# Patient Record
Sex: MALE | Race: WHITE | Employment: OTHER | URBAN - METROPOLITAN AREA
[De-identification: names, ages, dates, MRNs, and addresses within clinical notes are randomized per-mention and may not be internally consistent; named-entity substitution may affect disease eponyms.]

---

## 2018-01-15 ENCOUNTER — TELEPHONE (OUTPATIENT)
Dept: INTERNAL MEDICINE CLINIC | Facility: CLINIC | Age: 78
End: 2018-01-15

## 2018-01-15 NOTE — TELEPHONE ENCOUNTER
Gianfranco Alvarado is calling to have Dr. Herlinda Lozada request records for [employer].  # [phone] STAT   Ph. # [phone]  [facility] ELISE Valentine's ph. # [phone]    Routed to clinical

## 2018-01-15 NOTE — TELEPHONE ENCOUNTER
Deion advissonali - patient was in [facility] in October 2017  For pneumonia and also an aneurysm was detected that has grown - he wants the records from there send to DR. CODY - is it ok to just call [facility]? To DR. CODY

## 2018-01-16 NOTE — TELEPHONE ENCOUNTER
Called [facility] and spoke with personnel in medical records. She requested that we fax over a letter head with patients name/ and requested information to [phone]. I faxed over letter head and received confirmation that fax went through. To DR. ESCOBAR mancuso

## 2018-01-16 NOTE — TELEPHONE ENCOUNTER
Noted.  I do not know this patient. He has never been seen by me. Usually to obtain records from another hospital we need to have the patient's permission.   Please contact [facility] to see if they can forward records concerning the patient's hospi

## 2018-01-17 NOTE — TELEPHONE ENCOUNTER
Wife - Rey Gipson called  Please re fax  request to [facility] for pt's medical records -  requesting most recent records

## 2018-01-18 ENCOUNTER — OFFICE VISIT (OUTPATIENT)
Dept: INTERNAL MEDICINE CLINIC | Facility: CLINIC | Age: 78
End: 2018-01-18

## 2018-01-18 VITALS
BODY MASS INDEX: 29.92 KG/M2 | WEIGHT: 202 LBS | HEART RATE: 96 BPM | OXYGEN SATURATION: 91 % | SYSTOLIC BLOOD PRESSURE: 100 MMHG | TEMPERATURE: 98 F | DIASTOLIC BLOOD PRESSURE: 66 MMHG | HEIGHT: 69 IN

## 2018-01-18 DIAGNOSIS — I71.4 ABDOMINAL AORTIC ANEURYSM (AAA) WITHOUT RUPTURE (HCC): ICD-10-CM

## 2018-01-18 DIAGNOSIS — E03.9 HYPOTHYROIDISM, UNSPECIFIED TYPE: ICD-10-CM

## 2018-01-18 DIAGNOSIS — N40.1 BENIGN PROSTATIC HYPERPLASIA WITH LOWER URINARY TRACT SYMPTOMS, SYMPTOM DETAILS UNSPECIFIED: ICD-10-CM

## 2018-01-18 DIAGNOSIS — I10 ESSENTIAL HYPERTENSION: ICD-10-CM

## 2018-01-18 DIAGNOSIS — G81.94 LEFT HEMIPARESIS (HCC): ICD-10-CM

## 2018-01-18 DIAGNOSIS — Z00.00 ANNUAL PHYSICAL EXAM: Primary | ICD-10-CM

## 2018-01-18 DIAGNOSIS — J18.9 PNEUMONIA OF BOTH LOWER LOBES DUE TO INFECTIOUS ORGANISM: ICD-10-CM

## 2018-01-18 DIAGNOSIS — I69.359 CVA, OLD, HEMIPARESIS (HCC): ICD-10-CM

## 2018-01-18 DIAGNOSIS — E78.00 HYPERCHOLESTEROLEMIA: ICD-10-CM

## 2018-01-18 PROBLEM — I71.40 ABDOMINAL AORTIC ANEURYSM (AAA) WITHOUT RUPTURE: Status: ACTIVE | Noted: 2018-01-18

## 2018-01-18 PROBLEM — I71.40 ABDOMINAL AORTIC ANEURYSM (AAA) WITHOUT RUPTURE (HCC): Status: ACTIVE | Noted: 2018-01-18

## 2018-01-18 PROCEDURE — 99204 OFFICE O/P NEW MOD 45 MIN: CPT | Performed by: INTERNAL MEDICINE

## 2018-01-18 PROCEDURE — G0463 HOSPITAL OUTPT CLINIC VISIT: HCPCS | Performed by: INTERNAL MEDICINE

## 2018-01-18 RX ORDER — ATORVASTATIN CALCIUM 40 MG/1
40 TABLET, FILM COATED ORAL NIGHTLY
COMMUNITY
End: 2019-07-17

## 2018-01-18 RX ORDER — CLOPIDOGREL BISULFATE 75 MG/1
75 TABLET ORAL DAILY
COMMUNITY
Start: 2018-01-16 | End: 2018-11-14

## 2018-01-18 RX ORDER — IRBESARTAN 75 MG/1
1 TABLET ORAL DAILY
Status: ON HOLD | COMMUNITY
Start: 2018-01-16 | End: 2018-04-13

## 2018-01-18 RX ORDER — LEVOTHYROXINE SODIUM 0.1 MG/1
100 TABLET ORAL DAILY
COMMUNITY
Start: 2017-12-18 | End: 2019-01-04

## 2018-01-18 RX ORDER — TAMSULOSIN HYDROCHLORIDE 0.4 MG/1
1 CAPSULE ORAL DAILY
COMMUNITY
Start: 2018-01-04 | End: 2018-04-18

## 2018-01-18 RX ORDER — AMLODIPINE BESYLATE 5 MG/1
5 TABLET ORAL DAILY
COMMUNITY
Start: 2018-01-04 | End: 2018-08-08

## 2018-01-18 RX ORDER — ASPIRIN 81 MG/1
81 TABLET ORAL DAILY
COMMUNITY
End: 2018-04-10

## 2018-01-18 RX ORDER — ACETAMINOPHEN 500 MG
2 TABLET ORAL 3 TIMES DAILY
Status: ON HOLD | COMMUNITY
End: 2020-01-04 | Stop reason: CLARIF

## 2018-01-18 RX ORDER — AMOXICILLIN AND CLAVULANATE POTASSIUM 875; 125 MG/1; MG/1
1 TABLET, FILM COATED ORAL 2 TIMES DAILY
Qty: 20 TABLET | Refills: 1 | Status: SHIPPED | OUTPATIENT
Start: 2018-01-18 | End: 2018-01-28

## 2018-01-18 NOTE — PATIENT INSTRUCTIONS
1.  Patient is to continue his current diet, medications and activity. 2.  I will place the patient on Augmentin 875 mg orally twice daily for 10 days. 3.  I will plan to see the patient back in about 2 weeks with a chest x-ray at that time.   4.  I will

## 2018-01-19 NOTE — PROGRESS NOTES
Laurel Olvera is a 69year old male who presents for a complete physical exam.   HPI:   Mr. Misha Garvey is a 78-year-old white male who was seen by me on January 18, 2018 for his initial annual Medicare evaluation.   Mr. Kristel August was previously a patient o abdominal pain. Past history. Medical past history. Hypothyroidism, hypertension, hypercholesterolemia, BPH, prior CVA with stent placement. There is no history of diabetes mellitus. Surgical past history.   Patient had a cholecystectomy in the past Lung       Social History:  Smoking status: Former Smoker                                                              Packs/day: 2.00      Years: 0.00         Quit date: 1/18/2008  Smokeless tobacco: Never Used                      Alcohol use:  Yes obvious left hemiparesis. Deep tendon reflexes were 1+ on the right and 2+ on the left. The Achilles reflexes are absent bilaterally. .    ASSESSMENT AND PLAN:   1. Annual physical exam  Patient appeared stable at this time.   Patient does have a chronic c hypertension  Doing well.  CPM.    7. Hypercholesterolemia  Stable. CPM.  We will follow-up on this in the future with blood tests. 8. Hypothyroidism, unspecified type  Stable. CPM.  We will follow-up on this in the future with blood tests.     [address] [place] [address] previous visit. No flowsheet data found. Fecal Occult Blood Annually No results found for: FOB, OCCULTSTOOL No flowsheet data found.     Glaucoma Screening      Ophthalmology Visit Annually Pt sees his Ophthalmologist..    Prostate Cancer Screening

## 2018-01-28 ENCOUNTER — HOSPITAL ENCOUNTER (OUTPATIENT)
Dept: GENERAL RADIOLOGY | Age: 78
Discharge: HOME | End: 2018-01-28
Attending: INTERNAL MEDICINE
Payer: MEDICARE

## 2018-01-28 DIAGNOSIS — J18.9 PNEUMONIA OF BOTH LOWER LOBES DUE TO INFECTIOUS ORGANISM: ICD-10-CM

## 2018-01-28 PROCEDURE — 71046 X-RAY EXAM CHEST 2 VIEWS: CPT | Performed by: INTERNAL MEDICINE

## 2018-02-01 ENCOUNTER — OFFICE VISIT (OUTPATIENT)
Dept: INTERNAL MEDICINE CLINIC | Facility: CLINIC | Age: 78
End: 2018-02-01

## 2018-02-01 VITALS
HEART RATE: 80 BPM | SYSTOLIC BLOOD PRESSURE: 124 MMHG | DIASTOLIC BLOOD PRESSURE: 70 MMHG | OXYGEN SATURATION: 94 % | HEIGHT: 69 IN | BODY MASS INDEX: 34.69 KG/M2 | WEIGHT: 234.19 LBS | TEMPERATURE: 98 F

## 2018-02-01 DIAGNOSIS — I69.359 CVA, OLD, HEMIPARESIS (HCC): ICD-10-CM

## 2018-02-01 DIAGNOSIS — J18.9 PNEUMONIA OF BOTH LOWER LOBES DUE TO INFECTIOUS ORGANISM: Primary | ICD-10-CM

## 2018-02-01 DIAGNOSIS — G81.94 LEFT HEMIPARESIS (HCC): ICD-10-CM

## 2018-02-01 DIAGNOSIS — E03.9 HYPOTHYROIDISM, UNSPECIFIED TYPE: ICD-10-CM

## 2018-02-01 DIAGNOSIS — R53.83 FATIGUE, UNSPECIFIED TYPE: ICD-10-CM

## 2018-02-01 DIAGNOSIS — D64.9 ANEMIA, UNSPECIFIED TYPE: ICD-10-CM

## 2018-02-01 DIAGNOSIS — E78.00 HYPERCHOLESTEROLEMIA: ICD-10-CM

## 2018-02-01 DIAGNOSIS — I10 ESSENTIAL HYPERTENSION: ICD-10-CM

## 2018-02-01 DIAGNOSIS — I71.4 ABDOMINAL AORTIC ANEURYSM (AAA) WITHOUT RUPTURE (HCC): ICD-10-CM

## 2018-02-01 DIAGNOSIS — N40.1 BENIGN PROSTATIC HYPERPLASIA WITH LOWER URINARY TRACT SYMPTOMS, SYMPTOM DETAILS UNSPECIFIED: ICD-10-CM

## 2018-02-01 PROCEDURE — G0463 HOSPITAL OUTPT CLINIC VISIT: HCPCS | Performed by: INTERNAL MEDICINE

## 2018-02-01 PROCEDURE — 99214 OFFICE O/P EST MOD 30 MIN: CPT | Performed by: INTERNAL MEDICINE

## 2018-02-01 NOTE — PROGRESS NOTES
Cordelia Smith is a 69year old male. Patient presents with: Follow - Up: 2 week f/u for pneumonia. Completed course of abx. Reports cough is better (non-productive). Denies SOB or breathing difficulty. Had f/u CXR done.   Cough  CVA  Hypertension  Hyperlip vomiting, diarrhea, or constipation  :No Urinary complaints  EXT:No complaints of pain or swelling in patient's legs    EXAM:   /70 (BP Location: Right arm, Patient Position: Sitting)   Pulse 84   Temp 97.7 °F (36.5 °C) (Oral)   Ht 5' 9\" (1.753 m) activity. I will plan to see the patient back in 6 weeks with blood tests which will include a CBC, CMP and a lipid panel.   Patient has had prior blood test performed at [facility] last August.  I will not need to repeat his TSH or PSA at this time

## 2018-02-01 NOTE — PATIENT INSTRUCTIONS
1.  Patient is to continue his current diet, medications and activity. 2.  I will plan to see the patient back in about 6 weeks with blood tests which will include a CBC, CMP, and lipid panel. 3.  I will see the patient back sooner as necessary.   4.  I raymond

## 2018-02-08 ENCOUNTER — TELEPHONE (OUTPATIENT)
Dept: INTERNAL MEDICINE CLINIC | Facility: CLINIC | Age: 78
End: 2018-02-08

## 2018-02-08 NOTE — TELEPHONE ENCOUNTER
Per DR. CODY's LOV:  4. Abdominal aortic aneurysm (AAA) without rupture [facility])  Patient has a 5.4 centimeter abdominal aortic aneurysm which has been noted at [facility].  Patient and his wife prefer to come to [facility] in the future.

## 2018-02-08 NOTE — TELEPHONE ENCOUNTER
Alexandra from Dr. [facility] office is calling to get records. Pt. Was referred to Dr. Jeremiah Park for an abdominal aortic aneurysm. They need CT of the abdomen & pelvis & u/s of the aorta reports. Pt's wife thinks Dr. Fausto Palacio may have the images as well.    Ph. # [phone]

## 2018-02-12 NOTE — TELEPHONE ENCOUNTER
Spoke with Cole Tirado regarding results from [address] [place] [address]?  She will make a note for Lin Adam

## 2018-02-12 NOTE — TELEPHONE ENCOUNTER
Emy from Dr. Astorga Rising office called stating she was told that the pt. Brought in results of a CT abdomen and pelvis and a stress test that he had done at [facility]. She needs those results.   She has access to everything in Epic, but needs these

## 2018-02-13 NOTE — TELEPHONE ENCOUNTER
Spoke with patient's wife, Nanette Redmond, and informed her of lag time between receiving records and being able to view them in EMR. She verbalized understanding, but states she brought in records from [place] about a month ago.  Will search in Dr. Wali Vincent office and u

## 2018-02-13 NOTE — TELEPHONE ENCOUNTER
Located records on Dr. Tae Grayson desk. Patient's wife notified. Reviewed what patient would like sent to Dr. Leo Srivastava. Cardiac-related testing faxed to Dr. Leo Srivastava per patient request for continuity of care. Alexandra at Dr. Renay Witt office notified also by phone.

## 2018-02-13 NOTE — TELEPHONE ENCOUNTER
Radha Pepe is calling to check status of records that were supposed to be faxed to Dr. Jimi Boston office. CT of the abdomen & pelvis & u/s of aorta. Pt.  Wants any and all records that came from [facility] sent over to Dr. Jimi Boston office she also wants t

## 2018-03-22 NOTE — PLAN OF CARE
Vascular & Interventional Radiology   Pre-procedure Instructions      Cordelia Sarah  [id number]  6/24/1940  69year old      · You are scheduled to have a AAA Repair on 4/12/18  · Do NOT eat or drink anything after Midnight  · Do NOT take the following me

## 2018-03-30 ENCOUNTER — TELEPHONE (OUTPATIENT)
Dept: INTERNAL MEDICINE CLINIC | Facility: CLINIC | Age: 78
End: 2018-03-30

## 2018-03-30 RX ORDER — AMOXICILLIN AND CLAVULANATE POTASSIUM 875; 125 MG/1; MG/1
1 TABLET, FILM COATED ORAL 2 TIMES DAILY
Qty: 20 TABLET | Refills: 1 | Status: SHIPPED | OUTPATIENT
Start: 2018-03-30 | End: 2018-04-09

## 2018-03-30 NOTE — TELEPHONE ENCOUNTER
Noted.  I have called patient's spouse and left a message. Patient was previously treated with Augmentin earlier this year. I have sent a prescription for Augmentin to the patient's pharmacy.   Is for Augmentin 875 mg orally twice a day for 10 days with 1

## 2018-03-30 NOTE — TELEPHONE ENCOUNTER
Pt wife called, pt has had cold for about a week, over the counter not working, cough still persisting.  Pt had pneumonia in November  Pt is scheduled for surgery on 4/12/18 with Dr Lukas Pressley, trying to clear prior  Pt is scheduled for appt with Dr Jerome Scales on

## 2018-03-30 NOTE — TELEPHONE ENCOUNTER
Please advise - called spouse per hipaa , patient has persistent dry cough for about 1 week, denies fever. Had pneumonia in November 2017. Scheduled for surgery 4/12 with DR. Rayo Hong scheduled with  4/2  Should he start on any medications ?- to DR. CODY

## 2018-04-04 ENCOUNTER — LAB ENCOUNTER (OUTPATIENT)
Dept: LAB | Facility: HOSPITAL | Age: 78
End: 2018-04-04
Attending: THORACIC SURGERY (CARDIOTHORACIC VASCULAR SURGERY)
Payer: MEDICARE

## 2018-04-04 DIAGNOSIS — I10 ESSENTIAL HYPERTENSION: ICD-10-CM

## 2018-04-04 DIAGNOSIS — I71.4 ABDOMINAL AORTIC ANEURYSM (AAA) WITHOUT RUPTURE (HCC): ICD-10-CM

## 2018-04-04 DIAGNOSIS — R53.83 FATIGUE, UNSPECIFIED TYPE: ICD-10-CM

## 2018-04-04 DIAGNOSIS — Z01.818 PREOP TESTING: ICD-10-CM

## 2018-04-04 DIAGNOSIS — D64.9 ANEMIA, UNSPECIFIED TYPE: ICD-10-CM

## 2018-04-04 DIAGNOSIS — E78.00 HYPERCHOLESTEROLEMIA: ICD-10-CM

## 2018-04-04 PROCEDURE — 80061 LIPID PANEL: CPT

## 2018-04-04 PROCEDURE — 85610 PROTHROMBIN TIME: CPT

## 2018-04-04 PROCEDURE — 86901 BLOOD TYPING SEROLOGIC RH(D): CPT

## 2018-04-04 PROCEDURE — 36415 COLL VENOUS BLD VENIPUNCTURE: CPT

## 2018-04-04 PROCEDURE — 87086 URINE CULTURE/COLONY COUNT: CPT

## 2018-04-04 PROCEDURE — 87641 MR-STAPH DNA AMP PROBE: CPT

## 2018-04-04 PROCEDURE — 85730 THROMBOPLASTIN TIME PARTIAL: CPT

## 2018-04-04 PROCEDURE — 86900 BLOOD TYPING SEROLOGIC ABO: CPT

## 2018-04-04 PROCEDURE — 81001 URINALYSIS AUTO W/SCOPE: CPT

## 2018-04-04 PROCEDURE — 86850 RBC ANTIBODY SCREEN: CPT

## 2018-04-04 PROCEDURE — 85025 COMPLETE CBC W/AUTO DIFF WBC: CPT

## 2018-04-04 PROCEDURE — 80053 COMPREHEN METABOLIC PANEL: CPT

## 2018-04-06 ENCOUNTER — TELEPHONE (OUTPATIENT)
Dept: INTERNAL MEDICINE CLINIC | Facility: CLINIC | Age: 78
End: 2018-04-06

## 2018-04-06 NOTE — TELEPHONE ENCOUNTER
Telephone call to FILIBERTO. Message left for Diana who called me. Patient had a preoperative urinalysis that showed 33 white cells present. A reflux urine culture was done which reveals her to be no growth.   I have left a message for preadmission testing for

## 2018-04-10 ENCOUNTER — ANESTHESIA EVENT (OUTPATIENT)
Dept: SURGERY | Facility: HOSPITAL | Age: 78
DRG: 269 | End: 2018-04-10
Payer: MEDICARE

## 2018-04-10 RX ORDER — SODIUM CHLORIDE 9 MG/ML
3 INJECTION, SOLUTION INTRAVENOUS CONTINUOUS
Status: DISCONTINUED | OUTPATIENT
Start: 2018-04-12 | End: 2018-04-12

## 2018-04-12 ENCOUNTER — HOSPITAL ENCOUNTER (INPATIENT)
Dept: INTERVENTIONAL RADIOLOGY/VASCULAR | Facility: HOSPITAL | Age: 78
LOS: 1 days | Discharge: HOME | DRG: 269 | End: 2018-04-13
Attending: RADIOLOGY | Admitting: RADIOLOGY
Payer: MEDICARE

## 2018-04-12 ENCOUNTER — SURGERY (OUTPATIENT)
Age: 78
End: 2018-04-12

## 2018-04-12 ENCOUNTER — ANESTHESIA (OUTPATIENT)
Dept: SURGERY | Facility: HOSPITAL | Age: 78
DRG: 269 | End: 2018-04-12
Payer: MEDICARE

## 2018-04-12 DIAGNOSIS — I71.4 AAA (ABDOMINAL AORTIC ANEURYSM) (HCC): ICD-10-CM

## 2018-04-12 PROBLEM — I71.40 AAA (ABDOMINAL AORTIC ANEURYSM): Status: ACTIVE | Noted: 2018-04-12

## 2018-04-12 PROBLEM — I71.40 AAA (ABDOMINAL AORTIC ANEURYSM) (HCC): Status: ACTIVE | Noted: 2018-04-12

## 2018-04-12 PROCEDURE — B4171ZZ FLUOROSCOPY OF LEFT RENAL ARTERY USING LOW OSMOLAR CONTRAST: ICD-10-PCS | Performed by: RADIOLOGY

## 2018-04-12 PROCEDURE — 04V03D6 RESTRICTION OF ABDOMINAL AORTA, BIFURCATION, WITH INTRALUMINAL DEVICE, PERCUTANEOUS APPROACH: ICD-10-PCS | Performed by: THORACIC SURGERY (CARDIOTHORACIC VASCULAR SURGERY)

## 2018-04-12 PROCEDURE — B4101ZZ FLUOROSCOPY OF ABDOMINAL AORTA USING LOW OSMOLAR CONTRAST: ICD-10-PCS | Performed by: RADIOLOGY

## 2018-04-12 PROCEDURE — 04Q00ZZ REPAIR ABDOMINAL AORTA, OPEN APPROACH: ICD-10-PCS | Performed by: THORACIC SURGERY (CARDIOTHORACIC VASCULAR SURGERY)

## 2018-04-12 PROCEDURE — B41G1ZZ FLUOROSCOPY OF LEFT LOWER EXTREMITY ARTERIES USING LOW OSMOLAR CONTRAST: ICD-10-PCS | Performed by: RADIOLOGY

## 2018-04-12 PROCEDURE — 99232 SBSQ HOSP IP/OBS MODERATE 35: CPT | Performed by: HOSPITALIST

## 2018-04-12 PROCEDURE — B41F1ZZ FLUOROSCOPY OF RIGHT LOWER EXTREMITY ARTERIES USING LOW OSMOLAR CONTRAST: ICD-10-PCS | Performed by: RADIOLOGY

## 2018-04-12 PROCEDURE — 04QC0ZZ REPAIR RIGHT COMMON ILIAC ARTERY, OPEN APPROACH: ICD-10-PCS | Performed by: THORACIC SURGERY (CARDIOTHORACIC VASCULAR SURGERY)

## 2018-04-12 RX ORDER — BUPIVACAINE HYDROCHLORIDE 2.5 MG/ML
INJECTION, SOLUTION EPIDURAL; INFILTRATION; INTRACAUDAL
Status: COMPLETED
Start: 2018-04-12 | End: 2018-04-12

## 2018-04-12 RX ORDER — HYDROCODONE BITARTRATE AND ACETAMINOPHEN 5; 325 MG/1; MG/1
1 TABLET ORAL EVERY 4 HOURS PRN
Status: DISCONTINUED | OUTPATIENT
Start: 2018-04-12 | End: 2018-04-13

## 2018-04-12 RX ORDER — PROTAMINE SULFATE 10 MG/ML
INJECTION, SOLUTION INTRAVENOUS AS NEEDED
Status: DISCONTINUED | OUTPATIENT
Start: 2018-04-12 | End: 2018-04-12 | Stop reason: SURG

## 2018-04-12 RX ORDER — MORPHINE SULFATE 4 MG/ML
2 INJECTION, SOLUTION INTRAMUSCULAR; INTRAVENOUS EVERY 4 HOURS PRN
Status: DISCONTINUED | OUTPATIENT
Start: 2018-04-12 | End: 2018-04-13

## 2018-04-12 RX ORDER — ASPIRIN 81 MG/1
81 TABLET ORAL DAILY
Status: DISCONTINUED | OUTPATIENT
Start: 2018-04-13 | End: 2018-04-13

## 2018-04-12 RX ORDER — ACETAMINOPHEN 500 MG
1000 TABLET ORAL ONCE
Status: COMPLETED | OUTPATIENT
Start: 2018-04-12 | End: 2018-04-12

## 2018-04-12 RX ORDER — CEFAZOLIN SODIUM/WATER 2 G/20 ML
SYRINGE (ML) INTRAVENOUS AS NEEDED
Status: DISCONTINUED | OUTPATIENT
Start: 2018-04-12 | End: 2018-04-12 | Stop reason: SURG

## 2018-04-12 RX ORDER — HYDROCODONE BITARTRATE AND ACETAMINOPHEN 5; 325 MG/1; MG/1
2 TABLET ORAL AS NEEDED
Status: DISCONTINUED | OUTPATIENT
Start: 2018-04-12 | End: 2018-04-13

## 2018-04-12 RX ORDER — DOCUSATE SODIUM 100 MG/1
100 CAPSULE, LIQUID FILLED ORAL 2 TIMES DAILY
Status: DISCONTINUED | OUTPATIENT
Start: 2018-04-12 | End: 2018-04-13

## 2018-04-12 RX ORDER — HYDROCODONE BITARTRATE AND ACETAMINOPHEN 5; 325 MG/1; MG/1
1 TABLET ORAL AS NEEDED
Status: DISCONTINUED | OUTPATIENT
Start: 2018-04-12 | End: 2018-04-13

## 2018-04-12 RX ORDER — ROCURONIUM BROMIDE 10 MG/ML
INJECTION, SOLUTION INTRAVENOUS AS NEEDED
Status: DISCONTINUED | OUTPATIENT
Start: 2018-04-12 | End: 2018-04-12 | Stop reason: SURG

## 2018-04-12 RX ORDER — ONDANSETRON 2 MG/ML
4 INJECTION INTRAMUSCULAR; INTRAVENOUS EVERY 6 HOURS PRN
Status: DISCONTINUED | OUTPATIENT
Start: 2018-04-12 | End: 2018-04-13

## 2018-04-12 RX ORDER — ALFUZOSIN HYDROCHLORIDE 10 MG/1
10 TABLET, EXTENDED RELEASE ORAL
Status: DISCONTINUED | OUTPATIENT
Start: 2018-04-13 | End: 2018-04-13

## 2018-04-12 RX ORDER — MORPHINE SULFATE 10 MG/ML
6 INJECTION, SOLUTION INTRAMUSCULAR; INTRAVENOUS EVERY 10 MIN PRN
Status: DISCONTINUED | OUTPATIENT
Start: 2018-04-12 | End: 2018-04-13

## 2018-04-12 RX ORDER — MORPHINE SULFATE 4 MG/ML
6 INJECTION, SOLUTION INTRAMUSCULAR; INTRAVENOUS EVERY 4 HOURS PRN
Status: DISCONTINUED | OUTPATIENT
Start: 2018-04-12 | End: 2018-04-13

## 2018-04-12 RX ORDER — SODIUM CHLORIDE 9 MG/ML
INJECTION, SOLUTION INTRAVENOUS
Status: COMPLETED
Start: 2018-04-12 | End: 2018-04-12

## 2018-04-12 RX ORDER — HYDROMORPHONE HYDROCHLORIDE 1 MG/ML
0.6 INJECTION, SOLUTION INTRAMUSCULAR; INTRAVENOUS; SUBCUTANEOUS EVERY 5 MIN PRN
Status: DISCONTINUED | OUTPATIENT
Start: 2018-04-12 | End: 2018-04-13

## 2018-04-12 RX ORDER — LOSARTAN POTASSIUM 25 MG/1
25 TABLET ORAL DAILY
Status: DISCONTINUED | OUTPATIENT
Start: 2018-04-12 | End: 2018-04-13

## 2018-04-12 RX ORDER — LEVOTHYROXINE SODIUM 0.1 MG/1
100 TABLET ORAL
Status: DISCONTINUED | OUTPATIENT
Start: 2018-04-12 | End: 2018-04-13

## 2018-04-12 RX ORDER — SODIUM CHLORIDE, SODIUM LACTATE, POTASSIUM CHLORIDE, CALCIUM CHLORIDE 600; 310; 30; 20 MG/100ML; MG/100ML; MG/100ML; MG/100ML
INJECTION, SOLUTION INTRAVENOUS CONTINUOUS
Status: DISCONTINUED | OUTPATIENT
Start: 2018-04-12 | End: 2018-04-12

## 2018-04-12 RX ORDER — MORPHINE SULFATE 4 MG/ML
4 INJECTION, SOLUTION INTRAMUSCULAR; INTRAVENOUS EVERY 10 MIN PRN
Status: DISCONTINUED | OUTPATIENT
Start: 2018-04-12 | End: 2018-04-13

## 2018-04-12 RX ORDER — PROTAMINE SULFATE 10 MG/ML
INJECTION, SOLUTION INTRAVENOUS
Status: COMPLETED
Start: 2018-04-12 | End: 2018-04-12

## 2018-04-12 RX ORDER — HYDROMORPHONE HYDROCHLORIDE 1 MG/ML
0.2 INJECTION, SOLUTION INTRAMUSCULAR; INTRAVENOUS; SUBCUTANEOUS EVERY 5 MIN PRN
Status: DISCONTINUED | OUTPATIENT
Start: 2018-04-12 | End: 2018-04-13

## 2018-04-12 RX ORDER — FAMOTIDINE 20 MG/1
20 TABLET ORAL ONCE
Status: COMPLETED | OUTPATIENT
Start: 2018-04-12 | End: 2018-04-12

## 2018-04-12 RX ORDER — LIDOCAINE HYDROCHLORIDE 10 MG/ML
INJECTION, SOLUTION EPIDURAL; INFILTRATION; INTRACAUDAL; PERINEURAL AS NEEDED
Status: DISCONTINUED | OUTPATIENT
Start: 2018-04-12 | End: 2018-04-12 | Stop reason: SURG

## 2018-04-12 RX ORDER — HALOPERIDOL 5 MG/ML
0.25 INJECTION INTRAMUSCULAR ONCE AS NEEDED
Status: ACTIVE | OUTPATIENT
Start: 2018-04-12 | End: 2018-04-12

## 2018-04-12 RX ORDER — SODIUM CHLORIDE 0.9 % (FLUSH) 0.9 %
10 SYRINGE (ML) INJECTION AS NEEDED
Status: DISCONTINUED | OUTPATIENT
Start: 2018-04-12 | End: 2018-04-13

## 2018-04-12 RX ORDER — AMLODIPINE BESYLATE 5 MG/1
5 TABLET ORAL DAILY
Status: DISCONTINUED | OUTPATIENT
Start: 2018-04-12 | End: 2018-04-13

## 2018-04-12 RX ORDER — DEXTROSE AND SODIUM CHLORIDE 5; .45 G/100ML; G/100ML
INJECTION, SOLUTION INTRAVENOUS CONTINUOUS
Status: DISCONTINUED | OUTPATIENT
Start: 2018-04-12 | End: 2018-04-13

## 2018-04-12 RX ORDER — MORPHINE SULFATE 4 MG/ML
4 INJECTION, SOLUTION INTRAMUSCULAR; INTRAVENOUS EVERY 4 HOURS PRN
Status: DISCONTINUED | OUTPATIENT
Start: 2018-04-12 | End: 2018-04-13

## 2018-04-12 RX ORDER — ONDANSETRON 2 MG/ML
4 INJECTION INTRAMUSCULAR; INTRAVENOUS ONCE AS NEEDED
Status: ACTIVE | OUTPATIENT
Start: 2018-04-12 | End: 2018-04-12

## 2018-04-12 RX ORDER — CEFAZOLIN SODIUM/WATER 2 G/20 ML
SYRINGE (ML) INTRAVENOUS
Status: COMPLETED
Start: 2018-04-12 | End: 2018-04-12

## 2018-04-12 RX ORDER — CLOPIDOGREL BISULFATE 75 MG/1
75 TABLET ORAL DAILY
Status: DISCONTINUED | OUTPATIENT
Start: 2018-04-13 | End: 2018-04-13

## 2018-04-12 RX ORDER — HYDROMORPHONE HYDROCHLORIDE 1 MG/ML
0.4 INJECTION, SOLUTION INTRAMUSCULAR; INTRAVENOUS; SUBCUTANEOUS EVERY 5 MIN PRN
Status: DISCONTINUED | OUTPATIENT
Start: 2018-04-12 | End: 2018-04-13

## 2018-04-12 RX ORDER — HYDROCODONE BITARTRATE AND ACETAMINOPHEN 5; 325 MG/1; MG/1
2 TABLET ORAL EVERY 4 HOURS PRN
Status: DISCONTINUED | OUTPATIENT
Start: 2018-04-12 | End: 2018-04-13

## 2018-04-12 RX ORDER — LEVOTHYROXINE SODIUM 0.05 MG/1
100 TABLET ORAL DAILY
Status: DISCONTINUED | OUTPATIENT
Start: 2018-04-13 | End: 2018-04-12

## 2018-04-12 RX ORDER — ONDANSETRON 2 MG/ML
INJECTION INTRAMUSCULAR; INTRAVENOUS AS NEEDED
Status: DISCONTINUED | OUTPATIENT
Start: 2018-04-12 | End: 2018-04-12 | Stop reason: SURG

## 2018-04-12 RX ORDER — HEPARIN SODIUM 1000 [USP'U]/ML
INJECTION, SOLUTION INTRAVENOUS; SUBCUTANEOUS
Status: COMPLETED
Start: 2018-04-12 | End: 2018-04-12

## 2018-04-12 RX ORDER — METOCLOPRAMIDE 10 MG/1
10 TABLET ORAL ONCE
Status: COMPLETED | OUTPATIENT
Start: 2018-04-12 | End: 2018-04-12

## 2018-04-12 RX ORDER — 0.9 % SODIUM CHLORIDE 0.9 %
VIAL (ML) INJECTION
Status: COMPLETED
Start: 2018-04-12 | End: 2018-04-12

## 2018-04-12 RX ORDER — IPRATROPIUM BROMIDE AND ALBUTEROL SULFATE 2.5; .5 MG/3ML; MG/3ML
3 SOLUTION RESPIRATORY (INHALATION) ONCE
Status: COMPLETED | OUTPATIENT
Start: 2018-04-12 | End: 2018-04-12

## 2018-04-12 RX ORDER — HEPARIN SODIUM 1000 [USP'U]/ML
INJECTION, SOLUTION INTRAVENOUS; SUBCUTANEOUS AS NEEDED
Status: DISCONTINUED | OUTPATIENT
Start: 2018-04-12 | End: 2018-04-12 | Stop reason: SURG

## 2018-04-12 RX ORDER — ATORVASTATIN CALCIUM 40 MG/1
40 TABLET, FILM COATED ORAL NIGHTLY
Status: DISCONTINUED | OUTPATIENT
Start: 2018-04-12 | End: 2018-04-13

## 2018-04-12 RX ORDER — NALOXONE HYDROCHLORIDE 0.4 MG/ML
80 INJECTION, SOLUTION INTRAMUSCULAR; INTRAVENOUS; SUBCUTANEOUS AS NEEDED
Status: ACTIVE | OUTPATIENT
Start: 2018-04-12 | End: 2018-04-12

## 2018-04-12 RX ORDER — BACITRACIN 50000 [USP'U]/1
INJECTION, POWDER, LYOPHILIZED, FOR SOLUTION INTRAMUSCULAR
Status: COMPLETED
Start: 2018-04-12 | End: 2018-04-12

## 2018-04-12 RX ORDER — HEPARIN SODIUM 5000 [USP'U]/ML
5000 INJECTION, SOLUTION INTRAVENOUS; SUBCUTANEOUS EVERY 12 HOURS SCHEDULED
Status: DISCONTINUED | OUTPATIENT
Start: 2018-04-13 | End: 2018-04-13

## 2018-04-12 RX ORDER — DEXAMETHASONE SODIUM PHOSPHATE 4 MG/ML
VIAL (ML) INJECTION AS NEEDED
Status: DISCONTINUED | OUTPATIENT
Start: 2018-04-12 | End: 2018-04-12 | Stop reason: SURG

## 2018-04-12 RX ORDER — MORPHINE SULFATE 4 MG/ML
2 INJECTION, SOLUTION INTRAMUSCULAR; INTRAVENOUS EVERY 10 MIN PRN
Status: DISCONTINUED | OUTPATIENT
Start: 2018-04-12 | End: 2018-04-13

## 2018-04-12 RX ORDER — ACETAMINOPHEN 325 MG/1
650 TABLET ORAL EVERY 4 HOURS PRN
Status: DISCONTINUED | OUTPATIENT
Start: 2018-04-12 | End: 2018-04-13

## 2018-04-12 RX ADMIN — METOCLOPRAMIDE 10 MG: 10 TABLET ORAL at 06:34:00

## 2018-04-12 RX ADMIN — DOCUSATE SODIUM 100 MG: 100 CAPSULE, LIQUID FILLED ORAL at 11:28:00

## 2018-04-12 RX ADMIN — ROCURONIUM BROMIDE 50 MG: 10 INJECTION, SOLUTION INTRAVENOUS at 07:40:00

## 2018-04-12 RX ADMIN — PROTAMINE SULFATE 50 MG: 10 INJECTION, SOLUTION INTRAVENOUS at 09:22:00

## 2018-04-12 RX ADMIN — SODIUM CHLORIDE 3 ML/KG/HR: 9 INJECTION, SOLUTION INTRAVENOUS at 06:22:00

## 2018-04-12 RX ADMIN — SODIUM CHLORIDE, SODIUM LACTATE, POTASSIUM CHLORIDE, CALCIUM CHLORIDE: 600; 310; 30; 20 INJECTION, SOLUTION INTRAVENOUS at 07:31:00

## 2018-04-12 RX ADMIN — DEXTROSE AND SODIUM CHLORIDE: 5; .45 INJECTION, SOLUTION INTRAVENOUS at 11:28:00

## 2018-04-12 RX ADMIN — HEPARIN SODIUM 10000 UNITS: 1000 INJECTION, SOLUTION INTRAVENOUS; SUBCUTANEOUS at 08:27:00

## 2018-04-12 RX ADMIN — LIDOCAINE HYDROCHLORIDE 50 MG: 10 INJECTION, SOLUTION EPIDURAL; INFILTRATION; INTRACAUDAL; PERINEURAL at 07:40:00

## 2018-04-12 RX ADMIN — AMLODIPINE BESYLATE 5 MG: 5 TABLET ORAL at 11:28:00

## 2018-04-12 RX ADMIN — DEXTROSE AND SODIUM CHLORIDE: 5; .45 INJECTION, SOLUTION INTRAVENOUS at 21:15:00

## 2018-04-12 RX ADMIN — ATORVASTATIN CALCIUM 40 MG: 40 TABLET, FILM COATED ORAL at 21:15:00

## 2018-04-12 RX ADMIN — 0.9 % SODIUM CHLORIDE 10 ML: 0.9 % VIAL (ML) INJECTION at 11:29:00

## 2018-04-12 RX ADMIN — ACETAMINOPHEN 1000 MG: 500 MG TABLET ORAL at 06:34:00

## 2018-04-12 RX ADMIN — DOCUSATE SODIUM 100 MG: 100 CAPSULE, LIQUID FILLED ORAL at 21:15:00

## 2018-04-12 RX ADMIN — IPRATROPIUM BROMIDE AND ALBUTEROL SULFATE 3 ML: 2.5; .5 SOLUTION RESPIRATORY (INHALATION) at 10:30:00

## 2018-04-12 RX ADMIN — ONDANSETRON 4 MG: 2 INJECTION INTRAMUSCULAR; INTRAVENOUS at 09:15:00

## 2018-04-12 RX ADMIN — SODIUM CHLORIDE 3 ML/KG/HR: 9 INJECTION, SOLUTION INTRAVENOUS at 07:27:00

## 2018-04-12 RX ADMIN — CEFAZOLIN SODIUM/WATER 2 G: 2 G/20 ML SYRINGE (ML) INTRAVENOUS at 07:46:00

## 2018-04-12 RX ADMIN — FAMOTIDINE 20 MG: 20 TABLET ORAL at 06:34:00

## 2018-04-12 RX ADMIN — DEXAMETHASONE SODIUM PHOSPHATE 4 MG: 4 MG/ML VIAL (ML) INJECTION at 07:40:00

## 2018-04-12 NOTE — PROGRESS NOTES
[facility]    Progress Note    Mandeep Pappas Patient Status:  Inpatient    1940 MRN [id number]   Location [facility] 2W/SW Attending No Montiel MD   Hosp Day # 0 PCP Stephan Light MD     Subjective:     [facility]stit[facility] HOME MEDS. Benign prostatic hyperplasia with lower urinary tract symptoms  CONT HOME MEDS, MONITOR FOR URINE RETENTION.          Results:     Lab Results  Component Value Date   WBC 12.2 (H) 04/12/2018   HGB 12.3 (L) 04/12/2018   HCT 37.3 (L) 04/12/20

## 2018-04-12 NOTE — PLAN OF CARE
Impaired Activities of Daily Living    • Achieve highest/safest level of independence in self care Not Progressing    Patient on bedrest until 0800 on 4/13/18    MUSCULOSKELETAL - ADULT    • Return mobility to safest level of function Not Progressing    Pa

## 2018-04-12 NOTE — OPERATIVE REPORT
[facility]  Operative Note     Fortino Garner Location: OR   [facility] [id number] MRN [id number]   Admission Date 4/12/2018 Operation Date 4/12/2018   Attending Physician Shamar Page MD Operating Physician Jerry Buckner MD Olive Dry access the gate and then expanded the endurance to limb down to the left distal common iliac. I then placed the endurance to limb on the Epley side and ended just above the takeoff of the right internal iliac.   All the areas of the graft were balloon

## 2018-04-12 NOTE — ANESTHESIA POSTPROCEDURE EVALUATION
Patient: Danie Stewart    Procedure Summary     Date:  04/12/18 Room / Location:  [facility] OR  / [facility] OR; [facility]    Anesthesia Start:  4631 Anesthesia Stop:  3871    Procedures:       ABDOMINAL AORTIC ANEURYSM [address]

## 2018-04-12 NOTE — ANESTHESIA PREPROCEDURE EVALUATION
Anesthesia PreOp Note    HPI:     Mandeep Pappas is a 69year old male who presents for preoperative consultation requested by: Herimnio Boothe MD    Date of Surgery: 4/12/2018    Procedure(s):  ABDOMINAL AORTIC ANEURYSM ENDOSCOPIC  Indication: abdomin atorvastatin 40 MG Oral Tab Take 40 mg by mouth nightly. Disp:  Rfl:  4/11/2018 at Unknown time   Coenzyme Q10 (CO Q 10 OR) Take 1 tablet by mouth daily. Disp:  Rfl:  4/11/2018 at Unknown time   tamsulosin HCl 0.4 MG Oral Cap Take 1 capsule by mouth daily. weight is 104.3 kg (230 lb). His oral temperature is 97.7 °F (36.5 °C). His blood pressure is 126/78 and his pulse is 67. His respiration is 22 and oxygen saturation is 93%.     04/10/18  1007 04/12/18  0627   BP:  126/78   BP Location:  Left arm   Pulse:

## 2018-04-12 NOTE — ANESTHESIA PROCEDURE NOTES
Arterial Line  Performed by: Homer Wood by: Guido Gonsalves     Procedure Start:  4/12/2018 8:41 AM  Procedure End:  4/12/2018 8:41 AM  Site Identification: surface landmarks    Patient Location:  OR  Indication: continuous blood pressure

## 2018-04-13 ENCOUNTER — APPOINTMENT (OUTPATIENT)
Dept: GENERAL RADIOLOGY | Facility: HOSPITAL | Age: 78
DRG: 269 | End: 2018-04-13
Attending: CLINICAL NURSE SPECIALIST
Payer: MEDICARE

## 2018-04-13 ENCOUNTER — MED REC SCAN ONLY (OUTPATIENT)
Dept: INTERNAL MEDICINE CLINIC | Facility: CLINIC | Age: 78
End: 2018-04-13

## 2018-04-13 VITALS
BODY MASS INDEX: 34 KG/M2 | SYSTOLIC BLOOD PRESSURE: 98 MMHG | OXYGEN SATURATION: 91 % | TEMPERATURE: 98 F | WEIGHT: 234.19 LBS | HEART RATE: 93 BPM | DIASTOLIC BLOOD PRESSURE: 51 MMHG | RESPIRATION RATE: 16 BRPM

## 2018-04-13 PROCEDURE — 99239 HOSP IP/OBS DSCHRG MGMT >30: CPT | Performed by: HOSPITALIST

## 2018-04-13 PROCEDURE — 71045 X-RAY EXAM CHEST 1 VIEW: CPT | Performed by: CLINICAL NURSE SPECIALIST

## 2018-04-13 RX ORDER — IRBESARTAN 75 MG/1
75 TABLET ORAL DAILY
Refills: 0 | Status: SHIPPED | COMMUNITY
Start: 2018-04-13 | End: 2019-09-20

## 2018-04-13 RX ORDER — LOSARTAN POTASSIUM 25 MG/1
25 TABLET ORAL DAILY
Qty: 30 TABLET | Refills: 0 | Status: SHIPPED | OUTPATIENT
Start: 2018-04-14 | End: 2018-04-13

## 2018-04-13 RX ORDER — HYDROCODONE BITARTRATE AND ACETAMINOPHEN 5; 325 MG/1; MG/1
2 TABLET ORAL AS NEEDED
Qty: 30 TABLET | Refills: 0 | Status: SHIPPED | OUTPATIENT
Start: 2018-04-13 | End: 2018-05-18 | Stop reason: ALTCHOICE

## 2018-04-13 RX ORDER — ASPIRIN 81 MG/1
81 TABLET ORAL DAILY
Refills: 0 | Status: SHIPPED | COMMUNITY
Start: 2018-04-14

## 2018-04-13 RX ADMIN — CLOPIDOGREL BISULFATE 75 MG: 75 TABLET ORAL at 08:13:00

## 2018-04-13 RX ADMIN — HEPARIN SODIUM 5000 UNITS: 5000 INJECTION, SOLUTION INTRAVENOUS; SUBCUTANEOUS at 08:12:00

## 2018-04-13 RX ADMIN — AMLODIPINE BESYLATE 5 MG: 5 TABLET ORAL at 08:13:00

## 2018-04-13 RX ADMIN — LOSARTAN POTASSIUM 25 MG: 25 TABLET ORAL at 08:13:00

## 2018-04-13 RX ADMIN — DOCUSATE SODIUM 100 MG: 100 CAPSULE, LIQUID FILLED ORAL at 08:13:00

## 2018-04-13 RX ADMIN — ALFUZOSIN HYDROCHLORIDE 10 MG: 10 TABLET, EXTENDED RELEASE ORAL at 08:13:00

## 2018-04-13 RX ADMIN — LEVOTHYROXINE SODIUM 100 MCG: 0.1 TABLET ORAL at 08:13:00

## 2018-04-13 RX ADMIN — ASPIRIN 81 MG: 81 TABLET ORAL at 08:13:00

## 2018-04-13 NOTE — CM/SW NOTE
Care Management Discharge Planning/ possible home O2 need Evaluation:  Assessment:  S/P AAA stent repair-doing well    Anticipated Discharge Plan: pt from home w/wife, ambulates with cane prn, has home O2 from the past November 2017, that he never used per

## 2018-04-13 NOTE — DISCHARGE SUMMARY
[facility] HOSPITALIST  DISCHARGE SUMMARY     María Stark Patient Status:  Inpatient    1940 MRN [id number]   Location [facility] 2W/SW Attending No att. providers found   [facility] Day # 1 PCP Paulette Damian MD     DATE OF ADMISSION:  normal deficit. Coordination  and gait normal.   MS: No joint effusions. No peripheral edema. Skin: Skin is warm and dry. No rashes, erythema, diaphoresis. Psych: Normal mood and affect.  Behavior and judgment normal.     DISCHARGE MEDICATIONS     Disch medications  · HYDROcodone-acetaminophen 5-325 MG Tabs         CONSULTANTS      FOLLOW UP:  Don Griffiths MD  [address] [facility] [place] [facility] [address] [id number] [phone]      4/25/18 @ 1130am    The above plan and follow-up instructions were reviewe

## 2018-04-13 NOTE — PLAN OF CARE
Patient/Family Goals    • Patient/Family Short Term Goal Progressing    Surgery completed, planning on discharge today if possible.     RESPIRATORY - ADULT    • Achieves optimal ventilation and oxygenation Progressing    Incentive spirometry initiated by RT

## 2018-04-13 NOTE — PROGRESS NOTES
[facility]    Progress Note    Doverkiara Simons Patient Status:  Inpatient    1940 MRN [id number]   [facility]W/[facility] Attending Zach Cuenca MD   Hosp Day # 1 PCP Yoli Jett MD     Subjective:  Pt.  Sitting i around site. Neurological:  AAOx3, MAEW    Assessment/Plan:  S/P AAA STENT REPAIR POD # 1  Encourage pulm toilet: IS- able to perform approx 750cc with IS. Pt. Currently on 2 liters oxygen w/O2 sats= low 90s.  He does not use oxygen at home at this time h

## 2018-04-15 ENCOUNTER — TELEPHONE (OUTPATIENT)
Dept: INTERNAL MEDICINE CLINIC | Facility: CLINIC | Age: 78
End: 2018-04-15

## 2018-04-15 NOTE — TELEPHONE ENCOUNTER
Telephone call to pt and discussed with pt's wife. Pt had an endovascular repair of his AAA. Prior to leaving the hospital pt had a Chest X-ray which showed pt to have bibasilar atelectasis(?pneumonia). Pt feels OK.   He is using his incentive spirometry

## 2018-04-16 ENCOUNTER — PATIENT OUTREACH (OUTPATIENT)
Dept: CASE MANAGEMENT | Age: 78
End: 2018-04-16

## 2018-04-16 RX ORDER — MULTIVIT-MIN/IRON/FOLIC ACID/K 18-600-40
1 CAPSULE ORAL DAILY
COMMUNITY

## 2018-04-16 NOTE — PROGRESS NOTES
Initial Post Discharge Follow Up   Discharge Date: 4/13/18  Contact Date: 4/16/2018    Consent Verification:  Assessment Completed With: Other: Daughter Gaston Dhillon received per patient?  verbal  HIPAA Verified?   Yes    Discharge Dx:    AAA (abdomin Chew Tab Chew 2 tablets by mouth 2 (two) times daily. Disp:  Rfl:    atorvastatin 40 MG Oral Tab Take 40 mg by mouth nightly. Disp:  Rfl:    Coenzyme Q10 (CO Q 10 OR) Take 1 tablet by mouth daily.  Disp:  Rfl:      • When you were leaving the hospital were reviewed and discussed. Any changes or updates to medications and or orders sent to PCP.

## 2018-04-16 NOTE — PROGRESS NOTES
Lamberto [phone] for post hospital follow up, [facility] contact information provided. TCM book by date 4/27/2018.

## 2018-04-18 ENCOUNTER — LAB ENCOUNTER (OUTPATIENT)
Dept: LAB | Age: 78
End: 2018-04-18
Attending: INTERNAL MEDICINE
Payer: MEDICARE

## 2018-04-18 ENCOUNTER — HOSPITAL ENCOUNTER (OUTPATIENT)
Dept: GENERAL RADIOLOGY | Age: 78
Discharge: HOME | End: 2018-04-18
Attending: INTERNAL MEDICINE | Admitting: INTERNAL MEDICINE
Payer: MEDICARE

## 2018-04-18 ENCOUNTER — OFFICE VISIT (OUTPATIENT)
Dept: INTERNAL MEDICINE CLINIC | Facility: CLINIC | Age: 78
End: 2018-04-18

## 2018-04-18 VITALS
TEMPERATURE: 98 F | HEART RATE: 84 BPM | WEIGHT: 215 LBS | HEIGHT: 70 IN | DIASTOLIC BLOOD PRESSURE: 60 MMHG | BODY MASS INDEX: 30.78 KG/M2 | SYSTOLIC BLOOD PRESSURE: 120 MMHG | OXYGEN SATURATION: 95 %

## 2018-04-18 DIAGNOSIS — N40.1 BENIGN PROSTATIC HYPERPLASIA WITH LOWER URINARY TRACT SYMPTOMS, SYMPTOM DETAILS UNSPECIFIED: ICD-10-CM

## 2018-04-18 DIAGNOSIS — I69.359 CVA, OLD, HEMIPARESIS (HCC): ICD-10-CM

## 2018-04-18 DIAGNOSIS — G81.94 LEFT HEMIPARESIS (HCC): ICD-10-CM

## 2018-04-18 DIAGNOSIS — Z98.890 STATUS POST PERCUTANEOUS ABDOMINAL AORTIC ANEURYSM (AAA) REPAIR: ICD-10-CM

## 2018-04-18 DIAGNOSIS — J18.9 PNEUMONIA OF BOTH LOWER LOBES DUE TO INFECTIOUS ORGANISM: ICD-10-CM

## 2018-04-18 DIAGNOSIS — Z86.79 STATUS POST PERCUTANEOUS ABDOMINAL AORTIC ANEURYSM (AAA) REPAIR: ICD-10-CM

## 2018-04-18 DIAGNOSIS — E03.9 HYPOTHYROIDISM, UNSPECIFIED TYPE: ICD-10-CM

## 2018-04-18 DIAGNOSIS — I71.4 ABDOMINAL AORTIC ANEURYSM (AAA) WITHOUT RUPTURE (HCC): Primary | ICD-10-CM

## 2018-04-18 DIAGNOSIS — E78.00 HYPERCHOLESTEROLEMIA: ICD-10-CM

## 2018-04-18 DIAGNOSIS — I10 ESSENTIAL HYPERTENSION: ICD-10-CM

## 2018-04-18 PROBLEM — I71.40 AAA (ABDOMINAL AORTIC ANEURYSM) (HCC): Status: RESOLVED | Noted: 2018-04-12 | Resolved: 2018-04-18

## 2018-04-18 PROBLEM — Z95.828 STATUS POST REPAIR OF ABDOMINAL AORTIC ANEURYSM (AAA) USING BIFURCATION GRAFT: Status: ACTIVE | Noted: 2018-04-18

## 2018-04-18 PROBLEM — I71.40 AAA (ABDOMINAL AORTIC ANEURYSM): Status: RESOLVED | Noted: 2018-04-12 | Resolved: 2018-04-18

## 2018-04-18 PROCEDURE — 99495 TRANSJ CARE MGMT MOD F2F 14D: CPT | Performed by: INTERNAL MEDICINE

## 2018-04-18 PROCEDURE — 71046 X-RAY EXAM CHEST 2 VIEWS: CPT | Performed by: INTERNAL MEDICINE

## 2018-04-18 PROCEDURE — 85025 COMPLETE CBC W/AUTO DIFF WBC: CPT

## 2018-04-18 PROCEDURE — 80048 BASIC METABOLIC PNL TOTAL CA: CPT

## 2018-04-18 PROCEDURE — 36415 COLL VENOUS BLD VENIPUNCTURE: CPT

## 2018-04-18 NOTE — PROGRESS NOTES
Rere Gutierrez is a 69year old male. Patient presents with:  Hospital F/U: Patient was admitted to [address] [place] [address] for AAA repair by Dr. Josette Batres on 4/12/2018. Patient states doing well other than discomfort at the incision site.    TCM (Transition Of Care Management) needed for Pain. Disp: 30 tablet Rfl: 0   aspirin 81 MG Oral Tab EC Take 1 tablet (81 mg total) by mouth daily. Disp:  Rfl: 0   Irbesartan 75 MG Oral Tab Take 1 tablet (75 mg total) by mouth daily.  Disp:  Rfl: 0   Clopidogrel Bisulfate 75 MG Oral Tab Take Eyes are normal  NECK: supple,no lymphadenopathy or masses, no bruits  CHEST: Well-developed male. LUNGS: clear to auscultation with rales present over his right lower lung area.   CARDIO: RRR, normal S1S2, without murmur   GI:Protuberant, BS are present, Date: 4/12/2018  Discharge Date: 4/13/2018  Hospital Discharge Diagnosis:    Abdominal Aortic Aneurysm Repair    Interactive contact within 2 business days post discharge first initiated on Date: 4/16/2018    During the visit, the following was completed: previous pneumonia in the past few months. Pt has had no fever or chills. He is using his incentive spirometry 4 times a day. He feels that he is doing well at this time. Allergies:  He has No Known Allergies.     Current Meds:    Current Outpatient P sinus pain or ST  LUNGS: denies shortness of breath with exertion  CARDIOVASCULAR: denies chest pain on exertion or palpitations  GI: denies abdominal pain, denies heartburn, denies diarrhea  MUSCULOSKELETAL: denies pain, normal range of motion of extremit [facility])    Essential hypertension    Hypercholesterolemia    Hypothyroidism, unspecified type    Benign prostatic hyperplasia with lower urinary tract symptoms, symptom details unspecified    Status post percutaneous abdominal aortic aneurysm (AAA) repair

## 2018-04-18 NOTE — PATIENT INSTRUCTIONS
1.  Patient is to continue his current diet, medication and activity. 2.  I will obtain a chest x-ray on the patient today. 3.  I will also obtain a CBC and BMP today. 4.  I will plan to see the patient back in 1 month.

## 2018-04-18 NOTE — PROGRESS NOTES
Master Sexton is a 69year old male. Patient presents with:  Hospital F/U: Patient was admitted to [facility] for AAA repair by Dr. Shukri Mirza on 4/12/2018. Patient states doing well other than discomfort at the incision site.    TCM (Transition Of Care Management) 1/18/2008  Smokeless tobacco: Never Used                      Alcohol use: Yes           0.6 oz/week     Standard drinks or equivalent: 1 per week       REVIEW OF SYSTEMS:   GENERAL HEALTH: feels well otherwise  RESPIRATORY:No cough or SOB  CARDIOVASCULAR:

## 2018-04-19 ENCOUNTER — TELEPHONE (OUTPATIENT)
Dept: INTERNAL MEDICINE CLINIC | Facility: CLINIC | Age: 78
End: 2018-04-19

## 2018-04-19 DIAGNOSIS — J98.11 ATELECTASIS: Primary | ICD-10-CM

## 2018-04-19 NOTE — TELEPHONE ENCOUNTER
Pt had chest x-rays yesterday. Wen Khan would like to know the results.      To [employer]

## 2018-04-19 NOTE — TELEPHONE ENCOUNTER
Telephone call to patient's wife. I reviewed the patient's chest x-ray and chest x-ray report. The patient's x-ray appears improved to me. This chest x-ray report feels that the bilateral lower lobe opacifications have improved but not resolved.   The pa

## 2018-05-11 ENCOUNTER — HOSPITAL ENCOUNTER (OUTPATIENT)
Dept: GENERAL RADIOLOGY | Age: 78
Discharge: HOME | End: 2018-05-11
Attending: INTERNAL MEDICINE
Payer: MEDICARE

## 2018-05-11 DIAGNOSIS — J98.11 ATELECTASIS: ICD-10-CM

## 2018-05-11 PROCEDURE — 71046 X-RAY EXAM CHEST 2 VIEWS: CPT | Performed by: INTERNAL MEDICINE

## 2018-05-12 ENCOUNTER — TELEPHONE (OUTPATIENT)
Dept: INTERNAL MEDICINE CLINIC | Facility: CLINIC | Age: 78
End: 2018-05-12

## 2018-05-12 NOTE — TELEPHONE ENCOUNTER
Please call patient/wife and let them know of the patient's recent chest x-ray is clear. The chest x-ray appears normal.  There is no evidence of any residual pneumonia. I will plan see the patient is scheduled later this week.   I will forward this to nu

## 2018-05-16 NOTE — TELEPHONE ENCOUNTER
Spoke with Casey wife Cata Francis to relay normal results; She will pass on good news to Mickey Lockwood and they will keep the 5/18 OV already on the schedule.

## 2018-05-18 ENCOUNTER — OFFICE VISIT (OUTPATIENT)
Dept: INTERNAL MEDICINE CLINIC | Facility: CLINIC | Age: 78
End: 2018-05-18

## 2018-05-18 VITALS
SYSTOLIC BLOOD PRESSURE: 110 MMHG | TEMPERATURE: 98 F | HEIGHT: 70 IN | BODY MASS INDEX: 32.78 KG/M2 | DIASTOLIC BLOOD PRESSURE: 70 MMHG | OXYGEN SATURATION: 97 % | HEART RATE: 80 BPM | WEIGHT: 229 LBS

## 2018-05-18 DIAGNOSIS — I69.359 CVA, OLD, HEMIPARESIS (HCC): ICD-10-CM

## 2018-05-18 DIAGNOSIS — R07.9 CHEST PAIN, UNSPECIFIED TYPE: ICD-10-CM

## 2018-05-18 DIAGNOSIS — E03.9 HYPOTHYROIDISM, UNSPECIFIED TYPE: ICD-10-CM

## 2018-05-18 DIAGNOSIS — Z86.79 STATUS POST REPAIR OF ABDOMINAL AORTIC ANEURYSM (AAA) USING BIFURCATION GRAFT: ICD-10-CM

## 2018-05-18 DIAGNOSIS — Z95.828 STATUS POST REPAIR OF ABDOMINAL AORTIC ANEURYSM (AAA) USING BIFURCATION GRAFT: ICD-10-CM

## 2018-05-18 DIAGNOSIS — R53.83 FATIGUE, UNSPECIFIED TYPE: ICD-10-CM

## 2018-05-18 DIAGNOSIS — G81.94 LEFT HEMIPARESIS (HCC): ICD-10-CM

## 2018-05-18 DIAGNOSIS — D64.9 ANEMIA, UNSPECIFIED TYPE: ICD-10-CM

## 2018-05-18 DIAGNOSIS — I10 ESSENTIAL HYPERTENSION: Primary | ICD-10-CM

## 2018-05-18 DIAGNOSIS — E78.00 HYPERCHOLESTEROLEMIA: ICD-10-CM

## 2018-05-18 DIAGNOSIS — N40.1 BENIGN PROSTATIC HYPERPLASIA WITH LOWER URINARY TRACT SYMPTOMS, SYMPTOM DETAILS UNSPECIFIED: ICD-10-CM

## 2018-05-18 PROCEDURE — G0463 HOSPITAL OUTPT CLINIC VISIT: HCPCS | Performed by: INTERNAL MEDICINE

## 2018-05-18 PROCEDURE — 99214 OFFICE O/P EST MOD 30 MIN: CPT | Performed by: INTERNAL MEDICINE

## 2018-05-18 NOTE — PROGRESS NOTES
Merritt Prado is a 69year old male. Patient presents with:  Checkup: Follow-up; Sharp Back pain especially when standing up;  Hypertension  Hyperlipidemia  CVA    HPI:   Patient presents with:  Checkup: Follow-up;  Sharp Back pain especially when standing SOB  CARDIOVASCULAR: No chest pain  GI: No abdominal pain, nausea, vomiting, diarrhea, or constipation  :No Urinary complaints  EXT:No complaints of pain or swelling in patient's legs    EXAM:   /70 (BP Location: Left arm, Patient Position: Sitting old, hemiparesis ([facility] [address]  Stable. CPM.  Patient continues to have a left hemiparesis related to his prior CVA. As noted above I feel the patient may benefit from another course of physical therapy and Occupational Therapy.   Patient and wife will contact me

## 2018-05-18 NOTE — PATIENT INSTRUCTIONS
1.  Patient is to continue his current diet, medication and activity. 2.  I will plan to see the patient back in 3 months with blood tests which will include a CBC, BMP, lipid panel, AST and ALT. 3.  I will see the patient back sooner as necessary.

## 2018-07-25 ENCOUNTER — HOSPITAL ENCOUNTER (OUTPATIENT)
Dept: ULTRASOUND IMAGING | Facility: HOSPITAL | Age: 78
Discharge: HOME | End: 2018-07-25
Attending: PHYSICIAN ASSISTANT
Payer: MEDICARE

## 2018-07-25 DIAGNOSIS — I72.4 FEMORAL ARTERY PSEUDO-ANEURYSM, RIGHT (HCC): ICD-10-CM

## 2018-07-25 PROCEDURE — 93926 LOWER EXTREMITY STUDY: CPT | Performed by: PHYSICIAN ASSISTANT

## 2018-08-02 ENCOUNTER — OFFICE VISIT (OUTPATIENT)
Dept: INTERNAL MEDICINE CLINIC | Facility: CLINIC | Age: 78
End: 2018-08-02
Payer: MEDICARE

## 2018-08-02 VITALS
WEIGHT: 229 LBS | DIASTOLIC BLOOD PRESSURE: 80 MMHG | TEMPERATURE: 98 F | HEART RATE: 96 BPM | SYSTOLIC BLOOD PRESSURE: 134 MMHG | BODY MASS INDEX: 32.78 KG/M2 | HEIGHT: 70 IN | OXYGEN SATURATION: 93 %

## 2018-08-02 DIAGNOSIS — R19.7 DIARRHEA, UNSPECIFIED TYPE: Primary | ICD-10-CM

## 2018-08-02 DIAGNOSIS — I69.359 CVA, OLD, HEMIPARESIS (HCC): ICD-10-CM

## 2018-08-02 DIAGNOSIS — I10 ESSENTIAL HYPERTENSION: ICD-10-CM

## 2018-08-02 DIAGNOSIS — Z86.79 STATUS POST REPAIR OF ABDOMINAL AORTIC ANEURYSM (AAA) USING BIFURCATION GRAFT: ICD-10-CM

## 2018-08-02 DIAGNOSIS — Z95.828 STATUS POST REPAIR OF ABDOMINAL AORTIC ANEURYSM (AAA) USING BIFURCATION GRAFT: ICD-10-CM

## 2018-08-02 DIAGNOSIS — K58.0 IRRITABLE BOWEL SYNDROME WITH DIARRHEA: ICD-10-CM

## 2018-08-02 PROCEDURE — G0463 HOSPITAL OUTPT CLINIC VISIT: HCPCS | Performed by: INTERNAL MEDICINE

## 2018-08-02 PROCEDURE — 99214 OFFICE O/P EST MOD 30 MIN: CPT | Performed by: INTERNAL MEDICINE

## 2018-08-02 NOTE — PROGRESS NOTES
Harrison Marc is a 69year old male. Patient presents with:  Diarrhea: Diarrhea with urgency. Pt having sudden uncontrollable BMs for past few months. HPI:   Patient presents with:  Diarrhea: Diarrhea with urgency.  Pt having sudden uncontrollable BMs fo pressure    • High cholesterol    • Hx of pneumothorax    • Muscle weakness     LUE hemiplegia   • Osteoarthritis    • Pneumonia due to organism 11/2017   • Stroke [facility])       Social History:  Smoking status: Former Smoker day usually in the morning and evening. Patient or wife is to call me back in about 2 weeks to notify me how the patient is doing. I will see the patient back as scheduled for his next appointment. I will see the patient back sooner as necessary.     The

## 2018-08-02 NOTE — PATIENT INSTRUCTIONS
1.  Patient is to continue his current diet, medications and activity. 2.  Patient is a take Metamucil 1 tablespoon in 8 ounces of water daily.   If he does this for a few days or week he has increased his Metamucil to 1 tablespoon 8 ounces of water twice

## 2018-08-08 ENCOUNTER — TELEPHONE (OUTPATIENT)
Dept: INTERNAL MEDICINE CLINIC | Facility: CLINIC | Age: 78
End: 2018-08-08

## 2018-08-08 RX ORDER — AMLODIPINE BESYLATE 5 MG/1
5 TABLET ORAL DAILY
Qty: 90 TABLET | Refills: 3 | Status: SHIPPED | OUTPATIENT
Start: 2018-08-08 | End: 2018-11-14

## 2018-08-08 NOTE — TELEPHONE ENCOUNTER
Noted.  I have refilled this medication as requested. Please notify the patient this is been done. I will route this to nursing.   Thank you!!

## 2018-08-08 NOTE — TELEPHONE ENCOUNTER
To  to please authorize refill as this medication has never been refilled by [facility] before (new patient January 2018)

## 2018-08-08 NOTE — TELEPHONE ENCOUNTER
Pt. Is requesting a refill on: Amlodipine 5 mg 90 day supply Ph. # [phone]   Human ph.  # [phone]   Routed to Rx

## 2018-09-05 ENCOUNTER — LAB ENCOUNTER (OUTPATIENT)
Dept: LAB | Age: 78
End: 2018-09-05
Attending: INTERNAL MEDICINE
Payer: MEDICARE

## 2018-09-05 DIAGNOSIS — D64.9 ANEMIA, UNSPECIFIED TYPE: ICD-10-CM

## 2018-09-05 DIAGNOSIS — E78.00 HYPERCHOLESTEROLEMIA: ICD-10-CM

## 2018-09-05 DIAGNOSIS — I10 ESSENTIAL HYPERTENSION: ICD-10-CM

## 2018-09-05 DIAGNOSIS — R53.83 FATIGUE, UNSPECIFIED TYPE: ICD-10-CM

## 2018-09-05 LAB
ALT SERPL-CCNC: 14 U/L (ref 17–63)
ANION GAP SERPL CALC-SCNC: 9 MMOL/L (ref 0–18)
AST SERPL-CCNC: 17 U/L (ref 15–41)
BASOPHILS # BLD: 0.1 K/UL (ref 0–0.2)
BASOPHILS NFR BLD: 1 %
BUN SERPL-MCNC: 18 MG/DL (ref 8–20)
BUN/CREAT SERPL: 12.2 (ref 10–20)
CALCIUM SERPL-MCNC: 9 MG/DL (ref 8.5–10.5)
CHLORIDE SERPL-SCNC: 109 MMOL/L (ref 95–110)
CHOLEST SERPL-MCNC: 156 MG/DL (ref 110–200)
CO2 SERPL-SCNC: 22 MMOL/L (ref 22–32)
CREAT SERPL-MCNC: 1.47 MG/DL (ref 0.5–1.5)
EOSINOPHIL # BLD: 0.2 K/UL (ref 0–0.7)
EOSINOPHIL NFR BLD: 2 %
ERYTHROCYTE [DISTWIDTH] IN BLOOD BY AUTOMATED COUNT: 14.3 % (ref 11–15)
GLUCOSE SERPL-MCNC: 113 MG/DL (ref 70–99)
HCT VFR BLD AUTO: 41.7 % (ref 41–52)
HDLC SERPL-MCNC: 34 MG/DL
HGB BLD-MCNC: 13.8 G/DL (ref 13.5–17.5)
LDLC SERPL CALC-MCNC: 92 MG/DL (ref 0–99)
LYMPHOCYTES # BLD: 2.3 K/UL (ref 1–4)
LYMPHOCYTES NFR BLD: 29 %
MCH RBC QN AUTO: 31.1 PG (ref 27–32)
MCHC RBC AUTO-ENTMCNC: 33.1 G/DL (ref 32–37)
MCV RBC AUTO: 94.1 FL (ref 80–100)
MONOCYTES # BLD: 0.8 K/UL (ref 0–1)
MONOCYTES NFR BLD: 10 %
NEUTROPHILS # BLD AUTO: 4.7 K/UL (ref 1.8–7.7)
NEUTROPHILS NFR BLD: 59 %
NONHDLC SERPL-MCNC: 122 MG/DL
OSMOLALITY UR CALC.SUM OF ELEC: 293 MOSM/KG (ref 275–295)
PLATELET # BLD AUTO: 217 K/UL (ref 140–400)
PMV BLD AUTO: 10 FL (ref 7.4–10.3)
POTASSIUM SERPL-SCNC: 3.8 MMOL/L (ref 3.3–5.1)
RBC # BLD AUTO: 4.44 M/UL (ref 4.5–5.9)
SODIUM SERPL-SCNC: 140 MMOL/L (ref 136–144)
TRIGL SERPL-MCNC: 150 MG/DL (ref 1–149)
WBC # BLD AUTO: 8 K/UL (ref 4–11)

## 2018-09-05 PROCEDURE — 85025 COMPLETE CBC W/AUTO DIFF WBC: CPT

## 2018-09-05 PROCEDURE — 84450 TRANSFERASE (AST) (SGOT): CPT

## 2018-09-05 PROCEDURE — 80061 LIPID PANEL: CPT

## 2018-09-05 PROCEDURE — 36415 COLL VENOUS BLD VENIPUNCTURE: CPT

## 2018-09-05 PROCEDURE — 84460 ALANINE AMINO (ALT) (SGPT): CPT

## 2018-09-05 PROCEDURE — 80048 BASIC METABOLIC PNL TOTAL CA: CPT

## 2018-09-10 ENCOUNTER — TELEPHONE (OUTPATIENT)
Dept: INTERNAL MEDICINE CLINIC | Facility: CLINIC | Age: 78
End: 2018-09-10

## 2018-09-10 NOTE — TELEPHONE ENCOUNTER
Spoke with patient's wife. They would like to know lab results from 9/5/18. They are aware that Dr CODY is out of the office until Wednesday. To Dr CODY to please advise on lab results.

## 2018-09-14 NOTE — TELEPHONE ENCOUNTER
Telephone call to patient and discussed with patient's wife. Patient's recent blood tests included a CBC, BMP and lipid panel. Patient's test turned out well. I did discuss the results with her at that time.   His renal function is slightly diminished bu

## 2018-11-14 ENCOUNTER — HOSPITAL ENCOUNTER (OUTPATIENT)
Dept: GENERAL RADIOLOGY | Age: 78
Discharge: HOME | End: 2018-11-14
Attending: INTERNAL MEDICINE | Admitting: INTERNAL MEDICINE
Payer: MEDICARE

## 2018-11-14 ENCOUNTER — HOSPITAL ENCOUNTER (OUTPATIENT)
Dept: GENERAL RADIOLOGY | Age: 78
Discharge: HOME | End: 2018-11-14
Attending: INTERNAL MEDICINE
Payer: MEDICARE

## 2018-11-14 ENCOUNTER — OFFICE VISIT (OUTPATIENT)
Dept: INTERNAL MEDICINE CLINIC | Facility: CLINIC | Age: 78
End: 2018-11-14
Payer: MEDICARE

## 2018-11-14 VITALS
HEART RATE: 80 BPM | DIASTOLIC BLOOD PRESSURE: 70 MMHG | SYSTOLIC BLOOD PRESSURE: 110 MMHG | TEMPERATURE: 98 F | OXYGEN SATURATION: 95 % | HEIGHT: 69 IN | WEIGHT: 240 LBS | BODY MASS INDEX: 35.55 KG/M2

## 2018-11-14 DIAGNOSIS — E03.9 HYPOTHYROIDISM, UNSPECIFIED TYPE: ICD-10-CM

## 2018-11-14 DIAGNOSIS — G89.29 CHRONIC PAIN OF BOTH KNEES: ICD-10-CM

## 2018-11-14 DIAGNOSIS — E78.00 HYPERCHOLESTEROLEMIA: ICD-10-CM

## 2018-11-14 DIAGNOSIS — Z95.828 STATUS POST REPAIR OF ABDOMINAL AORTIC ANEURYSM (AAA) USING BIFURCATION GRAFT: ICD-10-CM

## 2018-11-14 DIAGNOSIS — M25.562 CHRONIC PAIN OF BOTH KNEES: ICD-10-CM

## 2018-11-14 DIAGNOSIS — M15.9 PRIMARY OSTEOARTHRITIS INVOLVING MULTIPLE JOINTS: ICD-10-CM

## 2018-11-14 DIAGNOSIS — R53.83 FATIGUE, UNSPECIFIED TYPE: ICD-10-CM

## 2018-11-14 DIAGNOSIS — N18.30 STAGE 3 CHRONIC KIDNEY DISEASE (HCC): ICD-10-CM

## 2018-11-14 DIAGNOSIS — M25.561 CHRONIC PAIN OF BOTH KNEES: ICD-10-CM

## 2018-11-14 DIAGNOSIS — I10 ESSENTIAL HYPERTENSION: Primary | ICD-10-CM

## 2018-11-14 DIAGNOSIS — Z00.00 ANNUAL PHYSICAL EXAM: ICD-10-CM

## 2018-11-14 DIAGNOSIS — G81.94 LEFT HEMIPARESIS (HCC): ICD-10-CM

## 2018-11-14 DIAGNOSIS — Z12.5 PROSTATE CANCER SCREENING: ICD-10-CM

## 2018-11-14 DIAGNOSIS — Z86.79 STATUS POST REPAIR OF ABDOMINAL AORTIC ANEURYSM (AAA) USING BIFURCATION GRAFT: ICD-10-CM

## 2018-11-14 DIAGNOSIS — I69.359 CVA, OLD, HEMIPARESIS (HCC): ICD-10-CM

## 2018-11-14 DIAGNOSIS — N40.1 BENIGN PROSTATIC HYPERPLASIA WITH LOWER URINARY TRACT SYMPTOMS, SYMPTOM DETAILS UNSPECIFIED: ICD-10-CM

## 2018-11-14 PROBLEM — I71.40 ABDOMINAL AORTIC ANEURYSM (AAA) WITHOUT RUPTURE: Status: RESOLVED | Noted: 2018-01-18 | Resolved: 2018-11-14

## 2018-11-14 PROBLEM — I71.4 ABDOMINAL AORTIC ANEURYSM (AAA) WITHOUT RUPTURE (HCC): Status: RESOLVED | Noted: 2018-01-18 | Resolved: 2018-11-14

## 2018-11-14 PROBLEM — J18.9 PNEUMONIA OF BOTH LOWER LOBES DUE TO INFECTIOUS ORGANISM: Status: RESOLVED | Noted: 2018-01-18 | Resolved: 2018-11-14

## 2018-11-14 PROBLEM — I71.40 ABDOMINAL AORTIC ANEURYSM (AAA) WITHOUT RUPTURE (HCC): Status: RESOLVED | Noted: 2018-01-18 | Resolved: 2018-11-14

## 2018-11-14 PROBLEM — M15.0 PRIMARY OSTEOARTHRITIS INVOLVING MULTIPLE JOINTS: Status: ACTIVE | Noted: 2018-11-14

## 2018-11-14 PROCEDURE — 90670 PCV13 VACCINE IM: CPT | Performed by: INTERNAL MEDICINE

## 2018-11-14 PROCEDURE — G0463 HOSPITAL OUTPT CLINIC VISIT: HCPCS | Performed by: INTERNAL MEDICINE

## 2018-11-14 PROCEDURE — G0009 ADMIN PNEUMOCOCCAL VACCINE: HCPCS | Performed by: INTERNAL MEDICINE

## 2018-11-14 PROCEDURE — 73560 X-RAY EXAM OF KNEE 1 OR 2: CPT | Performed by: INTERNAL MEDICINE

## 2018-11-14 PROCEDURE — 99214 OFFICE O/P EST MOD 30 MIN: CPT | Performed by: INTERNAL MEDICINE

## 2018-11-14 RX ORDER — AMLODIPINE BESYLATE 5 MG/1
5 TABLET ORAL DAILY
Qty: 90 TABLET | Refills: 3 | Status: SHIPPED | OUTPATIENT
Start: 2018-11-14 | End: 2019-05-22

## 2018-11-14 RX ORDER — CLOPIDOGREL BISULFATE 75 MG/1
75 TABLET ORAL DAILY
Qty: 90 TABLET | Refills: 3 | Status: SHIPPED | OUTPATIENT
Start: 2018-11-14 | End: 2019-02-01

## 2018-11-14 NOTE — PROGRESS NOTES
Rere Gutierrez is a 69year old male. Patient presents with:  Checkup: Swelling in both ankles for several weeks. Patient has soreness in his low back on both sides. Taking tylenol which does not help very much. He had a flu shot this year.  Refills pended t Osteoarthritis    • Pneumonia due to organism 11/2017   • Stroke [facility])       Social History:  Social History    Tobacco Use      Smoking status: Former Smoker        Packs/day: 2.00        Years: 50.00        Pack years: 100        Quit date: 1/18/2008 was 17 and ALT was 14. CPM.  As above. 3. CVA, old, hemiparesis ([address]  Stable. CPM.    4. Left hemiparesis (HCC)  Stable.   CPM.    5. Status post repair of abdominal aortic aneurysm (AAA) using bifurcation graft  Doing well.  CPM.    6. Benign prostat

## 2018-11-14 NOTE — PATIENT INSTRUCTIONS
1.  Patient is to continue his current diet, medication and activity. 2.  Patient was given his flu vaccine today. 3.  Patient was given his Prevnar vaccine today. 4.  I will obtain x-rays of both of patient's knees.   5.  Patient may take Tylenol Extra

## 2018-11-20 ENCOUNTER — TELEPHONE (OUTPATIENT)
Dept: INTERNAL MEDICINE CLINIC | Facility: CLINIC | Age: 78
End: 2018-11-20

## 2018-11-21 NOTE — TELEPHONE ENCOUNTER
Telephone call to patient's wife and situation discussed. Patient's x-ray of his right knee is turned out well. The x-ray report shows no significant arthritis.   The report of the patient's left knee x-ray shows the patient hasarthritis of the medial com

## 2019-01-03 ENCOUNTER — TELEPHONE (OUTPATIENT)
Dept: INTERNAL MEDICINE CLINIC | Facility: CLINIC | Age: 79
End: 2019-01-03

## 2019-01-03 NOTE — TELEPHONE ENCOUNTER
Pt wife called, requesting NEW RX for:  Levothyroxine  Pt uses [facility] delivery for medication now  Pt is low on medication  Tasked to [employer]

## 2019-01-04 RX ORDER — LEVOTHYROXINE SODIUM 0.1 MG/1
100 TABLET ORAL DAILY
Qty: 90 TABLET | Refills: 0 | Status: SHIPPED | OUTPATIENT
Start: 2019-01-04 | End: 2019-03-22

## 2019-01-04 NOTE — TELEPHONE ENCOUNTER
I spoke with patient's wife, Dean Zhang. Patient is overdue to check his TSH.  I explained that I would sent in a refill for the patient because he is out of medication but he will need to complete fasting blood work orders placed back in November before the nex

## 2019-01-07 ENCOUNTER — HOSPITAL ENCOUNTER (OUTPATIENT)
Dept: GENERAL RADIOLOGY | Age: 79
Discharge: HOME | End: 2019-01-07
Attending: INTERNAL MEDICINE | Admitting: INTERNAL MEDICINE
Payer: MEDICARE

## 2019-01-07 ENCOUNTER — OFFICE VISIT (OUTPATIENT)
Dept: INTERNAL MEDICINE CLINIC | Facility: CLINIC | Age: 79
End: 2019-01-07
Payer: MEDICARE

## 2019-01-07 VITALS
BODY MASS INDEX: 34.8 KG/M2 | HEART RATE: 100 BPM | OXYGEN SATURATION: 95 % | DIASTOLIC BLOOD PRESSURE: 70 MMHG | HEIGHT: 69 IN | TEMPERATURE: 98 F | SYSTOLIC BLOOD PRESSURE: 110 MMHG | WEIGHT: 235 LBS

## 2019-01-07 DIAGNOSIS — H61.23 BILATERAL IMPACTED CERUMEN: ICD-10-CM

## 2019-01-07 DIAGNOSIS — E78.00 HYPERCHOLESTEROLEMIA: ICD-10-CM

## 2019-01-07 DIAGNOSIS — R06.00 DYSPNEA, UNSPECIFIED TYPE: ICD-10-CM

## 2019-01-07 DIAGNOSIS — I69.359 CVA, OLD, HEMIPARESIS (HCC): ICD-10-CM

## 2019-01-07 DIAGNOSIS — J06.9 URI, ACUTE: Primary | ICD-10-CM

## 2019-01-07 DIAGNOSIS — E03.9 HYPOTHYROIDISM, UNSPECIFIED TYPE: ICD-10-CM

## 2019-01-07 DIAGNOSIS — I10 ESSENTIAL HYPERTENSION: ICD-10-CM

## 2019-01-07 PROCEDURE — G0463 HOSPITAL OUTPT CLINIC VISIT: HCPCS | Performed by: INTERNAL MEDICINE

## 2019-01-07 PROCEDURE — 71046 X-RAY EXAM CHEST 2 VIEWS: CPT | Performed by: INTERNAL MEDICINE

## 2019-01-07 PROCEDURE — 99214 OFFICE O/P EST MOD 30 MIN: CPT | Performed by: INTERNAL MEDICINE

## 2019-01-07 RX ORDER — CEFUROXIME AXETIL 500 MG/1
500 TABLET ORAL 2 TIMES DAILY
Qty: 20 TABLET | Refills: 0 | Status: SHIPPED | OUTPATIENT
Start: 2019-01-07 | End: 2019-02-14 | Stop reason: ALTCHOICE

## 2019-01-07 NOTE — PROGRESS NOTES
Jenny Devlin is a 69year old male.     HPI:   Patient presents with:  Cough: has been for 5 days now, denies fever      68 y/o M with 5 d h/o +sinus and nasal congestion; +cough; no F/C; no SOB; +post-nasal drip        HISTORY:  Past Medical History:   D Disp:  Rfl: 0   Irbesartan 75 MG Oral Tab Take 1 tablet (75 mg total) by mouth daily. Disp:  Rfl: 0   acetaminophen 500 MG Oral Tab Chew 2 tablets by mouth 2 (two) times daily. Disp:  Rfl:    atorvastatin 40 MG Oral Tab Take 40 mg by mouth nightly.  Disp: • Cefuroxime Axetil 500 MG Oral Tab 20 tablet 0     Sig: Take 1 tablet (500 mg total) by mouth 2 (two) times daily.        Imaging & Referrals:  None     1/7/2019  Patience Mcclure MD

## 2019-01-24 ENCOUNTER — LAB ENCOUNTER (OUTPATIENT)
Dept: LAB | Age: 79
End: 2019-01-24
Attending: INTERNAL MEDICINE
Payer: MEDICARE

## 2019-01-24 DIAGNOSIS — R53.83 FATIGUE, UNSPECIFIED TYPE: ICD-10-CM

## 2019-01-24 DIAGNOSIS — Z00.00 ANNUAL PHYSICAL EXAM: ICD-10-CM

## 2019-01-24 DIAGNOSIS — Z12.5 PROSTATE CANCER SCREENING: ICD-10-CM

## 2019-01-24 DIAGNOSIS — E78.00 HYPERCHOLESTEROLEMIA: ICD-10-CM

## 2019-01-24 LAB
ALBUMIN SERPL BCP-MCNC: 3.9 G/DL (ref 3.5–4.8)
ALBUMIN/GLOB SERPL: 1.1 {RATIO} (ref 1–2)
ALP SERPL-CCNC: 82 U/L (ref 32–100)
ALT SERPL-CCNC: 19 U/L (ref 17–63)
ANION GAP SERPL CALC-SCNC: 15 MMOL/L (ref 0–18)
AST SERPL-CCNC: 24 U/L (ref 15–41)
BASOPHILS # BLD: 0 K/UL (ref 0–0.2)
BASOPHILS NFR BLD: 0 %
BILIRUB SERPL-MCNC: 0.6 MG/DL (ref 0.3–1.2)
BUN SERPL-MCNC: 25 MG/DL (ref 8–20)
BUN/CREAT SERPL: 17.4 (ref 10–20)
CALCIUM SERPL-MCNC: 9.1 MG/DL (ref 8.5–10.5)
CHLORIDE SERPL-SCNC: 105 MMOL/L (ref 95–110)
CHOLEST SERPL-MCNC: 168 MG/DL (ref 110–200)
CO2 SERPL-SCNC: 19 MMOL/L (ref 22–32)
COMPLEXED PSA SERPL-MCNC: 1.27 NG/ML (ref 0.01–4)
CREAT SERPL-MCNC: 1.44 MG/DL (ref 0.5–1.5)
EOSINOPHIL # BLD: 0 K/UL (ref 0–0.7)
EOSINOPHIL NFR BLD: 0 %
ERYTHROCYTE [DISTWIDTH] IN BLOOD BY AUTOMATED COUNT: 14.2 % (ref 11–15)
GLOBULIN PLAS-MCNC: 3.5 G/DL (ref 2.5–3.7)
GLUCOSE SERPL-MCNC: 164 MG/DL (ref 70–99)
HCT VFR BLD AUTO: 44.9 % (ref 41–52)
HDLC SERPL-MCNC: 44 MG/DL
HGB BLD-MCNC: 14.6 G/DL (ref 13.5–17.5)
LDLC SERPL CALC-MCNC: 104 MG/DL (ref 0–99)
LYMPHOCYTES # BLD: 1.2 K/UL (ref 1–4)
LYMPHOCYTES NFR BLD: 7 %
MCH RBC QN AUTO: 31 PG (ref 27–32)
MCHC RBC AUTO-ENTMCNC: 32.5 G/DL (ref 32–37)
MCV RBC AUTO: 95.5 FL (ref 80–100)
MONOCYTES # BLD: 0.6 K/UL (ref 0–1)
MONOCYTES NFR BLD: 3 %
NEUTROPHILS # BLD AUTO: 16.3 K/UL (ref 1.8–7.7)
NEUTROPHILS NFR BLD: 90 %
NONHDLC SERPL-MCNC: 124 MG/DL
OSMOLALITY UR CALC.SUM OF ELEC: 296 MOSM/KG (ref 275–295)
PATIENT FASTING: YES
PLATELET # BLD AUTO: 222 K/UL (ref 140–400)
PMV BLD AUTO: 10.6 FL (ref 7.4–10.3)
POTASSIUM SERPL-SCNC: 4.3 MMOL/L (ref 3.3–5.1)
PROT SERPL-MCNC: 7.4 G/DL (ref 5.9–8.4)
PSA SERPL-MCNC: 1.1 NG/ML (ref 0–4)
RBC # BLD AUTO: 4.7 M/UL (ref 4.5–5.9)
SODIUM SERPL-SCNC: 139 MMOL/L (ref 136–144)
TRIGL SERPL-MCNC: 102 MG/DL (ref 1–149)
TSH SERPL-ACNC: 0.68 UIU/ML (ref 0.45–5.33)
WBC # BLD AUTO: 18.2 K/UL (ref 4–11)

## 2019-01-24 PROCEDURE — 80053 COMPREHEN METABOLIC PANEL: CPT

## 2019-01-24 PROCEDURE — 85025 COMPLETE CBC W/AUTO DIFF WBC: CPT

## 2019-01-24 PROCEDURE — 84443 ASSAY THYROID STIM HORMONE: CPT

## 2019-01-24 PROCEDURE — 80061 LIPID PANEL: CPT

## 2019-01-24 PROCEDURE — 36415 COLL VENOUS BLD VENIPUNCTURE: CPT

## 2019-01-28 ENCOUNTER — TELEPHONE (OUTPATIENT)
Dept: INTERNAL MEDICINE CLINIC | Facility: CLINIC | Age: 79
End: 2019-01-28

## 2019-01-28 DIAGNOSIS — R73.01 ABNORMAL FASTING GLUCOSE: ICD-10-CM

## 2019-01-28 DIAGNOSIS — D72.829 LEUKOCYTOSIS, UNSPECIFIED TYPE: Primary | ICD-10-CM

## 2019-01-28 DIAGNOSIS — R53.83 FATIGUE, UNSPECIFIED TYPE: ICD-10-CM

## 2019-01-28 RX ORDER — AMOXICILLIN AND CLAVULANATE POTASSIUM 875; 125 MG/1; MG/1
1 TABLET, FILM COATED ORAL 2 TIMES DAILY
Qty: 20 TABLET | Refills: 0 | Status: SHIPPED | OUTPATIENT
Start: 2019-01-28 | End: 2019-02-07

## 2019-01-28 NOTE — TELEPHONE ENCOUNTER
Telephone call to patient and situation discussed with patient's wife. Patient had recent blood test.  He has CBC is showing his white count to be 18,000. His recent FBS was elevated. Patient still has his cough.   I have given the patient another 10-day

## 2019-02-01 ENCOUNTER — TELEPHONE (OUTPATIENT)
Dept: INTERNAL MEDICINE CLINIC | Facility: CLINIC | Age: 79
End: 2019-02-01

## 2019-02-01 RX ORDER — CLOPIDOGREL BISULFATE 75 MG/1
75 TABLET ORAL DAILY
Qty: 90 TABLET | Refills: 3 | Status: SHIPPED | OUTPATIENT
Start: 2019-02-01 | End: 2019-12-05

## 2019-02-08 ENCOUNTER — LAB ENCOUNTER (OUTPATIENT)
Dept: LAB | Age: 79
End: 2019-02-08
Attending: INTERNAL MEDICINE
Payer: MEDICARE

## 2019-02-08 DIAGNOSIS — R73.01 ABNORMAL FASTING GLUCOSE: ICD-10-CM

## 2019-02-08 DIAGNOSIS — D72.829 LEUKOCYTOSIS, UNSPECIFIED TYPE: ICD-10-CM

## 2019-02-08 DIAGNOSIS — Z00.00 ANNUAL PHYSICAL EXAM: ICD-10-CM

## 2019-02-08 DIAGNOSIS — R53.83 FATIGUE, UNSPECIFIED TYPE: ICD-10-CM

## 2019-02-08 LAB
ANION GAP SERPL CALC-SCNC: 9 MMOL/L (ref 0–18)
BASOPHILS # BLD AUTO: 0.03 X10(3) UL (ref 0–0.2)
BASOPHILS NFR BLD AUTO: 0.2 %
BILIRUB UR QL: NEGATIVE
BUN SERPL-MCNC: 28 MG/DL (ref 8–20)
BUN/CREAT SERPL: 20.7 (ref 10–20)
CALCIUM SERPL-MCNC: 9.3 MG/DL (ref 8.5–10.5)
CHLORIDE SERPL-SCNC: 108 MMOL/L (ref 95–110)
CLARITY UR: CLEAR
CO2 SERPL-SCNC: 23 MMOL/L (ref 22–32)
COLOR UR: YELLOW
CREAT SERPL-MCNC: 1.35 MG/DL (ref 0.5–1.5)
DEPRECATED RDW RBC AUTO: 50.7 FL (ref 35.1–46.3)
EOSINOPHIL # BLD AUTO: 0.01 X10(3) UL (ref 0–0.7)
EOSINOPHIL NFR BLD AUTO: 0.1 %
ERYTHROCYTE [DISTWIDTH] IN BLOOD BY AUTOMATED COUNT: 14 % (ref 11–15)
EST. AVERAGE GLUCOSE BLD GHB EST-MCNC: 137 MG/DL (ref 68–126)
GLUCOSE SERPL-MCNC: 112 MG/DL (ref 70–99)
GLUCOSE UR-MCNC: NEGATIVE MG/DL
HBA1C MFR BLD HPLC: 6.4 % (ref ?–5.7)
HCT VFR BLD AUTO: 44 % (ref 39–53)
HGB BLD-MCNC: 13.6 G/DL (ref 13–17.5)
HGB UR QL STRIP.AUTO: NEGATIVE
IMM GRANULOCYTES # BLD AUTO: 0.1 X10(3) UL (ref 0–1)
IMM GRANULOCYTES NFR BLD: 0.6 %
KETONES UR-MCNC: NEGATIVE MG/DL
LYMPHOCYTES # BLD AUTO: 2.26 X10(3) UL (ref 1–4)
LYMPHOCYTES NFR BLD AUTO: 12.8 %
MCH RBC QN AUTO: 30.6 PG (ref 26–34)
MCHC RBC AUTO-ENTMCNC: 30.9 G/DL (ref 31–37)
MCV RBC AUTO: 99.1 FL (ref 80–100)
MONOCYTES # BLD AUTO: 1.32 X10(3) UL (ref 0.1–1)
MONOCYTES NFR BLD AUTO: 7.5 %
NEUTROPHILS # BLD AUTO: 13.95 X10 (3) UL (ref 1.5–7.7)
NEUTROPHILS # BLD AUTO: 13.95 X10(3) UL (ref 1.5–7.7)
NEUTROPHILS NFR BLD AUTO: 78.8 %
NITRITE UR QL STRIP.AUTO: NEGATIVE
OSMOLALITY UR CALC.SUM OF ELEC: 296 MOSM/KG (ref 275–295)
PH UR: 5 [PH] (ref 5–8)
PLATELET # BLD AUTO: 215 10(3)UL (ref 150–450)
POTASSIUM SERPL-SCNC: 4.1 MMOL/L (ref 3.3–5.1)
PROT UR-MCNC: NEGATIVE MG/DL
RBC # BLD AUTO: 4.44 X10(6)UL (ref 3.8–5.8)
RBC #/AREA URNS AUTO: 1 /HPF
SODIUM SERPL-SCNC: 140 MMOL/L (ref 136–144)
SP GR UR STRIP: 1.02 (ref 1–1.03)
UROBILINOGEN UR STRIP-ACNC: <2
VIT C UR-MCNC: NEGATIVE MG/DL
WBC # BLD AUTO: 17.7 X10(3) UL (ref 4–11)
WBC #/AREA URNS AUTO: 8 /HPF

## 2019-02-08 PROCEDURE — 83036 HEMOGLOBIN GLYCOSYLATED A1C: CPT

## 2019-02-08 PROCEDURE — 85025 COMPLETE CBC W/AUTO DIFF WBC: CPT

## 2019-02-08 PROCEDURE — 81001 URINALYSIS AUTO W/SCOPE: CPT

## 2019-02-08 PROCEDURE — 36415 COLL VENOUS BLD VENIPUNCTURE: CPT

## 2019-02-08 PROCEDURE — 80048 BASIC METABOLIC PNL TOTAL CA: CPT

## 2019-02-14 ENCOUNTER — OFFICE VISIT (OUTPATIENT)
Dept: INTERNAL MEDICINE CLINIC | Facility: CLINIC | Age: 79
End: 2019-02-14
Payer: MEDICARE

## 2019-02-14 VITALS
BODY MASS INDEX: 33.93 KG/M2 | TEMPERATURE: 97 F | HEIGHT: 70 IN | RESPIRATION RATE: 12 BRPM | DIASTOLIC BLOOD PRESSURE: 80 MMHG | HEART RATE: 88 BPM | OXYGEN SATURATION: 94 % | WEIGHT: 237 LBS | SYSTOLIC BLOOD PRESSURE: 120 MMHG

## 2019-02-14 DIAGNOSIS — N40.1 BENIGN PROSTATIC HYPERPLASIA WITH LOWER URINARY TRACT SYMPTOMS, SYMPTOM DETAILS UNSPECIFIED: ICD-10-CM

## 2019-02-14 DIAGNOSIS — I69.359 CVA, OLD, HEMIPARESIS (HCC): ICD-10-CM

## 2019-02-14 DIAGNOSIS — E78.00 HYPERCHOLESTEROLEMIA: ICD-10-CM

## 2019-02-14 DIAGNOSIS — J01.90 ACUTE NON-RECURRENT SINUSITIS, UNSPECIFIED LOCATION: ICD-10-CM

## 2019-02-14 DIAGNOSIS — R53.83 FATIGUE, UNSPECIFIED TYPE: ICD-10-CM

## 2019-02-14 DIAGNOSIS — J40 BRONCHITIS: Primary | ICD-10-CM

## 2019-02-14 DIAGNOSIS — G81.94 LEFT HEMIPARESIS (HCC): ICD-10-CM

## 2019-02-14 DIAGNOSIS — I10 ESSENTIAL HYPERTENSION: ICD-10-CM

## 2019-02-14 DIAGNOSIS — G89.29 CHRONIC BILATERAL LOW BACK PAIN WITHOUT SCIATICA: ICD-10-CM

## 2019-02-14 DIAGNOSIS — M54.50 CHRONIC BILATERAL LOW BACK PAIN WITHOUT SCIATICA: ICD-10-CM

## 2019-02-14 PROCEDURE — 99214 OFFICE O/P EST MOD 30 MIN: CPT | Performed by: INTERNAL MEDICINE

## 2019-02-14 PROCEDURE — G0463 HOSPITAL OUTPT CLINIC VISIT: HCPCS | Performed by: INTERNAL MEDICINE

## 2019-02-14 RX ORDER — AMOXICILLIN AND CLAVULANATE POTASSIUM 875; 125 MG/1; MG/1
1 TABLET, FILM COATED ORAL 2 TIMES DAILY
Qty: 20 TABLET | Refills: 1 | Status: SHIPPED | OUTPATIENT
Start: 2019-02-14 | End: 2019-02-24

## 2019-02-14 NOTE — PATIENT INSTRUCTIONS
1.  Patient is to continue his current diet, medication and activity. 2.  I will start the patient on Augmentin 875 mg orally twice daily for 10 days with 1 refill to be available to use as necessary.   3.  Patient's wife is to call me in 1-2 weeks limit h

## 2019-02-14 NOTE — PROGRESS NOTES
Cordelia Smith is a 69year old male. Patient presents with: Follow - Up: 1 month follow-up for URI. States symptoms improved slightly. Pt c/o wet, non-productive cough. Pain: C/O pain down each side of body since aneurism and operation x1 year ago.   Hyp OR) Take 1 tablet by mouth daily.  Disp:  Rfl:       Past Medical History:   Diagnosis Date   • Abdominal aortic aneurysm (AAA) ([facility] [address]    • Disorder of thyroid     hypothyroidism   • High blood pressure    • High cholesterol    • Hx of pneumothorax    • Musc hypertension  Cva, old, hemiparesis (hcc)  Left hemiparesis (hcc)  Hypercholesterolemia  Benign prostatic hyperplasia with lower urinary tract symptoms, symptom details unspecified  Chronic bilateral low back pain without sciatica  Fatigue, unspecified typ

## 2019-03-01 ENCOUNTER — HOSPITAL ENCOUNTER (OUTPATIENT)
Dept: GENERAL RADIOLOGY | Age: 79
Discharge: HOME | End: 2019-03-01
Attending: INTERNAL MEDICINE
Payer: MEDICARE

## 2019-03-01 ENCOUNTER — LAB ENCOUNTER (OUTPATIENT)
Dept: LAB | Age: 79
End: 2019-03-01
Attending: INTERNAL MEDICINE
Payer: MEDICARE

## 2019-03-01 DIAGNOSIS — J40 BRONCHITIS: ICD-10-CM

## 2019-03-01 DIAGNOSIS — G89.29 CHRONIC BILATERAL LOW BACK PAIN WITHOUT SCIATICA: ICD-10-CM

## 2019-03-01 DIAGNOSIS — M54.50 CHRONIC BILATERAL LOW BACK PAIN WITHOUT SCIATICA: ICD-10-CM

## 2019-03-01 DIAGNOSIS — R53.83 FATIGUE, UNSPECIFIED TYPE: ICD-10-CM

## 2019-03-01 DIAGNOSIS — J01.90 ACUTE NON-RECURRENT SINUSITIS, UNSPECIFIED LOCATION: ICD-10-CM

## 2019-03-01 LAB
ANION GAP SERPL CALC-SCNC: 6 MMOL/L (ref 0–18)
BASOPHILS # BLD AUTO: 0.06 X10(3) UL (ref 0–0.2)
BASOPHILS NFR BLD AUTO: 0.8 %
BUN BLD-MCNC: 20 MG/DL (ref 7–18)
BUN/CREAT SERPL: 14.6 (ref 10–20)
CALCIUM BLD-MCNC: 9.4 MG/DL (ref 8.5–10.1)
CHLORIDE SERPL-SCNC: 108 MMOL/L (ref 98–107)
CO2 SERPL-SCNC: 28 MMOL/L (ref 21–32)
CREAT BLD-MCNC: 1.37 MG/DL (ref 0.7–1.3)
DEPRECATED RDW RBC AUTO: 51.4 FL (ref 35.1–46.3)
EOSINOPHIL # BLD AUTO: 0.19 X10(3) UL (ref 0–0.7)
EOSINOPHIL NFR BLD AUTO: 2.5 %
ERYTHROCYTE [DISTWIDTH] IN BLOOD BY AUTOMATED COUNT: 14.1 % (ref 11–15)
GLUCOSE BLD-MCNC: 97 MG/DL (ref 70–99)
HCT VFR BLD AUTO: 44.5 % (ref 39–53)
HGB BLD-MCNC: 14 G/DL (ref 13–17.5)
IMM GRANULOCYTES # BLD AUTO: 0.02 X10(3) UL (ref 0–1)
IMM GRANULOCYTES NFR BLD: 0.3 %
LYMPHOCYTES # BLD AUTO: 2.38 X10(3) UL (ref 1–4)
LYMPHOCYTES NFR BLD AUTO: 31.2 %
MCH RBC QN AUTO: 31.3 PG (ref 26–34)
MCHC RBC AUTO-ENTMCNC: 31.5 G/DL (ref 31–37)
MCV RBC AUTO: 99.6 FL (ref 80–100)
MONOCYTES # BLD AUTO: 0.73 X10(3) UL (ref 0.1–1)
MONOCYTES NFR BLD AUTO: 9.6 %
NEUTROPHILS # BLD AUTO: 4.26 X10 (3) UL (ref 1.5–7.7)
NEUTROPHILS # BLD AUTO: 4.26 X10(3) UL (ref 1.5–7.7)
NEUTROPHILS NFR BLD AUTO: 55.6 %
OSMOLALITY SERPL CALC.SUM OF ELEC: 297 MOSM/KG (ref 275–295)
PLATELET # BLD AUTO: 193 10(3)UL (ref 150–450)
POTASSIUM SERPL-SCNC: 4.5 MMOL/L (ref 3.5–5.1)
RBC # BLD AUTO: 4.47 X10(6)UL (ref 3.8–5.8)
SODIUM SERPL-SCNC: 142 MMOL/L (ref 136–145)
WBC # BLD AUTO: 7.6 X10(3) UL (ref 4–11)

## 2019-03-01 PROCEDURE — 72170 X-RAY EXAM OF PELVIS: CPT | Performed by: INTERNAL MEDICINE

## 2019-03-01 PROCEDURE — 85025 COMPLETE CBC W/AUTO DIFF WBC: CPT

## 2019-03-01 PROCEDURE — 72110 X-RAY EXAM L-2 SPINE 4/>VWS: CPT | Performed by: INTERNAL MEDICINE

## 2019-03-01 PROCEDURE — 36415 COLL VENOUS BLD VENIPUNCTURE: CPT

## 2019-03-01 PROCEDURE — 80048 BASIC METABOLIC PNL TOTAL CA: CPT

## 2019-03-04 ENCOUNTER — TELEPHONE (OUTPATIENT)
Dept: INTERNAL MEDICINE CLINIC | Facility: CLINIC | Age: 79
End: 2019-03-04

## 2019-03-07 NOTE — TELEPHONE ENCOUNTER
Discussed with pt's wife. Pt's CBC is back to normal with a WBC of 7.300. Pt's X-ray of his Lumbar Spine shows some arthritis and some disc space narrowing. Pt is to consider Physical Therapy for his low back pain.   Pt could require an MRI of his Lumbar

## 2019-03-11 ENCOUNTER — OFFICE VISIT (OUTPATIENT)
Dept: INTERNAL MEDICINE CLINIC | Facility: CLINIC | Age: 79
End: 2019-03-11
Payer: MEDICARE

## 2019-03-11 VITALS
WEIGHT: 236 LBS | TEMPERATURE: 98 F | OXYGEN SATURATION: 96 % | HEART RATE: 84 BPM | BODY MASS INDEX: 34 KG/M2 | DIASTOLIC BLOOD PRESSURE: 70 MMHG | SYSTOLIC BLOOD PRESSURE: 120 MMHG

## 2019-03-11 DIAGNOSIS — Z12.5 PROSTATE CANCER SCREENING: ICD-10-CM

## 2019-03-11 DIAGNOSIS — M15.9 PRIMARY OSTEOARTHRITIS INVOLVING MULTIPLE JOINTS: ICD-10-CM

## 2019-03-11 DIAGNOSIS — I10 ESSENTIAL HYPERTENSION: Primary | ICD-10-CM

## 2019-03-11 DIAGNOSIS — N40.1 BENIGN PROSTATIC HYPERPLASIA WITH LOWER URINARY TRACT SYMPTOMS, SYMPTOM DETAILS UNSPECIFIED: ICD-10-CM

## 2019-03-11 DIAGNOSIS — Z00.00 ANNUAL PHYSICAL EXAM: ICD-10-CM

## 2019-03-11 DIAGNOSIS — G89.29 CHRONIC BILATERAL LOW BACK PAIN WITHOUT SCIATICA: ICD-10-CM

## 2019-03-11 DIAGNOSIS — N18.30 STAGE 3 CHRONIC KIDNEY DISEASE (HCC): ICD-10-CM

## 2019-03-11 DIAGNOSIS — E78.00 HYPERCHOLESTEROLEMIA: ICD-10-CM

## 2019-03-11 DIAGNOSIS — I69.359 CVA, OLD, HEMIPARESIS (HCC): ICD-10-CM

## 2019-03-11 DIAGNOSIS — G81.94 LEFT HEMIPARESIS (HCC): ICD-10-CM

## 2019-03-11 DIAGNOSIS — R53.83 FATIGUE, UNSPECIFIED TYPE: ICD-10-CM

## 2019-03-11 DIAGNOSIS — M54.50 CHRONIC BILATERAL LOW BACK PAIN WITHOUT SCIATICA: ICD-10-CM

## 2019-03-11 PROCEDURE — G0463 HOSPITAL OUTPT CLINIC VISIT: HCPCS | Performed by: INTERNAL MEDICINE

## 2019-03-11 PROCEDURE — 99214 OFFICE O/P EST MOD 30 MIN: CPT | Performed by: INTERNAL MEDICINE

## 2019-03-11 NOTE — PATIENT INSTRUCTIONS
1.  Patient is to continue his current diet, medication and activity. 2.  I will give the patient order for physical therapy that he can consider using for his low back pain.   3.  I will plan see the patient back in about 3 months with blood tests, urinal

## 2019-03-11 NOTE — PROGRESS NOTES
Cassius Malagon is a 69year old male. Patient presents with: Follow - Up: 1 month  Runny Nose  Cough  Cramps: patient reports more leg muscle cramps/ \"compa horses\"  Hypertension  CVA  Arthritis    HPI:   Patient presents with:   Follow - Up: 1 month  R [facility])    • Disorder of thyroid     hypothyroidism   • High blood pressure    • High cholesterol    • Hx of pneumothorax    • Muscle weakness     LUE hemiplegia   • Osteoarthritis    • Pneumonia due to organism 11/2017   • Stroke [facility])       Social History: CPM.    4. Hypercholesterolemia  Stable. CPM.  I will see the patient back in 3 months with blood tests as noted above. 5. Primary osteoarthritis involving multiple joints  Stable.   CPM.    6. Chronic bilateral low back pain without sciatica  Patient c

## 2019-03-22 RX ORDER — LEVOTHYROXINE SODIUM 0.1 MG/1
TABLET ORAL
Qty: 90 TABLET | Refills: 3 | Status: SHIPPED | OUTPATIENT
Start: 2019-03-22 | End: 2020-04-28

## 2019-03-22 NOTE — TELEPHONE ENCOUNTER
Spoke to patient's wife, Radha Pepe (ok per HIPPA), who confirms that they switched to [address] and that the patient is still taking the medication and needs refill soon.      Refill request is for a maintenance medication and has met the criteria spe

## 2019-04-15 ENCOUNTER — HOSPITAL ENCOUNTER (EMERGENCY)
Facility: HOSPITAL | Age: 79
Discharge: HOME | End: 2019-04-15
Attending: EMERGENCY MEDICINE
Payer: MEDICARE

## 2019-04-15 ENCOUNTER — APPOINTMENT (OUTPATIENT)
Dept: ULTRASOUND IMAGING | Facility: HOSPITAL | Age: 79
End: 2019-04-15
Attending: EMERGENCY MEDICINE
Payer: MEDICARE

## 2019-04-15 ENCOUNTER — TELEPHONE (OUTPATIENT)
Dept: INTERNAL MEDICINE CLINIC | Facility: CLINIC | Age: 79
End: 2019-04-15

## 2019-04-15 VITALS
SYSTOLIC BLOOD PRESSURE: 142 MMHG | DIASTOLIC BLOOD PRESSURE: 86 MMHG | WEIGHT: 235 LBS | HEART RATE: 82 BPM | BODY MASS INDEX: 33.64 KG/M2 | OXYGEN SATURATION: 96 % | HEIGHT: 70 IN | TEMPERATURE: 98 F | RESPIRATION RATE: 18 BRPM

## 2019-04-15 DIAGNOSIS — R25.2 LEG CRAMP: Primary | ICD-10-CM

## 2019-04-15 PROCEDURE — 83735 ASSAY OF MAGNESIUM: CPT | Performed by: EMERGENCY MEDICINE

## 2019-04-15 PROCEDURE — 99284 EMERGENCY DEPT VISIT MOD MDM: CPT

## 2019-04-15 PROCEDURE — 36415 COLL VENOUS BLD VENIPUNCTURE: CPT

## 2019-04-15 PROCEDURE — 80048 BASIC METABOLIC PNL TOTAL CA: CPT | Performed by: EMERGENCY MEDICINE

## 2019-04-15 PROCEDURE — 93971 EXTREMITY STUDY: CPT | Performed by: EMERGENCY MEDICINE

## 2019-04-15 RX ORDER — DIAZEPAM 2 MG/1
2 TABLET ORAL 2 TIMES DAILY PRN
Qty: 10 TABLET | Refills: 0 | Status: SHIPPED | OUTPATIENT
Start: 2019-04-15 | End: 2019-04-22

## 2019-04-15 RX ORDER — TRAMADOL HYDROCHLORIDE 50 MG/1
50 TABLET ORAL ONCE
Status: COMPLETED | OUTPATIENT
Start: 2019-04-15 | End: 2019-04-15

## 2019-04-15 RX ORDER — DIAZEPAM 2 MG/1
2 TABLET ORAL ONCE
Status: COMPLETED | OUTPATIENT
Start: 2019-04-15 | End: 2019-04-15

## 2019-04-15 NOTE — ED INITIAL ASSESSMENT (HPI)
Patient states it feels like a compa horse in L leg. No turning, lifting, or twisting precipitated the pain. Denies any weakness. Hx of CVA with L upper extremity weakness.

## 2019-04-15 NOTE — ED PROVIDER NOTES
Patient Seen in: [facility] Emergency Department    History   Patient presents with:  Deep Vein Thrombosis (cardiovascular)    Stated Complaint: r/o dvt left leg    HPI    60-year-old male with history of CVA and residual left-sided weakness prese Ht 177.8 cm (5' 10\")   Wt 106.6 kg   SpO2 96%   BMI 33.72 kg/m²         Physical Exam   Constitutional: He is oriented to person, place, and time. He appears well-developed and well-nourished. No distress. HENT:   Head: Normocephalic and atraumatic.    E peroneal veins appear     normal.           THROMBI: None visible. COMPRESSIBILITY: Normal.    OTHER: Negative.                        =====    CONCLUSION: Normal examination.                      Dictated by (CST): Edda Sutherland MD on 4/15/201 limits. Patient symptoms are likely related to cramping, discussed stretching exercises. He was given a dose of Toradol without improvement. Given a prescription for Valium with strict precautions given its possible sedative side effect.   He is to see h

## 2019-04-15 NOTE — TELEPHONE ENCOUNTER
As FYI to DR. CODY - called spouse per hipaa who states patient is on constant pain left leg, it is also swollen, going on for 3 days , can barely walk- RN instructed spouse to bring patient to ER to r/o DVT - verbalized understanding F/U 4/16

## 2019-04-15 NOTE — ED NOTES
Family at the bedside. Patient given discharge instructions and prescriptions. Patient verbalized understanding. Patient left in wheelchair with family.

## 2019-04-15 NOTE — ED NOTES
Pt medicated for LLE pain 5/10 with ultram 50mg PO. Pt revitaled. Pt resting on cart watching TV.  Family at bedside

## 2019-04-15 NOTE — TELEPHONE ENCOUNTER
Wife Ishan Estrella, calling for patient. Left leg muscle spasms. In a lot a pain, can barely walk. Has had for three full days. Concerned that there may be blood clot.     Wife Ishan Estrella at [phone]

## 2019-04-15 NOTE — ED NOTES
Verbalized understanding of d/c instructions and appropriate follow-up information. Given copy of d/c papers.

## 2019-04-16 NOTE — TELEPHONE ENCOUNTER
Telephone call to pt's wife and message left. Pt was in ER yesterday for leg cramp. Venous doppler was negative for DVT. Pt may use warm compresses to his calf 3-4 times/day and PRN. Pt may also use Tylenol ES 2 tabs 2-3 times/day as necessary.   If pro

## 2019-04-24 ENCOUNTER — TELEPHONE (OUTPATIENT)
Dept: INTERNAL MEDICINE CLINIC | Facility: CLINIC | Age: 79
End: 2019-04-24

## 2019-04-24 DIAGNOSIS — R26.9 GAIT DIFFICULTY: ICD-10-CM

## 2019-04-24 DIAGNOSIS — M15.9 PRIMARY OSTEOARTHRITIS INVOLVING MULTIPLE JOINTS: ICD-10-CM

## 2019-04-24 DIAGNOSIS — G89.29 CHRONIC BILATERAL LOW BACK PAIN WITHOUT SCIATICA: ICD-10-CM

## 2019-04-24 DIAGNOSIS — M54.50 CHRONIC BILATERAL LOW BACK PAIN WITHOUT SCIATICA: ICD-10-CM

## 2019-04-24 DIAGNOSIS — R26.89 BALANCE PROBLEM: Primary | ICD-10-CM

## 2019-04-24 NOTE — TELEPHONE ENCOUNTER
I spoke with Ishan Estrella and she would like PT order to be written again. She called [facility] PT to start therapy for her  and the therapist said they would not do balance and gait training unless it was added to the order.  Explained that I would pend a new or

## 2019-04-24 NOTE — TELEPHONE ENCOUNTER
Wife Lou Alfonso calling. Order for physical therapy. Asking if Dr. Eddie Norton could add to order gait and balance training.     Lou Alfonso at [phone]

## 2019-04-25 NOTE — TELEPHONE ENCOUNTER
Patient's wife called and left message on voicemail. Left detailed message stating new PT orders with requested training placed in Epic. Patient/wife to call back office for any further questions.

## 2019-04-25 NOTE — TELEPHONE ENCOUNTER
NOted,  I have renewed pt's order for PT to assist with gait and balance. Please notify pt's wife that this has been done. I will route this to nursing.   Thank you!!

## 2019-05-02 ENCOUNTER — OFFICE VISIT (OUTPATIENT)
Dept: PHYSICAL THERAPY | Facility: HOSPITAL | Age: 79
End: 2019-05-02
Attending: INTERNAL MEDICINE
Payer: MEDICARE

## 2019-05-02 DIAGNOSIS — R26.9 GAIT DIFFICULTY: ICD-10-CM

## 2019-05-02 DIAGNOSIS — G89.29 CHRONIC BILATERAL LOW BACK PAIN WITHOUT SCIATICA: ICD-10-CM

## 2019-05-02 DIAGNOSIS — M15.9 PRIMARY OSTEOARTHRITIS INVOLVING MULTIPLE JOINTS: ICD-10-CM

## 2019-05-02 DIAGNOSIS — M54.50 CHRONIC BILATERAL LOW BACK PAIN WITHOUT SCIATICA: ICD-10-CM

## 2019-05-02 DIAGNOSIS — R26.89 BALANCE PROBLEM: ICD-10-CM

## 2019-05-02 PROCEDURE — 97162 PT EVAL MOD COMPLEX 30 MIN: CPT

## 2019-05-02 NOTE — PROGRESS NOTES
NEUROLOGICAL PHYSICAL THERAPY EVALUATION:   Referring Physician: Dr. Travon Pate  Diagnosis: Imbalance, CVA      Date of Onset: per HPI Date of Service: 5/2/2019     PATIENT SUMMARY   Jay Huffman is a 69year old y/o male who presents to therapy today wi due to organism 11/2017   • Stroke [facility])               ASSESSMENT:    Carol Lee presents today with reports of decreased level of functional independence due to decreased strength and balance for past 10 years 2/2 CVA.  Pt is considered an increased risk of fall ___3___9. Retrieving object from floor   ___2__10. Turning to look behind   ___2__11. Turning 360 degrees   ___0__12. Placing alternate foot on stool   ___1__13. Standing with one foot in front   ___1__14.  Standing on one foot     Total __32__/56 (less t have any questions, please contact me at Dept: [phone]    Sincerely,  Tavon Keith PT, NCS      Electronically signed by therapist: Tavon Keith PT, GARETH    [de-identified] certification required: Yes  I certify the need for these services furnished

## 2019-05-13 ENCOUNTER — APPOINTMENT (OUTPATIENT)
Dept: PHYSICAL THERAPY | Facility: HOSPITAL | Age: 79
End: 2019-05-13
Attending: INTERNAL MEDICINE
Payer: MEDICARE

## 2019-05-13 ENCOUNTER — APPOINTMENT (OUTPATIENT)
Dept: GENERAL RADIOLOGY | Facility: HOSPITAL | Age: 79
DRG: 178 | End: 2019-05-13
Attending: EMERGENCY MEDICINE
Payer: MEDICARE

## 2019-05-13 ENCOUNTER — HOSPITAL ENCOUNTER (INPATIENT)
Facility: HOSPITAL | Age: 79
LOS: 3 days | Discharge: HOME | DRG: 178 | End: 2019-05-16
Attending: EMERGENCY MEDICINE | Admitting: HOSPITALIST
Payer: MEDICARE

## 2019-05-13 ENCOUNTER — TELEPHONE (OUTPATIENT)
Dept: INTERNAL MEDICINE CLINIC | Facility: CLINIC | Age: 79
End: 2019-05-13

## 2019-05-13 ENCOUNTER — APPOINTMENT (OUTPATIENT)
Dept: ULTRASOUND IMAGING | Facility: HOSPITAL | Age: 79
DRG: 178 | End: 2019-05-13
Attending: EMERGENCY MEDICINE
Payer: MEDICARE

## 2019-05-13 ENCOUNTER — TELEPHONE (OUTPATIENT)
Dept: PHYSICAL THERAPY | Facility: HOSPITAL | Age: 79
End: 2019-05-13

## 2019-05-13 DIAGNOSIS — J69.0 ASPIRATION PNEUMONITIS (HCC): Primary | ICD-10-CM

## 2019-05-13 DIAGNOSIS — G89.29 CHRONIC BILATERAL LOW BACK PAIN WITHOUT SCIATICA: ICD-10-CM

## 2019-05-13 DIAGNOSIS — M54.50 CHRONIC BILATERAL LOW BACK PAIN WITHOUT SCIATICA: ICD-10-CM

## 2019-05-13 LAB
ANION GAP SERPL CALC-SCNC: 9 MMOL/L (ref 0–18)
BASOPHILS # BLD AUTO: 0.06 X10(3) UL (ref 0–0.2)
BASOPHILS NFR BLD AUTO: 0.5 %
BUN BLD-MCNC: 26 MG/DL (ref 7–18)
BUN/CREAT SERPL: 16.7 (ref 10–20)
CALCIUM BLD-MCNC: 10 MG/DL (ref 8.5–10.1)
CHLORIDE SERPL-SCNC: 111 MMOL/L (ref 98–112)
CO2 SERPL-SCNC: 26 MMOL/L (ref 21–32)
CREAT BLD-MCNC: 1.56 MG/DL (ref 0.7–1.3)
DEPRECATED RDW RBC AUTO: 50 FL (ref 35.1–46.3)
EOSINOPHIL # BLD AUTO: 0.16 X10(3) UL (ref 0–0.7)
EOSINOPHIL NFR BLD AUTO: 1.4 %
ERYTHROCYTE [DISTWIDTH] IN BLOOD BY AUTOMATED COUNT: 13.2 % (ref 11–15)
GLUCOSE BLD-MCNC: 106 MG/DL (ref 70–99)
HCT VFR BLD AUTO: 41.3 % (ref 39–53)
HGB BLD-MCNC: 12.7 G/DL (ref 13–17.5)
IMM GRANULOCYTES # BLD AUTO: 0.03 X10(3) UL (ref 0–1)
IMM GRANULOCYTES NFR BLD: 0.3 %
LACTATE SERPL-SCNC: 1.5 MMOL/L (ref 0.4–2)
LYMPHOCYTES # BLD AUTO: 1.89 X10(3) UL (ref 1–4)
LYMPHOCYTES NFR BLD AUTO: 17.1 %
MCH RBC QN AUTO: 31.3 PG (ref 26–34)
MCHC RBC AUTO-ENTMCNC: 30.8 G/DL (ref 31–37)
MCV RBC AUTO: 101.7 FL (ref 80–100)
MONOCYTES # BLD AUTO: 1.54 X10(3) UL (ref 0.1–1)
MONOCYTES NFR BLD AUTO: 13.9 %
NEUTROPHILS # BLD AUTO: 7.37 X10 (3) UL (ref 1.5–7.7)
NEUTROPHILS # BLD AUTO: 7.37 X10(3) UL (ref 1.5–7.7)
NEUTROPHILS NFR BLD AUTO: 66.8 %
OSMOLALITY SERPL CALC.SUM OF ELEC: 307 MOSM/KG (ref 275–295)
PLATELET # BLD AUTO: 201 10(3)UL (ref 150–450)
POTASSIUM SERPL-SCNC: 3.7 MMOL/L (ref 3.5–5.1)
RBC # BLD AUTO: 4.06 X10(6)UL (ref 3.8–5.8)
SODIUM SERPL-SCNC: 146 MMOL/L (ref 136–145)
TROPONIN I SERPL-MCNC: <0.045 NG/ML (ref ?–0.04)
WBC # BLD AUTO: 11.1 X10(3) UL (ref 4–11)

## 2019-05-13 PROCEDURE — 99223 1ST HOSP IP/OBS HIGH 75: CPT | Performed by: HOSPITALIST

## 2019-05-13 PROCEDURE — 93971 EXTREMITY STUDY: CPT | Performed by: EMERGENCY MEDICINE

## 2019-05-13 PROCEDURE — 71045 X-RAY EXAM CHEST 1 VIEW: CPT | Performed by: EMERGENCY MEDICINE

## 2019-05-13 RX ORDER — LEVOTHYROXINE SODIUM 0.1 MG/1
100 TABLET ORAL
Status: DISCONTINUED | OUTPATIENT
Start: 2019-05-14 | End: 2019-05-16

## 2019-05-13 RX ORDER — ONDANSETRON 2 MG/ML
4 INJECTION INTRAMUSCULAR; INTRAVENOUS EVERY 6 HOURS PRN
Status: DISCONTINUED | OUTPATIENT
Start: 2019-05-13 | End: 2019-05-16

## 2019-05-13 RX ORDER — SODIUM CHLORIDE 0.9 % (FLUSH) 0.9 %
3 SYRINGE (ML) INJECTION AS NEEDED
Status: DISCONTINUED | OUTPATIENT
Start: 2019-05-13 | End: 2019-05-16

## 2019-05-13 RX ORDER — ACETAMINOPHEN 325 MG/1
650 TABLET ORAL EVERY 6 HOURS PRN
Status: DISCONTINUED | OUTPATIENT
Start: 2019-05-13 | End: 2019-05-16

## 2019-05-13 RX ORDER — CLOPIDOGREL BISULFATE 75 MG/1
75 TABLET ORAL DAILY
Status: DISCONTINUED | OUTPATIENT
Start: 2019-05-14 | End: 2019-05-16

## 2019-05-13 RX ORDER — ASPIRIN 81 MG/1
81 TABLET ORAL DAILY
Status: DISCONTINUED | OUTPATIENT
Start: 2019-05-14 | End: 2019-05-16

## 2019-05-13 RX ORDER — ASCORBIC ACID 1000 MG
TABLET ORAL DAILY
Status: DISCONTINUED | OUTPATIENT
Start: 2019-05-13 | End: 2019-05-13 | Stop reason: RX

## 2019-05-13 RX ORDER — HEPARIN SODIUM 5000 [USP'U]/ML
5000 INJECTION, SOLUTION INTRAVENOUS; SUBCUTANEOUS EVERY 12 HOURS SCHEDULED
Status: DISCONTINUED | OUTPATIENT
Start: 2019-05-13 | End: 2019-05-16

## 2019-05-13 RX ORDER — ATORVASTATIN CALCIUM 40 MG/1
40 TABLET, FILM COATED ORAL NIGHTLY
Status: DISCONTINUED | OUTPATIENT
Start: 2019-05-13 | End: 2019-05-16

## 2019-05-13 RX ORDER — AMLODIPINE BESYLATE 5 MG/1
5 TABLET ORAL DAILY
Status: DISCONTINUED | OUTPATIENT
Start: 2019-05-14 | End: 2019-05-16

## 2019-05-13 RX ORDER — LOSARTAN POTASSIUM 25 MG/1
25 TABLET ORAL DAILY
Status: DISCONTINUED | OUTPATIENT
Start: 2019-05-14 | End: 2019-05-16

## 2019-05-13 NOTE — ED PROVIDER NOTES
Patient Seen in: [facility] AND [facility] Emergency Department    History   Patient presents with:  Leg Swelling    Stated Complaint: R leg swelling     HPI    Patient presents the emergency department complaining of weakness and difficulty ambulating over the 05/13/19 1423 93 %   O2 Device 05/13/19 1900 None (Room air)       Current:/63 (BP Location: Right arm)   Pulse 75   Temp 98.3 °F (36.8 °C) (Oral)   Resp 20   Ht 177.8 cm (5' 10\")   Wt 106.4 kg   SpO2 92%   BMI 33.66 kg/m²         Physical Exam   Co 1.54 (*)     All other components within normal limits   CBC W/ DIFFERENTIAL - Abnormal; Notable for the following components:    HGB 12.1 (*)     .3 (*)     MCHC 30.4 (*)     RDW-SD 49.8 (*)     Monocyte Absolute 1.07 (*)     All other components w 5/13/2019  CONCLUSION:  1. Wedge-shaped pulmonary opacity in left hilar region extending into the left base, likely representing atelectasis however correlate for pneumonia. 2.  Chronic interstitial changes likely representing senescent change.     Dictate

## 2019-05-13 NOTE — TELEPHONE ENCOUNTER
Spoke with Wife Aura Senior. She reports Geovani Garcia has had swelling in the right leg for the last few weeks but in the last few days it has gotten worse. \"Now it is quite a but more swollen\" If she presses it, it leaves an indentation.   No redness or discoloratio

## 2019-05-13 NOTE — H&P
[facility]    PATIENT'S NAME: Rk Muhammad   ATTENDING PHYSICIAN: Lluvia Shin MD   PATIENT ACCOUNT#:   [id number]    LOCATION:  [place] [address]  MEDICAL RECORD #:   [id number]       YOB: 1940  ADMISSION DATE:       05/13/20 historian, but according to the wife, the patient had a temperature of 102.4 over the weekend 2 days ago and he became progressively weaker.   Patient has chronic cough when he lays down to sleep and during the day, which has become more prominent per the w

## 2019-05-13 NOTE — TELEPHONE ENCOUNTER
Pt wife Efren Lesa is calling pt has been experiencing swelling in right leg over the last two weeks, this morning it is worse  There are no openings today, can pt be added?   Please call [phone]    Tasked to nursing

## 2019-05-13 NOTE — TELEPHONE ENCOUNTER
Telephone call to patient and discussed with patient's wife. According the patient's wife patient has developed much swelling involving his right lower leg which is worse now than it had been before.   She notes it is sometimes warm to touch and the patien

## 2019-05-13 NOTE — ED NOTES
Orders for admission, patient is aware of plan and ready to go upstairs.  Any questions, please call ED RN Juan Abraham RN  at extension [phone]

## 2019-05-13 NOTE — ED NOTES
Received pt a/ox3, clear speech, nad, no resp distress, ambulatory with steady gait  Here with c/o RLE swelling for a couple weeks and they gave out yesterday. Denies injury or fall,   Denies CP or SOB.    Upon assessment, swelling noted to R thigh and ankl

## 2019-05-13 NOTE — ED PROVIDER NOTES
Patient Seen in: [facility] AND [facility] Emergency Department    History   Patient presents with:  Leg Swelling    Stated Complaint: R leg swelling     HPI    Signed out by previous shift to follow-up diagnostic work-up.   Patient has had intermittent fevers up atraumatic. Ears: TM's clear b/l  Nose: Nose normal.   Mouth/Throat: MMM, post OP clear with no exudates  Eyes: Conjunctivae and EOM are normal. PERRLA  Neck: Normal range of motion. Neck supple. No tracheal deviation present.    CV: s1s2+, RRR, no m/r/g, and axes as noted on EKG Report.   Rate: 91 bpm  Rhythm: Sinus Rhythm  Reading: Normal sinus rhythm, incomplete right bundle branch block, anterior fascicular block, no acute ST changes, normal intervals              Us Venous Doppler Leg Right - Diag Img ( Prescribed:  Current Discharge Medication List        Present on Admission  Date Reviewed: 3/11/2019          ICD-10-CM Noted POA    Aspiration pneumonia ([facility] [address] J69.0 5/13/2019 Unknown

## 2019-05-14 ENCOUNTER — TELEPHONE (OUTPATIENT)
Dept: PHYSICAL THERAPY | Facility: HOSPITAL | Age: 79
End: 2019-05-14

## 2019-05-14 LAB
ANION GAP SERPL CALC-SCNC: 8 MMOL/L (ref 0–18)
BASOPHILS # BLD AUTO: 0.05 X10(3) UL (ref 0–0.2)
BASOPHILS NFR BLD AUTO: 0.6 %
BUN BLD-MCNC: 21 MG/DL (ref 7–18)
BUN/CREAT SERPL: 14.1 (ref 10–20)
CALCIUM BLD-MCNC: 9.1 MG/DL (ref 8.5–10.1)
CHLORIDE SERPL-SCNC: 113 MMOL/L (ref 98–112)
CO2 SERPL-SCNC: 26 MMOL/L (ref 21–32)
CREAT BLD-MCNC: 1.49 MG/DL (ref 0.7–1.3)
DEPRECATED RDW RBC AUTO: 49.8 FL (ref 35.1–46.3)
EOSINOPHIL # BLD AUTO: 0.2 X10(3) UL (ref 0–0.7)
EOSINOPHIL NFR BLD AUTO: 2.4 %
ERYTHROCYTE [DISTWIDTH] IN BLOOD BY AUTOMATED COUNT: 13.2 % (ref 11–15)
GLUCOSE BLD-MCNC: 97 MG/DL (ref 70–99)
HCT VFR BLD AUTO: 39.8 % (ref 39–53)
HGB BLD-MCNC: 12.1 G/DL (ref 13–17.5)
IMM GRANULOCYTES # BLD AUTO: 0.04 X10(3) UL (ref 0–1)
IMM GRANULOCYTES NFR BLD: 0.5 %
LYMPHOCYTES # BLD AUTO: 1.88 X10(3) UL (ref 1–4)
LYMPHOCYTES NFR BLD AUTO: 22.5 %
MCH RBC QN AUTO: 31.1 PG (ref 26–34)
MCHC RBC AUTO-ENTMCNC: 30.4 G/DL (ref 31–37)
MCV RBC AUTO: 102.3 FL (ref 80–100)
MONOCYTES # BLD AUTO: 1.07 X10(3) UL (ref 0.1–1)
MONOCYTES NFR BLD AUTO: 12.8 %
NEUTROPHILS # BLD AUTO: 5.13 X10 (3) UL (ref 1.5–7.7)
NEUTROPHILS # BLD AUTO: 5.13 X10(3) UL (ref 1.5–7.7)
NEUTROPHILS NFR BLD AUTO: 61.2 %
OSMOLALITY SERPL CALC.SUM OF ELEC: 307 MOSM/KG (ref 275–295)
PLATELET # BLD AUTO: 180 10(3)UL (ref 150–450)
POTASSIUM SERPL-SCNC: 3.6 MMOL/L (ref 3.5–5.1)
RBC # BLD AUTO: 3.89 X10(6)UL (ref 3.8–5.8)
SODIUM SERPL-SCNC: 147 MMOL/L (ref 136–145)
WBC # BLD AUTO: 8.4 X10(3) UL (ref 4–11)

## 2019-05-14 PROCEDURE — 99233 SBSQ HOSP IP/OBS HIGH 50: CPT | Performed by: HOSPITALIST

## 2019-05-14 RX ORDER — SODIUM CHLORIDE 9 MG/ML
INJECTION, SOLUTION INTRAVENOUS
Status: COMPLETED
Start: 2019-05-14 | End: 2019-05-14

## 2019-05-14 NOTE — PHYSICAL THERAPY NOTE
PHYSICAL THERAPY EVALUATION - INPATIENT     Room Number: 570/570-A  Evaluation Date: 5/14/2019  Type of Evaluation: Initial   Physician Order: PT Eval and Treat    Presenting Problem: p/w cough, fever, dx PNA  Reason for Therapy: Mobility Dysfunction and home with [facility] vs rehab stay. Anticipate pt will be able to progress mobility to safe level for d/c home with continued participation in acute PT. PT recommendation home with 24 hr supervision and OP neuro PT at d/c.     Patient will benefit from continued I and walks down for exercise. SUBJECTIVE  \"I had my stroke 10 years ago. \"    PHYSICAL THERAPY EXAMINATION     OBJECTIVE  Precautions: Limb alert - left  Fall Risk: Standard fall risk    WEIGHT BEARING RESTRICTION  Weight Bearing Restriction: None     P STATUS  Gait Assessment   Gait Assistance: Contact guard assist;Minimum assistance  Distance (ft): 30' x 2  Assistive Device: Other (Comment)(tabitha-walker on R)  Pattern: Shuffle(impaired walker use)  Stoop/Curb Assistance: Not tested     Bed Mobility: not

## 2019-05-14 NOTE — PROGRESS NOTES
Pharmacy Note: Dietary Supplement Discontinuation Per Policy    Co Q 10 CAPS has been discontinued on Dena Darryn per policy. This supplement may be restarted upon discharge using the medication reconciliation process.     Thank you,   Lea Espitia

## 2019-05-14 NOTE — OCCUPATIONAL THERAPY NOTE
OCCUPATIONAL THERAPY EVALUATION - INPATIENT     Room Number: 570/570-A  Evaluation Date: 5/14/2019  Type of Evaluation: Initial  Presenting Problem: R LE swelling, cough, fever, aspiration pneumonia.      Physician Order: IP Consult to Occupational Therapy training;Patient/Family education       OCCUPATIONAL THERAPY MEDICAL/SOCIAL HISTORY     Problem List  Principal Problem:    Aspiration pneumonitis ([facility] [address]  Active Problems:    Aspiration pneumonia [facility])      Past Medical History  Past Medical History:   Diag deficits    PERCEPTION  Overall Perception Status:   WFL - within functional limits      RANGE OF MOTION   Upper extremity ROM is within functional limits in R UE. L UE not tested due to prior CVA    STRENGTH ASSESSMENT  Upper extremity strength is within toileting with SBA  Comment:     Patient will tolerate standing for 3-4 minutes in prep for adls with SBA   Comment:    Patient will complete item retrieval with SBA  Comment:          Goals  on: 19  Frequency:   3 x/week    Ever Fairchild MA,

## 2019-05-14 NOTE — PROGRESS NOTES
[facility]    Progress Note    Cassius Malagon Patient Status:  Inpatient    1940 MRN [id number]   Location [facility] 5SW/SE Attending Katrin Cervantes MD   Hosp Day # 1 PCP Dahlia Quintana MD       Subjective:   Karla Pressley b/l    Current Scheduled Inpatient Meds:     • piperacillin-tazobactam  4.5 g Intravenous Q8H   • Heparin Sodium (Porcine)  5,000 Units Subcutaneous 2 times per day   • amLODIPine Besylate  5 mg Oral Daily   • aspirin  81 mg Oral Daily   • atorvastatin  40 (CST): Roxane Martinez MD on 5/13/2019 at 17:02          Xr Chest Ap Portable  (cpt=71045)    Result Date: 5/13/2019  CONCLUSION:  1.   Wedge-shaped pulmonary opacity in left hilar region extending into the left base, likely representing atelectasis however

## 2019-05-14 NOTE — PROGRESS NOTES
irbesartan (AVAPRO) tab  is Non-Formulary Medication &  Auto-Substituted to Losartan 25 mg tablet Per P&T PROTOCOL

## 2019-05-14 NOTE — PROGRESS NOTES
[facility] Pharmacy Note: Antimicrobial Weight Dose Adjustment for: piperacillin/tazobactam (Andrea Rank)    Mynor Segundo is a 69year old male who has been prescribed piperacillin/tazobactam (ZOSYN) 3.375 gm every 8 hours.   CrCl is estimated creatinine clearance is

## 2019-05-14 NOTE — SLP NOTE
ADULT SWALLOWING EVALUATION    ASSESSMENT    ASSESSMENT/OVERALL IMPRESSION:  Pt seen sitting upright in chair for all PO trials and evaluation. Pt denied known hx of dysphagia.  However, pt reportedly with recent hx of pneumonia prior to this admission and • Abdominal aortic aneurysm (AAA) (HCC)    • Disorder of thyroid     hypothyroidism   • High blood pressure    • High cholesterol    • Hx of pneumothorax    • Muscle weakness     LUE hemiplegia   • Osteoarthritis    • Pneumonia due to organism 11/2017 and swallow strategies independently over 2 session(s).     In Progress   Goal #3 The patient will utilize compensatory strategies as outlined by  BSSE (clinical evaluation) including Slow rate, Small bites, Small sips, Multiple swallows, Alternate liquids/

## 2019-05-14 NOTE — PLAN OF CARE
Problem: Patient Centered Care  Goal: Patient preferences are identified and integrated in the patient's plan of care  Description  Interventions:  - What would you like us to know as we care for you?  I had a stroke 10 years ago    - Provide timely, comp non-pharmacological measures as appropriate and evaluate response  - Consider cultural and social influences on pain and pain management  - Manage/alleviate anxiety  - Utilize distraction and/or relaxation techniques  - Monitor for opioid side effects  - N

## 2019-05-14 NOTE — PLAN OF CARE
Problem: Patient Centered Care  Goal: Patient preferences are identified and integrated in the patient's plan of care  Description  Interventions:  - What would you like us to know as we care for you?  I had a stroke 10 years ago    - Provide timely, comp ADULT  Goal: Verbalizes/displays adequate comfort level or patient's stated pain goal  Description  INTERVENTIONS:  - Encourage pt to monitor pain and request assistance  - Assess pain using appropriate pain scale  - Administer analgesics based on type and

## 2019-05-15 ENCOUNTER — APPOINTMENT (OUTPATIENT)
Dept: ULTRASOUND IMAGING | Facility: HOSPITAL | Age: 79
DRG: 178 | End: 2019-05-15
Attending: HOSPITALIST
Payer: MEDICARE

## 2019-05-15 ENCOUNTER — APPOINTMENT (OUTPATIENT)
Dept: PHYSICAL THERAPY | Facility: HOSPITAL | Age: 79
End: 2019-05-15
Attending: INTERNAL MEDICINE
Payer: MEDICARE

## 2019-05-15 ENCOUNTER — APPOINTMENT (OUTPATIENT)
Dept: MRI IMAGING | Facility: HOSPITAL | Age: 79
DRG: 178 | End: 2019-05-15
Attending: HOSPITALIST
Payer: MEDICARE

## 2019-05-15 LAB
ANION GAP SERPL CALC-SCNC: 6 MMOL/L (ref 0–18)
BASOPHILS # BLD AUTO: 0.06 X10(3) UL (ref 0–0.2)
BASOPHILS NFR BLD AUTO: 0.7 %
BUN BLD-MCNC: 20 MG/DL (ref 7–18)
BUN/CREAT SERPL: 13.8 (ref 10–20)
CALCIUM BLD-MCNC: 9.5 MG/DL (ref 8.5–10.1)
CHLORIDE SERPL-SCNC: 113 MMOL/L (ref 98–112)
CO2 SERPL-SCNC: 25 MMOL/L (ref 21–32)
CREAT BLD-MCNC: 1.45 MG/DL (ref 0.7–1.3)
DEPRECATED RDW RBC AUTO: 49.3 FL (ref 35.1–46.3)
EOSINOPHIL # BLD AUTO: 0.28 X10(3) UL (ref 0–0.7)
EOSINOPHIL NFR BLD AUTO: 3.4 %
ERYTHROCYTE [DISTWIDTH] IN BLOOD BY AUTOMATED COUNT: 13.1 % (ref 11–15)
GLUCOSE BLD-MCNC: 95 MG/DL (ref 70–99)
HCT VFR BLD AUTO: 38.6 % (ref 39–53)
HGB BLD-MCNC: 11.8 G/DL (ref 13–17.5)
IMM GRANULOCYTES # BLD AUTO: 0.03 X10(3) UL (ref 0–1)
IMM GRANULOCYTES NFR BLD: 0.4 %
LYMPHOCYTES # BLD AUTO: 1.82 X10(3) UL (ref 1–4)
LYMPHOCYTES NFR BLD AUTO: 22.3 %
MCH RBC QN AUTO: 31 PG (ref 26–34)
MCHC RBC AUTO-ENTMCNC: 30.6 G/DL (ref 31–37)
MCV RBC AUTO: 101.3 FL (ref 80–100)
MONOCYTES # BLD AUTO: 0.8 X10(3) UL (ref 0.1–1)
MONOCYTES NFR BLD AUTO: 9.8 %
NEUTROPHILS # BLD AUTO: 5.16 X10 (3) UL (ref 1.5–7.7)
NEUTROPHILS # BLD AUTO: 5.16 X10(3) UL (ref 1.5–7.7)
NEUTROPHILS NFR BLD AUTO: 63.4 %
OSMOLALITY SERPL CALC.SUM OF ELEC: 300 MOSM/KG (ref 275–295)
PLATELET # BLD AUTO: 202 10(3)UL (ref 150–450)
POTASSIUM SERPL-SCNC: 4 MMOL/L (ref 3.5–5.1)
RBC # BLD AUTO: 3.81 X10(6)UL (ref 3.8–5.8)
SODIUM SERPL-SCNC: 144 MMOL/L (ref 136–145)
WBC # BLD AUTO: 8.2 X10(3) UL (ref 4–11)

## 2019-05-15 PROCEDURE — 99233 SBSQ HOSP IP/OBS HIGH 50: CPT | Performed by: HOSPITALIST

## 2019-05-15 PROCEDURE — 72148 MRI LUMBAR SPINE W/O DYE: CPT | Performed by: HOSPITALIST

## 2019-05-15 PROCEDURE — 93923 UPR/LXTR ART STDY 3+ LVLS: CPT | Performed by: HOSPITALIST

## 2019-05-15 PROCEDURE — 93922 UPR/L XTREMITY ART 2 LEVELS: CPT | Performed by: HOSPITALIST

## 2019-05-15 NOTE — PROGRESS NOTES
[facility]    Progress Note    Corby Oyster Patient Status:  Inpatient    1940 MRN [id number]   Location [facility] 5SW/SE Attending Beata Teixeira MD   Hosp Day # 2 PCP Keyshawn Jensen MD       Subjective:   Charlee Lacy 100 mcg Oral Before breakfast       Current PRN Inpatient Meds:      Normal Saline Flush, acetaminophen, ondansetron HCl    Results:     Lab Results   Component Value Date    WBC 8.2 05/15/2019    HGB 11.8 (L) 05/15/2019    HCT 38.6 (L) 05/15/2019    PLT 2 right-sided CRUZ, minimally decreased left-sided CRUZ, with intact triphasic arterial Doppler waveforms bilaterally.     Dictated by (CST): Kirk Peña MD on 5/15/2019 at 11:21     Approved by (CST): Kirk Peña MD on 5/15/2019 at 11:24          [facility]

## 2019-05-15 NOTE — PLAN OF CARE
Problem: Patient Centered Care  Goal: Patient preferences are identified and integrated in the patient's plan of care  Description  Interventions:  - What would you like us to know as we care for you?  I had a stroke 10 years ago    - Provide timely, comp Incentive Spirometry  - Assess the need for suctioning and perform as needed  - Assess and instruct to report SOB or any respiratory difficulty  - Respiratory Therapy support as indicated  - Manage/alleviate anxiety  - Monitor for signs/symptoms of CO2 ret Instruct pt to call for assistance with activity based on assessment  - Modify environment to reduce risk of injury  - Provide assistive devices as appropriate  - Consider OT/PT consult to assist with strengthening/mobility  - Encourage toileting schedule

## 2019-05-16 ENCOUNTER — APPOINTMENT (OUTPATIENT)
Dept: GENERAL RADIOLOGY | Facility: HOSPITAL | Age: 79
DRG: 178 | End: 2019-05-16
Attending: HOSPITALIST
Payer: MEDICARE

## 2019-05-16 VITALS
WEIGHT: 234.63 LBS | BODY MASS INDEX: 33.59 KG/M2 | DIASTOLIC BLOOD PRESSURE: 63 MMHG | HEART RATE: 96 BPM | RESPIRATION RATE: 18 BRPM | SYSTOLIC BLOOD PRESSURE: 129 MMHG | HEIGHT: 70 IN | OXYGEN SATURATION: 92 % | TEMPERATURE: 97 F

## 2019-05-16 LAB
ANION GAP SERPL CALC-SCNC: 7 MMOL/L (ref 0–18)
BASOPHILS # BLD AUTO: 0.05 X10(3) UL (ref 0–0.2)
BASOPHILS NFR BLD AUTO: 0.6 %
BUN BLD-MCNC: 20 MG/DL (ref 7–18)
BUN/CREAT SERPL: 13.9 (ref 10–20)
CALCIUM BLD-MCNC: 9.7 MG/DL (ref 8.5–10.1)
CHLORIDE SERPL-SCNC: 109 MMOL/L (ref 98–112)
CO2 SERPL-SCNC: 26 MMOL/L (ref 21–32)
CREAT BLD-MCNC: 1.44 MG/DL (ref 0.7–1.3)
DEPRECATED RDW RBC AUTO: 46.9 FL (ref 35.1–46.3)
EOSINOPHIL # BLD AUTO: 0.27 X10(3) UL (ref 0–0.7)
EOSINOPHIL NFR BLD AUTO: 3.5 %
ERYTHROCYTE [DISTWIDTH] IN BLOOD BY AUTOMATED COUNT: 12.8 % (ref 11–15)
GLUCOSE BLD-MCNC: 88 MG/DL (ref 70–99)
HCT VFR BLD AUTO: 41.3 % (ref 39–53)
HGB BLD-MCNC: 13 G/DL (ref 13–17.5)
IMM GRANULOCYTES # BLD AUTO: 0.05 X10(3) UL (ref 0–1)
IMM GRANULOCYTES NFR BLD: 0.6 %
LYMPHOCYTES # BLD AUTO: 2 X10(3) UL (ref 1–4)
LYMPHOCYTES NFR BLD AUTO: 25.7 %
MCH RBC QN AUTO: 31.3 PG (ref 26–34)
MCHC RBC AUTO-ENTMCNC: 31.5 G/DL (ref 31–37)
MCV RBC AUTO: 99.3 FL (ref 80–100)
MONOCYTES # BLD AUTO: 0.8 X10(3) UL (ref 0.1–1)
MONOCYTES NFR BLD AUTO: 10.3 %
NEUTROPHILS # BLD AUTO: 4.6 X10 (3) UL (ref 1.5–7.7)
NEUTROPHILS # BLD AUTO: 4.6 X10(3) UL (ref 1.5–7.7)
NEUTROPHILS NFR BLD AUTO: 59.3 %
OSMOLALITY SERPL CALC.SUM OF ELEC: 296 MOSM/KG (ref 275–295)
PLATELET # BLD AUTO: 242 10(3)UL (ref 150–450)
POTASSIUM SERPL-SCNC: 3.8 MMOL/L (ref 3.5–5.1)
RBC # BLD AUTO: 4.16 X10(6)UL (ref 3.8–5.8)
SODIUM SERPL-SCNC: 142 MMOL/L (ref 136–145)
WBC # BLD AUTO: 7.8 X10(3) UL (ref 4–11)

## 2019-05-16 PROCEDURE — 71045 X-RAY EXAM CHEST 1 VIEW: CPT | Performed by: HOSPITALIST

## 2019-05-16 PROCEDURE — 99239 HOSP IP/OBS DSCHRG MGMT >30: CPT | Performed by: HOSPITALIST

## 2019-05-16 RX ORDER — AMOXICILLIN AND CLAVULANATE POTASSIUM 875; 125 MG/1; MG/1
1 TABLET, FILM COATED ORAL 2 TIMES DAILY
Qty: 12 TABLET | Refills: 0 | Status: SHIPPED | OUTPATIENT
Start: 2019-05-16 | End: 2019-05-22

## 2019-05-16 RX ORDER — POTASSIUM CHLORIDE 20 MEQ/1
40 TABLET, EXTENDED RELEASE ORAL ONCE
Status: COMPLETED | OUTPATIENT
Start: 2019-05-16 | End: 2019-05-16

## 2019-05-16 NOTE — PLAN OF CARE
Pt discharged from hospital in stable condition, all instructions given, prescriptions and follow up appointments, removed PIV dressing applied,

## 2019-05-16 NOTE — DISCHARGE SUMMARY
[facility]    Discharge Summary    Kaye Rush Patient Status:  Inpatient    1940 MRN [id number]   Location [facility] 5SW/SE Attending Disha Gonzalez MD   Hosp Day # 3 PCP Jemima Perea MD     Date of Admission: 307.  Sodium 146. CBC showed white blood cell count of 11.1, hemoglobin of 12.7. Chest x-ray showed wedge-shaped pulmonary opacity in the left hilar region extending into the left base likely representing atelectasis versus pneumonia.   Patient had a veno 0     Clopidogrel Bisulfate 75 MG Tabs  Commonly known as:  PLAVIX      Take 1 tablet (75 mg total) by mouth daily. Quantity:  90 tablet  Refills:  3     CO Q 10 OR      Take 1 tablet by mouth daily.    Refills:  0     Irbesartan 75 MG Tabs      Take 1 ta

## 2019-05-16 NOTE — SLP NOTE
SPEECH DAILY NOTE - INPATIENT    ASSESSMENT & PLAN   ASSESSMENT  Pt seen sitting upright in chair for meal tolerance and monitoring. Pt with no verbalized complaints of dysphagia with intake of current diet, general solids with thin liquids, no straws.  No bites, Small sips, Multiple swallows, Alternate liquids/solids, No straws, Upright 90 degrees with mild assistance 100 % of the time across 2 sessions. Pt seen with current diet and self feeding trials in upright chair position.  Pt self initiated small

## 2019-05-16 NOTE — CM/SW NOTE
Pt able to d/c today. SW met w/ pt who stated plan is for outpt PT at [facility]. Pt declined [facility]  services.      Kimberley Henry, [address]

## 2019-05-17 ENCOUNTER — PATIENT OUTREACH (OUTPATIENT)
Dept: CASE MANAGEMENT | Age: 79
End: 2019-05-17

## 2019-05-17 DIAGNOSIS — J69.0 ASPIRATION PNEUMONITIS (HCC): ICD-10-CM

## 2019-05-17 DIAGNOSIS — Z02.9 ENCOUNTERS FOR ADMINISTRATIVE PURPOSE: ICD-10-CM

## 2019-05-17 PROCEDURE — 1111F DSCHRG MED/CURRENT MED MERGE: CPT

## 2019-05-17 NOTE — PROGRESS NOTES
Initial Post Discharge Follow Up   Discharge Date: 5/16/19  Contact Date: 5/17/2019    Consent Verification:  Assessment Completed With: Patient  Spouse: Shellywesley Leon received per patient?  verbal  HIPAA Verified?   Yes    Discharge Dx:   Aspiration p by mouth daily. Disp:  Rfl:    aspirin 81 MG Oral Tab EC Take 1 tablet (81 mg total) by mouth daily. Disp:  Rfl: 0   Irbesartan 75 MG Oral Tab Take 1 tablet (75 mg total) by mouth daily.  Disp:  Rfl: 0   acetaminophen 500 MG Oral Tab Take 2 tablets by sagar with pt:  Yes      Have you made all of your follow up appointments? yes    Is there any reason as to why you cannot make your appointments?    No     NCM Reviewed upcoming Specialist Appt with patient     Not Applicable          Interventions by NCM: SUKHJINDER r

## 2019-05-17 NOTE — PROGRESS NOTES
Oli Sena [phone] for post hospital follow up, [facility] contact information provided.  TCM book by date 5-

## 2019-05-22 ENCOUNTER — APPOINTMENT (OUTPATIENT)
Dept: PHYSICAL THERAPY | Facility: HOSPITAL | Age: 79
End: 2019-05-22
Attending: INTERNAL MEDICINE
Payer: MEDICARE

## 2019-05-22 RX ORDER — AMLODIPINE BESYLATE 5 MG/1
5 TABLET ORAL DAILY
Qty: 90 TABLET | Refills: 3 | Status: ON HOLD | OUTPATIENT
Start: 2019-05-22 | End: 2019-07-09

## 2019-05-22 NOTE — TELEPHONE ENCOUNTER
I spoke with patient's wife, Ye Long per [employer], and she says that patient will need a refill on amlodipine because [facility]. They have switched to [facility] delivery pharmacy. Amlodipine refilled to [facility].      Refill re

## 2019-05-23 ENCOUNTER — OFFICE VISIT (OUTPATIENT)
Dept: INTERNAL MEDICINE CLINIC | Facility: CLINIC | Age: 79
End: 2019-05-23
Payer: MEDICARE

## 2019-05-23 VITALS
HEIGHT: 70 IN | SYSTOLIC BLOOD PRESSURE: 130 MMHG | TEMPERATURE: 98 F | HEART RATE: 76 BPM | OXYGEN SATURATION: 98 % | DIASTOLIC BLOOD PRESSURE: 70 MMHG | BODY MASS INDEX: 33.93 KG/M2 | WEIGHT: 237 LBS

## 2019-05-23 DIAGNOSIS — G81.94 LEFT HEMIPARESIS (HCC): ICD-10-CM

## 2019-05-23 DIAGNOSIS — N18.30 STAGE 3 CHRONIC KIDNEY DISEASE (HCC): ICD-10-CM

## 2019-05-23 DIAGNOSIS — M15.9 PRIMARY OSTEOARTHRITIS INVOLVING MULTIPLE JOINTS: ICD-10-CM

## 2019-05-23 DIAGNOSIS — N40.1 BENIGN PROSTATIC HYPERPLASIA WITH LOWER URINARY TRACT SYMPTOMS, SYMPTOM DETAILS UNSPECIFIED: ICD-10-CM

## 2019-05-23 DIAGNOSIS — E78.00 HYPERCHOLESTEROLEMIA: ICD-10-CM

## 2019-05-23 DIAGNOSIS — J69.0 ASPIRATION PNEUMONIA OF LEFT LOWER LOBE, UNSPECIFIED ASPIRATION PNEUMONIA TYPE (HCC): Primary | ICD-10-CM

## 2019-05-23 DIAGNOSIS — I10 ESSENTIAL HYPERTENSION: ICD-10-CM

## 2019-05-23 DIAGNOSIS — M54.50 CHRONIC BILATERAL LOW BACK PAIN WITHOUT SCIATICA: ICD-10-CM

## 2019-05-23 DIAGNOSIS — I69.359 CVA, OLD, HEMIPARESIS (HCC): ICD-10-CM

## 2019-05-23 DIAGNOSIS — G89.29 CHRONIC BILATERAL LOW BACK PAIN WITHOUT SCIATICA: ICD-10-CM

## 2019-05-23 DIAGNOSIS — R53.83 FATIGUE, UNSPECIFIED TYPE: ICD-10-CM

## 2019-05-23 PROCEDURE — 99496 TRANSJ CARE MGMT HIGH F2F 7D: CPT | Performed by: INTERNAL MEDICINE

## 2019-05-23 RX ORDER — AMOXICILLIN AND CLAVULANATE POTASSIUM 875; 125 MG/1; MG/1
1 TABLET, FILM COATED ORAL 2 TIMES DAILY
COMMUNITY
End: 2019-06-17 | Stop reason: ALTCHOICE

## 2019-05-23 RX ORDER — AMOXICILLIN AND CLAVULANATE POTASSIUM 875; 125 MG/1; MG/1
1 TABLET, FILM COATED ORAL 2 TIMES DAILY
Qty: 20 TABLET | Refills: 0 | Status: SHIPPED | OUTPATIENT
Start: 2019-05-23 | End: 2019-06-02

## 2019-05-23 NOTE — PATIENT INSTRUCTIONS
1.  Patient is to continue his current diet, medication and activity. 2.  I will give the patient another course of Augmentin to take for 10 more days. I will give him a prescription for 8 and 75 mg orally twice daily for 10 more days.   3.  Patient may u

## 2019-05-23 NOTE — PROGRESS NOTES
Jenny Devlin is a 69year old male. Patient presents with:  TCM (Transition Of Care Management): Discharhed from [facility] 5/16 DX: Aspiration pneumonitis. Continues on PO Augmentin. slight dry cough. No fever.    Pneumonia  Hypertension  CVA  Arthritis    HPI: Take 1 tablet (75 mg total) by mouth daily. Disp:  Rfl: 0   acetaminophen 500 MG Oral Tab Take 2 tablets by mouth 3 (three) times daily. Disp:  Rfl:    atorvastatin 40 MG Oral Tab Take 40 mg by mouth nightly.  Disp:  Rfl:    Coenzyme Q10 (CO Q 10 OR) Take 1. Aspiration pneumonia of left lower lobe, unspecified aspiration pneumonia type [facility])  Patient is much improved with his left lower lobe aspiration pneumonia.   Patient will be given a another course of Augmentin 875 mg orally twice a day for 10 more da on self-management, independent living, and activities of daily living. ? Referrals as listed below in orders Assisted in scheduling required follow-up with community providers and services.     Medication Reconciliation:  I am aware of an inpatient discha mouth daily. Irbesartan 75 MG Oral Tab Take 1 tablet (75 mg total) by mouth daily. acetaminophen 500 MG Oral Tab Take 2 tablets by mouth 3 (three) times daily. atorvastatin 40 MG Oral Tab Take 40 mg by mouth nightly.    Coenzyme Q10 (CO Q 10 OR) Jayme this encounter: 5' 10\" (1.778 m). Weight as of this encounter: 237 lb (107.5 kg).    /70 (BP Location: Right arm, Patient Position: Sitting, Cuff Size: large)   Pulse 76   Temp 97.7 °F (36.5 °C) (Oral)   Ht 5' 10\" (1.778 m)   Wt 237 lb (107.5 kg) Clavulanate (AUGMENTIN) 875-125 MG Oral Tab 20 tablet 0     Sig: Take 1 tablet by mouth 2 (two) times daily for 10 days. Imaging & Consults:  XR CHEST PA + LAT CHEST (CPT=71046)    Patient is doing better.   I have given the patient another course of

## 2019-05-24 ENCOUNTER — APPOINTMENT (OUTPATIENT)
Dept: PHYSICAL THERAPY | Facility: HOSPITAL | Age: 79
End: 2019-05-24
Attending: INTERNAL MEDICINE
Payer: MEDICARE

## 2019-05-29 ENCOUNTER — APPOINTMENT (OUTPATIENT)
Dept: PHYSICAL THERAPY | Facility: HOSPITAL | Age: 79
End: 2019-05-29
Attending: INTERNAL MEDICINE
Payer: MEDICARE

## 2019-05-31 ENCOUNTER — APPOINTMENT (OUTPATIENT)
Dept: PHYSICAL THERAPY | Facility: HOSPITAL | Age: 79
End: 2019-05-31
Attending: INTERNAL MEDICINE
Payer: MEDICARE

## 2019-06-01 ENCOUNTER — APPOINTMENT (OUTPATIENT)
Dept: PHYSICAL THERAPY | Facility: HOSPITAL | Age: 79
End: 2019-06-01
Attending: INTERNAL MEDICINE
Payer: MEDICARE

## 2019-06-05 ENCOUNTER — LAB ENCOUNTER (OUTPATIENT)
Dept: LAB | Age: 79
End: 2019-06-05
Attending: INTERNAL MEDICINE
Payer: MEDICARE

## 2019-06-05 ENCOUNTER — HOSPITAL ENCOUNTER (OUTPATIENT)
Dept: GENERAL RADIOLOGY | Age: 79
Discharge: HOME | End: 2019-06-05
Attending: INTERNAL MEDICINE
Payer: MEDICARE

## 2019-06-05 DIAGNOSIS — J69.0 ASPIRATION PNEUMONIA OF LEFT LOWER LOBE, UNSPECIFIED ASPIRATION PNEUMONIA TYPE (HCC): ICD-10-CM

## 2019-06-05 PROCEDURE — 36415 COLL VENOUS BLD VENIPUNCTURE: CPT

## 2019-06-05 PROCEDURE — 80048 BASIC METABOLIC PNL TOTAL CA: CPT

## 2019-06-05 PROCEDURE — 85025 COMPLETE CBC W/AUTO DIFF WBC: CPT

## 2019-06-05 PROCEDURE — 71046 X-RAY EXAM CHEST 2 VIEWS: CPT | Performed by: INTERNAL MEDICINE

## 2019-06-07 ENCOUNTER — OFFICE VISIT (OUTPATIENT)
Dept: INTERNAL MEDICINE CLINIC | Facility: CLINIC | Age: 79
End: 2019-06-07
Payer: MEDICARE

## 2019-06-07 VITALS
HEART RATE: 76 BPM | WEIGHT: 237 LBS | DIASTOLIC BLOOD PRESSURE: 70 MMHG | SYSTOLIC BLOOD PRESSURE: 120 MMHG | BODY MASS INDEX: 33.93 KG/M2 | OXYGEN SATURATION: 98 % | TEMPERATURE: 99 F | HEIGHT: 70 IN

## 2019-06-07 DIAGNOSIS — M54.50 CHRONIC BILATERAL LOW BACK PAIN WITHOUT SCIATICA: ICD-10-CM

## 2019-06-07 DIAGNOSIS — I10 ESSENTIAL HYPERTENSION: ICD-10-CM

## 2019-06-07 DIAGNOSIS — N40.1 BENIGN PROSTATIC HYPERPLASIA WITH LOWER URINARY TRACT SYMPTOMS, SYMPTOM DETAILS UNSPECIFIED: ICD-10-CM

## 2019-06-07 DIAGNOSIS — J69.0 ASPIRATION PNEUMONIA OF LEFT LOWER LOBE, UNSPECIFIED ASPIRATION PNEUMONIA TYPE (HCC): Primary | ICD-10-CM

## 2019-06-07 DIAGNOSIS — G89.29 CHRONIC BILATERAL LOW BACK PAIN WITHOUT SCIATICA: ICD-10-CM

## 2019-06-07 DIAGNOSIS — N18.30 STAGE 3 CHRONIC KIDNEY DISEASE (HCC): ICD-10-CM

## 2019-06-07 DIAGNOSIS — I69.359 CVA, OLD, HEMIPARESIS (HCC): ICD-10-CM

## 2019-06-07 DIAGNOSIS — R53.83 FATIGUE, UNSPECIFIED TYPE: ICD-10-CM

## 2019-06-07 DIAGNOSIS — E78.00 HYPERCHOLESTEROLEMIA: ICD-10-CM

## 2019-06-07 DIAGNOSIS — G81.94 LEFT HEMIPARESIS (HCC): ICD-10-CM

## 2019-06-07 DIAGNOSIS — M15.9 PRIMARY OSTEOARTHRITIS INVOLVING MULTIPLE JOINTS: ICD-10-CM

## 2019-06-07 PROCEDURE — G0463 HOSPITAL OUTPT CLINIC VISIT: HCPCS | Performed by: INTERNAL MEDICINE

## 2019-06-07 PROCEDURE — 99214 OFFICE O/P EST MOD 30 MIN: CPT | Performed by: INTERNAL MEDICINE

## 2019-06-07 NOTE — PROGRESS NOTES
Wale Rodriguez is a 69year old male. Patient presents with:  Checkup: Recent IP at [address] [place] [address] Avenue for Pnuemonia. Cough still present. Pneumonia  Hypertension  CVA  Arthritis    HPI:   Patient presents with:  Checkup: Recent IP at [address] [place] [address] for Pnuemonia.  Cough still prese Packs/day: 2.00        Years: 50.00        Pack years: 100        Quit date: 2008        Years since quittin.3      Smokeless tobacco: Never Used    Alcohol use: Yes      Comment: socially; about 1 glass of wine once a week     Drug use:  No Hypercholesterolemia  Stable. CPM.    6. Primary osteoarthritis involving multiple joints  Stable. CPM.  Patient does have much arthritis in his spine.   Patient will resume physical therapy to assist with muscle strengthening and ambulation and also pain

## 2019-06-07 NOTE — PATIENT INSTRUCTIONS
1.  Patient is to continue his current diet, medication and activity. 2.  Patient is to be sure that he is seated up when he is eating. He is also to be sure that he chews well swallows with multiple swallows.   3.  I will plan to see the patient back in

## 2019-06-17 ENCOUNTER — OFFICE VISIT (OUTPATIENT)
Dept: INTERNAL MEDICINE CLINIC | Facility: CLINIC | Age: 79
End: 2019-06-17
Payer: MEDICARE

## 2019-06-17 VITALS
DIASTOLIC BLOOD PRESSURE: 70 MMHG | SYSTOLIC BLOOD PRESSURE: 120 MMHG | BODY MASS INDEX: 33.5 KG/M2 | HEART RATE: 80 BPM | OXYGEN SATURATION: 93 % | WEIGHT: 234 LBS | TEMPERATURE: 98 F | HEIGHT: 70 IN | RESPIRATION RATE: 18 BRPM

## 2019-06-17 DIAGNOSIS — M15.9 PRIMARY OSTEOARTHRITIS INVOLVING MULTIPLE JOINTS: ICD-10-CM

## 2019-06-17 DIAGNOSIS — M25.552 PAIN OF LEFT HIP JOINT: Primary | ICD-10-CM

## 2019-06-17 DIAGNOSIS — I69.359 CVA, OLD, HEMIPARESIS (HCC): ICD-10-CM

## 2019-06-17 DIAGNOSIS — G81.94 LEFT HEMIPARESIS (HCC): ICD-10-CM

## 2019-06-17 PROCEDURE — 99214 OFFICE O/P EST MOD 30 MIN: CPT | Performed by: INTERNAL MEDICINE

## 2019-06-17 PROCEDURE — G0463 HOSPITAL OUTPT CLINIC VISIT: HCPCS | Performed by: INTERNAL MEDICINE

## 2019-06-17 RX ORDER — HYDROCODONE BITARTRATE AND ACETAMINOPHEN 5; 325 MG/1; MG/1
1 TABLET ORAL 2 TIMES DAILY PRN
Qty: 30 TABLET | Refills: 0 | Status: SHIPPED | OUTPATIENT
Start: 2019-06-17 | End: 2019-07-02

## 2019-06-17 NOTE — PROGRESS NOTES
Eze Bo is a 69year old male. Patient presents with:  Difficulty Walking: Patient c/o increased difficulty walking for past 1 week. Pain 10/10 to left hip and left leg.      HPI:   Patient presents with:  Difficulty Walking: Patient c/o increased dif History:  Social History    Tobacco Use      Smoking status: Former Smoker        Packs/day: 2.00        Years: 50.00        Pack years: 100        Quit date: 2008        Years since quittin.4      Smokeless tobacco: Never Used    Alcohol use: Deacon Solis out well. I will place the patient on [place] 5/325 that he can take 1 tablet twice a day as necessary. Patient may also use Tylenol extra strength 2 tablets twice a day in addition to this.   As the patient improves he can cut out the Norco and resume the

## 2019-06-17 NOTE — PATIENT INSTRUCTIONS
1.  Patient is to continue his current diet, medication and activity. 2.  I will give the patient a prescription for Norco/5/325 that the patient can take 1 tablet twice a day for severe pain.   Patient can continue to use Tylenol Extra Strength 2 tablets

## 2019-07-06 ENCOUNTER — APPOINTMENT (OUTPATIENT)
Dept: CT IMAGING | Facility: HOSPITAL | Age: 79
DRG: 871 | End: 2019-07-06
Attending: EMERGENCY MEDICINE
Payer: MEDICARE

## 2019-07-06 ENCOUNTER — APPOINTMENT (OUTPATIENT)
Dept: GENERAL RADIOLOGY | Facility: HOSPITAL | Age: 79
DRG: 871 | End: 2019-07-06
Attending: EMERGENCY MEDICINE
Payer: MEDICARE

## 2019-07-06 ENCOUNTER — HOSPITAL ENCOUNTER (INPATIENT)
Facility: HOSPITAL | Age: 79
LOS: 5 days | Discharge: HOME HEALTH | DRG: 871 | End: 2019-07-11
Attending: EMERGENCY MEDICINE | Admitting: HOSPITALIST
Payer: MEDICARE

## 2019-07-06 DIAGNOSIS — J18.9 PNEUMONIA DUE TO INFECTIOUS ORGANISM, UNSPECIFIED LATERALITY, UNSPECIFIED PART OF LUNG: Primary | ICD-10-CM

## 2019-07-06 DIAGNOSIS — A41.9 SEPSIS, DUE TO UNSPECIFIED ORGANISM: ICD-10-CM

## 2019-07-06 LAB
ANION GAP SERPL CALC-SCNC: 12 MMOL/L (ref 0–18)
BASOPHILS # BLD: 0.27 X10(3) UL (ref 0–0.2)
BASOPHILS NFR BLD: 1 %
BUN BLD-MCNC: 19 MG/DL (ref 7–18)
BUN/CREAT SERPL: 11.2 (ref 10–20)
CALCIUM BLD-MCNC: 9.4 MG/DL (ref 8.5–10.1)
CHLORIDE SERPL-SCNC: 110 MMOL/L (ref 98–112)
CO2 SERPL-SCNC: 20 MMOL/L (ref 21–32)
CREAT BLD-MCNC: 1.7 MG/DL (ref 0.7–1.3)
DEPRECATED RDW RBC AUTO: 49.3 FL (ref 35.1–46.3)
EOSINOPHIL # BLD: 0 X10(3) UL (ref 0–0.7)
EOSINOPHIL NFR BLD: 0 %
ERYTHROCYTE [DISTWIDTH] IN BLOOD BY AUTOMATED COUNT: 13.5 % (ref 11–15)
GLUCOSE BLD-MCNC: 150 MG/DL (ref 70–99)
HCT VFR BLD AUTO: 43.4 % (ref 39–53)
HGB BLD-MCNC: 13.8 G/DL (ref 13–17.5)
LACTATE SERPL-SCNC: 4.1 MMOL/L (ref 0.4–2)
LYMPHOCYTES NFR BLD: 10 %
LYMPHOCYTES NFR BLD: 2.7 X10(3) UL (ref 1–4)
MCH RBC QN AUTO: 31.3 PG (ref 26–34)
MCHC RBC AUTO-ENTMCNC: 31.8 G/DL (ref 31–37)
MCV RBC AUTO: 98.4 FL (ref 80–100)
MONOCYTES # BLD: 1.08 X10(3) UL (ref 0.1–1)
MONOCYTES NFR BLD: 4 %
MORPHOLOGY: NORMAL
NEUTROPHILS # BLD AUTO: 22.12 X10 (3) UL (ref 1.5–7.7)
NEUTROPHILS NFR BLD: 84 %
NEUTS BAND NFR BLD: 1 %
NEUTS HYPERSEG # BLD: 22.95 X10(3) UL (ref 1.5–7.7)
NT-PROBNP SERPL-MCNC: 400 PG/ML (ref ?–450)
OSMOLALITY SERPL CALC.SUM OF ELEC: 299 MOSM/KG (ref 275–295)
PLATELET # BLD AUTO: 216 10(3)UL (ref 150–450)
PLATELET MORPHOLOGY: NORMAL
POTASSIUM SERPL-SCNC: 3.8 MMOL/L (ref 3.5–5.1)
RBC # BLD AUTO: 4.41 X10(6)UL (ref 3.8–5.8)
SODIUM SERPL-SCNC: 142 MMOL/L (ref 136–145)
TOTAL CELLS COUNTED: 100
TROPONIN I SERPL-MCNC: <0.045 NG/ML (ref ?–0.04)
WBC # BLD AUTO: 27 X10(3) UL (ref 4–11)

## 2019-07-06 PROCEDURE — 70450 CT HEAD/BRAIN W/O DYE: CPT | Performed by: EMERGENCY MEDICINE

## 2019-07-06 PROCEDURE — 99223 1ST HOSP IP/OBS HIGH 75: CPT | Performed by: HOSPITALIST

## 2019-07-06 PROCEDURE — 71046 X-RAY EXAM CHEST 2 VIEWS: CPT | Performed by: EMERGENCY MEDICINE

## 2019-07-06 PROCEDURE — 72125 CT NECK SPINE W/O DYE: CPT | Performed by: EMERGENCY MEDICINE

## 2019-07-06 PROCEDURE — 73560 X-RAY EXAM OF KNEE 1 OR 2: CPT | Performed by: EMERGENCY MEDICINE

## 2019-07-06 RX ORDER — GUAIFENESIN 600 MG
600 TABLET, EXTENDED RELEASE 12 HR ORAL 2 TIMES DAILY
Status: DISCONTINUED | OUTPATIENT
Start: 2019-07-06 | End: 2019-07-11

## 2019-07-06 RX ORDER — SODIUM CHLORIDE 9 MG/ML
INJECTION, SOLUTION INTRAVENOUS CONTINUOUS
Status: DISCONTINUED | OUTPATIENT
Start: 2019-07-06 | End: 2019-07-07

## 2019-07-06 RX ORDER — LEVOTHYROXINE SODIUM 0.1 MG/1
100 TABLET ORAL
Status: DISCONTINUED | OUTPATIENT
Start: 2019-07-07 | End: 2019-07-11

## 2019-07-06 RX ORDER — CLOPIDOGREL BISULFATE 75 MG/1
75 TABLET ORAL DAILY
Status: DISCONTINUED | OUTPATIENT
Start: 2019-07-07 | End: 2019-07-11

## 2019-07-06 RX ORDER — ALBUTEROL SULFATE 2.5 MG/3ML
2.5 SOLUTION RESPIRATORY (INHALATION)
Status: DISCONTINUED | OUTPATIENT
Start: 2019-07-06 | End: 2019-07-11

## 2019-07-06 RX ORDER — ATORVASTATIN CALCIUM 40 MG/1
40 TABLET, FILM COATED ORAL NIGHTLY
Status: DISCONTINUED | OUTPATIENT
Start: 2019-07-06 | End: 2019-07-11

## 2019-07-06 RX ORDER — HEPARIN SODIUM 5000 [USP'U]/ML
5000 INJECTION, SOLUTION INTRAVENOUS; SUBCUTANEOUS EVERY 12 HOURS SCHEDULED
Status: DISCONTINUED | OUTPATIENT
Start: 2019-07-06 | End: 2019-07-11

## 2019-07-06 RX ORDER — ASPIRIN 81 MG/1
81 TABLET ORAL DAILY
Status: DISCONTINUED | OUTPATIENT
Start: 2019-07-07 | End: 2019-07-11

## 2019-07-06 RX ORDER — IPRATROPIUM BROMIDE AND ALBUTEROL SULFATE 2.5; .5 MG/3ML; MG/3ML
3 SOLUTION RESPIRATORY (INHALATION) ONCE
Status: COMPLETED | OUTPATIENT
Start: 2019-07-06 | End: 2019-07-06

## 2019-07-06 RX ORDER — ONDANSETRON 2 MG/ML
4 INJECTION INTRAMUSCULAR; INTRAVENOUS EVERY 6 HOURS PRN
Status: DISCONTINUED | OUTPATIENT
Start: 2019-07-06 | End: 2019-07-11

## 2019-07-06 RX ORDER — ACETAMINOPHEN 325 MG/1
650 TABLET ORAL EVERY 6 HOURS PRN
Status: DISCONTINUED | OUTPATIENT
Start: 2019-07-06 | End: 2019-07-11

## 2019-07-07 ENCOUNTER — APPOINTMENT (OUTPATIENT)
Dept: GENERAL RADIOLOGY | Facility: HOSPITAL | Age: 79
DRG: 871 | End: 2019-07-07
Attending: HOSPITALIST
Payer: MEDICARE

## 2019-07-07 LAB
ALBUMIN SERPL-MCNC: 2.7 G/DL (ref 3.4–5)
ALBUMIN/GLOB SERPL: 0.8 {RATIO} (ref 1–2)
ALP LIVER SERPL-CCNC: 76 U/L (ref 45–117)
ALT SERPL-CCNC: 15 U/L (ref 16–61)
ANION GAP SERPL CALC-SCNC: 7 MMOL/L (ref 0–18)
AST SERPL-CCNC: 13 U/L (ref 15–37)
BASOPHILS # BLD AUTO: 0.06 X10(3) UL (ref 0–0.2)
BASOPHILS NFR BLD AUTO: 0.4 %
BILIRUB SERPL-MCNC: 0.8 MG/DL (ref 0.1–2)
BUN BLD-MCNC: 17 MG/DL (ref 7–18)
BUN/CREAT SERPL: 12.3 (ref 10–20)
CALCIUM BLD-MCNC: 8.3 MG/DL (ref 8.5–10.1)
CHLORIDE SERPL-SCNC: 116 MMOL/L (ref 98–112)
CO2 SERPL-SCNC: 22 MMOL/L (ref 21–32)
CREAT BLD-MCNC: 1.38 MG/DL (ref 0.7–1.3)
DEPRECATED RDW RBC AUTO: 50.6 FL (ref 35.1–46.3)
EOSINOPHIL # BLD AUTO: 0.04 X10(3) UL (ref 0–0.7)
EOSINOPHIL NFR BLD AUTO: 0.2 %
ERYTHROCYTE [DISTWIDTH] IN BLOOD BY AUTOMATED COUNT: 13.7 % (ref 11–15)
GLOBULIN PLAS-MCNC: 3.5 G/DL (ref 2.8–4.4)
GLUCOSE BLD-MCNC: 125 MG/DL (ref 70–99)
HCT VFR BLD AUTO: 36.1 % (ref 39–53)
HGB BLD-MCNC: 11.3 G/DL (ref 13–17.5)
IMM GRANULOCYTES # BLD AUTO: 0.13 X10(3) UL (ref 0–1)
IMM GRANULOCYTES NFR BLD: 0.8 %
LACTATE SERPL-SCNC: 1.9 MMOL/L (ref 0.4–2)
LYMPHOCYTES # BLD AUTO: 1.43 X10(3) UL (ref 1–4)
LYMPHOCYTES NFR BLD AUTO: 8.6 %
M PROTEIN MFR SERPL ELPH: 6.2 G/DL (ref 6.4–8.2)
MCH RBC QN AUTO: 31.2 PG (ref 26–34)
MCHC RBC AUTO-ENTMCNC: 31.3 G/DL (ref 31–37)
MCV RBC AUTO: 99.7 FL (ref 80–100)
MONOCYTES # BLD AUTO: 1.62 X10(3) UL (ref 0.1–1)
MONOCYTES NFR BLD AUTO: 9.8 %
NEUTROPHILS # BLD AUTO: 13.29 X10 (3) UL (ref 1.5–7.7)
NEUTROPHILS # BLD AUTO: 13.29 X10(3) UL (ref 1.5–7.7)
NEUTROPHILS NFR BLD AUTO: 80.2 %
OSMOLALITY SERPL CALC.SUM OF ELEC: 303 MOSM/KG (ref 275–295)
PLATELET # BLD AUTO: 148 10(3)UL (ref 150–450)
POTASSIUM SERPL-SCNC: 3.4 MMOL/L (ref 3.5–5.1)
RBC # BLD AUTO: 3.62 X10(6)UL (ref 3.8–5.8)
SODIUM SERPL-SCNC: 145 MMOL/L (ref 136–145)
WBC # BLD AUTO: 16.6 X10(3) UL (ref 4–11)

## 2019-07-07 PROCEDURE — 99233 SBSQ HOSP IP/OBS HIGH 50: CPT | Performed by: HOSPITALIST

## 2019-07-07 PROCEDURE — 71046 X-RAY EXAM CHEST 2 VIEWS: CPT | Performed by: HOSPITALIST

## 2019-07-07 NOTE — ED INITIAL ASSESSMENT (HPI)
Pt reports feeling weak at home while getting into stair chair, injured left nee and hit head on wall. No LOC.

## 2019-07-07 NOTE — SLP NOTE
ADULT SWALLOWING EVALUATION    ASSESSMENT    ASSESSMENT/OVERALL IMPRESSION:  Order received, chart reviewed. BSSE warranted given diagnosis of aspiration pneumonia.   Patient known to this service from previous admission, seen for BSSE on 5/14/19 with crow position; Slow rate;Small bites and sips; No straw  Medication Administration Recommendations: One pill at a time  Treatment Plan/Recommendations: Videofluoroscopic swallow study  Discharge Recommendations/Plan: [address] Phase of Swallow: Impaired  Laryngeal Elevation Timing: Appears impaired  Laryngeal Elevation Strength: Appears intact  Laryngeal Elevation Coordination: Appears impaired  (Please note: Silent aspiration cannot be evaluated clinically.  Videofluoroscopic Sw

## 2019-07-07 NOTE — H&P
9 [facility] Patient Status:  Emergency    1940 MRN [id number]   Location [address] Attending Lui Musa MD   Hosp Day # 0 PCP Michael Felix MD     Date: TONSILLECTOMY      at age 22     Family History   Problem Relation Age of Onset   • Cancer Father         Lung      Social History:  Social History    Tobacco Use      Smoking status: Former Smoker        Packs/day: 2.00        Years: 50.00        Pack yea 07/06/2019    .0 07/06/2019    CREATSERUM 1.70 (H) 07/06/2019    BUN 19 (H) 07/06/2019     07/06/2019    K 3.8 07/06/2019     07/06/2019    CO2 20.0 (L) 07/06/2019     (H) 07/06/2019    CA 9.4 07/06/2019    ALB 3.9 01/24/2019    A Gary Dunlap MD on 7/06/2019 at 20:32     Approved by (CST):  Tammie Mccann MD on 7/06/2019 at 20:38          Ekg 12-lead    Result Date: 7/6/2019  ECG Report  Interpretation  --------------------------       Assessment/Plan:       Acute bacterial pneumonia,

## 2019-07-07 NOTE — SEPSIS REASSESSMENT
[facility]    Sepsis Reassessment Note    /56 (BP Location: Right arm)   Pulse 92   Temp 97.7 °F (36.5 °C) (Oral)   Resp 20   Ht 5' 10\" (1.778 m)   Wt 255 lb (115.7 kg)   SpO2 94%   BMI 36.59 kg/m²      10:53 PM    Cardiac:  Regula

## 2019-07-07 NOTE — ED NOTES
Pulse oximetry moved to right ear. Oxygen decreased to 3L nasal cannula. Pt with currently oxygen saturation of 96%.

## 2019-07-07 NOTE — PHYSICAL THERAPY NOTE
PHYSICAL THERAPY EVALUATION - INPATIENT     Room Number: 568/568-A  Evaluation Date: 7/7/2019  Type of Evaluation: Initial   Physician Order: PT Eval and Treat    Presenting Problem: pneumonia Karsten Alida / fall on admission / declining mobility in home -PM with decrease gait quality and intermittent catching of left foot / decrease clearance left LE noted  with therapy providing min to intermittent mod assist for pt safety and fall prevention .   Pt also during ambulation providing intermittent assist support pt at d/c , and further acute management . SW to work with pt and pt family on optimal d/c plan . Patient will benefit from continued IP PT services to address these deficits in preparation for discharge.     DISCHARGE RECOMMENDATIONS  PT Discha hypothyroidism   • High blood pressure    • High cholesterol    • Hx of pneumothorax    • Muscle weakness     LUE hemiplegia   • Osteoarthritis    • Pneumonia due to organism 11/2017   • Stroke [facility])        Past Surgical History  Past Surgical History:   P STRENGTH ASSESSMENT    Pt with hx of left hemiplegia   Pt with decrease fxn use of left UE noted   Pt able to  on left hand  Pt able to move left elbow  Pt with inability to move left shoulder   Pt with fxn use right UE     Pt with generalized LE weakn End of Session: Up in chair;Needs met;Call light within reach;RN aware of session/findings; All patient questions and concerns addressed; Alarm set    CURRENT GOALS    Goals to be met by: 2 weeks   Patient Goal Patient's self-stated goal is: home    Goal #1

## 2019-07-07 NOTE — OCCUPATIONAL THERAPY NOTE
OCCUPATIONAL THERAPY EVALUATION - INPATIENT     Room Number: 568/568-A  Evaluation Date: 7/7/2019  Type of Evaluation: Initial  Presenting Problem: Fatigue, fall, weakness, PNA    Physician Order: IP Consult to Occupational Therapy  Reason for Therapy: ADL from skilled inpatient OT to address the above deficits, maximizing patient's ability to return to prior level of function.     The patient's Approx Degree of Impairment: 53.32% has been calculated based on documentation in the [facility] '6 clicks' Inpatient Da cane)       Hand Dominance: Right  Drives: No       Stairs in Home: 2 CHRISTINA, 10 stairs (chair lift)  Use of Assistive Device(s): 3-prong cane    Prior Level of [place]: Pt lives with his wife in a 2-story home.  He has a chair lift he uses to go up stair clothing?: A Lot  -   Bathing (including washing, rinsing, drying)?: A Lot  -   Toileting, which includes using toilet, bedpan or urinal? : A Little  -   Putting on and taking off regular upper body clothing?: A Little  -   Taking care of personal grooming

## 2019-07-07 NOTE — PLAN OF CARE
Problem: Patient Centered Care  Goal: Patient preferences are identified and integrated in the patient's plan of care  Description  Interventions:  - What would you like us to know as we care for you? I live at home with my wife.   - Provide timely, compl minimize respiratory effort  - Oxygen supplementation based on oxygen saturation or ABGs  - Provide Smoking Cessation handout, if applicable  - Encourage broncho-pulmonary hygiene including cough, deep breathe, Incentive Spirometry  - Assess the need for s

## 2019-07-07 NOTE — ED NOTES
Orders for admission, patient is aware of plan and ready to go upstairs.  Any questions, please call ED RN flaquito  at extension [phone]

## 2019-07-07 NOTE — PROGRESS NOTES
[facility]  Progress Note     Chadd Dobson  : 1940    Status: Inpatient  Day #: 1    Attending: Pardeep Chun MD  PCP: Yohana Valera MD      Assessment and Plan     Acute bacterial pneumonia, possible aspiration  With sepsis  Ini 43.4 36.1*   .0 148.0*   RBC 4.41 3.62*   MCV 98.4 99.7   MCH 31.3 31.2   MCHC 31.8 31.3   RDW 13.5 13.7   NEPRELIM 22.12* 13.29*     Recent Labs   Lab 07/06/19  1909 07/06/19  2032 07/07/19  0612   BUN 19*  --  17   CREATSERUM 1.70*  --  1.38*   GF 4. Chronic small vessel ischemic changes. 5. Atherosclerosis. 6. Opacification of the right maxillary sinus. Dictated by (CST): Isai Sanchez MD on 7/06/2019 at 20:22     Approved by (CST):  Isai Sanchez MD on 7/06/2019 at 20:31          Ct Sp

## 2019-07-07 NOTE — ED PROVIDER NOTES
Patient Seen in: [facility] AND [facility] Emergency Department    History   Patient presents with:  Fatigue (constitutional, neurologic)    Stated Complaint: weakness, fall, left knee pain    HPI    78-year-old male with history of hypertension, hypercholestero Used    Alcohol use: Yes      Comment: socially; about 1 glass of wine once a week     Drug use: No      Review of Systems    Positive for stated complaint: weakness, fall, left knee pain  Other systems are as noted in HPI.   Constitutional and vital signs Creatinine 1.70 (*)     Calculated Osmolality 299 (*)     GFR, Non- 38 (*)     GFR, -American 44 (*)     All other components within normal limits   MANUAL DIFFERENTIAL - Abnormal; Notable for the following components:    Neutrophil exam, bedside monitoring of interventions, collecting and interpreting tests and discussion with consultants but not including time spent performing procedures.       Admission disposition: 7/6/2019  9:49 PM                 Disposition and Plan     Clinical

## 2019-07-07 NOTE — ED NOTES
Pt to ER via EMS with c/o dyspnea, cough, and weakness that started today. Pt states he fell while getting into stair chair- hitting head without LOC and hitting left knee. +abrasion noted to right knee. Pt takes asa at home- pt denies thinners.  Bilateral

## 2019-07-07 NOTE — PROGRESS NOTES
[facility] Dosing Service  Antibiotic Dosing    Jenny Devlin is a 67year old male for whom pharmacy is dosing Zosyn for treatment of  pneumonia. .  Other antibiotics (Not dosed by pharmacy): Allergies: has No Known Allergies.     Vitals: /56

## 2019-07-08 ENCOUNTER — APPOINTMENT (OUTPATIENT)
Dept: CV DIAGNOSTICS | Facility: HOSPITAL | Age: 79
DRG: 871 | End: 2019-07-08
Attending: HOSPITALIST
Payer: MEDICARE

## 2019-07-08 ENCOUNTER — APPOINTMENT (OUTPATIENT)
Dept: GENERAL RADIOLOGY | Facility: HOSPITAL | Age: 79
DRG: 871 | End: 2019-07-08
Attending: HOSPITALIST
Payer: MEDICARE

## 2019-07-08 LAB
ANION GAP SERPL CALC-SCNC: 8 MMOL/L (ref 0–18)
BASOPHILS # BLD AUTO: 0.07 X10(3) UL (ref 0–0.2)
BASOPHILS NFR BLD AUTO: 0.8 %
BUN BLD-MCNC: 15 MG/DL (ref 7–18)
BUN/CREAT SERPL: 10.9 (ref 10–20)
CALCIUM BLD-MCNC: 8.4 MG/DL (ref 8.5–10.1)
CHLORIDE SERPL-SCNC: 115 MMOL/L (ref 98–112)
CO2 SERPL-SCNC: 23 MMOL/L (ref 21–32)
CREAT BLD-MCNC: 1.38 MG/DL (ref 0.7–1.3)
DEPRECATED RDW RBC AUTO: 49.7 FL (ref 35.1–46.3)
EOSINOPHIL # BLD AUTO: 0.2 X10(3) UL (ref 0–0.7)
EOSINOPHIL NFR BLD AUTO: 2.2 %
ERYTHROCYTE [DISTWIDTH] IN BLOOD BY AUTOMATED COUNT: 13.6 % (ref 11–15)
GLUCOSE BLD-MCNC: 101 MG/DL (ref 70–99)
HCT VFR BLD AUTO: 35 % (ref 39–53)
HGB BLD-MCNC: 11 G/DL (ref 13–17.5)
IMM GRANULOCYTES # BLD AUTO: 0.04 X10(3) UL (ref 0–1)
IMM GRANULOCYTES NFR BLD: 0.4 %
LYMPHOCYTES # BLD AUTO: 1.54 X10(3) UL (ref 1–4)
LYMPHOCYTES NFR BLD AUTO: 16.8 %
MCH RBC QN AUTO: 30.8 PG (ref 26–34)
MCHC RBC AUTO-ENTMCNC: 31.4 G/DL (ref 31–37)
MCV RBC AUTO: 98 FL (ref 80–100)
MONOCYTES # BLD AUTO: 1 X10(3) UL (ref 0.1–1)
MONOCYTES NFR BLD AUTO: 10.9 %
NEUTROPHILS # BLD AUTO: 6.29 X10 (3) UL (ref 1.5–7.7)
NEUTROPHILS # BLD AUTO: 6.29 X10(3) UL (ref 1.5–7.7)
NEUTROPHILS NFR BLD AUTO: 68.9 %
OSMOLALITY SERPL CALC.SUM OF ELEC: 303 MOSM/KG (ref 275–295)
PLATELET # BLD AUTO: 146 10(3)UL (ref 150–450)
POTASSIUM SERPL-SCNC: 3.6 MMOL/L (ref 3.5–5.1)
RBC # BLD AUTO: 3.57 X10(6)UL (ref 3.8–5.8)
SODIUM SERPL-SCNC: 146 MMOL/L (ref 136–145)
WBC # BLD AUTO: 9.1 X10(3) UL (ref 4–11)

## 2019-07-08 PROCEDURE — 93306 TTE W/DOPPLER COMPLETE: CPT | Performed by: HOSPITALIST

## 2019-07-08 PROCEDURE — 99233 SBSQ HOSP IP/OBS HIGH 50: CPT | Performed by: HOSPITALIST

## 2019-07-08 PROCEDURE — 74230 X-RAY XM SWLNG FUNCJ C+: CPT | Performed by: HOSPITALIST

## 2019-07-08 NOTE — CM/SW NOTE
Patient is currently a [address] readmission. Pt active 5/16/19 Thru 8/13/19 Under .  Pt with 30   Days left

## 2019-07-08 NOTE — PHYSICAL THERAPY NOTE
PHYSICAL THERAPY TREATMENT NOTE - INPATIENT     Room Number: 568/568-A       Presenting Problem: pneumonia Jason Massey / fall on admission / declining mobility in home -PMH sign for prior CVA with left side weakness /hemiplegia     Problem List  Principal Pr MOBILITY  How much difficulty does the patient currently have. ..  -   Turning over in bed (including adjusting bedclothes, sheets and blankets)?: A Little   -   Sitting down on and standing up from a chair with arms (e.g., wheelchair, bedside commode, etc. Goal #4   Current Status NT   Goal #5 Patient education and family training with spouse with both able  to verbalize understanding of pt education for improved safety and proper sequencing with  fxn mobility .    Goal #5   Current Status In progress   [facility]

## 2019-07-08 NOTE — PROGRESS NOTES
[facility]  Progress Note     Fortino Garner  : 1940    Status: Inpatient  Day #: 2    Attending: Peyton Rehman MD  PCP: Hector Casillas MD      Assessment and Plan     Acute bacterial pneumonia, possible aspiration  With sepsis  Ini Output —   Net 661 ml         Recent Labs   Lab 07/06/19  1909 07/07/19  0612 07/08/19  0608   WBC 27.0* 16.6* 9.1   HGB 13.8 11.3* 11.0*   HCT 43.4 36.1* 35.0*   .0 148.0* 146.0*   RBC 4.41 3.62* 3.57*   MCV 98.4 99.7 98.0   MCH 31.3 31.2 30.8 20:22          Ct Brain Or Head (25297)    Result Date: 7/6/2019  CONCLUSION: 1. No acute intracranial changes. 2. 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   3 results, diagnosis, further w/u.       Baldemar Holland MD

## 2019-07-08 NOTE — SLP NOTE
ADULT VIDEOFLUOROSCOPIC SWALLOWING STUDY    Admission Date: 7/6/2019  Evaluation Date: 07/08/19  Radiologist: Clifton Brennan    RECOMMENDATIONS   Diet Recommendations - Solids: Regular  Diet Recommendations - Liquid:  Thin    Further Follow-up:  Follow Up Needed Liquids): Within Functional Limits  Triggered at: Pyriform sinuses  Premature Spillage to: Pyriform sinuses  Delay (seconds): (2-3)  Residue Severity, Location: Trace; Throughout pharynx  Cleared/Reduced with: Secondary swallow  Laryngeal Penetration: None thin liquids without overt signs or symptoms of aspiration with 100 % accuracy over 2 session(s).   In Progress   Goal #2 The patient/family/caregiver will demonstrate understanding and implementation of aspiration precautions and swallow strategies indepen

## 2019-07-09 ENCOUNTER — APPOINTMENT (OUTPATIENT)
Dept: GENERAL RADIOLOGY | Facility: HOSPITAL | Age: 79
DRG: 871 | End: 2019-07-09
Attending: HOSPITALIST
Payer: MEDICARE

## 2019-07-09 LAB
ANION GAP SERPL CALC-SCNC: 9 MMOL/L (ref 0–18)
BASOPHILS # BLD AUTO: 0.07 X10(3) UL (ref 0–0.2)
BASOPHILS NFR BLD AUTO: 0.9 %
BUN BLD-MCNC: 14 MG/DL (ref 7–18)
BUN/CREAT SERPL: 10.4 (ref 10–20)
CALCIUM BLD-MCNC: 8.9 MG/DL (ref 8.5–10.1)
CHLORIDE SERPL-SCNC: 111 MMOL/L (ref 98–112)
CO2 SERPL-SCNC: 23 MMOL/L (ref 21–32)
CREAT BLD-MCNC: 1.34 MG/DL (ref 0.7–1.3)
DEPRECATED RDW RBC AUTO: 48.9 FL (ref 35.1–46.3)
EOSINOPHIL # BLD AUTO: 0.24 X10(3) UL (ref 0–0.7)
EOSINOPHIL NFR BLD AUTO: 3 %
ERYTHROCYTE [DISTWIDTH] IN BLOOD BY AUTOMATED COUNT: 13.7 % (ref 11–15)
GLUCOSE BLD-MCNC: 98 MG/DL (ref 70–99)
HCT VFR BLD AUTO: 37.5 % (ref 39–53)
HGB BLD-MCNC: 11.9 G/DL (ref 13–17.5)
IMM GRANULOCYTES # BLD AUTO: 0.03 X10(3) UL (ref 0–1)
IMM GRANULOCYTES NFR BLD: 0.4 %
LYMPHOCYTES # BLD AUTO: 1.66 X10(3) UL (ref 1–4)
LYMPHOCYTES NFR BLD AUTO: 20.7 %
MCH RBC QN AUTO: 30.9 PG (ref 26–34)
MCHC RBC AUTO-ENTMCNC: 31.7 G/DL (ref 31–37)
MCV RBC AUTO: 97.4 FL (ref 80–100)
MONOCYTES # BLD AUTO: 0.81 X10(3) UL (ref 0.1–1)
MONOCYTES NFR BLD AUTO: 10.1 %
NEUTROPHILS # BLD AUTO: 5.22 X10 (3) UL (ref 1.5–7.7)
NEUTROPHILS # BLD AUTO: 5.22 X10(3) UL (ref 1.5–7.7)
NEUTROPHILS NFR BLD AUTO: 64.9 %
OSMOLALITY SERPL CALC.SUM OF ELEC: 296 MOSM/KG (ref 275–295)
PLATELET # BLD AUTO: 163 10(3)UL (ref 150–450)
POTASSIUM SERPL-SCNC: 3.8 MMOL/L (ref 3.5–5.1)
RBC # BLD AUTO: 3.85 X10(6)UL (ref 3.8–5.8)
SODIUM SERPL-SCNC: 143 MMOL/L (ref 136–145)
WBC # BLD AUTO: 8 X10(3) UL (ref 4–11)

## 2019-07-09 PROCEDURE — 99233 SBSQ HOSP IP/OBS HIGH 50: CPT | Performed by: HOSPITALIST

## 2019-07-09 PROCEDURE — 71045 X-RAY EXAM CHEST 1 VIEW: CPT | Performed by: HOSPITALIST

## 2019-07-09 RX ORDER — FUROSEMIDE 10 MG/ML
20 INJECTION INTRAMUSCULAR; INTRAVENOUS ONCE
Status: COMPLETED | OUTPATIENT
Start: 2019-07-09 | End: 2019-07-09

## 2019-07-09 RX ORDER — AMOXICILLIN AND CLAVULANATE POTASSIUM 875; 125 MG/1; MG/1
1 TABLET, FILM COATED ORAL 2 TIMES DAILY
Qty: 14 TABLET | Refills: 0 | Status: SHIPPED | OUTPATIENT
Start: 2019-07-09 | End: 2019-07-16

## 2019-07-09 NOTE — PLAN OF CARE
Problem: SAFETY ADULT - FALL  Goal: Free from fall injury  Description  INTERVENTIONS:  - Assess pt frequently for physical needs  - Identify cognitive and physical deficits and behaviors that affect risk of falls.   - [place] fall precautions as indica alert and oriented, aware of safety issues as of this time. Patient is currently on 2LPM of O2 via NC, lung sounds a little diminished, has non productive cough occasionally.   Tried weaning him off O2 but noted to be desaturating when up and walking to th

## 2019-07-09 NOTE — PLAN OF CARE
Problem: Patient Centered Care  Goal: Patient preferences are identified and integrated in the patient's plan of care  Description  Interventions:  - What would you like us to know as we care for you? I live at home with my wife.   - Provide timely, compl minimize respiratory effort  - Oxygen supplementation based on oxygen saturation or ABGs  - Provide Smoking Cessation handout, if applicable  - Encourage broncho-pulmonary hygiene including cough, deep breathe, Incentive Spirometry  - Assess the need for s place. Patient able to make needs known.

## 2019-07-09 NOTE — PHYSICAL THERAPY NOTE
PHYSICAL THERAPY TREATMENT NOTE - INPATIENT     Room Number: 568/568-A       Presenting Problem: pneumonia Kristene Green / fall on admission / declining mobility in home -PMH sign for prior CVA with left side weakness /hemiplegia     Problem List  Principal Pr +  Dynamic Sitting: Fair +           Static Standing: Fair -  Dynamic Standing: Poor +    ACTIVITY TOLERANCE  Pulse: 93  Heart Rate Source: Monitor                   O2 WALK        SPO2 Ambulation on Oxygen: 93  Ambulation oxygen flow (liters per minute): assistance level: supervision with walker - tabitha improved sequencing and safety awareness       Goal #2  Current Status Pt demo with cga and increased time with hemiwalker   Goal #3 Patient is able to ambulate 100-150  feet with assist device: walker - hem

## 2019-07-09 NOTE — DISCHARGE SUMMARY
[facility]  Discharge Summary     Melania Pulido  : 1940    Status: Inpatient  Day #: 3    Attending: Baldemar Holland MD  PCP: Kecia Lawrence MD     Date of Admission: 2019  Date of Discharge: 2019     Hospital Discharge Diagn edema. Some is from the IVF he received.  ?from amlodipine he takes at home.  -no history of CHF  -echo reviewed  -stop amlodipine     ARF on CKD, mild metabolic acidosis FT8~65, dehydration  -improved     Profound weakness with fall from sepsis  XRs withou known as:  PLAVIX      Take 1 tablet (75 mg total) by mouth daily. Quantity:  90 tablet  Refills:  3     CO Q 10 OR      Take 1 tablet by mouth daily. Refills:  0     Irbesartan 75 MG Tabs      Take 1 tablet (75 mg total) by mouth daily.    Refills:  0

## 2019-07-09 NOTE — PROGRESS NOTES
At rest patient oxygenation is 93% room air, then while ambulating patient de saturated to  77% room air, place patient on 1 L oxygen and came up to 94%. Dr. Chandler Moon notified.  See orders

## 2019-07-09 NOTE — PROGRESS NOTES
[facility]  Progress Note     Tasha Justin  : 1940    Status: Inpatient  Day #: 3    Attending: Shaila Thao MD  PCP: Shirley May MD      Assessment and Plan     Acute bacterial pneumonia, possible aspiration  With sepsis  Ini affect, calm and appropriate  Skin:  No rash, no lesion    Intake/Output Summary (Last 24 hours) at 7/9/2019 1412  Last data filed at 7/9/2019 1355  Gross per 24 hour   Intake 340 ml   Output —   Net 340 ml         Recent Labs   Lab 07/07/19  0612 07/08/19 Clopidogrel Bisulfate  75 mg Oral Daily   • Levothyroxine Sodium  100 mcg Oral Before breakfast   • piperacillin-tazobactam  4.5 g Intravenous Q8H      PRN Meds: acetaminophen, ondansetron HCl    Spent >35 minutes, >50% of the time counseling coordinating

## 2019-07-10 ENCOUNTER — TELEPHONE (OUTPATIENT)
Dept: MEDSURG UNIT | Facility: HOSPITAL | Age: 79
End: 2019-07-10

## 2019-07-10 PROCEDURE — 99222 1ST HOSP IP/OBS MODERATE 55: CPT | Performed by: INTERNAL MEDICINE

## 2019-07-10 PROCEDURE — 99233 SBSQ HOSP IP/OBS HIGH 50: CPT | Performed by: HOSPITALIST

## 2019-07-10 RX ORDER — FUROSEMIDE 10 MG/ML
40 INJECTION INTRAMUSCULAR; INTRAVENOUS ONCE
Status: COMPLETED | OUTPATIENT
Start: 2019-07-10 | End: 2019-07-10

## 2019-07-10 NOTE — DOWNTIME EVENT NOTE
The EMR was down for 1 hours on 7/10/2019. You Yanez was responsible for completing the paper charting during this time period.      The following information was re-entered into the system by Melvi Casillas: n/a    The following information will remain in

## 2019-07-10 NOTE — CONSULTS
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 - Dr. Janis Gtz      at age 22       Family History  Family History   Problem Relation Age of Onset   • Cancer Father         Lung        Social His Q10 (CO Q 10 OR) Take 1 tablet by mouth daily. [DISCONTINUED] amLODIPine Besylate 5 MG Oral Tab Take 1 tablet (5 mg total) by mouth daily. Allergies  No Known Allergies    Review of Systems:    Pertinent items are noted in HPI.     Physical Exam: 4- h/o CVA with residual left sided weakness   5- mild volume overload  6- acute respiratory impairment with hypoxemia   7- fall without syncope in 7/6/2019   8- DVT prophylaxis with heparin sq     CXR 7/6 with basilar infiltrate / airbronchogram   CXR 7

## 2019-07-10 NOTE — PROGRESS NOTES
[facility]    Progress Note    María Stark Patient Status:  Inpatient    1940 MRN [id number]   Location [facility] 5SW/SE Attending Joanne Catherine, [address] [facility] Day # 4 PCP Paulette Damian MD       Subjective:     Pt josephine at 10:22     Approved by (CST): Jay Pizano MD on 7/09/2019 at 10:24                Assessment and Plan:     Acute bacterial pneumonia, possible aspiration  With sepsis  Initial high leukocytosis WBC~27k, tachycardia - improved  Pt with continued hypoxi

## 2019-07-10 NOTE — SLP NOTE
SPEECH DAILY NOTE - INPATIENT    ASSESSMENT & PLAN   ASSESSMENT  7/8/19 VFSS   Pt presents with mild oropharyngeal dysphagia characterized by increased mastication time and delayed pharyngeal trigger with premature spillage to the level of the pyriform sin Experiencing Pain: No    Discharge Recommendations  Discharge Recommendations/Plan: Undetermined    Treatment Plan  Treatment Plan/Recommendations: Aspiration precautions    Interdisciplinary Communication: Discussed with RN  [facility] Cosmo MARES Score: 5   FCM Impression:

## 2019-07-10 NOTE — PHYSICAL THERAPY NOTE
PHYSICAL THERAPY TREATMENT NOTE - INPATIENT     Room Number: 568/568-A       Presenting Problem: pneumonia Layvonne Josh / fall on admission / declining mobility in home -PMH sign for prior CVA with left side weakness /hemiplegia     Problem List  Principal Pr 120(with activity)  Heart Rate Source: Monitor                   O2 WALK     SPO2 Ambulation on Room Air: 89  SPO2 Ambulation on Oxygen: 93  Ambulation oxygen flow (liters per minute): 1      AM-PAC '6-Clicks' INPATIENT SHORT FORM - BASIC MOBILITY  How muc and safety awareness       Goal #2  Current Status Pt demo with cga/sba and increased time with hemiwalker   Goal #3 Patient is able to ambulate 100-150  feet with assist device: walker - tabitha at assistance level: supervision/CGA with improved gait quality

## 2019-07-10 NOTE — PLAN OF CARE
Problem: Patient Centered Care  Goal: Patient preferences are identified and integrated in the patient's plan of care  Description  Interventions:  - What would you like us to know as we care for you? I live at home with my wife.   - Provide timely, compl minimize respiratory effort  - Oxygen supplementation based on oxygen saturation or ABGs  - Provide Smoking Cessation handout, if applicable  - Encourage broncho-pulmonary hygiene including cough, deep breathe, Incentive Spirometry  - Assess the need for s within reach. Patient able to make needs known.

## 2019-07-10 NOTE — OCCUPATIONAL THERAPY NOTE
Cristela Reddy 541 working with PT when therapist attempted; will follow up later schedule permitting.      Wild Concepcion, OTR/L   5 [facility]

## 2019-07-11 VITALS
HEART RATE: 96 BPM | RESPIRATION RATE: 18 BRPM | TEMPERATURE: 97 F | HEIGHT: 70 IN | OXYGEN SATURATION: 90 % | SYSTOLIC BLOOD PRESSURE: 134 MMHG | WEIGHT: 231.38 LBS | BODY MASS INDEX: 33.12 KG/M2 | DIASTOLIC BLOOD PRESSURE: 68 MMHG

## 2019-07-11 LAB
ANION GAP SERPL CALC-SCNC: 8 MMOL/L (ref 0–18)
BASOPHILS # BLD AUTO: 0.07 X10(3) UL (ref 0–0.2)
BASOPHILS NFR BLD AUTO: 0.9 %
BUN BLD-MCNC: 24 MG/DL (ref 7–18)
BUN/CREAT SERPL: 13.4 (ref 10–20)
CALCIUM BLD-MCNC: 9.9 MG/DL (ref 8.5–10.1)
CHLORIDE SERPL-SCNC: 110 MMOL/L (ref 98–112)
CO2 SERPL-SCNC: 27 MMOL/L (ref 21–32)
CREAT BLD-MCNC: 1.79 MG/DL (ref 0.7–1.3)
DEPRECATED RDW RBC AUTO: 48.6 FL (ref 35.1–46.3)
EOSINOPHIL # BLD AUTO: 0.27 X10(3) UL (ref 0–0.7)
EOSINOPHIL NFR BLD AUTO: 3.6 %
ERYTHROCYTE [DISTWIDTH] IN BLOOD BY AUTOMATED COUNT: 13.7 % (ref 11–15)
GLUCOSE BLD-MCNC: 112 MG/DL (ref 70–99)
HCT VFR BLD AUTO: 40.8 % (ref 39–53)
HGB BLD-MCNC: 12.9 G/DL (ref 13–17.5)
IMM GRANULOCYTES # BLD AUTO: 0.06 X10(3) UL (ref 0–1)
IMM GRANULOCYTES NFR BLD: 0.8 %
LYMPHOCYTES # BLD AUTO: 2.27 X10(3) UL (ref 1–4)
LYMPHOCYTES NFR BLD AUTO: 29.9 %
MCH RBC QN AUTO: 30.8 PG (ref 26–34)
MCHC RBC AUTO-ENTMCNC: 31.6 G/DL (ref 31–37)
MCV RBC AUTO: 97.4 FL (ref 80–100)
MONOCYTES # BLD AUTO: 0.95 X10(3) UL (ref 0.1–1)
MONOCYTES NFR BLD AUTO: 12.5 %
NEUTROPHILS # BLD AUTO: 3.98 X10 (3) UL (ref 1.5–7.7)
NEUTROPHILS # BLD AUTO: 3.98 X10(3) UL (ref 1.5–7.7)
NEUTROPHILS NFR BLD AUTO: 52.3 %
OSMOLALITY SERPL CALC.SUM OF ELEC: 305 MOSM/KG (ref 275–295)
PLATELET # BLD AUTO: 213 10(3)UL (ref 150–450)
POTASSIUM SERPL-SCNC: 3.5 MMOL/L (ref 3.5–5.1)
RBC # BLD AUTO: 4.19 X10(6)UL (ref 3.8–5.8)
SODIUM SERPL-SCNC: 145 MMOL/L (ref 136–145)
WBC # BLD AUTO: 7.6 X10(3) UL (ref 4–11)

## 2019-07-11 PROCEDURE — 99239 HOSP IP/OBS DSCHRG MGMT >30: CPT | Performed by: HOSPITALIST

## 2019-07-11 PROCEDURE — 99232 SBSQ HOSP IP/OBS MODERATE 35: CPT | Performed by: INTERNAL MEDICINE

## 2019-07-11 RX ORDER — POTASSIUM CHLORIDE 20 MEQ/1
40 TABLET, EXTENDED RELEASE ORAL EVERY 4 HOURS
Status: COMPLETED | OUTPATIENT
Start: 2019-07-11 | End: 2019-07-11

## 2019-07-11 NOTE — HOME CARE LIAISON
Met with patient at the bedside. Patient is agreeable to [facility]. Residential brochure provided with contact information. All questions addressed and answered.

## 2019-07-11 NOTE — BH RN DISCHARGE NOTE
D/c instructions given to patient at bedside, scripts given to spouse, Rommel Wright. O2 walk study completed  With O2 sat at 91% throughout walk consistently. No Home O2 required at this time. Residential Home health RN in to visit patient and spouse. Pt.  To be t

## 2019-07-11 NOTE — PLAN OF CARE
Problem: Patient/Family Goals  Goal: Patient/Family Long Term Goal  Description  Patient's Long Term Goal: Return home.     Interventions:  - Follow MD orders  - See additional Care Plan goals for specific interventions   Outcome: Progressing  Goal: Patie Assess and document risk factors for pressure ulcer development  - Assess and document skin integrity  - Monitor for areas of redness and/or skin breakdown  - Initiate interventions, skin care algorithm/standards of care as needed  Outcome: Progressing

## 2019-07-11 NOTE — CM/SW NOTE
MDO for HHC. SW spoke w/ pt & pt's wife who are both agreeable and have no HHC preference. SW placed referral to [facility]. [facility] in.     [place], [address]

## 2019-07-11 NOTE — PROGRESS NOTES
Pulmonary/Critical Care Follow Up Note    HPI:   Harrison Marc is a 67year old male with Patient presents with:  Fatigue (constitutional, neurologic)      PCP Tamara Felix MD  Admission Attending No admitting provider for patient encounter.     [facility] +bs  Ext no edema      LABS:    Lab Results   Component Value Date    WBC 7.6 07/11/2019    HGB 12.9 07/11/2019    HCT 40.8 07/11/2019    .0 07/11/2019    CREATSERUM 1.79 07/11/2019    BUN 24 07/11/2019     07/11/2019    K 3.5 07/11/2019    CL

## 2019-07-11 NOTE — PLAN OF CARE
Problem: Patient Centered Care  Goal: Patient preferences are identified and integrated in the patient's plan of care  Description  Interventions:  - What would you like us to know as we care for you? I live at home with my wife.   - Provide timely, compl minimize respiratory effort  - Oxygen supplementation based on oxygen saturation or ABGs  - Provide Smoking Cessation handout, if applicable  - Encourage broncho-pulmonary hygiene including cough, deep breathe, Incentive Spirometry  - Assess the need for s tabitha walker and with out O2.  Lowest sat was 87%

## 2019-07-12 ENCOUNTER — TELEPHONE (OUTPATIENT)
Dept: MEDSURG UNIT | Facility: HOSPITAL | Age: 79
End: 2019-07-12

## 2019-07-12 ENCOUNTER — PATIENT OUTREACH (OUTPATIENT)
Dept: CASE MANAGEMENT | Age: 79
End: 2019-07-12

## 2019-07-12 DIAGNOSIS — J18.9 PNEUMONIA DUE TO INFECTIOUS ORGANISM, UNSPECIFIED LATERALITY, UNSPECIFIED PART OF LUNG: ICD-10-CM

## 2019-07-12 DIAGNOSIS — Z02.9 ENCOUNTERS FOR ADMINISTRATIVE PURPOSE: ICD-10-CM

## 2019-07-12 PROCEDURE — 1111F DSCHRG MED/CURRENT MED MERGE: CPT

## 2019-07-12 NOTE — PROGRESS NOTES
Initial Post Discharge Follow Up   Discharge Date: 7/11/19  Contact Date: 7/12/2019    Consent Verification:  Assessment Completed With: Spouse: Arabella Vanegas received per patient?  written  HIPAA Verified?   Yes     Discharge Dx:   Pneumonia due to in Tab Take 1 tablet (75 mg total) by mouth daily. Disp: 90 tablet Rfl: 3   Cholecalciferol (VITAMIN D) 2000 units Oral Cap Take 1 capsule by mouth daily. Disp:  Rfl:    aspirin 81 MG Oral Tab EC Take 1 tablet (81 mg total) by mouth daily.  Disp:  Rfl: 0   I [id number]  [phone]          PCP TCM/HFU appointment: scheduled at D/C within 7-14 days  no     NCM Reviewed/scheduled/rescheduled PCP TCM/HFU appointment with pt:  Yes      Have you made all of your follow up appointments? no    Is there any reason a

## 2019-07-12 NOTE — DISCHARGE SUMMARY
[facility]    Discharge Summary    Maurice Valdez Patient Status:  Inpatient    1940 MRN [id number]   Location [facility] 5SW/SE Attending No att. providers found   Hosp Day # 5 PCP Sydney Jessica MD     Date of Admissi nonfocal      Reason for Admission:   Weakness    History of Present Illness:     Samina Mena is a(n) 67year old male with h/o hypertension, hypercholesterolemia, hypothyroidism, CVA with residual left-sided weakness, and hospitalized for aspiration pn injury  - As above  - PT/OT  Home PT and HHC on dc.     H/o CVA in the past with residual left-sided weakness  H/o aspiration pneumonia few months ago     H/o HTN  - resume arb on discharge.   Stopped amlodipine due to LE edema.     H/o hypothyroidism     d Tabs         Follow up Visits: Follow-up Information     Delfin Stark MD In 1 week. Specialty:  Internal Medicine  Contact information:  [address] [id number] [phone]             Ever Chirinos MD In 2 weeks.     Speci

## 2019-07-15 ENCOUNTER — TELEPHONE (OUTPATIENT)
Dept: INTERNAL MEDICINE CLINIC | Facility: CLINIC | Age: 79
End: 2019-07-15

## 2019-07-15 NOTE — TELEPHONE ENCOUNTER
Alysia/physical therapist/Residential     Patient had PT evaluation yesterday    Will be seen -   2 X a week for 4 Weeks     Any questions Mat Cervantes can be reached at [phone]

## 2019-07-16 ENCOUNTER — TELEPHONE (OUTPATIENT)
Dept: INTERNAL MEDICINE CLINIC | Facility: CLINIC | Age: 79
End: 2019-07-16

## 2019-07-16 NOTE — TELEPHONE ENCOUNTER
Plan of care for O.T. Is 1p3gaus & 0w6xibww for mobility.     Needs verbal order    Call Barbara Prado at:  [phone]

## 2019-07-17 ENCOUNTER — HOSPITAL ENCOUNTER (OUTPATIENT)
Dept: GENERAL RADIOLOGY | Age: 79
Discharge: HOME | End: 2019-07-17
Attending: INTERNAL MEDICINE
Payer: MEDICARE

## 2019-07-17 ENCOUNTER — HOSPITAL ENCOUNTER (OUTPATIENT)
Dept: GENERAL RADIOLOGY | Age: 79
Discharge: HOME | End: 2019-07-17
Attending: INTERNAL MEDICINE | Admitting: INTERNAL MEDICINE
Payer: MEDICARE

## 2019-07-17 ENCOUNTER — LAB ENCOUNTER (OUTPATIENT)
Dept: LAB | Age: 79
End: 2019-07-17
Attending: INTERNAL MEDICINE
Payer: MEDICARE

## 2019-07-17 ENCOUNTER — OFFICE VISIT (OUTPATIENT)
Dept: INTERNAL MEDICINE CLINIC | Facility: CLINIC | Age: 79
End: 2019-07-17
Payer: MEDICARE

## 2019-07-17 VITALS
OXYGEN SATURATION: 94 % | WEIGHT: 235 LBS | DIASTOLIC BLOOD PRESSURE: 70 MMHG | HEART RATE: 88 BPM | BODY MASS INDEX: 33.64 KG/M2 | SYSTOLIC BLOOD PRESSURE: 126 MMHG | HEIGHT: 70 IN | TEMPERATURE: 98 F

## 2019-07-17 DIAGNOSIS — N18.30 STAGE 3 CHRONIC KIDNEY DISEASE (HCC): ICD-10-CM

## 2019-07-17 DIAGNOSIS — M54.50 CHRONIC BILATERAL LOW BACK PAIN WITHOUT SCIATICA: ICD-10-CM

## 2019-07-17 DIAGNOSIS — R53.83 FATIGUE, UNSPECIFIED TYPE: ICD-10-CM

## 2019-07-17 DIAGNOSIS — E03.9 HYPOTHYROIDISM, UNSPECIFIED TYPE: ICD-10-CM

## 2019-07-17 DIAGNOSIS — G81.94 LEFT HEMIPARESIS (HCC): ICD-10-CM

## 2019-07-17 DIAGNOSIS — Z00.00 ANNUAL PHYSICAL EXAM: ICD-10-CM

## 2019-07-17 DIAGNOSIS — E78.00 HYPERCHOLESTEROLEMIA: ICD-10-CM

## 2019-07-17 DIAGNOSIS — M15.9 PRIMARY OSTEOARTHRITIS INVOLVING MULTIPLE JOINTS: ICD-10-CM

## 2019-07-17 DIAGNOSIS — N40.1 BENIGN PROSTATIC HYPERPLASIA WITH LOWER URINARY TRACT SYMPTOMS, SYMPTOM DETAILS UNSPECIFIED: ICD-10-CM

## 2019-07-17 DIAGNOSIS — J18.9 PNEUMONIA OF LEFT LOWER LOBE DUE TO INFECTIOUS ORGANISM: Primary | ICD-10-CM

## 2019-07-17 DIAGNOSIS — G89.29 CHRONIC BILATERAL LOW BACK PAIN WITHOUT SCIATICA: ICD-10-CM

## 2019-07-17 DIAGNOSIS — J18.9 PNEUMONIA OF LEFT LOWER LOBE DUE TO INFECTIOUS ORGANISM: ICD-10-CM

## 2019-07-17 DIAGNOSIS — I10 ESSENTIAL HYPERTENSION: ICD-10-CM

## 2019-07-17 DIAGNOSIS — M48.061 SPINAL STENOSIS OF LUMBAR REGION WITHOUT NEUROGENIC CLAUDICATION: ICD-10-CM

## 2019-07-17 DIAGNOSIS — I69.359 CVA, OLD, HEMIPARESIS (HCC): ICD-10-CM

## 2019-07-17 LAB
ALBUMIN SERPL-MCNC: 3.6 G/DL (ref 3.4–5)
ALBUMIN/GLOB SERPL: 0.8 {RATIO} (ref 1–2)
ALP LIVER SERPL-CCNC: 95 U/L (ref 45–117)
ALT SERPL-CCNC: 21 U/L (ref 16–61)
ANION GAP SERPL CALC-SCNC: 9 MMOL/L (ref 0–18)
AST SERPL-CCNC: 12 U/L (ref 15–37)
BASOPHILS # BLD AUTO: 0.09 X10(3) UL (ref 0–0.2)
BASOPHILS NFR BLD AUTO: 1 %
BILIRUB SERPL-MCNC: 0.4 MG/DL (ref 0.1–2)
BUN BLD-MCNC: 20 MG/DL (ref 7–18)
BUN/CREAT SERPL: 13.8 (ref 10–20)
CALCIUM BLD-MCNC: 9.4 MG/DL (ref 8.5–10.1)
CHLORIDE SERPL-SCNC: 112 MMOL/L (ref 98–112)
CHOLEST SMN-MCNC: 140 MG/DL (ref ?–200)
CO2 SERPL-SCNC: 23 MMOL/L (ref 21–32)
CREAT BLD-MCNC: 1.45 MG/DL (ref 0.7–1.3)
DEPRECATED RDW RBC AUTO: 47.7 FL (ref 35.1–46.3)
EOSINOPHIL # BLD AUTO: 0.23 X10(3) UL (ref 0–0.7)
EOSINOPHIL NFR BLD AUTO: 2.6 %
ERYTHROCYTE [DISTWIDTH] IN BLOOD BY AUTOMATED COUNT: 13.5 % (ref 11–15)
GLOBULIN PLAS-MCNC: 4.4 G/DL (ref 2.8–4.4)
GLUCOSE BLD-MCNC: 108 MG/DL (ref 70–99)
HCT VFR BLD AUTO: 41.4 % (ref 39–53)
HDLC SERPL-MCNC: 28 MG/DL (ref 40–59)
HGB BLD-MCNC: 12.9 G/DL (ref 13–17.5)
IMM GRANULOCYTES # BLD AUTO: 0.04 X10(3) UL (ref 0–1)
IMM GRANULOCYTES NFR BLD: 0.4 %
LDLC SERPL CALC-MCNC: 72 MG/DL (ref ?–100)
LYMPHOCYTES # BLD AUTO: 1.63 X10(3) UL (ref 1–4)
LYMPHOCYTES NFR BLD AUTO: 18.2 %
M PROTEIN MFR SERPL ELPH: 8 G/DL (ref 6.4–8.2)
MCH RBC QN AUTO: 30.6 PG (ref 26–34)
MCHC RBC AUTO-ENTMCNC: 31.2 G/DL (ref 31–37)
MCV RBC AUTO: 98.1 FL (ref 80–100)
MONOCYTES # BLD AUTO: 0.75 X10(3) UL (ref 0.1–1)
MONOCYTES NFR BLD AUTO: 8.4 %
NEUTROPHILS # BLD AUTO: 6.2 X10 (3) UL (ref 1.5–7.7)
NEUTROPHILS # BLD AUTO: 6.2 X10(3) UL (ref 1.5–7.7)
NEUTROPHILS NFR BLD AUTO: 69.4 %
NONHDLC SERPL-MCNC: 112 MG/DL (ref ?–130)
OSMOLALITY SERPL CALC.SUM OF ELEC: 301 MOSM/KG (ref 275–295)
PATIENT FASTING: YES
PATIENT FASTING: YES
PLATELET # BLD AUTO: 229 10(3)UL (ref 150–450)
POTASSIUM SERPL-SCNC: 4.2 MMOL/L (ref 3.5–5.1)
RBC # BLD AUTO: 4.22 X10(6)UL (ref 3.8–5.8)
SODIUM SERPL-SCNC: 144 MMOL/L (ref 136–145)
TRIGL SERPL-MCNC: 201 MG/DL (ref 30–149)
TSI SER-ACNC: 2.07 MIU/ML (ref 0.36–3.74)
VLDLC SERPL CALC-MCNC: 40 MG/DL (ref 0–30)
WBC # BLD AUTO: 8.9 X10(3) UL (ref 4–11)

## 2019-07-17 PROCEDURE — 80061 LIPID PANEL: CPT

## 2019-07-17 PROCEDURE — 84443 ASSAY THYROID STIM HORMONE: CPT

## 2019-07-17 PROCEDURE — 36415 COLL VENOUS BLD VENIPUNCTURE: CPT

## 2019-07-17 PROCEDURE — 71046 X-RAY EXAM CHEST 2 VIEWS: CPT | Performed by: INTERNAL MEDICINE

## 2019-07-17 PROCEDURE — 85025 COMPLETE CBC W/AUTO DIFF WBC: CPT

## 2019-07-17 PROCEDURE — 80053 COMPREHEN METABOLIC PANEL: CPT

## 2019-07-17 PROCEDURE — 99496 TRANSJ CARE MGMT HIGH F2F 7D: CPT | Performed by: INTERNAL MEDICINE

## 2019-07-17 RX ORDER — AMOXICILLIN AND CLAVULANATE POTASSIUM 875; 125 MG/1; MG/1
1 TABLET, FILM COATED ORAL 2 TIMES DAILY
Qty: 20 TABLET | Refills: 1 | Status: SHIPPED | OUTPATIENT
Start: 2019-07-17 | End: 2019-07-27

## 2019-07-17 RX ORDER — AMOXICILLIN AND CLAVULANATE POTASSIUM 875; 125 MG/1; MG/1
1 TABLET, FILM COATED ORAL 2 TIMES DAILY
COMMUNITY
End: 2019-08-15 | Stop reason: ALTCHOICE

## 2019-07-17 RX ORDER — ATORVASTATIN CALCIUM 40 MG/1
40 TABLET, FILM COATED ORAL NIGHTLY
Qty: 90 TABLET | Refills: 3 | Status: SHIPPED | OUTPATIENT
Start: 2019-07-17 | End: 2021-02-19

## 2019-07-17 NOTE — PROGRESS NOTES
HPI:    Danie Stewart is a 67year old male here today for hospital follow up.    He was discharged from Inpatient hospital[facility]  to Home   Admission Date: 7/6/19   Discharge Date: 7/11/19  Hospital Discharge Diagnoses (since 6/17/2019)     P 27,000. Patient admitted to the hospital with pneumonia and sepsis. Patient also has had arthritic complaints and complains of pain in his knee. Patient also claims of low back pain which is been a problem that is had for some time.   While in the hospit pneumothorax, Muscle weakness, Osteoarthritis, Pneumonia due to organism (11/2017), and Stroke [facility]). He  has a past surgical history that includes cholecystectomy; cataract; tonsillectomy; and other surgical history (04/12/2018).     He family history i auscultation with diminished breath sounds over the patient's left lower and left midlung field.   CARDIO: RRR without murmur  GI: good BS's, no masses, HSM or tenderness  MUSCULOSKELETAL: back is not tender, FROM of the extremities  EXTREMITIES: no cyanosi · Number of Possible Diagnoses and/or Management Options: severe  · Amount and/or Complexity of Data to Be Reviewed: severe  · Risk of Significant Complications, Morbidity, and/or Mortality: severe    Overall Risk:   severe    Patient seen within 7 days

## 2019-07-17 NOTE — PATIENT INSTRUCTIONS
1.  Patient is to continue his current diet, medication and activity. 2.  I will give the patient a refill for his Augmentin 875 mg orally twice a day for 10 more days.   3.  I will give the patient order to have a chest x-ray PA and lateral, CBC and BMP t

## 2019-07-19 ENCOUNTER — TELEPHONE (OUTPATIENT)
Dept: INTERNAL MEDICINE CLINIC | Facility: CLINIC | Age: 79
End: 2019-07-19

## 2019-07-19 NOTE — TELEPHONE ENCOUNTER
[facility] Александр Fernandez [phone]  Need to move the speech therapy to next week per pt.

## 2019-07-22 ENCOUNTER — TELEPHONE (OUTPATIENT)
Dept: INTERNAL MEDICINE CLINIC | Facility: CLINIC | Age: 79
End: 2019-07-22

## 2019-07-22 DIAGNOSIS — J18.9 PNEUMONIA OF LEFT LOWER LOBE DUE TO INFECTIOUS ORGANISM: Primary | ICD-10-CM

## 2019-07-22 NOTE — TELEPHONE ENCOUNTER
I spoke with Sintia Schmidt to let her know we got her message. She verbalized understanding. To Dr. Mark Ramirez.

## 2019-07-22 NOTE — TELEPHONE ENCOUNTER
Please call Frann Lundborg regarding results for pt chest xray and labs taken on 7/17/19  Tasked to nursing

## 2019-07-22 NOTE — TELEPHONE ENCOUNTER
Eli Mabry from [facility]. .H.  Is calling to inform Dr. CODY that plan of care will be ST two times a week ph. # [phone]   Routed to clinical

## 2019-07-23 NOTE — TELEPHONE ENCOUNTER
Telephone call to patient's wife and situation discussed. Patient's recent chest x-ray appears much improved and has a resolving left lower lobe pneumonia. Patient CBC and BMP also turned out well. Patient is doing well at home with visiting nurse, physical therapy etc.  At this point we will cancel the patient's appointment to see me next week and plan to see the patient back about 2 weeks later in the second and third week of August.  Patient's wife will call back to make an appointment. I have placed an order in the system for the patient to have a chest x-ray taken prior to seeing me. Patient's wife is aware of this. I will route this message to nursing to can share with the .   Thank you!!

## 2019-07-24 ENCOUNTER — TELEPHONE (OUTPATIENT)
Dept: INTERNAL MEDICINE CLINIC | Facility: CLINIC | Age: 79
End: 2019-07-24

## 2019-07-24 NOTE — TELEPHONE ENCOUNTER
At pt last appt Dr Baljinder Benjamin recommended pt see someone? ? Was it  Dr Nikky Kramer?  For an injection  Pt cannot find note with name and number  Please call pt wife Susana Schmidter   Tasked to nursing

## 2019-07-25 NOTE — TELEPHONE ENCOUNTER
Telephone call to patient's wife and situation discussed. When patient was last here I recommended that she see either Dr. Vaishali Akers, Dr. Alessandro Barr or 1 of their other partners for evaluation.   I have given her the telephone number and address for

## 2019-08-05 ENCOUNTER — TELEPHONE (OUTPATIENT)
Dept: INTERNAL MEDICINE CLINIC | Facility: CLINIC | Age: 79
End: 2019-08-05

## 2019-08-05 NOTE — TELEPHONE ENCOUNTER
As FYI to ESCOBAR PACHECO - called john MARADIAGA OT and gave verbal approval per protocol - left on confidential VM

## 2019-08-12 ENCOUNTER — OFFICE VISIT (OUTPATIENT)
Dept: INTERNAL MEDICINE CLINIC | Facility: CLINIC | Age: 79
End: 2019-08-12
Payer: MEDICARE

## 2019-08-12 VITALS
OXYGEN SATURATION: 96 % | WEIGHT: 230.81 LBS | DIASTOLIC BLOOD PRESSURE: 60 MMHG | SYSTOLIC BLOOD PRESSURE: 120 MMHG | TEMPERATURE: 98 F | HEIGHT: 70 IN | BODY MASS INDEX: 33.04 KG/M2 | HEART RATE: 84 BPM

## 2019-08-12 DIAGNOSIS — G89.29 CHRONIC BILATERAL LOW BACK PAIN WITHOUT SCIATICA: ICD-10-CM

## 2019-08-12 DIAGNOSIS — I69.359 CVA, OLD, HEMIPARESIS (HCC): ICD-10-CM

## 2019-08-12 DIAGNOSIS — G81.94 LEFT HEMIPARESIS (HCC): ICD-10-CM

## 2019-08-12 DIAGNOSIS — E03.9 HYPOTHYROIDISM, UNSPECIFIED TYPE: ICD-10-CM

## 2019-08-12 DIAGNOSIS — E78.00 HYPERCHOLESTEROLEMIA: ICD-10-CM

## 2019-08-12 DIAGNOSIS — N18.30 STAGE 3 CHRONIC KIDNEY DISEASE (HCC): ICD-10-CM

## 2019-08-12 DIAGNOSIS — I10 ESSENTIAL HYPERTENSION: ICD-10-CM

## 2019-08-12 DIAGNOSIS — M15.9 PRIMARY OSTEOARTHRITIS INVOLVING MULTIPLE JOINTS: ICD-10-CM

## 2019-08-12 DIAGNOSIS — M54.50 CHRONIC BILATERAL LOW BACK PAIN WITHOUT SCIATICA: ICD-10-CM

## 2019-08-12 DIAGNOSIS — R53.83 FATIGUE, UNSPECIFIED TYPE: ICD-10-CM

## 2019-08-12 DIAGNOSIS — M48.061 SPINAL STENOSIS OF LUMBAR REGION WITHOUT NEUROGENIC CLAUDICATION: ICD-10-CM

## 2019-08-12 DIAGNOSIS — J69.0 ASPIRATION PNEUMONIA OF LEFT LOWER LOBE, UNSPECIFIED ASPIRATION PNEUMONIA TYPE (HCC): Primary | ICD-10-CM

## 2019-08-12 PROCEDURE — G0463 HOSPITAL OUTPT CLINIC VISIT: HCPCS | Performed by: INTERNAL MEDICINE

## 2019-08-12 PROCEDURE — 99214 OFFICE O/P EST MOD 30 MIN: CPT | Performed by: INTERNAL MEDICINE

## 2019-08-12 NOTE — PATIENT INSTRUCTIONS
1.  Patient is to continue his current diet, medication and activity. 2.  Patient is to get a chest x-ray PA and lateral either later today or this week.   3.  I will plan to see the patient back in 2 months with blood tests which will include a CBC, BMP,

## 2019-08-12 NOTE — PROGRESS NOTES
Wilbert Crespo is a 67year old male. Patient presents with: Follow - Up  Pneumonia  Arthritis    HPI:   Patient presents with: Follow - Up  Pneumonia  Arthritis    Pt feels better and is appears stronger and is more alert than he was at his last visit. Guadalupe Riedel cough or SOB  CARDIOVASCULAR: No chest pain  GI: No abdominal pain, nausea, vomiting, diarrhea, or constipation  :No Urinary complaints  EXT:No complaints of pain or swelling in patient's legs    EXAM:   /70   Pulse 94   Temp 97.5 °F (36.4 °C) (Ora tests as noted above. 10. Fatigue, unspecified type  Patient appears to be doing better at this time. CPM.      The patient indicates understanding of these issues and agrees to the plan.   The patient is asked to return in 2 months with blood tests as

## 2019-08-14 ENCOUNTER — HOSPITAL ENCOUNTER (OUTPATIENT)
Dept: GENERAL RADIOLOGY | Age: 79
Discharge: HOME | End: 2019-08-14
Attending: INTERNAL MEDICINE
Payer: MEDICARE

## 2019-08-14 DIAGNOSIS — J69.0 ASPIRATION PNEUMONIA OF LEFT LOWER LOBE, UNSPECIFIED ASPIRATION PNEUMONIA TYPE (HCC): ICD-10-CM

## 2019-08-14 PROCEDURE — 71046 X-RAY EXAM CHEST 2 VIEWS: CPT | Performed by: INTERNAL MEDICINE

## 2019-08-15 ENCOUNTER — OFFICE VISIT (OUTPATIENT)
Dept: NEUROLOGY | Facility: CLINIC | Age: 79
End: 2019-08-15
Payer: MEDICARE

## 2019-08-15 VITALS
HEIGHT: 70 IN | BODY MASS INDEX: 32.93 KG/M2 | SYSTOLIC BLOOD PRESSURE: 140 MMHG | RESPIRATION RATE: 20 BRPM | HEART RATE: 88 BPM | DIASTOLIC BLOOD PRESSURE: 76 MMHG | WEIGHT: 230 LBS

## 2019-08-15 DIAGNOSIS — I69.359 CVA, OLD, HEMIPARESIS (HCC): ICD-10-CM

## 2019-08-15 DIAGNOSIS — N18.30 STAGE 3 CHRONIC KIDNEY DISEASE (HCC): ICD-10-CM

## 2019-08-15 DIAGNOSIS — M17.11 PRIMARY OSTEOARTHRITIS OF RIGHT KNEE: ICD-10-CM

## 2019-08-15 DIAGNOSIS — Z79.01 ANTICOAGULANT LONG-TERM USE: ICD-10-CM

## 2019-08-15 DIAGNOSIS — M48.061 SPINAL STENOSIS OF LUMBAR REGION WITHOUT NEUROGENIC CLAUDICATION: Primary | ICD-10-CM

## 2019-08-15 DIAGNOSIS — N40.1 BENIGN PROSTATIC HYPERPLASIA WITH LOWER URINARY TRACT SYMPTOMS, SYMPTOM DETAILS UNSPECIFIED: ICD-10-CM

## 2019-08-15 PROCEDURE — 99204 OFFICE O/P NEW MOD 45 MIN: CPT | Performed by: PHYSICAL MEDICINE & REHABILITATION

## 2019-08-15 NOTE — PROGRESS NOTES
[address]  Progress Note    CHIEF COMPLAINT:  Patient presents with:  Back Pain: patient present for back pain, pain been going on for a year and is referred by Dr. Torin Bass. rain makes the pain worse, sun No      Sexual activity: Not on file      FAMILY HISTORY:   Family History   Problem Relation Age of Onset   • Cancer Father         Lung        CURRENT MEDICATIONS:     Current Outpatient Medications:  atorvastatin 40 MG Oral Tab Take 1 tablet (40 mg tota denies  Balance: admits   Psychiatric  Anxiety: denies  Depressed Mood: denies         All other systems reviewed and are negative. Pertinent positives and negatives noted in the HPI.       PHYSICAL EXAM:   /76 (BP Location: Right arm, Patient Positio contraindicated due to concurrent anticoagulant therapy. Limits medication options. Also complicates administration of injections. 4. CVA, old, hemiparesis ([address]  Affecting gait pattern. PT may help.     5. Benign prostatic hyperplasia with lower ur

## 2019-08-18 PROBLEM — M17.11 PRIMARY OSTEOARTHRITIS OF RIGHT KNEE: Status: ACTIVE | Noted: 2019-08-18

## 2019-08-20 ENCOUNTER — TELEPHONE (OUTPATIENT)
Dept: INTERNAL MEDICINE CLINIC | Facility: CLINIC | Age: 79
End: 2019-08-20

## 2019-08-20 NOTE — TELEPHONE ENCOUNTER
To nursing, yes, okay to tell patient CXR 8/14/19–no acute findings. No pneumonia seen. Routed to nursing and to Dr. Lacie Myers as Jeromy Ballard.

## 2019-08-20 NOTE — TELEPHONE ENCOUNTER
Wife, Dean Zhang, called  Requests call back with Aug 15 chest xray results   [phone]    - advised Dr Torin Bass is out of the office today & tomorrow

## 2019-08-20 NOTE — TELEPHONE ENCOUNTER
I spoke with Christian Hung Frankelchristian, Ye Metz per [facility], and relayed Dr. Juvenal English message. She verbalized understanding. She says patient is still coughing and not feeling great. To Dr. Love Arzate.

## 2019-08-22 ENCOUNTER — TELEPHONE (OUTPATIENT)
Dept: INTERNAL MEDICINE CLINIC | Facility: CLINIC | Age: 79
End: 2019-08-22

## 2019-08-22 NOTE — TELEPHONE ENCOUNTER
Juan David Miller from [facility]. H.H. Is calling to inform Dr. Gaurang Rogers that pt. Will be discharged from [facility]. On 09/05  Ph.  # [phone]   Routed to clinical

## 2019-08-25 NOTE — TELEPHONE ENCOUNTER
Telephone call to pt and message left. Pt's repeat Chest X-ray has turned out well. No pneumonia noted. Wife is to call back if pt is having more problems.

## 2019-09-20 RX ORDER — IRBESARTAN 75 MG/1
75 TABLET ORAL DAILY
Qty: 90 TABLET | Refills: 3 | Status: SHIPPED | OUTPATIENT
Start: 2019-09-20 | End: 2020-10-05

## 2019-09-20 NOTE — TELEPHONE ENCOUNTER
Pt requesting refill on his Irbesartan 75 MG Oral Tab. [facility].

## 2019-11-18 ENCOUNTER — LAB ENCOUNTER (OUTPATIENT)
Dept: LAB | Age: 79
End: 2019-11-18
Attending: INTERNAL MEDICINE
Payer: MEDICARE

## 2019-11-18 DIAGNOSIS — N18.30 STAGE 3 CHRONIC KIDNEY DISEASE (HCC): ICD-10-CM

## 2019-11-18 DIAGNOSIS — E78.00 HYPERCHOLESTEROLEMIA: ICD-10-CM

## 2019-11-18 DIAGNOSIS — R53.83 FATIGUE, UNSPECIFIED TYPE: ICD-10-CM

## 2019-11-18 DIAGNOSIS — J69.0 ASPIRATION PNEUMONIA OF LEFT LOWER LOBE, UNSPECIFIED ASPIRATION PNEUMONIA TYPE (HCC): ICD-10-CM

## 2019-11-18 PROCEDURE — 36415 COLL VENOUS BLD VENIPUNCTURE: CPT

## 2019-11-18 PROCEDURE — 80048 BASIC METABOLIC PNL TOTAL CA: CPT

## 2019-11-18 PROCEDURE — 85025 COMPLETE CBC W/AUTO DIFF WBC: CPT

## 2019-11-18 PROCEDURE — 84450 TRANSFERASE (AST) (SGOT): CPT

## 2019-11-18 PROCEDURE — 84460 ALANINE AMINO (ALT) (SGPT): CPT

## 2019-11-18 PROCEDURE — 80061 LIPID PANEL: CPT

## 2019-11-26 ENCOUNTER — TELEPHONE (OUTPATIENT)
Dept: INTERNAL MEDICINE CLINIC | Facility: CLINIC | Age: 79
End: 2019-11-26

## 2019-12-02 ENCOUNTER — OFFICE VISIT (OUTPATIENT)
Dept: INTERNAL MEDICINE CLINIC | Facility: CLINIC | Age: 79
End: 2019-12-02
Payer: MEDICARE

## 2019-12-02 VITALS
SYSTOLIC BLOOD PRESSURE: 114 MMHG | WEIGHT: 230 LBS | HEIGHT: 70 IN | DIASTOLIC BLOOD PRESSURE: 70 MMHG | OXYGEN SATURATION: 98 % | BODY MASS INDEX: 32.93 KG/M2 | HEART RATE: 84 BPM | TEMPERATURE: 99 F

## 2019-12-02 DIAGNOSIS — E78.00 HYPERCHOLESTEROLEMIA: ICD-10-CM

## 2019-12-02 DIAGNOSIS — I10 ESSENTIAL HYPERTENSION: Primary | ICD-10-CM

## 2019-12-02 DIAGNOSIS — E03.9 HYPOTHYROIDISM, UNSPECIFIED TYPE: ICD-10-CM

## 2019-12-02 DIAGNOSIS — R53.83 FATIGUE, UNSPECIFIED TYPE: ICD-10-CM

## 2019-12-02 DIAGNOSIS — G81.94 LEFT HEMIPARESIS (HCC): ICD-10-CM

## 2019-12-02 DIAGNOSIS — M15.9 PRIMARY OSTEOARTHRITIS INVOLVING MULTIPLE JOINTS: ICD-10-CM

## 2019-12-02 DIAGNOSIS — M48.061 SPINAL STENOSIS OF LUMBAR REGION WITHOUT NEUROGENIC CLAUDICATION: ICD-10-CM

## 2019-12-02 DIAGNOSIS — M54.50 CHRONIC BILATERAL LOW BACK PAIN WITHOUT SCIATICA: ICD-10-CM

## 2019-12-02 DIAGNOSIS — N18.30 STAGE 3 CHRONIC KIDNEY DISEASE (HCC): ICD-10-CM

## 2019-12-02 DIAGNOSIS — G89.29 CHRONIC BILATERAL LOW BACK PAIN WITHOUT SCIATICA: ICD-10-CM

## 2019-12-02 DIAGNOSIS — I69.359 CVA, OLD, HEMIPARESIS (HCC): ICD-10-CM

## 2019-12-02 PROCEDURE — 99214 OFFICE O/P EST MOD 30 MIN: CPT | Performed by: INTERNAL MEDICINE

## 2019-12-02 PROCEDURE — G0463 HOSPITAL OUTPT CLINIC VISIT: HCPCS | Performed by: INTERNAL MEDICINE

## 2019-12-02 NOTE — PATIENT INSTRUCTIONS
1.  Patient is to continue his current diet, medication and activity. 2.  I will plan to see the patient back in about 3 months with blood tests as ordered. 3.  I will see the patient back sooner as necessary.

## 2019-12-02 NOTE — PROGRESS NOTES
Jay Huffman is a 67year old male. Patient presents with:  Checkup  Pneumonia  Arthritis    HPI:   Patient presents with:  Checkup  Pneumonia  Arthritis    Patient feels well.   Patient does have various aches and pains as usual.  Patient also has a cough constipation  :No Urinary complaints  EXT:No complaints of pain or swelling in patient's legs    EXAM:   /70 (BP Location: Left arm, Patient Position: Sitting, Cuff Size: large)   Pulse 84   Temp 98.8 °F (37.1 °C) (Oral)   Ht 5' 10\" (1.778 m)   Wt

## 2019-12-05 RX ORDER — CLOPIDOGREL BISULFATE 75 MG/1
75 TABLET ORAL DAILY
Qty: 90 TABLET | Refills: 3 | Status: SHIPPED | OUTPATIENT
Start: 2019-12-05 | End: 2020-06-09

## 2019-12-05 RX ORDER — CLOPIDOGREL BISULFATE 75 MG/1
TABLET ORAL
Qty: 90 TABLET | Refills: 3 | OUTPATIENT
Start: 2019-12-05

## 2019-12-05 NOTE — TELEPHONE ENCOUNTER
Spoke with Kaycee Dye (pt's wife) and [facility]. Also confirmed that local pharmacy is [facility] in [address] [place] [address]. Refill ordered, see medication list. Notified Kaycee Dye that refill was placed. Verbalized understanding.

## 2019-12-05 NOTE — TELEPHONE ENCOUNTER
LMTCB - refused refill request from [facility] for plavix. - it appears mailorder has changed to [id number], see 3/22/19 encounter - please confirm all pharmacies with pt: local and mailorder.     Does pt need refill of Plavix or any other refill

## 2019-12-30 ENCOUNTER — TELEPHONE (OUTPATIENT)
Dept: INTERNAL MEDICINE CLINIC | Facility: CLINIC | Age: 79
End: 2019-12-30

## 2019-12-30 RX ORDER — AMOXICILLIN AND CLAVULANATE POTASSIUM 875; 125 MG/1; MG/1
1 TABLET, FILM COATED ORAL 2 TIMES DAILY
Qty: 20 TABLET | Refills: 1 | Status: ON HOLD | OUTPATIENT
Start: 2019-12-30 | End: 2020-01-09

## 2019-12-30 RX ORDER — OSELTAMIVIR PHOSPHATE 75 MG/1
75 CAPSULE ORAL 2 TIMES DAILY
Qty: 10 CAPSULE | Refills: 0 | Status: ON HOLD | OUTPATIENT
Start: 2019-12-30 | End: 2020-01-09

## 2019-12-30 NOTE — TELEPHONE ENCOUNTER
Pt have a cough for the passed week nose runny no temp been treading with over the counter medications no help.

## 2019-12-30 NOTE — TELEPHONE ENCOUNTER
Discussed with patient's wife, patient is again developed cough and chest congestion. Patient to push fluids. Patient may use Robitussin as necessary. He may use Tylenol as necessary.   I have sent prescriptions to the patient's pharmacy for Augmentin an

## 2019-12-30 NOTE — TELEPHONE ENCOUNTER
Spoke with patient's wife, Yvan Sagastume (OK per HIPPA), who reports patient has had a cough and runny nose for about one week. No phlegm expectoration with cough but Yvan Mitesh states you can hear that it is a little wet, he just isn't getting anything up.  He is havin

## 2020-01-04 ENCOUNTER — HOSPITAL ENCOUNTER (INPATIENT)
Facility: HOSPITAL | Age: 80
LOS: 5 days | Discharge: HOME | DRG: 194 | End: 2020-01-09
Attending: EMERGENCY MEDICINE | Admitting: HOSPITALIST
Payer: MEDICARE

## 2020-01-04 ENCOUNTER — APPOINTMENT (OUTPATIENT)
Dept: GENERAL RADIOLOGY | Facility: HOSPITAL | Age: 80
DRG: 194 | End: 2020-01-04
Attending: EMERGENCY MEDICINE
Payer: MEDICARE

## 2020-01-04 DIAGNOSIS — B34.9 VIRAL SYNDROME: ICD-10-CM

## 2020-01-04 DIAGNOSIS — J98.01 ACUTE BRONCHOSPASM: Primary | ICD-10-CM

## 2020-01-04 LAB
ANION GAP SERPL CALC-SCNC: 9 MMOL/L (ref 0–18)
BASOPHILS # BLD AUTO: 0.03 X10(3) UL (ref 0–0.2)
BASOPHILS NFR BLD AUTO: 0.4 %
BUN BLD-MCNC: 20 MG/DL (ref 7–18)
BUN/CREAT SERPL: 13.7 (ref 10–20)
CALCIUM BLD-MCNC: 9 MG/DL (ref 8.5–10.1)
CHLORIDE SERPL-SCNC: 114 MMOL/L (ref 98–112)
CO2 SERPL-SCNC: 21 MMOL/L (ref 21–32)
CREAT BLD-MCNC: 1.46 MG/DL (ref 0.7–1.3)
DEPRECATED RDW RBC AUTO: 49.6 FL (ref 35.1–46.3)
EOSINOPHIL # BLD AUTO: 0.23 X10(3) UL (ref 0–0.7)
EOSINOPHIL NFR BLD AUTO: 2.9 %
ERYTHROCYTE [DISTWIDTH] IN BLOOD BY AUTOMATED COUNT: 14 % (ref 11–15)
FLUAV + FLUBV RNA SPEC NAA+PROBE: NEGATIVE
GLUCOSE BLD-MCNC: 127 MG/DL (ref 70–99)
HCT VFR BLD AUTO: 45.2 % (ref 39–53)
HGB BLD-MCNC: 14.5 G/DL (ref 13–17.5)
IMM GRANULOCYTES # BLD AUTO: 0.02 X10(3) UL (ref 0–1)
IMM GRANULOCYTES NFR BLD: 0.3 %
LYMPHOCYTES # BLD AUTO: 1.04 X10(3) UL (ref 1–4)
LYMPHOCYTES NFR BLD AUTO: 13.1 %
MCH RBC QN AUTO: 31.5 PG (ref 26–34)
MCHC RBC AUTO-ENTMCNC: 32.1 G/DL (ref 31–37)
MCV RBC AUTO: 98 FL (ref 80–100)
MONOCYTES # BLD AUTO: 0.75 X10(3) UL (ref 0.1–1)
MONOCYTES NFR BLD AUTO: 9.4 %
NEUTROPHILS # BLD AUTO: 5.88 X10 (3) UL (ref 1.5–7.7)
NEUTROPHILS # BLD AUTO: 5.88 X10(3) UL (ref 1.5–7.7)
NEUTROPHILS NFR BLD AUTO: 73.9 %
OSMOLALITY SERPL CALC.SUM OF ELEC: 302 MOSM/KG (ref 275–295)
PLATELET # BLD AUTO: 168 10(3)UL (ref 150–450)
POTASSIUM SERPL-SCNC: 3.8 MMOL/L (ref 3.5–5.1)
RBC # BLD AUTO: 4.61 X10(6)UL (ref 3.8–5.8)
SODIUM SERPL-SCNC: 144 MMOL/L (ref 136–145)
WBC # BLD AUTO: 8 X10(3) UL (ref 4–11)

## 2020-01-04 PROCEDURE — 99223 1ST HOSP IP/OBS HIGH 75: CPT | Performed by: HOSPITALIST

## 2020-01-04 PROCEDURE — 71045 X-RAY EXAM CHEST 1 VIEW: CPT | Performed by: EMERGENCY MEDICINE

## 2020-01-04 RX ORDER — METHYLPREDNISOLONE SODIUM SUCCINATE 125 MG/2ML
125 INJECTION, POWDER, LYOPHILIZED, FOR SOLUTION INTRAMUSCULAR; INTRAVENOUS ONCE
Status: COMPLETED | OUTPATIENT
Start: 2020-01-04 | End: 2020-01-04

## 2020-01-04 RX ORDER — BISACODYL 10 MG
10 SUPPOSITORY, RECTAL RECTAL
Status: DISCONTINUED | OUTPATIENT
Start: 2020-01-04 | End: 2020-01-09

## 2020-01-04 RX ORDER — LEVOTHYROXINE SODIUM 0.1 MG/1
100 TABLET ORAL
Status: DISCONTINUED | OUTPATIENT
Start: 2020-01-05 | End: 2020-01-09

## 2020-01-04 RX ORDER — ATORVASTATIN CALCIUM 40 MG/1
40 TABLET, FILM COATED ORAL NIGHTLY
Status: DISCONTINUED | OUTPATIENT
Start: 2020-01-04 | End: 2020-01-09

## 2020-01-04 RX ORDER — GUAIFENESIN 100 MG/5ML
100 SOLUTION ORAL EVERY 4 HOURS PRN
Status: DISCONTINUED | OUTPATIENT
Start: 2020-01-04 | End: 2020-01-09

## 2020-01-04 RX ORDER — SODIUM CHLORIDE 0.9 % (FLUSH) 0.9 %
3 SYRINGE (ML) INJECTION AS NEEDED
Status: DISCONTINUED | OUTPATIENT
Start: 2020-01-04 | End: 2020-01-09

## 2020-01-04 RX ORDER — ALBUTEROL SULFATE 2.5 MG/3ML
5 SOLUTION RESPIRATORY (INHALATION) ONCE
Status: COMPLETED | OUTPATIENT
Start: 2020-01-04 | End: 2020-01-04

## 2020-01-04 RX ORDER — AMOXICILLIN AND CLAVULANATE POTASSIUM 875; 125 MG/1; MG/1
1 TABLET, FILM COATED ORAL ONCE
Status: COMPLETED | OUTPATIENT
Start: 2020-01-04 | End: 2020-01-04

## 2020-01-04 RX ORDER — IPRATROPIUM BROMIDE AND ALBUTEROL SULFATE 2.5; .5 MG/3ML; MG/3ML
3 SOLUTION RESPIRATORY (INHALATION)
Status: DISCONTINUED | OUTPATIENT
Start: 2020-01-04 | End: 2020-01-09

## 2020-01-04 RX ORDER — SODIUM PHOSPHATE, DIBASIC AND SODIUM PHOSPHATE, MONOBASIC 7; 19 G/133ML; G/133ML
1 ENEMA RECTAL ONCE AS NEEDED
Status: DISCONTINUED | OUTPATIENT
Start: 2020-01-04 | End: 2020-01-09

## 2020-01-04 RX ORDER — POLYETHYLENE GLYCOL 3350 17 G/17G
17 POWDER, FOR SOLUTION ORAL DAILY PRN
Status: DISCONTINUED | OUTPATIENT
Start: 2020-01-04 | End: 2020-01-09

## 2020-01-04 RX ORDER — ACETAMINOPHEN 325 MG/1
650 TABLET ORAL
COMMUNITY
End: 2021-05-17

## 2020-01-04 RX ORDER — ONDANSETRON 2 MG/ML
4 INJECTION INTRAMUSCULAR; INTRAVENOUS EVERY 6 HOURS PRN
Status: DISCONTINUED | OUTPATIENT
Start: 2020-01-04 | End: 2020-01-09

## 2020-01-04 RX ORDER — ASPIRIN 81 MG/1
81 TABLET ORAL DAILY
Status: DISCONTINUED | OUTPATIENT
Start: 2020-01-04 | End: 2020-01-09

## 2020-01-04 RX ORDER — CLOPIDOGREL BISULFATE 75 MG/1
75 TABLET ORAL DAILY
Status: DISCONTINUED | OUTPATIENT
Start: 2020-01-04 | End: 2020-01-09

## 2020-01-04 RX ORDER — METOCLOPRAMIDE HYDROCHLORIDE 5 MG/ML
10 INJECTION INTRAMUSCULAR; INTRAVENOUS EVERY 8 HOURS PRN
Status: DISCONTINUED | OUTPATIENT
Start: 2020-01-04 | End: 2020-01-09

## 2020-01-04 RX ORDER — ACETAMINOPHEN 325 MG/1
650 TABLET ORAL EVERY 6 HOURS PRN
Status: DISCONTINUED | OUTPATIENT
Start: 2020-01-04 | End: 2020-01-09

## 2020-01-04 RX ORDER — IPRATROPIUM BROMIDE AND ALBUTEROL SULFATE 2.5; .5 MG/3ML; MG/3ML
3 SOLUTION RESPIRATORY (INHALATION) ONCE
Status: COMPLETED | OUTPATIENT
Start: 2020-01-04 | End: 2020-01-04

## 2020-01-04 RX ORDER — IPRATROPIUM BROMIDE AND ALBUTEROL SULFATE 2.5; .5 MG/3ML; MG/3ML
3 SOLUTION RESPIRATORY (INHALATION)
Status: DISCONTINUED | OUTPATIENT
Start: 2020-01-04 | End: 2020-01-04

## 2020-01-04 RX ORDER — HEPARIN SODIUM 5000 [USP'U]/ML
5000 INJECTION, SOLUTION INTRAVENOUS; SUBCUTANEOUS EVERY 12 HOURS SCHEDULED
Status: DISCONTINUED | OUTPATIENT
Start: 2020-01-04 | End: 2020-01-07

## 2020-01-04 NOTE — ED NOTES
67year old male here from home with shorntess of breath, cough and congestion, noted to be stating low on room air 88-89% placed on [facility]

## 2020-01-04 NOTE — ED INITIAL ASSESSMENT (HPI)
Patient here with a congested cough and SOB that began two days ago. Patient has a long recent history of pneumonia.  Denies CP

## 2020-01-04 NOTE — H&P
[address]  : 1940    Status: Inpatient  Day #: 0    Attending: Puneet Garcia MD  PCP: Ritchie Leblanc MD     Date of Encounter:  2020  Date of Admission:  2020     Chief Complaint: Jing Vogt once a week     Drug use: No      Allergies: No Known Allergies    Medications :   acetaminophen 325 MG Oral Tab,  Take 650 mg by mouth 3 (three) times daily.    Amoxicillin-Pot Clavulanate (AUGMENTIN) 875-125 MG Oral Tab,  Take 1 tablet by mouth 2 (two) ti *   CO2 21.0   *     Xr Chest Ap Portable  (cpt=71045)    Result Date: 1/4/2020  CONCLUSION:  1. Mild cardiomegaly. Tortuous atherosclerotic aorta.  2. Mild peribronchial thickening t with prominent bronchovascular markings perihilar basilar

## 2020-01-04 NOTE — ED PROVIDER NOTES
Patient Seen in: [facility] Emergency Department    History   Patient presents with:  Dyspnea STU SOB    Stated Complaint: SOB    HPI    Patient presents with onset 2 days ago of cough congestion.   Yesterday he started feeling weak and this mornin mg total) by mouth daily. Irbesartan 75 MG Oral Tab,  Take 1 tablet (75 mg total) by mouth daily. atorvastatin 40 MG Oral Tab,  Take 1 tablet (40 mg total) by mouth nightly.    LEVOTHYROXINE SODIUM 100 MCG Oral Tab,  TAKE 1 TABLET DAILY   Cholecalcifero No unusual behavior. Interacting well.     We will do chest x-ray blood work IV fluids breathing treatment we will also check flu swab    Patient work-up was reassuring however he still feeling weak still with some borderline hypoxia increased respiratory GOLD     EKG    Rate, intervals and axes as noted on EKG Report.   Rate: EKG shows rate 96 normal sinus rhythm no ST elevation seen              MDM     97% Normal  Pulse oximetry       Disposition and Plan     We recommend that you schedule follow up care

## 2020-01-04 NOTE — ED NOTES
Orders for admission, patient is aware of plan and ready to go upstairs.  Any questions, please call ED YADIRA Finch  at extension [phone]

## 2020-01-05 ENCOUNTER — APPOINTMENT (OUTPATIENT)
Dept: GENERAL RADIOLOGY | Facility: HOSPITAL | Age: 80
DRG: 194 | End: 2020-01-05
Attending: HOSPITALIST
Payer: MEDICARE

## 2020-01-05 LAB
ANION GAP SERPL CALC-SCNC: 8 MMOL/L (ref 0–18)
BUN BLD-MCNC: 24 MG/DL (ref 7–18)
BUN/CREAT SERPL: 15.6 (ref 10–20)
CALCIUM BLD-MCNC: 8.6 MG/DL (ref 8.5–10.1)
CHLORIDE SERPL-SCNC: 115 MMOL/L (ref 98–112)
CO2 SERPL-SCNC: 22 MMOL/L (ref 21–32)
CREAT BLD-MCNC: 1.54 MG/DL (ref 0.7–1.3)
GLUCOSE BLD-MCNC: 141 MG/DL (ref 70–99)
OSMOLALITY SERPL CALC.SUM OF ELEC: 306 MOSM/KG (ref 275–295)
POTASSIUM SERPL-SCNC: 4 MMOL/L (ref 3.5–5.1)
SODIUM SERPL-SCNC: 145 MMOL/L (ref 136–145)

## 2020-01-05 PROCEDURE — 99233 SBSQ HOSP IP/OBS HIGH 50: CPT | Performed by: HOSPITALIST

## 2020-01-05 PROCEDURE — 71045 X-RAY EXAM CHEST 1 VIEW: CPT | Performed by: HOSPITALIST

## 2020-01-05 RX ORDER — METHYLPREDNISOLONE SODIUM SUCCINATE 40 MG/ML
40 INJECTION, POWDER, LYOPHILIZED, FOR SOLUTION INTRAMUSCULAR; INTRAVENOUS EVERY 8 HOURS
Status: DISCONTINUED | OUTPATIENT
Start: 2020-01-05 | End: 2020-01-08

## 2020-01-05 RX ORDER — CALCIUM CARBONATE 200(500)MG
500 TABLET,CHEWABLE ORAL
Status: DISCONTINUED | OUTPATIENT
Start: 2020-01-05 | End: 2020-01-09

## 2020-01-05 NOTE — SLP NOTE
ADULT SWALLOWING EVALUATION    ASSESSMENT    ASSESSMENT/OVERALL IMPRESSION:  Pt seen sitting upright in bed for all PO trials and evaluation. Pt's wife at bedside and provided some feedback on dysphagia hx prior to this admission.  Per patient's wife, pt wi sips  Aspiration Precautions: Upright position; Slow rate;Small bites and sips; No straw  Medication Administration Recommendations: Whole in puree  Treatment Plan/Recommendations: Videofluoroscopic swallow study  Discharge Recommendations/Plan: Undetermined Propulsion: Intact  Mastication: Impaired  Retention: Impaired    Pharyngeal Phase of Swallow: Impaired  Laryngeal Elevation Timing: Appears intact  Laryngeal Elevation Strength: Appears impaired  Laryngeal Elevation Coordination: Appears impaired  (Please

## 2020-01-05 NOTE — PROGRESS NOTES
[facility]  Hospitalist Progress Note     Tasha Justin Patient Status:  Inpatient    1940  67year old [facility] [id number]   Location 521/521-A Attending Kevan Hardin MD   Hosp Day # 1 PCP Shirley May MD     ASSESSMENT/PLAN hours.    • cefTRIAXone  2 g Intravenous Q24H   • azithromycin  500 mg Intravenous Q24H   • MethylPREDNISolone Sodium Succ  40 mg Intravenous Q8H   • aspirin  81 mg Oral Daily   • atorvastatin  40 mg Oral Nightly   • cholecalciferol  2,000 Units Oral Daily

## 2020-01-05 NOTE — RESPIRATORY THERAPY NOTE
MARIANGEL ASSESSMENT:    Pt does not have a previous diagnosis of MARIANGEL.  Pt does not routinely use a CPAP device at home and does not want to use CPAP during his hospital stay    CPAP INITIATION:    Pt to be placed on CPAP: no  Pt refused: yes

## 2020-01-05 NOTE — PROGRESS NOTES
[facility] Pharmacy Note:  Renal Adjustment for azithromycin [facility])    Danie Stewart is a 67year old male who has been prescribed azithromycin (ZITHROMAX) 250 mg every 24 hrs.   CrCl is estimated creatinine clearance is 40.8 mL/min (A) (based on SCr of 1.54

## 2020-01-05 NOTE — PLAN OF CARE
Problem: Patient Centered Care  Goal: Patient preferences are identified and integrated in the patient's plan of care  Description  Interventions:  - What would you like us to know as we care for you?   - Provide timely, complete, and accurate informatio fall injury  Description  INTERVENTIONS:  - Assess pt frequently for physical needs  - Identify cognitive and physical deficits and behaviors that affect risk of falls.   - [place] fall precautions as indicated by assessment.  - Educate pt/family on patie

## 2020-01-05 NOTE — PROGRESS NOTES
[facility] Pharmacy Note: Antimicrobial Weight Based Dose Adjustment for: ceftriaxone (ROCEPHIN)    Michelle Gregory is a 67year old male who has been prescribed ceftriaxone (ROCEPHIN) 1000 mg every 24 hours.     Estimated Creatinine Clearance: 40.8 mL/min (A) (bas

## 2020-01-05 NOTE — PLAN OF CARE
Problem: Patient Centered Care  Goal: Patient preferences are identified and integrated in the patient's plan of care  Description  Interventions:  - What would you like us to know as we care for you?  I live with my wife   - Provide timely, complete, and FALL  Goal: Free from fall injury  Description  INTERVENTIONS:  - Assess pt frequently for physical needs  - Identify cognitive and physical deficits and behaviors that affect risk of falls.   - [place] fall precautions as indicated by assessment.  - Educ

## 2020-01-06 ENCOUNTER — APPOINTMENT (OUTPATIENT)
Dept: GENERAL RADIOLOGY | Facility: HOSPITAL | Age: 80
DRG: 194 | End: 2020-01-06
Attending: HOSPITALIST
Payer: MEDICARE

## 2020-01-06 LAB
ANION GAP SERPL CALC-SCNC: 8 MMOL/L (ref 0–18)
BASOPHILS # BLD AUTO: 0.02 X10(3) UL (ref 0–0.2)
BASOPHILS NFR BLD AUTO: 0.1 %
BUN BLD-MCNC: 29 MG/DL (ref 7–18)
BUN/CREAT SERPL: 20.1 (ref 10–20)
CALCIUM BLD-MCNC: 9.3 MG/DL (ref 8.5–10.1)
CHLORIDE SERPL-SCNC: 113 MMOL/L (ref 98–112)
CO2 SERPL-SCNC: 21 MMOL/L (ref 21–32)
CREAT BLD-MCNC: 1.44 MG/DL (ref 0.7–1.3)
DEPRECATED RDW RBC AUTO: 53.2 FL (ref 35.1–46.3)
EOSINOPHIL # BLD AUTO: 0 X10(3) UL (ref 0–0.7)
EOSINOPHIL NFR BLD AUTO: 0 %
ERYTHROCYTE [DISTWIDTH] IN BLOOD BY AUTOMATED COUNT: 14.1 % (ref 11–15)
GLUCOSE BLD-MCNC: 143 MG/DL (ref 70–99)
HCT VFR BLD AUTO: 44.7 % (ref 39–53)
HGB BLD-MCNC: 13.4 G/DL (ref 13–17.5)
IMM GRANULOCYTES # BLD AUTO: 0.2 X10(3) UL (ref 0–1)
IMM GRANULOCYTES NFR BLD: 1.2 %
LYMPHOCYTES # BLD AUTO: 1.44 X10(3) UL (ref 1–4)
LYMPHOCYTES NFR BLD AUTO: 8.5 %
MCH RBC QN AUTO: 30 PG (ref 26–34)
MCHC RBC AUTO-ENTMCNC: 30 G/DL (ref 31–37)
MCV RBC AUTO: 100 FL (ref 80–100)
MONOCYTES # BLD AUTO: 0.78 X10(3) UL (ref 0.1–1)
MONOCYTES NFR BLD AUTO: 4.6 %
NEUTROPHILS # BLD AUTO: 14.41 X10 (3) UL (ref 1.5–7.7)
NEUTROPHILS # BLD AUTO: 14.41 X10(3) UL (ref 1.5–7.7)
NEUTROPHILS NFR BLD AUTO: 85.6 %
OSMOLALITY SERPL CALC.SUM OF ELEC: 302 MOSM/KG (ref 275–295)
PLATELET # BLD AUTO: 185 10(3)UL (ref 150–450)
POTASSIUM SERPL-SCNC: 4.2 MMOL/L (ref 3.5–5.1)
RBC # BLD AUTO: 4.47 X10(6)UL (ref 3.8–5.8)
SODIUM SERPL-SCNC: 142 MMOL/L (ref 136–145)
WBC # BLD AUTO: 16.9 X10(3) UL (ref 4–11)

## 2020-01-06 PROCEDURE — 99233 SBSQ HOSP IP/OBS HIGH 50: CPT | Performed by: HOSPITALIST

## 2020-01-06 PROCEDURE — 74230 X-RAY XM SWLNG FUNCJ C+: CPT | Performed by: HOSPITALIST

## 2020-01-06 NOTE — PLAN OF CARE
Problem: Patient Centered Care  Goal: Patient preferences are identified and integrated in the patient's plan of care  Description  Interventions:  - What would you like us to know as we care for you?   - Provide timely, complete, and accurate informatio fall injury  Description  INTERVENTIONS:  - Assess pt frequently for physical needs  - Identify cognitive and physical deficits and behaviors that affect risk of falls.   - [place] fall precautions as indicated by assessment.  - Educate pt/family on patie

## 2020-01-06 NOTE — PROGRESS NOTES
[facility]  [facility]     Chadd Dobson  : 1940    Status: Inpatient  Day #: 2    Attending: Pardeep Chun MD  PCP: Yohana Valera MD      Assessment and Plan     Community-acquired pneumonia.   Evident on repeat chest x-ray  GFRAA 53* 49* 53*   GFRNAA 45* 43* 46*   CA 9.0 8.6 9.3    145 142   K 3.8 4.0 4.2   * 115* 113*   CO2 21.0 22.0 21.0   * 141* 143*       Xr Chest Ap Portable  (cpt=71045)    Result Date: 1/5/2020  CONCLUSION: Pulmonary opacity in the

## 2020-01-06 NOTE — PLAN OF CARE
Problem: Patient Centered Care  Goal: Patient preferences are identified and integrated in the patient's plan of care  Description  Interventions:  - What would you like us to know as we care for you?  \" I live at home with my family\"  - Provide timely, ADULT - FALL  Goal: Free from fall injury  Description  INTERVENTIONS:  - Assess pt frequently for physical needs  - Identify cognitive and physical deficits and behaviors that affect risk of falls. - [place] fall precautions as indicated by assessment.

## 2020-01-06 NOTE — PLAN OF CARE
Problem: Patient Centered Care  Goal: Patient preferences are identified and integrated in the patient's plan of care  Description  Interventions:  - What would you like us to know as we care for you?  \" I live at home with my family\"  - Provide timely, ADULT - FALL  Goal: Free from fall injury  Description  INTERVENTIONS:  - Assess pt frequently for physical needs  - Identify cognitive and physical deficits and behaviors that affect risk of falls. - [place] fall precautions as indicated by assessment.

## 2020-01-07 LAB
ANION GAP SERPL CALC-SCNC: 6 MMOL/L (ref 0–18)
BASOPHILS # BLD AUTO: 0.01 X10(3) UL (ref 0–0.2)
BASOPHILS NFR BLD AUTO: 0.1 %
BUN BLD-MCNC: 32 MG/DL (ref 7–18)
BUN/CREAT SERPL: 22.9 (ref 10–20)
CALCIUM BLD-MCNC: 8.9 MG/DL (ref 8.5–10.1)
CHLORIDE SERPL-SCNC: 114 MMOL/L (ref 98–112)
CO2 SERPL-SCNC: 23 MMOL/L (ref 21–32)
CREAT BLD-MCNC: 1.4 MG/DL (ref 0.7–1.3)
DEPRECATED RDW RBC AUTO: 49.1 FL (ref 35.1–46.3)
EOSINOPHIL # BLD AUTO: 0 X10(3) UL (ref 0–0.7)
EOSINOPHIL NFR BLD AUTO: 0 %
ERYTHROCYTE [DISTWIDTH] IN BLOOD BY AUTOMATED COUNT: 13.9 % (ref 11–15)
GLUCOSE BLD-MCNC: 150 MG/DL (ref 70–99)
HCT VFR BLD AUTO: 41.2 % (ref 39–53)
HGB BLD-MCNC: 12.8 G/DL (ref 13–17.5)
IMM GRANULOCYTES # BLD AUTO: 0.16 X10(3) UL (ref 0–1)
IMM GRANULOCYTES NFR BLD: 1 %
LYMPHOCYTES # BLD AUTO: 1.24 X10(3) UL (ref 1–4)
LYMPHOCYTES NFR BLD AUTO: 7.5 %
MCH RBC QN AUTO: 30.3 PG (ref 26–34)
MCHC RBC AUTO-ENTMCNC: 31.1 G/DL (ref 31–37)
MCV RBC AUTO: 97.6 FL (ref 80–100)
MONOCYTES # BLD AUTO: 0.86 X10(3) UL (ref 0.1–1)
MONOCYTES NFR BLD AUTO: 5.2 %
NEUTROPHILS # BLD AUTO: 14.19 X10 (3) UL (ref 1.5–7.7)
NEUTROPHILS # BLD AUTO: 14.19 X10(3) UL (ref 1.5–7.7)
NEUTROPHILS NFR BLD AUTO: 86.2 %
OSMOLALITY SERPL CALC.SUM OF ELEC: 306 MOSM/KG (ref 275–295)
PLATELET # BLD AUTO: 204 10(3)UL (ref 150–450)
POTASSIUM SERPL-SCNC: 4.5 MMOL/L (ref 3.5–5.1)
RBC # BLD AUTO: 4.22 X10(6)UL (ref 3.8–5.8)
SODIUM SERPL-SCNC: 143 MMOL/L (ref 136–145)
WBC # BLD AUTO: 16.5 X10(3) UL (ref 4–11)

## 2020-01-07 PROCEDURE — 99233 SBSQ HOSP IP/OBS HIGH 50: CPT | Performed by: HOSPITALIST

## 2020-01-07 NOTE — SLP NOTE
ADULT VIDEOFLUOROSCOPIC SWALLOWING STUDY    Admission Date: 1/4/2020  Evaluation Date: 01/06/20  Radiologist: Dr. Dorota Tran: Regular  Diet Recommendations - Liquid:  Thin    Further Follow-up:  Follow Up N Upright;Midline. Patient Viewed: Lateral.  Patient Alertness: Fully alert. Consistencies Presented: Nectar thick liquids; Thin liquids;Puree;Hard solid to assess oropharyngeal swallow function and assess for compensatory strategies to improve safe swallow (VFSS - Puree): Intact  Bolus Formation (VFSS - Puree): Impaired  Bolus Propulsion (VFSS - Puree): Impaired  Retention (VFSS - Puree):  Impaired  Premature Spillage to: Valleculae;Pyriform sinuses  Delay (seconds): 1-2  Residue Severity, Location: Throughou session(s). In Progress   Goal #2 The patient/family/caregiver will demonstrate understanding and implementation of aspiration precautions and swallow strategies independently over 1-2 session(s).     In Progress   Goal #3 The patient will utilize compensa

## 2020-01-07 NOTE — PROGRESS NOTES
[facility]  Progress Note     Melvina Powell  : 1940    Status: Inpatient  Day #: 3    Attending: Brett Crocker MD  PCP: Temi Peters MD      Assessment and Plan     Community-acquired pneumonia.   Evident on repeat chest x-ray  MCHC 32.1 30.0* 31.1   RDW 14.0 14.1 13.9   NEPRELIM 5.88 14.41* 14.19*     Recent Labs   Lab 01/05/20  0635 01/06/20  0704 01/07/20  0622   BUN 24* 29* 32*   CREATSERUM 1.54* 1.44* 1.40*   GFRAA 49* 53* 55*   GFRNAA 43* 46* 48*   CA 8.6 9.3 8.9

## 2020-01-07 NOTE — PLAN OF CARE
Problem: Patient/Family Goals  Goal: Patient/Family Long Term Goal  Description  Patient's Long Term Goal: Go home     Interventions:  - abx as ordered  - Duonebs as ordered  - Supplemental O2 as needed  - See additional Care Plan goals for specific inte Manage/alleviate anxiety  - Utilize distraction and/or relaxation techniques  - Monitor for opioid side effects  - Notify MD/LIP if interventions unsuccessful or patient reports new pain  - Anticipate increased pain with activity and pre-medicate as approp

## 2020-01-07 NOTE — PLAN OF CARE
Problem: Patient Centered Care  Goal: Patient preferences are identified and integrated in the patient's plan of care  Description  Interventions:  - What would you like us to know as we care for you?  \" I live at home with my family\"  - Provide timely, goal  Description  INTERVENTIONS:  - Encourage pt to monitor pain and request assistance  - Assess pain using appropriate pain scale  - Administer analgesics based on type and severity of pain and evaluate response  - Implement non-pharmacological measures

## 2020-01-07 NOTE — SLP NOTE
SPEECH DAILY NOTE - INPATIENT    ASSESSMENT & PLAN   ASSESSMENT      Pt alert, afebrile and on 4 L/min 02 via NC. Pt seen for swallow analysis per VFSS recommendations (after consulting with RN).  Pt observed upright in chair with current diet of solid/thin Plan/Recommendations: Aspiration precautions; Dysphagia therapy    Interdisciplinary Communication: Discussed with RN            GOALS:    Goal #1 The patient will tolerate regular  consistency and thin  liquids without overt signs or symptoms of aspiration

## 2020-01-08 PROCEDURE — 99233 SBSQ HOSP IP/OBS HIGH 50: CPT | Performed by: HOSPITALIST

## 2020-01-08 RX ORDER — HEPARIN SODIUM 5000 [USP'U]/ML
5000 INJECTION, SOLUTION INTRAVENOUS; SUBCUTANEOUS EVERY 12 HOURS
Status: DISCONTINUED | OUTPATIENT
Start: 2020-01-08 | End: 2020-01-09

## 2020-01-08 RX ORDER — PREDNISONE 20 MG/1
40 TABLET ORAL
Status: DISCONTINUED | OUTPATIENT
Start: 2020-01-08 | End: 2020-01-09

## 2020-01-08 NOTE — PROGRESS NOTES
[facility]    Progress Note    Fortino Garner Patient Status:  Inpatient    1940 MRN [id number]   Location [facility] Attending Omid Gomez, [address] [facility] [address] Day # 4 PCP Hector Casillas MD       Subjective:     Feeling bett Per ST, ok for thin liquids with strategies  - wean O2  - cont nebs  - stop solu medrol, change to pred 40 mg po daily.     Acute hypoxic respiratory failure  Due to above.       - wean O2  - nebs, steroids, abx as above     Hx of :  Hypertension  Dyslipide

## 2020-01-08 NOTE — CM/SW NOTE
Per nsg rounds pt is on 02 at 2-3L now. No home 02 hx. Per chart review pt has no qualifying dx for home 02. Ref to [facility] quoted $181.40 for OOP oxygen costs.     Michelle MAY notified of above    / to remain available for s

## 2020-01-08 NOTE — PLAN OF CARE
Problem: Patient Centered Care  Goal: Patient preferences are identified and integrated in the patient's plan of care  Description  Interventions:  - What would you like us to know as we care for you?  \" I live at home with my family\"  - Provide timely, ADULT - FALL  Goal: Free from fall injury  Description  INTERVENTIONS:  - Assess pt frequently for physical needs  - Identify cognitive and physical deficits and behaviors that affect risk of falls. - [place] fall precautions as indicated by assessment.

## 2020-01-09 VITALS
WEIGHT: 230 LBS | BODY MASS INDEX: 32.93 KG/M2 | OXYGEN SATURATION: 91 % | SYSTOLIC BLOOD PRESSURE: 144 MMHG | DIASTOLIC BLOOD PRESSURE: 76 MMHG | RESPIRATION RATE: 18 BRPM | HEIGHT: 70 IN | HEART RATE: 95 BPM | TEMPERATURE: 98 F

## 2020-01-09 LAB
ANION GAP SERPL CALC-SCNC: 5 MMOL/L (ref 0–18)
BASOPHILS # BLD AUTO: 0.04 X10(3) UL (ref 0–0.2)
BASOPHILS NFR BLD AUTO: 0.3 %
BUN BLD-MCNC: 32 MG/DL (ref 7–18)
BUN/CREAT SERPL: 22.2 (ref 10–20)
CALCIUM BLD-MCNC: 8.9 MG/DL (ref 8.5–10.1)
CHLORIDE SERPL-SCNC: 116 MMOL/L (ref 98–112)
CO2 SERPL-SCNC: 24 MMOL/L (ref 21–32)
CREAT BLD-MCNC: 1.44 MG/DL (ref 0.7–1.3)
DEPRECATED RDW RBC AUTO: 47.8 FL (ref 35.1–46.3)
EOSINOPHIL # BLD AUTO: 0.01 X10(3) UL (ref 0–0.7)
EOSINOPHIL NFR BLD AUTO: 0.1 %
ERYTHROCYTE [DISTWIDTH] IN BLOOD BY AUTOMATED COUNT: 13.6 % (ref 11–15)
GLUCOSE BLD-MCNC: 91 MG/DL (ref 70–99)
HCT VFR BLD AUTO: 38.5 % (ref 39–53)
HGB BLD-MCNC: 12.1 G/DL (ref 13–17.5)
IMM GRANULOCYTES # BLD AUTO: 0.25 X10(3) UL (ref 0–1)
IMM GRANULOCYTES NFR BLD: 1.7 %
LYMPHOCYTES # BLD AUTO: 3.17 X10(3) UL (ref 1–4)
LYMPHOCYTES NFR BLD AUTO: 22 %
MCH RBC QN AUTO: 30 PG (ref 26–34)
MCHC RBC AUTO-ENTMCNC: 31.4 G/DL (ref 31–37)
MCV RBC AUTO: 95.3 FL (ref 80–100)
MONOCYTES # BLD AUTO: 1.35 X10(3) UL (ref 0.1–1)
MONOCYTES NFR BLD AUTO: 9.3 %
NEUTROPHILS # BLD AUTO: 9.62 X10 (3) UL (ref 1.5–7.7)
NEUTROPHILS # BLD AUTO: 9.62 X10(3) UL (ref 1.5–7.7)
NEUTROPHILS NFR BLD AUTO: 66.6 %
OSMOLALITY SERPL CALC.SUM OF ELEC: 306 MOSM/KG (ref 275–295)
PLATELET # BLD AUTO: 177 10(3)UL (ref 150–450)
POTASSIUM SERPL-SCNC: 3.6 MMOL/L (ref 3.5–5.1)
POTASSIUM SERPL-SCNC: 4.8 MMOL/L (ref 3.5–5.1)
RBC # BLD AUTO: 4.04 X10(6)UL (ref 3.8–5.8)
SODIUM SERPL-SCNC: 145 MMOL/L (ref 136–145)
WBC # BLD AUTO: 14.4 X10(3) UL (ref 4–11)

## 2020-01-09 PROCEDURE — 99239 HOSP IP/OBS DSCHRG MGMT >30: CPT | Performed by: HOSPITALIST

## 2020-01-09 RX ORDER — PREDNISONE 10 MG/1
TABLET ORAL
Qty: 14 TABLET | Refills: 0 | Status: SHIPPED | OUTPATIENT
Start: 2020-01-09 | End: 2020-02-05 | Stop reason: ALTCHOICE

## 2020-01-09 RX ORDER — ALBUTEROL SULFATE 90 UG/1
AEROSOL, METERED RESPIRATORY (INHALATION)
Qty: 1 INHALER | Refills: 1 | Status: SHIPPED | OUTPATIENT
Start: 2020-01-09 | End: 2020-02-05 | Stop reason: ALTCHOICE

## 2020-01-09 RX ORDER — LEVOFLOXACIN 750 MG/1
750 TABLET ORAL EVERY OTHER DAY
Qty: 3 TABLET | Refills: 0 | Status: SHIPPED | OUTPATIENT
Start: 2020-01-10 | End: 2020-01-15

## 2020-01-09 RX ORDER — POTASSIUM CHLORIDE 20 MEQ/1
40 TABLET, EXTENDED RELEASE ORAL EVERY 4 HOURS
Status: COMPLETED | OUTPATIENT
Start: 2020-01-09 | End: 2020-01-09

## 2020-01-09 NOTE — SLP NOTE
SPEECH DAILY NOTE - INPATIENT    ASSESSMENT & PLAN   ASSESSMENT      Pt alert, afebrile and on room air. Pt seen for swallow analysis per VFSS recommendations (after consulting with RN).  Pt observed upright in chair with current diet of solid/thin liquids therapy    Interdisciplinary Communication: Discussed with RN            GOALS:    Goal #1 The patient will tolerate regular  consistency and thin  liquids without overt signs or symptoms of aspiration with 100 % accuracy over 1-2 session(s).     No CSA on

## 2020-01-09 NOTE — PROGRESS NOTES
IV removed.  Prescriptions canceled through home delivery system per family/patient request sent to [facility]

## 2020-01-09 NOTE — PLAN OF CARE
Problem: Patient Centered Care  Goal: Patient preferences are identified and integrated in the patient's plan of care  Description  Interventions:  - What would you like us to know as we care for you?  \" I live at home with my family\"  - Provide timely, ADULT - FALL  Goal: Free from fall injury  Description  INTERVENTIONS:  - Assess pt frequently for physical needs  - Identify cognitive and physical deficits and behaviors that affect risk of falls. - [place] fall precautions as indicated by assessment.

## 2020-01-10 ENCOUNTER — PATIENT OUTREACH (OUTPATIENT)
Dept: CASE MANAGEMENT | Age: 80
End: 2020-01-10

## 2020-01-10 ENCOUNTER — TELEPHONE (OUTPATIENT)
Dept: INTERNAL MEDICINE CLINIC | Facility: CLINIC | Age: 80
End: 2020-01-10

## 2020-01-10 DIAGNOSIS — J18.9 PNEUMONIA OF LEFT LOWER LOBE DUE TO INFECTIOUS ORGANISM: Primary | ICD-10-CM

## 2020-01-10 DIAGNOSIS — R53.83 FATIGUE, UNSPECIFIED TYPE: ICD-10-CM

## 2020-01-10 DIAGNOSIS — Z02.9 ENCOUNTERS FOR UNSPECIFIED ADMINISTRATIVE PURPOSE: ICD-10-CM

## 2020-01-10 DIAGNOSIS — J18.9 COMMUNITY ACQUIRED PNEUMONIA OF LEFT LOWER LOBE OF LUNG: ICD-10-CM

## 2020-01-10 PROCEDURE — 1111F DSCHRG MED/CURRENT MED MERGE: CPT

## 2020-01-10 NOTE — TELEPHONE ENCOUNTER
Phone call to patient's wife. Patient was hospitalized earlier this week with another bout of pneumonia. .  Patient will be seeing me next Wednesday.   I will place an order in the system for the patient to get a chest x-ray PA and lateral, CBC and BMP on T

## 2020-01-10 NOTE — PROGRESS NOTES
Initial Post Discharge Follow Up   Discharge Date: 1/9/20  Contact Date: 1/10/2020    Consent Verification:  Assessment Completed With: Spouse: Virgen Adams received per patient?  written  HIPAA Verified?   Yes    Discharge Dx:  Community-acquired pn Then take 2 tabs daily for 2 days. Then take 1 tab daily for 2 days and then stop. 14 tablet 0   • acetaminophen 325 MG Oral Tab Take 650 mg by mouth 3 (three) times daily.      • Clopidogrel Bisulfate 75 MG Oral Tab Take 1 tablet (75 mg total) by mouth PCP?  (DME, meds, disease concerns, Etc): No     Follow up appointments:      Your appointments     Date & Time Appointment Department [facility])    Hira 15, 2020 11:00 AM [facility] Follow Up with Jessie Ludwig MD [facility]

## 2020-01-10 NOTE — TELEPHONE ENCOUNTER
Patient has appointment with Dr. David Quezada 1/15 for follow up hospital stay Pneumonia. Asking if should have cxr prior to appointment    Was given Levofloxacin by hospital physician.    Wife concern about some of the side effects, asking if patient should

## 2020-01-10 NOTE — DISCHARGE SUMMARY
[facility]    Discharge Summary    Danie Stewart Patient Status:  Inpatient    1940 MRN [id number]   Location [address] [facility] Attending No att. 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       Reason for Admission:    Cough, SOB    History of Present Illness:     Mane Castillo is a(n) 67year old male with a history o MG Tabs  Commonly known as:  LEVAQUIN  Start taking on:  January 10, 2020      Take 1 tablet (750 mg total) by mouth every other day for 3 doses.    Stop taking on:  January 15, 2020  Quantity:  3 tablet  Refills:  0     predniSONE 10 MG Tabs  Commonly know Alfonso Quezada MD In 1 week.     Specialty:  Internal Medicine  Contact information:  [phone]  [address] Discharge Diagnoses: PNA    Lace+ Score: 82  59-90 High Risk  29-58 Medium Risk  0-28   Low Risk

## 2020-01-14 ENCOUNTER — LAB ENCOUNTER (OUTPATIENT)
Dept: LAB | Age: 80
End: 2020-01-14
Attending: INTERNAL MEDICINE
Payer: MEDICARE

## 2020-01-14 ENCOUNTER — HOSPITAL ENCOUNTER (OUTPATIENT)
Dept: GENERAL RADIOLOGY | Age: 80
Discharge: HOME | End: 2020-01-14
Attending: INTERNAL MEDICINE
Payer: MEDICARE

## 2020-01-14 DIAGNOSIS — R53.83 FATIGUE, UNSPECIFIED TYPE: ICD-10-CM

## 2020-01-14 DIAGNOSIS — J18.9 PNEUMONIA OF LEFT LOWER LOBE DUE TO INFECTIOUS ORGANISM: ICD-10-CM

## 2020-01-14 LAB
ANION GAP SERPL CALC-SCNC: 8 MMOL/L (ref 0–18)
BASOPHILS # BLD AUTO: 0.06 X10(3) UL (ref 0–0.2)
BASOPHILS NFR BLD AUTO: 0.4 %
BUN BLD-MCNC: 31 MG/DL (ref 7–18)
BUN/CREAT SERPL: 20.5 (ref 10–20)
CALCIUM BLD-MCNC: 8.4 MG/DL (ref 8.5–10.1)
CHLORIDE SERPL-SCNC: 111 MMOL/L (ref 98–112)
CO2 SERPL-SCNC: 24 MMOL/L (ref 21–32)
CREAT BLD-MCNC: 1.51 MG/DL (ref 0.7–1.3)
DEPRECATED RDW RBC AUTO: 49.9 FL (ref 35.1–46.3)
EOSINOPHIL # BLD AUTO: 0.09 X10(3) UL (ref 0–0.7)
EOSINOPHIL NFR BLD AUTO: 0.6 %
ERYTHROCYTE [DISTWIDTH] IN BLOOD BY AUTOMATED COUNT: 14.4 % (ref 11–15)
GLUCOSE BLD-MCNC: 119 MG/DL (ref 70–99)
HCT VFR BLD AUTO: 43.7 % (ref 39–53)
HGB BLD-MCNC: 14.2 G/DL (ref 13–17.5)
IMM GRANULOCYTES # BLD AUTO: 0.32 X10(3) UL (ref 0–1)
IMM GRANULOCYTES NFR BLD: 2 %
LYMPHOCYTES # BLD AUTO: 3.56 X10(3) UL (ref 1–4)
LYMPHOCYTES NFR BLD AUTO: 22.5 %
MCH RBC QN AUTO: 31.3 PG (ref 26–34)
MCHC RBC AUTO-ENTMCNC: 32.5 G/DL (ref 31–37)
MCV RBC AUTO: 96.5 FL (ref 80–100)
MONOCYTES # BLD AUTO: 1.25 X10(3) UL (ref 0.1–1)
MONOCYTES NFR BLD AUTO: 7.9 %
NEUTROPHILS # BLD AUTO: 10.51 X10 (3) UL (ref 1.5–7.7)
NEUTROPHILS # BLD AUTO: 10.51 X10(3) UL (ref 1.5–7.7)
NEUTROPHILS NFR BLD AUTO: 66.6 %
OSMOLALITY SERPL CALC.SUM OF ELEC: 304 MOSM/KG (ref 275–295)
PATIENT FASTING Y/N/NP: NO
PLATELET # BLD AUTO: 201 10(3)UL (ref 150–450)
POTASSIUM SERPL-SCNC: 3.4 MMOL/L (ref 3.5–5.1)
RBC # BLD AUTO: 4.53 X10(6)UL (ref 3.8–5.8)
SODIUM SERPL-SCNC: 143 MMOL/L (ref 136–145)
WBC # BLD AUTO: 15.8 X10(3) UL (ref 4–11)

## 2020-01-14 PROCEDURE — 71046 X-RAY EXAM CHEST 2 VIEWS: CPT | Performed by: INTERNAL MEDICINE

## 2020-01-14 PROCEDURE — 85025 COMPLETE CBC W/AUTO DIFF WBC: CPT

## 2020-01-14 PROCEDURE — 36415 COLL VENOUS BLD VENIPUNCTURE: CPT

## 2020-01-14 PROCEDURE — 80048 BASIC METABOLIC PNL TOTAL CA: CPT

## 2020-01-15 ENCOUNTER — OFFICE VISIT (OUTPATIENT)
Dept: INTERNAL MEDICINE CLINIC | Facility: CLINIC | Age: 80
End: 2020-01-15
Payer: MEDICARE

## 2020-01-15 VITALS
TEMPERATURE: 98 F | HEART RATE: 76 BPM | BODY MASS INDEX: 33 KG/M2 | DIASTOLIC BLOOD PRESSURE: 80 MMHG | OXYGEN SATURATION: 91 % | SYSTOLIC BLOOD PRESSURE: 128 MMHG | WEIGHT: 230 LBS

## 2020-01-15 DIAGNOSIS — I10 ESSENTIAL HYPERTENSION: ICD-10-CM

## 2020-01-15 DIAGNOSIS — J18.9 PNEUMONIA OF LEFT LOWER LOBE DUE TO INFECTIOUS ORGANISM: Primary | ICD-10-CM

## 2020-01-15 DIAGNOSIS — E78.00 HYPERCHOLESTEROLEMIA: ICD-10-CM

## 2020-01-15 DIAGNOSIS — I69.359 CVA, OLD, HEMIPARESIS (HCC): ICD-10-CM

## 2020-01-15 DIAGNOSIS — G81.94 LEFT HEMIPARESIS (HCC): ICD-10-CM

## 2020-01-15 DIAGNOSIS — R53.83 FATIGUE, UNSPECIFIED TYPE: ICD-10-CM

## 2020-01-15 DIAGNOSIS — G89.29 CHRONIC BILATERAL LOW BACK PAIN WITHOUT SCIATICA: ICD-10-CM

## 2020-01-15 DIAGNOSIS — M54.50 CHRONIC BILATERAL LOW BACK PAIN WITHOUT SCIATICA: ICD-10-CM

## 2020-01-15 DIAGNOSIS — M48.061 SPINAL STENOSIS OF LUMBAR REGION WITHOUT NEUROGENIC CLAUDICATION: ICD-10-CM

## 2020-01-15 DIAGNOSIS — E03.9 HYPOTHYROIDISM, UNSPECIFIED TYPE: ICD-10-CM

## 2020-01-15 DIAGNOSIS — N18.30 STAGE 3 CHRONIC KIDNEY DISEASE (HCC): ICD-10-CM

## 2020-01-15 DIAGNOSIS — M17.11 PRIMARY OSTEOARTHRITIS OF RIGHT KNEE: ICD-10-CM

## 2020-01-15 DIAGNOSIS — M15.9 PRIMARY OSTEOARTHRITIS INVOLVING MULTIPLE JOINTS: ICD-10-CM

## 2020-01-15 PROCEDURE — 99496 TRANSJ CARE MGMT HIGH F2F 7D: CPT | Performed by: INTERNAL MEDICINE

## 2020-01-15 PROCEDURE — 1111F DSCHRG MED/CURRENT MED MERGE: CPT | Performed by: INTERNAL MEDICINE

## 2020-01-15 RX ORDER — LEVOFLOXACIN 500 MG/1
500 TABLET, FILM COATED ORAL DAILY
Qty: 7 TABLET | Refills: 0 | Status: SHIPPED | OUTPATIENT
Start: 2020-01-15 | End: 2020-01-22

## 2020-01-15 NOTE — PROGRESS NOTES
HPI:    Harrison Marc is a 67year old male here today for hospital follow up.    He was discharged from Inpatient hospital[facility]  to Home   Admission Date: 1/4/20   Discharge Date: 1/9/20  Hospital Discharge Diagnoses (since 12/16/2019)     P had a swallow study while in the hospital to check for aspiration which apparently turned out well. Patient has no complaints of fever or chills. Patient feels well.   Patient's medications are the same as they were prior to entering the hospital.  Nelson Portillo Pneumonia due to organism (11/2017), and Stroke [facility]). He  has a past surgical history that includes cholecystectomy; cataract; tonsillectomy; other surgical history (04/12/2018); and hernia surgery. He family history includes Cancer in his father. masses, HSM or tenderness  MUSCULOSKELETAL: back is not tender, FROM of the extremities  EXTREMITIES: no cyanosis, clubbing or edema  NEURO: Oriented times three, cranial nerves are intact, motor and sensory are grossly intact.   Patient is confined to a  true:  Communication with the patient was made within 2 business days of discharge on date above   Medical Decision Making- Based on service period of discharge to 30 days:   · Number of Possible Diagnoses and/or Management Options: severe  · Amount and/or

## 2020-01-15 NOTE — PATIENT INSTRUCTIONS
1.  Patient is to continue his current diet, medication and activity. 2.  Patient is to continue Levaquin for another 7 days. I will send a new prescription for this to the patient's pharmacy. 3.  Patient is to continue to push fluids.   4.  I will see t

## 2020-02-03 ENCOUNTER — LAB ENCOUNTER (OUTPATIENT)
Dept: LAB | Age: 80
End: 2020-02-03
Attending: INTERNAL MEDICINE
Payer: MEDICARE

## 2020-02-03 ENCOUNTER — HOSPITAL ENCOUNTER (OUTPATIENT)
Dept: GENERAL RADIOLOGY | Age: 80
Discharge: HOME | End: 2020-02-03
Attending: INTERNAL MEDICINE
Payer: MEDICARE

## 2020-02-03 DIAGNOSIS — J18.9 PNEUMONIA OF LEFT LOWER LOBE DUE TO INFECTIOUS ORGANISM: ICD-10-CM

## 2020-02-03 DIAGNOSIS — E78.00 HYPERCHOLESTEROLEMIA: ICD-10-CM

## 2020-02-03 DIAGNOSIS — N18.30 STAGE 3 CHRONIC KIDNEY DISEASE (HCC): ICD-10-CM

## 2020-02-03 DIAGNOSIS — R53.83 FATIGUE, UNSPECIFIED TYPE: ICD-10-CM

## 2020-02-03 LAB
ALT SERPL-CCNC: 16 U/L (ref 16–61)
ANION GAP SERPL CALC-SCNC: 8 MMOL/L (ref 0–18)
AST SERPL-CCNC: 15 U/L (ref 15–37)
BASOPHILS # BLD AUTO: 0.06 X10(3) UL (ref 0–0.2)
BASOPHILS NFR BLD AUTO: 0.9 %
BUN BLD-MCNC: 18 MG/DL (ref 7–18)
BUN/CREAT SERPL: 12.9 (ref 10–20)
CALCIUM BLD-MCNC: 9.5 MG/DL (ref 8.5–10.1)
CHLORIDE SERPL-SCNC: 110 MMOL/L (ref 98–112)
CHOLEST SMN-MCNC: 131 MG/DL (ref ?–200)
CO2 SERPL-SCNC: 24 MMOL/L (ref 21–32)
CREAT BLD-MCNC: 1.39 MG/DL (ref 0.7–1.3)
DEPRECATED RDW RBC AUTO: 49.2 FL (ref 35.1–46.3)
EOSINOPHIL # BLD AUTO: 0.16 X10(3) UL (ref 0–0.7)
EOSINOPHIL NFR BLD AUTO: 2.3 %
ERYTHROCYTE [DISTWIDTH] IN BLOOD BY AUTOMATED COUNT: 13.6 % (ref 11–15)
GLUCOSE BLD-MCNC: 101 MG/DL (ref 70–99)
HCT VFR BLD AUTO: 44.6 % (ref 39–53)
HDLC SERPL-MCNC: 40 MG/DL (ref 40–59)
HGB BLD-MCNC: 14 G/DL (ref 13–17.5)
IMM GRANULOCYTES # BLD AUTO: 0.03 X10(3) UL (ref 0–1)
IMM GRANULOCYTES NFR BLD: 0.4 %
LDLC SERPL CALC-MCNC: 72 MG/DL (ref ?–100)
LYMPHOCYTES # BLD AUTO: 1.93 X10(3) UL (ref 1–4)
LYMPHOCYTES NFR BLD AUTO: 28.1 %
MCH RBC QN AUTO: 30.8 PG (ref 26–34)
MCHC RBC AUTO-ENTMCNC: 31.4 G/DL (ref 31–37)
MCV RBC AUTO: 98 FL (ref 80–100)
MONOCYTES # BLD AUTO: 0.65 X10(3) UL (ref 0.1–1)
MONOCYTES NFR BLD AUTO: 9.5 %
NEUTROPHILS # BLD AUTO: 4.03 X10 (3) UL (ref 1.5–7.7)
NEUTROPHILS # BLD AUTO: 4.03 X10(3) UL (ref 1.5–7.7)
NEUTROPHILS NFR BLD AUTO: 58.8 %
NONHDLC SERPL-MCNC: 91 MG/DL (ref ?–130)
OSMOLALITY SERPL CALC.SUM OF ELEC: 296 MOSM/KG (ref 275–295)
PATIENT FASTING Y/N/NP: YES
PATIENT FASTING Y/N/NP: YES
PLATELET # BLD AUTO: 236 10(3)UL (ref 150–450)
POTASSIUM SERPL-SCNC: 4.3 MMOL/L (ref 3.5–5.1)
RBC # BLD AUTO: 4.55 X10(6)UL (ref 3.8–5.8)
SODIUM SERPL-SCNC: 142 MMOL/L (ref 136–145)
TRIGL SERPL-MCNC: 95 MG/DL (ref 30–149)
VLDLC SERPL CALC-MCNC: 19 MG/DL (ref 0–30)
WBC # BLD AUTO: 6.9 X10(3) UL (ref 4–11)

## 2020-02-03 PROCEDURE — 80061 LIPID PANEL: CPT

## 2020-02-03 PROCEDURE — 84460 ALANINE AMINO (ALT) (SGPT): CPT

## 2020-02-03 PROCEDURE — 85025 COMPLETE CBC W/AUTO DIFF WBC: CPT

## 2020-02-03 PROCEDURE — 36415 COLL VENOUS BLD VENIPUNCTURE: CPT

## 2020-02-03 PROCEDURE — 71046 X-RAY EXAM CHEST 2 VIEWS: CPT | Performed by: INTERNAL MEDICINE

## 2020-02-03 PROCEDURE — 80048 BASIC METABOLIC PNL TOTAL CA: CPT

## 2020-02-03 PROCEDURE — 84450 TRANSFERASE (AST) (SGOT): CPT

## 2020-02-05 ENCOUNTER — OFFICE VISIT (OUTPATIENT)
Dept: INTERNAL MEDICINE CLINIC | Facility: CLINIC | Age: 80
End: 2020-02-05
Payer: MEDICARE

## 2020-02-05 VITALS
DIASTOLIC BLOOD PRESSURE: 70 MMHG | TEMPERATURE: 97 F | OXYGEN SATURATION: 98 % | SYSTOLIC BLOOD PRESSURE: 110 MMHG | HEART RATE: 76 BPM

## 2020-02-05 DIAGNOSIS — I10 ESSENTIAL HYPERTENSION: ICD-10-CM

## 2020-02-05 DIAGNOSIS — J18.9 COMMUNITY ACQUIRED PNEUMONIA OF LEFT LOWER LOBE OF LUNG: Primary | ICD-10-CM

## 2020-02-05 DIAGNOSIS — G89.29 CHRONIC BILATERAL LOW BACK PAIN WITHOUT SCIATICA: ICD-10-CM

## 2020-02-05 DIAGNOSIS — R53.83 FATIGUE, UNSPECIFIED TYPE: ICD-10-CM

## 2020-02-05 DIAGNOSIS — N18.30 STAGE 3 CHRONIC KIDNEY DISEASE (HCC): ICD-10-CM

## 2020-02-05 DIAGNOSIS — G81.94 LEFT HEMIPARESIS (HCC): ICD-10-CM

## 2020-02-05 DIAGNOSIS — I69.359 CVA, OLD, HEMIPARESIS (HCC): ICD-10-CM

## 2020-02-05 DIAGNOSIS — M54.50 CHRONIC BILATERAL LOW BACK PAIN WITHOUT SCIATICA: ICD-10-CM

## 2020-02-05 DIAGNOSIS — M15.9 PRIMARY OSTEOARTHRITIS INVOLVING MULTIPLE JOINTS: ICD-10-CM

## 2020-02-05 DIAGNOSIS — E03.9 HYPOTHYROIDISM, UNSPECIFIED TYPE: ICD-10-CM

## 2020-02-05 DIAGNOSIS — M48.061 SPINAL STENOSIS OF LUMBAR REGION WITHOUT NEUROGENIC CLAUDICATION: ICD-10-CM

## 2020-02-05 DIAGNOSIS — E78.00 HYPERCHOLESTEROLEMIA: ICD-10-CM

## 2020-02-05 PROCEDURE — G0463 HOSPITAL OUTPT CLINIC VISIT: HCPCS | Performed by: INTERNAL MEDICINE

## 2020-02-05 PROCEDURE — 99214 OFFICE O/P EST MOD 30 MIN: CPT | Performed by: INTERNAL MEDICINE

## 2020-02-05 NOTE — PATIENT INSTRUCTIONS
1.  Patient is to continue his current diet, medication and activity. 2.  Patient is to continue to take precautions to avoid aspiration with his meals. 3.  I will plan to see the patient back in 1 month with a chest x-ray prior.

## 2020-02-05 NOTE — PROGRESS NOTES
William Simons is a 67year old male. Patient presents with:  Checkup: 3 wk f/u  Pneumonia  Hypertension    HPI:   Patient presents with:  Checkup: 3 wk f/u  Pneumonia  Hypertension    Patient feels better. Patient's wife feels the patient is better also. SOB  CARDIOVASCULAR: No chest pain  GI: No abdominal pain, nausea, vomiting, diarrhea, or constipation  :No Urinary complaints  EXT:No complaints of pain or swelling in patient's legs    EXAM:   /70 (BP Location: Right arm, Patient Position: Sittin low back pain without sciatica  Stable. CPM.    9. Spinal stenosis of lumbar region without neurogenic claudication  Stable. CPM.    10. Stage 3 chronic kidney disease ([address]  Patient's renal function is slightly improved from before.   Patient's BUN was 1

## 2020-03-11 ENCOUNTER — HOSPITAL ENCOUNTER (OUTPATIENT)
Dept: GENERAL RADIOLOGY | Age: 80
Discharge: HOME | End: 2020-03-11
Attending: INTERNAL MEDICINE
Payer: MEDICARE

## 2020-03-11 DIAGNOSIS — J69.0 ASPIRATION PNEUMONIA OF LEFT LOWER LOBE, UNSPECIFIED ASPIRATION PNEUMONIA TYPE (HCC): ICD-10-CM

## 2020-03-11 DIAGNOSIS — J18.9 COMMUNITY ACQUIRED PNEUMONIA OF LEFT LOWER LOBE OF LUNG: ICD-10-CM

## 2020-03-11 PROCEDURE — 71046 X-RAY EXAM CHEST 2 VIEWS: CPT | Performed by: INTERNAL MEDICINE

## 2020-03-12 ENCOUNTER — OFFICE VISIT (OUTPATIENT)
Dept: INTERNAL MEDICINE CLINIC | Facility: CLINIC | Age: 80
End: 2020-03-12
Payer: MEDICARE

## 2020-03-12 VITALS
HEART RATE: 72 BPM | BODY MASS INDEX: 33 KG/M2 | HEIGHT: 70 IN | OXYGEN SATURATION: 95 % | DIASTOLIC BLOOD PRESSURE: 80 MMHG | TEMPERATURE: 98 F | SYSTOLIC BLOOD PRESSURE: 120 MMHG

## 2020-03-12 DIAGNOSIS — N18.30 STAGE 3 CHRONIC KIDNEY DISEASE (HCC): ICD-10-CM

## 2020-03-12 DIAGNOSIS — G81.94 LEFT HEMIPARESIS (HCC): ICD-10-CM

## 2020-03-12 DIAGNOSIS — I69.359 CVA, OLD, HEMIPARESIS (HCC): ICD-10-CM

## 2020-03-12 DIAGNOSIS — E78.00 HYPERCHOLESTEROLEMIA: ICD-10-CM

## 2020-03-12 DIAGNOSIS — I10 ESSENTIAL HYPERTENSION: ICD-10-CM

## 2020-03-12 DIAGNOSIS — G89.29 CHRONIC BILATERAL LOW BACK PAIN WITHOUT SCIATICA: ICD-10-CM

## 2020-03-12 DIAGNOSIS — R53.83 FATIGUE, UNSPECIFIED TYPE: ICD-10-CM

## 2020-03-12 DIAGNOSIS — M48.061 SPINAL STENOSIS OF LUMBAR REGION WITHOUT NEUROGENIC CLAUDICATION: ICD-10-CM

## 2020-03-12 DIAGNOSIS — J18.9 COMMUNITY ACQUIRED PNEUMONIA OF LEFT LOWER LOBE OF LUNG: Primary | ICD-10-CM

## 2020-03-12 DIAGNOSIS — M54.50 CHRONIC BILATERAL LOW BACK PAIN WITHOUT SCIATICA: ICD-10-CM

## 2020-03-12 DIAGNOSIS — E03.9 HYPOTHYROIDISM, UNSPECIFIED TYPE: ICD-10-CM

## 2020-03-12 DIAGNOSIS — Z00.00 ANNUAL PHYSICAL EXAM: ICD-10-CM

## 2020-03-12 DIAGNOSIS — Z12.5 PROSTATE CANCER SCREENING: ICD-10-CM

## 2020-03-12 DIAGNOSIS — M15.9 PRIMARY OSTEOARTHRITIS INVOLVING MULTIPLE JOINTS: ICD-10-CM

## 2020-03-12 PROCEDURE — 99214 OFFICE O/P EST MOD 30 MIN: CPT | Performed by: INTERNAL MEDICINE

## 2020-03-12 PROCEDURE — 1111F DSCHRG MED/CURRENT MED MERGE: CPT | Performed by: INTERNAL MEDICINE

## 2020-03-12 PROCEDURE — G0463 HOSPITAL OUTPT CLINIC VISIT: HCPCS | Performed by: INTERNAL MEDICINE

## 2020-03-12 NOTE — PROGRESS NOTES
Samina Mena is a 67year old male. Patient presents with:  Pneumonia  Hypertension    HPI:   Patient presents with:  Pneumonia  Hypertension    Pt feels better. He feels well. Pt does still have a persistent cough. Pt's cough is nonproductive.   Citlaly pain  GI: No abdominal pain, nausea, vomiting, diarrhea, or constipation  :No Urinary complaints  EXT:No complaints of pain or swelling in patient's legs    EXAM:   /80 (BP Location: Right arm, Patient Position: Sitting, Cuff Size: large)   Pulse 7 issues and agrees to the plan. The patient is asked to return in 3 months with blood tests, urinalysis and EKG for his annual physical examination as noted above. Armand Pedraza MD  3/12/2020  3:59 PM

## 2020-03-12 NOTE — PATIENT INSTRUCTIONS
1.  Patient is to continue his current diet, medication and activity. 2.  I will plan to see the patient back in 3 months with blood tests, urinalysis and EKG for his annual physical examination. 3.  I will see the patient back sooner as necessary.

## 2020-04-28 RX ORDER — LEVOTHYROXINE SODIUM 0.1 MG/1
100 TABLET ORAL DAILY
Qty: 90 TABLET | Refills: 3 | Status: SHIPPED | OUTPATIENT
Start: 2020-04-28 | End: 2021-01-28

## 2020-04-28 NOTE — TELEPHONE ENCOUNTER
Last TSH 2.070 on 7/17/19. Refill request is for a maintenance medication and has met the criteria specified in the Ambulatory Medication Refill Standing Order for eligibility, visits, laboratory, alerts and was sent to the requested pharmacy.     Reque

## 2020-05-19 NOTE — Clinical Note
Spoke with spouse, Merlyn Al, for TCM--sent TE to office staff for appt clarification and f/u, per TCM protocol. CCM referral placed-thank you. 19-May-2020

## 2020-06-01 ENCOUNTER — TELEPHONE (OUTPATIENT)
Dept: INTERNAL MEDICINE CLINIC | Facility: CLINIC | Age: 80
End: 2020-06-01

## 2020-06-01 NOTE — TELEPHONE ENCOUNTER
Pt wife called  Requesting refill for:  Clopidogrel, Qty 90  Pt uses [facility] for this refill  Tasked to [employer]

## 2020-06-02 NOTE — TELEPHONE ENCOUNTER
Tried to call Daine Epley - unable to leave message - Rx is current at [facility] order; If they have a new mail order (it will not be called [facility]) then we can send;   Otherwise recommend she call [address] [place] [address]

## 2020-06-09 RX ORDER — CLOPIDOGREL BISULFATE 75 MG/1
75 TABLET ORAL DAILY
Qty: 90 TABLET | Refills: 3 | Status: SHIPPED | OUTPATIENT
Start: 2020-06-09 | End: 2021-02-19

## 2020-06-09 NOTE — TELEPHONE ENCOUNTER
To [facility]. Refill request is for a maintenance medication and has met the criteria specified in the Ambulatory Medication Refill Standing Order for eligibility, visits, laboratory, alerts and was sent to the requested pharmacy.     Requested Prescript

## 2020-06-09 NOTE — TELEPHONE ENCOUNTER
Wen Khan called back they are no longer using [employer] please send Rx to [place] [address] [facility] ph.  # [phone]   Routed to Rx

## 2020-10-02 NOTE — TELEPHONE ENCOUNTER
Pt has an appt on Oct 15  5 tablets left of Irbesartan  Needs refill sent to [facility]

## 2020-10-03 ENCOUNTER — TELEPHONE (OUTPATIENT)
Dept: INTERNAL MEDICINE CLINIC | Facility: CLINIC | Age: 80
End: 2020-10-03

## 2020-10-03 DIAGNOSIS — Z20.822 CLOSE EXPOSURE TO COVID-19 VIRUS: Primary | ICD-10-CM

## 2020-10-03 DIAGNOSIS — Z87.01 HISTORY OF PNEUMONIA: ICD-10-CM

## 2020-10-03 DIAGNOSIS — I63.9 CEREBROVASCULAR ACCIDENT (CVA), UNSPECIFIED MECHANISM (HCC): ICD-10-CM

## 2020-10-03 DIAGNOSIS — E78.00 HYPERCHOLESTEROLEMIA: ICD-10-CM

## 2020-10-03 DIAGNOSIS — I10 ESSENTIAL HYPERTENSION: ICD-10-CM

## 2020-10-04 DIAGNOSIS — Z20.822 CLOSE EXPOSURE TO COVID-19 VIRUS: Primary | ICD-10-CM

## 2020-10-04 NOTE — TELEPHONE ENCOUNTER
On call telephone call from pt/wife. Pt was in recent contact with grandson who has tested positive for COVID-19 today. Pt has multiple risk factors including CVA, recurrent pneumonia, hypertension, hypercholesterolemia.  CKD, etc.  Pt is currently asympt

## 2020-10-05 ENCOUNTER — APPOINTMENT (OUTPATIENT)
Dept: LAB | Age: 80
End: 2020-10-05
Attending: INTERNAL MEDICINE
Payer: MEDICARE

## 2020-10-05 DIAGNOSIS — Z20.822 CLOSE EXPOSURE TO COVID-19 VIRUS: ICD-10-CM

## 2020-10-05 RX ORDER — IRBESARTAN 75 MG/1
75 TABLET ORAL DAILY
Qty: 90 TABLET | Refills: 3 | Status: SHIPPED | OUTPATIENT
Start: 2020-10-05 | End: 2021-02-19

## 2020-10-07 NOTE — TELEPHONE ENCOUNTER
Results in process. Advised pt spouse results in process (ok per hipaa). She verbalized understanding.  Advised since pt is active on mychart results will immediately release to [employer]

## 2020-10-08 ENCOUNTER — TELEPHONE (OUTPATIENT)
Dept: INTERNAL MEDICINE CLINIC | Facility: CLINIC | Age: 80
End: 2020-10-08

## 2020-10-09 NOTE — TELEPHONE ENCOUNTER
Telephone call to patient and message left. Patient's nasal swab for COVID-19 is come back nondetected/negative. Patient's wife's nasal swab COVID-19 test is also come back negative/not detected.   I have left a message for both of them that their test ha

## 2020-10-15 ENCOUNTER — LAB ENCOUNTER (OUTPATIENT)
Dept: LAB | Age: 80
End: 2020-10-15
Attending: INTERNAL MEDICINE
Payer: MEDICARE

## 2020-10-15 ENCOUNTER — OFFICE VISIT (OUTPATIENT)
Dept: INTERNAL MEDICINE CLINIC | Facility: CLINIC | Age: 80
End: 2020-10-15
Payer: MEDICARE

## 2020-10-15 VITALS
HEART RATE: 88 BPM | TEMPERATURE: 97 F | DIASTOLIC BLOOD PRESSURE: 66 MMHG | HEIGHT: 70 IN | SYSTOLIC BLOOD PRESSURE: 120 MMHG | WEIGHT: 234 LBS | BODY MASS INDEX: 33.5 KG/M2

## 2020-10-15 DIAGNOSIS — M48.061 SPINAL STENOSIS OF LUMBAR REGION WITHOUT NEUROGENIC CLAUDICATION: ICD-10-CM

## 2020-10-15 DIAGNOSIS — E03.9 HYPOTHYROIDISM, UNSPECIFIED TYPE: ICD-10-CM

## 2020-10-15 DIAGNOSIS — N18.30 STAGE 3 CHRONIC KIDNEY DISEASE, UNSPECIFIED WHETHER STAGE 3A OR 3B CKD (HCC): ICD-10-CM

## 2020-10-15 DIAGNOSIS — E78.00 HYPERCHOLESTEROLEMIA: ICD-10-CM

## 2020-10-15 DIAGNOSIS — G81.94 LEFT HEMIPARESIS (HCC): ICD-10-CM

## 2020-10-15 DIAGNOSIS — I10 ESSENTIAL HYPERTENSION: ICD-10-CM

## 2020-10-15 DIAGNOSIS — Z00.00 ANNUAL PHYSICAL EXAM: Primary | ICD-10-CM

## 2020-10-15 DIAGNOSIS — Z12.5 PROSTATE CANCER SCREENING: ICD-10-CM

## 2020-10-15 DIAGNOSIS — Z00.00 ANNUAL PHYSICAL EXAM: ICD-10-CM

## 2020-10-15 DIAGNOSIS — M54.50 CHRONIC BILATERAL LOW BACK PAIN WITHOUT SCIATICA: ICD-10-CM

## 2020-10-15 DIAGNOSIS — G89.29 CHRONIC BILATERAL LOW BACK PAIN WITHOUT SCIATICA: ICD-10-CM

## 2020-10-15 DIAGNOSIS — M15.9 PRIMARY OSTEOARTHRITIS INVOLVING MULTIPLE JOINTS: ICD-10-CM

## 2020-10-15 DIAGNOSIS — I69.359 CVA, OLD, HEMIPARESIS (HCC): ICD-10-CM

## 2020-10-15 DIAGNOSIS — R53.83 FATIGUE, UNSPECIFIED TYPE: ICD-10-CM

## 2020-10-15 PROCEDURE — 99214 OFFICE O/P EST MOD 30 MIN: CPT | Performed by: INTERNAL MEDICINE

## 2020-10-15 PROCEDURE — 84443 ASSAY THYROID STIM HORMONE: CPT

## 2020-10-15 PROCEDURE — 85025 COMPLETE CBC W/AUTO DIFF WBC: CPT

## 2020-10-15 PROCEDURE — 80061 LIPID PANEL: CPT

## 2020-10-15 PROCEDURE — 80053 COMPREHEN METABOLIC PANEL: CPT

## 2020-10-15 PROCEDURE — G0463 HOSPITAL OUTPT CLINIC VISIT: HCPCS | Performed by: INTERNAL MEDICINE

## 2020-10-15 PROCEDURE — G0439 PPPS, SUBSEQ VISIT: HCPCS | Performed by: INTERNAL MEDICINE

## 2020-10-15 PROCEDURE — 93005 ELECTROCARDIOGRAM TRACING: CPT | Performed by: INTERNAL MEDICINE

## 2020-10-15 PROCEDURE — 36415 COLL VENOUS BLD VENIPUNCTURE: CPT

## 2020-10-15 PROCEDURE — 93010 ELECTROCARDIOGRAM REPORT: CPT | Performed by: INTERNAL MEDICINE

## 2020-10-15 NOTE — PATIENT INSTRUCTIONS
1.  Patient is to continue his current diet, medication and activity. 2.  Patient had his high-dose flu vaccine at his pharmacy yesterday. 3.  Patient is to have previously ordered blood tests and urinalysis today on his way out.   6.  I will plan to see

## 2020-10-15 NOTE — PROGRESS NOTES
Michelle Gregory is a 79year old male who presents for a complete physical exam.   HPI:   Mr. Ramon Cordero. [address] is a 73-year-old white male who was seen by me in October 15, 2020 for his Medicare annual physical examination.   At the time the examination  total) by mouth daily. 0   • Coenzyme Q10 (CO Q 10 OR) Take 1 tablet by mouth daily.         Past Medical History:   Diagnosis Date   • Abdominal aortic aneurysm (AAA) ([facility] [address]    • Disorder of thyroid     hypothyroidism   • High blood pressure    • High chol lesions  HEENT: atraumatic, normocephalic, normal oropharynx, ears appear normal, normal TM's  EYES:PERRLA, EOMI, conjunctivae pink, sclerae are nonicteric  NECK: supple,no cervical or supraclavicular lymphadenopathy or palpable masses,no carotid bruits  C back sooner as necessary.    - ELECTROCARDIOGRAM, COMPLETE    2. Essential hypertension  Patient's blood pressure is doing well at this time. Patient is to continue his current diet, medications and activity as noted above.     3. CVA, old, hemiparesis (HC Hepatitis B, Tetanus, or Pneumococcal?: No     Functional Ability     Bathing or Showering: Need some help    Toileting: Able without help    Dressing: Need some help    Eating: Able without help    Driving: (doesnt drive)    Preparing your meals: Cannot d such as at a meeting or place of Religion: No   Many people I talk to seem to mumble (or don't speak clearly): No People get annoyed because I misunderstand what they say: No   I misunderstand what others are saying and make inappropriate responses: No I av this patient. Update Health Maintenance if applicable   Immunizations      Influenza No orders found for this or any previous visit.  Update Immunization Activity if applicable    Pneumococcal Orders placed or performed in visit on 11/14/18   • Fam Peres

## 2020-10-16 ENCOUNTER — TELEPHONE (OUTPATIENT)
Dept: INTERNAL MEDICINE CLINIC | Facility: CLINIC | Age: 80
End: 2020-10-16

## 2020-10-16 DIAGNOSIS — N18.30 STAGE 3 CHRONIC KIDNEY DISEASE, UNSPECIFIED WHETHER STAGE 3A OR 3B CKD (HCC): ICD-10-CM

## 2020-10-16 DIAGNOSIS — E78.00 HYPERCHOLESTEREMIA: ICD-10-CM

## 2020-10-16 DIAGNOSIS — R53.83 FATIGUE, UNSPECIFIED TYPE: Primary | ICD-10-CM

## 2020-10-16 NOTE — TELEPHONE ENCOUNTER
Phone call to patient and lab test discussed with wife. Patient's blood tests have turned out well for the most part. Patient's cholesterol readings are good. Patient does have chronic kidney disease stage III which is stable.   I am planning to see the

## 2020-12-29 ENCOUNTER — TELEPHONE (OUTPATIENT)
Dept: INTERNAL MEDICINE CLINIC | Facility: CLINIC | Age: 80
End: 2020-12-29

## 2020-12-29 ENCOUNTER — HOSPITAL ENCOUNTER (OUTPATIENT)
Age: 80
Discharge: HOME | End: 2020-12-29
Attending: EMERGENCY MEDICINE
Payer: MEDICARE

## 2020-12-29 ENCOUNTER — APPOINTMENT (OUTPATIENT)
Dept: GENERAL RADIOLOGY | Age: 80
End: 2020-12-29
Attending: EMERGENCY MEDICINE
Payer: MEDICARE

## 2020-12-29 VITALS
HEIGHT: 70 IN | SYSTOLIC BLOOD PRESSURE: 114 MMHG | RESPIRATION RATE: 18 BRPM | BODY MASS INDEX: 30.78 KG/M2 | TEMPERATURE: 97 F | DIASTOLIC BLOOD PRESSURE: 79 MMHG | WEIGHT: 215 LBS | OXYGEN SATURATION: 94 % | HEART RATE: 88 BPM

## 2020-12-29 DIAGNOSIS — Z20.822 ENCOUNTER FOR LABORATORY TESTING FOR COVID-19 VIRUS: ICD-10-CM

## 2020-12-29 DIAGNOSIS — J06.9 VIRAL UPPER RESPIRATORY INFECTION: Primary | ICD-10-CM

## 2020-12-29 DIAGNOSIS — R05.9 COUGH: ICD-10-CM

## 2020-12-29 PROCEDURE — 71046 X-RAY EXAM CHEST 2 VIEWS: CPT | Performed by: EMERGENCY MEDICINE

## 2020-12-29 PROCEDURE — 99213 OFFICE O/P EST LOW 20 MIN: CPT | Performed by: EMERGENCY MEDICINE

## 2020-12-29 RX ORDER — AMOXICILLIN AND CLAVULANATE POTASSIUM 875; 125 MG/1; MG/1
1 TABLET, FILM COATED ORAL 2 TIMES DAILY
Qty: 20 TABLET | Refills: 1 | Status: SHIPPED | OUTPATIENT
Start: 2020-12-29 | End: 2021-01-08

## 2020-12-29 RX ORDER — BENZONATATE 200 MG/1
200 CAPSULE ORAL 3 TIMES DAILY PRN
Qty: 15 CAPSULE | Refills: 0 | Status: SHIPPED | OUTPATIENT
Start: 2020-12-29 | End: 2021-01-03

## 2020-12-29 NOTE — TELEPHONE ENCOUNTER
Attempted to call [facility]. Her cell phone goes straight to voicemail. Voicemail is full. Nursing please follow up later on.

## 2020-12-29 NOTE — TELEPHONE ENCOUNTER
Carito Sánchez is calling back with update  Patient went to  he was given an Chest X-Ray, it was negative for pneumonia  He was given a Covid test

## 2020-12-29 NOTE — ED PROVIDER NOTES
Patient Seen in: [facility]      History   Patient presents with:  Cough/URI  Covid-19 Test    Stated Complaint: cough    HPI  Here with wife.   Patient and wife report he has had a runny nose and dry cough on and off for the last 3 or 4 weeks HPI.  Constitutional and vital signs reviewed. All other systems reviewed and negative except as noted above.     Physical Exam     ED Triage Vitals [12/29/20 1026]   BP (!) 135/111   Pulse 88   Resp 18   Temp 96.7 °F (35.9 °C)   Temp src Temporal   Sp discussed with patient and wife. Will try Tessalon Perles for comfort. Close home observation advised.   Red flag warnings were discussed and if symptoms worsen or new or concerning symptoms develop patient is to return promptly or go to the emergency dep

## 2020-12-29 NOTE — TELEPHONE ENCOUNTER
Respiratory infection triage:    Fever:  [x]  No fever  []  Fever>100.4    Cough:  [] Tight cough  [] Cough with exertion  [x] Dry cough  [] Sputum production, Color    Breathing:  [] Mild shortness of breath interfering with activity  [x] Wheezing  [] Sam Elizabeth

## 2020-12-29 NOTE — TELEPHONE ENCOUNTER
I spoke with patient's wife, Stefany Beyer, [place] per HIPPA. She says patient's chest x-ray looked good. He had a coronavirus test. The result is in process. She says he was given benzonatate. No fever. Stefany Brim asks if Dr. Ousmane Watts has any further recommendations.  She

## 2020-12-29 NOTE — TELEPHONE ENCOUNTER
Telephone call pt pt's wife. Pt has a cough with chest congestion but no fever. Pt does have a past history of pneumonia. Pt was seen in [address] [place] [address] where his Chest X-ray was clear. Pt was given a COVID test which is pending.   Pt was also given [place]  f

## 2020-12-29 NOTE — TELEPHONE ENCOUNTER
Patient's wife Yanira Mcknight is calling for the last 2 weeks patient has been experiencing a cough and now it sounds like a rattle in the chest and some drainage  No other symptoms    Patient would like to be seen today can he come in the office?     Please call Pe

## 2020-12-29 NOTE — ED INITIAL ASSESSMENT (HPI)
Negative covid in 10/2020. Has chronic cough that is not improving . Call pcp and inst to come here for evaluation.  Complains fo 4 weeks cough slight productive hx of pneumonia no fever old CVA

## 2020-12-29 NOTE — ED NOTES
Social distance quarantine wear mask wash hands meds as reviewed call and follow up with pcp in office 3 days go to the ed for increased shortness of breath chest pain high fever new or worse concerns

## 2020-12-30 LAB — SARS-COV-2 RNA,QUAL, RT-PCR: NOT DETECTED

## 2021-01-28 ENCOUNTER — TELEPHONE (OUTPATIENT)
Dept: INTERNAL MEDICINE CLINIC | Facility: CLINIC | Age: 81
End: 2021-01-28

## 2021-01-28 RX ORDER — LEVOTHYROXINE SODIUM 0.1 MG/1
100 TABLET ORAL DAILY
Qty: 90 TABLET | Refills: 3 | Status: SHIPPED | OUTPATIENT
Start: 2021-01-28

## 2021-01-28 NOTE — TELEPHONE ENCOUNTER
Pt. Needs a new Rx for Levothyroxine send to [facility] ph. # [phone]  Routed to Rx

## 2021-01-29 NOTE — TELEPHONE ENCOUNTER
Dewayne Galindo (spouse) is calling to see if Nafisa Lynne can use Voltarin gel she has some concerns about the side effects ph.  # [phone]   Routed to clinical
Discussed with patient's wife, Karina Abdullahi. I feel that the possibility of side effects from Voltaren gel are much less thin if she was taking the Voltaren tablet. I think it is okay to use the Voltaren gel as necessary.
To Dr. Jeremiah Santana - wife concerned about renal side effects of Volataren Gel that pt would like to use for arthritic knees. Is Voltaren Gel OK for pt to take?
---

## 2021-02-01 DIAGNOSIS — Z23 NEED FOR VACCINATION: ICD-10-CM

## 2021-02-05 ENCOUNTER — IMMUNIZATION (OUTPATIENT)
Dept: LAB | Age: 81
End: 2021-02-05
Attending: HOSPITALIST
Payer: MEDICARE

## 2021-02-05 DIAGNOSIS — Z23 NEED FOR VACCINATION: Primary | ICD-10-CM

## 2021-02-05 PROCEDURE — 0001A SARSCOV2 VAC 30MCG/0.3ML IM: CPT

## 2021-02-18 ENCOUNTER — TELEPHONE (OUTPATIENT)
Dept: INTERNAL MEDICINE CLINIC | Facility: CLINIC | Age: 81
End: 2021-02-18

## 2021-02-18 NOTE — TELEPHONE ENCOUNTER
Pt wife called  Pt having back pain and is having difficulty walking  No openings in Dr Alina Martinez schedule today  Pt scheduled for tomorrow at 2:30  Should pt be seen earlier?   Tasked to nursing

## 2021-02-18 NOTE — TELEPHONE ENCOUNTER
To Dr. Sher Double - see message below. Wife states this is not a new thing but pain has worsened in the last few weeks. Pt takes tylenol 650mg TID. OK to wait to be seen until tomorrow?

## 2021-02-18 NOTE — TELEPHONE ENCOUNTER
Noted.  I discussed this with patient's wife. Patient's been complaining of back pain for some time. He is having difficulty with ambulation due to the back pain. His situation has not changed recently.   It is okay for the patient to wait until tomorrow

## 2021-02-19 ENCOUNTER — OFFICE VISIT (OUTPATIENT)
Dept: INTERNAL MEDICINE CLINIC | Facility: CLINIC | Age: 81
End: 2021-02-19
Payer: MEDICARE

## 2021-02-19 VITALS
HEART RATE: 80 BPM | BODY MASS INDEX: 33.36 KG/M2 | DIASTOLIC BLOOD PRESSURE: 66 MMHG | HEIGHT: 70 IN | SYSTOLIC BLOOD PRESSURE: 120 MMHG | WEIGHT: 233 LBS | TEMPERATURE: 98 F | OXYGEN SATURATION: 97 %

## 2021-02-19 DIAGNOSIS — I69.359 CVA, OLD, HEMIPARESIS (HCC): ICD-10-CM

## 2021-02-19 DIAGNOSIS — G81.94 LEFT HEMIPARESIS (HCC): ICD-10-CM

## 2021-02-19 DIAGNOSIS — G89.29 CHRONIC BILATERAL LOW BACK PAIN WITHOUT SCIATICA: ICD-10-CM

## 2021-02-19 DIAGNOSIS — E78.00 HYPERCHOLESTEROLEMIA: ICD-10-CM

## 2021-02-19 DIAGNOSIS — N18.30 STAGE 3 CHRONIC KIDNEY DISEASE, UNSPECIFIED WHETHER STAGE 3A OR 3B CKD (HCC): ICD-10-CM

## 2021-02-19 DIAGNOSIS — E03.9 HYPOTHYROIDISM, UNSPECIFIED TYPE: ICD-10-CM

## 2021-02-19 DIAGNOSIS — M54.50 CHRONIC BILATERAL LOW BACK PAIN WITHOUT SCIATICA: ICD-10-CM

## 2021-02-19 DIAGNOSIS — R53.83 FATIGUE, UNSPECIFIED TYPE: ICD-10-CM

## 2021-02-19 DIAGNOSIS — M48.061 SPINAL STENOSIS OF LUMBAR REGION WITHOUT NEUROGENIC CLAUDICATION: ICD-10-CM

## 2021-02-19 DIAGNOSIS — I10 ESSENTIAL HYPERTENSION: Primary | ICD-10-CM

## 2021-02-19 DIAGNOSIS — M15.9 PRIMARY OSTEOARTHRITIS INVOLVING MULTIPLE JOINTS: ICD-10-CM

## 2021-02-19 PROCEDURE — 99214 OFFICE O/P EST MOD 30 MIN: CPT | Performed by: INTERNAL MEDICINE

## 2021-02-19 RX ORDER — IRBESARTAN 75 MG/1
75 TABLET ORAL DAILY
Qty: 90 TABLET | Refills: 3 | Status: SHIPPED | OUTPATIENT
Start: 2021-02-19

## 2021-02-19 RX ORDER — ATORVASTATIN CALCIUM 40 MG/1
40 TABLET, FILM COATED ORAL NIGHTLY
Qty: 90 TABLET | Refills: 3 | Status: SHIPPED | OUTPATIENT
Start: 2021-02-19

## 2021-02-19 RX ORDER — CLOPIDOGREL BISULFATE 75 MG/1
75 TABLET ORAL DAILY
Qty: 90 TABLET | Refills: 3 | Status: SHIPPED | OUTPATIENT
Start: 2021-02-19

## 2021-02-19 RX ORDER — A/SINGAPORE/GP1908/2015 IVR-180 (AN A/MICHIGAN/45/2015 (H1N1)PDM09-LIKE VIRUS, A/HONG KONG/4801/2014, NYMC X-263B (H3N2) (AN A/HONG KONG/4801/2014-LIKE VIRUS), AND B/BRISBANE/60/2008, WILD TYPE (A B/BRISBANE/60/2008-LIKE VIRUS) 15; 15; 15 UG/.5ML; UG/.5ML; UG/.5ML
INJECTION, SUSPENSION INTRAMUSCULAR
COMMUNITY
Start: 2020-10-14 | End: 2021-02-19 | Stop reason: ALTCHOICE

## 2021-02-19 RX ORDER — HYDROCODONE BITARTRATE AND ACETAMINOPHEN 5; 325 MG/1; MG/1
1 TABLET ORAL 2 TIMES DAILY PRN
Qty: 30 TABLET | Refills: 0 | Status: SHIPPED | OUTPATIENT
Start: 2021-02-19 | End: 2021-03-21

## 2021-02-19 NOTE — PROGRESS NOTES
Michelle Gregory is a 79year old male. Patient presents with:  Back Pain: 8/10 back pain, wife says he could barely walk this morning. No falls or injuries, feels the weather has made it worse. He is taking tylenol. It takes the edge off.  He is taking robitu Coenzyme Q10 (CO Q 10 OR) Take 1 tablet by mouth daily.         Past Medical History:   Diagnosis Date   • Abdominal aortic aneurysm (AAA) ([facility] [address]    • Disorder of thyroid     hypothyroidism   • High blood pressure    • High cholesterol    • Hx of pneumothora (hcc)  Left hemiparesis (hcc)  Hypercholesterolemia  Hypothyroidism, unspecified type  Primary osteoarthritis involving multiple joints  Spinal stenosis of lumbar region without neurogenic claudication  Chronic bilateral low back pain without sciatica  Sta

## 2021-02-19 NOTE — PATIENT INSTRUCTIONS
1.  Patient is to continue his current diet, medication and activity. 2.  I will refer the patient to see Dr. Author Winn for evaluation of his immobility issues and arthritic complaints.   3.  I will give the patient a prescription for [place] that he can u

## 2021-02-22 ENCOUNTER — TELEPHONE (OUTPATIENT)
Dept: INTERNAL MEDICINE CLINIC | Facility: CLINIC | Age: 81
End: 2021-02-22

## 2021-02-22 NOTE — TELEPHONE ENCOUNTER
Vishnu Tai,     Mr Jose Graf is a 78 y/o white male that you may have seen in the past.  Pt has had a prior CVA. He also has much arthritis. Pt has become quite immobile at home. I have referred him to you for evsluation.     Elton Mccray

## 2021-02-26 ENCOUNTER — IMMUNIZATION (OUTPATIENT)
Dept: LAB | Age: 81
End: 2021-02-26
Attending: HOSPITALIST
Payer: MEDICARE

## 2021-02-26 DIAGNOSIS — Z23 NEED FOR VACCINATION: Primary | ICD-10-CM

## 2021-02-26 PROCEDURE — 0002A SARSCOV2 VAC 30MCG/0.3ML IM: CPT

## 2021-03-01 ENCOUNTER — OFFICE VISIT (OUTPATIENT)
Dept: NEUROLOGY | Facility: CLINIC | Age: 81
End: 2021-03-01
Payer: MEDICARE

## 2021-03-01 VITALS — WEIGHT: 233 LBS | BODY MASS INDEX: 33.36 KG/M2 | HEIGHT: 70 IN

## 2021-03-01 DIAGNOSIS — Z79.01 ANTICOAGULANT LONG-TERM USE: ICD-10-CM

## 2021-03-01 DIAGNOSIS — M47.816 LUMBAR SPONDYLOSIS: ICD-10-CM

## 2021-03-01 DIAGNOSIS — M17.11 PRIMARY OSTEOARTHRITIS OF RIGHT KNEE: Primary | ICD-10-CM

## 2021-03-01 DIAGNOSIS — I69.359 CVA, OLD, HEMIPARESIS (HCC): ICD-10-CM

## 2021-03-01 DIAGNOSIS — N18.30 STAGE 3 CHRONIC KIDNEY DISEASE, UNSPECIFIED WHETHER STAGE 3A OR 3B CKD (HCC): ICD-10-CM

## 2021-03-01 DIAGNOSIS — N40.1 BENIGN PROSTATIC HYPERPLASIA WITH LOWER URINARY TRACT SYMPTOMS, SYMPTOM DETAILS UNSPECIFIED: ICD-10-CM

## 2021-03-01 PROCEDURE — 99214 OFFICE O/P EST MOD 30 MIN: CPT | Performed by: PHYSICAL MEDICINE & REHABILITATION

## 2021-03-01 RX ORDER — DULOXETIN HYDROCHLORIDE 30 MG/1
30 CAPSULE, DELAYED RELEASE ORAL DAILY
Qty: 30 CAPSULE | Refills: 0 | Status: SHIPPED | OUTPATIENT
Start: 2021-03-01 | End: 2021-03-22 | Stop reason: DRUGHIGH

## 2021-03-01 NOTE — PROGRESS NOTES
[address]  Progress Note    CHIEF COMPLAINT:  Patient presents with:  Low Back Pain: Patient presents to follow up on low back.  LOV 08/15/19 Patient states that Dr. Lia Stokes suggested he see Dr. Gabriela colon 100        Quit date: 2008        Years since quittin.1      Smokeless tobacco: Never Used    Substance and Sexual Activity      Alcohol use: Yes        Frequency: 2-4 times a month        Comment: socially; about 1 glass of wine once a week noted in the HPI. PHYSICAL EXAM:   Ht 70\"   Wt 233 lb (105.7 kg)   BMI 33.43 kg/m²     Body mass index is 33.43 kg/m².       General: No immediate distress  Extremities: No lower extremity edema bilaterally   Spine: Limited lumbar range of motion in CKD  NSAIDs contraindicated due to renal disease. Limits treatment options. Orders Placed This Encounter      DULoxetine HCl (CYMBALTA) 30 MG Oral Cap DR Particles      RTC    Return in about 4 weeks (around 3/29/2021).       Discharge Instructions were

## 2021-03-12 ENCOUNTER — TELEPHONE (OUTPATIENT)
Dept: INTERNAL MEDICINE CLINIC | Facility: CLINIC | Age: 81
End: 2021-03-12

## 2021-03-12 DIAGNOSIS — M54.50 ACUTE BILATERAL LOW BACK PAIN, UNSPECIFIED WHETHER SCIATICA PRESENT: Primary | ICD-10-CM

## 2021-03-12 NOTE — TELEPHONE ENCOUNTER
Pt's wife Lamar Pa  called. She said that Marita Limon has been complaining of his back \"going out\" and he has to sit in a chair. Wife is asking if Dr. Christos Tamayo would consider ordering him x-rays of his back.   Lamar Winn can be reached at [phone]

## 2021-03-12 NOTE — TELEPHONE ENCOUNTER
I attempted to call the patient but there was no answer. Also there was no voicemail. After ringing for a number of rings the phone she was cut off. I will place an order in the system for the patient to get x-rays of his lumbar spine and pelvis.   Deion

## 2021-03-12 NOTE — TELEPHONE ENCOUNTER
Please advise - called patient , spoke with spouse who states patient has  back problems , but the past 1-2 weeks getting worse. He states his \" back goes out\" and at that point he cannot walk any further.  The pain is on the right side of his back - to D

## 2021-03-14 ENCOUNTER — HOSPITAL ENCOUNTER (OUTPATIENT)
Dept: GENERAL RADIOLOGY | Age: 81
Discharge: HOME | End: 2021-03-14
Attending: INTERNAL MEDICINE
Payer: MEDICARE

## 2021-03-14 DIAGNOSIS — M54.50 ACUTE BILATERAL LOW BACK PAIN, UNSPECIFIED WHETHER SCIATICA PRESENT: ICD-10-CM

## 2021-03-14 PROCEDURE — 72110 X-RAY EXAM L-2 SPINE 4/>VWS: CPT | Performed by: INTERNAL MEDICINE

## 2021-03-14 PROCEDURE — 72170 X-RAY EXAM OF PELVIS: CPT | Performed by: INTERNAL MEDICINE

## 2021-03-19 ENCOUNTER — TELEPHONE (OUTPATIENT)
Dept: NEUROLOGY | Facility: CLINIC | Age: 81
End: 2021-03-19

## 2021-03-19 ENCOUNTER — TELEPHONE (OUTPATIENT)
Dept: INTERNAL MEDICINE CLINIC | Facility: CLINIC | Age: 81
End: 2021-03-19

## 2021-03-19 NOTE — TELEPHONE ENCOUNTER
Wife, Gene Stewart, is calling to leave a message with Dr Doug James or a nurse. Gene Stewart states the patient had xrays on back last Saturday, patient got results on MyChart but would like to go over them with someone over the phone.      Wife, [place]uf Stewart, also requested

## 2021-03-19 NOTE — TELEPHONE ENCOUNTER
Wife called and want instruction to stop Duloxetine. State patient is not noticing any change in the pain. So want to get off the medication and try something else. State patient have a appointment on 4/5/21.   Please advise

## 2021-03-19 NOTE — TELEPHONE ENCOUNTER
Telephone call and situation discussed with patient's wife. Patient has much arthritis of his lumbar spine. There is no sign of any compression fractures that are noted.   Patient's pelvis x-ray appeared normal.  Patient does have much arthritis degenerat

## 2021-03-19 NOTE — TELEPHONE ENCOUNTER
If no change in pain I recommend she INCREASE it to 60mg instead of stopping it. Take 2 tabs every morning over the weekend and call me for refills when he runs out.  Takes 2 weeks before you notice a difference with dosage changes

## 2021-03-22 ENCOUNTER — TELEPHONE (OUTPATIENT)
Dept: NEUROLOGY | Facility: CLINIC | Age: 81
End: 2021-03-22

## 2021-03-22 DIAGNOSIS — M17.11 PRIMARY OSTEOARTHRITIS OF RIGHT KNEE: Primary | ICD-10-CM

## 2021-03-22 RX ORDER — DULOXETIN HYDROCHLORIDE 30 MG/1
CAPSULE, DELAYED RELEASE ORAL
Qty: 30 CAPSULE | Refills: 0 | OUTPATIENT
Start: 2021-03-22

## 2021-03-22 RX ORDER — DULOXETIN HYDROCHLORIDE 60 MG/1
60 CAPSULE, DELAYED RELEASE ORAL DAILY
Qty: 30 CAPSULE | Refills: 0 | Status: SHIPPED | OUTPATIENT
Start: 2021-03-22 | End: 2021-04-05

## 2021-03-22 NOTE — TELEPHONE ENCOUNTER
Medication request:  Duloxetine 30 mg oral cap Take 1 capsule by mouth daily.  Qty 30     LOV  03/01/21  NOV 04/05/21    [place]: 03/01/21

## 2021-03-22 NOTE — TELEPHONE ENCOUNTER
Please let the patient's wife know that it takes 2 weeks to know whether an increase of the Cymbalta makes a difference or not. I would recommend staying on the current dose for 2-4 weeks straight. If no improvement then, I would consider weaning.   If sh

## 2021-03-22 NOTE — TELEPHONE ENCOUNTER
S/w patient's wife Daine Epley (HIPPA Verified) who states that patient saw Dr. Seamus Scott 03/01/21 and recommended he take cymbalta 30 mg. As of Saturday patient increased to 60 mg daily and is not seeing any improvement.  Patient's wife will like patient to wean off

## 2021-03-22 NOTE — TELEPHONE ENCOUNTER
S/w pt's wife. Patient verbalized understanding and would like refill with no questions at this time. I've routed the medication for 60mg to Dr. Shannon Grullon.

## 2021-04-05 ENCOUNTER — OFFICE VISIT (OUTPATIENT)
Dept: NEUROLOGY | Facility: CLINIC | Age: 81
End: 2021-04-05
Payer: MEDICARE

## 2021-04-05 VITALS — WEIGHT: 230 LBS | HEIGHT: 70 IN | BODY MASS INDEX: 32.93 KG/M2

## 2021-04-05 DIAGNOSIS — N40.1 BENIGN PROSTATIC HYPERPLASIA WITH LOWER URINARY TRACT SYMPTOMS, SYMPTOM DETAILS UNSPECIFIED: ICD-10-CM

## 2021-04-05 DIAGNOSIS — R26.9 GAIT DISTURBANCE: Primary | ICD-10-CM

## 2021-04-05 DIAGNOSIS — M81.0 AGE-RELATED OSTEOPOROSIS WITHOUT CURRENT PATHOLOGICAL FRACTURE: ICD-10-CM

## 2021-04-05 DIAGNOSIS — Z79.01 ANTICOAGULANT LONG-TERM USE: ICD-10-CM

## 2021-04-05 DIAGNOSIS — M17.11 PRIMARY OSTEOARTHRITIS OF RIGHT KNEE: ICD-10-CM

## 2021-04-05 DIAGNOSIS — M47.816 LUMBAR SPONDYLOSIS: ICD-10-CM

## 2021-04-05 DIAGNOSIS — N18.30 STAGE 3 CHRONIC KIDNEY DISEASE, UNSPECIFIED WHETHER STAGE 3A OR 3B CKD (HCC): ICD-10-CM

## 2021-04-05 DIAGNOSIS — I69.359 CVA, OLD, HEMIPARESIS (HCC): ICD-10-CM

## 2021-04-05 PROCEDURE — 99214 OFFICE O/P EST MOD 30 MIN: CPT | Performed by: PHYSICAL MEDICINE & REHABILITATION

## 2021-04-05 RX ORDER — DULOXETIN HYDROCHLORIDE 60 MG/1
60 CAPSULE, DELAYED RELEASE ORAL DAILY
Qty: 30 CAPSULE | Refills: 0 | Status: SHIPPED | OUTPATIENT
Start: 2021-04-05 | End: 2021-05-17

## 2021-04-05 NOTE — PROGRESS NOTES
[address]  Progress Note    CHIEF COMPLAINT:  Patient presents with:  Back Pain: Patient present for f/u on low back pain. LOV: 3/1/21.  State aching pain is there most of the time, increase when standing wi used    Substance and Sexual Activity      Alcohol use: Yes        Comment: socially; about 1 glass of wine once a week       Drug use: No      Sexual activity: Not on file      CURRENT MEDICATIONS:   Current Outpatient Medications   Medication Sig Dispens showing severe osteopenia, aortic bypass graft, nonspecific disc degeneration. 2.  I personally reviewed plain film x-ray of the pelvis showing intact hip joints      ASSESSMENT AND PLAN:  1. Gait disturbance  I think he needs physical therapy.   He has ha in AVS summary. The patient was in agreement with the assessment and plan. All questions were answered. There were no barriers to learning.         Di Apgar, MD  Physical Medicine and Rehabilitation/Sports Medicine  MEDICAL CENTER [facility]

## 2021-04-16 ENCOUNTER — TELEPHONE (OUTPATIENT)
Dept: INTERNAL MEDICINE CLINIC | Facility: CLINIC | Age: 81
End: 2021-04-16

## 2021-04-16 ENCOUNTER — TELEPHONE (OUTPATIENT)
Dept: NEUROLOGY | Facility: CLINIC | Age: 81
End: 2021-04-16

## 2021-04-16 NOTE — TELEPHONE ENCOUNTER
To Dr. Damari Laura---    Have you received any fax correspondence in regards to below?  (Plavix hold instructions for tooth extraction from dentist)

## 2021-04-16 NOTE — TELEPHONE ENCOUNTER
Noted.  I do not recall receiving any correspondence from a dental office concerning Mr. Juvenal Salinas and a dental extraction and his Plavix. I have reviewed the message that are on my desk I do not see anything there at this time. I have contacted Mrs. Malinda Severance

## 2021-04-16 NOTE — TELEPHONE ENCOUNTER
Wife, Radha Hakeem,  called  Dental group is waiting for instructions re: pt's clopidogrel as he may needs some teeth pulled    [facility] [place] has faxed 2X  Their ph# is [phone]    Please follow up with Radha Palacio on her Cell#    [phone]

## 2021-04-21 ENCOUNTER — TELEPHONE (OUTPATIENT)
Dept: INTERNAL MEDICINE CLINIC | Facility: CLINIC | Age: 81
End: 2021-04-21

## 2021-04-21 ENCOUNTER — MED REC SCAN ONLY (OUTPATIENT)
Dept: INTERNAL MEDICINE CLINIC | Facility: CLINIC | Age: 81
End: 2021-04-21

## 2021-04-21 NOTE — TELEPHONE ENCOUNTER
Telephone call to patient's wife. Patient is scheduled for dental extractions. Discussed with Allen Person. Recommendation from the American dental Association from this year, 2021, reviewed.   According to the recommendations there is no need to hold the Plav

## 2021-04-23 ENCOUNTER — TELEPHONE (OUTPATIENT)
Dept: NEUROLOGY | Facility: CLINIC | Age: 81
End: 2021-04-23

## 2021-04-23 NOTE — TELEPHONE ENCOUNTER
Aura Storma (wife) calling requesting the óscar lin PT order be reviewed, signed and faxed back. Also in the PT order the therapist recommended OT for the patient. Please send OT order if appropriate.

## 2021-04-23 NOTE — TELEPHONE ENCOUNTER
Pls call wife she is stating orders need to be signed. She also wants to discuss other things too, Call to discuss.

## 2021-04-26 NOTE — TELEPHONE ENCOUNTER
S/w Wen Cathryn patient's wife (HIPPA VERIFIED) to notify we have not received any PT Notes from [facility] for patient. Wen Khan will ask Lexa to refax orders.

## 2021-05-04 ENCOUNTER — TELEPHONE (OUTPATIENT)
Dept: NEUROLOGY | Facility: CLINIC | Age: 81
End: 2021-05-04

## 2021-05-04 NOTE — TELEPHONE ENCOUNTER
S/w wife she said to call Sarahi Serna at [phone] and ask for Carlos Mutter in [place]. Called Sarahi Serna and s/w nesha Arcos who seemed like he did not know who Carlos Mutter was or what a progress note is.  Requested Isrrael have [address] [facility] fax progress note Attn:

## 2021-05-04 NOTE — TELEPHONE ENCOUNTER
Spouse came into [place] office trying to reach Dr. Charlotte Pool. She said Luz Mayer has left multiple messages for orders to be signed and the spouse has called about it also, but they are not getting signed.   Spouse is very frustrated and wants a call

## 2021-05-10 ENCOUNTER — HOSPITAL ENCOUNTER (OUTPATIENT)
Dept: CT IMAGING | Facility: HOSPITAL | Age: 81
Discharge: HOME | End: 2021-05-10
Attending: RADIOLOGY
Payer: MEDICARE

## 2021-05-10 DIAGNOSIS — I71.4 AAA (ABDOMINAL AORTIC ANEURYSM) (HCC): ICD-10-CM

## 2021-05-10 PROCEDURE — 74174 CTA ABD&PLVS W/CONTRAST: CPT | Performed by: RADIOLOGY

## 2021-05-10 PROCEDURE — 82565 ASSAY OF CREATININE: CPT

## 2021-05-11 ENCOUNTER — TELEPHONE (OUTPATIENT)
Dept: NEUROLOGY | Facility: CLINIC | Age: 81
End: 2021-05-11

## 2021-05-11 NOTE — TELEPHONE ENCOUNTER
PT's wife was calling again to let us know that the Rehab facility has yet to receive the orders. Please advise and call back.

## 2021-05-11 NOTE — TELEPHONE ENCOUNTER
S/w Wen Khan, patient's wife- Patient is requesting that the paperwork(evaluation report) from [facility] that was faxed over today to be signed (placed in Dr. Didi Oconnell in office bin)      Patient also requesting Dr. Amilcar Shaw put in an OT order and an orthodontic

## 2021-05-12 NOTE — TELEPHONE ENCOUNTER
Found it and signed it. First time I saw it. They should not be so rigid, need to start treating him to not stress out his wife. ..

## 2021-05-12 NOTE — PROGRESS NOTES
S/p endovascular AAA repair 4/12/18. This 3 year f/u CT demonstrates aneurysm completely sealed, no endoleak, and decreased sac size. Called and discussed results with patient's wife. Given the good result, we can relax f/u CT imaging to q 2 years.

## 2021-05-15 DIAGNOSIS — M17.11 PRIMARY OSTEOARTHRITIS OF RIGHT KNEE: ICD-10-CM

## 2021-05-17 ENCOUNTER — TELEPHONE (OUTPATIENT)
Dept: INTERNAL MEDICINE CLINIC | Facility: CLINIC | Age: 81
End: 2021-05-17

## 2021-05-17 ENCOUNTER — HOSPITAL ENCOUNTER (OUTPATIENT)
Dept: GENERAL RADIOLOGY | Age: 81
Discharge: HOME | End: 2021-05-17
Attending: INTERNAL MEDICINE
Payer: MEDICARE

## 2021-05-17 ENCOUNTER — LAB ENCOUNTER (OUTPATIENT)
Dept: LAB | Age: 81
End: 2021-05-17
Attending: INTERNAL MEDICINE
Payer: MEDICARE

## 2021-05-17 ENCOUNTER — OFFICE VISIT (OUTPATIENT)
Dept: INTERNAL MEDICINE CLINIC | Facility: CLINIC | Age: 81
End: 2021-05-17
Payer: MEDICARE

## 2021-05-17 VITALS
SYSTOLIC BLOOD PRESSURE: 106 MMHG | DIASTOLIC BLOOD PRESSURE: 66 MMHG | HEART RATE: 92 BPM | WEIGHT: 228.63 LBS | TEMPERATURE: 98 F | OXYGEN SATURATION: 95 % | HEIGHT: 70 IN | BODY MASS INDEX: 32.73 KG/M2

## 2021-05-17 DIAGNOSIS — J40 BRONCHITIS: ICD-10-CM

## 2021-05-17 DIAGNOSIS — R05.9 COUGH: ICD-10-CM

## 2021-05-17 DIAGNOSIS — I69.359 CVA, OLD, HEMIPARESIS (HCC): ICD-10-CM

## 2021-05-17 DIAGNOSIS — G81.94 LEFT HEMIPARESIS (HCC): ICD-10-CM

## 2021-05-17 DIAGNOSIS — Z87.01 HISTORY OF PNEUMONIA: ICD-10-CM

## 2021-05-17 DIAGNOSIS — R05.9 COUGH: Primary | ICD-10-CM

## 2021-05-17 DIAGNOSIS — J01.90 ACUTE NON-RECURRENT SINUSITIS, UNSPECIFIED LOCATION: ICD-10-CM

## 2021-05-17 DIAGNOSIS — I10 ESSENTIAL HYPERTENSION: ICD-10-CM

## 2021-05-17 PROCEDURE — 71046 X-RAY EXAM CHEST 2 VIEWS: CPT | Performed by: INTERNAL MEDICINE

## 2021-05-17 PROCEDURE — 80048 BASIC METABOLIC PNL TOTAL CA: CPT

## 2021-05-17 PROCEDURE — 99214 OFFICE O/P EST MOD 30 MIN: CPT | Performed by: INTERNAL MEDICINE

## 2021-05-17 PROCEDURE — 36415 COLL VENOUS BLD VENIPUNCTURE: CPT

## 2021-05-17 PROCEDURE — 85025 COMPLETE CBC W/AUTO DIFF WBC: CPT

## 2021-05-17 RX ORDER — DULOXETIN HYDROCHLORIDE 60 MG/1
CAPSULE, DELAYED RELEASE ORAL
Qty: 90 CAPSULE | Refills: 3 | Status: SHIPPED | OUTPATIENT
Start: 2021-05-17 | End: 2021-06-10 | Stop reason: DRUGHIGH

## 2021-05-17 RX ORDER — AMOXICILLIN AND CLAVULANATE POTASSIUM 875; 125 MG/1; MG/1
1 TABLET, FILM COATED ORAL 2 TIMES DAILY
Qty: 20 TABLET | Refills: 1 | Status: SHIPPED | OUTPATIENT
Start: 2021-05-17 | End: 2021-05-27

## 2021-05-17 RX ORDER — ACETAMINOPHEN 500 MG
1000 TABLET ORAL 2 TIMES DAILY
COMMUNITY

## 2021-05-17 NOTE — TELEPHONE ENCOUNTER
Telephone call to patient's wife. Symptoms are noted below. Patient's had his Covid vaccines. Patient does have a history of pneumonia in the past that he has had a couple of times.   I have asked the patient's wife to bring him here to see me at 10:30 t

## 2021-05-17 NOTE — TELEPHONE ENCOUNTER
Respiratory infection triage:    Fever:  [x]  No fever  []  Fever>100.4    Cough:  [] Tight cough  [] Cough with exertion  [x] Dry cough  [] Sputum production, Color:     Breathing:  [x] Mild shortness of breath interfering with activity:sometimes  [x] Whe

## 2021-05-17 NOTE — PROGRESS NOTES
Cordelia Smith is a 79year old male. Patient presents with:  Cough: Patient present for cough and cold like symptoms with SOB w/ activity. Pain 8/10 to back.      HPI:   Patient presents with:  Cough: Patient present for cough and cold like symptoms with SO Quit date: 2008        Years since quittin.3      Smokeless tobacco: Never Used    Vaping Use      Vaping Use: Never used    Alcohol use: Yes      Comment: socially; about 1 glass of wine once a week     Drug use: No       REVIEW OF SYSTEMS:   GE patient does. The patient indicates understanding of these issues and agrees to the plan. The patient is asked to return in as scheduled or as necessary. Sara Myers MD  5/17/2021  10:45 AM

## 2021-05-17 NOTE — TELEPHONE ENCOUNTER
Pt wife called, hoping pt can be seen today for cough/cold-has had for a few weeks, seems to be getting weaker. Pt has history of pneumonia  Ok for pt to be seen in office today?   Tasked to nursing

## 2021-05-17 NOTE — TELEPHONE ENCOUNTER
Medication request: Duloxetine 60mg Take 1 capsule (60 mg total) by mouth daily.     LOV: 04/05/21   NOV: none    [place] 04/05/21 #30 r-0

## 2021-05-19 ENCOUNTER — TELEPHONE (OUTPATIENT)
Dept: INTERNAL MEDICINE CLINIC | Facility: CLINIC | Age: 81
End: 2021-05-19

## 2021-05-19 DIAGNOSIS — J18.9 PNEUMONIA OF LEFT LOWER LOBE DUE TO INFECTIOUS ORGANISM: Primary | ICD-10-CM

## 2021-05-19 NOTE — TELEPHONE ENCOUNTER
Telephone call to patient's wife and situation discussed. Patient's chest x-ray shows a possible early infiltrate in the left lower lung which is where I heard some rales when I saw the patient on Monday.   Patient CBC shows the white count to be elevated

## 2021-05-19 NOTE — TELEPHONE ENCOUNTER
Wife, Lasha Damon, calling to get xray and blood work results that were recently completed.      Ph # [phone]

## 2021-05-24 ENCOUNTER — LAB ENCOUNTER (OUTPATIENT)
Dept: LAB | Age: 81
End: 2021-05-24
Attending: INTERNAL MEDICINE
Payer: MEDICARE

## 2021-05-24 ENCOUNTER — HOSPITAL ENCOUNTER (OUTPATIENT)
Dept: GENERAL RADIOLOGY | Age: 81
Discharge: HOME | End: 2021-05-24
Attending: INTERNAL MEDICINE
Payer: MEDICARE

## 2021-05-24 DIAGNOSIS — J18.9 PNEUMONIA OF LEFT LOWER LOBE DUE TO INFECTIOUS ORGANISM: ICD-10-CM

## 2021-05-24 PROCEDURE — 71046 X-RAY EXAM CHEST 2 VIEWS: CPT | Performed by: INTERNAL MEDICINE

## 2021-05-24 PROCEDURE — 36415 COLL VENOUS BLD VENIPUNCTURE: CPT

## 2021-05-24 PROCEDURE — 85025 COMPLETE CBC W/AUTO DIFF WBC: CPT

## 2021-05-25 ENCOUNTER — APPOINTMENT (OUTPATIENT)
Dept: CT IMAGING | Facility: HOSPITAL | Age: 81
End: 2021-05-25
Attending: EMERGENCY MEDICINE
Payer: MEDICARE

## 2021-05-25 ENCOUNTER — HOSPITAL ENCOUNTER (EMERGENCY)
Facility: HOSPITAL | Age: 81
Discharge: HOME | End: 2021-05-25
Attending: EMERGENCY MEDICINE
Payer: MEDICARE

## 2021-05-25 VITALS
TEMPERATURE: 98 F | DIASTOLIC BLOOD PRESSURE: 77 MMHG | BODY MASS INDEX: 33 KG/M2 | WEIGHT: 228 LBS | HEART RATE: 79 BPM | OXYGEN SATURATION: 92 % | SYSTOLIC BLOOD PRESSURE: 130 MMHG | RESPIRATION RATE: 21 BRPM

## 2021-05-25 DIAGNOSIS — R06.00 DYSPNEA ON EXERTION: Primary | ICD-10-CM

## 2021-05-25 DIAGNOSIS — J18.9 PNEUMONIA OF LEFT LOWER LOBE DUE TO INFECTIOUS ORGANISM: ICD-10-CM

## 2021-05-25 PROBLEM — R06.09 DYSPNEA ON EXERTION: Status: ACTIVE | Noted: 2021-05-25

## 2021-05-25 PROCEDURE — 93010 ELECTROCARDIOGRAM REPORT: CPT | Performed by: EMERGENCY MEDICINE

## 2021-05-25 PROCEDURE — 85379 FIBRIN DEGRADATION QUANT: CPT | Performed by: EMERGENCY MEDICINE

## 2021-05-25 PROCEDURE — 83880 ASSAY OF NATRIURETIC PEPTIDE: CPT | Performed by: EMERGENCY MEDICINE

## 2021-05-25 PROCEDURE — 36415 COLL VENOUS BLD VENIPUNCTURE: CPT

## 2021-05-25 PROCEDURE — 71260 CT THORAX DX C+: CPT | Performed by: EMERGENCY MEDICINE

## 2021-05-25 PROCEDURE — 93005 ELECTROCARDIOGRAM TRACING: CPT

## 2021-05-25 PROCEDURE — 99284 EMERGENCY DEPT VISIT MOD MDM: CPT

## 2021-05-25 PROCEDURE — 83605 ASSAY OF LACTIC ACID: CPT | Performed by: EMERGENCY MEDICINE

## 2021-05-25 PROCEDURE — 84484 ASSAY OF TROPONIN QUANT: CPT | Performed by: EMERGENCY MEDICINE

## 2021-05-25 PROCEDURE — 85025 COMPLETE CBC W/AUTO DIFF WBC: CPT | Performed by: EMERGENCY MEDICINE

## 2021-05-25 PROCEDURE — 80048 BASIC METABOLIC PNL TOTAL CA: CPT | Performed by: EMERGENCY MEDICINE

## 2021-05-25 PROCEDURE — 87040 BLOOD CULTURE FOR BACTERIA: CPT | Performed by: EMERGENCY MEDICINE

## 2021-05-25 NOTE — TELEPHONE ENCOUNTER
Nursing -  Please tell patient  Lab test improved. CXR shows continued findings of possible pneumonia now at both lung bases. If he is feeling better, check in with Dr. Belinda Matos tomorrow--it may take the CXR longer to improve.   If he is not feeling bett

## 2021-05-25 NOTE — TELEPHONE ENCOUNTER
I spoke with Ye Bills, and relayed Dr. Huan Hutchison message. She verbalized understanding. She says that the patient seemed to improve after starting medication. However over the past two days he has looked weaker to her.  Explained that he should

## 2021-05-25 NOTE — TELEPHONE ENCOUNTER
Pt's wife Luisito Dixon called looking for Re-peat CXR and lab results. She can be reached at [phone].

## 2021-05-26 NOTE — ED QUICK NOTES
Orders for admission, patient is aware of plan and ready to go upstairs. Any questions, please call ED RN SHANNON LIN  at [address]. Type of COVID test sent: Rapid- negative  COVID Suspicion level: Low    LOC at time of transport: Alert and oriented x3.

## 2021-05-26 NOTE — TELEPHONE ENCOUNTER
Called spouse per hipaa , patient was seen in ER and needs F/U with DR. CODY - transferred to [facility] SPECIALTY [facility] to schedule F/U

## 2021-05-26 NOTE — ED PROVIDER NOTES
Patient Seen in: [facility] AND [facility] Emergency Department      History   Patient presents with:  Pneumonia    Stated Complaint: worsening CXR    HPI/Subjective:   HPI    59-year-old male presents for evaluation of changing x-ray.   Patient sent by primary c not in acute distress. Appearance: He is well-developed. HENT:      Head: Normocephalic and atraumatic. Eyes:      Conjunctiva/sclera: Conjunctivae normal.   Cardiovascular:      Rate and Rhythm: Normal rate and regular rhythm.       Heart sounds: N GREEN   RAINBOW DRAW GOLD   BLOOD CULTURE   BLOOD CULTURE   CBC W/ DIFFERENTIAL     EKG    Rate, intervals and axes as noted on EKG Report.   Rate: 76  Rhythm: Sinus Rhythm  Reading: Sinus rhythm without ST elevation or depression              Imaging Resul BONES: Normal.  No bony lesion or fracture. OTHER: Negative.                          =====    CONCLUSION:     1. There is no pulmonary embolism or aortic dissection.   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 Discussed return precautions.   Both verbalized understanding of and agreement with t

## 2021-05-27 ENCOUNTER — TELEPHONE (OUTPATIENT)
Dept: INTERNAL MEDICINE CLINIC | Facility: CLINIC | Age: 81
End: 2021-05-27

## 2021-05-27 DIAGNOSIS — J18.9 PNEUMONIA OF LEFT LOWER LOBE DUE TO INFECTIOUS ORGANISM: Primary | ICD-10-CM

## 2021-05-27 RX ORDER — DULOXETIN HYDROCHLORIDE 30 MG/1
CAPSULE, DELAYED RELEASE ORAL
Qty: 30 CAPSULE | Refills: 0 | OUTPATIENT
Start: 2021-05-27

## 2021-05-27 RX ORDER — LEVOFLOXACIN 500 MG/1
500 TABLET, FILM COATED ORAL DAILY
Qty: 10 TABLET | Refills: 1 | Status: SHIPPED | OUTPATIENT
Start: 2021-05-27 | End: 2021-06-06

## 2021-05-27 NOTE — TELEPHONE ENCOUNTER
Patient was advised to go to the ER by our office regarding worsening CXR. Patient was previously placed on Augmentin 875/125 mg BID x 10 days. Patient was discharged from the ED.  DX: Dyspnea on exertion    Per Emre Nunez (spouse), patient completed abx course

## 2021-05-27 NOTE — TELEPHONE ENCOUNTER
Pt was in the ER diagnosed w/pneumonia on Tuesday May 25  He is out of amoxicillin and needs a refill  Please send to [facility] ad confirm to wife, Lou Alfonso at [phone]

## 2021-05-27 NOTE — TELEPHONE ENCOUNTER
Telephone call to patient's wife and situation discussed. Patient still has a deep cough. Patient was seen in the emergency room 2 days ago where his chest x-ray continued to show pneumonia. Patient's WBC count had improved.   Patient still has a deep co

## 2021-06-02 ENCOUNTER — LAB ENCOUNTER (OUTPATIENT)
Dept: LAB | Age: 81
End: 2021-06-02
Attending: INTERNAL MEDICINE
Payer: MEDICARE

## 2021-06-02 ENCOUNTER — HOSPITAL ENCOUNTER (OUTPATIENT)
Dept: GENERAL RADIOLOGY | Age: 81
Discharge: HOME | End: 2021-06-02
Attending: INTERNAL MEDICINE
Payer: MEDICARE

## 2021-06-02 DIAGNOSIS — J18.9 PNEUMONIA OF LEFT LOWER LOBE DUE TO INFECTIOUS ORGANISM: ICD-10-CM

## 2021-06-02 PROCEDURE — 85025 COMPLETE CBC W/AUTO DIFF WBC: CPT

## 2021-06-02 PROCEDURE — 36415 COLL VENOUS BLD VENIPUNCTURE: CPT

## 2021-06-02 PROCEDURE — 80048 BASIC METABOLIC PNL TOTAL CA: CPT

## 2021-06-02 PROCEDURE — 71046 X-RAY EXAM CHEST 2 VIEWS: CPT | Performed by: INTERNAL MEDICINE

## 2021-06-04 ENCOUNTER — OFFICE VISIT (OUTPATIENT)
Dept: INTERNAL MEDICINE CLINIC | Facility: CLINIC | Age: 81
End: 2021-06-04
Payer: MEDICARE

## 2021-06-04 VITALS
HEIGHT: 70 IN | DIASTOLIC BLOOD PRESSURE: 70 MMHG | TEMPERATURE: 98 F | BODY MASS INDEX: 32.64 KG/M2 | OXYGEN SATURATION: 93 % | SYSTOLIC BLOOD PRESSURE: 120 MMHG | WEIGHT: 228 LBS | HEART RATE: 80 BPM

## 2021-06-04 DIAGNOSIS — M48.061 SPINAL STENOSIS OF LUMBAR REGION WITHOUT NEUROGENIC CLAUDICATION: ICD-10-CM

## 2021-06-04 DIAGNOSIS — I69.359 CVA, OLD, HEMIPARESIS (HCC): ICD-10-CM

## 2021-06-04 DIAGNOSIS — J18.9 PNEUMONIA OF BOTH LOWER LOBES DUE TO INFECTIOUS ORGANISM: Primary | ICD-10-CM

## 2021-06-04 DIAGNOSIS — G81.94 LEFT HEMIPARESIS (HCC): ICD-10-CM

## 2021-06-04 DIAGNOSIS — N18.31 STAGE 3A CHRONIC KIDNEY DISEASE (HCC): ICD-10-CM

## 2021-06-04 DIAGNOSIS — M15.9 PRIMARY OSTEOARTHRITIS INVOLVING MULTIPLE JOINTS: ICD-10-CM

## 2021-06-04 DIAGNOSIS — I10 ESSENTIAL HYPERTENSION: ICD-10-CM

## 2021-06-04 PROCEDURE — 99214 OFFICE O/P EST MOD 30 MIN: CPT | Performed by: INTERNAL MEDICINE

## 2021-06-04 RX ORDER — LEVOFLOXACIN 500 MG/1
500 TABLET, FILM COATED ORAL DAILY
Qty: 10 TABLET | Refills: 0 | Status: SHIPPED | OUTPATIENT
Start: 2021-06-04 | End: 2021-06-14

## 2021-06-04 NOTE — PROGRESS NOTES
Laurel Olvera is a 79year old male. Patient presents with:  ER F/U: 5/25/21 for dyspnea on exertion and pneumonia of lower left lobe due to infectious organism. Pt has not improvement since ER. Pain 8/10 to back.    Pneumonia    HPI:   Patient presents wit Osteoarthritis    • Pneumonia due to organism 11/2017   • Stroke [facility])     left sided weakness      Social History:  Social History    Tobacco Use      Smoking status: Former Smoker        Packs/day: 2.00        Years: 50.00        Pack years: 100        Q Patient is to push fluids. At this point I will refer the patient to see either Dr. Monty Vallejo or Dr. Naila Garg for pulmonary evaluation. I will tentatively plan to see the patient back in 3 weeks with a chest x-ray, CBC and BMP.   I will see the patient back so

## 2021-06-04 NOTE — PATIENT INSTRUCTIONS
1.  Patient is to continue his current diet, medication and activity. 2.  Patient has had his Covid vaccine. 3.  I will give the patient another 10-day course of Levaquin 500 mg orally daily.   4.  I will refer the patient to see either Dr. Nya Barajas or

## 2021-06-07 ENCOUNTER — MED REC SCAN ONLY (OUTPATIENT)
Dept: NEUROLOGY | Facility: CLINIC | Age: 81
End: 2021-06-07

## 2021-06-09 ENCOUNTER — TELEPHONE (OUTPATIENT)
Dept: NEUROLOGY | Facility: CLINIC | Age: 81
End: 2021-06-09

## 2021-06-09 NOTE — TELEPHONE ENCOUNTER
He was supposed to have a 2 month follow up about now to discuss this. If she would like me to reassess him, pls have him make a follow up.  If she just wants to stop the Cymbalta for some reason give her 7 tabs of 30mg pills and then he can stop it after t

## 2021-06-09 NOTE — TELEPHONE ENCOUNTER
S/w Aura Senior (wife) who states the patient has been on Cymbalta for a while now and does not feel like it is helping his pain and discomfort. They would like to wean the patient off of the medication. They no longer have any 30mg capsules left. Please advise.

## 2021-06-10 RX ORDER — DULOXETIN HYDROCHLORIDE 30 MG/1
30 CAPSULE, DELAYED RELEASE ORAL DAILY
Qty: 7 CAPSULE | Refills: 0 | Status: SHIPPED | OUTPATIENT
Start: 2021-06-10 | End: 2021-11-22 | Stop reason: ALTCHOICE

## 2021-06-10 NOTE — TELEPHONE ENCOUNTER
S/w Nanette Rdemond who states patient didn't follow up b/c he wanted to complete PT then OT. Then he has been struggling with pneumonia for a few weeks. He is seeing a pulmonologist tomorrow to discuss treatment.  They need to deal with this before following up with

## 2021-06-12 ENCOUNTER — LAB ENCOUNTER (OUTPATIENT)
Dept: LAB | Age: 81
End: 2021-06-12
Attending: INTERNAL MEDICINE
Payer: MEDICARE

## 2021-06-12 DIAGNOSIS — Z01.818 PRE-OP TESTING: ICD-10-CM

## 2021-06-12 NOTE — PAT NURSING NOTE
Clients wife communicates they were instructed to hold all medications the day of procedure. Client taking blood thinner, this RN asked if they received additional instructions in regards to the blood thinner.  Clients wife communicates no other instruction

## 2021-06-15 ENCOUNTER — APPOINTMENT (OUTPATIENT)
Dept: GENERAL RADIOLOGY | Facility: HOSPITAL | Age: 81
End: 2021-06-15
Attending: INTERNAL MEDICINE
Payer: MEDICARE

## 2021-06-15 ENCOUNTER — HOSPITAL ENCOUNTER (OUTPATIENT)
Facility: HOSPITAL | Age: 81
Setting detail: HOSPITAL OUTPATIENT SURGERY
Discharge: HOME | End: 2021-06-15
Attending: INTERNAL MEDICINE | Admitting: INTERNAL MEDICINE
Payer: MEDICARE

## 2021-06-15 VITALS
BODY MASS INDEX: 32.93 KG/M2 | RESPIRATION RATE: 18 BRPM | OXYGEN SATURATION: 95 % | WEIGHT: 230 LBS | DIASTOLIC BLOOD PRESSURE: 74 MMHG | HEIGHT: 70 IN | SYSTOLIC BLOOD PRESSURE: 126 MMHG | HEART RATE: 75 BPM

## 2021-06-15 DIAGNOSIS — J18.9 UNRESOLVED PNEUMONIA: ICD-10-CM

## 2021-06-15 DIAGNOSIS — Z01.818 PRE-OP TESTING: Primary | ICD-10-CM

## 2021-06-15 PROCEDURE — 87206 SMEAR FLUORESCENT/ACID STAI: CPT | Performed by: INTERNAL MEDICINE

## 2021-06-15 PROCEDURE — 87102 FUNGUS ISOLATION CULTURE: CPT | Performed by: INTERNAL MEDICINE

## 2021-06-15 PROCEDURE — 0BDJ8ZX EXTRACTION OF LEFT LOWER LUNG LOBE, VIA NATURAL OR ARTIFICIAL OPENING ENDOSCOPIC, DIAGNOSTIC: ICD-10-PCS | Performed by: INTERNAL MEDICINE

## 2021-06-15 PROCEDURE — 0BD78ZX EXTRACTION OF LEFT MAIN BRONCHUS, VIA NATURAL OR ARTIFICIAL OPENING ENDOSCOPIC, DIAGNOSTIC: ICD-10-PCS | Performed by: INTERNAL MEDICINE

## 2021-06-15 PROCEDURE — 87205 SMEAR GRAM STAIN: CPT | Performed by: INTERNAL MEDICINE

## 2021-06-15 PROCEDURE — 99153 MOD SED SAME PHYS/QHP EA: CPT | Performed by: INTERNAL MEDICINE

## 2021-06-15 PROCEDURE — 87107 FUNGI IDENTIFICATION MOLD: CPT | Performed by: INTERNAL MEDICINE

## 2021-06-15 PROCEDURE — 88112 CYTOPATH CELL ENHANCE TECH: CPT | Performed by: INTERNAL MEDICINE

## 2021-06-15 PROCEDURE — 99152 MOD SED SAME PHYS/QHP 5/>YRS: CPT | Performed by: INTERNAL MEDICINE

## 2021-06-15 PROCEDURE — 88305 TISSUE EXAM BY PATHOLOGIST: CPT | Performed by: INTERNAL MEDICINE

## 2021-06-15 PROCEDURE — 71045 X-RAY EXAM CHEST 1 VIEW: CPT | Performed by: INTERNAL MEDICINE

## 2021-06-15 PROCEDURE — 87070 CULTURE OTHR SPECIMN AEROBIC: CPT | Performed by: INTERNAL MEDICINE

## 2021-06-15 PROCEDURE — 94640 AIRWAY INHALATION TREATMENT: CPT

## 2021-06-15 PROCEDURE — 87077 CULTURE AEROBIC IDENTIFY: CPT | Performed by: INTERNAL MEDICINE

## 2021-06-15 PROCEDURE — 0BD38ZX EXTRACTION OF RIGHT MAIN BRONCHUS, VIA NATURAL OR ARTIFICIAL OPENING ENDOSCOPIC, DIAGNOSTIC: ICD-10-PCS | Performed by: INTERNAL MEDICINE

## 2021-06-15 PROCEDURE — 87116 MYCOBACTERIA CULTURE: CPT | Performed by: INTERNAL MEDICINE

## 2021-06-15 RX ORDER — IPRATROPIUM BROMIDE AND ALBUTEROL SULFATE 2.5; .5 MG/3ML; MG/3ML
3 SOLUTION RESPIRATORY (INHALATION) ONCE
Status: COMPLETED | OUTPATIENT
Start: 2021-06-15 | End: 2021-06-15

## 2021-06-15 RX ORDER — MIDAZOLAM HYDROCHLORIDE 1 MG/ML
1 INJECTION INTRAMUSCULAR; INTRAVENOUS EVERY 5 MIN PRN
Status: DISCONTINUED | OUTPATIENT
Start: 2021-06-15 | End: 2021-06-15

## 2021-06-15 RX ORDER — SODIUM CHLORIDE 0.9 % (FLUSH) 0.9 %
10 SYRINGE (ML) INJECTION AS NEEDED
Status: DISCONTINUED | OUTPATIENT
Start: 2021-06-15 | End: 2021-06-15

## 2021-06-15 RX ORDER — MIDAZOLAM HYDROCHLORIDE 1 MG/ML
INJECTION INTRAMUSCULAR; INTRAVENOUS
Status: DISCONTINUED | OUTPATIENT
Start: 2021-06-15 | End: 2021-06-15

## 2021-06-15 RX ORDER — SODIUM CHLORIDE, SODIUM LACTATE, POTASSIUM CHLORIDE, CALCIUM CHLORIDE 600; 310; 30; 20 MG/100ML; MG/100ML; MG/100ML; MG/100ML
INJECTION, SOLUTION INTRAVENOUS CONTINUOUS
Status: DISCONTINUED | OUTPATIENT
Start: 2021-06-15 | End: 2021-06-15

## 2021-06-15 NOTE — H&P
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 - Dr. Tiera Purcell   • TONSILLECTOMY      at age 22     Family History   Problem Relation Age of Onset   • Cancer Fath

## 2021-06-15 NOTE — OPERATIVE REPORT
[facility]    PATIENT'S NAME: Estiven Villalpando   ATTENDING PHYSICIAN: Sandra Hoffman. Andre Ash MD   OPERATING PHYSICIAN: Sandra Hoffman.  Andre Ash MD   PATIENT ACCOUNT#:   [id number]    LOCATION:  [facility] ENDO POOL ROOM 7 [facility] 10  MEDICAL RECORD #:   [id number]       DATE Tanda Sprung

## 2021-06-15 NOTE — BRIEF OP NOTE
[facility] ENDOSCOPY LAB SUITES  Brief Op Note     Harrison Marc Patient Status:  Hospital Outpatient Surgery    1940 MRN [id number]   Location [facility] ENDOSCOPY LAB SUITES Attending Palmira Morrow MD   Hosp Day # 0 SYLVIA [place]

## 2021-06-30 ENCOUNTER — HOSPITAL ENCOUNTER (OUTPATIENT)
Dept: GENERAL RADIOLOGY | Age: 81
Discharge: HOME | End: 2021-06-30
Attending: INTERNAL MEDICINE
Payer: MEDICARE

## 2021-06-30 DIAGNOSIS — J18.9 PNEUMONIA OF BOTH LOWER LOBES DUE TO INFECTIOUS ORGANISM: ICD-10-CM

## 2021-06-30 PROCEDURE — 71046 X-RAY EXAM CHEST 2 VIEWS: CPT | Performed by: INTERNAL MEDICINE

## 2021-07-01 ENCOUNTER — TELEPHONE (OUTPATIENT)
Dept: INTERNAL MEDICINE CLINIC | Facility: CLINIC | Age: 81
End: 2021-07-01

## 2021-09-17 ENCOUNTER — APPOINTMENT (OUTPATIENT)
Dept: GENERAL RADIOLOGY | Age: 81
End: 2021-09-17
Attending: NURSE PRACTITIONER
Payer: MEDICARE

## 2021-09-17 ENCOUNTER — HOSPITAL ENCOUNTER (OUTPATIENT)
Age: 81
Discharge: HOME | End: 2021-09-17
Payer: MEDICARE

## 2021-09-17 VITALS
RESPIRATION RATE: 18 BRPM | OXYGEN SATURATION: 94 % | DIASTOLIC BLOOD PRESSURE: 61 MMHG | SYSTOLIC BLOOD PRESSURE: 130 MMHG | TEMPERATURE: 98 F | HEART RATE: 90 BPM

## 2021-09-17 DIAGNOSIS — M25.569 KNEE PAIN: Primary | ICD-10-CM

## 2021-09-17 PROCEDURE — 73562 X-RAY EXAM OF KNEE 3: CPT | Performed by: NURSE PRACTITIONER

## 2021-09-17 PROCEDURE — 99203 OFFICE O/P NEW LOW 30 MIN: CPT | Performed by: NURSE PRACTITIONER

## 2021-09-17 NOTE — ED PROVIDER NOTES
Patient Seen in: [facility]      History   No chief complaint on file. Stated Complaint: Left Knee Pain    Subjective:   HPI    This is a well-appearing 43-year-old male who presents with his wife for left knee pain.   Patient states he has of Systems   Musculoskeletal: Positive for arthralgias and gait problem. All other systems reviewed and are negative. Positive for stated complaint: Left Knee Pain  Other systems are as noted in HPI. Constitutional and vital signs reviewed.       Al oriented to person, place, and time. Psychiatric:         Mood and Affect: Mood normal.         Behavior: Behavior normal. Behavior is cooperative. Thought Content:  Thought content normal.         Judgment: Judgment normal.       ED Course   Labs

## 2021-09-17 NOTE — ED INITIAL ASSESSMENT (HPI)
Pt here to IC with c/o left knee pain. Pt states his knee gave out yesterday whild walking and he fell back on his buttocks. Pt denies any c/o pain to buttocks. 911 was called yesterday for fall to help him up. Pt c/o weakness in his legs.

## 2021-09-22 ENCOUNTER — LAB ENCOUNTER (OUTPATIENT)
Dept: LAB | Age: 81
End: 2021-09-22
Attending: INTERNAL MEDICINE
Payer: MEDICARE

## 2021-09-22 DIAGNOSIS — E78.00 HYPERCHOLESTEREMIA: ICD-10-CM

## 2021-09-22 DIAGNOSIS — N18.30 STAGE 3 CHRONIC KIDNEY DISEASE, UNSPECIFIED WHETHER STAGE 3A OR 3B CKD (HCC): ICD-10-CM

## 2021-09-22 DIAGNOSIS — J18.9 PNEUMONIA OF BOTH LOWER LOBES DUE TO INFECTIOUS ORGANISM: ICD-10-CM

## 2021-09-22 DIAGNOSIS — R53.83 FATIGUE, UNSPECIFIED TYPE: ICD-10-CM

## 2021-09-22 LAB
ALT SERPL-CCNC: 17 U/L
ANION GAP SERPL CALC-SCNC: 6 MMOL/L (ref 0–18)
AST SERPL-CCNC: 15 U/L (ref 15–37)
BASOPHILS # BLD AUTO: 0.09 X10(3) UL (ref 0–0.2)
BASOPHILS NFR BLD AUTO: 1 %
BUN BLD-MCNC: 17 MG/DL (ref 7–18)
BUN/CREAT SERPL: 13.6 (ref 10–20)
CALCIUM BLD-MCNC: 9.3 MG/DL (ref 8.5–10.1)
CHLORIDE SERPL-SCNC: 113 MMOL/L (ref 98–112)
CHOLEST SERPL-MCNC: 121 MG/DL (ref ?–200)
CO2 SERPL-SCNC: 24 MMOL/L (ref 21–32)
CREAT BLD-MCNC: 1.25 MG/DL
DEPRECATED RDW RBC AUTO: 50 FL (ref 35.1–46.3)
EOSINOPHIL # BLD AUTO: 0.19 X10(3) UL (ref 0–0.7)
EOSINOPHIL NFR BLD AUTO: 2 %
ERYTHROCYTE [DISTWIDTH] IN BLOOD BY AUTOMATED COUNT: 13.7 % (ref 11–15)
GLUCOSE BLD-MCNC: 88 MG/DL (ref 70–99)
HCT VFR BLD AUTO: 42.6 %
HDLC SERPL-MCNC: 33 MG/DL (ref 40–59)
HGB BLD-MCNC: 13.4 G/DL
IMM GRANULOCYTES # BLD AUTO: 0.03 X10(3) UL (ref 0–1)
IMM GRANULOCYTES NFR BLD: 0.3 %
LDLC SERPL CALC-MCNC: 63 MG/DL (ref ?–100)
LYMPHOCYTES # BLD AUTO: 2.59 X10(3) UL (ref 1–4)
LYMPHOCYTES NFR BLD AUTO: 27.9 %
MCH RBC QN AUTO: 30.9 PG (ref 26–34)
MCHC RBC AUTO-ENTMCNC: 31.5 G/DL (ref 31–37)
MCV RBC AUTO: 98.2 FL
MONOCYTES # BLD AUTO: 1 X10(3) UL (ref 0.1–1)
MONOCYTES NFR BLD AUTO: 10.8 %
NEUTROPHILS # BLD AUTO: 5.39 X10 (3) UL (ref 1.5–7.7)
NEUTROPHILS # BLD AUTO: 5.39 X10(3) UL (ref 1.5–7.7)
NEUTROPHILS NFR BLD AUTO: 58 %
NONHDLC SERPL-MCNC: 88 MG/DL (ref ?–130)
OSMOLALITY SERPL CALC.SUM OF ELEC: 297 MOSM/KG (ref 275–295)
PATIENT FASTING Y/N/NP: YES
PATIENT FASTING Y/N/NP: YES
PLATELET # BLD AUTO: 199 10(3)UL (ref 150–450)
POTASSIUM SERPL-SCNC: 3.8 MMOL/L (ref 3.5–5.1)
RBC # BLD AUTO: 4.34 X10(6)UL
SODIUM SERPL-SCNC: 143 MMOL/L (ref 136–145)
TRIGL SERPL-MCNC: 141 MG/DL (ref 30–149)
VLDLC SERPL CALC-MCNC: 21 MG/DL (ref 0–30)
WBC # BLD AUTO: 9.3 X10(3) UL (ref 4–11)

## 2021-09-22 PROCEDURE — 80061 LIPID PANEL: CPT

## 2021-09-22 PROCEDURE — 84460 ALANINE AMINO (ALT) (SGPT): CPT

## 2021-09-22 PROCEDURE — 80048 BASIC METABOLIC PNL TOTAL CA: CPT

## 2021-09-22 PROCEDURE — 85025 COMPLETE CBC W/AUTO DIFF WBC: CPT

## 2021-09-22 PROCEDURE — 36415 COLL VENOUS BLD VENIPUNCTURE: CPT

## 2021-09-22 PROCEDURE — 84450 TRANSFERASE (AST) (SGOT): CPT

## 2021-11-08 ENCOUNTER — TELEPHONE (OUTPATIENT)
Dept: INTERNAL MEDICINE CLINIC | Facility: CLINIC | Age: 81
End: 2021-11-08

## 2021-11-08 NOTE — TELEPHONE ENCOUNTER
Contacted pt. To r/s his appt. With Dr. Fausto Palacio.  Wife Gene Stewart stated he has had a chronic cough for awhile, but lately the cough is worse. They were hoping to see Dr. Fausto Hoang Today. It seems to be a dry cough. She states he has chest congestion and sinus drainage.

## 2021-11-16 ENCOUNTER — TELEPHONE (OUTPATIENT)
Dept: INTERNAL MEDICINE CLINIC | Facility: CLINIC | Age: 81
End: 2021-11-16

## 2021-11-16 DIAGNOSIS — G81.94 LEFT HEMIPARESIS (HCC): ICD-10-CM

## 2021-11-16 DIAGNOSIS — I69.359 CVA, OLD, HEMIPARESIS (HCC): Primary | ICD-10-CM

## 2021-11-16 NOTE — TELEPHONE ENCOUNTER
Wife, Aura Nadeen, called to request orders for patient to start back up with physical therapy     Call back # to confirm [phone]

## 2021-11-16 NOTE — TELEPHONE ENCOUNTER
Left message to call back. No recent documentation found regarding PT. Where was patient doing PT? Reason? Transfer to triage    -message 1-2.  See 11/8/21 TE.

## 2021-11-18 NOTE — TELEPHONE ENCOUNTER
Patient was previously doing PT as ordered by Dr. 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 Patient had to stop PT due other aliments and did not complete. Spouse would like patient to resume PT for strengthening and mobility. States, he's not walking much.

## 2021-11-18 NOTE — TELEPHONE ENCOUNTER
Spoke with spouse, Cata Francis. She reports chronic cough. In addition, clear nasal drainage, and chest congestion. When asked when these sx started, she reported that they are persistent. No fever/chills.  She notes that when he's talking, he seems short of traci

## 2021-11-19 NOTE — TELEPHONE ENCOUNTER
Telephone call to patient's wife and situation discussed. She feels patient continues to have a chronic cough. He has no fever or chills. She also feels that he is weak and previously stopped his physical therapy in the summer.   She would like to resume

## 2021-11-22 ENCOUNTER — OFFICE VISIT (OUTPATIENT)
Dept: INTERNAL MEDICINE CLINIC | Facility: CLINIC | Age: 81
End: 2021-11-22
Payer: MEDICARE

## 2021-11-22 VITALS
BODY MASS INDEX: 33.07 KG/M2 | OXYGEN SATURATION: 96 % | HEIGHT: 70 IN | HEART RATE: 80 BPM | WEIGHT: 231 LBS | SYSTOLIC BLOOD PRESSURE: 110 MMHG | TEMPERATURE: 98 F | DIASTOLIC BLOOD PRESSURE: 66 MMHG

## 2021-11-22 DIAGNOSIS — G89.29 CHRONIC BILATERAL LOW BACK PAIN WITHOUT SCIATICA: ICD-10-CM

## 2021-11-22 DIAGNOSIS — M15.9 PRIMARY OSTEOARTHRITIS INVOLVING MULTIPLE JOINTS: ICD-10-CM

## 2021-11-22 DIAGNOSIS — R53.83 FATIGUE, UNSPECIFIED TYPE: ICD-10-CM

## 2021-11-22 DIAGNOSIS — R05.9 COUGH: ICD-10-CM

## 2021-11-22 DIAGNOSIS — I10 ESSENTIAL HYPERTENSION: Primary | ICD-10-CM

## 2021-11-22 DIAGNOSIS — M54.50 CHRONIC BILATERAL LOW BACK PAIN WITHOUT SCIATICA: ICD-10-CM

## 2021-11-22 DIAGNOSIS — N18.31 STAGE 3A CHRONIC KIDNEY DISEASE (HCC): ICD-10-CM

## 2021-11-22 DIAGNOSIS — E78.00 HYPERCHOLESTEROLEMIA: ICD-10-CM

## 2021-11-22 DIAGNOSIS — E03.9 HYPOTHYROIDISM, UNSPECIFIED TYPE: ICD-10-CM

## 2021-11-22 DIAGNOSIS — M48.061 SPINAL STENOSIS OF LUMBAR REGION WITHOUT NEUROGENIC CLAUDICATION: ICD-10-CM

## 2021-11-22 DIAGNOSIS — G81.94 LEFT HEMIPARESIS (HCC): ICD-10-CM

## 2021-11-22 DIAGNOSIS — I69.359 CVA, OLD, HEMIPARESIS (HCC): ICD-10-CM

## 2021-11-22 DIAGNOSIS — R53.1 WEAKNESS: ICD-10-CM

## 2021-11-22 PROCEDURE — 99215 OFFICE O/P EST HI 40 MIN: CPT | Performed by: INTERNAL MEDICINE

## 2021-11-22 RX ORDER — CEPHALEXIN 250 MG/1
250 CAPSULE ORAL 4 TIMES DAILY
Qty: 40 CAPSULE | Refills: 0 | Status: SHIPPED | OUTPATIENT
Start: 2021-11-22 | End: 2021-12-02

## 2021-11-22 RX ORDER — KETOCONAZOLE 20 MG/G
1 CREAM TOPICAL AS DIRECTED
COMMUNITY
Start: 2020-07-16 | End: 2021-11-22 | Stop reason: ALTCHOICE

## 2021-11-22 RX ORDER — DOXYCYCLINE HYCLATE 100 MG
100 TABLET ORAL EVERY 12 HOURS
COMMUNITY
Start: 2021-06-21 | End: 2021-11-22 | Stop reason: ALTCHOICE

## 2021-11-22 RX ORDER — PREDNISONE 20 MG/1
TABLET ORAL
COMMUNITY
Start: 2021-06-21 | End: 2021-11-22 | Stop reason: ALTCHOICE

## 2021-11-22 NOTE — PATIENT INSTRUCTIONS
1. Patient is to continue his current diet, medication and activity. 2. Patient has received his Covid booster vaccine and flu vaccine. 3. I will give the patient order for Keflex 250 mg orally 4 times a day for 10 days  4.  I will give the patient order

## 2021-11-22 NOTE — PROGRESS NOTES
Alonso Roberts is a [de-identified]year old male. Patient presents with:  Checkup: He did have a flu shot and Covid booster. Dr. Levi Sheets gave Harle Broom but having a hard time using it. He would like to start physical therapy and OT. Mrs. Luiza Parish had papers for a new scooter, she atorvastatin 40 MG Oral Tab Take 1 tablet (40 mg total) by mouth nightly. 90 tablet 3   • Levothyroxine Sodium 100 MCG Oral Tab Take 1 tablet (100 mcg total) by mouth daily.  90 tablet 3   • Cholecalciferol (VITAMIN D) 2000 units Oral Cap Take 1 capsule by S1S2, without murmur   GI:Protuberant, BS are present, no organomegaly or palpable masses  EXTREMITIES: no edema  NEURO: alert and oriented. Patient has a left hemiparesis. ASSESSMENT AND PLAN:   There are no diagnoses linked to this encounter.     Lashawn Baumann

## 2022-01-12 NOTE — TELEPHONE ENCOUNTER
PT and OT orders faxed to Julieta Dupont at [phone]. Fax confirmation received. I spoke with Ye Mesa per HAMMAD, and let her know this was done. She verbalized understanding. At  for .

## 2022-01-12 NOTE — TELEPHONE ENCOUNTER
Please call pt  Pt has appt for PT at [facility] on 1/18/22  Was order sent to [facility]?     Wife would like to also include OT and orthotics on order  FAX#:  [phone]  Pt wife would like to  copy of order as well  Please let her know when orde

## 2022-01-12 NOTE — TELEPHONE ENCOUNTER
Pended OT order including left foot and knee brace orthotics in comments.     To Dr. Shaun Katz to please review and order---[facility] staff will call pt's wife Susan Castellano to inform this has been done after faxing order to [facility] and leaving a copy of the new orders at the

## 2022-01-12 NOTE — TELEPHONE ENCOUNTER
.  I have approved the referral for occupational therapy as requested. Please check to see if there is a physical therapy order in the system. I will route this to nursing. To notify patient's wife.   Thank you!!

## 2022-01-20 NOTE — TELEPHONE ENCOUNTER
Patients wife called today and states that the physical therapist(Dustin) ask her to call Dr. Dina Andujar office to see if she can ask the doctor to sign the treatment orders.  The wife Diana Forth also states that the PT asked her to see if the doctor would si

## 2022-01-21 NOTE — TELEPHONE ENCOUNTER
Spoke to patients wife per MD and advised her per MD, however advised patient I would look through paperwork and if we still haven't received it we would call back.

## 2022-01-21 NOTE — TELEPHONE ENCOUNTER
Spoke to patients wife and advised that we did not have it per MD patient was called back and made aware, pt was also asked to call them back and have them refax it to us since it hasn't been received, conner (pts wife) verbalized understanding and has no f

## 2022-01-21 NOTE — TELEPHONE ENCOUNTER
Noted. Please call patient's wife and notify her that we faxed orders to physical therapy and Occupational Therapy about 8 days ago. I have not seen any other orders from them since then. I will route this to nursing.   Thank you!!

## 2022-01-27 ENCOUNTER — LAB ENCOUNTER (OUTPATIENT)
Dept: LAB | Age: 82
End: 2022-01-27
Attending: INTERNAL MEDICINE
Payer: MEDICARE

## 2022-01-27 DIAGNOSIS — R05.9 COUGH: ICD-10-CM

## 2022-01-27 DIAGNOSIS — N18.31 STAGE 3A CHRONIC KIDNEY DISEASE (HCC): ICD-10-CM

## 2022-01-27 DIAGNOSIS — E78.00 HYPERCHOLESTEROLEMIA: ICD-10-CM

## 2022-01-27 DIAGNOSIS — R53.83 FATIGUE, UNSPECIFIED TYPE: ICD-10-CM

## 2022-01-27 DIAGNOSIS — R53.1 WEAKNESS: ICD-10-CM

## 2022-01-27 LAB
ALT SERPL-CCNC: 17 U/L
ANION GAP SERPL CALC-SCNC: 6 MMOL/L (ref 0–18)
AST SERPL-CCNC: 12 U/L (ref 15–37)
BASOPHILS # BLD AUTO: 0.09 X10(3) UL (ref 0–0.2)
BASOPHILS NFR BLD AUTO: 1 %
BUN BLD-MCNC: 17 MG/DL (ref 7–18)
BUN/CREAT SERPL: 12.3 (ref 10–20)
CALCIUM BLD-MCNC: 9.4 MG/DL (ref 8.5–10.1)
CHLORIDE SERPL-SCNC: 111 MMOL/L (ref 98–112)
CHOLEST SERPL-MCNC: 144 MG/DL (ref ?–200)
CO2 SERPL-SCNC: 26 MMOL/L (ref 21–32)
CREAT BLD-MCNC: 1.38 MG/DL
DEPRECATED RDW RBC AUTO: 53.7 FL (ref 35.1–46.3)
EOSINOPHIL # BLD AUTO: 0.24 X10(3) UL (ref 0–0.7)
EOSINOPHIL NFR BLD AUTO: 2.6 %
ERYTHROCYTE [DISTWIDTH] IN BLOOD BY AUTOMATED COUNT: 14.1 % (ref 11–15)
FASTING PATIENT LIPID ANSWER: YES
FASTING STATUS PATIENT QL REPORTED: YES
GLUCOSE BLD-MCNC: 110 MG/DL (ref 70–99)
HCT VFR BLD AUTO: 45 %
HDLC SERPL-MCNC: 35 MG/DL (ref 40–59)
HGB BLD-MCNC: 13.8 G/DL
IMM GRANULOCYTES # BLD AUTO: 0.03 X10(3) UL (ref 0–1)
IMM GRANULOCYTES NFR BLD: 0.3 %
LDLC SERPL CALC-MCNC: 81 MG/DL (ref ?–100)
LYMPHOCYTES # BLD AUTO: 1.87 X10(3) UL (ref 1–4)
LYMPHOCYTES NFR BLD AUTO: 20.1 %
MCH RBC QN AUTO: 31.2 PG (ref 26–34)
MCHC RBC AUTO-ENTMCNC: 30.7 G/DL (ref 31–37)
MCV RBC AUTO: 101.8 FL
MONOCYTES # BLD AUTO: 0.94 X10(3) UL (ref 0.1–1)
MONOCYTES NFR BLD AUTO: 10.1 %
NEUTROPHILS # BLD AUTO: 6.15 X10 (3) UL (ref 1.5–7.7)
NEUTROPHILS # BLD AUTO: 6.15 X10(3) UL (ref 1.5–7.7)
NEUTROPHILS NFR BLD AUTO: 65.9 %
NONHDLC SERPL-MCNC: 109 MG/DL (ref ?–130)
OSMOLALITY SERPL CALC.SUM OF ELEC: 298 MOSM/KG (ref 275–295)
PLATELET # BLD AUTO: 190 10(3)UL (ref 150–450)
POTASSIUM SERPL-SCNC: 4.4 MMOL/L (ref 3.5–5.1)
RBC # BLD AUTO: 4.42 X10(6)UL
SODIUM SERPL-SCNC: 143 MMOL/L (ref 136–145)
TRIGL SERPL-MCNC: 159 MG/DL (ref 30–149)
VLDLC SERPL CALC-MCNC: 25 MG/DL (ref 0–30)
WBC # BLD AUTO: 9.3 X10(3) UL (ref 4–11)

## 2022-01-27 PROCEDURE — 84460 ALANINE AMINO (ALT) (SGPT): CPT

## 2022-01-27 PROCEDURE — 80048 BASIC METABOLIC PNL TOTAL CA: CPT

## 2022-01-27 PROCEDURE — 85025 COMPLETE CBC W/AUTO DIFF WBC: CPT

## 2022-01-27 PROCEDURE — 36415 COLL VENOUS BLD VENIPUNCTURE: CPT

## 2022-01-27 PROCEDURE — 84450 TRANSFERASE (AST) (SGOT): CPT

## 2022-01-27 PROCEDURE — 80061 LIPID PANEL: CPT

## 2022-02-01 NOTE — TELEPHONE ENCOUNTER
Deng Godoy / Wava Dubin is calling to check on PT Evaluation to be signed  Was unable to find on Dr Kai Lam desk, she will refax

## 2022-02-02 ENCOUNTER — TELEPHONE (OUTPATIENT)
Dept: INTERNAL MEDICINE CLINIC | Facility: CLINIC | Age: 82
End: 2022-02-02

## 2022-02-02 NOTE — TELEPHONE ENCOUNTER
Telephone call to patient and discussed with patient's wife. She feels patient is doing well with outpatient physical therapy. Patient still has a slight cough. Patient's blood tests have turned out stable. Patient's blood tests included a CBC, BMP, lipid panel, AST and ALT. I advised patient's wife to have the patient come to see me in about 2 months. I will see him sooner as necessary. Wife feels the patient is doing well at this time.

## 2022-02-03 ENCOUNTER — TELEPHONE (OUTPATIENT)
Dept: INTERNAL MEDICINE CLINIC | Facility: CLINIC | Age: 82
End: 2022-02-03

## 2022-02-03 NOTE — TELEPHONE ENCOUNTER
Yinka Anaya,  called  Orders are needed for patient to continue with physical therapy  Today will be his last day of therapy unless the orders are received  Ishan Estrella is able to  the orders if necessary, please follow up with her at   [phone]

## 2022-02-04 NOTE — TELEPHONE ENCOUNTER
Spoke with wife, PT order for Lizjoy re-ordered per protocol along with orthotic shoes to support his feet for better ambulation.

## 2022-02-08 RX ORDER — LEVOTHYROXINE SODIUM 0.1 MG/1
TABLET ORAL
Qty: 90 TABLET | Refills: 0 | Status: SHIPPED | OUTPATIENT
Start: 2022-02-08 | End: 2022-05-24

## 2022-02-10 NOTE — TELEPHONE ENCOUNTER
Called patient to schedule MA per nurse request.  Gayla Arreola to patients wife Deja Nunezigan. Per Deja Lackey the Pt changed insurance and the prescription for LEVOTHYROXINE 100 MCG Oral Tab needs to be called into [facility] in [facility].  [facility]. Scheduled MA for 4/18/22.     Any questions or concerns call patients wife Deja Nunezigan at [phone]

## 2022-02-21 RX ORDER — CLOPIDOGREL BISULFATE 75 MG/1
TABLET ORAL
Qty: 90 TABLET | Refills: 3 | Status: SHIPPED | OUTPATIENT
Start: 2022-02-21

## 2022-02-21 RX ORDER — ATORVASTATIN CALCIUM 40 MG/1
TABLET, FILM COATED ORAL
Qty: 90 TABLET | Refills: 3 | Status: SHIPPED | OUTPATIENT
Start: 2022-02-21

## 2022-02-24 ENCOUNTER — APPOINTMENT (OUTPATIENT)
Dept: URBAN - METROPOLITAN AREA CLINIC 321 | Age: 82
Setting detail: DERMATOLOGY
End: 2022-02-24

## 2022-02-24 DIAGNOSIS — D22 MELANOCYTIC NEVI: ICD-10-CM

## 2022-02-24 DIAGNOSIS — L81.4 OTHER MELANIN HYPERPIGMENTATION: ICD-10-CM

## 2022-02-24 DIAGNOSIS — L21.8 OTHER SEBORRHEIC DERMATITIS: ICD-10-CM

## 2022-02-24 DIAGNOSIS — L82.1 OTHER SEBORRHEIC KERATOSIS: ICD-10-CM

## 2022-02-24 PROBLEM — D22.5 MELANOCYTIC NEVI OF TRUNK: Status: ACTIVE | Noted: 2022-02-24

## 2022-02-24 PROCEDURE — 99214 OFFICE O/P EST MOD 30 MIN: CPT

## 2022-02-24 PROCEDURE — OTHER COUNSELING: OTHER

## 2022-02-24 PROCEDURE — OTHER PRESCRIPTION: OTHER

## 2022-02-24 RX ORDER — KETOCONAZOLE 20 MG/ML
SHAMPOO, SUSPENSION TOPICAL
Qty: 120 | Refills: 10 | Status: ERX | COMMUNITY
Start: 2022-02-24

## 2022-02-24 ASSESSMENT — LOCATION DETAILED DESCRIPTION DERM
LOCATION DETAILED: UPPER STERNUM
LOCATION DETAILED: RIGHT CENTRAL MALAR CHEEK
LOCATION DETAILED: RIGHT SUPERIOR MEDIAL UPPER BACK
LOCATION DETAILED: MID-FRONTAL SCALP
LOCATION DETAILED: LEFT MEDIAL UPPER BACK
LOCATION DETAILED: LEFT INFERIOR CENTRAL MALAR CHEEK

## 2022-02-24 ASSESSMENT — LOCATION SIMPLE DESCRIPTION DERM
LOCATION SIMPLE: CHEST
LOCATION SIMPLE: LEFT CHEEK
LOCATION SIMPLE: RIGHT UPPER BACK
LOCATION SIMPLE: LEFT UPPER BACK
LOCATION SIMPLE: ANTERIOR SCALP
LOCATION SIMPLE: RIGHT CHEEK

## 2022-02-24 ASSESSMENT — LOCATION ZONE DERM
LOCATION ZONE: SCALP
LOCATION ZONE: TRUNK
LOCATION ZONE: FACE

## 2022-03-07 ENCOUNTER — TELEPHONE (OUTPATIENT)
Dept: INTERNAL MEDICINE CLINIC | Facility: CLINIC | Age: 82
End: 2022-03-07

## 2022-03-07 RX ORDER — IRBESARTAN 75 MG/1
TABLET ORAL
Qty: 90 TABLET | Refills: 3 | Status: SHIPPED | OUTPATIENT
Start: 2022-03-07 | End: 2022-03-23

## 2022-03-22 NOTE — TELEPHONE ENCOUNTER
Patient's wife Jaswinder Damon is calling patient will now be using [facility] as preferred pharmacy. Please transfer script to [address].

## 2022-03-23 ENCOUNTER — TELEPHONE (OUTPATIENT)
Dept: INTERNAL MEDICINE CLINIC | Facility: CLINIC | Age: 82
End: 2022-03-23

## 2022-03-23 RX ORDER — IRBESARTAN 75 MG/1
75 TABLET ORAL DAILY
Qty: 90 TABLET | Refills: 3 | Status: SHIPPED | OUTPATIENT
Start: 2022-03-23

## 2022-03-23 NOTE — TELEPHONE ENCOUNTER
Patient's wife Donis Cavanaugh dropped off a Parking Placard to be filled out and mailed to [facility].     Placed in Dr Aleman Payment mail slot

## 2022-03-23 NOTE — TELEPHONE ENCOUNTER
Spoke to patient's wife Joyce Jerome  and informed her that Irbesartan was canceled at [facility] pharmacy and sent to [facility] as requested.

## 2022-03-24 NOTE — TELEPHONE ENCOUNTER
Noted.  I have completed patient's form for disability parking placard. I have left the form of my nurses desk. It is okay to mail the form in after a copy of this form has been scanned into the system. Also please notify the patient or the patient's wife that this is been done. I will forward this message to nursing.   Thank you!!

## 2022-04-06 ENCOUNTER — APPOINTMENT (OUTPATIENT)
Dept: GENERAL RADIOLOGY | Facility: HOSPITAL | Age: 82
End: 2022-04-06
Attending: EMERGENCY MEDICINE
Payer: MEDICARE

## 2022-04-06 ENCOUNTER — TELEPHONE (OUTPATIENT)
Dept: INTERNAL MEDICINE CLINIC | Facility: CLINIC | Age: 82
End: 2022-04-06

## 2022-04-06 ENCOUNTER — HOSPITAL ENCOUNTER (INPATIENT)
Facility: HOSPITAL | Age: 82
LOS: 2 days | Discharge: HOME HEALTH | End: 2022-04-08
Attending: EMERGENCY MEDICINE | Admitting: HOSPITALIST
Payer: MEDICARE

## 2022-04-06 ENCOUNTER — HOSPITAL ENCOUNTER (INPATIENT)
Facility: HOSPITAL | Age: 82
LOS: 2 days | Discharge: HOME | End: 2022-04-08
Attending: EMERGENCY MEDICINE | Admitting: HOSPITALIST
Payer: MEDICARE

## 2022-04-06 DIAGNOSIS — R26.2 INABILITY TO WALK: ICD-10-CM

## 2022-04-06 DIAGNOSIS — R53.1 WEAKNESS GENERALIZED: Primary | ICD-10-CM

## 2022-04-06 PROBLEM — J69.0 ASPIRATION PNEUMONIA (HCC): Status: ACTIVE | Noted: 2022-04-06

## 2022-04-06 LAB
ALBUMIN SERPL-MCNC: 3.5 G/DL (ref 3.4–5)
ALBUMIN/GLOB SERPL: 0.7 {RATIO} (ref 1–2)
ALP LIVER SERPL-CCNC: 116 U/L
ALT SERPL-CCNC: 18 U/L
ANION GAP SERPL CALC-SCNC: 7 MMOL/L (ref 0–18)
AST SERPL-CCNC: 20 U/L (ref 15–37)
BASOPHILS # BLD AUTO: 0.1 X10(3) UL (ref 0–0.2)
BASOPHILS NFR BLD AUTO: 0.7 %
BILIRUB SERPL-MCNC: 0.5 MG/DL (ref 0.1–2)
BILIRUB UR QL: NEGATIVE
BUN BLD-MCNC: 23 MG/DL (ref 7–18)
BUN/CREAT SERPL: 15.9 (ref 10–20)
CALCIUM BLD-MCNC: 9.5 MG/DL (ref 8.5–10.1)
CHLORIDE SERPL-SCNC: 114 MMOL/L (ref 98–112)
CLARITY UR: CLEAR
CO2 SERPL-SCNC: 25 MMOL/L (ref 21–32)
COLOR UR: YELLOW
CREAT BLD-MCNC: 1.45 MG/DL
DEPRECATED RDW RBC AUTO: 50.4 FL (ref 35.1–46.3)
EOSINOPHIL # BLD AUTO: 0.15 X10(3) UL (ref 0–0.7)
EOSINOPHIL NFR BLD AUTO: 1.1 %
ERYTHROCYTE [DISTWIDTH] IN BLOOD BY AUTOMATED COUNT: 13.4 % (ref 11–15)
GLOBULIN PLAS-MCNC: 4.9 G/DL (ref 2.8–4.4)
GLUCOSE BLD-MCNC: 112 MG/DL (ref 70–99)
GLUCOSE UR-MCNC: NEGATIVE MG/DL
HCT VFR BLD AUTO: 44.5 %
HGB BLD-MCNC: 13.6 G/DL
HGB UR QL STRIP.AUTO: NEGATIVE
IMM GRANULOCYTES # BLD AUTO: 0.08 X10(3) UL (ref 0–1)
IMM GRANULOCYTES NFR BLD: 0.6 %
KETONES UR-MCNC: NEGATIVE MG/DL
LYMPHOCYTES # BLD AUTO: 2.09 X10(3) UL (ref 1–4)
LYMPHOCYTES NFR BLD AUTO: 15.6 %
MCH RBC QN AUTO: 30.8 PG (ref 26–34)
MCHC RBC AUTO-ENTMCNC: 30.6 G/DL (ref 31–37)
MCV RBC AUTO: 100.9 FL
MONOCYTES # BLD AUTO: 1.32 X10(3) UL (ref 0.1–1)
MONOCYTES NFR BLD AUTO: 9.9 %
NEUTROPHILS # BLD AUTO: 9.66 X10 (3) UL (ref 1.5–7.7)
NEUTROPHILS # BLD AUTO: 9.66 X10(3) UL (ref 1.5–7.7)
NEUTROPHILS NFR BLD AUTO: 72.1 %
NITRITE UR QL STRIP.AUTO: NEGATIVE
OSMOLALITY SERPL CALC.SUM OF ELEC: 306 MOSM/KG (ref 275–295)
PH UR: 5 [PH] (ref 5–8)
PLATELET # BLD AUTO: 182 10(3)UL (ref 150–450)
POTASSIUM SERPL-SCNC: 3.5 MMOL/L (ref 3.5–5.1)
PROCALCITONIN SERPL-MCNC: 0.07 NG/ML (ref ?–0.16)
PROT SERPL-MCNC: 8.4 G/DL (ref 6.4–8.2)
PROT UR-MCNC: 30 MG/DL
RBC # BLD AUTO: 4.41 X10(6)UL
SARS-COV-2 RNA RESP QL NAA+PROBE: NOT DETECTED
SODIUM SERPL-SCNC: 146 MMOL/L (ref 136–145)
SP GR UR STRIP: 1.03 (ref 1–1.03)
UROBILINOGEN UR STRIP-ACNC: <2
VIT C UR-MCNC: 20 MG/DL
WBC # BLD AUTO: 13.4 X10(3) UL (ref 4–11)

## 2022-04-06 PROCEDURE — 99285 EMERGENCY DEPT VISIT HI MDM: CPT

## 2022-04-06 PROCEDURE — 92610 EVALUATE SWALLOWING FUNCTION: CPT

## 2022-04-06 PROCEDURE — 81001 URINALYSIS AUTO W/SCOPE: CPT | Performed by: EMERGENCY MEDICINE

## 2022-04-06 PROCEDURE — 96374 THER/PROPH/DIAG INJ IV PUSH: CPT

## 2022-04-06 PROCEDURE — 80053 COMPREHEN METABOLIC PANEL: CPT | Performed by: EMERGENCY MEDICINE

## 2022-04-06 PROCEDURE — 71045 X-RAY EXAM CHEST 1 VIEW: CPT | Performed by: EMERGENCY MEDICINE

## 2022-04-06 PROCEDURE — 93010 ELECTROCARDIOGRAM REPORT: CPT | Performed by: EMERGENCY MEDICINE

## 2022-04-06 PROCEDURE — 93005 ELECTROCARDIOGRAM TRACING: CPT

## 2022-04-06 PROCEDURE — 96361 HYDRATE IV INFUSION ADD-ON: CPT

## 2022-04-06 PROCEDURE — 87086 URINE CULTURE/COLONY COUNT: CPT | Performed by: EMERGENCY MEDICINE

## 2022-04-06 PROCEDURE — 84145 PROCALCITONIN (PCT): CPT | Performed by: EMERGENCY MEDICINE

## 2022-04-06 PROCEDURE — 85025 COMPLETE CBC W/AUTO DIFF WBC: CPT | Performed by: EMERGENCY MEDICINE

## 2022-04-06 RX ORDER — METOCLOPRAMIDE HYDROCHLORIDE 5 MG/ML
10 INJECTION INTRAMUSCULAR; INTRAVENOUS EVERY 8 HOURS PRN
Status: DISCONTINUED | OUTPATIENT
Start: 2022-04-06 | End: 2022-04-08

## 2022-04-06 RX ORDER — SODIUM CHLORIDE 9 MG/ML
1000 INJECTION, SOLUTION INTRAVENOUS ONCE
Status: COMPLETED | OUTPATIENT
Start: 2022-04-06 | End: 2022-04-06

## 2022-04-06 RX ORDER — ACETAMINOPHEN 325 MG/1
650 TABLET ORAL EVERY 6 HOURS PRN
Status: DISCONTINUED | OUTPATIENT
Start: 2022-04-06 | End: 2022-04-08

## 2022-04-06 RX ORDER — ONDANSETRON 2 MG/ML
4 INJECTION INTRAMUSCULAR; INTRAVENOUS EVERY 6 HOURS PRN
Status: DISCONTINUED | OUTPATIENT
Start: 2022-04-06 | End: 2022-04-08

## 2022-04-06 RX ORDER — DEXTROSE AND SODIUM CHLORIDE 5; .45 G/100ML; G/100ML
INJECTION, SOLUTION INTRAVENOUS CONTINUOUS
Status: DISCONTINUED | OUTPATIENT
Start: 2022-04-06 | End: 2022-04-08

## 2022-04-06 RX ORDER — HEPARIN SODIUM 5000 [USP'U]/ML
5000 INJECTION, SOLUTION INTRAVENOUS; SUBCUTANEOUS EVERY 12 HOURS SCHEDULED
Status: DISCONTINUED | OUTPATIENT
Start: 2022-04-06 | End: 2022-04-08

## 2022-04-06 NOTE — ED QUICK NOTES
rn asked patient if it was okay for rn to straight cath patient for urine since patient is currently incontinent, patient strongly declined.

## 2022-04-06 NOTE — TELEPHONE ENCOUNTER
KAVYA to Dr. Edmond Given----    Spoke to spouse (ok per hipaa) who reports pt has been sick x3 days with non-productive cough, \"profound weakness\" and is barely able to walk, generalized pain, rattling in his chest, shortness of breath at rest, chills, headache, body aches, and runny nose. Denies fever, conjunctivitis, sore throat, nausea, vomiting, diarrhea, difficulty breathing. Reports she would have great difficulty getting pt out of the house but can call for help if needed and does have a wheel chair. She wishes pt to be seen in office today. RN discussed concerning symptoms with spouse and urgency of evaluation. RN recommended ER for patient and recourses that patient may need that office does not have (XR, stat labs, fluids, ect). Also discussed safety concerns with patients inability to walk and her getting him out of the home herself; RN advised she does not want patient or spouse to get injured in process. It was discussed and decided that calling EMS for ER transfer was safest and best option; she will do so now.

## 2022-04-06 NOTE — ED QUICK NOTES
Orders for admission, patient is aware of plan and ready to go upstairs. Any questions, please call ED RN Timmy at extension [phone].      Patient Covid vaccination status: Fully vaccinated     COVID Test Ordered in ED: Rapid SARS-CoV-2 by PCR    COVID Suspicion at Admission: Low clinical suspicion for COVID    Running Infusions:  None    Mental Status/LOC at time of transport: AXOX4    Other pertinent information:   CIWA score: N/A   NIH score:  N/A

## 2022-04-06 NOTE — ED QUICK NOTES
Pt to ED reports dyspnea with walking short distances, weakness, cough and fatigue x past few days, denies any fevers at home, per family these are his usual \"precursor\" symptoms leading up to pneumonia. AXOX4, GCS 15.

## 2022-04-06 NOTE — TELEPHONE ENCOUNTER
Wife called to report that the pt. Has been sick for the last couple of days. He has profound weakness, cough with occasional \"rattling), sob at times and can barely walk. No fever. Headaches and body aches. Wife asking if he can be seen by Dr. Demi Bowen today. Wife Caren Benjamin can be reached at [phone].

## 2022-04-06 NOTE — H&P
[facility]    PATIENT'S NAME: Tasia Brennan   ATTENDING PHYSICIAN: Raji Brennan MD   PATIENT ACCOUNT#:   [id number]    LOCATION:  [address]  MEDICAL RECORD #:   [id number]       YOB: 1940  ADMISSION DATE:       2022    HISTORY AND PHYSICAL EXAMINATION    CHIEF COMPLAINT:  Possible aspiration pneumonia. HISTORY OF PRESENT ILLNESS:  The patient is an [de-identified]  male who was brought in today to the emergency department for evaluation of cough and fatigue for the last 2 to 3 days, per his wife. CBC showed white blood cell count of 13.4 with left shift. Chemistry showed GFR of 45 which is slightly below his baseline, sodium 146, calculated osmolality elevated at 306, BUN 23, creatinine 1.45. Procalcitonin was negative. Urinalysis showed no evidence of urinary tract infection. Chest x-ray showed no acute findings. Considering his clinical presentation, he was initiated on IV Zosyn and IV fluids. He will be admitted to hospital for further management. PAST MEDICAL HISTORY:  History of recurrent aspiration pneumonia initially evaluated in 2019. At that time video swallow study was negative. Eventually he had upper endoscopy which showed no evidence of malignancy or persistent left lower lobe infiltrate. He had history of cerebrovascular accident with left hemiparesis, hypothyroidism, generalized osteoarthritis, hypertension, chronic renal insufficiency stage 3, hyperlipidemia. PAST SURGICAL HISTORY:  Tonsillectomy, abdominal aortic aneurysm endovascular stent grafting, history of cholecystectomy, and cataract procedure. MEDICATIONS:  Please see medication reconciliation list.    ALLERGIES:  No known drug allergies. FAMILY HISTORY:  Father  of lung cancer. SOCIAL HISTORY:  Ex-tobacco user. No current tobacco, alcohol, or drug use. Lives with his wife. Requires some assistance in his basic activities of daily living.     REVIEW OF SYSTEMS:  Per the wife, the patient sporadically and often chokes while he is eating. He has to clear his throat. Last time it happened was last night with dinner. The patient has been having cough nonproductive of phlegm. He sounds rhonchus . He has been progressively more weak with decreased appetite. Other 12-point review of systems negative. PHYSICAL EXAMINATION:    GENERAL:  Alert. Oriented to time, place, and person. Able to answer simple questions. Sporadic cough. VITAL SIGNS:  Temperature 97.3, pulse 75, respiratory rate 19, blood pressure 109/72, pulse ox 91% to 93% on room air. HEENT:  Atraumatic. Oropharynx clear, dry mucous membranes. Normal hard and soft palate. Eyes:  Anicteric sclerae. NECK:  Supple. No lymphadenopathy. Trachea midline. Full range of motion. LUNGS:  Rhonchi auscultated on both lung bases. HEART:  Regular rate and rhythm. S1, S2 auscultated. No murmur. ABDOMEN:  Soft, nondistended, no tenderness. Positive bowel sounds. EXTREMITIES:  No peripheral edema, clubbing, or cyanosis. NEUROLOGIC:  Motor and sensory intact. ASSESSMENT:    1. Clinical presentation suggestive of possible aspiration pneumonia. 2.   Hyperosmolar hypernatremia with dehydration. 3.   Mild acute on chronic renal insufficiency. PLAN:  The patient will be admitted to general medical floor. D5 0.5 normal saline, IV fluids, IV Zosyn, Speech Therapy to evaluate for possibility of aspiration, fall precautions and aspiration precautions, monitor his clinical status closely. Further recommendations to follow.     Dictated By Flako Garsia MD  d: 04/06/2022 14:23:46  t: 04/06/2022 15:17:15  Job [id number]  FB/

## 2022-04-07 ENCOUNTER — APPOINTMENT (OUTPATIENT)
Dept: GENERAL RADIOLOGY | Facility: HOSPITAL | Age: 82
End: 2022-04-07
Attending: HOSPITALIST
Payer: MEDICARE

## 2022-04-07 LAB
ANION GAP SERPL CALC-SCNC: 6 MMOL/L (ref 0–18)
BASOPHILS # BLD AUTO: 0.07 X10(3) UL (ref 0–0.2)
BASOPHILS NFR BLD AUTO: 0.8 %
BUN BLD-MCNC: 19 MG/DL (ref 7–18)
BUN/CREAT SERPL: 15 (ref 10–20)
CALCIUM BLD-MCNC: 9.2 MG/DL (ref 8.5–10.1)
CHLORIDE SERPL-SCNC: 115 MMOL/L (ref 98–112)
CO2 SERPL-SCNC: 25 MMOL/L (ref 21–32)
CREAT BLD-MCNC: 1.27 MG/DL
DEPRECATED RDW RBC AUTO: 50.4 FL (ref 35.1–46.3)
EOSINOPHIL # BLD AUTO: 0.26 X10(3) UL (ref 0–0.7)
EOSINOPHIL NFR BLD AUTO: 2.8 %
ERYTHROCYTE [DISTWIDTH] IN BLOOD BY AUTOMATED COUNT: 13.5 % (ref 11–15)
GLUCOSE BLD-MCNC: 117 MG/DL (ref 70–99)
HCT VFR BLD AUTO: 37.9 %
HGB BLD-MCNC: 11.7 G/DL
IMM GRANULOCYTES # BLD AUTO: 0.04 X10(3) UL (ref 0–1)
IMM GRANULOCYTES NFR BLD: 0.4 %
LYMPHOCYTES # BLD AUTO: 2.5 X10(3) UL (ref 1–4)
LYMPHOCYTES NFR BLD AUTO: 27.3 %
MCH RBC QN AUTO: 31.3 PG (ref 26–34)
MCHC RBC AUTO-ENTMCNC: 30.9 G/DL (ref 31–37)
MCV RBC AUTO: 101.3 FL
MONOCYTES # BLD AUTO: 0.89 X10(3) UL (ref 0.1–1)
MONOCYTES NFR BLD AUTO: 9.7 %
NEUTROPHILS # BLD AUTO: 5.4 X10 (3) UL (ref 1.5–7.7)
NEUTROPHILS # BLD AUTO: 5.4 X10(3) UL (ref 1.5–7.7)
NEUTROPHILS NFR BLD AUTO: 59 %
OSMOLALITY SERPL CALC.SUM OF ELEC: 305 MOSM/KG (ref 275–295)
PLATELET # BLD AUTO: 164 10(3)UL (ref 150–450)
POTASSIUM SERPL-SCNC: 3.4 MMOL/L (ref 3.5–5.1)
RBC # BLD AUTO: 3.74 X10(6)UL
SODIUM SERPL-SCNC: 146 MMOL/L (ref 136–145)
WBC # BLD AUTO: 9.2 X10(3) UL (ref 4–11)

## 2022-04-07 PROCEDURE — 92526 ORAL FUNCTION THERAPY: CPT

## 2022-04-07 PROCEDURE — 97162 PT EVAL MOD COMPLEX 30 MIN: CPT

## 2022-04-07 PROCEDURE — 85025 COMPLETE CBC W/AUTO DIFF WBC: CPT | Performed by: HOSPITALIST

## 2022-04-07 PROCEDURE — 97530 THERAPEUTIC ACTIVITIES: CPT

## 2022-04-07 PROCEDURE — 97165 OT EVAL LOW COMPLEX 30 MIN: CPT

## 2022-04-07 PROCEDURE — 97116 GAIT TRAINING THERAPY: CPT

## 2022-04-07 PROCEDURE — 97535 SELF CARE MNGMENT TRAINING: CPT

## 2022-04-07 PROCEDURE — 80048 BASIC METABOLIC PNL TOTAL CA: CPT | Performed by: HOSPITALIST

## 2022-04-07 PROCEDURE — 71045 X-RAY EXAM CHEST 1 VIEW: CPT | Performed by: HOSPITALIST

## 2022-04-07 RX ORDER — MELATONIN
2000 DAILY
Status: DISCONTINUED | OUTPATIENT
Start: 2022-04-07 | End: 2022-04-08

## 2022-04-07 RX ORDER — ATORVASTATIN CALCIUM 40 MG/1
40 TABLET, FILM COATED ORAL NIGHTLY
Status: DISCONTINUED | OUTPATIENT
Start: 2022-04-07 | End: 2022-04-08

## 2022-04-07 RX ORDER — LOSARTAN POTASSIUM 50 MG/1
25 TABLET ORAL DAILY
Refills: 3 | Status: DISCONTINUED | OUTPATIENT
Start: 2022-04-07 | End: 2022-04-08

## 2022-04-07 RX ORDER — CLOPIDOGREL BISULFATE 75 MG/1
75 TABLET ORAL NIGHTLY
Status: DISCONTINUED | OUTPATIENT
Start: 2022-04-07 | End: 2022-04-08

## 2022-04-07 RX ORDER — POTASSIUM CHLORIDE 20 MEQ/1
40 TABLET, EXTENDED RELEASE ORAL ONCE
Status: COMPLETED | OUTPATIENT
Start: 2022-04-07 | End: 2022-04-07

## 2022-04-07 RX ORDER — ASPIRIN 81 MG/1
81 TABLET ORAL DAILY
Status: DISCONTINUED | OUTPATIENT
Start: 2022-04-07 | End: 2022-04-08

## 2022-04-07 RX ORDER — LEVOTHYROXINE SODIUM 0.05 MG/1
100 TABLET ORAL DAILY
Status: DISCONTINUED | OUTPATIENT
Start: 2022-04-08 | End: 2022-04-08

## 2022-04-08 ENCOUNTER — TELEPHONE (OUTPATIENT)
Dept: INTERNAL MEDICINE CLINIC | Facility: CLINIC | Age: 82
End: 2022-04-08

## 2022-04-08 VITALS
BODY MASS INDEX: 33.64 KG/M2 | TEMPERATURE: 98 F | OXYGEN SATURATION: 93 % | WEIGHT: 235 LBS | RESPIRATION RATE: 20 BRPM | HEIGHT: 70 IN | DIASTOLIC BLOOD PRESSURE: 68 MMHG | HEART RATE: 64 BPM | SYSTOLIC BLOOD PRESSURE: 158 MMHG

## 2022-04-08 LAB
ANION GAP SERPL CALC-SCNC: 6 MMOL/L (ref 0–18)
BASOPHILS # BLD AUTO: 0.07 X10(3) UL (ref 0–0.2)
BASOPHILS NFR BLD AUTO: 0.9 %
BUN BLD-MCNC: 17 MG/DL (ref 7–18)
BUN/CREAT SERPL: 12.1 (ref 10–20)
CALCIUM BLD-MCNC: 8.9 MG/DL (ref 8.5–10.1)
CHLORIDE SERPL-SCNC: 114 MMOL/L (ref 98–112)
CO2 SERPL-SCNC: 25 MMOL/L (ref 21–32)
CREAT BLD-MCNC: 1.4 MG/DL
DEPRECATED RDW RBC AUTO: 50 FL (ref 35.1–46.3)
EOSINOPHIL # BLD AUTO: 0.3 X10(3) UL (ref 0–0.7)
EOSINOPHIL NFR BLD AUTO: 4 %
ERYTHROCYTE [DISTWIDTH] IN BLOOD BY AUTOMATED COUNT: 13.4 % (ref 11–15)
GLUCOSE BLD-MCNC: 128 MG/DL (ref 70–99)
HCT VFR BLD AUTO: 37.5 %
HGB BLD-MCNC: 11.5 G/DL
IMM GRANULOCYTES # BLD AUTO: 0.03 X10(3) UL (ref 0–1)
IMM GRANULOCYTES NFR BLD: 0.4 %
LYMPHOCYTES # BLD AUTO: 2.14 X10(3) UL (ref 1–4)
LYMPHOCYTES NFR BLD AUTO: 28.6 %
MCH RBC QN AUTO: 30.9 PG (ref 26–34)
MCHC RBC AUTO-ENTMCNC: 30.7 G/DL (ref 31–37)
MCV RBC AUTO: 100.8 FL
MONOCYTES # BLD AUTO: 0.84 X10(3) UL (ref 0.1–1)
MONOCYTES NFR BLD AUTO: 11.2 %
NEUTROPHILS # BLD AUTO: 4.1 X10 (3) UL (ref 1.5–7.7)
NEUTROPHILS # BLD AUTO: 4.1 X10(3) UL (ref 1.5–7.7)
NEUTROPHILS NFR BLD AUTO: 54.9 %
OSMOLALITY SERPL CALC.SUM OF ELEC: 303 MOSM/KG (ref 275–295)
PLATELET # BLD AUTO: 166 10(3)UL (ref 150–450)
POTASSIUM SERPL-SCNC: 3.7 MMOL/L (ref 3.5–5.1)
POTASSIUM SERPL-SCNC: 3.7 MMOL/L (ref 3.5–5.1)
RBC # BLD AUTO: 3.72 X10(6)UL
SODIUM SERPL-SCNC: 145 MMOL/L (ref 136–145)
WBC # BLD AUTO: 7.5 X10(3) UL (ref 4–11)

## 2022-04-08 PROCEDURE — 80048 BASIC METABOLIC PNL TOTAL CA: CPT | Performed by: HOSPITALIST

## 2022-04-08 PROCEDURE — 96361 HYDRATE IV INFUSION ADD-ON: CPT

## 2022-04-08 PROCEDURE — 84132 ASSAY OF SERUM POTASSIUM: CPT | Performed by: HOSPITALIST

## 2022-04-08 PROCEDURE — 85025 COMPLETE CBC W/AUTO DIFF WBC: CPT | Performed by: HOSPITALIST

## 2022-04-08 RX ORDER — AMOXICILLIN AND CLAVULANATE POTASSIUM 875; 125 MG/1; MG/1
1 TABLET, FILM COATED ORAL 2 TIMES DAILY
Qty: 14 TABLET | Refills: 0 | Status: SHIPPED | OUTPATIENT
Start: 2022-04-08 | End: 2022-04-15

## 2022-04-08 NOTE — CDS QUERY
Dr. Leopoldo Jalloh: To answer this query: 1st click on \"Edit\" button on toolbar. 2nd type an \"X\" in the bracket for the diagnosis(s) that apply. (You may add/type in any additional clinical details you feel necessary to substantiate your response). 3rd Click \"SIGN\" tab to complete your response. Thank you. Damien Campo Acute Impaired Renal Function  CLINICAL DOCUMENTATION CLARIFICATION FORM    Dear Doctor Leopoldo Jalloh,  04/06/2022  Admit: 46-year-old  male with PMH of recurrent aspiration pneumonia,cerebrovascular accident with left hemiparesis, hypothyroidism, generalized osteoarthritis, hypertension, chronic renal insufficiency stage 3, hyperlipidemia presented with  cough and fatigue for the last 2 to 3 days,  Admit Creat 1.45 BUN 23 GFR 45, Started on IV Fluids 100cc/hr. 04/07/2022 Creat 1.27  BUN 19 GFR 53,  IV [id number] continued   04/06/2022 H&P: Mild acute on chronic renal insufficiency. 04/07/2022 Your Progress Note: Mild acute on chronic renal insufficiency  Improved. bmp in am  04/08/2022 Creat 1.40 BUN 17 GFR 47   Treatment: IV Fluids 100cc/hr, checking bmp daily   Documentation (provided above) includes acutely impaired renal function. For accurate ICD-10-CM code assignment to reflect severity of illness and risk of mortality,   PLEASE (X) ALL DIAGNOSES THAT APPLY,  SELECTION BY PROVIDER ONLY    (  ) Acute Kidney Failure on chronic renal insufficiency stage 3  (x  ) Acute Kidney Injury on chronic renal insufficiency stage 3    (  ) Acute Renal Insufficiency without Acute Kidney Failure or Kidney Injury    (  ) Chronic renal insufficiency stage 3     (  ) Other Explanation, (please specify):      (  ) Unable to Determine, (please comment): If you have any questions, please contact Clinical :  Giuliano Mayer RN at [phone]. Thank You.     THIS FORM IS A PERMANENT PART OF THE MEDICAL RECORD

## 2022-04-08 NOTE — TELEPHONE ENCOUNTER
Re: hospital follow up pt was advised to make an appt a week from today April 15  Pt is scheduled to see Dr Demi Bowen on Mon/April 18 for his MA - ok for hospital follow up to be done then?     Please call wife Caren Benjamin to advise [phone]

## 2022-04-08 NOTE — HOME CARE LIAISON
Received referral via [facility] for Home Health services. Spoke w/ patient's wife/Meme and discussed list of [facility] providers from [facility], choice is [facility] (pt has hx of [facility]). Agency reserved in [facility] and contact information placed on AVS.Financial interest disclosure provided. Notified TRACEE/Alysia.

## 2022-04-08 NOTE — TELEPHONE ENCOUNTER
Ines @ [facility]    Double checking that Dr Perla Delong will sign orders from [facility].       # [phone], confidential voicemail

## 2022-04-08 NOTE — CM/SW NOTE
Addendum 11:09:   Pt accepted w/[facility]. AVS updated. Pt RN informed SW that pt/spouse are now requesting HH. SW uploaded referral/F2F via [facility]. Will need to provide choice and quality data per protocol. SW/CM to remain available for support and/or discharge planning.      EDWIN Noland, [facility]  Social Work   FVT:#[phone]

## 2022-04-08 NOTE — TELEPHONE ENCOUNTER
Noted.  Yesi Davenport for patient to see me on Monday, April 18 as he is currently scheduled to do. I did discuss this with the patient's wife visit and she is in full agreement. She feels patient is doing well at this time following his recent hospitalization.

## 2022-04-08 NOTE — CM/SW NOTE
BPCI-Advanced Medicare Program Note:  Plan of care reviewed for care coordination and discharge planning. Noted patient falls under  BPCI/Medicare program, with  for simple pneumonia  ERYN tool was used to help determine next care setting. Thus, [address] recommendations is for home health pending patient progress. Case Management team is following for care coordination and discharge planning needs.

## 2022-04-08 NOTE — TELEPHONE ENCOUNTER
Routed to Dr. Moira Rebollar as Kilo Kraus --- I provided [facility] with verbal orders / 'ok per protocol'

## 2022-04-11 ENCOUNTER — PATIENT OUTREACH (OUTPATIENT)
Dept: CASE MANAGEMENT | Age: 82
End: 2022-04-11

## 2022-04-11 PROCEDURE — 1111F DSCHRG MED/CURRENT MED MERGE: CPT

## 2022-04-11 NOTE — PROGRESS NOTES
Attempted to reach the patient to complete a [facility] FU call. Left a message for the pt to call the NCM back at, [phone].

## 2022-04-12 ENCOUNTER — TELEPHONE (OUTPATIENT)
Dept: INTERNAL MEDICINE CLINIC | Facility: CLINIC | Age: 82
End: 2022-04-12

## 2022-04-12 NOTE — TELEPHONE ENCOUNTER
Attempted to contact pt for TCM however no answer, VM left and [facility] sent. Pt has an appt with PCP on 4/18/22 [facility] that PCP is aware of however TCM/HFU appt is recommended by 4/15/22 as pt is a high risk for readmission. Please advise. It seems PCP has spoken to pt's wife and is okay with appt on 4/18/22 however please verify if the VT is to stay as scheduled or changed to TCM ([phone]). Thank you! BOOK BY DATE (last date for TCM): 4/22/22    TRIAGE:  Please f/u with pt and try to get them to schedule sooner as pt would greatly benefit from TCM/HFU appt. Thank you!

## 2022-04-12 NOTE — PROGRESS NOTES
[employer] message sent to the pt for TCM. NCM contact information was included in message. TE will be sent to PCP office to FU on a sooner appt as pt is a high risk for readmission.

## 2022-04-18 ENCOUNTER — OFFICE VISIT (OUTPATIENT)
Dept: INTERNAL MEDICINE CLINIC | Facility: CLINIC | Age: 82
End: 2022-04-18
Payer: MEDICARE

## 2022-04-18 VITALS
BODY MASS INDEX: 32.64 KG/M2 | TEMPERATURE: 98 F | OXYGEN SATURATION: 94 % | SYSTOLIC BLOOD PRESSURE: 130 MMHG | DIASTOLIC BLOOD PRESSURE: 60 MMHG | HEART RATE: 72 BPM | WEIGHT: 228 LBS | HEIGHT: 70 IN

## 2022-04-18 DIAGNOSIS — E03.9 HYPOTHYROIDISM, UNSPECIFIED TYPE: ICD-10-CM

## 2022-04-18 DIAGNOSIS — M48.061 SPINAL STENOSIS OF LUMBAR REGION WITHOUT NEUROGENIC CLAUDICATION: ICD-10-CM

## 2022-04-18 DIAGNOSIS — R53.83 FATIGUE, UNSPECIFIED TYPE: ICD-10-CM

## 2022-04-18 DIAGNOSIS — I10 ESSENTIAL HYPERTENSION: ICD-10-CM

## 2022-04-18 DIAGNOSIS — N18.31 STAGE 3A CHRONIC KIDNEY DISEASE (HCC): ICD-10-CM

## 2022-04-18 DIAGNOSIS — I69.359 CVA, OLD, HEMIPARESIS (HCC): ICD-10-CM

## 2022-04-18 DIAGNOSIS — J18.9 PNEUMONIA OF BOTH LUNGS DUE TO INFECTIOUS ORGANISM, UNSPECIFIED PART OF LUNG: ICD-10-CM

## 2022-04-18 DIAGNOSIS — E78.00 HYPERCHOLESTEROLEMIA: ICD-10-CM

## 2022-04-18 DIAGNOSIS — Z00.00 ANNUAL PHYSICAL EXAM: Primary | ICD-10-CM

## 2022-04-18 DIAGNOSIS — M15.9 PRIMARY OSTEOARTHRITIS INVOLVING MULTIPLE JOINTS: ICD-10-CM

## 2022-04-18 DIAGNOSIS — R53.1 WEAKNESS: ICD-10-CM

## 2022-04-18 DIAGNOSIS — G81.94 LEFT HEMIPARESIS (HCC): ICD-10-CM

## 2022-04-18 DIAGNOSIS — G89.29 CHRONIC BILATERAL LOW BACK PAIN WITHOUT SCIATICA: ICD-10-CM

## 2022-04-18 DIAGNOSIS — M54.50 CHRONIC BILATERAL LOW BACK PAIN WITHOUT SCIATICA: ICD-10-CM

## 2022-04-18 PROCEDURE — 99215 OFFICE O/P EST HI 40 MIN: CPT | Performed by: INTERNAL MEDICINE

## 2022-04-18 PROCEDURE — G0439 PPPS, SUBSEQ VISIT: HCPCS | Performed by: INTERNAL MEDICINE

## 2022-04-18 PROCEDURE — 1111F DSCHRG MED/CURRENT MED MERGE: CPT | Performed by: INTERNAL MEDICINE

## 2022-04-18 NOTE — PATIENT INSTRUCTIONS
1.  Patient is to continue his current diet, medication and activity. 2.  Patient is to push fluids. 3.  Patient is to continue with his visiting nurse and home physical therapy. 4.  Patient may use incentive spirometry 4 times a day. Alternatively patient can do some deep breathing exercises 4 times a day. 5.  I will place an order in the system for the patient have a chest x-ray PA and lateral sometime in the next week. 6.  I will place an order in system for the patient have blood test to be done prior to seeing me back in about 1 month. The blood tests will include a CBC, CMP, lipid panel, TSH and urinalysis. 7.  I will see the patient back in about 1 month.

## 2022-04-19 NOTE — PROGRESS NOTES
Multiple attempts to reach the pt and messages left with no returned phone call. Pt went to HFU with PCP on 4/18/22, closing encounter.

## 2022-04-21 ENCOUNTER — HOSPITAL ENCOUNTER (OUTPATIENT)
Dept: GENERAL RADIOLOGY | Age: 82
Discharge: HOME | End: 2022-04-21
Attending: INTERNAL MEDICINE
Payer: MEDICARE

## 2022-04-21 ENCOUNTER — TELEPHONE (OUTPATIENT)
Dept: INTERNAL MEDICINE CLINIC | Facility: CLINIC | Age: 82
End: 2022-04-21

## 2022-04-21 DIAGNOSIS — J18.9 PNEUMONIA OF BOTH LUNGS DUE TO INFECTIOUS ORGANISM, UNSPECIFIED PART OF LUNG: ICD-10-CM

## 2022-04-21 PROCEDURE — 71046 X-RAY EXAM CHEST 2 VIEWS: CPT | Performed by: INTERNAL MEDICINE

## 2022-04-21 NOTE — TELEPHONE ENCOUNTER
Wife Pari Paniagua calling from [facility]   Pt saw Dr Elliott Caicedo on 4/18 and chest xray was ordered  Pt there now for chest xray   No order in the system    Please call wife back on cell# [phone]

## 2022-04-21 NOTE — TELEPHONE ENCOUNTER
There is an active CXR order already in the system from 4/18. Spoke to Moise ferguson per hipaa0 and advised order is in the system. She will attempt again with [place]. Advised she can p/u copy of order. Otherwise office can fax order if they are having difficulty seeing it? Need fax # if this is requested.  She will call if there are any problems

## 2022-04-22 ENCOUNTER — TELEPHONE (OUTPATIENT)
Dept: INTERNAL MEDICINE CLINIC | Facility: CLINIC | Age: 82
End: 2022-04-22

## 2022-04-22 NOTE — TELEPHONE ENCOUNTER
Phone call to patient and situation discussed with patient's wife. Patient's chest x-ray appears improved from before. Patient still has some opacification behind the heart and in the left lung. This appears to be similar to prior chest x-ray and may be improved from before. Patient's right lung is not perfectly clear. Patient appears to be doing well at this time. This most likely is related to the patient's prior pneumonias. I will continue to follow-up with the patient. Patient may require further chest x-rays in the future. We will continue to monitor this in the future. Patient's wife is fine with this plan.

## 2022-04-25 ENCOUNTER — OFFICE VISIT (OUTPATIENT)
Dept: PHYSICAL MEDICINE AND REHAB | Facility: CLINIC | Age: 82
End: 2022-04-25
Payer: MEDICARE

## 2022-04-25 ENCOUNTER — TELEPHONE (OUTPATIENT)
Dept: PHYSICAL MEDICINE AND REHAB | Facility: CLINIC | Age: 82
End: 2022-04-25

## 2022-04-25 VITALS — BODY MASS INDEX: 32.64 KG/M2 | HEIGHT: 70 IN | WEIGHT: 228 LBS

## 2022-04-25 DIAGNOSIS — M17.0 PRIMARY OSTEOARTHRITIS OF BOTH KNEES: Primary | ICD-10-CM

## 2022-04-25 DIAGNOSIS — Z79.01 ANTICOAGULANT LONG-TERM USE: ICD-10-CM

## 2022-04-25 DIAGNOSIS — R53.1 WEAKNESS GENERALIZED: ICD-10-CM

## 2022-04-25 DIAGNOSIS — I69.359 CVA, OLD, HEMIPARESIS (HCC): ICD-10-CM

## 2022-04-25 DIAGNOSIS — N18.31 STAGE 3A CHRONIC KIDNEY DISEASE (HCC): ICD-10-CM

## 2022-04-25 PROCEDURE — 1111F DSCHRG MED/CURRENT MED MERGE: CPT | Performed by: PHYSICAL MEDICINE & REHABILITATION

## 2022-04-25 PROCEDURE — 99214 OFFICE O/P EST MOD 30 MIN: CPT | Performed by: PHYSICAL MEDICINE & REHABILITATION

## 2022-04-25 PROCEDURE — 20610 DRAIN/INJ JOINT/BURSA W/O US: CPT | Performed by: PHYSICAL MEDICINE & REHABILITATION

## 2022-04-25 RX ORDER — TRIAMCINOLONE ACETONIDE 40 MG/ML
40 INJECTION, SUSPENSION INTRA-ARTICULAR; INTRAMUSCULAR ONCE
Status: COMPLETED | OUTPATIENT
Start: 2022-04-25 | End: 2022-04-25

## 2022-04-25 RX ORDER — LIDOCAINE HYDROCHLORIDE 10 MG/ML
4 INJECTION, SOLUTION INFILTRATION; PERINEURAL ONCE
Status: COMPLETED | OUTPATIENT
Start: 2022-04-25 | End: 2022-04-25

## 2022-04-25 NOTE — TELEPHONE ENCOUNTER
Per Medicare guidelines-no authorization required for Lidocaine/Kenalog injections in office     Left knee injection and/or bilateral knee injection CPT 20610x2+[id number]    Status: Approved-Covered Benefit    Routing to clinical staff to schedule

## 2022-04-28 PROBLEM — M17.0 PRIMARY OSTEOARTHRITIS OF BOTH KNEES: Status: ACTIVE | Noted: 2022-04-28

## 2022-04-29 NOTE — PROCEDURES
Knee injection  Location: bilateral  After discussing benefits and possible side effects, we proceeded with a knee injection. The patient was consented. The patient was placed in supine, and the lateral knee was palpated. The skin was sterilely prepped. Via a lateral approach a 25 -gauge needle was introduced into the knee joint. 0 ml of cheyenne colored joint fluid were aspirated. A total of 2 cc of 40 mg/ml Kenalog and 4 cc of 1% lidocaine was injected into the knee. The same procedure was repeated on the contralateral side. The patient tolerated the procedure well without adverse effects.

## 2022-05-02 ENCOUNTER — LAB ENCOUNTER (OUTPATIENT)
Dept: LAB | Age: 82
End: 2022-05-02
Attending: INTERNAL MEDICINE
Payer: MEDICARE

## 2022-05-02 DIAGNOSIS — R53.83 FATIGUE, UNSPECIFIED TYPE: ICD-10-CM

## 2022-05-02 DIAGNOSIS — E03.9 HYPOTHYROIDISM, UNSPECIFIED TYPE: ICD-10-CM

## 2022-05-02 DIAGNOSIS — R53.1 WEAKNESS: ICD-10-CM

## 2022-05-02 DIAGNOSIS — E78.00 HYPERCHOLESTEROLEMIA: ICD-10-CM

## 2022-05-02 DIAGNOSIS — J18.9 PNEUMONIA OF BOTH LUNGS DUE TO INFECTIOUS ORGANISM, UNSPECIFIED PART OF LUNG: ICD-10-CM

## 2022-05-02 DIAGNOSIS — Z00.00 ANNUAL PHYSICAL EXAM: ICD-10-CM

## 2022-05-02 LAB
ALBUMIN SERPL-MCNC: 3.6 G/DL (ref 3.4–5)
ALBUMIN/GLOB SERPL: 0.9 {RATIO} (ref 1–2)
ALP LIVER SERPL-CCNC: 100 U/L
ALT SERPL-CCNC: 34 U/L
ANION GAP SERPL CALC-SCNC: 9 MMOL/L (ref 0–18)
AST SERPL-CCNC: 17 U/L (ref 15–37)
BASOPHILS # BLD AUTO: 0.05 X10(3) UL (ref 0–0.2)
BASOPHILS NFR BLD AUTO: 0.3 %
BILIRUB SERPL-MCNC: 0.6 MG/DL (ref 0.1–2)
BUN BLD-MCNC: 31 MG/DL (ref 7–18)
BUN/CREAT SERPL: 22.8 (ref 10–20)
CALCIUM BLD-MCNC: 9.2 MG/DL (ref 8.5–10.1)
CHLORIDE SERPL-SCNC: 111 MMOL/L (ref 98–112)
CHOLEST SERPL-MCNC: 150 MG/DL (ref ?–200)
CO2 SERPL-SCNC: 23 MMOL/L (ref 21–32)
CREAT BLD-MCNC: 1.36 MG/DL
DEPRECATED RDW RBC AUTO: 49.2 FL (ref 35.1–46.3)
EOSINOPHIL # BLD AUTO: 0.18 X10(3) UL (ref 0–0.7)
EOSINOPHIL NFR BLD AUTO: 1.1 %
ERYTHROCYTE [DISTWIDTH] IN BLOOD BY AUTOMATED COUNT: 13.2 % (ref 11–15)
FASTING PATIENT LIPID ANSWER: YES
FASTING STATUS PATIENT QL REPORTED: YES
GLOBULIN PLAS-MCNC: 4 G/DL (ref 2.8–4.4)
GLUCOSE BLD-MCNC: 103 MG/DL (ref 70–99)
HCT VFR BLD AUTO: 47.7 %
HDLC SERPL-MCNC: 44 MG/DL (ref 40–59)
HGB BLD-MCNC: 14.7 G/DL
IMM GRANULOCYTES # BLD AUTO: 0.31 X10(3) UL (ref 0–1)
IMM GRANULOCYTES NFR BLD: 2 %
LDLC SERPL CALC-MCNC: 84 MG/DL (ref ?–100)
LYMPHOCYTES # BLD AUTO: 3.04 X10(3) UL (ref 1–4)
LYMPHOCYTES NFR BLD AUTO: 19.4 %
MCH RBC QN AUTO: 30.8 PG (ref 26–34)
MCHC RBC AUTO-ENTMCNC: 30.8 G/DL (ref 31–37)
MCV RBC AUTO: 99.8 FL
MONOCYTES # BLD AUTO: 1.27 X10(3) UL (ref 0.1–1)
MONOCYTES NFR BLD AUTO: 8.1 %
NEUTROPHILS # BLD AUTO: 10.84 X10 (3) UL (ref 1.5–7.7)
NEUTROPHILS # BLD AUTO: 10.84 X10(3) UL (ref 1.5–7.7)
NEUTROPHILS NFR BLD AUTO: 69.1 %
NONHDLC SERPL-MCNC: 106 MG/DL (ref ?–130)
OSMOLALITY SERPL CALC.SUM OF ELEC: 303 MOSM/KG (ref 275–295)
PLATELET # BLD AUTO: 238 10(3)UL (ref 150–450)
POTASSIUM SERPL-SCNC: 4.1 MMOL/L (ref 3.5–5.1)
PROT SERPL-MCNC: 7.6 G/DL (ref 6.4–8.2)
RBC # BLD AUTO: 4.78 X10(6)UL
SODIUM SERPL-SCNC: 143 MMOL/L (ref 136–145)
TRIGL SERPL-MCNC: 125 MG/DL (ref 30–149)
TSI SER-ACNC: 2.54 MIU/ML (ref 0.36–3.74)
VLDLC SERPL CALC-MCNC: 20 MG/DL (ref 0–30)
WBC # BLD AUTO: 15.7 X10(3) UL (ref 4–11)

## 2022-05-02 PROCEDURE — 36415 COLL VENOUS BLD VENIPUNCTURE: CPT

## 2022-05-02 PROCEDURE — 80053 COMPREHEN METABOLIC PANEL: CPT

## 2022-05-02 PROCEDURE — 84443 ASSAY THYROID STIM HORMONE: CPT

## 2022-05-02 PROCEDURE — 80061 LIPID PANEL: CPT

## 2022-05-02 PROCEDURE — 85025 COMPLETE CBC W/AUTO DIFF WBC: CPT

## 2022-05-04 ENCOUNTER — LAB ENCOUNTER (OUTPATIENT)
Dept: LAB | Age: 82
End: 2022-05-04
Attending: INTERNAL MEDICINE
Payer: MEDICARE

## 2022-05-04 ENCOUNTER — TELEPHONE (OUTPATIENT)
Dept: INTERNAL MEDICINE CLINIC | Facility: CLINIC | Age: 82
End: 2022-05-04

## 2022-05-04 LAB
BILIRUB UR QL: NEGATIVE
CLARITY UR: CLEAR
COLOR UR: YELLOW
GLUCOSE UR-MCNC: NEGATIVE MG/DL
HGB UR QL STRIP.AUTO: NEGATIVE
KETONES UR-MCNC: NEGATIVE MG/DL
LEUKOCYTE ESTERASE UR QL STRIP.AUTO: NEGATIVE
NITRITE UR QL STRIP.AUTO: NEGATIVE
PH UR: 5 [PH] (ref 5–8)
PROT UR-MCNC: NEGATIVE MG/DL
SP GR UR STRIP: 1.02 (ref 1–1.03)
UROBILINOGEN UR STRIP-ACNC: <2
VIT C UR-MCNC: NEGATIVE MG/DL

## 2022-05-04 PROCEDURE — 81003 URINALYSIS AUTO W/O SCOPE: CPT | Performed by: INTERNAL MEDICINE

## 2022-05-05 RX ORDER — AMOXICILLIN AND CLAVULANATE POTASSIUM 875; 125 MG/1; MG/1
1 TABLET, FILM COATED ORAL 2 TIMES DAILY
Qty: 20 TABLET | Refills: 1 | Status: SHIPPED | OUTPATIENT
Start: 2022-05-05 | End: 2022-05-15

## 2022-05-05 NOTE — TELEPHONE ENCOUNTER
Phone call to patient's wife regarding patient. Patient is to continue to have some coughing. He has no fever or chills. Patient's recent CBC showed him to have a hemoglobin above 14 and a white count of 15,700 which is higher than before. Patient's chest x-ray last week showed some calcification behind the the heart. The lungs were otherwise clear. At this point I will put the patient on Augmentin 8 and 75 mg orally twice daily for 10 days with 1 refill. I will also place an order in the system for the patient to get a CBC next week before he sees me the following week on May 19. Patient's wife will keep me updated.

## 2022-05-10 ENCOUNTER — TELEPHONE (OUTPATIENT)
Dept: INTERNAL MEDICINE CLINIC | Facility: CLINIC | Age: 82
End: 2022-05-10

## 2022-05-10 NOTE — TELEPHONE ENCOUNTER
As FYI to BRAYAN CODY - called Yuliet Pazt from [facility] who states spouse called him  To report fall on 5/5/22- PT will re- evaluate patient either Thursday or Friday  Rn called spouse per hipaa - she explained patient was home alone - his left ankle gave out and he landed on ceramic tile and his head hit vacuum in the guest closet .  Paramedics came to evaluate - no bruises , no headache  And patient denied going to hospital . Spouse is aware to watch out for bruises , headache  Mental status since patient is on PLavix

## 2022-05-10 NOTE — TELEPHONE ENCOUNTER
Alan/Sintia called with FYI for Dr Lane Anguiano had a fall last Thursday/May 5  No injury; pt is just a little sore     Overall pt is struggling in general

## 2022-05-10 NOTE — TELEPHONE ENCOUNTER
Sanna Torrez is also on blood thinner (CLOPIDOGREL 75 MG); Fall needs to be Triaged regardless. Re-routed on HIGH to bring to nursing's attention. Added ACUTE & FALL to Reason for Call.

## 2022-05-12 NOTE — TELEPHONE ENCOUNTER
Patient's wife Joanne Harper is calling patient is now complaining of pain in Shoulder, Knee & Ankle after his fall. Joanne Harper is asking if Dr Perla Delong will order X-Rays.   Phone [phone]

## 2022-05-12 NOTE — TELEPHONE ENCOUNTER
.  Please notify patient's wife that I recommend patient be evaluated and either urgent care or the emergency room to see if x-rays are needed and if so what x-rays need to be done. I will route this to nursing.   Thank you!!

## 2022-05-12 NOTE — TELEPHONE ENCOUNTER
I spoke with Ye Washburn per [facility]. Patient has been complaining of aches and pains for six days since his fall on 5/5/22. He fell on left side which is his compromised side. He complains of pain in his left shoulder, left leg and left ankle. John Alicea was not there when it happened but patient reported that he felt his \"ankle gave out\" and he did recall hitting his head in the closet. He has a little bump on head. He is on a blood thinner. She does not see anything visible like a bruise. I did recommend ER evaluation or office evaluation today. Meme declines. Patient will see Dr. Nadiya Xie next week. John Alicea says some of these are not new pains but she is not sure evaluation is warranted. She does feel x-rays should be done. Explained that I would relay this to Dr. Nadiya Xie and call her back with his recommendation.

## 2022-05-13 ENCOUNTER — APPOINTMENT (OUTPATIENT)
Dept: GENERAL RADIOLOGY | Facility: HOSPITAL | Age: 82
End: 2022-05-13
Attending: EMERGENCY MEDICINE
Payer: MEDICARE

## 2022-05-13 ENCOUNTER — APPOINTMENT (OUTPATIENT)
Dept: CT IMAGING | Facility: HOSPITAL | Age: 82
End: 2022-05-13
Attending: EMERGENCY MEDICINE
Payer: MEDICARE

## 2022-05-13 ENCOUNTER — HOSPITAL ENCOUNTER (INPATIENT)
Facility: HOSPITAL | Age: 82
LOS: 4 days | Discharge: NURSING, REHAB OR LONG-TERM CARE | End: 2022-05-17
Attending: EMERGENCY MEDICINE | Admitting: HOSPITALIST
Payer: MEDICARE

## 2022-05-13 DIAGNOSIS — J96.01 ACUTE RESPIRATORY FAILURE WITH HYPOXIA (HCC): Primary | ICD-10-CM

## 2022-05-13 DIAGNOSIS — J69.0 ASPIRATION PNEUMONITIS (HCC): ICD-10-CM

## 2022-05-13 LAB
ANION GAP SERPL CALC-SCNC: 10 MMOL/L (ref 0–18)
BASOPHILS # BLD AUTO: 0.04 X10(3) UL (ref 0–0.2)
BASOPHILS NFR BLD AUTO: 0.2 %
BUN BLD-MCNC: 29 MG/DL (ref 7–18)
BUN/CREAT SERPL: 18.6 (ref 10–20)
CALCIUM BLD-MCNC: 9.4 MG/DL (ref 8.5–10.1)
CHLORIDE SERPL-SCNC: 111 MMOL/L (ref 98–112)
CO2 SERPL-SCNC: 20 MMOL/L (ref 21–32)
CREAT BLD-MCNC: 1.56 MG/DL
DEPRECATED RDW RBC AUTO: 51.8 FL (ref 35.1–46.3)
EOSINOPHIL # BLD AUTO: 0.02 X10(3) UL (ref 0–0.7)
EOSINOPHIL NFR BLD AUTO: 0.1 %
ERYTHROCYTE [DISTWIDTH] IN BLOOD BY AUTOMATED COUNT: 13.7 % (ref 11–15)
GLUCOSE BLD-MCNC: 117 MG/DL (ref 70–99)
HCT VFR BLD AUTO: 48 %
HGB BLD-MCNC: 14.5 G/DL
IMM GRANULOCYTES # BLD AUTO: 0.17 X10(3) UL (ref 0–1)
IMM GRANULOCYTES NFR BLD: 0.8 %
LYMPHOCYTES # BLD AUTO: 0.9 X10(3) UL (ref 1–4)
LYMPHOCYTES NFR BLD AUTO: 4 %
MCH RBC QN AUTO: 30.9 PG (ref 26–34)
MCHC RBC AUTO-ENTMCNC: 30.2 G/DL (ref 31–37)
MCV RBC AUTO: 102.1 FL
MONOCYTES # BLD AUTO: 1.21 X10(3) UL (ref 0.1–1)
MONOCYTES NFR BLD AUTO: 5.4 %
NEUTROPHILS # BLD AUTO: 20.27 X10 (3) UL (ref 1.5–7.7)
NEUTROPHILS # BLD AUTO: 20.27 X10(3) UL (ref 1.5–7.7)
NEUTROPHILS NFR BLD AUTO: 89.5 %
NT-PROBNP SERPL-MCNC: 244 PG/ML (ref ?–450)
OSMOLALITY SERPL CALC.SUM OF ELEC: 299 MOSM/KG (ref 275–295)
PLATELET # BLD AUTO: 165 10(3)UL (ref 150–450)
POTASSIUM SERPL-SCNC: 4.3 MMOL/L (ref 3.5–5.1)
PROCALCITONIN SERPL-MCNC: 0.09 NG/ML (ref ?–0.16)
RBC # BLD AUTO: 4.7 X10(6)UL
SARS-COV-2 RNA RESP QL NAA+PROBE: NOT DETECTED
SODIUM SERPL-SCNC: 141 MMOL/L (ref 136–145)
WBC # BLD AUTO: 22.6 X10(3) UL (ref 4–11)

## 2022-05-13 PROCEDURE — 99223 1ST HOSP IP/OBS HIGH 75: CPT | Performed by: HOSPITALIST

## 2022-05-13 PROCEDURE — 71045 X-RAY EXAM CHEST 1 VIEW: CPT | Performed by: EMERGENCY MEDICINE

## 2022-05-13 PROCEDURE — 73030 X-RAY EXAM OF SHOULDER: CPT | Performed by: EMERGENCY MEDICINE

## 2022-05-13 PROCEDURE — 73610 X-RAY EXAM OF ANKLE: CPT | Performed by: EMERGENCY MEDICINE

## 2022-05-13 PROCEDURE — 73560 X-RAY EXAM OF KNEE 1 OR 2: CPT | Performed by: EMERGENCY MEDICINE

## 2022-05-13 PROCEDURE — 70450 CT HEAD/BRAIN W/O DYE: CPT | Performed by: EMERGENCY MEDICINE

## 2022-05-13 RX ORDER — ASPIRIN 81 MG/1
81 TABLET ORAL DAILY
Status: DISCONTINUED | OUTPATIENT
Start: 2022-05-13 | End: 2022-05-17

## 2022-05-13 RX ORDER — HEPARIN SODIUM 5000 [USP'U]/ML
5000 INJECTION, SOLUTION INTRAVENOUS; SUBCUTANEOUS EVERY 12 HOURS SCHEDULED
Status: DISCONTINUED | OUTPATIENT
Start: 2022-05-13 | End: 2022-05-17

## 2022-05-13 RX ORDER — LOSARTAN POTASSIUM 25 MG/1
25 TABLET ORAL DAILY
Refills: 3 | Status: DISCONTINUED | OUTPATIENT
Start: 2022-05-13 | End: 2022-05-17

## 2022-05-13 RX ORDER — LEVOTHYROXINE SODIUM 0.1 MG/1
100 TABLET ORAL
Status: DISCONTINUED | OUTPATIENT
Start: 2022-05-14 | End: 2022-05-17

## 2022-05-13 RX ORDER — MORPHINE SULFATE 2 MG/ML
2 INJECTION, SOLUTION INTRAMUSCULAR; INTRAVENOUS EVERY 2 HOUR PRN
Status: DISCONTINUED | OUTPATIENT
Start: 2022-05-13 | End: 2022-05-17

## 2022-05-13 RX ORDER — MORPHINE SULFATE 2 MG/ML
1 INJECTION, SOLUTION INTRAMUSCULAR; INTRAVENOUS EVERY 2 HOUR PRN
Status: DISCONTINUED | OUTPATIENT
Start: 2022-05-13 | End: 2022-05-17

## 2022-05-13 RX ORDER — CLOPIDOGREL BISULFATE 75 MG/1
75 TABLET ORAL NIGHTLY
Status: DISCONTINUED | OUTPATIENT
Start: 2022-05-13 | End: 2022-05-17

## 2022-05-13 RX ORDER — DEXTROSE AND SODIUM CHLORIDE 5; .45 G/100ML; G/100ML
INJECTION, SOLUTION INTRAVENOUS CONTINUOUS
Status: DISCONTINUED | OUTPATIENT
Start: 2022-05-13 | End: 2022-05-16

## 2022-05-13 RX ORDER — ONDANSETRON 2 MG/ML
4 INJECTION INTRAMUSCULAR; INTRAVENOUS EVERY 6 HOURS PRN
Status: DISCONTINUED | OUTPATIENT
Start: 2022-05-13 | End: 2022-05-17

## 2022-05-13 RX ORDER — ATORVASTATIN CALCIUM 40 MG/1
40 TABLET, FILM COATED ORAL NIGHTLY
Status: DISCONTINUED | OUTPATIENT
Start: 2022-05-13 | End: 2022-05-17

## 2022-05-13 RX ORDER — METOCLOPRAMIDE HYDROCHLORIDE 5 MG/ML
5 INJECTION INTRAMUSCULAR; INTRAVENOUS EVERY 8 HOURS PRN
Status: DISCONTINUED | OUTPATIENT
Start: 2022-05-13 | End: 2022-05-17

## 2022-05-13 RX ORDER — MORPHINE SULFATE 4 MG/ML
4 INJECTION, SOLUTION INTRAMUSCULAR; INTRAVENOUS EVERY 2 HOUR PRN
Status: DISCONTINUED | OUTPATIENT
Start: 2022-05-13 | End: 2022-05-17

## 2022-05-13 NOTE — ED QUICK NOTES
Per XR pt refused pelvic/hip XRs d/t c/o inability to lie flat. MD notified. Pt en route to room from XR.

## 2022-05-13 NOTE — ED QUICK NOTES
Orders for admission, patient is aware of plan and ready to go upstairs. Any questions, please call ED RN Daljit Dodson at extension [phone]. Patient Covid vaccination status: Fully vaccinated     COVID Test Ordered in ED: Rapid SARS-CoV-2 by PCR    COVID Suspicion at Admission: Low clinical suspicion for COVID    Running Infusions:  None    Mental Status/LOC at time of transport: a/ox4    Other pertinent information: hx stroke w/ previous L sided weakness to L arm and L leg.   3LPM via NC in ER, new for pt  UA pending, no sample given yet

## 2022-05-13 NOTE — H&P
[facility]    PATIENT'S NAME: Debora Paz   ATTENDING PHYSICIAN: Rosa Maria Lainez MD   PATIENT ACCOUNT#:   [de-identified]    LOCATION:  [address]  MEDICAL RECORD #:   [id number]       YOB: 1940  ADMISSION DATE:       05/13/2022    HISTORY AND PHYSICAL EXAMINATION    CHIEF COMPLAINT:  Recurrent aspiration pneumonia. HISTORY OF PRESENT ILLNESS:  The patient is an [de-identified]  male who presented to the emergency department for evaluation of multiple falls at home, generalized weakness, chills, fever. CBC, chemistry, proBNP, and procalcitonin are still pending. Chest x-ray showed bibasilar pneumonia. X-rays of the left ankle, left knee, right knee, and left shoulder were negative. CT scan of the brain showed no acute findings. The patient was started on IV antibiotics, IV fluids, and he will be admitted to the hospital for further management. PAST MEDICAL HISTORY:  The patient has a history of recurrent aspiration pneumonia, last hospitalization around a month ago. 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 PAST SURGICAL HISTORY:  Tonsillectomy, abdominal aortic aneurysm endovascular stent grafting, cholecystectomy, and cataract procedure. MEDICATIONS:  Please see medication reconciliation list.     ALLERGIES:  No known drug allergies. FAMILY HISTORY:  Father had lung cancer. SOCIAL HISTORY:  Ex-tobacco user. No current tobacco, alcohol, or drug use. Lives with his wife. Requires some assistance in his basic activities of daily living. REVIEW OF SYSTEMS:  Per the wife, the patient does choke on his food and has to clear his throat on multiple occasions. In the last week, progressively getting weaker. He reports night sweats and fevers at home. Cough, nonproductive of phlegm. Other 12-point review of systems is negative. PHYSICAL EXAMINATION:    GENERAL:  Alert and oriented. Moderate distress. Fatigued, diaphoretic. VITAL SIGNS:  Temperature 97.8; pulse 116, sinus tachycardia; respiratory rate 24; blood pressure 111/63; pulse ox 86% on room air, 93% on 3L nasal cannula oxygen. HEENT:  Atraumatic. Oropharynx clear. Dry mucous membranes. Normal hard and soft palate. Eyes:  Anicteric sclerae. NECK:  Supple. No lymphadenopathy. Trachea midline. Full range of motion. LUNGS:  Rhonchi auscultated on both lung fields. HEART:  Regular rate and rhythm. S1 and S2 auscultated. Tachycardiac. Sinus tachycardia on monitor. ABDOMEN:  Soft, nondistended. No tenderness. Positive bowel sounds. EXTREMITIES:  No peripheral edema, clubbing, or cyanosis. NEUROLOGIC:  Chronic left hemiparesis. Otherwise no acute neurological findings. ASSESSMENT:    1. Recurrent aspiration pneumonia. 2.   Hypoxemic respiratory failure. 3.   Generalized weakness and dehydration. 4.   Hypertension. PLAN:  The patient will be admitted to general medical floor. IV fluids, IV Zosyn. Speech Therapy to evaluate patient for recommendations regarding diet consistency. I will place him for now on pureed diet. Aspiration precautions. Further recommendations to follow.       Dictated By Popeye Hein MD  d: 05/13/2022 17:19:20  t: 05/13/2022 17:34:17  Job [id number]  FB/

## 2022-05-13 NOTE — ED INITIAL ASSESSMENT (HPI)
Pt came in via EMS for fall. Reports multiple falls this week. Was walking tot he car to go to immediate care when his left ankle \"gave out\" and he fell to his knees. Reports hitting his head on the car. No LOC. Reports HA, bilat knee pain, left ankle pain and left shoulder pain from previous fall. On blood thinner. No deformities noted. RR shallow, SpO2 also low at 86% on RA. Placed on 3L O2 NC. Speaking in full sentences.

## 2022-05-14 ENCOUNTER — APPOINTMENT (OUTPATIENT)
Dept: GENERAL RADIOLOGY | Facility: HOSPITAL | Age: 82
End: 2022-05-14
Attending: HOSPITALIST
Payer: MEDICARE

## 2022-05-14 LAB
ANION GAP SERPL CALC-SCNC: 6 MMOL/L (ref 0–18)
BASOPHILS # BLD: 0 X10(3) UL (ref 0–0.2)
BASOPHILS NFR BLD: 0 %
BILIRUB UR QL: NEGATIVE
BUN BLD-MCNC: 32 MG/DL (ref 7–18)
BUN/CREAT SERPL: 18.6 (ref 10–20)
CALCIUM BLD-MCNC: 8.8 MG/DL (ref 8.5–10.1)
CHLORIDE SERPL-SCNC: 113 MMOL/L (ref 98–112)
CO2 SERPL-SCNC: 22 MMOL/L (ref 21–32)
COLOR UR: YELLOW
CREAT BLD-MCNC: 1.72 MG/DL
DEPRECATED RDW RBC AUTO: 55.2 FL (ref 35.1–46.3)
EOSINOPHIL # BLD: 0 X10(3) UL (ref 0–0.7)
EOSINOPHIL NFR BLD: 0 %
ERYTHROCYTE [DISTWIDTH] IN BLOOD BY AUTOMATED COUNT: 13.9 % (ref 11–15)
GLUCOSE BLD-MCNC: 174 MG/DL (ref 70–99)
GLUCOSE UR-MCNC: NEGATIVE MG/DL
HCT VFR BLD AUTO: 42.9 %
HGB BLD-MCNC: 12.7 G/DL
LEUKOCYTE ESTERASE UR QL STRIP.AUTO: NEGATIVE
LYMPHOCYTES NFR BLD: 1.4 X10(3) UL (ref 1–4)
LYMPHOCYTES NFR BLD: 5 %
MCH RBC QN AUTO: 31.5 PG (ref 26–34)
MCHC RBC AUTO-ENTMCNC: 29.6 G/DL (ref 31–37)
MCV RBC AUTO: 106.5 FL
MONOCYTES # BLD: 1.4 X10(3) UL (ref 0.1–1)
MONOCYTES NFR BLD: 5 %
MORPHOLOGY: NORMAL
NEUTROPHILS # BLD AUTO: 23.32 X10 (3) UL (ref 1.5–7.7)
NEUTROPHILS NFR BLD: 83 %
NEUTS BAND NFR BLD: 7 %
NEUTS HYPERSEG # BLD: 25.11 X10(3) UL (ref 1.5–7.7)
NITRITE UR QL STRIP.AUTO: NEGATIVE
OSMOLALITY SERPL CALC.SUM OF ELEC: 303 MOSM/KG (ref 275–295)
PH UR: 5 [PH] (ref 5–8)
PLATELET # BLD AUTO: 134 10(3)UL (ref 150–450)
PLATELET MORPHOLOGY: NORMAL
POTASSIUM SERPL-SCNC: 4.1 MMOL/L (ref 3.5–5.1)
PROT UR-MCNC: 30 MG/DL
RBC # BLD AUTO: 4.03 X10(6)UL
SODIUM SERPL-SCNC: 141 MMOL/L (ref 136–145)
SP GR UR STRIP: >1.03 (ref 1–1.03)
TOTAL CELLS COUNTED BLD: 100
UROBILINOGEN UR STRIP-ACNC: <2
VIT C UR-MCNC: NEGATIVE MG/DL
WBC # BLD AUTO: 27.9 X10(3) UL (ref 4–11)

## 2022-05-14 PROCEDURE — 99233 SBSQ HOSP IP/OBS HIGH 50: CPT | Performed by: HOSPITALIST

## 2022-05-14 PROCEDURE — 74230 X-RAY XM SWLNG FUNCJ C+: CPT | Performed by: HOSPITALIST

## 2022-05-14 RX ORDER — TRAMADOL HYDROCHLORIDE 50 MG/1
50 TABLET ORAL EVERY 6 HOURS PRN
Status: DISCONTINUED | OUTPATIENT
Start: 2022-05-14 | End: 2022-05-17

## 2022-05-14 RX ORDER — ACETAMINOPHEN 325 MG/1
650 TABLET ORAL EVERY 6 HOURS PRN
Status: DISCONTINUED | OUTPATIENT
Start: 2022-05-14 | End: 2022-05-17

## 2022-05-14 NOTE — PROGRESS NOTES
[facility] Pharmacy Note:  Renal Dose Adjustment for Metoclopramide (REGLAN)    Jagruti Weathers has been prescribed Metoclopramide (REGLAN) 10 mg every 8 hours as needed for nausea/vomiting,. Estimated Creatinine Clearance: 39 mL/min (A) (based on SCr of 1.56 mg/dL (H)). Calculated creatinine clearance is < 40 ml/min, therefore, the dose of Metoclopramide (REGLAN) has been changed to 5 mg every 8 hours as needed for nausea/vomiting per P&T approved protocol. Pharmacy will continue to follow, and if renal function improves, will resume the original order.        Thank you,  Seamus Guzman, PharmD  5/13/2022 8:20 PM

## 2022-05-14 NOTE — PLAN OF CARE
Problem: Patient Centered Care  Goal: Patient preferences are identified and integrated in the patient's plan of care  Description: Interventions:  - What would you like us to know as we care for you? \" I am a baby when it comes to shots\".   - Provide timely, complete, and accurate information to patient/family  - Incorporate patient and family knowledge, values, beliefs, and cultural backgrounds into the planning and delivery of care  - Encourage patient/family to participate in care and decision-making at the level they choose  - Honor patient and family perspectives and choices  Outcome: Progressing     Problem: RESPIRATORY - ADULT  Goal: Achieves optimal ventilation and oxygenation  Description: INTERVENTIONS:  - Assess for changes in respiratory status  - Assess for changes in mentation and behavior  - Position to facilitate oxygenation and minimize respiratory effort  - Oxygen supplementation based on oxygen saturation or ABGs  - Provide Smoking Cessation handout, if applicable  - Encourage broncho-pulmonary hygiene including cough, deep breathe, Incentive Spirometry  - Assess the need for suctioning and perform as needed  - Assess and instruct to report SOB or any respiratory difficulty  - Respiratory Therapy support as indicated  - Manage/alleviate anxiety  - Monitor for signs/symptoms of CO2 retention  Outcome: Progressing     Problem: GASTROINTESTINAL - ADULT  Goal: Maintains or returns to baseline bowel function  Description: INTERVENTIONS:  - Assess bowel function  - Maintain adequate hydration with IV or PO as ordered and tolerated  - Evaluate effectiveness of GI medications  - Encourage mobilization and activity  - Obtain nutritional consult as needed  - Establish a toileting routine/schedule  - Consider collaborating with pharmacy to review patient's medication profile  Outcome: Progressing     Problem: SKIN/TISSUE INTEGRITY - ADULT  Goal: Skin integrity remains intact  Description: INTERVENTIONS  - Assess and document risk factors for pressure ulcer development  - Assess and document skin integrity  - Monitor for areas of redness and/or skin breakdown  - Initiate interventions, skin care algorithm/standards of care as needed  Outcome: Progressing  Goal: Incision(s), wounds(s) or drain site(s) healing without S/S of infection  Description: INTERVENTIONS:  - Assess and document risk factors for pressure ulcer development  - Assess and document skin integrity  - Assess and document dressing/incision, wound bed, drain sites and surrounding tissue  - Implement wound care per orders  - Initiate isolation precautions as appropriate  - Initiate Pressure Ulcer prevention bundle as indicated  Outcome: Progressing     Problem: Impaired Functional Mobility  Goal: Achieve highest/safest level of mobility/gait  Description: Interventions:  - Assess patient's functional ability and stability  - Promote increasing activity/tolerance for mobility and gait  - Educate and engage patient/family in tolerated activity level and precautions    Outcome: Progressing     Problem: Impaired Swallowing  Goal: Minimize aspiration risk  Description: Interventions:  - Patient should be alert and upright for all feedings (90 degrees preferred)  - Offer food and liquids at a slow rate  - No straws  - Encourage small bites of food and small sips of liquid  - Offer pills one at a time, crush or deliver with applesauce as needed  - Discontinue feeding and notify MD (or speech pathologist) if coughing or persistent throat clearing or wet/gurgly vocal quality is noted  Outcome: Progressing     Problem: PAIN - ADULT  Goal: Verbalizes/displays adequate comfort level or patient's stated pain goal  Description: INTERVENTIONS:  - Encourage pt to monitor pain and request assistance  - Assess pain using appropriate pain scale  - Administer analgesics based on type and severity of pain and evaluate response  - Implement non-pharmacological measures as appropriate and evaluate response  - Consider cultural and social influences on pain and pain management  - Manage/alleviate anxiety  - Utilize distraction and/or relaxation techniques  - Monitor for opioid side effects  - Notify MD/LIP if interventions unsuccessful or patient reports new pain  - Anticipate increased pain with activity and pre-medicate as appropriate  Outcome: Progressing     Problem: RISK FOR INFECTION - ADULT  Goal: Absence of fever/infection during anticipated neutropenic period  Description: INTERVENTIONS  - Monitor WBC  - Administer growth factors as ordered  - Implement neutropenic guidelines  Outcome: Progressing     Problem: SAFETY ADULT - FALL  Goal: Free from fall injury  Description: INTERVENTIONS:  - Assess pt frequently for physical needs  - Identify cognitive and physical deficits and behaviors that affect risk of falls.   - [place] fall precautions as indicated by assessment.  - Educate pt/family on patient safety including physical limitations  - Instruct pt to call for assistance with activity based on assessment  - Modify environment to reduce risk of injury  - Provide assistive devices as appropriate  - Consider OT/PT consult to assist with strengthening/mobility  - Encourage toileting schedule  Outcome: Progressing     Problem: DISCHARGE PLANNING  Goal: Discharge to home or other facility with appropriate resources  Description: INTERVENTIONS:  - Identify barriers to discharge w/pt and caregiver  - Include patient/family/discharge partner in discharge planning  - Arrange for needed discharge resources and transportation as appropriate  - Identify discharge learning needs (meds, wound care, etc)  - Arrange for interpreters to assist at discharge as needed  - Consider post-discharge preferences of patient/family/discharge partner  - Complete POLST form as appropriate  - Assess patient's ability to be responsible for managing their own health  - Refer to Case Management Department for coordinating discharge planning if the patient needs post-hospital services based on physician/LIP order or complex needs related to functional status, cognitive ability or social support system  Outcome: Progressing     Patient A/Ox4, resting in bed, request to sit in chair max assistance. Speech eval, recommended video swallow, aspiration precaution, continue pureed/nectar thick. IV antibiotic. Fall precaution. Isolation. Pain managed. PT/OT eval. IV fluids. Call light within reach.

## 2022-05-14 NOTE — PLAN OF CARE
Problem: Patient Centered Care  Goal: Patient preferences are identified and integrated in the patient's plan of care  Description: Interventions:  - What would you like us to know as we care for you? \" I am a baby when it comes to shots\". - Provide timely, complete, and accurate information to patient/family  - Incorporate patient and family knowledge, values, beliefs, and cultural backgrounds into the planning and delivery of care  - Encourage patient/family to participate in care and decision-making at the level they choose  - Honor patient and family perspectives and choices  Outcome: Progressing   Pt was informed on md orders and plan of care. Problem: SKIN/TISSUE INTEGRITY - ADULT  Goal: Skin integrity remains intact  Description: INTERVENTIONS  - Assess and document risk factors for pressure ulcer development  - Assess and document skin integrity  - Monitor for areas of redness and/or skin breakdown  - Initiate interventions, skin care algorithm/standards of care as needed  Outcome: Progressing  Aquacel foam applied to sacrum as protection. Goal: Incision(s), wounds(s) or drain site(s) healing without S/S of infection  Description: INTERVENTIONS:  - Assess and document risk factors for pressure ulcer development  - Assess and document skin integrity  - Assess and document dressing/incision, wound bed, drain sites and surrounding tissue  - Implement wound care per orders  - Initiate isolation precautions as appropriate  - Initiate Pressure Ulcer prevention bundle as indicated  Outcome: Progressing     Problem: SAFETY ADULT - FALL  Goal: Free from fall injury  Description: INTERVENTIONS:  - Assess pt frequently for physical needs  - Identify cognitive and physical deficits and behaviors that affect risk of falls.   - [place] fall precautions as indicated by assessment.  - Educate pt/family on patient safety including physical limitations  - Instruct pt to call for assistance with activity based on assessment  - Modify environment to reduce risk of injury  - Provide assistive devices as appropriate  - Consider OT/PT consult to assist with strengthening/mobility  - Encourage toileting schedule  Outcome: Progressing   Fall prec in place, bed alarm on, frequent rounding by nursing staff.    Problem: DISCHARGE PLANNING  Goal: Discharge to home or other facility with appropriate resources  Description: INTERVENTIONS:  - Identify barriers to discharge w/pt and caregiver  - Include patient/family/discharge partner in discharge planning  - Arrange for needed discharge resources and transportation as appropriate  - Identify discharge learning needs (meds, wound care, etc)  - Arrange for interpreters to assist at discharge as needed  - Consider post-discharge preferences of patient/family/discharge partner  - Complete POLST form as appropriate  - Assess patient's ability to be responsible for managing their own health  - Refer to Case Management Department for coordinating discharge planning if the patient needs post-hospital services based on physician/LIP order or complex needs related to functional status, cognitive ability or social support system  Outcome: Progressing     Problem: RESPIRATORY - ADULT  Goal: Achieves optimal ventilation and oxygenation  Description: INTERVENTIONS:  - Assess for changes in respiratory status  - Assess for changes in mentation and behavior  - Position to facilitate oxygenation and minimize respiratory effort  - Oxygen supplementation based on oxygen saturation or ABGs  - Provide Smoking Cessation handout, if applicable  - Encourage broncho-pulmonary hygiene including cough, deep breathe, Incentive Spirometry  - Assess the need for suctioning and perform as needed  - Assess and instruct to report SOB or any respiratory difficulty  - Respiratory Therapy support as indicated  - Manage/alleviate anxiety  - Monitor for signs/symptoms of CO2 retention  Outcome: Not Progressing   Desat on room air, o2 started, on zosyn for pneumonia. Problem: GASTROINTESTINAL - ADULT  Goal: Maintains or returns to baseline bowel function  Description: INTERVENTIONS:  - Assess bowel function  - Maintain adequate hydration with IV or PO as ordered and tolerated  - Evaluate effectiveness of GI medications  - Encourage mobilization and activity  - Obtain nutritional consult as needed  - Establish a toileting routine/schedule  - Consider collaborating with pharmacy to review patient's medication profile  Outcome: Not Progressing  Reported diarrhea, stool for c diff needed, contact/enteric isolation. 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 Problem: Impaired Swallowing  Goal: Minimize aspiration risk  Description: Interventions:  - Patient should be alert and upright for all feedings (90 degrees preferred)  - Offer food and liquids at a slow rate  - No straws  - Encourage small bites of food and small sips of liquid  - Offer pills one at a time, crush or deliver with applesauce as needed  - Discontinue feeding and notify MD (or speech pathologist) if coughing or persistent throat clearing or wet/gurgly vocal quality is noted  Outcome: Not Progressing   Aspiration prec, passed dysphagia screen,  SLP to see.   Problem: PAIN - ADULT  Goal: Verbalizes/displays adequate comfort level or patient's stated pain goal  Description: INTERVENTIONS:  - Encourage pt to monitor pain and request assistance  - Assess pain using appropriate pain scale  - Administer analgesics based on type and severity of pain and evaluate response  - Implement non-pharmacological measures as appropriate and evaluate response  - Consider cultural and social influences on pain and pain management  - Manage/alleviate anxiety  - Utilize distraction and/or relaxation techniques  - Monitor for opioid side effects  - Notify MD/LIP if interventions unsuccessful or patient reports new pain  - Anticipate increased pain with activity and pre-medicate as appropriate  Outcome: Not Progressing   Morphine given for pain to bilat knees, pt also reports chronic back pain. Problem: RISK FOR INFECTION - ADULT  Goal: Absence of fever/infection during anticipated neutropenic period  Description: INTERVENTIONS  - Monitor WBC, temp, culture  - Administer antibiotic as ordered  - Implement neutropenic guidelines  Outcome: Not Progressing  Sepsis bpa fired on admit, MD notified, continue to monitor. Problem: Patient/Family Goals  Goal: Patient/Family Long Term Goal  Description: Patient's Long Term Goal: \"Feel better and go home\". Interventions:  - Follow MD orders  -Administer meds  -Monitor vitals, labs, test results  -Assist w/ ADL's  -Pain control  -Fall/skin/aspiration precautions  -Inform/update pt/family on the plan of care  - See additional Care Plan goals for specific interventions  Outcome: Not Progressing  Goal: Patient/Family Short Term Goal  Description: Patient's Short Term Goal: \"Feel better and go home\".     Interventions:   -Follow MD orders  -Administer meds  -Monitor vitals, labs, test results  -Assist w/ ADL's  -Pain control  -Fall/skin/aspiration precautions  -Inform/update pt/family on the plan of care    - See additional Care Plan goals for specific interventions  Outcome: Not Progressing

## 2022-05-15 LAB
ANION GAP SERPL CALC-SCNC: 5 MMOL/L (ref 0–18)
BASOPHILS # BLD AUTO: 0.07 X10(3) UL (ref 0–0.2)
BASOPHILS NFR BLD AUTO: 0.5 %
BUN BLD-MCNC: 27 MG/DL (ref 7–18)
BUN/CREAT SERPL: 16.3 (ref 10–20)
CALCIUM BLD-MCNC: 9 MG/DL (ref 8.5–10.1)
CHLORIDE SERPL-SCNC: 112 MMOL/L (ref 98–112)
CO2 SERPL-SCNC: 26 MMOL/L (ref 21–32)
CREAT BLD-MCNC: 1.66 MG/DL
DEPRECATED RDW RBC AUTO: 51.5 FL (ref 35.1–46.3)
EOSINOPHIL # BLD AUTO: 0.22 X10(3) UL (ref 0–0.7)
EOSINOPHIL NFR BLD AUTO: 1.6 %
ERYTHROCYTE [DISTWIDTH] IN BLOOD BY AUTOMATED COUNT: 13.8 % (ref 11–15)
GLUCOSE BLD-MCNC: 130 MG/DL (ref 70–99)
HCT VFR BLD AUTO: 38.6 %
HGB BLD-MCNC: 11.9 G/DL
IMM GRANULOCYTES # BLD AUTO: 0.06 X10(3) UL (ref 0–1)
IMM GRANULOCYTES NFR BLD: 0.4 %
LYMPHOCYTES # BLD AUTO: 2.07 X10(3) UL (ref 1–4)
LYMPHOCYTES NFR BLD AUTO: 15.3 %
MCH RBC QN AUTO: 31.2 PG (ref 26–34)
MCHC RBC AUTO-ENTMCNC: 30.8 G/DL (ref 31–37)
MCV RBC AUTO: 101 FL
MONOCYTES # BLD AUTO: 1.09 X10(3) UL (ref 0.1–1)
MONOCYTES NFR BLD AUTO: 8.1 %
NEUTROPHILS # BLD AUTO: 10.03 X10 (3) UL (ref 1.5–7.7)
NEUTROPHILS # BLD AUTO: 10.03 X10(3) UL (ref 1.5–7.7)
NEUTROPHILS NFR BLD AUTO: 74.1 %
OSMOLALITY SERPL CALC.SUM OF ELEC: 303 MOSM/KG (ref 275–295)
PLATELET # BLD AUTO: 140 10(3)UL (ref 150–450)
POTASSIUM SERPL-SCNC: 3.5 MMOL/L (ref 3.5–5.1)
RBC # BLD AUTO: 3.82 X10(6)UL
SODIUM SERPL-SCNC: 143 MMOL/L (ref 136–145)
WBC # BLD AUTO: 13.5 X10(3) UL (ref 4–11)

## 2022-05-15 PROCEDURE — 99233 SBSQ HOSP IP/OBS HIGH 50: CPT | Performed by: HOSPITALIST

## 2022-05-15 RX ORDER — POLYETHYLENE GLYCOL 3350 17 G/17G
17 POWDER, FOR SOLUTION ORAL DAILY PRN
Status: DISCONTINUED | OUTPATIENT
Start: 2022-05-15 | End: 2022-05-17

## 2022-05-15 RX ORDER — DOCUSATE SODIUM 100 MG/1
100 CAPSULE, LIQUID FILLED ORAL 2 TIMES DAILY
Status: DISCONTINUED | OUTPATIENT
Start: 2022-05-15 | End: 2022-05-17

## 2022-05-15 RX ORDER — BISACODYL 10 MG
10 SUPPOSITORY, RECTAL RECTAL
Status: DISCONTINUED | OUTPATIENT
Start: 2022-05-15 | End: 2022-05-17

## 2022-05-15 NOTE — PLAN OF CARE
Problem: Patient Centered Care  Goal: Patient preferences are identified and integrated in the patient's plan of care  Description: Interventions:  - What would you like us to know as we care for you? \" I am a baby when it comes to shots\".   - Provide timely, complete, and accurate information to patient/family  - Incorporate patient and family knowledge, values, beliefs, and cultural backgrounds into the planning and delivery of care  - Encourage patient/family to participate in care and decision-making at the level they choose  - Honor patient and family perspectives and choices  Outcome: Progressing     Problem: RESPIRATORY - ADULT  Goal: Achieves optimal ventilation and oxygenation  Description: INTERVENTIONS:  - Assess for changes in respiratory status  - Assess for changes in mentation and behavior  - Position to facilitate oxygenation and minimize respiratory effort  - Oxygen supplementation based on oxygen saturation or ABGs  - Provide Smoking Cessation handout, if applicable  - Encourage broncho-pulmonary hygiene including cough, deep breathe, Incentive Spirometry  - Assess the need for suctioning and perform as needed  - Assess and instruct to report SOB or any respiratory difficulty  - Respiratory Therapy support as indicated  - Manage/alleviate anxiety  - Monitor for signs/symptoms of CO2 retention  Outcome: Progressing     Problem: GASTROINTESTINAL - ADULT  Goal: Maintains or returns to baseline bowel function  Description: INTERVENTIONS:  - Assess bowel function  - Maintain adequate hydration with IV or PO as ordered and tolerated  - Evaluate effectiveness of GI medications  - Encourage mobilization and activity  - Obtain nutritional consult as needed  - Establish a toileting routine/schedule  - Consider collaborating with pharmacy to review patient's medication profile  Outcome: Progressing     Problem: SKIN/TISSUE INTEGRITY - ADULT  Goal: Skin integrity remains intact  Description: INTERVENTIONS  - Assess and document risk factors for pressure ulcer development  - Assess and document skin integrity  - Monitor for areas of redness and/or skin breakdown  - Initiate interventions, skin care algorithm/standards of care as needed  Outcome: Progressing  Goal: Incision(s), wounds(s) or drain site(s) healing without S/S of infection  Description: INTERVENTIONS:  - Assess and document risk factors for pressure ulcer development  - Assess and document skin integrity  - Assess and document dressing/incision, wound bed, drain sites and surrounding tissue  - Implement wound care per orders  - Initiate isolation precautions as appropriate  - Initiate Pressure Ulcer prevention bundle as indicated  Outcome: Progressing     Problem: Impaired Functional Mobility  Goal: Achieve highest/safest level of mobility/gait  Description: Interventions:  - Assess patient's functional ability and stability  - Promote increasing activity/tolerance for mobility and gait  - Educate and engage patient/family in tolerated activity level and precautions    Outcome: Progressing     Problem: Impaired Swallowing  Goal: Minimize aspiration risk  Description: Interventions:  - Patient should be alert and upright for all feedings (90 degrees preferred)  - Offer food and liquids at a slow rate  - No straws  - Encourage small bites of food and small sips of liquid  - Offer pills one at a time, crush or deliver with applesauce as needed  - Discontinue feeding and notify MD (or speech pathologist) if coughing or persistent throat clearing or wet/gurgly vocal quality is noted  Outcome: Progressing     Problem: RISK FOR INFECTION - ADULT  Goal: Absence of fever/infection during anticipated neutropenic period  Description: INTERVENTIONS  - Monitor WBC  - Administer growth factors as ordered  - Implement neutropenic guidelines  Outcome: Progressing     Patient A/Ox4, up in chair for meals, denies pain, is comfortable. X1-X2 assistance. Fall precaution.  PT/OT eval. Speech eval will continue on same diet. Aspiration precaution. Continue IV fluid. Continue IV antibiotic. Call light within reach.

## 2022-05-15 NOTE — PLAN OF CARE
Problem: Patient Centered Care  Goal: Patient preferences are identified and integrated in the patient's plan of care  Description: Interventions:  - What would you like us to know as we care for you? \" I am a baby when it comes to shots\".   - Provide timely, complete, and accurate information to patient/family  - Incorporate patient and family knowledge, values, beliefs, and cultural backgrounds into the planning and delivery of care  - Encourage patient/family to participate in care and decision-making at the level they choose  - Honor patient and family perspectives and choices  Outcome: Progressing     Problem: RESPIRATORY - ADULT  Goal: Achieves optimal ventilation and oxygenation  Description: INTERVENTIONS:  - Assess for changes in respiratory status  - Assess for changes in mentation and behavior  - Position to facilitate oxygenation and minimize respiratory effort  - Oxygen supplementation based on oxygen saturation or ABGs  - Provide Smoking Cessation handout, if applicable  - Encourage broncho-pulmonary hygiene including cough, deep breathe, Incentive Spirometry  - Assess the need for suctioning and perform as needed  - Assess and instruct to report SOB or any respiratory difficulty  - Respiratory Therapy support as indicated  - Manage/alleviate anxiety  - Monitor for signs/symptoms of CO2 retention  Outcome: Progressing     Problem: GASTROINTESTINAL - ADULT  Goal: Maintains or returns to baseline bowel function  Description: INTERVENTIONS:  - Assess bowel function  - Maintain adequate hydration with IV or PO as ordered and tolerated  - Evaluate effectiveness of GI medications  - Encourage mobilization and activity  - Obtain nutritional consult as needed  - Establish a toileting routine/schedule  - Consider collaborating with pharmacy to review patient's medication profile  Outcome: Progressing     Problem: SKIN/TISSUE INTEGRITY - ADULT  Goal: Skin integrity remains intact  Description: INTERVENTIONS  - Assess and document risk factors for pressure ulcer development  - Assess and document skin integrity  - Monitor for areas of redness and/or skin breakdown  - Initiate interventions, skin care algorithm/standards of care as needed  Outcome: Progressing  Goal: Incision(s), wounds(s) or drain site(s) healing without S/S of infection  Description: INTERVENTIONS:  - Assess and document risk factors for pressure ulcer development  - Assess and document skin integrity  - Assess and document dressing/incision, wound bed, drain sites and surrounding tissue  - Implement wound care per orders  - Initiate isolation precautions as appropriate  - Initiate Pressure Ulcer prevention bundle as indicated  Outcome: Progressing     Problem: Impaired Functional Mobility  Goal: Achieve highest/safest level of mobility/gait  Description: Interventions:  - Assess patient's functional ability and stability  - Promote increasing activity/tolerance for mobility and gait  - Educate and engage patient/family in tolerated activity level and precautions  Outcome: Progressing     Problem: Impaired Swallowing  Goal: Minimize aspiration risk  Description: Interventions:  - Patient should be alert and upright for all feedings (90 degrees preferred)  - Offer food and liquids at a slow rate  - No straws  - Encourage small bites of food and small sips of liquid  - Offer pills one at a time, crush or deliver with applesauce as needed  - Discontinue feeding and notify MD (or speech pathologist) if coughing or persistent throat clearing or wet/gurgly vocal quality is noted  Outcome: Progressing     Problem: PAIN - ADULT  Goal: Verbalizes/displays adequate comfort level or patient's stated pain goal  Description: INTERVENTIONS:  - Encourage pt to monitor pain and request assistance  - Assess pain using appropriate pain scale  - Administer analgesics based on type and severity of pain and evaluate response  - Implement non-pharmacological measures as appropriate and evaluate response  - Consider cultural and social influences on pain and pain management  - Manage/alleviate anxiety  - Utilize distraction and/or relaxation techniques  - Monitor for opioid side effects  - Notify MD/LIP if interventions unsuccessful or patient reports new pain  - Anticipate increased pain with activity and pre-medicate as appropriate  Outcome: Progressing     Problem: RISK FOR INFECTION - ADULT  Goal: Absence of fever/infection during anticipated neutropenic period  Description: INTERVENTIONS  - Monitor WBC, temp, culture  - Administer antibiotic as ordered  - Implement neutropenic guidelines  Outcome: Progressing     Problem: SAFETY ADULT - FALL  Goal: Free from fall injury  Description: INTERVENTIONS:  - Assess pt frequently for physical needs  - Identify cognitive and physical deficits and behaviors that affect risk of falls.   - [place] fall precautions as indicated by assessment.  - Educate pt/family on patient safety including physical limitations  - Instruct pt to call for assistance with activity based on assessment  - Modify environment to reduce risk of injury  - Provide assistive devices as appropriate  - Consider OT/PT consult to assist with strengthening/mobility  - Encourage toileting schedule  Outcome: Progressing     Problem: DISCHARGE PLANNING  Goal: Discharge to home or other facility with appropriate resources  Description: INTERVENTIONS:  - Identify barriers to discharge w/pt and caregiver  - Include patient/family/discharge partner in discharge planning  - Arrange for needed discharge resources and transportation as appropriate  - Identify discharge learning needs (meds, wound care, etc)  - Arrange for interpreters to assist at discharge as needed  - Consider post-discharge preferences of patient/family/discharge partner  - Complete POLST form as appropriate  - Assess patient's ability to be responsible for managing their own health  - Refer to Case Management Department for coordinating discharge planning if the patient needs post-hospital services based on physician/LIP order or complex needs related to functional status, cognitive ability or social support system  Outcome: Progressing     Problem: Patient/Family Goals  Goal: Patient/Family Long Term Goal  Description: Patient's Long Term Goal: \"Feel better and go home\". Interventions:  - Follow MD orders  -Administer meds  -Monitor vitals, labs, test results  -Assist w/ ADL's  -Pain control  -Fall/skin/aspiration precautions  -Inform/update pt/family on the plan of care  - See additional Care Plan goals for specific interventions  Outcome: Progressing  Goal: Patient/Family Short Term Goal  Description: Patient's Short Term Goal: \"Feel better and go home\". Interventions:   -Follow MD orders  -Administer meds  -Monitor vitals, labs, test results  -Assist w/ ADL's  -Pain control  -Fall/skin/aspiration precautions  -Inform/update pt/family on the plan of care    - See additional Care Plan goals for specific interventions  Outcome: Progressing   Pt is on room air, no acute resp distress, new order for tylenol or tramadol for pain per wife's request, wife does not want pt to be given morphine, pt denies need for pain med, had psvt at hs, asymptomatic, no new orders, nectar thick liquids, all precautions are in place, closely monitored, frequent nursing rounding.

## 2022-05-16 LAB
ANION GAP SERPL CALC-SCNC: 5 MMOL/L (ref 0–18)
BASOPHILS # BLD AUTO: 0.06 X10(3) UL (ref 0–0.2)
BASOPHILS NFR BLD AUTO: 0.6 %
BUN BLD-MCNC: 17 MG/DL (ref 7–18)
BUN/CREAT SERPL: 12.3 (ref 10–20)
CALCIUM BLD-MCNC: 8.5 MG/DL (ref 8.5–10.1)
CHLORIDE SERPL-SCNC: 114 MMOL/L (ref 98–112)
CO2 SERPL-SCNC: 24 MMOL/L (ref 21–32)
CREAT BLD-MCNC: 1.38 MG/DL
DEPRECATED RDW RBC AUTO: 50.3 FL (ref 35.1–46.3)
EOSINOPHIL # BLD AUTO: 0.26 X10(3) UL (ref 0–0.7)
EOSINOPHIL NFR BLD AUTO: 2.6 %
ERYTHROCYTE [DISTWIDTH] IN BLOOD BY AUTOMATED COUNT: 13.5 % (ref 11–15)
GLUCOSE BLD-MCNC: 130 MG/DL (ref 70–99)
HCT VFR BLD AUTO: 35.6 %
HGB BLD-MCNC: 11.2 G/DL
IMM GRANULOCYTES # BLD AUTO: 0.03 X10(3) UL (ref 0–1)
IMM GRANULOCYTES NFR BLD: 0.3 %
LYMPHOCYTES # BLD AUTO: 2 X10(3) UL (ref 1–4)
LYMPHOCYTES NFR BLD AUTO: 20 %
MCH RBC QN AUTO: 31.5 PG (ref 26–34)
MCHC RBC AUTO-ENTMCNC: 31.5 G/DL (ref 31–37)
MCV RBC AUTO: 100.3 FL
MONOCYTES # BLD AUTO: 0.81 X10(3) UL (ref 0.1–1)
MONOCYTES NFR BLD AUTO: 8.1 %
NEUTROPHILS # BLD AUTO: 6.82 X10 (3) UL (ref 1.5–7.7)
NEUTROPHILS # BLD AUTO: 6.82 X10(3) UL (ref 1.5–7.7)
NEUTROPHILS NFR BLD AUTO: 68.4 %
OSMOLALITY SERPL CALC.SUM OF ELEC: 299 MOSM/KG (ref 275–295)
PLATELET # BLD AUTO: 139 10(3)UL (ref 150–450)
POTASSIUM SERPL-SCNC: 3.7 MMOL/L (ref 3.5–5.1)
RBC # BLD AUTO: 3.55 X10(6)UL
SODIUM SERPL-SCNC: 143 MMOL/L (ref 136–145)
WBC # BLD AUTO: 10 X10(3) UL (ref 4–11)

## 2022-05-16 PROCEDURE — 99233 SBSQ HOSP IP/OBS HIGH 50: CPT | Performed by: HOSPITALIST

## 2022-05-16 RX ORDER — VANCOMYCIN HYDROCHLORIDE 125 MG/1
125 CAPSULE ORAL DAILY
Status: DISCONTINUED | OUTPATIENT
Start: 2022-05-16 | End: 2022-05-17

## 2022-05-16 NOTE — PLAN OF CARE
Pt. Alert, oriented, vitals stable. Denies pain/discomfort. Tolerated scheduled medications. No sob or respiratory distress. 1:1 feed. Incontinence care provided. Wife at bedside, nursing update given. Call light within reach, able to make needs known. Problem: Patient Centered Care  Goal: Patient preferences are identified and integrated in the patient's plan of care  Description: Interventions:  - What would you like us to know as we care for you? \" I am a baby when it comes to shots\".   - Provide timely, complete, and accurate information to patient/family  - Incorporate patient and family knowledge, values, beliefs, and cultural backgrounds into the planning and delivery of care  - Encourage patient/family to participate in care and decision-making at the level they choose  - Honor patient and family perspectives and choices  Outcome: Progressing     Problem: RESPIRATORY - ADULT  Goal: Achieves optimal ventilation and oxygenation  Description: INTERVENTIONS:  - Assess for changes in respiratory status  - Assess for changes in mentation and behavior  - Position to facilitate oxygenation and minimize respiratory effort  - Oxygen supplementation based on oxygen saturation or ABGs  - Provide Smoking Cessation handout, if applicable  - Encourage broncho-pulmonary hygiene including cough, deep breathe, Incentive Spirometry  - Assess the need for suctioning and perform as needed  - Assess and instruct to report SOB or any respiratory difficulty  - Respiratory Therapy support as indicated  - Manage/alleviate anxiety  - Monitor for signs/symptoms of CO2 retention  Outcome: Progressing     Problem: GASTROINTESTINAL - ADULT  Goal: Maintains or returns to baseline bowel function  Description: INTERVENTIONS:  - Assess bowel function  - Maintain adequate hydration with IV or PO as ordered and tolerated  - Evaluate effectiveness of GI medications  - Encourage mobilization and activity  - Obtain nutritional consult as needed  - Establish a toileting routine/schedule  - Consider collaborating with pharmacy to review patient's medication profile  Outcome: Progressing     Problem: SKIN/TISSUE INTEGRITY - ADULT  Goal: Skin integrity remains intact  Description: INTERVENTIONS  - Assess and document risk factors for pressure ulcer development  - Assess and document skin integrity  - Monitor for areas of redness and/or skin breakdown  - Initiate interventions, skin care algorithm/standards of care as needed  Outcome: Progressing  Goal: Incision(s), wounds(s) or drain site(s) healing without S/S of infection  Description: INTERVENTIONS:  - Assess and document risk factors for pressure ulcer development  - Assess and document skin integrity  - Assess and document dressing/incision, wound bed, drain sites and surrounding tissue  - Implement wound care per orders  - Initiate isolation precautions as appropriate  - Initiate Pressure Ulcer prevention bundle as indicated  Outcome: Progressing     Problem: Impaired Functional Mobility  Goal: Achieve highest/safest level of mobility/gait  Description: Interventions:  - Assess patient's functional ability and stability  - Promote increasing activity/tolerance for mobility and gait  - Educate and engage patient/family in tolerated activity level and precautions  - Recommend patient transfer to bedside chair toward strongest side  - When transferring patient, block weaker knee for safety  - Recommend use of chair position in bed 3 times per day  Outcome: Progressing     Problem: Impaired Swallowing  Goal: Minimize aspiration risk  Description: Interventions:  - Patient should be alert and upright for all feedings (90 degrees preferred)  - Offer food and liquids at a slow rate  - No straws  - Encourage small bites of food and small sips of liquid  - Offer pills one at a time, crush or deliver with applesauce as needed  - Discontinue feeding and notify MD (or speech pathologist) if coughing or persistent throat clearing or wet/gurgly vocal quality is noted  Outcome: Progressing     Problem: PAIN - ADULT  Goal: Verbalizes/displays adequate comfort level or patient's stated pain goal  Description: INTERVENTIONS:  - Encourage pt to monitor pain and request assistance  - Assess pain using appropriate pain scale  - Administer analgesics based on type and severity of pain and evaluate response  - Implement non-pharmacological measures as appropriate and evaluate response  - Consider cultural and social influences on pain and pain management  - Manage/alleviate anxiety  - Utilize distraction and/or relaxation techniques  - Monitor for opioid side effects  - Notify MD/LIP if interventions unsuccessful or patient reports new pain  - Anticipate increased pain with activity and pre-medicate as appropriate  Outcome: Progressing     Problem: RISK FOR INFECTION - ADULT  Goal: Absence of fever/infection during anticipated neutropenic period  Description: INTERVENTIONS  - Monitor WBC  - Administer growth factors as ordered  - Implement neutropenic guidelines  Outcome: Progressing     Problem: SAFETY ADULT - FALL  Goal: Free from fall injury  Description: INTERVENTIONS:  - Assess pt frequently for physical needs  - Identify cognitive and physical deficits and behaviors that affect risk of falls.   - [place] fall precautions as indicated by assessment.  - Educate pt/family on patient safety including physical limitations  - Instruct pt to call for assistance with activity based on assessment  - Modify environment to reduce risk of injury  - Provide assistive devices as appropriate  - Consider OT/PT consult to assist with strengthening/mobility  - Encourage toileting schedule  Outcome: Progressing     Problem: DISCHARGE PLANNING  Goal: Discharge to home or other facility with appropriate resources  Description: INTERVENTIONS:  - Identify barriers to discharge w/pt and caregiver  - Include patient/family/discharge partner in discharge planning  - Arrange for needed discharge resources and transportation as appropriate  - Identify discharge learning needs (meds, wound care, etc)  - Arrange for interpreters to assist at discharge as needed  - Consider post-discharge preferences of patient/family/discharge partner  - Complete POLST form as appropriate  - Assess patient's ability to be responsible for managing their own health  - Refer to Case Management Department for coordinating discharge planning if the patient needs post-hospital services based on physician/LIP order or complex needs related to functional status, cognitive ability or social support system  Outcome: Progressing     Problem: Patient/Family Goals  Goal: Patient/Family Long Term Goal  Description: Patient's Long Term Goal: \"Feel better and go home\". Interventions:  - Follow MD orders  -Administer meds  -Monitor vitals, labs, test results  -Assist w/ ADL's  -Pain control  -Fall/skin/aspiration precautions  -Inform/update pt/family on the plan of care  - See additional Care Plan goals for specific interventions  Outcome: Progressing  Goal: Patient/Family Short Term Goal  Description: Patient's Short Term Goal: \"Feel better and go home\".       Interventions:   -Follow MD orders  -Administer meds  -Monitor vitals, labs, test results  -Assist w/ ADL's  -Pain control  -Fall/skin/aspiration precautions  -Inform/update pt/family on the plan of care    - See additional Care Plan goals for specific interventions  Outcome: Progressing

## 2022-05-16 NOTE — CM/SW NOTE
PT/OT recommending acute rehab. Met with patient and wife Keaton Callahan for assessment. Patient, Keaton Callahan and their daughter live in a 2 level home w/ 10 stairs. Patient receives help with all ADLs/IADLs, Keaton Callahan is available 24/7. He uses a cane. [facility] is current w/ [facility]. Denies h/o SNF. Discussed discharge plan, family agreeable to acute rehab & prefer [facility]. Referral initiated via [facility]. SW to f/u with list of options & choice.     Elwood Staff, [address]

## 2022-05-16 NOTE — PLAN OF CARE
Problem: Patient Centered Care  Goal: Patient preferences are identified and integrated in the patient's plan of care  Description: Interventions:  - What would you like us to know as we care for you? \" I am a baby when it comes to shots\".   - Provide timely, complete, and accurate information to patient/family  - Incorporate patient and family knowledge, values, beliefs, and cultural backgrounds into the planning and delivery of care  - Encourage patient/family to participate in care and decision-making at the level they choose  - Honor patient and family perspectives and choices  Outcome: Progressing     Problem: RESPIRATORY - ADULT  Goal: Achieves optimal ventilation and oxygenation  Description: INTERVENTIONS:  - Assess for changes in respiratory status  - Assess for changes in mentation and behavior  - Position to facilitate oxygenation and minimize respiratory effort  - Oxygen supplementation based on oxygen saturation or ABGs  - Provide Smoking Cessation handout, if applicable  - Encourage broncho-pulmonary hygiene including cough, deep breathe, Incentive Spirometry  - Assess the need for suctioning and perform as needed  - Assess and instruct to report SOB or any respiratory difficulty  - Respiratory Therapy support as indicated  - Manage/alleviate anxiety  - Monitor for signs/symptoms of CO2 retention  Outcome: Progressing     Problem: GASTROINTESTINAL - ADULT  Goal: Maintains or returns to baseline bowel function  Description: INTERVENTIONS:  - Assess bowel function  - Maintain adequate hydration with IV or PO as ordered and tolerated  - Evaluate effectiveness of GI medications  - Encourage mobilization and activity  - Obtain nutritional consult as needed  - Establish a toileting routine/schedule  - Consider collaborating with pharmacy to review patient's medication profile  Outcome: Progressing     Problem: SKIN/TISSUE INTEGRITY - ADULT  Goal: Skin integrity remains intact  Description: INTERVENTIONS  - Assess and document risk factors for pressure ulcer development  - Assess and document skin integrity  - Monitor for areas of redness and/or skin breakdown  - Initiate interventions, skin care algorithm/standards of care as needed  Outcome: Progressing  Goal: Incision(s), wounds(s) or drain site(s) healing without S/S of infection  Description: INTERVENTIONS:  - Assess and document risk factors for pressure ulcer development  - Assess and document skin integrity  - Assess and document dressing/incision, wound bed, drain sites and surrounding tissue  - Implement wound care per orders  - Initiate isolation precautions as appropriate  - Initiate Pressure Ulcer prevention bundle as indicated  Outcome: Progressing     Problem: Impaired Functional Mobility  Goal: Achieve highest/safest level of mobility/gait  Description: Interventions:  - Assess patient's functional ability and stability  - Promote increasing activity/tolerance for mobility and gait  - Educate and engage patient/family in tolerated activity level and precautions  Outcome: Progressing     Problem: Impaired Swallowing  Goal: Minimize aspiration risk  Description: Interventions:  - Patient should be alert and upright for all feedings (90 degrees preferred)  - Offer food and liquids at a slow rate  - No straws  - Encourage small bites of food and small sips of liquid  - Offer pills one at a time, crush or deliver with applesauce as needed  - Discontinue feeding and notify MD (or speech pathologist) if coughing or persistent throat clearing or wet/gurgly vocal quality is noted  Outcome: Progressing     Problem: RISK FOR INFECTION - ADULT  Goal: Absence of fever/infection during anticipated neutropenic period  Description: INTERVENTIONS  - Monitor WBC, temp, culture  - Administer antibiotic as ordered  - Implement neutropenic guidelines  Outcome: Progressing     Problem: SAFETY ADULT - FALL  Goal: Free from fall injury  Description: INTERVENTIONS:  - Assess pt frequently for physical needs  - Identify cognitive and physical deficits and behaviors that affect risk of falls. - [place] fall precautions as indicated by assessment.  - Educate pt/family on patient safety including physical limitations  - Instruct pt to call for assistance with activity based on assessment  - Modify environment to reduce risk of injury  - Provide assistive devices as appropriate  - Consider OT/PT consult to assist with strengthening/mobility  - Encourage toileting schedule  Outcome: Progressing     Problem: DISCHARGE PLANNING  Goal: Discharge to home or other facility with appropriate resources  Description: INTERVENTIONS:  - Identify barriers to discharge w/pt and caregiver  - Include patient/family/discharge partner in discharge planning  - Arrange for needed discharge resources and transportation as appropriate  - Identify discharge learning needs (meds, wound care, etc)  - Arrange for interpreters to assist at discharge as needed  - Consider post-discharge preferences of patient/family/discharge partner  - Complete POLST form as appropriate  - Assess patient's ability to be responsible for managing their own health  - Refer to Case Management Department for coordinating discharge planning if the patient needs post-hospital services based on physician/LIP order or complex needs related to functional status, cognitive ability or social support system  Outcome: Progressing     Problem: Patient/Family Goals  Goal: Patient/Family Long Term Goal  Description: Patient's Long Term Goal: \"Feel better and go home\".     Interventions:  - Follow MD orders  -Administer meds  -Monitor vitals, labs, test results  -Assist w/ ADL's  -Pain control  -Fall/skin/aspiration precautions  -Inform/update pt/family on the plan of care  - See additional Care Plan goals for specific interventions  Outcome: Progressing  Goal: Patient/Family Short Term Goal  Description: Patient's Short Term Goal: \"Feel better and go home\". Interventions:   -Follow MD orders  -Administer meds  -Monitor vitals, labs, test results  -Assist w/ ADL's  -Pain control  -Fall/skin/aspiration precautions  -Inform/update pt/family on the plan of care    - See additional Care Plan goals for specific interventions  Outcome: Progressing     Problem: PAIN - ADULT  Goal: Verbalizes/displays adequate comfort level or patient's stated pain goal  Description: INTERVENTIONS:  - Encourage pt to monitor pain and request assistance  - Assess pain using appropriate pain scale  - Administer analgesics based on type and severity of pain and evaluate response  - Implement non-pharmacological measures as appropriate and evaluate response  - Consider cultural and social influences on pain and pain management  - Manage/alleviate anxiety  - Utilize distraction and/or relaxation techniques  - Monitor for opioid side effects  - Notify MD/LIP if interventions unsuccessful or patient reports new pain  - Anticipate increased pain with activity and pre-medicate as appropriate  Outcome: Not Progressing   Medicated for pain, pt is more alert, oriented,  appropriate, PT/OT recommending acute rehab,  precautions are in place, no bm, c. Diff collection/isolation discontinued, po vanco prophylaxis to start today, closely monitored w/ nursing hourly rounding.

## 2022-05-16 NOTE — CM/SW NOTE
Department  notified of request for Acute Rehab, aidin referrals started. Assigned CM/SW to follow up with pt/family on further discharge planning.      Mansoor Frazier

## 2022-05-16 NOTE — CM/SW NOTE
BPCI-[employer] Medicare Program Note:  Plan of care reviewed for care coordination and discharge planning. Noted patient falls under  BPCI/Medicare program, with   for simple pneumonia. ERYN tool was used to help determine next care setting. Thus, [address] recommendations is for home health pending patient progress. Case Management team is following for care coordination and discharge planning needs.

## 2022-05-17 ENCOUNTER — TELEPHONE (OUTPATIENT)
Dept: INTERNAL MEDICINE CLINIC | Facility: CLINIC | Age: 82
End: 2022-05-17

## 2022-05-17 VITALS
TEMPERATURE: 98 F | SYSTOLIC BLOOD PRESSURE: 155 MMHG | OXYGEN SATURATION: 92 % | WEIGHT: 213.38 LBS | HEART RATE: 86 BPM | RESPIRATION RATE: 18 BRPM | DIASTOLIC BLOOD PRESSURE: 80 MMHG | BODY MASS INDEX: 29.87 KG/M2 | HEIGHT: 71 IN

## 2022-05-17 LAB
ANION GAP SERPL CALC-SCNC: 9 MMOL/L (ref 0–18)
BASOPHILS # BLD AUTO: 0.07 X10(3) UL (ref 0–0.2)
BASOPHILS NFR BLD AUTO: 0.9 %
BUN BLD-MCNC: 15 MG/DL (ref 7–18)
BUN/CREAT SERPL: 12.1 (ref 10–20)
CALCIUM BLD-MCNC: 9.3 MG/DL (ref 8.5–10.1)
CHLORIDE SERPL-SCNC: 112 MMOL/L (ref 98–112)
CO2 SERPL-SCNC: 22 MMOL/L (ref 21–32)
CREAT BLD-MCNC: 1.24 MG/DL
DEPRECATED RDW RBC AUTO: 50 FL (ref 35.1–46.3)
EOSINOPHIL # BLD AUTO: 0.26 X10(3) UL (ref 0–0.7)
EOSINOPHIL NFR BLD AUTO: 3.5 %
ERYTHROCYTE [DISTWIDTH] IN BLOOD BY AUTOMATED COUNT: 13.5 % (ref 11–15)
GLUCOSE BLD-MCNC: 102 MG/DL (ref 70–99)
HCT VFR BLD AUTO: 39.2 %
HGB BLD-MCNC: 12.1 G/DL
IMM GRANULOCYTES # BLD AUTO: 0.03 X10(3) UL (ref 0–1)
IMM GRANULOCYTES NFR BLD: 0.4 %
LYMPHOCYTES # BLD AUTO: 1.74 X10(3) UL (ref 1–4)
LYMPHOCYTES NFR BLD AUTO: 23.6 %
MCH RBC QN AUTO: 31.2 PG (ref 26–34)
MCHC RBC AUTO-ENTMCNC: 30.9 G/DL (ref 31–37)
MCV RBC AUTO: 101 FL
MONOCYTES # BLD AUTO: 0.59 X10(3) UL (ref 0.1–1)
MONOCYTES NFR BLD AUTO: 8 %
NEUTROPHILS # BLD AUTO: 4.68 X10 (3) UL (ref 1.5–7.7)
NEUTROPHILS # BLD AUTO: 4.68 X10(3) UL (ref 1.5–7.7)
NEUTROPHILS NFR BLD AUTO: 63.6 %
OSMOLALITY SERPL CALC.SUM OF ELEC: 297 MOSM/KG (ref 275–295)
PLATELET # BLD AUTO: 150 10(3)UL (ref 150–450)
POTASSIUM SERPL-SCNC: 3.8 MMOL/L (ref 3.5–5.1)
RBC # BLD AUTO: 3.88 X10(6)UL
SARS-COV-2 RNA RESP QL NAA+PROBE: NOT DETECTED
SODIUM SERPL-SCNC: 143 MMOL/L (ref 136–145)
WBC # BLD AUTO: 7.4 X10(3) UL (ref 4–11)

## 2022-05-17 PROCEDURE — 99239 HOSP IP/OBS DSCHRG MGMT >30: CPT | Performed by: HOSPITALIST

## 2022-05-17 RX ORDER — AMOXICILLIN AND CLAVULANATE POTASSIUM 875; 125 MG/1; MG/1
1 TABLET, FILM COATED ORAL 2 TIMES DAILY
Qty: 10 TABLET | Refills: 0 | Status: SHIPPED | OUTPATIENT
Start: 2022-05-17 | End: 2022-05-22

## 2022-05-17 RX ORDER — TRAMADOL HYDROCHLORIDE 50 MG/1
50 TABLET ORAL EVERY 6 HOURS PRN
Qty: 10 TABLET | Refills: 0 | Status: SHIPPED | OUTPATIENT
Start: 2022-05-17

## 2022-05-17 RX ORDER — LOSARTAN POTASSIUM 25 MG/1
25 TABLET ORAL DAILY
Qty: 30 TABLET | Refills: 3 | Status: SHIPPED | OUTPATIENT
Start: 2022-05-18

## 2022-05-17 RX ORDER — VANCOMYCIN HYDROCHLORIDE 125 MG/1
125 CAPSULE ORAL DAILY
Qty: 5 CAPSULE | Refills: 0 | Status: SHIPPED | OUTPATIENT
Start: 2022-05-18

## 2022-05-17 NOTE — PLAN OF CARE
Problem: Patient Centered Care  Goal: Patient preferences are identified and integrated in the patient's plan of care  Description: Interventions:  - What would you like us to know as we care for you? \" I am a baby when it comes to shots\".   - Provide timely, complete, and accurate information to patient/family  - Incorporate patient and family knowledge, values, beliefs, and cultural backgrounds into the planning and delivery of care  - Encourage patient/family to participate in care and decision-making at the level they choose  - Honor patient and family perspectives and choices  Outcome: Progressing     Problem: RESPIRATORY - ADULT  Goal: Achieves optimal ventilation and oxygenation  Description: INTERVENTIONS:  - Assess for changes in respiratory status  - Assess for changes in mentation and behavior  - Position to facilitate oxygenation and minimize respiratory effort  - Oxygen supplementation based on oxygen saturation or ABGs  - Provide Smoking Cessation handout, if applicable  - Encourage broncho-pulmonary hygiene including cough, deep breathe, Incentive Spirometry  - Assess the need for suctioning and perform as needed  - Assess and instruct to report SOB or any respiratory difficulty  - Respiratory Therapy support as indicated  - Manage/alleviate anxiety  - Monitor for signs/symptoms of CO2 retention  Outcome: Progressing     Problem: GASTROINTESTINAL - ADULT  Goal: Maintains or returns to baseline bowel function  Description: INTERVENTIONS:  - Assess bowel function  - Maintain adequate hydration with IV or PO as ordered and tolerated  - Evaluate effectiveness of GI medications  - Encourage mobilization and activity  - Obtain nutritional consult as needed  - Establish a toileting routine/schedule  - Consider collaborating with pharmacy to review patient's medication profile  Outcome: Progressing     Problem: SKIN/TISSUE INTEGRITY - ADULT  Goal: Skin integrity remains intact  Description: INTERVENTIONS  - Assess and document risk factors for pressure ulcer development  - Assess and document skin integrity  - Monitor for areas of redness and/or skin breakdown  - Initiate interventions, skin care algorithm/standards of care as needed  Outcome: Progressing  Goal: Incision(s), wounds(s) or drain site(s) healing without S/S of infection  Description: INTERVENTIONS:  - Assess and document risk factors for pressure ulcer development  - Assess and document skin integrity  - Assess and document dressing/incision, wound bed, drain sites and surrounding tissue  - Implement wound care per orders  - Initiate isolation precautions as appropriate  - Initiate Pressure Ulcer prevention bundle as indicated  Outcome: Progressing     Problem: Impaired Functional Mobility  Goal: Achieve highest/safest level of mobility/gait  Description: Interventions:  - Assess patient's functional ability and stability  - Promote increasing activity/tolerance for mobility and gait  - Educate and engage patient/family in tolerated activity level and precautions  - Recommend use of  cane for transfers and ambulation  Outcome: Progressing     Problem: Impaired Swallowing  Goal: Minimize aspiration risk  Description: Interventions:  - Patient should be alert and upright for all feedings (90 degrees preferred)  - Offer food and liquids at a slow rate  - No straws  - Encourage small bites of food and small sips of liquid  - Offer pills one at a time, crush or deliver with applesauce as needed  - Discontinue feeding and notify MD (or speech pathologist) if coughing or persistent throat clearing or wet/gurgly vocal quality is noted  Outcome: Progressing     Problem: PAIN - ADULT  Goal: Verbalizes/displays adequate comfort level or patient's stated pain goal  Description: INTERVENTIONS:  - Encourage pt to monitor pain and request assistance  - Assess pain using appropriate pain scale  - Administer analgesics based on type and severity of pain and evaluate response  - Implement non-pharmacological measures as appropriate and evaluate response  - Consider cultural and social influences on pain and pain management  - Manage/alleviate anxiety  - Utilize distraction and/or relaxation techniques  - Monitor for opioid side effects  - Notify MD/LIP if interventions unsuccessful or patient reports new pain  - Anticipate increased pain with activity and pre-medicate as appropriate  Outcome: Progressing     Problem: RISK FOR INFECTION - ADULT  Goal: Absence of fever/infection during anticipated neutropenic period  Description: INTERVENTIONS  - Monitor WBC  - Administer growth factors as ordered  - Implement neutropenic guidelines  Outcome: Progressing     Problem: SAFETY ADULT - FALL  Goal: Free from fall injury  Description: INTERVENTIONS:  - Assess pt frequently for physical needs  - Identify cognitive and physical deficits and behaviors that affect risk of falls.   - [facility] fall precautions as indicated by assessment.  - Educate pt/family on patient safety including physical limitations  - Instruct pt to call for assistance with activity based on assessment  - Modify environment to reduce risk of injury  - Provide assistive devices as appropriate  - Consider OT/PT consult to assist with strengthening/mobility  - Encourage toileting schedule  Outcome: Progressing     Problem: DISCHARGE PLANNING  Goal: Discharge to home or other facility with appropriate resources  Description: INTERVENTIONS:  - Identify barriers to discharge w/pt and caregiver  - Include patient/family/discharge partner in discharge planning  - Arrange for needed discharge resources and transportation as appropriate  - Identify discharge learning needs (meds, wound care, etc)  - Arrange for interpreters to assist at discharge as needed  - Consider post-discharge preferences of patient/family/discharge partner  - Complete POLST form as appropriate  - Assess patient's ability to be responsible for managing their own health  - Refer to Case Management Department for coordinating discharge planning if the patient needs post-hospital services based on physician/LIP order or complex needs related to functional status, cognitive ability or social support system  Outcome: Progressing     Problem: Patient/Family Goals  Goal: Patient/Family Long Term Goal  Description: Patient's Long Term Goal: \"Feel better and go home\". Interventions:  - Follow MD orders  -Administer meds  -Monitor vitals, labs, test results  -Assist w/ ADL's  -Pain control  -Fall/skin/aspiration precautions  -Inform/update pt/family on the plan of care  - See additional Care Plan goals for specific interventions  Outcome: Progressing  Goal: Patient/Family Short Term Goal  Description: Patient's Short Term Goal: \"Feel better and go home\". Interventions:   -Follow MD orders  -Administer meds  -Monitor vitals, labs, test results  -Assist w/ ADL's  -Pain control  -Fall/skin/aspiration precautions  -Inform/update pt/family on the plan of care    - See additional Care Plan goals for specific interventions  Outcome: Progressing     Patient resting in bed. Vital signs stable. As needed medication given for pain. Safety measures and fall precautions in place, call light with in reach, bed locked in lowest position, bed alarm on. Frequent rounding by nursing staff.

## 2022-05-17 NOTE — PLAN OF CARE
Pt. Alert, oriented, vitals stable. C/o back pain, prn medications. No sob or respiratory distress. 1:1 feed. Tolerated scheduled medications. Sitting up in chair for meals, assited with adls. Incontinence care provided. Call light within reach, able to make needs known. Wife at bedside, nursing update given. Orders for patient to discharge, report called into [facility], spoke with RN mateus. [facility] to transport  . Problem: Patient Centered Care  Goal: Patient preferences are identified and integrated in the patient's plan of care  Description: Interventions:  - What would you like us to know as we care for you? \" I am a baby when it comes to shots\".   - Provide timely, complete, and accurate information to patient/family  - Incorporate patient and family knowledge, values, beliefs, and cultural backgrounds into the planning and delivery of care  - Encourage patient/family to participate in care and decision-making at the level they choose  - Honor patient and family perspectives and choices  Outcome: Progressing     Problem: RESPIRATORY - ADULT  Goal: Achieves optimal ventilation and oxygenation  Description: INTERVENTIONS:  - Assess for changes in respiratory status  - Assess for changes in mentation and behavior  - Position to facilitate oxygenation and minimize respiratory effort  - Oxygen supplementation based on oxygen saturation or ABGs  - Provide Smoking Cessation handout, if applicable  - Encourage broncho-pulmonary hygiene including cough, deep breathe, Incentive Spirometry  - Assess the need for suctioning and perform as needed  - Assess and instruct to report SOB or any respiratory difficulty  - Respiratory Therapy support as indicated  - Manage/alleviate anxiety  - Monitor for signs/symptoms of CO2 retention  Outcome: Progressing     Problem: GASTROINTESTINAL - ADULT  Goal: Maintains or returns to baseline bowel function  Description: INTERVENTIONS:  - Assess bowel function  - Maintain adequate hydration with IV or PO as ordered and tolerated  - Evaluate effectiveness of GI medications  - Encourage mobilization and activity  - Obtain nutritional consult as needed  - Establish a toileting routine/schedule  - Consider collaborating with pharmacy to review patient's medication profile  Outcome: Progressing     Problem: SKIN/TISSUE INTEGRITY - ADULT  Goal: Skin integrity remains intact  Description: INTERVENTIONS  - Assess and document risk factors for pressure ulcer development  - Assess and document skin integrity  - Monitor for areas of redness and/or skin breakdown  - Initiate interventions, skin care algorithm/standards of care as needed  Outcome: Progressing  Goal: Incision(s), wounds(s) or drain site(s) healing without S/S of infection  Description: INTERVENTIONS:  - Assess and document risk factors for pressure ulcer development  - Assess and document skin integrity  - Assess and document dressing/incision, wound bed, drain sites and surrounding tissue  - Implement wound care per orders  - Initiate isolation precautions as appropriate  - Initiate Pressure Ulcer prevention bundle as indicated  Outcome: Progressing     Problem: Impaired Functional Mobility  Goal: Achieve highest/safest level of mobility/gait  Description: Interventions:  - Assess patient's functional ability and stability  - Promote increasing activity/tolerance for mobility and gait  - Educate and engage patient/family in tolerated activity level and precautions  - Recommend use of  RW for transfers and ambulation  - Recommend patient transfer to bedside chair toward strongest side  - When transferring patient, block weaker knee for safety  - Recommend use of chair position in bed 3 times per day  Outcome: Progressing     Problem: Impaired Swallowing  Goal: Minimize aspiration risk  Description: Interventions:  - Patient should be alert and upright for all feedings (90 degrees preferred)  - Offer food and liquids at a slow rate  - No straws  - Encourage small bites of food and small sips of liquid  - Offer pills one at a time, crush or deliver with applesauce as needed  - Discontinue feeding and notify MD (or speech pathologist) if coughing or persistent throat clearing or wet/gurgly vocal quality is noted  Outcome: Progressing     Problem: PAIN - ADULT  Goal: Verbalizes/displays adequate comfort level or patient's stated pain goal  Description: INTERVENTIONS:  - Encourage pt to monitor pain and request assistance  - Assess pain using appropriate pain scale  - Administer analgesics based on type and severity of pain and evaluate response  - Implement non-pharmacological measures as appropriate and evaluate response  - Consider cultural and social influences on pain and pain management  - Manage/alleviate anxiety  - Utilize distraction and/or relaxation techniques  - Monitor for opioid side effects  - Notify MD/LIP if interventions unsuccessful or patient reports new pain  - Anticipate increased pain with activity and pre-medicate as appropriate  Outcome: Progressing     Problem: RISK FOR INFECTION - ADULT  Goal: Absence of fever/infection during anticipated neutropenic period  Description: INTERVENTIONS  - Monitor WBC  - Administer growth factors as ordered  - Implement neutropenic guidelines  Outcome: Progressing     Problem: SAFETY ADULT - FALL  Goal: Free from fall injury  Description: INTERVENTIONS:  - Assess pt frequently for physical needs  - Identify cognitive and physical deficits and behaviors that affect risk of falls.   - [facility] fall precautions as indicated by assessment.  - Educate pt/family on patient safety including physical limitations  - Instruct pt to call for assistance with activity based on assessment  - Modify environment to reduce risk of injury  - Provide assistive devices as appropriate  - Consider OT/PT consult to assist with strengthening/mobility  - Encourage toileting schedule  Outcome: Progressing     Problem: DISCHARGE PLANNING  Goal: Discharge to home or other facility with appropriate resources  Description: INTERVENTIONS:  - Identify barriers to discharge w/pt and caregiver  - Include patient/family/discharge partner in discharge planning  - Arrange for needed discharge resources and transportation as appropriate  - Identify discharge learning needs (meds, wound care, etc)  - Arrange for interpreters to assist at discharge as needed  - Consider post-discharge preferences of patient/family/discharge partner  - Complete POLST form as appropriate  - Assess patient's ability to be responsible for managing their own health  - Refer to Case Management Department for coordinating discharge planning if the patient needs post-hospital services based on physician/LIP order or complex needs related to functional status, cognitive ability or social support system  Outcome: Progressing     Problem: Patient/Family Goals  Goal: Patient/Family Long Term Goal  Description: Patient's Long Term Goal: \"Feel better and go home\". Interventions:  - Follow MD orders  -Administer meds  -Monitor vitals, labs, test results  -Assist w/ ADL's  -Pain control  -Fall/skin/aspiration precautions  -Inform/update pt/family on the plan of care  - See additional Care Plan goals for specific interventions  Outcome: Progressing  Goal: Patient/Family Short Term Goal  Description: Patient's Short Term Goal: \"Feel better and go home\".       Interventions:   -Follow MD orders  -Administer meds  -Monitor vitals, labs, test results  -Assist w/ ADL's  -Pain control  -Fall/skin/aspiration precautions  -Inform/update pt/family on the plan of care    - See additional Care Plan goals for specific interventions  Outcome: Progressing

## 2022-05-17 NOTE — CM/SW NOTE
SW following for discharge planning. MDO received for discharge. SW met with patient and wife at bedside. SW provided [facility] list of available acute rehab for choice and quality data. Patient and wife confirmed choice for [facility]. Reserved via [facility], notified liaison of DC. Liaison confirmed bed availability for today. Requested to coordinate DC. SW coordinated DC w/ liaison. Liaison requested updated COVID result. Notified RN. Plan: [address]  Phone: [phone]  [facility] arranged via [employer] for 2 PM. PCS Complete. RN informed. Patient aware and agreeable to plan. SW/CM to remain available for support and/or discharge planning.      Ulysses Starring, MSW, [facility]   [id number]

## 2022-05-24 RX ORDER — LEVOTHYROXINE SODIUM 0.1 MG/1
100 TABLET ORAL DAILY
Qty: 90 TABLET | Refills: 0 | Status: SHIPPED | OUTPATIENT
Start: 2022-05-24

## 2022-06-03 ENCOUNTER — TELEPHONE (OUTPATIENT)
Dept: INTERNAL MEDICINE CLINIC | Facility: CLINIC | Age: 82
End: 2022-06-03

## 2022-06-03 NOTE — TELEPHONE ENCOUNTER
Pt wife Joyce Jerome is calling and states that she would like to make an appointment for the patient 3 weeks out for a follow from a hospital and rehab stay. Patient got home on 6/1/22. Dr. Daniel Tay schedule is full.   Please advise

## 2022-06-06 NOTE — TELEPHONE ENCOUNTER
NOted.  I have checked my schedule. OK to add pt to my schedule at 12:00 noon on Friday, June 17. I will route this to the .   Thank you!!

## 2022-06-13 ENCOUNTER — LAB ENCOUNTER (OUTPATIENT)
Dept: LAB | Age: 82
End: 2022-06-13
Attending: INTERNAL MEDICINE
Payer: MEDICARE

## 2022-06-13 ENCOUNTER — HOSPITAL ENCOUNTER (OUTPATIENT)
Dept: GENERAL RADIOLOGY | Age: 82
Discharge: HOME | End: 2022-06-13
Attending: INTERNAL MEDICINE
Payer: MEDICARE

## 2022-06-13 DIAGNOSIS — R53.83 FATIGUE, UNSPECIFIED TYPE: ICD-10-CM

## 2022-06-13 DIAGNOSIS — J69.0 ASPIRATION PNEUMONIA, UNSPECIFIED ASPIRATION PNEUMONIA TYPE, UNSPECIFIED LATERALITY, UNSPECIFIED PART OF LUNG (HCC): ICD-10-CM

## 2022-06-13 DIAGNOSIS — J69.0 ASPIRATION PNEUMONIA OF BOTH LOWER LOBES, UNSPECIFIED ASPIRATION PNEUMONIA TYPE (HCC): ICD-10-CM

## 2022-06-13 LAB
ANION GAP SERPL CALC-SCNC: 8 MMOL/L (ref 0–18)
BASOPHILS # BLD AUTO: 0.08 X10(3) UL (ref 0–0.2)
BASOPHILS NFR BLD AUTO: 1 %
BUN BLD-MCNC: 11 MG/DL (ref 7–18)
BUN/CREAT SERPL: 9.2 (ref 10–20)
CALCIUM BLD-MCNC: 9.5 MG/DL (ref 8.5–10.1)
CHLORIDE SERPL-SCNC: 111 MMOL/L (ref 98–112)
CO2 SERPL-SCNC: 23 MMOL/L (ref 21–32)
CREAT BLD-MCNC: 1.19 MG/DL
DEPRECATED RDW RBC AUTO: 51 FL (ref 35.1–46.3)
EOSINOPHIL # BLD AUTO: 0.23 X10(3) UL (ref 0–0.7)
EOSINOPHIL NFR BLD AUTO: 3 %
ERYTHROCYTE [DISTWIDTH] IN BLOOD BY AUTOMATED COUNT: 13.7 % (ref 11–15)
FASTING STATUS PATIENT QL REPORTED: YES
GLUCOSE BLD-MCNC: 105 MG/DL (ref 70–99)
HCT VFR BLD AUTO: 43 %
HGB BLD-MCNC: 13.1 G/DL
IMM GRANULOCYTES # BLD AUTO: 0.02 X10(3) UL (ref 0–1)
IMM GRANULOCYTES NFR BLD: 0.3 %
LYMPHOCYTES # BLD AUTO: 1.82 X10(3) UL (ref 1–4)
LYMPHOCYTES NFR BLD AUTO: 23.4 %
MCH RBC QN AUTO: 30.7 PG (ref 26–34)
MCHC RBC AUTO-ENTMCNC: 30.5 G/DL (ref 31–37)
MCV RBC AUTO: 100.7 FL
MONOCYTES # BLD AUTO: 0.71 X10(3) UL (ref 0.1–1)
MONOCYTES NFR BLD AUTO: 9.1 %
NEUTROPHILS # BLD AUTO: 4.93 X10 (3) UL (ref 1.5–7.7)
NEUTROPHILS # BLD AUTO: 4.93 X10(3) UL (ref 1.5–7.7)
NEUTROPHILS NFR BLD AUTO: 63.2 %
OSMOLALITY SERPL CALC.SUM OF ELEC: 294 MOSM/KG (ref 275–295)
PLATELET # BLD AUTO: 218 10(3)UL (ref 150–450)
POTASSIUM SERPL-SCNC: 3.4 MMOL/L (ref 3.5–5.1)
RBC # BLD AUTO: 4.27 X10(6)UL
SODIUM SERPL-SCNC: 142 MMOL/L (ref 136–145)
WBC # BLD AUTO: 7.8 X10(3) UL (ref 4–11)

## 2022-06-13 PROCEDURE — 36415 COLL VENOUS BLD VENIPUNCTURE: CPT

## 2022-06-13 PROCEDURE — 80048 BASIC METABOLIC PNL TOTAL CA: CPT

## 2022-06-13 PROCEDURE — 71046 X-RAY EXAM CHEST 2 VIEWS: CPT | Performed by: INTERNAL MEDICINE

## 2022-06-13 PROCEDURE — 85025 COMPLETE CBC W/AUTO DIFF WBC: CPT

## 2022-06-17 ENCOUNTER — OFFICE VISIT (OUTPATIENT)
Dept: INTERNAL MEDICINE CLINIC | Facility: CLINIC | Age: 82
End: 2022-06-17
Payer: MEDICARE

## 2022-06-17 VITALS
TEMPERATURE: 99 F | DIASTOLIC BLOOD PRESSURE: 70 MMHG | HEIGHT: 71 IN | OXYGEN SATURATION: 94 % | BODY MASS INDEX: 29.82 KG/M2 | WEIGHT: 213 LBS | SYSTOLIC BLOOD PRESSURE: 130 MMHG | HEART RATE: 84 BPM

## 2022-06-17 DIAGNOSIS — E03.9 HYPOTHYROIDISM, UNSPECIFIED TYPE: ICD-10-CM

## 2022-06-17 DIAGNOSIS — I10 ESSENTIAL HYPERTENSION: ICD-10-CM

## 2022-06-17 DIAGNOSIS — G81.94 LEFT HEMIPARESIS (HCC): ICD-10-CM

## 2022-06-17 DIAGNOSIS — I69.359 CVA, OLD, HEMIPARESIS (HCC): ICD-10-CM

## 2022-06-17 DIAGNOSIS — G89.29 CHRONIC BILATERAL LOW BACK PAIN WITHOUT SCIATICA: ICD-10-CM

## 2022-06-17 DIAGNOSIS — M54.50 CHRONIC BILATERAL LOW BACK PAIN WITHOUT SCIATICA: ICD-10-CM

## 2022-06-17 DIAGNOSIS — M15.9 PRIMARY OSTEOARTHRITIS INVOLVING MULTIPLE JOINTS: ICD-10-CM

## 2022-06-17 DIAGNOSIS — R53.83 FATIGUE, UNSPECIFIED TYPE: Primary | ICD-10-CM

## 2022-06-17 DIAGNOSIS — M48.061 SPINAL STENOSIS OF LUMBAR REGION WITHOUT NEUROGENIC CLAUDICATION: ICD-10-CM

## 2022-06-17 DIAGNOSIS — N18.31 STAGE 3A CHRONIC KIDNEY DISEASE (HCC): ICD-10-CM

## 2022-06-17 DIAGNOSIS — J69.0 ASPIRATION PNEUMONIA OF LOWER LOBE, UNSPECIFIED ASPIRATION PNEUMONIA TYPE, UNSPECIFIED LATERALITY (HCC): ICD-10-CM

## 2022-06-17 DIAGNOSIS — E78.00 HYPERCHOLESTEROLEMIA: ICD-10-CM

## 2022-06-17 PROCEDURE — 99214 OFFICE O/P EST MOD 30 MIN: CPT | Performed by: INTERNAL MEDICINE

## 2022-06-17 PROCEDURE — 1125F AMNT PAIN NOTED PAIN PRSNT: CPT | Performed by: INTERNAL MEDICINE

## 2022-06-17 PROCEDURE — 1111F DSCHRG MED/CURRENT MED MERGE: CPT | Performed by: INTERNAL MEDICINE

## 2022-06-17 RX ORDER — AMLODIPINE BESYLATE 2.5 MG/1
2.5 TABLET ORAL DAILY
Qty: 90 TABLET | Refills: 3 | Status: SHIPPED | OUTPATIENT
Start: 2022-06-17 | End: 2023-06-17

## 2022-06-17 RX ORDER — AMLODIPINE BESYLATE 2.5 MG/1
2.5 TABLET ORAL DAILY
COMMUNITY
Start: 2022-06-01

## 2022-06-17 NOTE — PATIENT INSTRUCTIONS
1.  Patient is to continue his current diet, medication and activity. 2.  Patient is to continue his amlodipine 2.5 mg orally daily. 3.  I will plan to see the patient back in about 2 months with blood test as ordered. 4.  I will see the patient back sooner as necessary. 5.  I have encouraged the patient to get his second COVID booster vaccine. 6.  I have encouraged the patient to follow-up with outpatient rehab at [facility].

## 2022-07-25 ENCOUNTER — APPOINTMENT (OUTPATIENT)
Dept: CT IMAGING | Facility: HOSPITAL | Age: 82
End: 2022-07-25
Attending: EMERGENCY MEDICINE
Payer: MEDICARE

## 2022-07-25 ENCOUNTER — APPOINTMENT (OUTPATIENT)
Dept: GENERAL RADIOLOGY | Facility: HOSPITAL | Age: 82
End: 2022-07-25
Attending: EMERGENCY MEDICINE
Payer: MEDICARE

## 2022-07-25 ENCOUNTER — HOSPITAL ENCOUNTER (EMERGENCY)
Facility: HOSPITAL | Age: 82
Discharge: HOME | End: 2022-07-25
Attending: EMERGENCY MEDICINE
Payer: MEDICARE

## 2022-07-25 ENCOUNTER — TELEPHONE (OUTPATIENT)
Dept: INTERNAL MEDICINE CLINIC | Facility: CLINIC | Age: 82
End: 2022-07-25

## 2022-07-25 VITALS
DIASTOLIC BLOOD PRESSURE: 83 MMHG | HEIGHT: 70 IN | HEART RATE: 78 BPM | SYSTOLIC BLOOD PRESSURE: 171 MMHG | RESPIRATION RATE: 16 BRPM | BODY MASS INDEX: 30.06 KG/M2 | OXYGEN SATURATION: 91 % | WEIGHT: 210 LBS | TEMPERATURE: 98 F

## 2022-07-25 DIAGNOSIS — S09.90XA INJURY OF HEAD, INITIAL ENCOUNTER: ICD-10-CM
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
## 2022-07-25 DIAGNOSIS — S80.02XA CONTUSION OF LEFT KNEE, INITIAL ENCOUNTER: ICD-10-CM

## 2022-07-25 PROCEDURE — 73560 X-RAY EXAM OF KNEE 1 OR 2: CPT | Performed by: EMERGENCY MEDICINE

## 2022-07-25 PROCEDURE — 72125 CT NECK SPINE W/O DYE: CPT | Performed by: EMERGENCY MEDICINE

## 2022-07-25 PROCEDURE — 71045 X-RAY EXAM CHEST 1 VIEW: CPT | Performed by: EMERGENCY MEDICINE

## 2022-07-25 PROCEDURE — 99284 EMERGENCY DEPT VISIT MOD MDM: CPT

## 2022-07-25 PROCEDURE — 70450 CT HEAD/BRAIN W/O DYE: CPT | Performed by: EMERGENCY MEDICINE

## 2022-07-25 PROCEDURE — 72128 CT CHEST SPINE W/O DYE: CPT | Performed by: EMERGENCY MEDICINE

## 2022-07-25 NOTE — TELEPHONE ENCOUNTER
Spoke with spouse, Donis Cavanaugh (hipaa verified). She reports that patient has had 3 falls in the last week. Fall #1 occured sometime last week. . Fall # 2 7/23/22. Most recent fall occurred this morning (#3) while getting out of the shower. States, all of a sudden he gets this overwhelming weakness and his legs gave out. This is how all the falls occurred. He landed \"partially sitting and partially not\". He landed near the bathroom door. Spouse tried to assist him. Eventually, he fell back against the door. The back of his head hit the door. No bumps, bruising or injury. C/o pain to head, legs and back. No n/v. Spouse denies syncope episode. EMS arrived and assisted him up. He declined ER eval.   Takes Plavix. Dry cough for a couple weeks. + chest congestion. SOB with exertion. ER advised for assessment, testing and imaging. Spouse states that patient will probably not go if he has to sit and wait hours. I spoke with patient directly and explained rationale for ER eval. Urgent need for possible CT brain to r/o brain bleed, CXR. Offered to call EMS. Patient states, \"let me talk to my wife\". Spouse was placed back on the phone. She asked to speak with patient about calling EMS, and she'll call the office back.      To dr. Liz Wright

## 2022-07-25 NOTE — ED INITIAL ASSESSMENT (HPI)
Pt to ED fell in the bathroom this AM in shower when his knee gave out, pt denies any dizziness, no CP. + HA d/t bumping head, takes plavix. AXOX3. Wife called EMS at home for life assist, pt denied any sx or wanting to come to ED so wife called PMD who convinced pt to come get checked out in ED. Wife reported to EMS that he has had multiple falls recently.

## 2022-07-25 NOTE — TELEPHONE ENCOUNTER
Discussed personally with PCP. MD agreed that patient needs to go to the ER. I called spouse Marcoseliza De Leon. Marcos De Leon stated that patient is agreeable for ER eval and requested that I call EMS. EMS called and paramedic transport requested.

## 2022-07-25 NOTE — TELEPHONE ENCOUNTER
Wife Debra Byrd called   Pt fell this morning coming out of the shower  Debra Byrd had to call the paramedics to help him up  Pt declined going to the hospital    Pt also fell on Saturday going from the dining room to the kitchen - pt hit his head/no bleeding  Enough family at home to help him up    Pt has a cough/he is taking over the counter cough medicine for - wife is concerned he may have pneumonia again  No fever, pt rarely runs a fever when he is sick    Pt would not be able to wait long hours for ER evaluation, wife asks about direct admission to the hospital -  requests call back with direction     Debra Byrd  [phone]

## 2022-07-26 ENCOUNTER — TELEPHONE (OUTPATIENT)
Dept: INTERNAL MEDICINE CLINIC | Facility: CLINIC | Age: 82
End: 2022-07-26

## 2022-07-26 NOTE — TELEPHONE ENCOUNTER
Patient's wife Merlyn Al is calling she feels patient needs home care. He is unable to shower by himself, patient is immobile, he needs help getting around. Merlyn Al feels she will need a letter for her Insurance. Patient has an appointment for an ER follow up on 7/29, she said she can discuss at the time of the visit.

## 2022-07-28 NOTE — TELEPHONE ENCOUNTER
Bedside and Verbal shift change report given to [facility]   (oncoming nurse) by Dimitrios Hinds RN   (offgoing nurse). Report included the following information SBAR, Kardex, MAR, Accordion and Recent Results. Noted.  I will discuss this with her at the time of the follow-up visit on July 29.   Thank you!!

## 2022-07-29 ENCOUNTER — OFFICE VISIT (OUTPATIENT)
Dept: INTERNAL MEDICINE CLINIC | Facility: CLINIC | Age: 82
End: 2022-07-29
Payer: MEDICARE

## 2022-07-29 ENCOUNTER — LAB ENCOUNTER (OUTPATIENT)
Dept: LAB | Age: 82
End: 2022-07-29
Attending: INTERNAL MEDICINE
Payer: MEDICARE

## 2022-07-29 VITALS
SYSTOLIC BLOOD PRESSURE: 130 MMHG | HEART RATE: 76 BPM | DIASTOLIC BLOOD PRESSURE: 80 MMHG | TEMPERATURE: 98 F | OXYGEN SATURATION: 95 % | BODY MASS INDEX: 29.78 KG/M2 | HEIGHT: 70 IN | WEIGHT: 208 LBS

## 2022-07-29 DIAGNOSIS — R53.83 FATIGUE, UNSPECIFIED TYPE: ICD-10-CM

## 2022-07-29 DIAGNOSIS — E03.9 HYPOTHYROIDISM, UNSPECIFIED TYPE: ICD-10-CM

## 2022-07-29 DIAGNOSIS — G89.29 CHRONIC BILATERAL LOW BACK PAIN WITHOUT SCIATICA: ICD-10-CM

## 2022-07-29 DIAGNOSIS — I10 ESSENTIAL HYPERTENSION: ICD-10-CM

## 2022-07-29 DIAGNOSIS — M54.50 CHRONIC BILATERAL LOW BACK PAIN WITHOUT SCIATICA: ICD-10-CM

## 2022-07-29 DIAGNOSIS — N18.31 STAGE 3A CHRONIC KIDNEY DISEASE (HCC): ICD-10-CM

## 2022-07-29 DIAGNOSIS — M48.061 SPINAL STENOSIS OF LUMBAR REGION WITHOUT NEUROGENIC CLAUDICATION: ICD-10-CM

## 2022-07-29 DIAGNOSIS — R53.1 WEAKNESS: ICD-10-CM

## 2022-07-29 DIAGNOSIS — I69.359 CVA, OLD, HEMIPARESIS (HCC): ICD-10-CM

## 2022-07-29 DIAGNOSIS — M15.9 PRIMARY OSTEOARTHRITIS INVOLVING MULTIPLE JOINTS: ICD-10-CM

## 2022-07-29 DIAGNOSIS — G81.94 LEFT HEMIPARESIS (HCC): ICD-10-CM
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
## 2022-07-29 DIAGNOSIS — E78.00 HYPERCHOLESTEROLEMIA: ICD-10-CM

## 2022-07-29 LAB
ALBUMIN SERPL-MCNC: 3.6 G/DL (ref 3.4–5)
ALBUMIN/GLOB SERPL: 0.8 {RATIO} (ref 1–2)
ALP LIVER SERPL-CCNC: 93 U/L
ALT SERPL-CCNC: 15 U/L
ANION GAP SERPL CALC-SCNC: 10 MMOL/L (ref 0–18)
AST SERPL-CCNC: 11 U/L (ref 15–37)
BASOPHILS # BLD AUTO: 0.07 X10(3) UL (ref 0–0.2)
BASOPHILS NFR BLD AUTO: 0.8 %
BILIRUB SERPL-MCNC: 0.5 MG/DL (ref 0.1–2)
BUN BLD-MCNC: 18 MG/DL (ref 7–18)
BUN/CREAT SERPL: 16.7 (ref 10–20)
CALCIUM BLD-MCNC: 9.7 MG/DL (ref 8.5–10.1)
CHLORIDE SERPL-SCNC: 111 MMOL/L (ref 98–112)
CHOLEST SERPL-MCNC: 116 MG/DL (ref ?–200)
CO2 SERPL-SCNC: 22 MMOL/L (ref 21–32)
CREAT BLD-MCNC: 1.08 MG/DL
DEPRECATED RDW RBC AUTO: 49.7 FL (ref 35.1–46.3)
EOSINOPHIL # BLD AUTO: 0.11 X10(3) UL (ref 0–0.7)
EOSINOPHIL NFR BLD AUTO: 1.2 %
ERYTHROCYTE [DISTWIDTH] IN BLOOD BY AUTOMATED COUNT: 13.1 % (ref 11–15)
FASTING PATIENT LIPID ANSWER: YES
FASTING STATUS PATIENT QL REPORTED: YES
GLOBULIN PLAS-MCNC: 4.3 G/DL (ref 2.8–4.4)
GLUCOSE BLD-MCNC: 97 MG/DL (ref 70–99)
HCT VFR BLD AUTO: 45.6 %
HDLC SERPL-MCNC: 33 MG/DL (ref 40–59)
HGB BLD-MCNC: 13.7 G/DL
IMM GRANULOCYTES # BLD AUTO: 0.03 X10(3) UL (ref 0–1)
IMM GRANULOCYTES NFR BLD: 0.3 %
LDLC SERPL CALC-MCNC: 63 MG/DL (ref ?–100)
LYMPHOCYTES # BLD AUTO: 1.85 X10(3) UL (ref 1–4)
LYMPHOCYTES NFR BLD AUTO: 20.6 %
MCH RBC QN AUTO: 30.5 PG (ref 26–34)
MCHC RBC AUTO-ENTMCNC: 30 G/DL (ref 31–37)
MCV RBC AUTO: 101.6 FL
MONOCYTES # BLD AUTO: 0.7 X10(3) UL (ref 0.1–1)
MONOCYTES NFR BLD AUTO: 7.8 %
NEUTROPHILS # BLD AUTO: 6.2 X10 (3) UL (ref 1.5–7.7)
NEUTROPHILS # BLD AUTO: 6.2 X10(3) UL (ref 1.5–7.7)
NEUTROPHILS NFR BLD AUTO: 69.3 %
NONHDLC SERPL-MCNC: 83 MG/DL (ref ?–130)
OSMOLALITY SERPL CALC.SUM OF ELEC: 298 MOSM/KG (ref 275–295)
PLATELET # BLD AUTO: 212 10(3)UL (ref 150–450)
POTASSIUM SERPL-SCNC: 4 MMOL/L (ref 3.5–5.1)
PROT SERPL-MCNC: 7.9 G/DL (ref 6.4–8.2)
RBC # BLD AUTO: 4.49 X10(6)UL
SODIUM SERPL-SCNC: 143 MMOL/L (ref 136–145)
TRIGL SERPL-MCNC: 110 MG/DL (ref 30–149)
TSI SER-ACNC: 0.63 MIU/ML (ref 0.36–3.74)
VLDLC SERPL CALC-MCNC: 16 MG/DL (ref 0–30)
WBC # BLD AUTO: 9 X10(3) UL (ref 4–11)

## 2022-07-29 PROCEDURE — 99215 OFFICE O/P EST HI 40 MIN: CPT | Performed by: INTERNAL MEDICINE

## 2022-07-29 PROCEDURE — 84443 ASSAY THYROID STIM HORMONE: CPT

## 2022-07-29 PROCEDURE — 80053 COMPREHEN METABOLIC PANEL: CPT

## 2022-07-29 PROCEDURE — 80061 LIPID PANEL: CPT

## 2022-07-29 PROCEDURE — 36415 COLL VENOUS BLD VENIPUNCTURE: CPT

## 2022-07-29 PROCEDURE — 85025 COMPLETE CBC W/AUTO DIFF WBC: CPT

## 2022-07-29 PROCEDURE — 1125F AMNT PAIN NOTED PAIN PRSNT: CPT | Performed by: INTERNAL MEDICINE

## 2022-07-29 RX ORDER — AMOXICILLIN 250 MG
2 CAPSULE ORAL 2 TIMES DAILY
COMMUNITY
Start: 2022-06-01

## 2022-07-29 NOTE — PATIENT INSTRUCTIONS
1.  Patient is to continue his current diet, medication and activity. 2.  I have encouraged the patient to get his second COVID booster vaccine which she can either get at his pharmacy or get at the [facility] in [place]. 3.  I will place an order in the system for the patient that to be seen by visiting nurse, physical therapy and Occupational Therapy to assist him with his muscle strengthening, ambulation and transfers as well as activities of daily living. 4.  Patient had blood test today that are already in the system. 6.  I will see the patient back in about 5 or 6 weeks.

## 2022-07-29 NOTE — PROGRESS NOTES
[facility] order and 7/29/22 note faxed to [phone]. Await fax confirmation. [facility] at [phone]. I spoke with [facility], Nataliya Centeno in intake and provided her with the patient's name and date of birth. She will keep an eye out for the fax. Left message to call back for patient to let him know this was done.

## 2022-08-01 ENCOUNTER — TELEPHONE (OUTPATIENT)
Dept: INTERNAL MEDICINE CLINIC | Facility: CLINIC | Age: 82
End: 2022-08-01

## 2022-08-01 NOTE — TELEPHONE ENCOUNTER
Katerina @ [facility] [address]    Admitted patient to [address] [place] [address] services on Saturday, looking for a verbal order home health aid to assist with bathing. Ph # [phone], confidential voicemail.

## 2022-08-01 NOTE — TELEPHONE ENCOUNTER
As FYI to DR. ESCOBAR santana called Carry from [facility] and gave verbal approval for plan of care per protocol

## 2022-08-05 ENCOUNTER — TELEPHONE (OUTPATIENT)
Dept: INTERNAL MEDICINE CLINIC | Facility: CLINIC | Age: 82
End: 2022-08-05

## 2022-08-05 NOTE — TELEPHONE ENCOUNTER
To Dr. Curtis Little for original RX, not RXed by [facility] before, spouse states pt received tramadol at [facility] and requests a refill, please advise

## 2022-08-05 NOTE — TELEPHONE ENCOUNTER
Wife called  Pt had Tramadol 50 mg Rx from [facility]   Pt is out of medication & needs a new Rx   Can on call associate refill for patient   Please send to [facility] in [place]

## 2022-08-08 RX ORDER — TRAMADOL HYDROCHLORIDE 50 MG/1
50 TABLET ORAL 2 TIMES DAILY PRN
Qty: 30 TABLET | Refills: 0 | Status: SHIPPED | OUTPATIENT
Start: 2022-08-08 | End: 2022-09-07

## 2022-08-08 NOTE — TELEPHONE ENCOUNTER
Noted.  I have refilled patient's tramadol prescription as requested. I did speak with patient's wife who indicated patient's been taking 1 pill daily unless he is having a bad day in which case he is using 2 tablets. Patient uses Tylenol for less severe pain. I refilled patient's medication for tramadol that he can take 1 tablet twice a day as necessary. I have given patient a prescription for 30 tablets.

## 2022-08-17 ENCOUNTER — TELEPHONE (OUTPATIENT)
Dept: INTERNAL MEDICINE CLINIC | Facility: CLINIC | Age: 82
End: 2022-08-17

## 2022-08-17 NOTE — TELEPHONE ENCOUNTER
Con Ludwig from [facility] called to report that the pt. Had a fall this morning. He slid off of the bed onto the floor. No injuries noted.

## 2022-08-19 RX ORDER — LEVOTHYROXINE SODIUM 0.1 MG/1
TABLET ORAL
Qty: 90 TABLET | Refills: 3 | Status: SHIPPED | OUTPATIENT
Start: 2022-08-19

## 2022-09-12 ENCOUNTER — TELEPHONE (OUTPATIENT)
Dept: INTERNAL MEDICINE CLINIC | Facility: CLINIC | Age: 82
End: 2022-09-12

## 2022-09-12 RX ORDER — BENZONATATE 200 MG/1
200 CAPSULE ORAL 3 TIMES DAILY PRN
Qty: 60 CAPSULE | Refills: 1 | Status: SHIPPED | OUTPATIENT
Start: 2022-09-12

## 2022-09-12 NOTE — TELEPHONE ENCOUNTER
BARRIE from [facility] updated. BARRIE verbalized understanding.

## 2022-09-12 NOTE — TELEPHONE ENCOUNTER
TJ/[facility] requesting order for cough pearls for patient ongoing cough  Tasked to nursing

## 2022-09-19 ENCOUNTER — TELEPHONE (OUTPATIENT)
Dept: INTERNAL MEDICINE CLINIC | Facility: CLINIC | Age: 82
End: 2022-09-19

## 2022-09-19 NOTE — TELEPHONE ENCOUNTER
Verbal order provided, per protocol, to re-certify for [facility]

## 2022-09-19 NOTE — TELEPHONE ENCOUNTER
Amanda/[facility] requesting verbal order to re-certify patient for additional 60 days of home health  Tasked to nursing

## 2022-10-12 ENCOUNTER — TELEPHONE (OUTPATIENT)
Dept: INTERNAL MEDICINE CLINIC | Facility: CLINIC | Age: 82
End: 2022-10-12

## 2022-10-12 NOTE — TELEPHONE ENCOUNTER
BARRIE from [facility] is calling requesting an order for the flu vaccine. BARRIE also would like to let the doctor know that the patients blood pressure is elevated today.   The last reading was 160/90    Please call and advise

## 2022-10-13 NOTE — TELEPHONE ENCOUNTER
S/w HHRN, BARRIE. BP taken yesterday during visit x2. Initial /80 HR 68, 2nd /90 HR 82. Patient is asymptomatic (No chest pain, SON, h/a or changes in vision). No edema. Per TJ, patients BP is normally wnl, average /80. HHRN reports that patient was consuming more salt yesterday. He had eaten a bag of potato chips. Patient takes Amlodipine 2.5 mg daily. [address] [place] [address] visits patient weekly. Per [facility] protocol, they require MDs order to administer annual flu vaccine (can be verbal). Order to include administration of benadryl or Epi injection if needed for reaction. TJ will call back with more details to determine if Benadryl/Epi order needs to be written specifically. To on call Dr. Sanju Garcia: Please await RN call back in regards to flu vaccine order clarification.

## 2022-10-13 NOTE — TELEPHONE ENCOUNTER
Received voicemail message from BARRIE/Sintia  Returning Janna 76 call to provide more information that was needed for orders    Call back# [phone]

## 2022-10-13 NOTE — TELEPHONE ENCOUNTER
To Dr. Natasha SaltnessJudah Severin request the following orders, along with influenza vaccine:  Epi pen 0.3 mg administered once prn, Benadryl 25 mg 2 tablets (total=50 mg) once

## 2022-10-14 NOTE — TELEPHONE ENCOUNTER
To nursing, take an additional amlodipine 2.5 mg once a day prn BP over 704 systolic or over 89 diastolic. Thanks.

## 2022-10-14 NOTE — TELEPHONE ENCOUNTER
I spoke with BARRIE at [facility] and relayed Dr. Alexis Quezada message. He verbalized understanding. He took a verbal order. He asks if there are further recommendations regarding the blood pressures as noted below. To Dr. Michelle Chiu, please advise. Thank you.

## 2022-10-17 ENCOUNTER — TELEPHONE (OUTPATIENT)
Dept: INTERNAL MEDICINE CLINIC | Facility: CLINIC | Age: 82
End: 2022-10-17

## 2022-10-17 RX ORDER — TRAMADOL HYDROCHLORIDE 50 MG/1
50 TABLET ORAL 2 TIMES DAILY PRN
Qty: 30 TABLET | Refills: 0 | Status: SHIPPED | OUTPATIENT
Start: 2022-10-17 | End: 2022-11-12

## 2022-10-17 NOTE — TELEPHONE ENCOUNTER
To MD:  The above refill request is for a controlled substance. Please review pended medication order. Print and sign for staff to fax to pharmacy or prescribe electronically. Last office visit: 22  Last time refill sent and quantity/refills: 22 #30 with 0 (not seen under current med list because rx )  Per [facility]  last dispensed 22    TO Dr. Shira Cardoso to please advise in MD absence, thanks!

## 2022-11-07 ENCOUNTER — APPOINTMENT (OUTPATIENT)
Dept: GENERAL RADIOLOGY | Facility: HOSPITAL | Age: 82
End: 2022-11-07
Attending: EMERGENCY MEDICINE
Payer: MEDICARE

## 2022-11-07 ENCOUNTER — TELEPHONE (OUTPATIENT)
Dept: INTERNAL MEDICINE CLINIC | Facility: CLINIC | Age: 82
End: 2022-11-07

## 2022-11-07 ENCOUNTER — HOSPITAL ENCOUNTER (INPATIENT)
Facility: HOSPITAL | Age: 82
LOS: 5 days | Discharge: NURSING, REHAB OR LONG-TERM CARE | End: 2022-11-12
Attending: EMERGENCY MEDICINE | Admitting: HOSPITALIST
Payer: MEDICARE

## 2022-11-07 DIAGNOSIS — J69.0 ASPIRATION PNEUMONIA, UNSPECIFIED ASPIRATION PNEUMONIA TYPE, UNSPECIFIED LATERALITY, UNSPECIFIED PART OF LUNG (HCC): Primary | ICD-10-CM

## 2022-11-07 DIAGNOSIS — G81.94 LEFT HEMIPARESIS (HCC): ICD-10-CM

## 2022-11-07 DIAGNOSIS — N28.9 RENAL INSUFFICIENCY: ICD-10-CM

## 2022-11-07 DIAGNOSIS — E03.9 HYPOTHYROIDISM, UNSPECIFIED TYPE: ICD-10-CM

## 2022-11-07 DIAGNOSIS — I69.359 CVA, OLD, HEMIPARESIS (HCC): Primary | ICD-10-CM

## 2022-11-07 DIAGNOSIS — R77.8 ELEVATED TROPONIN: ICD-10-CM

## 2022-11-07 DIAGNOSIS — R53.1 WEAKNESS: ICD-10-CM

## 2022-11-07 DIAGNOSIS — I10 ESSENTIAL HYPERTENSION: ICD-10-CM

## 2022-11-07 DIAGNOSIS — R30.0 DYSURIA: ICD-10-CM

## 2022-11-07 DIAGNOSIS — D72.829 LEUKOCYTOSIS, UNSPECIFIED TYPE: ICD-10-CM

## 2022-11-07 DIAGNOSIS — R09.02 HYPOXIA: ICD-10-CM

## 2022-11-07 PROBLEM — R79.89 ELEVATED TROPONIN: Status: ACTIVE | Noted: 2022-11-07

## 2022-11-07 LAB
ANION GAP SERPL CALC-SCNC: 8 MMOL/L (ref 0–18)
BASOPHILS # BLD AUTO: 0.06 X10(3) UL (ref 0–0.2)
BASOPHILS NFR BLD AUTO: 0.3 %
BUN BLD-MCNC: 34 MG/DL (ref 7–18)
BUN/CREAT SERPL: 17 (ref 10–20)
CALCIUM BLD-MCNC: 10.5 MG/DL (ref 8.5–10.1)
CHLORIDE SERPL-SCNC: 109 MMOL/L (ref 98–112)
CHOLEST SERPL-MCNC: 102 MG/DL (ref ?–200)
CO2 SERPL-SCNC: 26 MMOL/L (ref 21–32)
CREAT BLD-MCNC: 2 MG/DL
DEPRECATED RDW RBC AUTO: 53.5 FL (ref 35.1–46.3)
EOSINOPHIL # BLD AUTO: 0.01 X10(3) UL (ref 0–0.7)
EOSINOPHIL NFR BLD AUTO: 0.1 %
ERYTHROCYTE [DISTWIDTH] IN BLOOD BY AUTOMATED COUNT: 14.6 % (ref 11–15)
FLUAV + FLUBV RNA SPEC NAA+PROBE: NEGATIVE
FLUAV + FLUBV RNA SPEC NAA+PROBE: NEGATIVE
GFR SERPLBLD BASED ON 1.73 SQ M-ARVRAT: 33 ML/MIN/1.73M2 (ref 60–?)
GLUCOSE BLD-MCNC: 126 MG/DL (ref 70–99)
HCT VFR BLD AUTO: 48.1 %
HDLC SERPL-MCNC: 37 MG/DL (ref 40–59)
HGB BLD-MCNC: 14.5 G/DL
IMM GRANULOCYTES # BLD AUTO: 0.31 X10(3) UL (ref 0–1)
IMM GRANULOCYTES NFR BLD: 1.6 %
LDLC SERPL CALC-MCNC: 44 MG/DL (ref ?–100)
LYMPHOCYTES # BLD AUTO: 1.52 X10(3) UL (ref 1–4)
LYMPHOCYTES NFR BLD AUTO: 8 %
MCH RBC QN AUTO: 29.8 PG (ref 26–34)
MCHC RBC AUTO-ENTMCNC: 30.1 G/DL (ref 31–37)
MCV RBC AUTO: 98.8 FL
MONOCYTES # BLD AUTO: 1 X10(3) UL (ref 0.1–1)
MONOCYTES NFR BLD AUTO: 5.3 %
NEUTROPHILS # BLD AUTO: 16.09 X10 (3) UL (ref 1.5–7.7)
NEUTROPHILS # BLD AUTO: 16.09 X10(3) UL (ref 1.5–7.7)
NEUTROPHILS NFR BLD AUTO: 84.7 %
NONHDLC SERPL-MCNC: 65 MG/DL (ref ?–130)
NT-PROBNP SERPL-MCNC: 1784 PG/ML (ref ?–450)
OSMOLALITY SERPL CALC.SUM OF ELEC: 305 MOSM/KG (ref 275–295)
PLATELET # BLD AUTO: 189 10(3)UL (ref 150–450)
POTASSIUM SERPL-SCNC: 3.1 MMOL/L (ref 3.5–5.1)
PROCALCITONIN SERPL-MCNC: 8.31 NG/ML (ref ?–0.16)
RBC # BLD AUTO: 4.87 X10(6)UL
RSV RNA SPEC NAA+PROBE: NEGATIVE
SARS-COV-2 RNA RESP QL NAA+PROBE: NOT DETECTED
SODIUM SERPL-SCNC: 143 MMOL/L (ref 136–145)
TRIGL SERPL-MCNC: 115 MG/DL (ref 30–149)
TROPONIN I HIGH SENSITIVITY: 111 NG/L
TROPONIN I HIGH SENSITIVITY: 133 NG/L
VLDLC SERPL CALC-MCNC: 16 MG/DL (ref 0–30)
WBC # BLD AUTO: 19 X10(3) UL (ref 4–11)

## 2022-11-07 PROCEDURE — 99223 1ST HOSP IP/OBS HIGH 75: CPT | Performed by: HOSPITALIST

## 2022-11-07 PROCEDURE — 71045 X-RAY EXAM CHEST 1 VIEW: CPT | Performed by: EMERGENCY MEDICINE

## 2022-11-07 RX ORDER — MELATONIN
2000 DAILY
Status: DISCONTINUED | OUTPATIENT
Start: 2022-11-08 | End: 2022-11-12

## 2022-11-07 RX ORDER — TRAMADOL HYDROCHLORIDE 50 MG/1
50 TABLET ORAL 2 TIMES DAILY PRN
Status: DISCONTINUED | OUTPATIENT
Start: 2022-11-07 | End: 2022-11-12

## 2022-11-07 RX ORDER — ATORVASTATIN CALCIUM 40 MG/1
40 TABLET, FILM COATED ORAL NIGHTLY
Status: DISCONTINUED | OUTPATIENT
Start: 2022-11-08 | End: 2022-11-12

## 2022-11-07 RX ORDER — ACETAMINOPHEN 500 MG
1000 TABLET ORAL 2 TIMES DAILY
Status: DISCONTINUED | OUTPATIENT
Start: 2022-11-07 | End: 2022-11-12

## 2022-11-07 RX ORDER — POTASSIUM CHLORIDE 20 MEQ/1
40 TABLET, EXTENDED RELEASE ORAL ONCE
Status: COMPLETED | OUTPATIENT
Start: 2022-11-07 | End: 2022-11-07

## 2022-11-07 RX ORDER — DEXTROSE AND SODIUM CHLORIDE 5; .45 G/100ML; G/100ML
INJECTION, SOLUTION INTRAVENOUS ONCE
Status: COMPLETED | OUTPATIENT
Start: 2022-11-07 | End: 2022-11-07

## 2022-11-07 RX ORDER — LEVOTHYROXINE SODIUM 0.1 MG/1
100 TABLET ORAL
Status: DISCONTINUED | OUTPATIENT
Start: 2022-11-08 | End: 2022-11-12

## 2022-11-07 RX ORDER — ONDANSETRON 2 MG/ML
4 INJECTION INTRAMUSCULAR; INTRAVENOUS EVERY 6 HOURS PRN
Status: DISCONTINUED | OUTPATIENT
Start: 2022-11-07 | End: 2022-11-12

## 2022-11-07 RX ORDER — ASPIRIN 81 MG/1
81 TABLET ORAL DAILY
Status: DISCONTINUED | OUTPATIENT
Start: 2022-11-08 | End: 2022-11-12

## 2022-11-07 RX ORDER — CLOPIDOGREL BISULFATE 75 MG/1
75 TABLET ORAL NIGHTLY
Status: DISCONTINUED | OUTPATIENT
Start: 2022-11-07 | End: 2022-11-12

## 2022-11-07 RX ORDER — METOCLOPRAMIDE HYDROCHLORIDE 5 MG/ML
5 INJECTION INTRAMUSCULAR; INTRAVENOUS EVERY 8 HOURS PRN
Status: DISCONTINUED | OUTPATIENT
Start: 2022-11-07 | End: 2022-11-12

## 2022-11-07 RX ORDER — AMLODIPINE BESYLATE 2.5 MG/1
2.5 TABLET ORAL DAILY
Status: DISCONTINUED | OUTPATIENT
Start: 2022-11-08 | End: 2022-11-12

## 2022-11-07 RX ORDER — HEPARIN SODIUM 5000 [USP'U]/ML
5000 INJECTION, SOLUTION INTRAVENOUS; SUBCUTANEOUS EVERY 12 HOURS SCHEDULED
Status: DISCONTINUED | OUTPATIENT
Start: 2022-11-07 | End: 2022-11-12

## 2022-11-07 RX ORDER — ACETAMINOPHEN 500 MG
500 TABLET ORAL EVERY 4 HOURS PRN
Status: DISCONTINUED | OUTPATIENT
Start: 2022-11-07 | End: 2022-11-12

## 2022-11-07 RX ORDER — DEXTROSE AND SODIUM CHLORIDE 5; .45 G/100ML; G/100ML
INJECTION, SOLUTION INTRAVENOUS CONTINUOUS
Status: DISCONTINUED | OUTPATIENT
Start: 2022-11-07 | End: 2022-11-08

## 2022-11-07 NOTE — TELEPHONE ENCOUNTER
Rafa Ny called and relayed Dr Imogene Phalen orders and to re-certfiy patient. No further questions noted.

## 2022-11-07 NOTE — H&P
[facility]    PATIENT'S NAME: Mayyocasta Craneeverardo   ATTENDING PHYSICIAN: Janneth Ta DO   PATIENT ACCOUNT#:   [de-identified]    LOCATION:  [place] [address]  MEDICAL RECORD #:   [id number]       YOB: 1940  ADMISSION DATE:       2022    HISTORY AND PHYSICAL EXAMINATION    CHIEF COMPLAINT:  Aspiration pneumonia. HISTORY OF PRESENT ILLNESS:  Patient is an 81-year-old  male who was brought into the emergency department for evaluation of shortness of breath, generalized weakness and low oxygen saturation. CBC showed white blood cell count of 19.0 with left shift. Chemistry showed GFR of 33 which is below his baseline. BUN and creatinine of 34 and 2.0. Calcium 10.5. ProBNP 1700 and troponin 133. EKG showed sinus tachycardia. Chest x-ray showed small left pleural effusion with associated compression atelectasis with and without superimposed pneumonia. Patient started empirically on IV antibiotics, IV fluids and he will be admitted to the hospital for further management. 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 PAST SURGICAL HISTORY:  Tonsillectomy, abdominal aortic aneurysm endovascular stent grafting, cholecystectomy and cataract procedure. MEDICATIONS:  Please see medication reconciliation list.      ALLERGIES:  No known drug allergies. FAMILY HISTORY:  Father  of lung cancer. SOCIAL HISTORY:  Ex-tobacco user. No current tobacco, alcohol, or drug use. Lives with his wife. Requires some assistance in his basic activities of daily living. REVIEW OF SYSTEMS:  Patient's wife at the bedside said he often chose on food particles and some thin liquids. He is noncompliant with thickened pureed diet.   He had history of recurrent aspiration pneumonia. He choked on pieces of biscuit last night. Symptom has been going on for last 3 to 4 days. Mobility has been declining while he was these symptoms. No recorded fever. Other 12-point review of systems is negative. PHYSICAL EXAMINATION:    GENERAL:  Alert, fatigued. No acute distress. VITAL SIGNS:  Temperature 98.4, pulse 99, respiratory rate 25, blood pressure 142/78, pulse ox 85% on room air and 91% on nasal cannula oxygen. HEENT:  Atraumatic. Oropharynx clear. Dry mucous membranes. Normal hard and soft palate. Eyes:  Anicteric sclerae. NECK:  Supple. No lymphadenopathy. Trachea midline. Full range of motion. LUNGS:  Faint rhonchi auscultated on both lung bases. HEART:  Regular rate, rhythm. S1 and S2 auscultated. No murmur. ABDOMEN:  Soft, nondistended. No tenderness. Positive bowel sounds. EXTREMITIES:  No peripheral edema, clubbing, or cyanosis. NEUROLOGIC:  Left hemiparesis noted on neurological exam.    ASSESSMENT AND PLAN:    1. Possible recurrent aspiration pneumonia. 2.   Acute hypoxemic respiratory failure. 3.   Acute renal insufficiency, dehydration. Patient will be admitted to general medical floor. Monitor his hemodynamic and respiratory status. Pureed diet. IV fluids, IV Zosyn. Speech Therapy to re-evaluate patient. Re-education regarding diet. Further recommendations to follow.     Dictated By Phyllis Goff MD  d: 11/07/2022 16:44:36  t: 11/07/2022 17:20:40  Job [id number]  /

## 2022-11-07 NOTE — TELEPHONE ENCOUNTER
Miah Lee from [facility] is calling to let the doctor know that when she went to see the patient today he did not look good. Patient could not get out of bed was very weak and oxygen was 85.   Miah Lee called the ambulance and patient is on his was to the hospital.

## 2022-11-07 NOTE — ED QUICK NOTES
Orders for admission, patient is aware of plan and ready to go upstairs. Any questions, please call ED RN Jerald Hyman at extension [phone]. Patient Covid vaccination status: Fully vaccinated     COVID Test Ordered in ED: SARS-CoV-2/Flu A and B/RSV by PCR (GeneXpert)    COVID Suspicion at Admission: N/A    Running Infusions:  None    Mental Status/LOC at time of transport: a. ox3    Other pertinent information:   CIWA score: N/A   NIH score:  N/A

## 2022-11-07 NOTE — ED INITIAL ASSESSMENT (HPI)
patient presents via ems from home with increased Sob - 88% RA at home - ems applied 2L nc. per ems pt hasnt been feeling well since Saturday.    Hx cva - left side deficit

## 2022-11-07 NOTE — TELEPHONE ENCOUNTER
To Dr Jinny Browning, please advise with lab orders. Dr Josh Hatchet patient was supposed to see Dr Gifford Hamman on 11/1/22 but canceled and requesting blood work for home. Pended blood work that was pended by Dr Gifford Hamman from canceled visit.

## 2022-11-08 LAB
ANION GAP SERPL CALC-SCNC: 8 MMOL/L (ref 0–18)
BASOPHILS # BLD AUTO: 0.04 X10(3) UL (ref 0–0.2)
BASOPHILS NFR BLD AUTO: 0.3 %
BUN BLD-MCNC: 34 MG/DL (ref 7–18)
BUN/CREAT SERPL: 20.9 (ref 10–20)
CALCIUM BLD-MCNC: 9.3 MG/DL (ref 8.5–10.1)
CHLORIDE SERPL-SCNC: 115 MMOL/L (ref 98–112)
CO2 SERPL-SCNC: 24 MMOL/L (ref 21–32)
CREAT BLD-MCNC: 1.63 MG/DL
DEPRECATED RDW RBC AUTO: 54.5 FL (ref 35.1–46.3)
EOSINOPHIL # BLD AUTO: 0.09 X10(3) UL (ref 0–0.7)
EOSINOPHIL NFR BLD AUTO: 0.7 %
ERYTHROCYTE [DISTWIDTH] IN BLOOD BY AUTOMATED COUNT: 14.6 % (ref 11–15)
GFR SERPLBLD BASED ON 1.73 SQ M-ARVRAT: 42 ML/MIN/1.73M2 (ref 60–?)
GLUCOSE BLD-MCNC: 115 MG/DL (ref 70–99)
HCT VFR BLD AUTO: 40.8 %
HGB BLD-MCNC: 12.1 G/DL
IMM GRANULOCYTES # BLD AUTO: 0.16 X10(3) UL (ref 0–1)
IMM GRANULOCYTES NFR BLD: 1.3 %
LYMPHOCYTES # BLD AUTO: 1.16 X10(3) UL (ref 1–4)
LYMPHOCYTES NFR BLD AUTO: 9.5 %
MCH RBC QN AUTO: 29.5 PG (ref 26–34)
MCHC RBC AUTO-ENTMCNC: 29.7 G/DL (ref 31–37)
MCV RBC AUTO: 99.5 FL
MONOCYTES # BLD AUTO: 0.51 X10(3) UL (ref 0.1–1)
MONOCYTES NFR BLD AUTO: 4.2 %
NEUTROPHILS # BLD AUTO: 10.27 X10 (3) UL (ref 1.5–7.7)
NEUTROPHILS # BLD AUTO: 10.27 X10(3) UL (ref 1.5–7.7)
NEUTROPHILS NFR BLD AUTO: 84 %
OSMOLALITY SERPL CALC.SUM OF ELEC: 313 MOSM/KG (ref 275–295)
PLATELET # BLD AUTO: 149 10(3)UL (ref 150–450)
POTASSIUM SERPL-SCNC: 3.7 MMOL/L (ref 3.5–5.1)
POTASSIUM SERPL-SCNC: 3.8 MMOL/L (ref 3.5–5.1)
RBC # BLD AUTO: 4.1 X10(6)UL
SODIUM SERPL-SCNC: 147 MMOL/L (ref 136–145)
WBC # BLD AUTO: 12.2 X10(3) UL (ref 4–11)

## 2022-11-08 PROCEDURE — 99233 SBSQ HOSP IP/OBS HIGH 50: CPT | Performed by: INTERNAL MEDICINE

## 2022-11-08 NOTE — CM/SW NOTE
11/08/22 0900   CM/SW Referral Data   Referral Source Social Work (self-referral)   Reason for Referral Discharge planning   Informant Patient   Pertinent Medical Hx   Does patient have an established PCP? Yes  (Ezra Green)   Patient Info   Patient's [address]   Number of Levels in Home 2   Number of Stair in Home 4 to enter, 10 to 2nd floor   Patient lives with Spouse/Significant other   Patient Status Prior to Admission   Independent with ADLs and Mobility No   Pt. requires assistance with Ambulating;Driving   Services in place prior to admission Home Health Care;DME/Supplies at home   [address] Provider Info [facility]   Type of DME/Supplies Straight Cane   Discharge Needs   Anticipated D/C needs Home health care;Medical equipment  (monitor for O2 needs)     10:00AM  SW self referred pt for DC Planning and assessment. SW met w/ pt in his room. Above assessment completed. Pt confirmed living w/ his wife and primarily using a cane for ambulation. Pt does not drive but his wife does. Pt confirmed having [facility] services via [facility]. SW confirmed w/ Paolo Hicks from [facility] that pt has [facility] RN at this time. SW entered new F2F to include PT and RN. Pt is currently on 8L O2 w/ no home Oxygen. SW to monitor for needs closer to DC. Documentation for O2 sats: (RN to add to progress note)  Patient's O2 sat on room air is ____% at rest. Pt's O2 sat on room is ____% when ambulating, and ____% on ____ liter while ambulating. (Reminder: The \"at rest\" and \"while ambulating\" are required statements. If patient is non ambulatory you can use \"with exertion\" such as during a transfer to assess their O2 level)    Once O2 sats are completed and IF home O2 is indicated, SW to place MDO in [place] and request MD to [place] as appropriate. (Dx: Pleural Effusion)    PT/OT evals are pending at this time.  TRACEE sent secure chat to PT [address] and OT Eb requesting update on recommendations once they are able to work w/ pt.    01:45PM  Per chart review, PT recommends [facility] at [place]. SW requested [facility] send [facility] referral via [employer]. PASRR completed - no level 2 required. Will need document uploaded to [employer]. UPDATE: PASRR uploaded to [employer]. SW to f/up w/ pt Wednesday AM when [facility] list is generated in [address] [place] [address]. SW to discuss PT recommendation at that time. PLAN: [facility] vs Home w/ [facility], monitor O2 needs - pending clinical course      SW/CM to remain available for support and/or discharge planning.          Jevon Fairchild, MSW, [address] [facility]

## 2022-11-08 NOTE — CM/SW NOTE
Department  notified of request for rafia KAYE referrals started. Assigned CM/SW to follow up with pt/family on further discharge planning.      Ashley Wright   November 08, 2022   14:04

## 2022-11-08 NOTE — PLAN OF CARE
Matthew Koroma arrived to floor on high flow NC, currently going at 8L. He expressed some mild SOB when flat, back pain treated per orders with improvement. He is anticipated to continue IV zosyn therapy and have PT/OT evaluate him later on today. Problem: Patient Centered Care  Goal: Patient preferences are identified and integrated in the patient's plan of care  Description: Interventions:  - What would you like us to know as we care for you?  From home with wife, I do have home health services  - Provide timely, complete, and accurate information to patient/family  - Incorporate patient and family knowledge, values, beliefs, and cultural backgrounds into the planning and delivery of care  - Encourage patient/family to participate in care and decision-making at the level they choose  - Honor patient and family perspectives and choices  Outcome: Progressing     Problem: Patient/Family Goals  Goal: Patient/Family Long Term Goal  Description: Patient's Long Term Goal: Regain some of my strength and get back to walking    Interventions:  - PT/OT  - Ambulate as tolerated  - Educate patient on importance of mobility  - Wean O2 as tolerated  - Discharge planning for optimal recovery  - See additional Care Plan goals for specific interventions  Outcome: Progressing  Goal: Patient/Family Short Term Goal  Description: Patient's Short Term Goal: Improve my breathing and no longer have oxygen    Interventions:   - Wean O2  - Ambulate as tolerated  - Monitor vitals  - Incentive spirometer  - IV zosyn  - See additional Care Plan goals for specific interventions  Outcome: Progressing     Problem: MUSCULOSKELETAL - ADULT  Goal: Return mobility to safest level of function  Description: INTERVENTIONS:  - Assess patient stability and activity tolerance for standing, transferring and ambulating w/ or w/o assistive devices  - Assist with transfers and ambulation using safe patient handling equipment as needed  - Ensure adequate protection for wounds/incisions during mobilization  - Obtain PT/OT consults as needed  - Advance activity as appropriate  - Communicate ordered activity level and limitations with patient/family  Outcome: Progressing     Problem: Impaired Functional Mobility  Goal: Achieve highest/safest level of mobility/gait  Description: Interventions:  - Assess patient's functional ability and stability  - Promote increasing activity/tolerance for mobility and gait  - Educate and engage patient/family in tolerated activity level and precautions    Outcome: Progressing     Problem: Impaired Swallowing  Goal: Minimize aspiration risk  Description: Interventions:  - Patient should be alert and upright for all feedings (90 degrees preferred)  - Offer food and liquids at a slow rate  - No straws  - Encourage small bites of food and small sips of liquid  - Offer pills one at a time, crush or deliver with applesauce as needed  - Discontinue feeding and notify MD (or speech pathologist) if coughing or persistent throat clearing or wet/gurgly vocal quality is noted  Outcome: Progressing     Problem: PAIN - ADULT  Goal: Verbalizes/displays adequate comfort level or patient's stated pain goal  Description: INTERVENTIONS:  - Encourage pt to monitor pain and request assistance  - Assess pain using appropriate pain scale  - Administer analgesics based on type and severity of pain and evaluate response  - Implement non-pharmacological measures as appropriate and evaluate response  - Consider cultural and social influences on pain and pain management  - Manage/alleviate anxiety  - Utilize distraction and/or relaxation techniques  - Monitor for opioid side effects  - Notify MD/LIP if interventions unsuccessful or patient reports new pain  - Anticipate increased pain with activity and pre-medicate as appropriate  Outcome: Progressing     Problem: SAFETY ADULT - FALL  Goal: Free from fall injury  Description: INTERVENTIONS:  - Assess pt frequently for physical needs  - Identify cognitive and physical deficits and behaviors that affect risk of falls.   - [place] fall precautions as indicated by assessment.  - Educate pt/family on patient safety including physical limitations  - Instruct pt to call for assistance with activity based on assessment  - Modify environment to reduce risk of injury  - Provide assistive devices as appropriate  - Consider OT/PT consult to assist with strengthening/mobility  - Encourage toileting schedule  Outcome: Progressing     Problem: RESPIRATORY - ADULT  Goal: Achieves optimal ventilation and oxygenation  Description: INTERVENTIONS:  - Assess for changes in respiratory status  - Assess for changes in mentation and behavior  - Position to facilitate oxygenation and minimize respiratory effort  - Oxygen supplementation based on oxygen saturation or ABGs  - Provide Smoking Cessation handout, if applicable  - Encourage broncho-pulmonary hygiene including cough, deep breathe, Incentive Spirometry  - Assess the need for suctioning and perform as needed  - Assess and instruct to report SOB or any respiratory difficulty  - Respiratory Therapy support as indicated  - Manage/alleviate anxiety  - Monitor for signs/symptoms of CO2 retention  Outcome: Not Progressing

## 2022-11-09 ENCOUNTER — APPOINTMENT (OUTPATIENT)
Dept: CV DIAGNOSTICS | Facility: HOSPITAL | Age: 82
End: 2022-11-09
Attending: INTERNAL MEDICINE
Payer: MEDICARE

## 2022-11-09 LAB
ANION GAP SERPL CALC-SCNC: 8 MMOL/L (ref 0–18)
BASOPHILS # BLD AUTO: 0.05 X10(3) UL (ref 0–0.2)
BASOPHILS NFR BLD AUTO: 0.5 %
BUN BLD-MCNC: 25 MG/DL (ref 7–18)
BUN/CREAT SERPL: 18.9 (ref 10–20)
CALCIUM BLD-MCNC: 9.7 MG/DL (ref 8.5–10.1)
CHLORIDE SERPL-SCNC: 113 MMOL/L (ref 98–112)
CO2 SERPL-SCNC: 24 MMOL/L (ref 21–32)
CREAT BLD-MCNC: 1.32 MG/DL
DEPRECATED RDW RBC AUTO: 52.2 FL (ref 35.1–46.3)
EOSINOPHIL # BLD AUTO: 0.13 X10(3) UL (ref 0–0.7)
EOSINOPHIL NFR BLD AUTO: 1.2 %
ERYTHROCYTE [DISTWIDTH] IN BLOOD BY AUTOMATED COUNT: 14.3 % (ref 11–15)
GFR SERPLBLD BASED ON 1.73 SQ M-ARVRAT: 54 ML/MIN/1.73M2 (ref 60–?)
GLUCOSE BLD-MCNC: 107 MG/DL (ref 70–99)
HCT VFR BLD AUTO: 39.7 %
HGB BLD-MCNC: 12.3 G/DL
IMM GRANULOCYTES # BLD AUTO: 0.09 X10(3) UL (ref 0–1)
IMM GRANULOCYTES NFR BLD: 0.9 %
LYMPHOCYTES # BLD AUTO: 1.37 X10(3) UL (ref 1–4)
LYMPHOCYTES NFR BLD AUTO: 13.1 %
MCH RBC QN AUTO: 30.4 PG (ref 26–34)
MCHC RBC AUTO-ENTMCNC: 31 G/DL (ref 31–37)
MCV RBC AUTO: 98.3 FL
MONOCYTES # BLD AUTO: 0.7 X10(3) UL (ref 0.1–1)
MONOCYTES NFR BLD AUTO: 6.7 %
NEUTROPHILS # BLD AUTO: 8.1 X10 (3) UL (ref 1.5–7.7)
NEUTROPHILS # BLD AUTO: 8.1 X10(3) UL (ref 1.5–7.7)
NEUTROPHILS NFR BLD AUTO: 77.6 %
OSMOLALITY SERPL CALC.SUM OF ELEC: 305 MOSM/KG (ref 275–295)
PLATELET # BLD AUTO: 162 10(3)UL (ref 150–450)
POTASSIUM SERPL-SCNC: 3.5 MMOL/L (ref 3.5–5.1)
RBC # BLD AUTO: 4.04 X10(6)UL
SODIUM SERPL-SCNC: 145 MMOL/L (ref 136–145)
WBC # BLD AUTO: 10.4 X10(3) UL (ref 4–11)

## 2022-11-09 PROCEDURE — 93306 TTE W/DOPPLER COMPLETE: CPT | Performed by: INTERNAL MEDICINE

## 2022-11-09 PROCEDURE — 99233 SBSQ HOSP IP/OBS HIGH 50: CPT | Performed by: HOSPITALIST

## 2022-11-09 NOTE — PLAN OF CARE
Pt is A&O x3-4, 8L O2. IV ABX given. Denied pain and SOB. Plan for echo today. Call light within reach and monitoring will continue. Problem: Patient Centered Care  Goal: Patient preferences are identified and integrated in the patient's plan of care  Description: Interventions:  - What would you like us to know as we care for you?  From home with wife, I do have home health services  - Provide timely, complete, and accurate information to patient/family  - Incorporate patient and family knowledge, values, beliefs, and cultural backgrounds into the planning and delivery of care  - Encourage patient/family to participate in care and decision-making at the level they choose  - Honor patient and family perspectives and choices  Outcome: Progressing     Problem: Patient/Family Goals  Goal: Patient/Family Long Term Goal  Description: Patient's Long Term Goal: Regain some of my strength and get back to walking    Interventions:  - PT/OT  - Ambulate as tolerated  - Educate patient on importance of mobility  - Wean O2 as tolerated  - Discharge planning for optimal recovery  - See additional Care Plan goals for specific interventions  Outcome: Progressing  Goal: Patient/Family Short Term Goal  Description: Patient's Short Term Goal: Improve my breathing and no longer have oxygen    Interventions:   - Wean O2  - Ambulate as tolerated  - Monitor vitals  - Incentive spirometer  - IV zosyn  - See additional Care Plan goals for specific interventions  Outcome: Progressing     Problem: RESPIRATORY - ADULT  Goal: Achieves optimal ventilation and oxygenation  Description: INTERVENTIONS:  - Assess for changes in respiratory status  - Assess for changes in mentation and behavior  - Position to facilitate oxygenation and minimize respiratory effort  - Oxygen supplementation based on oxygen saturation or ABGs  - Provide Smoking Cessation handout, if applicable  - Encourage broncho-pulmonary hygiene including cough, deep breathe, Incentive Spirometry  - Assess the need for suctioning and perform as needed  - Assess and instruct to report SOB or any respiratory difficulty  - Respiratory Therapy support as indicated  - Manage/alleviate anxiety  - Monitor for signs/symptoms of CO2 retention  Outcome: Progressing     Problem: MUSCULOSKELETAL - ADULT  Goal: Return mobility to safest level of function  Description: INTERVENTIONS:  - Assess patient stability and activity tolerance for standing, transferring and ambulating w/ or w/o assistive devices  - Assist with transfers and ambulation using safe patient handling equipment as needed  - Ensure adequate protection for wounds/incisions during mobilization  - Obtain PT/OT consults as needed  - Advance activity as appropriate  - Communicate ordered activity level and limitations with patient/family  Outcome: Progressing     Problem: Impaired Functional Mobility  Goal: Achieve highest/safest level of mobility/gait  Description: Interventions:  - Assess patient's functional ability and stability  - Promote increasing activity/tolerance for mobility and gait  - Educate and engage patient/family in tolerated activity level and precautions       Problem: Impaired Swallowing  Goal: Minimize aspiration risk  Description: Interventions:  - Patient should be alert and upright for all feedings (90 degrees preferred)  - Offer food and liquids at a slow rate  - No straws  - Encourage small bites of food and small sips of liquid  - Offer pills one at a time, crush or deliver with applesauce as needed  - Discontinue feeding and notify MD (or speech pathologist) if coughing or persistent throat clearing or wet/gurgly vocal quality is noted  Outcome: Progressing     Problem: PAIN - ADULT  Goal: Verbalizes/displays adequate comfort level or patient's stated pain goal  Description: INTERVENTIONS:  - Encourage pt to monitor pain and request assistance  - Assess pain using appropriate pain scale  - Administer analgesics based on type and severity of pain and evaluate response  - Implement non-pharmacological measures as appropriate and evaluate response  - Consider cultural and social influences on pain and pain management  - Manage/alleviate anxiety  - Utilize distraction and/or relaxation techniques  - Monitor for opioid side effects  - Notify MD/LIP if interventions unsuccessful or patient reports new pain  - Anticipate increased pain with activity and pre-medicate as appropriate  Outcome: Progressing     Problem: SAFETY ADULT - FALL  Goal: Free from fall injury  Description: INTERVENTIONS:  - Assess pt frequently for physical needs  - Identify cognitive and physical deficits and behaviors that affect risk of falls.   - [place] fall precautions as indicated by assessment.  - Educate pt/family on patient safety including physical limitations  - Instruct pt to call for assistance with activity based on assessment  - Modify environment to reduce risk of injury  - Provide assistive devices as appropriate  - Consider OT/PT consult to assist with strengthening/mobility  - Encourage toileting schedule  Outcome: Progressing

## 2022-11-09 NOTE — SLP NOTE
SPEECH DAILY NOTE - INPATIENT    ASSESSMENT & PLAN   ASSESSMENT    PPE REQUIRED. THIS THERAPIST WORE GLOVES AND DROPLET MASK FOR DURATION OF TX SESSION. HANDS WASHED UPON ENTRANCE/EXIT. SLP f/u for ongoing meal assessment per recommendations of soft easy to chew and mildly thick liquids per BSE. RN reports pt tolerates diet and medication well with no overt clinical s/s aspiration, but has been very upset about the thickened liquids. Pt denies any swallowing challenges. Pt positioned upright to 90 degrees in bedside chair. Pt afebrile, tolerating 6L HFNC with oxygen status 92% prior to the start of oral trials. SLP reviewed aspiration precautions and safe swallowing compensatory strategies with the patient and pt's family. Patient and family v/u but would benefit from continued tx. Provided set up of trials and the pt self fed at a slow rate and in controlled amounts. Good tolerance of puree, soft easy to chew solids, and nectar thick liquids via open cup with no overt clinical signs/symptoms of aspiration. Pt trialed with hard solids and thin liquids. Bite strength adequate but mastication is prolonged with oxygen saturations reducing to 90%. Improved tolerance with thin liquids via open cup with no clinical signs of aspiration (e.g., immediate/delayed throat clear, immediate/delayed cough, wet vocal quality, increased O2 effort) observed across all trials. Oxygen status remained >92% with all thin liquid trials. Oral/buccal cavities clear of residue at the end of the session. Recommend UPGRADE in diet to SOFT EASY TO CHEW/THIN (no straws). PLAN: SLP to f/u with x2 meal assessment, monitor CXR, and VFSS if any overt CSA and/or decline in CXR. RN, Unruly Ponce alerted with results and recommendations. Diet Recommendations - Solids: Soft/ Easy to chew  Diet Recommendations - Liquids: Thin Liquids (No Straw)    Compensatory Strategies Recommended: No straws; Slow rate; Alternate consistencies;Small bites and sips;Multiple swallows  Aspiration Precautions: Upright position; Slow rate;Small bites and sips; No straw  Medication Administration Recommendations:  (May take a sip of water after swallowing pill in puree)    Patient Experiencing Pain: No                Discharge Recommendations  Discharge Recommendations/Plan: Undetermined    Treatment Plan  Treatment Plan/Recommendations: Dysphagia therapy; Aspiration precautions    Interdisciplinary Communication: Discussed with RN  Plan posted at bedside  Recommendations posted at bedside            GOALS  Goal #1 The patient will tolerate SOFT EASY TO CHEW consistency and MILDLY THICK liquids without overt signs or symptoms of aspiration with 100 % accuracy over 2 session(s). Good tolerance on soft easy to chew and mildly thick liquids without overt clinical signs of aspiration to 100% accuracy. Recommend to upgrade liquids to thin/no straw. Revised goal 11/9/22:    The patient will tolerate SOFT EASY TO CHEW consistency and THIN liquids without overt signs or symptoms of aspiration with 100 % accuracy over 2 session(s). Met/Revised   Goal #2 The patient/family/caregiver will demonstrate understanding and implementation of aspiration precautions and swallow strategies independently over 2 session(s). SLP reviewed aspiration precautions and safe swallowing compensatory strategies with the patient and pt's family. Patient and family v/u but would benefit from continued tx. Provided set up of trials and the pt self fed at a slow rate and in controlled amounts. In Progress   Goal #3 The patient will utilize compensatory strategies as outlined by  BSSE (clinical evaluation) including Slow rate, Small bites, Small sips, Multiple swallows, Alternate liquids/solids, No straws, Upright 90 degrees with mild assistance 90 % of the time across 2 sessions. The pt self fed while sitting upright in bed.   Pt took amounts at a slow rate, in controlled amounts, no straw, and alternating consistencies to 90% accuracy. In Progress   Goal #4 The patient will tolerate trial upgrade of regular solid consistency and thin liquids without overt signs or symptoms of aspiration with 100 % accuracy over 2 session(s). Pt trialed with hard solids and thin liquids. Bite strength adequate but mastication is prolonged with oxygen saturations reducing to 90%. Improved tolerance with thin liquids via open cup with no clinical signs of aspiration (e.g., immediate/delayed throat clear, immediate/delayed cough, wet vocal quality, increased O2 effort) observed across all trials. Oropharyngeal timing improved. Oxygen status remained >92% with all thin liquid trials. Oral/buccal cavities clear of residue at the end of the session. Recommend UPGRADE in diet to SOFT EASY TO CHEW/THIN (no straws). In Progress     FOLLOW UP  Follow Up Needed (Documentation Required): Yes  SLP Follow-up Date: 11/10/22  Number of Visits to Meet Established Goals: 3    Session: 1 post BSSE    If you have any questions, please contact     [address] [place] [address] MS/CCC-SLP  Speech Language Pathologist  [facility]  EXT.  [phone]

## 2022-11-09 NOTE — PLAN OF CARE
-Pt is alert and oriented x3-4  -Pt on 8L high flow   -Pt. Is max assist, difficult pivot, deconditoned and weak  -Pt went to echo   -continued IV abx and meds    Problem: Patient Centered Care  Goal: Patient preferences are identified and integrated in the patient's plan of care  Description: Interventions:  - What would you like us to know as we care for you?  From home with wife, I do have home health services  - Provide timely, complete, and accurate information to patient/family  - Incorporate patient and family knowledge, values, beliefs, and cultural backgrounds into the planning and delivery of care  - Encourage patient/family to participate in care and decision-making at the level they choose  - Honor patient and family perspectives and choices  Outcome: Progressing     Problem: Patient/Family Goals  Goal: Patient/Family Long Term Goal  Description: Patient's Long Term Goal: Regain some of my strength and get back to walking    Interventions:  - PT/OT  - Ambulate as tolerated  - Educate patient on importance of mobility  - Wean O2 as tolerated  - Discharge planning for optimal recovery  - See additional Care Plan goals for specific interventions  Outcome: Progressing  Goal: Patient/Family Short Term Goal  Description: Patient's Short Term Goal: Improve my breathing and no longer have oxygen    Interventions:   - Wean O2  - Ambulate as tolerated  - Monitor vitals  - Incentive spirometer  - IV zosyn  - See additional Care Plan goals for specific interventions  Outcome: Progressing     Problem: RESPIRATORY - ADULT  Goal: Achieves optimal ventilation and oxygenation  Description: INTERVENTIONS:  - Assess for changes in respiratory status  - Assess for changes in mentation and behavior  - Position to facilitate oxygenation and minimize respiratory effort  - Oxygen supplementation based on oxygen saturation or ABGs  - Provide Smoking Cessation handout, if applicable  - Encourage broncho-pulmonary hygiene including cough, deep breathe, Incentive Spirometry  - Assess the need for suctioning and perform as needed  - Assess and instruct to report SOB or any respiratory difficulty  - Respiratory Therapy support as indicated  - Manage/alleviate anxiety  - Monitor for signs/symptoms of CO2 retention  Outcome: Progressing     Problem: MUSCULOSKELETAL - ADULT  Goal: Return mobility to safest level of function  Description: INTERVENTIONS:  - Assess patient stability and activity tolerance for standing, transferring and ambulating w/ or w/o assistive devices  - Assist with transfers and ambulation using safe patient handling equipment as needed  - Ensure adequate protection for wounds/incisions during mobilization  - Obtain PT/OT consults as needed  - Advance activity as appropriate  - Communicate ordered activity level and limitations with patient/family  Outcome: Progressing     Problem: Impaired Functional Mobility  Goal: Achieve highest/safest level of mobility/gait  Description: Interventions:  - Assess patient's functional ability and stability  - Promote increasing activity/tolerance for mobility and gait  - Educate and engage patient/family in tolerated activity level and precautions  {Additional Mobility Interventions:[id number]:::0}  Outcome: Progressing     Problem: Impaired Swallowing  Goal: Minimize aspiration risk  Description: Interventions:  - Patient should be alert and upright for all feedings (90 degrees preferred)  - Offer food and liquids at a slow rate  - No straws  - Encourage small bites of food and small sips of liquid  - Offer pills one at a time, crush or deliver with applesauce as needed  - Discontinue feeding and notify MD (or speech pathologist) if coughing or persistent throat clearing or wet/gurgly vocal quality is noted  Outcome: Progressing     Problem: PAIN - ADULT  Goal: Verbalizes/displays adequate comfort level or patient's stated pain goal  Description: INTERVENTIONS:  - Encourage pt to monitor pain and request assistance  - Assess pain using appropriate pain scale  - Administer analgesics based on type and severity of pain and evaluate response  - Implement non-pharmacological measures as appropriate and evaluate response  - Consider cultural and social influences on pain and pain management  - Manage/alleviate anxiety  - Utilize distraction and/or relaxation techniques  - Monitor for opioid side effects  - Notify MD/LIP if interventions unsuccessful or patient reports new pain  - Anticipate increased pain with activity and pre-medicate as appropriate  Outcome: Progressing     Problem: SAFETY ADULT - FALL  Goal: Free from fall injury  Description: INTERVENTIONS:  - Assess pt frequently for physical needs  - Identify cognitive and physical deficits and behaviors that affect risk of falls.   - [place] fall precautions as indicated by assessment.  - Educate pt/family on patient safety including physical limitations  - Instruct pt to call for assistance with activity based on assessment  - Modify environment to reduce risk of injury  - Provide assistive devices as appropriate  - Consider OT/PT consult to assist with strengthening/mobility  - Encourage toileting schedule  Outcome: Progressing

## 2022-11-09 NOTE — CM/SW NOTE
12: 45PM  SW obtained [facility] list of quality data via [facility]. SW attempted to meet w/ pt in his room as f/up. Pt was down for Echo at this time. SW to try and f/up later this afternoon to provide [facility] list and discuss DC plans if schedule permits. 01:45PM  SW met w/ pt and pt's wife in his room as f/up for [facility] discussion. SW explained PT/OT recommendations, referral process, and f/up steps for DC planning. Pt and wife confirmed agreeable to [facility] at [facility]. Pt's wife inquired about [facility]. SW explained difference between Acute and Subacute rehab. Pt's wife expressed understanding. Pt and wife confirmed they will review the [facility] list and make a final choice. SW provided name and direct phone # for f/up. PLAN: [facility] - pending choice & med clear       SW/CM to remain available for support and/or discharge planning.          Harvey Tse, MSW, [facility]

## 2022-11-09 NOTE — CONSULTS
[facility]    PATIENT'S NAME: Karolyn Chacon   ATTENDING PHYSICIAN: Christina Leary MD   CONSULTING PHYSICIAN: Jake Cho DO   PATIENT ACCOUNT#:   [de-identified]    LOCATION:  [address] [place] [address] #:   [id number]       YOB: 1940  ADMISSION DATE:       11/07/2022      CONSULT DATE:  11/09/2022    REPORT OF CONSULTATION      REASON FOR CONSULTATION:  This is a patient seen in cardiology consultation because of elevated troponin. HISTORY OF PRESENT ILLNESS:  Patient has been diagnosed with aspiration pneumonia. He has denied having any chest pain or heaviness in the chest.  He denies exertional symptoms. He denies palpitations or syncope. He states that he always has a cough. He feels cold when asked if he feels any chills. No nausea or vomiting, but he does sometimes feel that his food gets stuck. WBC count was 19,000, there was a left shift noted. Procalcitonin was significantly elevated at 8.31. His high-sensitivity troponin was 131, subsequently 111. Patient was in renal failure. Creatinine was 2.0. Patient was started on IV fluids. Chest x-ray demonstrating small left effusion with or without possible pneumonia. 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 MEDICATIONS:  Medications list reviewed. ALLERGIES:  None. SOCIAL HISTORY:  Ex-smoker. No habitual alcohol use. REVIEW OF SYSTEMS:  A comprehensive 12-point review of systems was reviewed. He does have thin liquids at home. Otherwise, his 12-point review of systems is negative except for arthritis. PHYSICAL EXAMINATION:    VITAL SIGNS:  Blood pressure 112/63. Pulse 76. Patient afebrile. Patient was 85% on room air initially and 91% on nasal cannula oxygen. HEENT:  Head is atraumatic. Oral mucosa is dry. Patient has poor dentition appreciated. NECK:  Neck veins are flat, not elevated. LUNGS:  Diminished anteriorly. HEART:  S1, S2 regular. No murmurs were auscultated. ABDOMEN:  Soft and nontender. EXTREMITIES:  Without edema. Pulses are palpable, 2+ bilaterally. NEUROLOGIC:  Alert, appropriate. Patient has left-sided hemiparesis. PSYCHIATRIC:  Patient has a calm affect, appropriate mood. MUSCULOSKELETAL:  No joint effusions appreciated. LABORATORY DATA:  ProBNP 1784. High-sensitivity troponin 133, 111. Procalcitonin 8.31. Subsequent creatinine 1.63. Previously creatinine was 1.19. EKG demonstrating sinus tachycardia, incomplete right bundle branch block, left anterior fascicular block. Chest x-ray:  Image personally reviewed. No pulmonary congestion. They describe left-sided infiltrate. IMPRESSION:    1. Type 2 myocardial injury in the setting of hypoxia, aspiration pneumonia, and acute renal failure. 2.   Recurrent aspiration pneumonia with hypoxemic respiratory failure. 3.   Acute renal failure from dehydration. 4.   Elevated proBNP, but clinically no evidence for congestive heart failure. Flat neck veins. Chest x-ray without pulmonary congestion. Imaging reviewed. 5.   History of cerebrovascular accident. Patient is on dual-antiplatelet therapy chronically as well as statin. 6.   Hypertension. Amlodipine has been continued. RECOMMENDATIONS:  As stated above, it does not appear to be an acute myocardial injury due to plaque rupture. Continue with dual-antiplatelet therapy already ordered. Continue with statin therapy. Echocardiogram has been ordered and will be reviewed once completed. BNP elevated, but no evidence for heart failure; in fact, he was dehydrated on admission, his renal failure is improving.   Patient once again asked me if he can have thin liquids and I suspect that he may be noncompliant again with his dietary recommendations that put him in his current condition. Lipid profile reviewed from 11/07/2022 with LDL at goal at 44. Thank you for the consult.      Dictated By Luz Alatorre DO  d: 11/09/2022 07:19:22  t: 11/09/2022 09:04:04  [facility] [id number]  RQ/

## 2022-11-09 NOTE — PLAN OF CARE
-Pt is alert and oriented x3 to x4  -Pt is on 8L of O2  -Pt is x2 assist with a walker, left side deficit  -Speech eval today  -PT/OT eval today  -plan is for echo tomorrow     Problem: Patient Centered Care  Goal: Patient preferences are identified and integrated in the patient's plan of care  Description: Interventions:  - What would you like us to know as we care for you?  From home with wife, I do have home health services  - Provide timely, complete, and accurate information to patient/family  - Incorporate patient and family knowledge, values, beliefs, and cultural backgrounds into the planning and delivery of care  - Encourage patient/family to participate in care and decision-making at the level they choose  - Honor patient and family perspectives and choices  Outcome: Progressing     Problem: Patient/Family Goals  Goal: Patient/Family Long Term Goal  Description: Patient's Long Term Goal: Regain some of my strength and get back to walking    Interventions:  - PT/OT  - Ambulate as tolerated  - Educate patient on importance of mobility  - Wean O2 as tolerated  - Discharge planning for optimal recovery  - See additional Care Plan goals for specific interventions  Outcome: Progressing  Goal: Patient/Family Short Term Goal  Description: Patient's Short Term Goal: Improve my breathing and no longer have oxygen    Interventions:   - Wean O2  - Ambulate as tolerated  - Monitor vitals  - Incentive spirometer  - IV zosyn  - See additional Care Plan goals for specific interventions  Outcome: Progressing     Problem: RESPIRATORY - ADULT  Goal: Achieves optimal ventilation and oxygenation  Description: INTERVENTIONS:  - Assess for changes in respiratory status  - Assess for changes in mentation and behavior  - Position to facilitate oxygenation and minimize respiratory effort  - Oxygen supplementation based on oxygen saturation or ABGs  - Provide Smoking Cessation handout, if applicable  - Encourage broncho-pulmonary hygiene including cough, deep breathe, Incentive Spirometry  - Assess the need for suctioning and perform as needed  - Assess and instruct to report SOB or any respiratory difficulty  - Respiratory Therapy support as indicated  - Manage/alleviate anxiety  - Monitor for signs/symptoms of CO2 retention  Outcome: Progressing     Problem: MUSCULOSKELETAL - ADULT  Goal: Return mobility to safest level of function  Description: INTERVENTIONS:  - Assess patient stability and activity tolerance for standing, transferring and ambulating w/ or w/o assistive devices  - Assist with transfers and ambulation using safe patient handling equipment as needed  - Ensure adequate protection for wounds/incisions during mobilization  - Obtain PT/OT consults as needed  - Advance activity as appropriate  - Communicate ordered activity level and limitations with patient/family  Outcome: Progressing     Problem: Impaired Functional Mobility  Goal: Achieve highest/safest level of mobility/gait  Description: Interventions:  - Assess patient's functional ability and stability  - Promote increasing activity/tolerance for mobility and gait  - Educate and engage patient/family in tolerated activity level and precautions  - Recommend use of  walker for transfers and ambulation  Outcome: Progressing     Problem: Impaired Swallowing  Goal: Minimize aspiration risk  Description: Interventions:  - Patient should be alert and upright for all feedings (90 degrees preferred)  - Offer food and liquids at a slow rate  - No straws  - Encourage small bites of food and small sips of liquid  - Offer pills one at a time, crush or deliver with applesauce as needed  - Discontinue feeding and notify MD (or speech pathologist) if coughing or persistent throat clearing or wet/gurgly vocal quality is noted  Outcome: Progressing     Problem: PAIN - ADULT  Goal: Verbalizes/displays adequate comfort level or patient's stated pain goal  Description: INTERVENTIONS:  - Encourage pt to monitor pain and request assistance  - Assess pain using appropriate pain scale  - Administer analgesics based on type and severity of pain and evaluate response  - Implement non-pharmacological measures as appropriate and evaluate response  - Consider cultural and social influences on pain and pain management  - Manage/alleviate anxiety  - Utilize distraction and/or relaxation techniques  - Monitor for opioid side effects  - Notify MD/LIP if interventions unsuccessful or patient reports new pain  - Anticipate increased pain with activity and pre-medicate as appropriate  Outcome: Progressing     Problem: SAFETY ADULT - FALL  Goal: Free from fall injury  Description: INTERVENTIONS:  - Assess pt frequently for physical needs  - Identify cognitive and physical deficits and behaviors that affect risk of falls.   - [place] fall precautions as indicated by assessment.  - Educate pt/family on patient safety including physical limitations  - Instruct pt to call for assistance with activity based on assessment  - Modify environment to reduce risk of injury  - Provide assistive devices as appropriate  - Consider OT/PT consult to assist with strengthening/mobility  - Encourage toileting schedule  Outcome: Progressing

## 2022-11-09 NOTE — SLP NOTE
Attempted to see pt for swallowing therapy. Pt off floor in testing for noninvasive cardiodiagnostics. Therapy to be rescheduled permitting speech schedule. Agueda STANFORD [address] [place] [address] MS/CCC-SLP  Speech Language Pathologist  [facility]  EXT.  [phone]

## 2022-11-10 LAB
ANION GAP SERPL CALC-SCNC: 7 MMOL/L (ref 0–18)
BASOPHILS # BLD AUTO: 0.05 X10(3) UL (ref 0–0.2)
BASOPHILS NFR BLD AUTO: 0.6 %
BUN BLD-MCNC: 20 MG/DL (ref 7–18)
BUN/CREAT SERPL: 16 (ref 10–20)
CALCIUM BLD-MCNC: 9.2 MG/DL (ref 8.5–10.1)
CHLORIDE SERPL-SCNC: 113 MMOL/L (ref 98–112)
CO2 SERPL-SCNC: 26 MMOL/L (ref 21–32)
CREAT BLD-MCNC: 1.25 MG/DL
DEPRECATED RDW RBC AUTO: 50.4 FL (ref 35.1–46.3)
EOSINOPHIL # BLD AUTO: 0.14 X10(3) UL (ref 0–0.7)
EOSINOPHIL NFR BLD AUTO: 1.7 %
ERYTHROCYTE [DISTWIDTH] IN BLOOD BY AUTOMATED COUNT: 14.2 % (ref 11–15)
GFR SERPLBLD BASED ON 1.73 SQ M-ARVRAT: 58 ML/MIN/1.73M2 (ref 60–?)
GLUCOSE BLD-MCNC: 93 MG/DL (ref 70–99)
HCT VFR BLD AUTO: 37.3 %
HGB BLD-MCNC: 11.7 G/DL
IMM GRANULOCYTES # BLD AUTO: 0.07 X10(3) UL (ref 0–1)
IMM GRANULOCYTES NFR BLD: 0.8 %
LYMPHOCYTES # BLD AUTO: 1.59 X10(3) UL (ref 1–4)
LYMPHOCYTES NFR BLD AUTO: 19.1 %
MCH RBC QN AUTO: 30.4 PG (ref 26–34)
MCHC RBC AUTO-ENTMCNC: 31.4 G/DL (ref 31–37)
MCV RBC AUTO: 96.9 FL
MONOCYTES # BLD AUTO: 0.91 X10(3) UL (ref 0.1–1)
MONOCYTES NFR BLD AUTO: 10.9 %
NEUTROPHILS # BLD AUTO: 5.57 X10 (3) UL (ref 1.5–7.7)
NEUTROPHILS # BLD AUTO: 5.57 X10(3) UL (ref 1.5–7.7)
NEUTROPHILS NFR BLD AUTO: 66.9 %
OSMOLALITY SERPL CALC.SUM OF ELEC: 304 MOSM/KG (ref 275–295)
PLATELET # BLD AUTO: 156 10(3)UL (ref 150–450)
POTASSIUM SERPL-SCNC: 3.3 MMOL/L (ref 3.5–5.1)
RBC # BLD AUTO: 3.85 X10(6)UL
SODIUM SERPL-SCNC: 146 MMOL/L (ref 136–145)
WBC # BLD AUTO: 8.3 X10(3) UL (ref 4–11)

## 2022-11-10 PROCEDURE — 99233 SBSQ HOSP IP/OBS HIGH 50: CPT | Performed by: HOSPITALIST

## 2022-11-10 RX ORDER — POTASSIUM CHLORIDE 20 MEQ/1
40 TABLET, EXTENDED RELEASE ORAL ONCE
Status: COMPLETED | OUTPATIENT
Start: 2022-11-10 | End: 2022-11-10

## 2022-11-10 NOTE — SLP NOTE
SPEECH DAILY NOTE - INPATIENT    ASSESSMENT & PLAN   ASSESSMENT    PPE REQUIRED. THIS THERAPIST WORE GLOVES AND DROPLET MASK FOR DURATION OF TX SESSION. HANDS WASHED UPON ENTRANCE/EXIT. SLP f/u for ongoing meal assessment per recommendations of diet upgrade to soft easy to chew and thin liquids on 11/9/22. RN reports pt tolerates diet and medication well with no overt clinical s/s aspiration. Pt denies any swallowing challenges. Pt positioned upright to 90 degrees in bedside chair. Pt afebrile, tolerating 8L HFNC with oxygen status 93% prior to the start of oral trials. SLP reviewed aspiration precautions and safe swallowing compensatory strategies with the patient. Patient seen with his lunch tray and self fed after set up. The pt demonstrated self feeding at a slow rate, taking controlled amounts, and alternating consistencies. Good tolerance of puree, soft easy to chew solids, and thin liquids via open cup with no overt clinical signs/symptoms of aspiration. Bite strength adequate with prolonged mastication on soft solids. Oropharyngeal timing improved with thin liquids via open cup with no clinical signs of aspiration (e.g., immediate/delayed throat clear, immediate/delayed cough, wet vocal quality, increased O2 effort) observed across all trials. Oxygen status remained >93% with all thin liquid trials. Oral/buccal cavities clear of residue at the end of the session. Recommend to continue no straws precaution. Recommend to monitor swallowing 1-2x and complete family education as able. PLAN: SLP to f/u with x1-2 meal assessment, monitor CXR, and VFSS if any overt CSA and/or decline in CXR. RN alerted with results and recommendations. Diet Recommendations - Solids: Soft/ Easy to chew  Diet Recommendations - Liquids: Thin Liquids (No Straw)    Compensatory Strategies Recommended: No straws; Slow rate; Alternate consistencies;Small bites and sips;Multiple swallows  Aspiration Precautions: Upright position; Slow rate;Small bites and sips; No straw  Medication Administration Recommendations:  (May take a sip of water after swallowing pill in puree)    Patient Experiencing Pain: No                Discharge Recommendations  Discharge Recommendations/Plan: Undetermined    Treatment Plan  Treatment Plan/Recommendations: Dysphagia therapy; Aspiration precautions    Interdisciplinary Communication: Discussed with RN  Plan posted at bedside  Recommendations posted at bedside            GOALS  Goal #1 The patient will tolerate SOFT EASY TO CHEW consistency and THIN liquids without overt signs or symptoms of aspiration with 100 % accuracy over 2 session(s). Good tolerance on soft easy to chew and thin liquids without overt clinical signs of aspiration to 100% accuracy on this first session. Recommend to continue no straw precaution. In Progress   Goal #2 The patient/family/caregiver will demonstrate understanding and implementation of aspiration precautions and swallow strategies independently over 2 session(s). SLP reviewed aspiration precautions and safe swallowing compensatory strategies with the patient. Patient acknowledged understanfing but would benefit from continued tx. Complete family education as able. In Progress   Goal #3 The patient will utilize compensatory strategies as outlined by  BSSE (clinical evaluation) including Slow rate, Small bites, Small sips, Multiple swallows, Alternate liquids/solids, No straws, Upright 90 degrees with mild assistance 90 % of the time across 2 sessions. The pt seen with his meal tray. Provided set up of trials and the pt self fed at a slow rate taking controlled amounts and alternating consistencies. No straws were used. In Progress   Goal #4 The patient will tolerate trial upgrade of regular solid consistency and thin liquids without overt signs or symptoms of aspiration with 100 % accuracy over 2 session(s). Pt trialed with hard solids .  Bite strength adequate but mastication is prolonged with oxygen saturations reducing to 90-91% during the mastication phase. Recommend to continue diet to SOFT EASY TO CHEW/THIN (no straws). In Progress     FOLLOW UP  Follow Up Needed (Documentation Required): Yes  SLP Follow-up Date: 11/11/22  Number of Visits to Meet Established Goals: 3    Session: 2 post BSSE    If you have any questions, please contact     [address] [place] [address] [address] MS/CCC-SLP  Speech Language Pathologist  [address] [place] [address]  EXT.  [phone]

## 2022-11-10 NOTE — PHYSICAL THERAPY NOTE
PHYSICAL THERAPY TREATMENT NOTE - INPATIENT     Room Number: 762/986-V       Presenting Problem: aspiration PNA    Problem List  Principal Problem:    Aspiration pneumonia, unspecified aspiration pneumonia type, unspecified laterality, unspecified part of lung ([facility] [address]  Active Problems:    Aspiration pneumonia (HCC)    Leukocytosis, unspecified type    Renal insufficiency    Elevated troponin    Hypoxia      PHYSICAL THERAPY ASSESSMENT   Chart reviewed. YADIRA Alonso  approved participation in physical therapy. PPE worn by therapist: mask and gloves. Patient was not wearing a mask during session. Patient presented in bed with not rated pain. Patient with fair  progress towards goals during this session. Education provided on Physical therapy plan of care and physiological benefits of out of bed mobility. Patient with good carryover. Pt was received in a bed and cleared for therapy session. Pt is max A from supine to EOB. Pt required Assist with BLE and upper trunk to sit up. Pt sat at the EOB for 15 minutes Min A for sitting balance. Pt is on O2  8 L and pt SpO2 was 89% while sitting EOB. Pt was educated and performed LE exercise at EOB to improve strength and functional mobility. Pt is cued for technique. Pt is Max A with sit to stand transfer while holding on to therapist. Pt then performed  SPT from the bed to a chair while holding onto one therapist hand and the other therapist performed SPT to a chair. Pt was anxious during the transfer and reported pain in rib cage due to gait belt. The sling for lift transfer was putted on a chair as nurse requested, for bringing pt back to bed. Pt is left in a chair with all needs within reach. Reported to RN on a status of a pt. Pt is on a track to DC to [facility] once medically cleared.     Bed mobility: Max assist  Transfers: Max assist  Gait Assistance: Maximum assistance  Distance (ft): SPT  Assistive Device: None (while holding on to therapist)  Pattern: Shuffle          Patient was left in bedside chair at end of session with all needs in reach. The patient's Approx Degree of Impairment: 72.57% has been calculated based on documentation in the [facility] '6 clicks' Inpatient Basic Mobility Short Form. Research supports that patients with this level of impairment may benefit from Subacute Rehab. RN aware of patient status post session. DISCHARGE RECOMMENDATIONS  PT Discharge Recommendations: Sub-acute rehabilitation     PLAN  PT Treatment Plan: Bed mobility; Body mechanics; Coordination; Endurance; Energy conservation;Range of motion;Strengthening;Transfer training;Balance training;Patient education    SUBJECTIVE  Pt was agreeable to therapy session    OBJECTIVE  Precautions: Bed/chair alarm;Limb alert - left    WEIGHT BEARING RESTRICTION  Weight Bearing Restriction: None                PAIN ASSESSMENT   Rating: Unable to rate  Location: back  Management Techniques: Activity promotion; Body mechanics; Relaxation;Repositioning    BALANCE                                                                                                                       Static Sitting: Fair -  Dynamic Sitting: Poor +           Static Standing: Poor -  Dynamic Standing: Not tested    ACTIVITY TOLERANCE                         O2 WALK  Oxygen Therapy  SPO2% on Room Air at Rest: 89  At rest oxygen flow (liters per minute): 8    AM-PAC '6-Clicks' INPATIENT SHORT FORM - BASIC MOBILITY  How much difficulty does the patient currently have. .. Patient Difficulty: Turning over in bed (including adjusting bedclothes, sheets and blankets)?: A Lot   Patient Difficulty: Sitting down on and standing up from a chair with arms (e.g., wheelchair, bedside commode, etc.): A Lot   Patient Difficulty: Moving from lying on back to sitting on the side of the bed?: A Lot   How much help from another person does the patient currently need. ..    Help from Another: Moving to and from a bed to a chair (including a wheelchair)?: A Lot   Help from Another: Need to walk in hospital room?: A Lot   Help from Another: Climbing 3-5 steps with a railing?: Total     AM-PAC Score:  Raw Score: 11   Approx Degree of Impairment: 72.57%   Standardized Score (AM-PAC Scale): 33.86   CMS Modifier (G-Code): CL      THERAPEUTIC EXERCISES  Lower Extremity Alternating marching  Knee extension     Position Sitting       Patient End of Session: In bed;Needs met;Call light within reach;RN aware of session/findings; All patient questions and concerns addressed; Alarm set    CURRENT GOALS   Goals to be met by: 11/25/22  Patient Goal Patient's self-stated goal is: to go home   Goal #1 Patient is able to demonstrate supine - sit EOB @ level: supervision     Goal #1   Current Status Max A   Goal #2 Patient is able to demonstrate transfers Sit to/from Stand at assistance level: supervision with cane - straight and cane - quad     Goal #2  Current Status Max A while holding on to therapist   Goal #3 Patient is able to ambulate 10 feet with assist device: cane - straight and cane - quad at assistance level: supervision   Goal #3   Current Status SPT to the chair Max A    Goal #4 Patient will negotiate 2 stairs/one curb w/ assistive device and supervision   Goal #4   Current Status NT   Goal #5 Patient to demonstrate independence with home activity/exercise instructions provided to patient in preparation for discharge.    Goal #5   Current Status In progress   Goal #6    Goal #6  Current Status .  1. R/O CVA:  Cardiac tele monitoring, neuro checks, MRI/MRA per orders, echo, Neuro team reassessment, general observation care and monitoring.  2. New-onset Diabetes Mellitus:  Endocrine consult and Diabetic Education as per usual process; initial recs discussed with Endo fellow (see Consult section).

## 2022-11-10 NOTE — PLAN OF CARE
Problem: Patient Centered Care  Goal: Patient preferences are identified and integrated in the patient's plan of care  Description: Interventions:  - What would you like us to know as we care for you?  From home with wife, I do have home health services  - Provide timely, complete, and accurate information to patient/family  - Incorporate patient and family knowledge, values, beliefs, and cultural backgrounds into the planning and delivery of care  - Encourage patient/family to participate in care and decision-making at the level they choose  - Honor patient and family perspectives and choices  Outcome: Progressing     Problem: Patient/Family Goals  Goal: Patient/Family Long Term Goal  Description: Patient's Long Term Goal: Regain some of my strength and get back to walking    Interventions:  - PT/OT  - Ambulate as tolerated  - Educate patient on importance of mobility  - Wean O2 as tolerated  - Discharge planning for optimal recovery  - See additional Care Plan goals for specific interventions  Outcome: Progressing  Goal: Patient/Family Short Term Goal  Description: Patient's Short Term Goal: Improve my breathing and no longer have oxygen    Interventions:   - Wean O2  - Ambulate as tolerated  - Monitor vitals  - Incentive spirometer  - IV zosyn  - See additional Care Plan goals for specific interventions  Outcome: Progressing     Problem: RESPIRATORY - ADULT  Goal: Achieves optimal ventilation and oxygenation  Description: INTERVENTIONS:  - Assess for changes in respiratory status  - Assess for changes in mentation and behavior  - Position to facilitate oxygenation and minimize respiratory effort  - Oxygen supplementation based on oxygen saturation or ABGs  - Provide Smoking Cessation handout, if applicable  - Encourage broncho-pulmonary hygiene including cough, deep breathe, Incentive Spirometry  - Assess the need for suctioning and perform as needed  - Assess and instruct to report SOB or any respiratory difficulty  - Respiratory Therapy support as indicated  - Manage/alleviate anxiety  - Monitor for signs/symptoms of CO2 retention  Outcome: Progressing     Problem: MUSCULOSKELETAL - ADULT  Goal: Return mobility to safest level of function  Description: INTERVENTIONS:  - Assess patient stability and activity tolerance for standing, transferring and ambulating w/ or w/o assistive devices  - Assist with transfers and ambulation using safe patient handling equipment as needed  - Ensure adequate protection for wounds/incisions during mobilization  - Obtain PT/OT consults as needed  - Advance activity as appropriate  - Communicate ordered activity level and limitations with patient/family  Outcome: Progressing     Problem: Impaired Functional Mobility  Goal: Achieve highest/safest level of mobility/gait  Description: Interventions:  - Assess patient's functional ability and stability  - Promote increasing activity/tolerance for mobility and gait  - Educate and engage patient/family in tolerated activity level and precautions  Outcome: Progressing     Problem: Impaired Swallowing  Goal: Minimize aspiration risk  Description: Interventions:  - Patient should be alert and upright for all feedings (90 degrees preferred)  - Offer food and liquids at a slow rate  - No straws  - Encourage small bites of food and small sips of liquid  - Offer pills one at a time, crush or deliver with applesauce as needed  - Discontinue feeding and notify MD (or speech pathologist) if coughing or persistent throat clearing or wet/gurgly vocal quality is noted  Outcome: Progressing     Problem: PAIN - ADULT  Goal: Verbalizes/displays adequate comfort level or patient's stated pain goal  Description: INTERVENTIONS:  - Encourage pt to monitor pain and request assistance  - Assess pain using appropriate pain scale  - Administer analgesics based on type and severity of pain and evaluate response  - Implement non-pharmacological measures as appropriate and evaluate response  - Consider cultural and social influences on pain and pain management  - Manage/alleviate anxiety  - Utilize distraction and/or relaxation techniques  - Monitor for opioid side effects  - Notify MD/LIP if interventions unsuccessful or patient reports new pain  - Anticipate increased pain with activity and pre-medicate as appropriate  Outcome: Progressing     Problem: SAFETY ADULT - FALL  Goal: Free from fall injury  Description: INTERVENTIONS:  - Assess pt frequently for physical needs  - Identify cognitive and physical deficits and behaviors that affect risk of falls. - [place] fall precautions as indicated by assessment.  - Educate pt/family on patient safety including physical limitations  - Instruct pt to call for assistance with activity based on assessment  - Modify environment to reduce risk of injury  - Provide assistive devices as appropriate  - Consider OT/PT consult to assist with strengthening/mobility  - Encourage toileting schedule  Outcome: Progressing     Patient is alert and orientated x 3-4. Patient is on O2 7 L. Patient is on IV ABT. Potassium replaced per protocol. Plan to discharge to [facility] when medically cleared.

## 2022-11-11 ENCOUNTER — APPOINTMENT (OUTPATIENT)
Dept: GENERAL RADIOLOGY | Facility: HOSPITAL | Age: 82
End: 2022-11-11
Attending: HOSPITALIST
Payer: MEDICARE

## 2022-11-11 LAB
ANION GAP SERPL CALC-SCNC: 8 MMOL/L (ref 0–18)
BASOPHILS # BLD AUTO: 0.07 X10(3) UL (ref 0–0.2)
BASOPHILS NFR BLD AUTO: 0.7 %
BUN BLD-MCNC: 18 MG/DL (ref 7–18)
BUN/CREAT SERPL: 14.8 (ref 10–20)
CALCIUM BLD-MCNC: 9.2 MG/DL (ref 8.5–10.1)
CHLORIDE SERPL-SCNC: 111 MMOL/L (ref 98–112)
CO2 SERPL-SCNC: 25 MMOL/L (ref 21–32)
CREAT BLD-MCNC: 1.22 MG/DL
DEPRECATED RDW RBC AUTO: 50.5 FL (ref 35.1–46.3)
EOSINOPHIL # BLD AUTO: 0.14 X10(3) UL (ref 0–0.7)
EOSINOPHIL NFR BLD AUTO: 1.4 %
ERYTHROCYTE [DISTWIDTH] IN BLOOD BY AUTOMATED COUNT: 14.3 % (ref 11–15)
GFR SERPLBLD BASED ON 1.73 SQ M-ARVRAT: 60 ML/MIN/1.73M2 (ref 60–?)
GLUCOSE BLD-MCNC: 107 MG/DL (ref 70–99)
HCT VFR BLD AUTO: 37.3 %
HGB BLD-MCNC: 11.6 G/DL
IMM GRANULOCYTES # BLD AUTO: 0.18 X10(3) UL (ref 0–1)
IMM GRANULOCYTES NFR BLD: 1.8 %
LYMPHOCYTES # BLD AUTO: 1.84 X10(3) UL (ref 1–4)
LYMPHOCYTES NFR BLD AUTO: 18.1 %
MCH RBC QN AUTO: 30.1 PG (ref 26–34)
MCHC RBC AUTO-ENTMCNC: 31.1 G/DL (ref 31–37)
MCV RBC AUTO: 96.6 FL
MONOCYTES # BLD AUTO: 1.14 X10(3) UL (ref 0.1–1)
MONOCYTES NFR BLD AUTO: 11.2 %
NEUTROPHILS # BLD AUTO: 6.8 X10 (3) UL (ref 1.5–7.7)
NEUTROPHILS # BLD AUTO: 6.8 X10(3) UL (ref 1.5–7.7)
NEUTROPHILS NFR BLD AUTO: 66.8 %
OSMOLALITY SERPL CALC.SUM OF ELEC: 300 MOSM/KG (ref 275–295)
PLATELET # BLD AUTO: 176 10(3)UL (ref 150–450)
POTASSIUM SERPL-SCNC: 3.4 MMOL/L (ref 3.5–5.1)
POTASSIUM SERPL-SCNC: 3.4 MMOL/L (ref 3.5–5.1)
RBC # BLD AUTO: 3.86 X10(6)UL
SODIUM SERPL-SCNC: 144 MMOL/L (ref 136–145)
WBC # BLD AUTO: 10.2 X10(3) UL (ref 4–11)

## 2022-11-11 PROCEDURE — 99233 SBSQ HOSP IP/OBS HIGH 50: CPT | Performed by: HOSPITALIST

## 2022-11-11 PROCEDURE — 71046 X-RAY EXAM CHEST 2 VIEWS: CPT | Performed by: HOSPITALIST

## 2022-11-11 RX ORDER — POTASSIUM CHLORIDE 20 MEQ/1
40 TABLET, EXTENDED RELEASE ORAL ONCE
Status: COMPLETED | OUTPATIENT
Start: 2022-11-11 | End: 2022-11-11

## 2022-11-11 NOTE — PLAN OF CARE
Pt. weaned down to 4L O2. Zosyn IV @25ml/hr given for pneumonia. Plan is for CXR re-check in the morning. Family has chosen [facility] location for discharge. Pt. will be discharged to rehab when stable and weaned off O2. Problem: Patient Centered Care  Goal: Patient preferences are identified and integrated in the patient's plan of care  Description: Interventions:  - What would you like us to know as we care for you?  From home with wife, I do have home health services  - Provide timely, complete, and accurate information to patient/family  - Incorporate patient and family knowledge, values, beliefs, and cultural backgrounds into the planning and delivery of care  - Encourage patient/family to participate in care and decision-making at the level they choose  - Honor patient and family perspectives and choices  Outcome: Progressing     Problem: Patient/Family Goals  Goal: Patient/Family Long Term Goal  Description: Patient's Long Term Goal: Regain some of my strength and get back to walking    Interventions:  - PT/OT  - Ambulate as tolerated  - Educate patient on importance of mobility  - Wean O2 as tolerated  - Discharge planning for optimal recovery  - See additional Care Plan goals for specific interventions  Outcome: Progressing  Goal: Patient/Family Short Term Goal  Description: Patient's Short Term Goal: Improve my breathing and no longer have oxygen    Interventions:   - Wean O2  - Ambulate as tolerated  - Monitor vitals  - Incentive spirometer  - IV zosyn  - See additional Care Plan goals for specific interventions  Outcome: Progressing     Problem: RESPIRATORY - ADULT  Goal: Achieves optimal ventilation and oxygenation  Description: INTERVENTIONS:  - Assess for changes in respiratory status  - Assess for changes in mentation and behavior  - Position to facilitate oxygenation and minimize respiratory effort  - Oxygen supplementation based on oxygen saturation or ABGs  - Provide Smoking Cessation handout, if applicable  - Encourage broncho-pulmonary hygiene including cough, deep breathe, Incentive Spirometry  - Assess the need for suctioning and perform as needed  - Assess and instruct to report SOB or any respiratory difficulty  - Respiratory Therapy support as indicated  - Manage/alleviate anxiety  - Monitor for signs/symptoms of CO2 retention  Outcome: Progressing     Problem: MUSCULOSKELETAL - ADULT  Goal: Return mobility to safest level of function  Description: INTERVENTIONS:  - Assess patient stability and activity tolerance for standing, transferring and ambulating w/ or w/o assistive devices  - Assist with transfers and ambulation using safe patient handling equipment as needed  - Ensure adequate protection for wounds/incisions during mobilization  - Obtain PT/OT consults as needed  - Advance activity as appropriate  - Communicate ordered activity level and limitations with patient/family  Outcome: Progressing     Problem: Impaired Functional Mobility  Goal: Achieve highest/safest level of mobility/gait  Description: Interventions:  - Assess patient's functional ability and stability  - Promote increasing activity/tolerance for mobility and gait  - Educate and engage patient/family in tolerated activity level and precautions  - Recommend use of sit-stand lift for transfers  - Recommend use of  tabitha-walker for transfers and ambulation  - Recommend use of chair position in bed 3 times per day  Outcome: Progressing     Problem: Impaired Swallowing  Goal: Minimize aspiration risk  Description: Interventions:  - Patient should be alert and upright for all feedings (90 degrees preferred)  - Offer food and liquids at a slow rate  - No straws  - Encourage small bites of food and small sips of liquid  - Offer pills one at a time, crush or deliver with applesauce as needed  - Discontinue feeding and notify MD (or speech pathologist) if coughing or persistent throat clearing or wet/gurgly vocal quality is noted  Outcome: Progressing     Problem: PAIN - ADULT  Goal: Verbalizes/displays adequate comfort level or patient's stated pain goal  Description: INTERVENTIONS:  - Encourage pt to monitor pain and request assistance  - Assess pain using appropriate pain scale  - Administer analgesics based on type and severity of pain and evaluate response  - Implement non-pharmacological measures as appropriate and evaluate response  - Consider cultural and social influences on pain and pain management  - Manage/alleviate anxiety  - Utilize distraction and/or relaxation techniques  - Monitor for opioid side effects  - Notify MD/LIP if interventions unsuccessful or patient reports new pain  - Anticipate increased pain with activity and pre-medicate as appropriate  Outcome: Progressing     Problem: SAFETY ADULT - FALL  Goal: Free from fall injury  Description: INTERVENTIONS:  - Assess pt frequently for physical needs  - Identify cognitive and physical deficits and behaviors that affect risk of falls.   - [place] fall precautions as indicated by assessment.  - Educate pt/family on patient safety including physical limitations  - Instruct pt to call for assistance with activity based on assessment  - Modify environment to reduce risk of injury  - Provide assistive devices as appropriate  - Consider OT/PT consult to assist with strengthening/mobility  - Encourage toileting schedule  Outcome: Progressing

## 2022-11-11 NOTE — PLAN OF CARE
Problem: Patient Centered Care  Goal: Patient preferences are identified and integrated in the patient's plan of care  Description: Interventions:  - What would you like us to know as we care for you?  From home with wife, I do have home health services  - Provide timely, complete, and accurate information to patient/family  - Incorporate patient and family knowledge, values, beliefs, and cultural backgrounds into the planning and delivery of care  - Encourage patient/family to participate in care and decision-making at the level they choose  - Honor patient and family perspectives and choices  Outcome: Progressing     Problem: Patient/Family Goals  Goal: Patient/Family Long Term Goal  Description: Patient's Long Term Goal: Regain some of my strength and get back to walking    Interventions:  - PT/OT  - Ambulate as tolerated  - Educate patient on importance of mobility  - Wean O2 as tolerated  - Discharge planning for optimal recovery  - See additional Care Plan goals for specific interventions  Outcome: Progressing  Goal: Patient/Family Short Term Goal  Description: Patient's Short Term Goal: Improve my breathing and no longer have oxygen    Interventions:   - Wean O2  - Ambulate as tolerated  - Monitor vitals  - Incentive spirometer  - IV zosyn  - See additional Care Plan goals for specific interventions  Outcome: Progressing     Problem: RESPIRATORY - ADULT  Goal: Achieves optimal ventilation and oxygenation  Description: INTERVENTIONS:  - Assess for changes in respiratory status  - Assess for changes in mentation and behavior  - Position to facilitate oxygenation and minimize respiratory effort  - Oxygen supplementation based on oxygen saturation or ABGs  - Provide Smoking Cessation handout, if applicable  - Encourage broncho-pulmonary hygiene including cough, deep breathe, Incentive Spirometry  - Assess the need for suctioning and perform as needed  - Assess and instruct to report SOB or any respiratory difficulty  - Respiratory Therapy support as indicated  - Manage/alleviate anxiety  - Monitor for signs/symptoms of CO2 retention  Outcome: Progressing     Problem: MUSCULOSKELETAL - ADULT  Goal: Return mobility to safest level of function  Description: INTERVENTIONS:  - Assess patient stability and activity tolerance for standing, transferring and ambulating w/ or w/o assistive devices  - Assist with transfers and ambulation using safe patient handling equipment as needed  - Ensure adequate protection for wounds/incisions during mobilization  - Obtain PT/OT consults as needed  - Advance activity as appropriate  - Communicate ordered activity level and limitations with patient/family  Outcome: Progressing     Problem: Impaired Functional Mobility  Goal: Achieve highest/safest level of mobility/gait  Description: Interventions:  - Assess patient's functional ability and stability  - Promote increasing activity/tolerance for mobility and gait  - Educate and engage patient/family in tolerated activity level and precautions  Outcome: Progressing     Problem: Impaired Swallowing  Goal: Minimize aspiration risk  Description: Interventions:  - Patient should be alert and upright for all feedings (90 degrees preferred)  - Offer food and liquids at a slow rate  - No straws  - Encourage small bites of food and small sips of liquid  - Offer pills one at a time, crush or deliver with applesauce as needed  - Discontinue feeding and notify MD (or speech pathologist) if coughing or persistent throat clearing or wet/gurgly vocal quality is noted  Outcome: Progressing     Problem: PAIN - ADULT  Goal: Verbalizes/displays adequate comfort level or patient's stated pain goal  Description: INTERVENTIONS:  - Encourage pt to monitor pain and request assistance  - Assess pain using appropriate pain scale  - Administer analgesics based on type and severity of pain and evaluate response  - Implement non-pharmacological measures as appropriate and evaluate response  - Consider cultural and social influences on pain and pain management  - Manage/alleviate anxiety  - Utilize distraction and/or relaxation techniques  - Monitor for opioid side effects  - Notify MD/LIP if interventions unsuccessful or patient reports new pain  - Anticipate increased pain with activity and pre-medicate as appropriate  Outcome: Progressing     Problem: SAFETY ADULT - FALL  Goal: Free from fall injury  Description: INTERVENTIONS:  - Assess pt frequently for physical needs  - Identify cognitive and physical deficits and behaviors that affect risk of falls. - [place] fall precautions as indicated by assessment.  - Educate pt/family on patient safety including physical limitations  - Instruct pt to call for assistance with activity based on assessment  - Modify environment to reduce risk of injury  - Provide assistive devices as appropriate  - Consider OT/PT consult to assist with strengthening/mobility  - Encourage toileting schedule  Outcome: Progressing     Patient is alert and orientated x 3-4. Patient is on O2 5 L. Potassium replaced per protocol. Patient is on IV ABT.

## 2022-11-11 NOTE — PHYSICAL THERAPY NOTE
PHYSICAL THERAPY TREATMENT NOTE - INPATIENT     Room Number: 577/576-Z       Presenting Problem: aspiration PNA    Problem List  Principal Problem:    Aspiration pneumonia, unspecified aspiration pneumonia type, unspecified laterality, unspecified part of lung ([facility] [address]  Active Problems:    Aspiration pneumonia (HCC)    Leukocytosis, unspecified type    Renal insufficiency    Elevated troponin    Hypoxia      PHYSICAL THERAPY ASSESSMENT   Chart reviewed. RN Valdemar Ormond approved participation in physical therapy. PPE worn by therapist: mask and gloves. Patient was wearing a mask during session. Patient presented in bed with 0/10 pain. Patient with good  progress towards goals during this session. Education provided on Physical therapy plan of care and physiological benefits of out of bed mobility. Patient with goodcarryover. Pt was received in a bed and cleared for therapy session. Co treated with OT Elisabeth. Pt is max A from supine to EOB. Pt required Assist with BLE and upper trunk to sit up. Pt sat at the EOB for few minutes Min A for sitting balance. Pt is on O2 4 L. Pt is Max A with sit to stand transfer while holding on to therapist. Pt then performed SPT from the bed to a chair while holding onto one therapist hand and the other therapist performed SPT to a chair. PT is min A to stand from chair with RW. Pt stood for about 1-2 minutes with a min A for balance and safety. While in standing OT donned briefs on the pt. Pt denied any dizziness and lightheadedness. . The sling for lift transfer was putted on a chair as nurse requested, for bringing pt back to bed. Pt is left in a chair with all needs within reach. Reported to RN on a status of a pt. Pt is on a track to DC to [facility] once medically cleared.         Bed mobility: Max assist  Transfers: Min assist   Gait Assistance: Maximum assistance  Distance (ft): SPT  Assistive Device: None (while holding on to therapist)  Pattern: Shuffle           Patient was left in bedside chair at end of session with all needs in reach. The patient's Approx Degree of Impairment: 72.57% has been calculated based on documentation in the [facility] '6 clicks' Inpatient Basic Mobility Short Form. Research supports that patients with this level of impairment may benefit from Subacute Rehab. RN aware of patient status post session. DISCHARGE RECOMMENDATIONS  PT Discharge Recommendations: Sub-acute rehabilitation     PLAN  PT Treatment Plan: Bed mobility; Body mechanics; Coordination; Endurance; Energy conservation;Patient education;Strengthening;Transfer training;Balance training    SUBJECTIVE  Pt was agreeable to therapy session    OBJECTIVE  Precautions: Limb alert - left    WEIGHT BEARING RESTRICTION  Weight Bearing Restriction: None (LUE precautions)                PAIN ASSESSMENT   Ratin  Location: back  Management Techniques: Activity promotion; Body mechanics; Relaxation;Repositioning    BALANCE                                                                                                                       Static Sitting: Fair -  Dynamic Sitting: Poor +           Static Standing: Poor  Dynamic Standing: Not tested    ACTIVITY TOLERANCE                         O2 WALK  Oxygen Therapy  At rest oxygen flow (liters per minute): 4    AM-PAC '6-Clicks' INPATIENT SHORT FORM - BASIC MOBILITY  How much difficulty does the patient currently have. .. Patient Difficulty: Turning over in bed (including adjusting bedclothes, sheets and blankets)?: A Lot   Patient Difficulty: Sitting down on and standing up from a chair with arms (e.g., wheelchair, bedside commode, etc.): A Lot   Patient Difficulty: Moving from lying on back to sitting on the side of the bed?: A Lot   How much help from another person does the patient currently need. ..    Help from Another: Moving to and from a bed to a chair (including a wheelchair)?: A Lot   Help from Another: Need to walk in hospital room?: A Lot   Help from Another: Climbing 3-5 steps with a railing?: Total     AM-PAC Score:  Raw Score: 11   Approx Degree of Impairment: 72.57%   Standardized Score (AM-PAC Scale): 33.86   CMS Modifier (G-Code): CL      Patient End of Session: Up in chair;Needs met;Call light within reach;RN aware of session/findings; All patient questions and concerns addressed    CURRENT GOALS     Goals to be met by: 11/25/22  Patient Goal Patient's self-stated goal is: to go home   Goal #1 Patient is able to demonstrate supine - sit EOB @ level: supervision     Goal #1   Current Status Max A   Goal #2 Patient is able to demonstrate transfers Sit to/from Stand at assistance level: supervision with cane - straight and cane - quad     Goal #2  Current Status Max A while holding on to therapist   Goal #3 Patient is able to ambulate 10 feet with assist device: cane - straight and cane - quad at assistance level: supervision   Goal #3   Current Status SPT to the chair Max A    Goal #4 Patient will negotiate 2 stairs/one curb w/ assistive device and supervision   Goal #4   Current Status NT   Goal #5 Patient to demonstrate independence with home activity/exercise instructions provided to patient in preparation for discharge.    Goal #5   Current Status In progress   Goal #6    Goal #6  Current Status

## 2022-11-11 NOTE — CM/SW NOTE
Received vmail from wife Debra Razoeverardo requesting a new SNF facility. She previously picked [facility] & heard some bad reviews. She now prefers [facility] w/ [facility] Brian liaison, patient accepted and bed available. Canceled [place] reservation, updates sent to [facility]. Notified [facility], ambulance on will call. Anticipated discharge Saturday. Addendum 11/12/2022:    Discharge today, notified Chet Brown w/ [facility] ([phone]) for 3pm, PCS complete. Confirmed plan w/ Theron Bustillo RN & wife Debra Byrd.     RN to report [phone]    Racquel Mercado, [address] [facility]

## 2022-11-12 VITALS
OXYGEN SATURATION: 94 % | WEIGHT: 199.81 LBS | DIASTOLIC BLOOD PRESSURE: 100 MMHG | BODY MASS INDEX: 29 KG/M2 | SYSTOLIC BLOOD PRESSURE: 142 MMHG | RESPIRATION RATE: 18 BRPM | TEMPERATURE: 98 F | HEART RATE: 71 BPM

## 2022-11-12 LAB
POTASSIUM SERPL-SCNC: 3.7 MMOL/L (ref 3.5–5.1)
SARS-COV-2 RNA RESP QL NAA+PROBE: NOT DETECTED

## 2022-11-12 PROCEDURE — 99239 HOSP IP/OBS DSCHRG MGMT >30: CPT | Performed by: HOSPITALIST

## 2022-11-12 PROCEDURE — 99223 1ST HOSP IP/OBS HIGH 75: CPT | Performed by: INTERNAL MEDICINE

## 2022-11-12 RX ORDER — CALCIUM CARBONATE 200(500)MG
500 TABLET,CHEWABLE ORAL ONCE
Status: COMPLETED | OUTPATIENT
Start: 2022-11-12 | End: 2022-11-12

## 2022-11-12 RX ORDER — AMOXICILLIN AND CLAVULANATE POTASSIUM 875; 125 MG/1; MG/1
875 TABLET, FILM COATED ORAL EVERY 12 HOURS SCHEDULED
Qty: 10 TABLET | Refills: 0 | Status: SHIPPED | OUTPATIENT
Start: 2022-11-12 | End: 2022-11-17

## 2022-11-12 RX ORDER — AMOXICILLIN AND CLAVULANATE POTASSIUM 875; 125 MG/1; MG/1
875 TABLET, FILM COATED ORAL EVERY 12 HOURS SCHEDULED
Status: DISCONTINUED | OUTPATIENT
Start: 2022-11-12 | End: 2022-11-12

## 2022-11-12 NOTE — PLAN OF CARE
Problem: Patient Centered Care  Goal: Patient preferences are identified and integrated in the patient's plan of care  Description: Interventions:  - What would you like us to know as we care for you?  From home with wife, I do have home health services  - Provide timely, complete, and accurate information to patient/family  - Incorporate patient and family knowledge, values, beliefs, and cultural backgrounds into the planning and delivery of care  - Encourage patient/family to participate in care and decision-making at the level they choose  - Honor patient and family perspectives and choices  Outcome: Adequate for Discharge     Problem: Patient/Family Goals  Goal: Patient/Family Long Term Goal  Description: Patient's Long Term Goal: Regain some of my strength and get back to walking    Interventions:  - PT/OT  - Ambulate as tolerated  - Educate patient on importance of mobility  - Wean O2 as tolerated  - Discharge planning for optimal recovery  - See additional Care Plan goals for specific interventions  Outcome: Adequate for Discharge  Goal: Patient/Family Short Term Goal  Description: Patient's Short Term Goal: Improve my breathing and no longer have oxygen    Interventions:   - Wean O2  - Ambulate as tolerated  - Monitor vitals  - Incentive spirometer  - IV zosyn  - See additional Care Plan goals for specific interventions  Outcome: Adequate for Discharge     Problem: RESPIRATORY - ADULT  Goal: Achieves optimal ventilation and oxygenation  Description: INTERVENTIONS:  - Assess for changes in respiratory status  - Assess for changes in mentation and behavior  - Position to facilitate oxygenation and minimize respiratory effort  - Oxygen supplementation based on oxygen saturation or ABGs  - Provide Smoking Cessation handout, if applicable  - Encourage broncho-pulmonary hygiene including cough, deep breathe, Incentive Spirometry  - Assess the need for suctioning and perform as needed  - Assess and instruct to report SOB or any respiratory difficulty  - Respiratory Therapy support as indicated  - Manage/alleviate anxiety  - Monitor for signs/symptoms of CO2 retention  Outcome: Adequate for Discharge     Problem: MUSCULOSKELETAL - ADULT  Goal: Return mobility to safest level of function  Description: INTERVENTIONS:  - Assess patient stability and activity tolerance for standing, transferring and ambulating w/ or w/o assistive devices  - Assist with transfers and ambulation using safe patient handling equipment as needed  - Ensure adequate protection for wounds/incisions during mobilization  - Obtain PT/OT consults as needed  - Advance activity as appropriate  - Communicate ordered activity level and limitations with patient/family  Outcome: Adequate for Discharge     Problem: Impaired Functional Mobility  Goal: Achieve highest/safest level of mobility/gait  Description: Interventions:  - Assess patient's functional ability and stability  - Promote increasing activity/tolerance for mobility and gait  - Educate and engage patient/family in tolerated activity level and precautions    Outcome: Adequate for Discharge     Problem: Impaired Swallowing  Goal: Minimize aspiration risk  Description: Interventions:  - Patient should be alert and upright for all feedings (90 degrees preferred)  - Offer food and liquids at a slow rate  - No straws  - Encourage small bites of food and small sips of liquid  - Offer pills one at a time, crush or deliver with applesauce as needed  - Discontinue feeding and notify MD (or speech pathologist) if coughing or persistent throat clearing or wet/gurgly vocal quality is noted  Outcome: Adequate for Discharge     Problem: PAIN - ADULT  Goal: Verbalizes/displays adequate comfort level or patient's stated pain goal  Description: INTERVENTIONS:  - Encourage pt to monitor pain and request assistance  - Assess pain using appropriate pain scale  - Administer analgesics based on type and severity of pain and evaluate response  - Implement non-pharmacological measures as appropriate and evaluate response  - Consider cultural and social influences on pain and pain management  - Manage/alleviate anxiety  - Utilize distraction and/or relaxation techniques  - Monitor for opioid side effects  - Notify MD/LIP if interventions unsuccessful or patient reports new pain  - Anticipate increased pain with activity and pre-medicate as appropriate  Outcome: Adequate for Discharge     Problem: SAFETY ADULT - FALL  Goal: Free from fall injury  Description: INTERVENTIONS:  - Assess pt frequently for physical needs  - Identify cognitive and physical deficits and behaviors that affect risk of falls.   - [place] fall precautions as indicated by assessment.  - Educate pt/family on patient safety including physical limitations  - Instruct pt to call for assistance with activity based on assessment  - Modify environment to reduce risk of injury  - Provide assistive devices as appropriate  - Consider OT/PT consult to assist with strengthening/mobility  - Encourage toileting schedule  Outcome: Adequate for Discharge

## 2022-11-12 NOTE — PLAN OF CARE
O2 5 l n/c. HOB kept elevated. Iv abx continues. Plan: [place] [facility] [place].    Problem: Patient Centered Care  Goal: Patient preferences are identified and integrated in the patient's plan of care  Description: Interventions:  - What would you like us to know as we care for you?  From home with wife, I do have home health services  - Provide timely, complete, and accurate information to patient/family  - Incorporate patient and family knowledge, values, beliefs, and cultural backgrounds into the planning and delivery of care  - Encourage patient/family to participate in care and decision-making at the level they choose  - Honor patient and family perspectives and choices  Outcome: Progressing     Problem: Patient/Family Goals  Goal: Patient/Family Long Term Goal  Description: Patient's Long Term Goal: Regain some of my strength and get back to walking    Interventions:  - PT/OT  - Ambulate as tolerated  - Educate patient on importance of mobility  - Wean O2 as tolerated  - Discharge planning for optimal recovery  - See additional Care Plan goals for specific interventions  Outcome: Progressing  Goal: Patient/Family Short Term Goal  Description: Patient's Short Term Goal: Improve my breathing and no longer have oxygen    Interventions:   - Wean O2  - Ambulate as tolerated  - Monitor vitals  - Incentive spirometer  - IV zosyn  - See additional Care Plan goals for specific interventions  Outcome: Progressing     Problem: RESPIRATORY - ADULT  Goal: Achieves optimal ventilation and oxygenation  Description: INTERVENTIONS:  - Assess for changes in respiratory status  - Assess for changes in mentation and behavior  - Position to facilitate oxygenation and minimize respiratory effort  - Oxygen supplementation based on oxygen saturation or ABGs  - Provide Smoking Cessation handout, if applicable  - Encourage broncho-pulmonary hygiene including cough, deep breathe, Incentive Spirometry  - Assess the need for suctioning and perform as needed  - Assess and instruct to report SOB or any respiratory difficulty  - Respiratory Therapy support as indicated  - Manage/alleviate anxiety  - Monitor for signs/symptoms of CO2 retention  Outcome: Progressing     Problem: MUSCULOSKELETAL - ADULT  Goal: Return mobility to safest level of function  Description: INTERVENTIONS:  - Assess patient stability and activity tolerance for standing, transferring and ambulating w/ or w/o assistive devices  - Assist with transfers and ambulation using safe patient handling equipment as needed  - Ensure adequate protection for wounds/incisions during mobilization  - Obtain PT/OT consults as needed  - Advance activity as appropriate  - Communicate ordered activity level and limitations with patient/family  Outcome: Progressing     Problem: Impaired Functional Mobility  Goal: Achieve highest/safest level of mobility/gait  Description: Interventions:  - Assess patient's functional ability and stability  - Promote increasing activity/tolerance for mobility and gait  - Educate and engage patient/family in tolerated activity level and precautions    Outcome: Progressing     Problem: Impaired Swallowing  Goal: Minimize aspiration risk  Description: Interventions:  - Patient should be alert and upright for all feedings (90 degrees preferred)  - Offer food and liquids at a slow rate  - No straws  - Encourage small bites of food and small sips of liquid  - Offer pills one at a time, crush or deliver with applesauce as needed  - Discontinue feeding and notify MD (or speech pathologist) if coughing or persistent throat clearing or wet/gurgly vocal quality is noted  Outcome: Progressing     Problem: PAIN - ADULT  Goal: Verbalizes/displays adequate comfort level or patient's stated pain goal  Description: INTERVENTIONS:  - Encourage pt to monitor pain and request assistance  - Assess pain using appropriate pain scale  - Administer analgesics based on type and severity of pain and evaluate response  - Implement non-pharmacological measures as appropriate and evaluate response  - Consider cultural and social influences on pain and pain management  - Manage/alleviate anxiety  - Utilize distraction and/or relaxation techniques  - Monitor for opioid side effects  - Notify MD/LIP if interventions unsuccessful or patient reports new pain  - Anticipate increased pain with activity and pre-medicate as appropriate  Outcome: Progressing     Problem: SAFETY ADULT - FALL  Goal: Free from fall injury  Description: INTERVENTIONS:  - Assess pt frequently for physical needs  - Identify cognitive and physical deficits and behaviors that affect risk of falls.   - [place] fall precautions as indicated by assessment.  - Educate pt/family on patient safety including physical limitations  - Instruct pt to call for assistance with activity based on assessment  - Modify environment to reduce risk of injury  - Provide assistive devices as appropriate  - Consider OT/PT consult to assist with strengthening/mobility  - Encourage toileting schedule  Outcome: Progressing No

## 2022-11-14 ENCOUNTER — EXTERNAL FACILITY (OUTPATIENT)
Dept: PULMONOLOGY | Facility: CLINIC | Age: 82
End: 2022-11-14

## 2022-11-14 ENCOUNTER — INITIAL APN SNF VISIT (OUTPATIENT)
Dept: INTERNAL MEDICINE CLINIC | Facility: SKILLED NURSING FACILITY | Age: 82
End: 2022-11-14

## 2022-11-14 DIAGNOSIS — J69.0 ASPIRATION PNEUMONIA OF BOTH LOWER LOBES, UNSPECIFIED ASPIRATION PNEUMONIA TYPE (HCC): ICD-10-CM

## 2022-11-14 DIAGNOSIS — J96.01 ACUTE RESPIRATORY FAILURE WITH HYPOXIA (HCC): ICD-10-CM

## 2022-11-14 DIAGNOSIS — E03.9 HYPOTHYROIDISM, UNSPECIFIED TYPE: ICD-10-CM

## 2022-11-14 DIAGNOSIS — I10 ESSENTIAL HYPERTENSION: ICD-10-CM

## 2022-11-14 DIAGNOSIS — N18.31 STAGE 3A CHRONIC KIDNEY DISEASE (HCC): ICD-10-CM

## 2022-11-14 DIAGNOSIS — J69.0 ASPIRATION PNEUMONIA, UNSPECIFIED ASPIRATION PNEUMONIA TYPE, UNSPECIFIED LATERALITY, UNSPECIFIED PART OF LUNG (HCC): ICD-10-CM

## 2022-11-14 DIAGNOSIS — I69.359 CVA, OLD, HEMIPARESIS (HCC): ICD-10-CM

## 2022-11-14 DIAGNOSIS — J96.01 ACUTE HYPOXEMIC RESPIRATORY FAILURE (HCC): Primary | ICD-10-CM

## 2022-11-14 PROCEDURE — 99305 1ST NF CARE MODERATE MDM 35: CPT | Performed by: PHYSICIAN ASSISTANT

## 2022-11-14 PROCEDURE — 1111F DSCHRG MED/CURRENT MED MERGE: CPT | Performed by: PHYSICIAN ASSISTANT

## 2022-11-17 ENCOUNTER — SNF VISIT (OUTPATIENT)
Dept: INTERNAL MEDICINE CLINIC | Facility: SKILLED NURSING FACILITY | Age: 82
End: 2022-11-17

## 2022-11-17 DIAGNOSIS — N40.1 BENIGN PROSTATIC HYPERPLASIA WITH LOWER URINARY TRACT SYMPTOMS, SYMPTOM DETAILS UNSPECIFIED: ICD-10-CM

## 2022-11-17 DIAGNOSIS — J96.01 ACUTE RESPIRATORY FAILURE WITH HYPOXIA (HCC): ICD-10-CM

## 2022-11-17 DIAGNOSIS — E03.9 HYPOTHYROIDISM, UNSPECIFIED TYPE: ICD-10-CM

## 2022-11-17 DIAGNOSIS — I10 ESSENTIAL HYPERTENSION: ICD-10-CM

## 2022-11-17 DIAGNOSIS — J69.0 ASPIRATION PNEUMONIA, UNSPECIFIED ASPIRATION PNEUMONIA TYPE, UNSPECIFIED LATERALITY, UNSPECIFIED PART OF LUNG (HCC): ICD-10-CM

## 2022-11-17 DIAGNOSIS — G81.94 LEFT HEMIPARESIS (HCC): ICD-10-CM

## 2022-11-17 DIAGNOSIS — N17.9 AKI (ACUTE KIDNEY INJURY) (HCC): ICD-10-CM

## 2022-11-17 PROCEDURE — 1111F DSCHRG MED/CURRENT MED MERGE: CPT | Performed by: NURSE PRACTITIONER

## 2022-11-17 PROCEDURE — 99309 SBSQ NF CARE MODERATE MDM 30: CPT | Performed by: NURSE PRACTITIONER

## 2022-11-21 ENCOUNTER — SNF VISIT (OUTPATIENT)
Dept: INTERNAL MEDICINE CLINIC | Facility: SKILLED NURSING FACILITY | Age: 82
End: 2022-11-21

## 2022-11-21 DIAGNOSIS — G81.94 LEFT HEMIPARESIS (HCC): ICD-10-CM

## 2022-11-21 DIAGNOSIS — J69.0 ASPIRATION PNEUMONIA, UNSPECIFIED ASPIRATION PNEUMONIA TYPE, UNSPECIFIED LATERALITY, UNSPECIFIED PART OF LUNG (HCC): ICD-10-CM

## 2022-11-21 DIAGNOSIS — I10 ESSENTIAL HYPERTENSION: ICD-10-CM

## 2022-11-21 DIAGNOSIS — I69.359 CVA, OLD, HEMIPARESIS (HCC): ICD-10-CM

## 2022-11-21 DIAGNOSIS — E03.9 HYPOTHYROIDISM, UNSPECIFIED TYPE: ICD-10-CM

## 2022-11-21 DIAGNOSIS — J96.01 ACUTE RESPIRATORY FAILURE WITH HYPOXIA (HCC): ICD-10-CM

## 2022-11-21 DIAGNOSIS — N17.9 AKI (ACUTE KIDNEY INJURY) (HCC): ICD-10-CM

## 2022-11-23 ENCOUNTER — APPOINTMENT (OUTPATIENT)
Dept: CT IMAGING | Facility: HOSPITAL | Age: 82
End: 2022-11-23
Attending: EMERGENCY MEDICINE
Payer: MEDICARE

## 2022-11-23 ENCOUNTER — APPOINTMENT (OUTPATIENT)
Dept: GENERAL RADIOLOGY | Facility: HOSPITAL | Age: 82
End: 2022-11-23
Payer: MEDICARE

## 2022-11-23 ENCOUNTER — APPOINTMENT (OUTPATIENT)
Dept: CT IMAGING | Facility: HOSPITAL | Age: 82
End: 2022-11-23
Payer: MEDICARE

## 2022-11-23 ENCOUNTER — HOSPITAL ENCOUNTER (INPATIENT)
Facility: HOSPITAL | Age: 82
LOS: 5 days | Discharge: NURSING, REHAB OR LONG-TERM CARE | End: 2022-11-29
Attending: EMERGENCY MEDICINE | Admitting: HOSPITALIST
Payer: MEDICARE

## 2022-11-23 ENCOUNTER — APPOINTMENT (OUTPATIENT)
Dept: GENERAL RADIOLOGY | Facility: HOSPITAL | Age: 82
End: 2022-11-23
Attending: EMERGENCY MEDICINE
Payer: MEDICARE

## 2022-11-23 DIAGNOSIS — U07.1 COVID: Primary | ICD-10-CM

## 2022-11-23 DIAGNOSIS — J96.21 ACUTE ON CHRONIC RESPIRATORY FAILURE WITH HYPOXIA (HCC): ICD-10-CM

## 2022-11-23 DIAGNOSIS — S70.02XA CONTUSION OF LEFT HIP, INITIAL ENCOUNTER: ICD-10-CM

## 2022-11-23 LAB
ALBUMIN SERPL-MCNC: 3.2 G/DL (ref 3.4–5)
ALBUMIN/GLOB SERPL: 0.6 {RATIO} (ref 1–2)
ALP LIVER SERPL-CCNC: 105 U/L
ALT SERPL-CCNC: 21 U/L
ANION GAP SERPL CALC-SCNC: 7 MMOL/L (ref 0–18)
AST SERPL-CCNC: 33 U/L (ref 15–37)
BASOPHILS # BLD AUTO: 0.06 X10(3) UL (ref 0–0.2)
BASOPHILS NFR BLD AUTO: 0.6 %
BILIRUB SERPL-MCNC: 0.4 MG/DL (ref 0.1–2)
BUN BLD-MCNC: 21 MG/DL (ref 7–18)
BUN/CREAT SERPL: 12.8 (ref 10–20)
CALCIUM BLD-MCNC: 9.3 MG/DL (ref 8.5–10.1)
CHLORIDE SERPL-SCNC: 109 MMOL/L (ref 98–112)
CK SERPL-CCNC: 108 U/L
CLARITY UR: CLEAR
CO2 SERPL-SCNC: 26 MMOL/L (ref 21–32)
COLOR UR: YELLOW
CREAT BLD-MCNC: 1.64 MG/DL
CRP SERPL-MCNC: 6.05 MG/DL (ref ?–0.3)
D DIMER PPP FEU-MCNC: 3.89 UG/ML FEU (ref ?–0.81)
DEPRECATED HBV CORE AB SER IA-ACNC: 169 NG/ML
DEPRECATED RDW RBC AUTO: 54.8 FL (ref 35.1–46.3)
EOSINOPHIL # BLD AUTO: 0.01 X10(3) UL (ref 0–0.7)
EOSINOPHIL NFR BLD AUTO: 0.1 %
ERYTHROCYTE [DISTWIDTH] IN BLOOD BY AUTOMATED COUNT: 15.1 % (ref 11–15)
GFR SERPLBLD BASED ON 1.73 SQ M-ARVRAT: 42 ML/MIN/1.73M2 (ref 60–?)
GLOBULIN PLAS-MCNC: 5.5 G/DL (ref 2.8–4.4)
GLUCOSE BLD-MCNC: 108 MG/DL (ref 70–99)
GLUCOSE UR-MCNC: NEGATIVE MG/DL
GRAN CASTS #/AREA URNS LPF: PRESENT /LPF
HCT VFR BLD AUTO: 44.7 %
HGB BLD-MCNC: 13.7 G/DL
HGB UR QL STRIP.AUTO: NEGATIVE
HYALINE CASTS #/AREA URNS AUTO: PRESENT /LPF
IMM GRANULOCYTES # BLD AUTO: 0.03 X10(3) UL (ref 0–1)
IMM GRANULOCYTES NFR BLD: 0.3 %
LEUKOCYTE ESTERASE UR QL STRIP.AUTO: NEGATIVE
LYMPHOCYTES # BLD AUTO: 1.18 X10(3) UL (ref 1–4)
LYMPHOCYTES NFR BLD AUTO: 12.4 %
MCH RBC QN AUTO: 30.2 PG (ref 26–34)
MCHC RBC AUTO-ENTMCNC: 30.6 G/DL (ref 31–37)
MCV RBC AUTO: 98.5 FL
MONOCYTES # BLD AUTO: 1.27 X10(3) UL (ref 0.1–1)
MONOCYTES NFR BLD AUTO: 13.4 %
NEUTROPHILS # BLD AUTO: 6.93 X10 (3) UL (ref 1.5–7.7)
NEUTROPHILS # BLD AUTO: 6.93 X10(3) UL (ref 1.5–7.7)
NEUTROPHILS NFR BLD AUTO: 73.2 %
NITRITE UR QL STRIP.AUTO: NEGATIVE
NT-PROBNP SERPL-MCNC: 454 PG/ML (ref ?–450)
OSMOLALITY SERPL CALC.SUM OF ELEC: 298 MOSM/KG (ref 275–295)
PH UR: 5.5 [PH] (ref 5–8)
PLATELET # BLD AUTO: 216 10(3)UL (ref 150–450)
POTASSIUM SERPL-SCNC: 4.3 MMOL/L (ref 3.5–5.1)
PROCALCITONIN SERPL-MCNC: 0.16 NG/ML (ref ?–0.16)
PROT SERPL-MCNC: 8.7 G/DL (ref 6.4–8.2)
RBC # BLD AUTO: 4.54 X10(6)UL
SARS-COV-2 RNA RESP QL NAA+PROBE: DETECTED
SODIUM SERPL-SCNC: 142 MMOL/L (ref 136–145)
SP GR UR STRIP: >=1.03 (ref 1–1.03)
TROPONIN I HIGH SENSITIVITY: 29 NG/L
TROPONIN I HIGH SENSITIVITY: 32 NG/L
UROBILINOGEN UR STRIP-ACNC: 0.2
WBC # BLD AUTO: 9.5 X10(3) UL (ref 4–11)

## 2022-11-23 PROCEDURE — 71045 X-RAY EXAM CHEST 1 VIEW: CPT | Performed by: EMERGENCY MEDICINE

## 2022-11-23 PROCEDURE — 73502 X-RAY EXAM HIP UNI 2-3 VIEWS: CPT | Performed by: EMERGENCY MEDICINE

## 2022-11-23 PROCEDURE — 70450 CT HEAD/BRAIN W/O DYE: CPT | Performed by: EMERGENCY MEDICINE

## 2022-11-23 PROCEDURE — 73030 X-RAY EXAM OF SHOULDER: CPT | Performed by: EMERGENCY MEDICINE

## 2022-11-23 PROCEDURE — 71260 CT THORAX DX C+: CPT | Performed by: EMERGENCY MEDICINE

## 2022-11-23 RX ORDER — DEXAMETHASONE SODIUM PHOSPHATE 4 MG/ML
6 VIAL (ML) INJECTION ONCE
Status: DISCONTINUED | OUTPATIENT
Start: 2022-11-23 | End: 2022-11-28

## 2022-11-24 PROBLEM — J96.21 ACUTE ON CHRONIC RESPIRATORY FAILURE WITH HYPOXIA (HCC): Status: ACTIVE | Noted: 2022-11-24

## 2022-11-24 PROBLEM — S70.02XA CONTUSION OF LEFT HIP, INITIAL ENCOUNTER: Status: ACTIVE | Noted: 2022-11-24

## 2022-11-24 LAB
ALBUMIN SERPL-MCNC: 2.8 G/DL (ref 3.4–5)
ALBUMIN/GLOB SERPL: 0.6 {RATIO} (ref 1–2)
ALP LIVER SERPL-CCNC: 84 U/L
ALT SERPL-CCNC: 18 U/L
ANION GAP SERPL CALC-SCNC: 6 MMOL/L (ref 0–18)
AST SERPL-CCNC: 23 U/L (ref 15–37)
BASOPHILS # BLD AUTO: 0.04 X10(3) UL (ref 0–0.2)
BASOPHILS NFR BLD AUTO: 0.6 %
BILIRUB SERPL-MCNC: 0.3 MG/DL (ref 0.1–2)
BILIRUB UR QL CFM: NEGATIVE
BUN BLD-MCNC: 18 MG/DL (ref 7–18)
BUN/CREAT SERPL: 14.5 (ref 10–20)
CALCIUM BLD-MCNC: 8.7 MG/DL (ref 8.5–10.1)
CHLORIDE SERPL-SCNC: 112 MMOL/L (ref 98–112)
CO2 SERPL-SCNC: 27 MMOL/L (ref 21–32)
CREAT BLD-MCNC: 1.24 MG/DL
CRP SERPL-MCNC: 5.95 MG/DL (ref ?–0.3)
D DIMER PPP FEU-MCNC: 3.71 UG/ML FEU (ref ?–0.81)
DEPRECATED HBV CORE AB SER IA-ACNC: 162.8 NG/ML
DEPRECATED RDW RBC AUTO: 56.8 FL (ref 35.1–46.3)
EOSINOPHIL # BLD AUTO: 0.03 X10(3) UL (ref 0–0.7)
EOSINOPHIL NFR BLD AUTO: 0.4 %
ERYTHROCYTE [DISTWIDTH] IN BLOOD BY AUTOMATED COUNT: 15.4 % (ref 11–15)
GFR SERPLBLD BASED ON 1.73 SQ M-ARVRAT: 58 ML/MIN/1.73M2 (ref 60–?)
GLOBULIN PLAS-MCNC: 4.6 G/DL (ref 2.8–4.4)
GLUCOSE BLD-MCNC: 91 MG/DL (ref 70–99)
HCT VFR BLD AUTO: 41.4 %
HGB BLD-MCNC: 12.2 G/DL
IMM GRANULOCYTES # BLD AUTO: 0.02 X10(3) UL (ref 0–1)
IMM GRANULOCYTES NFR BLD: 0.3 %
LDH SERPL L TO P-CCNC: 156 U/L
LYMPHOCYTES # BLD AUTO: 1.64 X10(3) UL (ref 1–4)
LYMPHOCYTES NFR BLD AUTO: 23.3 %
MAGNESIUM SERPL-MCNC: 2 MG/DL (ref 1.6–2.6)
MCH RBC QN AUTO: 29.5 PG (ref 26–34)
MCHC RBC AUTO-ENTMCNC: 29.5 G/DL (ref 31–37)
MCV RBC AUTO: 100.2 FL
MONOCYTES # BLD AUTO: 1.25 X10(3) UL (ref 0.1–1)
MONOCYTES NFR BLD AUTO: 17.8 %
NEUTROPHILS # BLD AUTO: 4.05 X10 (3) UL (ref 1.5–7.7)
NEUTROPHILS # BLD AUTO: 4.05 X10(3) UL (ref 1.5–7.7)
NEUTROPHILS NFR BLD AUTO: 57.6 %
OSMOLALITY SERPL CALC.SUM OF ELEC: 301 MOSM/KG (ref 275–295)
PHOSPHATE SERPL-MCNC: 3.1 MG/DL (ref 2.5–4.9)
PLATELET # BLD AUTO: 197 10(3)UL (ref 150–450)
POTASSIUM SERPL-SCNC: 3.6 MMOL/L (ref 3.5–5.1)
PROT SERPL-MCNC: 7.4 G/DL (ref 6.4–8.2)
RBC # BLD AUTO: 4.13 X10(6)UL
SODIUM SERPL-SCNC: 145 MMOL/L (ref 136–145)
WBC # BLD AUTO: 7 X10(3) UL (ref 4–11)

## 2022-11-24 PROCEDURE — XW033E5 INTRODUCTION OF REMDESIVIR ANTI-INFECTIVE INTO PERIPHERAL VEIN, PERCUTANEOUS APPROACH, NEW TECHNOLOGY GROUP 5: ICD-10-PCS | Performed by: HOSPITALIST

## 2022-11-24 PROCEDURE — 99223 1ST HOSP IP/OBS HIGH 75: CPT | Performed by: INTERNAL MEDICINE

## 2022-11-24 PROCEDURE — 3E0DX3Z INTRODUCTION OF ANTI-INFLAMMATORY INTO MOUTH AND PHARYNX, EXTERNAL APPROACH: ICD-10-PCS | Performed by: HOSPITALIST

## 2022-11-24 PROCEDURE — 99223 1ST HOSP IP/OBS HIGH 75: CPT | Performed by: HOSPITALIST

## 2022-11-24 RX ORDER — BENZONATATE 100 MG/1
200 CAPSULE ORAL 3 TIMES DAILY PRN
Status: DISCONTINUED | OUTPATIENT
Start: 2022-11-24 | End: 2022-11-29

## 2022-11-24 RX ORDER — CLOPIDOGREL BISULFATE 75 MG/1
75 TABLET ORAL DAILY
Status: DISCONTINUED | OUTPATIENT
Start: 2022-11-24 | End: 2022-11-29

## 2022-11-24 RX ORDER — LEVOTHYROXINE SODIUM 0.1 MG/1
100 TABLET ORAL DAILY
Status: DISCONTINUED | OUTPATIENT
Start: 2022-11-24 | End: 2022-11-29

## 2022-11-24 RX ORDER — ATORVASTATIN CALCIUM 40 MG/1
40 TABLET, FILM COATED ORAL NIGHTLY
Status: DISCONTINUED | OUTPATIENT
Start: 2022-11-24 | End: 2022-11-29

## 2022-11-24 RX ORDER — AMLODIPINE BESYLATE 2.5 MG/1
2.5 TABLET ORAL DAILY
Status: DISCONTINUED | OUTPATIENT
Start: 2022-11-24 | End: 2022-11-29

## 2022-11-24 RX ORDER — ENOXAPARIN SODIUM 100 MG/ML
40 INJECTION SUBCUTANEOUS DAILY
Status: DISCONTINUED | OUTPATIENT
Start: 2022-11-24 | End: 2022-11-29

## 2022-11-24 RX ORDER — SENNA AND DOCUSATE SODIUM 50; 8.6 MG/1; MG/1
2 TABLET, FILM COATED ORAL 2 TIMES DAILY
Status: DISCONTINUED | OUTPATIENT
Start: 2022-11-24 | End: 2022-11-29

## 2022-11-24 RX ORDER — DEXAMETHASONE SODIUM PHOSPHATE 4 MG/ML
6 VIAL (ML) INJECTION DAILY
Status: DISCONTINUED | OUTPATIENT
Start: 2022-11-24 | End: 2022-11-24

## 2022-11-24 RX ORDER — TRAMADOL HYDROCHLORIDE 50 MG/1
50 TABLET ORAL DAILY
Status: DISCONTINUED | OUTPATIENT
Start: 2022-11-24 | End: 2022-11-25

## 2022-11-24 RX ORDER — ALBUTEROL SULFATE 90 UG/1
4 AEROSOL, METERED RESPIRATORY (INHALATION) EVERY 4 HOURS PRN
Status: DISCONTINUED | OUTPATIENT
Start: 2022-11-24 | End: 2022-11-24

## 2022-11-24 RX ORDER — ONDANSETRON 2 MG/ML
4 INJECTION INTRAMUSCULAR; INTRAVENOUS EVERY 6 HOURS PRN
Status: DISCONTINUED | OUTPATIENT
Start: 2022-11-24 | End: 2022-11-29

## 2022-11-24 RX ORDER — DEXAMETHASONE 6 MG/1
6 TABLET ORAL DAILY
Status: DISCONTINUED | OUTPATIENT
Start: 2022-11-24 | End: 2022-11-24

## 2022-11-24 RX ORDER — ACETAMINOPHEN 500 MG
1000 TABLET ORAL 2 TIMES DAILY
Status: DISCONTINUED | OUTPATIENT
Start: 2022-11-24 | End: 2022-11-29

## 2022-11-24 RX ORDER — ASPIRIN 81 MG/1
81 TABLET ORAL DAILY
Status: DISCONTINUED | OUTPATIENT
Start: 2022-11-24 | End: 2022-11-29

## 2022-11-24 RX ORDER — DEXAMETHASONE SODIUM PHOSPHATE 4 MG/ML
6 VIAL (ML) INJECTION DAILY
Status: DISCONTINUED | OUTPATIENT
Start: 2022-11-24 | End: 2022-11-28

## 2022-11-24 RX ORDER — MELATONIN
2000 DAILY
Status: DISCONTINUED | OUTPATIENT
Start: 2022-11-24 | End: 2022-11-29

## 2022-11-24 RX ORDER — DEXAMETHASONE 6 MG/1
6 TABLET ORAL DAILY
Status: DISCONTINUED | OUTPATIENT
Start: 2022-11-24 | End: 2022-11-28

## 2022-11-24 RX ORDER — GUAIFENESIN 600 MG/1
600 TABLET, EXTENDED RELEASE ORAL 2 TIMES DAILY
Status: DISCONTINUED | OUTPATIENT
Start: 2022-11-24 | End: 2022-11-29

## 2022-11-24 RX ORDER — FLUTICASONE FUROATE AND VILANTEROL 100; 25 UG/1; UG/1
1 POWDER RESPIRATORY (INHALATION) DAILY
Status: DISCONTINUED | OUTPATIENT
Start: 2022-11-24 | End: 2022-11-29

## 2022-11-24 RX ORDER — ALBUTEROL SULFATE 90 UG/1
4 AEROSOL, METERED RESPIRATORY (INHALATION) EVERY 4 HOURS PRN
Status: DISCONTINUED | OUTPATIENT
Start: 2022-11-24 | End: 2022-11-29

## 2022-11-24 RX ORDER — GUAIFENESIN 600 MG/1
600 TABLET, EXTENDED RELEASE ORAL 2 TIMES DAILY
Status: DISCONTINUED | OUTPATIENT
Start: 2022-11-24 | End: 2022-11-24

## 2022-11-24 RX ORDER — TRAMADOL HYDROCHLORIDE 50 MG/1
50 TABLET ORAL DAILY
Status: ON HOLD | COMMUNITY
End: 2022-11-28

## 2022-11-24 NOTE — ED INITIAL ASSESSMENT (HPI)
Pt presents to the ER from [facility]. Per pt he slid out of his chair onto his buttocks. Pt stated he hit his left shoulder and head. Pt on Plavix.  Denies LOC

## 2022-11-24 NOTE — ED QUICK NOTES
Orders for admission, patient is aware of plan and ready to go upstairs. Any questions, please call ED RN tk at extension [phone].      Patient Covid vaccination status: Fully vaccinated     COVID Test Ordered in ED: Rapid SARS-CoV-2 by PCR    COVID Suspicion at Admission: N/A    Running Infusions:  None    Mental Status/LOC at time of transport: a&ox 3    Other pertinent information: fall risk  CIWA score: N/A   NIH score:  N/A

## 2022-11-24 NOTE — PROGRESS NOTES
[facility] Pharmacy Note:  Remdesivir    Minerva Redmond is a 81year old patient admitted 11/23/2022  6:32 PM who is COVID (+) and has been been started on Remdesivir as of 11/24/22. Pertinent Patient Lab Values:  Estimated GFR:   No results for input(s): Kitty Ceballos in the last 72 hours. Alkaline Phosphate:   Recent Labs   Lab 11/23/22 2001   ALKPHO 105       ALT:   Recent Labs   Lab 11/23/22 2001   ALT 21       AST:   Recent Labs   Lab 11/23/22 2001   AST 33       Bilirubin:   Recent Labs   Lab 11/23/22 2001   BILT 0.4       eGFR is above 30mL/min. ALT is less than ten times the upper limit of normal.      Recommendation: Continue therapy as is.       Win Durham PharmD  11/23/2022  11:51 PM

## 2022-11-25 LAB
ALBUMIN SERPL-MCNC: 2.8 G/DL (ref 3.4–5)
ALBUMIN/GLOB SERPL: 0.6 {RATIO} (ref 1–2)
ALP LIVER SERPL-CCNC: 79 U/L
ALT SERPL-CCNC: 15 U/L
ANION GAP SERPL CALC-SCNC: 8 MMOL/L (ref 0–18)
AST SERPL-CCNC: 20 U/L (ref 15–37)
ATRIAL RATE: 102 BPM
BASOPHILS # BLD AUTO: 0.01 X10(3) UL (ref 0–0.2)
BASOPHILS NFR BLD AUTO: 0.2 %
BILIRUB SERPL-MCNC: 0.3 MG/DL (ref 0.1–2)
BUN BLD-MCNC: 26 MG/DL (ref 7–18)
BUN/CREAT SERPL: 22.8 (ref 10–20)
CALCIUM BLD-MCNC: 9 MG/DL (ref 8.5–10.1)
CHLORIDE SERPL-SCNC: 113 MMOL/L (ref 98–112)
CO2 SERPL-SCNC: 24 MMOL/L (ref 21–32)
CREAT BLD-MCNC: 1.14 MG/DL
CRP SERPL-MCNC: 3.66 MG/DL (ref ?–0.3)
D DIMER PPP FEU-MCNC: 3.74 UG/ML FEU (ref ?–0.81)
DEPRECATED HBV CORE AB SER IA-ACNC: 168.3 NG/ML
DEPRECATED RDW RBC AUTO: 53.5 FL (ref 35.1–46.3)
EOSINOPHIL # BLD AUTO: 0 X10(3) UL (ref 0–0.7)
EOSINOPHIL NFR BLD AUTO: 0 %
ERYTHROCYTE [DISTWIDTH] IN BLOOD BY AUTOMATED COUNT: 14.7 % (ref 11–15)
GFR SERPLBLD BASED ON 1.73 SQ M-ARVRAT: 65 ML/MIN/1.73M2 (ref 60–?)
GLOBULIN PLAS-MCNC: 4.4 G/DL (ref 2.8–4.4)
GLUCOSE BLD-MCNC: 101 MG/DL (ref 70–99)
HCT VFR BLD AUTO: 38.2 %
HGB BLD-MCNC: 11.6 G/DL
IMM GRANULOCYTES # BLD AUTO: 0.02 X10(3) UL (ref 0–1)
IMM GRANULOCYTES NFR BLD: 0.4 %
LDH SERPL L TO P-CCNC: 138 U/L
LYMPHOCYTES # BLD AUTO: 1.2 X10(3) UL (ref 1–4)
LYMPHOCYTES NFR BLD AUTO: 21.2 %
MCH RBC QN AUTO: 29.8 PG (ref 26–34)
MCHC RBC AUTO-ENTMCNC: 30.4 G/DL (ref 31–37)
MCV RBC AUTO: 98.2 FL
MONOCYTES # BLD AUTO: 0.77 X10(3) UL (ref 0.1–1)
MONOCYTES NFR BLD AUTO: 13.6 %
NEUTROPHILS # BLD AUTO: 3.67 X10 (3) UL (ref 1.5–7.7)
NEUTROPHILS # BLD AUTO: 3.67 X10(3) UL (ref 1.5–7.7)
NEUTROPHILS NFR BLD AUTO: 64.6 %
OSMOLALITY SERPL CALC.SUM OF ELEC: 305 MOSM/KG (ref 275–295)
P AXIS: 71 DEGREES
P-R INTERVAL: 128 MS
PLATELET # BLD AUTO: 199 10(3)UL (ref 150–450)
POTASSIUM SERPL-SCNC: 4 MMOL/L (ref 3.5–5.1)
PROT SERPL-MCNC: 7.2 G/DL (ref 6.4–8.2)
Q-T INTERVAL: 344 MS
QRS DURATION: 94 MS
QTC CALCULATION (BEZET): 448 MS
R AXIS: -51 DEGREES
RBC # BLD AUTO: 3.89 X10(6)UL
SODIUM SERPL-SCNC: 145 MMOL/L (ref 136–145)
T AXIS: 47 DEGREES
VENTRICULAR RATE: 102 BPM
WBC # BLD AUTO: 5.7 X10(3) UL (ref 4–11)

## 2022-11-25 PROCEDURE — 99233 SBSQ HOSP IP/OBS HIGH 50: CPT | Performed by: INTERNAL MEDICINE

## 2022-11-25 PROCEDURE — 99233 SBSQ HOSP IP/OBS HIGH 50: CPT | Performed by: HOSPITALIST

## 2022-11-25 RX ORDER — TRAMADOL HYDROCHLORIDE 50 MG/1
50 TABLET ORAL DAILY PRN
Status: DISCONTINUED | OUTPATIENT
Start: 2022-11-25 | End: 2022-11-29

## 2022-11-25 NOTE — PLAN OF CARE
Pt alert and oriented x4. Denies chest pain/SOB. ON 4L o2 via nasal cannula. Satting well in the 90s. Redness on sacral area. Pt repositioned overniht. No acute events overnight. Plan to return to [facility] upon discarge pending medical clearance. Problem: Patient Centered Care  Goal: Patient preferences are identified and integrated in the patient's plan of care  Description: Interventions:  - What would you like us to know as we care for you?   Problem: CARDIOVASCULAR - ADULT  Goal: Absence of cardiac arrhythmias or at baseline  Description: INTERVENTIONS:  - Continuous cardiac monitoring, monitor vital signs, obtain 12 lead EKG if indicated  - Evaluate effectiveness of antiarrhythmic and heart rate control medications as ordered  - Initiate emergency measures for life threatening arrhythmias  - Monitor electrolytes and administer replacement therapy as ordered  Outcome: Progressing     Problem: RESPIRATORY - ADULT  Goal: Achieves optimal ventilation and oxygenation  Description: INTERVENTIONS:  - Assess for changes in respiratory status  - Assess for changes in mentation and behavior  - Position to facilitate oxygenation and minimize respiratory effort  - Oxygen supplementation based on oxygen saturation or ABGs  - Provide Smoking Cessation handout, if applicable  - Encourage broncho-pulmonary hygiene including cough, deep breathe, Incentive Spirometry  - Assess the need for suctioning and perform as needed  - Assess and instruct to report SOB or any respiratory difficulty  - Respiratory Therapy support as indicated  - Manage/alleviate anxiety  - Monitor for signs/symptoms of CO2 retention  Outcome: Progressing     Problem: METABOLIC/FLUID AND ELECTROLYTES - ADULT  Goal: Electrolytes maintained within normal limits  Description: INTERVENTIONS:  - Monitor labs and rhythm and assess patient for signs and symptoms of electrolyte imbalances  - Administer electrolyte replacement as ordered  - Monitor response to electrolyte replacements, including rhythm and repeat lab results as appropriate  - Fluid restriction as ordered  - Instruct patient on fluid and nutrition restrictions as appropriate  Outcome: Progressing     Problem: SKIN/TISSUE INTEGRITY - ADULT  Goal: Skin integrity remains intact  Description: INTERVENTIONS  - Assess and document risk factors for pressure ulcer development  - Assess and document skin integrity  - Monitor for areas of redness and/or skin breakdown  - Initiate interventions, skin care algorithm/standards of care as needed  Outcome: Progressing     Problem: Impaired Functional Mobility  Goal: Achieve highest/safest level of mobility/gait  Description: Interventions:  - Assess patient's functional ability and stability  - Promote increasing activity/tolerance for mobility and gait  - Educate and engage patient/family in tolerated activity level and precautions  - Recommend use of total lift for transfers  Outcome: Progressing     Problem: SAFETY ADULT - FALL  Goal: Free from fall injury  Description: INTERVENTIONS:  - Assess pt frequently for physical needs  - Identify cognitive and physical deficits and behaviors that affect risk of falls.   - [place] fall precautions as indicated by assessment.  - Educate pt/family on patient safety including physical limitations  - Instruct pt to call for assistance with activity based on assessment  - Modify environment to reduce risk of injury  - Provide assistive devices as appropriate  - Consider OT/PT consult to assist with strengthening/mobility  - Encourage toileting schedule  Outcome: Progressing     - Provide timely, complete, and accurate information to patient/family  - Incorporate patient and family knowledge, values, beliefs, and cultural backgrounds into the planning and delivery of care  - Encourage patient/family to participate in care and decision-making at the level they choose  - Honor patient and family perspectives and choices  Outcome: Progressing

## 2022-11-25 NOTE — PLAN OF CARE
No acute changes. O2 at 4L- baseline. Plan to return to [facility] when medically able. Problem: Patient Centered Care  Goal: Patient preferences are identified and integrated in the patient's plan of care  Description: Interventions:  - What would you like us to know as we care for you?  From [facility]   - Provide timely, complete, and accurate information to patient/family  - Incorporate patient and family knowledge, values, beliefs, and cultural backgrounds into the planning and delivery of care  - Encourage patient/family to participate in care and decision-making at the level they choose  - Honor patient and family perspectives and choices  Outcome: Progressing

## 2022-11-25 NOTE — CM/SW NOTE
Department  notified of request for [facility] referrals started. Assigned CM/SW to follow up with pt/family on further discharge planning.      Keri Moon  [facility]

## 2022-11-25 NOTE — CM/SW NOTE
Patient was in [facility] prior to admission her. PT and OT ordered. Patient has L non-displaced shoulder fx that he wears in a sling. [facility] referrals being initiated. Wife prefers patient not return to [facility] that he came from. Her preference is for acute rehab at [facility]. Explained the process of evaluations by PT and OT to see current level of care recommendation. Patient does not have a qualifying diagnosis based on Medicare criteria for acute rehab. / to remain available for support and/or discharge planning.      Bobbi Oseguera MBA BSN RN [address]  RN Case Manager  [phone]

## 2022-11-26 LAB
ALBUMIN SERPL-MCNC: 2.8 G/DL (ref 3.4–5)
ALBUMIN/GLOB SERPL: 0.6 {RATIO} (ref 1–2)
ALP LIVER SERPL-CCNC: 77 U/L
ALT SERPL-CCNC: 16 U/L
ANION GAP SERPL CALC-SCNC: 6 MMOL/L (ref 0–18)
AST SERPL-CCNC: 19 U/L (ref 15–37)
BASOPHILS # BLD AUTO: 0.01 X10(3) UL (ref 0–0.2)
BASOPHILS NFR BLD AUTO: 0.1 %
BILIRUB SERPL-MCNC: 0.2 MG/DL (ref 0.1–2)
BUN BLD-MCNC: 30 MG/DL (ref 7–18)
BUN/CREAT SERPL: 26.5 (ref 10–20)
CALCIUM BLD-MCNC: 9 MG/DL (ref 8.5–10.1)
CHLORIDE SERPL-SCNC: 111 MMOL/L (ref 98–112)
CO2 SERPL-SCNC: 27 MMOL/L (ref 21–32)
CREAT BLD-MCNC: 1.13 MG/DL
CRP SERPL-MCNC: 1.75 MG/DL (ref ?–0.3)
D DIMER PPP FEU-MCNC: 3.59 UG/ML FEU (ref ?–0.81)
DEPRECATED HBV CORE AB SER IA-ACNC: 158 NG/ML
DEPRECATED RDW RBC AUTO: 52.6 FL (ref 35.1–46.3)
EOSINOPHIL # BLD AUTO: 0 X10(3) UL (ref 0–0.7)
EOSINOPHIL NFR BLD AUTO: 0 %
ERYTHROCYTE [DISTWIDTH] IN BLOOD BY AUTOMATED COUNT: 14.6 % (ref 11–15)
GFR SERPLBLD BASED ON 1.73 SQ M-ARVRAT: 65 ML/MIN/1.73M2 (ref 60–?)
GLOBULIN PLAS-MCNC: 4.5 G/DL (ref 2.8–4.4)
GLUCOSE BLD-MCNC: 100 MG/DL (ref 70–99)
HCT VFR BLD AUTO: 38.8 %
HGB BLD-MCNC: 11.9 G/DL
IMM GRANULOCYTES # BLD AUTO: 0.05 X10(3) UL (ref 0–1)
IMM GRANULOCYTES NFR BLD: 0.7 %
LDH SERPL L TO P-CCNC: 145 U/L
LYMPHOCYTES # BLD AUTO: 1.4 X10(3) UL (ref 1–4)
LYMPHOCYTES NFR BLD AUTO: 18.3 %
MCH RBC QN AUTO: 30.1 PG (ref 26–34)
MCHC RBC AUTO-ENTMCNC: 30.7 G/DL (ref 31–37)
MCV RBC AUTO: 98 FL
MONOCYTES # BLD AUTO: 0.78 X10(3) UL (ref 0.1–1)
MONOCYTES NFR BLD AUTO: 10.2 %
NEUTROPHILS # BLD AUTO: 5.41 X10 (3) UL (ref 1.5–7.7)
NEUTROPHILS # BLD AUTO: 5.41 X10(3) UL (ref 1.5–7.7)
NEUTROPHILS NFR BLD AUTO: 70.7 %
OSMOLALITY SERPL CALC.SUM OF ELEC: 304 MOSM/KG (ref 275–295)
PLATELET # BLD AUTO: 189 10(3)UL (ref 150–450)
POTASSIUM SERPL-SCNC: 3.8 MMOL/L (ref 3.5–5.1)
PROT SERPL-MCNC: 7.3 G/DL (ref 6.4–8.2)
RBC # BLD AUTO: 3.96 X10(6)UL
SODIUM SERPL-SCNC: 144 MMOL/L (ref 136–145)
WBC # BLD AUTO: 7.7 X10(3) UL (ref 4–11)

## 2022-11-26 PROCEDURE — 99232 SBSQ HOSP IP/OBS MODERATE 35: CPT | Performed by: INTERNAL MEDICINE

## 2022-11-26 PROCEDURE — 99233 SBSQ HOSP IP/OBS HIGH 50: CPT | Performed by: HOSPITALIST

## 2022-11-26 NOTE — PLAN OF CARE
Pt is on contact/ droplet precautions. Receiving 5L of O2. Tramadol as needed for pain. Primofit in place and incontinence care provided. Lovenox for DVT prophylaxis. All safety precautions in place, frequent rounding by nursing staff, and call light within reach.          Problem: Patient Centered Care  Goal: Patient preferences are identified and integrated in the patient's plan of care  Description: Interventions:  - What would you like us to know as we care for you?  - Provide timely, complete, and accurate information to patient/family  - Incorporate patient and family knowledge, values, beliefs, and cultural backgrounds into the planning and delivery of care  - Encourage patient/family to participate in care and decision-making at the level they choose  - Honor patient and family perspectives and choices  Outcome: Progressing     Problem: CARDIOVASCULAR - ADULT  Goal: Absence of cardiac arrhythmias or at baseline  Description: INTERVENTIONS:  - Continuous cardiac monitoring, monitor vital signs, obtain 12 lead EKG if indicated  - Evaluate effectiveness of antiarrhythmic and heart rate control medications as ordered  - Initiate emergency measures for life threatening arrhythmias  - Monitor electrolytes and administer replacement therapy as ordered  Outcome: Progressing     Problem: RESPIRATORY - ADULT  Goal: Achieves optimal ventilation and oxygenation  Description: INTERVENTIONS:  - Assess for changes in respiratory status  - Assess for changes in mentation and behavior  - Position to facilitate oxygenation and minimize respiratory effort  - Oxygen supplementation based on oxygen saturation or ABGs  - Provide Smoking Cessation handout, if applicable  - Encourage broncho-pulmonary hygiene including cough, deep breathe, Incentive Spirometry  - Assess the need for suctioning and perform as needed  - Assess and instruct to report SOB or any respiratory difficulty  - Respiratory Therapy support as indicated  - Manage/alleviate anxiety  - Monitor for signs/symptoms of CO2 retention  Outcome: Progressing     Problem: METABOLIC/FLUID AND ELECTROLYTES - ADULT  Goal: Electrolytes maintained within normal limits  Description: INTERVENTIONS:  - Monitor labs and rhythm and assess patient for signs and symptoms of electrolyte imbalances  - Administer electrolyte replacement as ordered  - Monitor response to electrolyte replacements, including rhythm and repeat lab results as appropriate  - Fluid restriction as ordered  - Instruct patient on fluid and nutrition restrictions as appropriate  Outcome: Progressing     Problem: SKIN/TISSUE INTEGRITY - ADULT  Goal: Skin integrity remains intact  Description: INTERVENTIONS  - Assess and document risk factors for pressure ulcer development  - Assess and document skin integrity  - Monitor for areas of redness and/or skin breakdown  - Initiate interventions, skin care algorithm/standards of care as needed  Outcome: Progressing     Problem: Impaired Functional Mobility  Goal: Achieve highest/safest level of mobility/gait  Description: Interventions:  - Assess patient's functional ability and stability  - Promote increasing activity/tolerance for mobility and gait  - Educate and engage patient/family in tolerated activity level and precautions    Outcome: Progressing     Problem: SAFETY ADULT - FALL  Goal: Free from fall injury  Description: INTERVENTIONS:  - Assess pt frequently for physical needs  - Identify cognitive and physical deficits and behaviors that affect risk of falls.   - [place] fall precautions as indicated by assessment.  - Educate pt/family on patient safety including physical limitations  - Instruct pt to call for assistance with activity based on assessment  - Modify environment to reduce risk of injury  - Provide assistive devices as appropriate  - Consider OT/PT consult to assist with strengthening/mobility  - Encourage toileting schedule  Outcome: Progressing

## 2022-11-26 NOTE — CM/SW NOTE
Requested PT/OT to see so dc planning can be initiated.     Joie Pedersen MBA BSN RN [address]  RN Case Manager  [phone]

## 2022-11-26 NOTE — PLAN OF CARE
Patient may transfer to medical floor. Patient denies pain or discomfort. Worked with pt and up in the chair for 4 hours. Transferred back to bed x2 staff.  Remains on 5L o2 sat @95%        Problem: Patient Centered Care  Goal: Patient preferences are identified and integrated in the patient's plan of care  Description: Interventions:  - What would you like us to know as we care for you?   - Provide timely, complete, and accurate information to patient/family  - Incorporate patient and family knowledge, values, beliefs, and cultural backgrounds into the planning and delivery of care  - Encourage patient/family to participate in care and decision-making at the level they choose  - Honor patient and family perspectives and choices  Outcome: Progressing     Problem: CARDIOVASCULAR - ADULT  Goal: Absence of cardiac arrhythmias or at baseline  Description: INTERVENTIONS:  - Continuous cardiac monitoring, monitor vital signs, obtain 12 lead EKG if indicated  - Evaluate effectiveness of antiarrhythmic and heart rate control medications as ordered  - Initiate emergency measures for life threatening arrhythmias  - Monitor electrolytes and administer replacement therapy as ordered  Outcome: Progressing     Problem: RESPIRATORY - ADULT  Goal: Achieves optimal ventilation and oxygenation  Description: INTERVENTIONS:  - Assess for changes in respiratory status  - Assess for changes in mentation and behavior  - Position to facilitate oxygenation and minimize respiratory effort  - Oxygen supplementation based on oxygen saturation or ABGs  - Provide Smoking Cessation handout, if applicable  - Encourage broncho-pulmonary hygiene including cough, deep breathe, Incentive Spirometry  - Assess the need for suctioning and perform as needed  - Assess and instruct to report SOB or any respiratory difficulty  - Respiratory Therapy support as indicated  - Manage/alleviate anxiety  - Monitor for signs/symptoms of CO2 retention  Outcome: Progressing     Problem: METABOLIC/FLUID AND ELECTROLYTES - ADULT  Goal: Electrolytes maintained within normal limits  Description: INTERVENTIONS:  - Monitor labs and rhythm and assess patient for signs and symptoms of electrolyte imbalances  - Administer electrolyte replacement as ordered  - Monitor response to electrolyte replacements, including rhythm and repeat lab results as appropriate  - Fluid restriction as ordered  - Instruct patient on fluid and nutrition restrictions as appropriate  Outcome: Progressing     Problem: SKIN/TISSUE INTEGRITY - ADULT  Goal: Skin integrity remains intact  Description: INTERVENTIONS  - Assess and document risk factors for pressure ulcer development  - Assess and document skin integrity  - Monitor for areas of redness and/or skin breakdown  - Initiate interventions, skin care algorithm/standards of care as needed  Outcome: Progressing     Problem: Impaired Functional Mobility  Goal: Achieve highest/safest level of mobility/gait  Description: Interventions:  - Assess patient's functional ability and stability  - Promote increasing activity/tolerance for mobility and gait  - Educate and engage patient/family in tolerated activity level and precautions    Outcome: Progressing     Problem: SAFETY ADULT - FALL  Goal: Free from fall injury  Description: INTERVENTIONS:  - Assess pt frequently for physical needs  - Identify cognitive and physical deficits and behaviors that affect risk of falls.   - [place] fall precautions as indicated by assessment.  - Educate pt/family on patient safety including physical limitations  - Instruct pt to call for assistance with activity based on assessment  - Modify environment to reduce risk of injury  - Provide assistive devices as appropriate  - Consider OT/PT consult to assist with strengthening/mobility  - Encourage toileting schedule  Outcome: Progressing

## 2022-11-27 LAB
ALBUMIN SERPL-MCNC: 2.6 G/DL (ref 3.4–5)
ALBUMIN/GLOB SERPL: 0.6 {RATIO} (ref 1–2)
ALP LIVER SERPL-CCNC: 68 U/L
ALT SERPL-CCNC: 18 U/L
ANION GAP SERPL CALC-SCNC: 5 MMOL/L (ref 0–18)
AST SERPL-CCNC: 20 U/L (ref 15–37)
BASOPHILS # BLD AUTO: 0.01 X10(3) UL (ref 0–0.2)
BASOPHILS NFR BLD AUTO: 0.1 %
BILIRUB SERPL-MCNC: 0.3 MG/DL (ref 0.1–2)
BUN BLD-MCNC: 31 MG/DL (ref 7–18)
BUN/CREAT SERPL: 27.9 (ref 10–20)
CALCIUM BLD-MCNC: 9 MG/DL (ref 8.5–10.1)
CHLORIDE SERPL-SCNC: 109 MMOL/L (ref 98–112)
CO2 SERPL-SCNC: 27 MMOL/L (ref 21–32)
CREAT BLD-MCNC: 1.11 MG/DL
DEPRECATED RDW RBC AUTO: 53.1 FL (ref 35.1–46.3)
EOSINOPHIL # BLD AUTO: 0 X10(3) UL (ref 0–0.7)
EOSINOPHIL NFR BLD AUTO: 0 %
ERYTHROCYTE [DISTWIDTH] IN BLOOD BY AUTOMATED COUNT: 14.6 % (ref 11–15)
GFR SERPLBLD BASED ON 1.73 SQ M-ARVRAT: 67 ML/MIN/1.73M2 (ref 60–?)
GLOBULIN PLAS-MCNC: 4.2 G/DL (ref 2.8–4.4)
GLUCOSE BLD-MCNC: 144 MG/DL (ref 70–99)
HCT VFR BLD AUTO: 37.2 %
HGB BLD-MCNC: 11.1 G/DL
IMM GRANULOCYTES # BLD AUTO: 0.04 X10(3) UL (ref 0–1)
IMM GRANULOCYTES NFR BLD: 0.4 %
LYMPHOCYTES # BLD AUTO: 1.6 X10(3) UL (ref 1–4)
LYMPHOCYTES NFR BLD AUTO: 14.3 %
MCH RBC QN AUTO: 29.5 PG (ref 26–34)
MCHC RBC AUTO-ENTMCNC: 29.8 G/DL (ref 31–37)
MCV RBC AUTO: 98.9 FL
MONOCYTES # BLD AUTO: 0.8 X10(3) UL (ref 0.1–1)
MONOCYTES NFR BLD AUTO: 7.2 %
NEUTROPHILS # BLD AUTO: 8.73 X10 (3) UL (ref 1.5–7.7)
NEUTROPHILS # BLD AUTO: 8.73 X10(3) UL (ref 1.5–7.7)
NEUTROPHILS NFR BLD AUTO: 78 %
OSMOLALITY SERPL CALC.SUM OF ELEC: 301 MOSM/KG (ref 275–295)
PHOSPHATE SERPL-MCNC: 1.5 MG/DL (ref 2.5–4.9)
PLATELET # BLD AUTO: 179 10(3)UL (ref 150–450)
POTASSIUM SERPL-SCNC: 3.2 MMOL/L (ref 3.5–5.1)
POTASSIUM SERPL-SCNC: 4.3 MMOL/L (ref 3.5–5.1)
PROT SERPL-MCNC: 6.8 G/DL (ref 6.4–8.2)
RBC # BLD AUTO: 3.76 X10(6)UL
SODIUM SERPL-SCNC: 141 MMOL/L (ref 136–145)
WBC # BLD AUTO: 11.2 X10(3) UL (ref 4–11)

## 2022-11-27 PROCEDURE — 99232 SBSQ HOSP IP/OBS MODERATE 35: CPT | Performed by: HOSPITALIST

## 2022-11-27 PROCEDURE — 99232 SBSQ HOSP IP/OBS MODERATE 35: CPT | Performed by: INTERNAL MEDICINE

## 2022-11-27 NOTE — PLAN OF CARE
Patient denies pain or discomfort. Awaiting place for rehab. Electrolyte replacement done. Tolerated well.    Problem: Patient Centered Care  Goal: Patient preferences are identified and integrated in the patient's plan of care  Description: Interventions:  - What would you like us to know as we care for you?   - Provide timely, complete, and accurate information to patient/family  - Incorporate patient and family knowledge, values, beliefs, and cultural backgrounds into the planning and delivery of care  - Encourage patient/family to participate in care and decision-making at the level they choose  - Honor patient and family perspectives and choices  Outcome: Progressing     Problem: CARDIOVASCULAR - ADULT  Goal: Absence of cardiac arrhythmias or at baseline  Description: INTERVENTIONS:  - Continuous cardiac monitoring, monitor vital signs, obtain 12 lead EKG if indicated  - Evaluate effectiveness of antiarrhythmic and heart rate control medications as ordered  - Initiate emergency measures for life threatening arrhythmias  - Monitor electrolytes and administer replacement therapy as ordered  Outcome: Progressing     Problem: RESPIRATORY - ADULT  Goal: Achieves optimal ventilation and oxygenation  Description: INTERVENTIONS:  - Assess for changes in respiratory status  - Assess for changes in mentation and behavior  - Position to facilitate oxygenation and minimize respiratory effort  - Oxygen supplementation based on oxygen saturation or ABGs  - Provide Smoking Cessation handout, if applicable  - Encourage broncho-pulmonary hygiene including cough, deep breathe, Incentive Spirometry  - Assess the need for suctioning and perform as needed  - Assess and instruct to report SOB or any respiratory difficulty  - Respiratory Therapy support as indicated  - Manage/alleviate anxiety  - Monitor for signs/symptoms of CO2 retention  Outcome: Progressing     Problem: METABOLIC/FLUID AND ELECTROLYTES - ADULT  Goal: Electrolytes maintained within normal limits  Description: INTERVENTIONS:  - Monitor labs and rhythm and assess patient for signs and symptoms of electrolyte imbalances  - Administer electrolyte replacement as ordered  - Monitor response to electrolyte replacements, including rhythm and repeat lab results as appropriate  - Fluid restriction as ordered  - Instruct patient on fluid and nutrition restrictions as appropriate  Outcome: Progressing     Problem: SKIN/TISSUE INTEGRITY - ADULT  Goal: Skin integrity remains intact  Description: INTERVENTIONS  - Assess and document risk factors for pressure ulcer development  - Assess and document skin integrity  - Monitor for areas of redness and/or skin breakdown  - Initiate interventions, skin care algorithm/standards of care as needed  Outcome: Progressing     Problem: Impaired Functional Mobility  Goal: Achieve highest/safest level of mobility/gait  Description: Interventions:  - Assess patient's functional ability and stability  - Promote increasing activity/tolerance for mobility and gait  - Educate and engage patient/family in tolerated activity level and precautions    Outcome: Progressing     Problem: SAFETY ADULT - FALL  Goal: Free from fall injury  Description: INTERVENTIONS:  - Assess pt frequently for physical needs  - Identify cognitive and physical deficits and behaviors that affect risk of falls.   - [place] fall precautions as indicated by assessment.  - Educate pt/family on patient safety including physical limitations  - Instruct pt to call for assistance with activity based on assessment  - Modify environment to reduce risk of injury  - Provide assistive devices as appropriate  - Consider OT/PT consult to assist with strengthening/mobility  - Encourage toileting schedule  Outcome: Progressing

## 2022-11-27 NOTE — PLAN OF CARE
Pt A/Ox3/4- can be confused at times. Covid +. Able to transfer to [address] [place] [address]. Pt reports mild pain in back and R shoulder- tylenol given. On 5L O2 on nasal cannula- stating over 90%. Pt reports no SOB. Problem: Patient Centered Care  Goal: Patient preferences are identified and integrated in the patient's plan of care  Description: Interventions:  - What would you like us to know as we care for you?  From [facility] with wife  - Provide timely, complete, and accurate information to patient/family  - Incorporate patient and family knowledge, values, beliefs, and cultural backgrounds into the planning and delivery of care  - Encourage patient/family to participate in care and decision-making at the level they choose  - Honor patient and family perspectives and choices  Outcome: Progressing     Problem: CARDIOVASCULAR - ADULT  Goal: Absence of cardiac arrhythmias or at baseline  Description: INTERVENTIONS:  - Continuous cardiac monitoring, monitor vital signs, obtain 12 lead EKG if indicated  - Evaluate effectiveness of antiarrhythmic and heart rate control medications as ordered  - Initiate emergency measures for life threatening arrhythmias  - Monitor electrolytes and administer replacement therapy as ordered  Outcome: Progressing     Problem: RESPIRATORY - ADULT  Goal: Achieves optimal ventilation and oxygenation  Description: INTERVENTIONS:  - Assess for changes in respiratory status  - Assess for changes in mentation and behavior  - Position to facilitate oxygenation and minimize respiratory effort  - Oxygen supplementation based on oxygen saturation or ABGs  - Provide Smoking Cessation handout, if applicable  - Encourage broncho-pulmonary hygiene including cough, deep breathe, Incentive Spirometry  - Assess the need for suctioning and perform as needed  - Assess and instruct to report SOB or any respiratory difficulty  - Respiratory Therapy support as indicated  - Manage/alleviate anxiety  - Monitor for signs/symptoms of CO2 retention  Outcome: Progressing     Problem: METABOLIC/FLUID AND ELECTROLYTES - ADULT  Goal: Electrolytes maintained within normal limits  Description: INTERVENTIONS:  - Monitor labs and rhythm and assess patient for signs and symptoms of electrolyte imbalances  - Administer electrolyte replacement as ordered  - Monitor response to electrolyte replacements, including rhythm and repeat lab results as appropriate  - Fluid restriction as ordered  - Instruct patient on fluid and nutrition restrictions as appropriate  Outcome: Progressing     Problem: SKIN/TISSUE INTEGRITY - ADULT  Goal: Skin integrity remains intact  Description: INTERVENTIONS  - Assess and document risk factors for pressure ulcer development  - Assess and document skin integrity  - Monitor for areas of redness and/or skin breakdown  - Initiate interventions, skin care algorithm/standards of care as needed  Outcome: Progressing     Problem: Impaired Functional Mobility  Goal: Achieve highest/safest level of mobility/gait  Description: Interventions:  - Assess patient's functional ability and stability  - Promote increasing activity/tolerance for mobility and gait  - Educate and engage patient/family in tolerated activity level and precautions    Outcome: Progressing     Problem: SAFETY ADULT - FALL  Goal: Free from fall injury  Description: INTERVENTIONS:  - Assess pt frequently for physical needs  - Identify cognitive and physical deficits and behaviors that affect risk of falls.   - [place] fall precautions as indicated by assessment.  - Educate pt/family on patient safety including physical limitations  - Instruct pt to call for assistance with activity based on assessment  - Modify environment to reduce risk of injury  - Provide assistive devices as appropriate  - Consider OT/PT consult to assist with strengthening/mobility  - Encourage toileting schedule  Outcome: Progressing

## 2022-11-28 ENCOUNTER — APPOINTMENT (OUTPATIENT)
Dept: GENERAL RADIOLOGY | Facility: HOSPITAL | Age: 82
End: 2022-11-28
Attending: HOSPITALIST
Payer: MEDICARE

## 2022-11-28 LAB
ALBUMIN SERPL-MCNC: 2.8 G/DL (ref 3.4–5)
ALBUMIN/GLOB SERPL: 0.7 {RATIO} (ref 1–2)
ALP LIVER SERPL-CCNC: 72 U/L
ALT SERPL-CCNC: 17 U/L
ANION GAP SERPL CALC-SCNC: 6 MMOL/L (ref 0–18)
AST SERPL-CCNC: 17 U/L (ref 15–37)
BILIRUB SERPL-MCNC: 0.4 MG/DL (ref 0.1–2)
BUN BLD-MCNC: 29 MG/DL (ref 7–18)
BUN/CREAT SERPL: 28.7 (ref 10–20)
CALCIUM BLD-MCNC: 9.3 MG/DL (ref 8.5–10.1)
CHLORIDE SERPL-SCNC: 109 MMOL/L (ref 98–112)
CO2 SERPL-SCNC: 27 MMOL/L (ref 21–32)
CREAT BLD-MCNC: 1.01 MG/DL
GFR SERPLBLD BASED ON 1.73 SQ M-ARVRAT: 75 ML/MIN/1.73M2 (ref 60–?)
GLOBULIN PLAS-MCNC: 4 G/DL (ref 2.8–4.4)
GLUCOSE BLD-MCNC: 89 MG/DL (ref 70–99)
OSMOLALITY SERPL CALC.SUM OF ELEC: 299 MOSM/KG (ref 275–295)
PHOSPHATE SERPL-MCNC: 2.4 MG/DL (ref 2.5–4.9)
POTASSIUM SERPL-SCNC: 3.6 MMOL/L (ref 3.5–5.1)
PROT SERPL-MCNC: 6.8 G/DL (ref 6.4–8.2)
SODIUM SERPL-SCNC: 142 MMOL/L (ref 136–145)

## 2022-11-28 PROCEDURE — 99232 SBSQ HOSP IP/OBS MODERATE 35: CPT | Performed by: INTERNAL MEDICINE

## 2022-11-28 PROCEDURE — 73030 X-RAY EXAM OF SHOULDER: CPT | Performed by: HOSPITALIST

## 2022-11-28 PROCEDURE — 99239 HOSP IP/OBS DSCHRG MGMT >30: CPT | Performed by: HOSPITALIST

## 2022-11-28 RX ORDER — ALBUTEROL SULFATE 90 UG/1
4 AEROSOL, METERED RESPIRATORY (INHALATION) EVERY 4 HOURS PRN
Qty: 1 EACH | Refills: 0 | Status: SHIPPED | OUTPATIENT
Start: 2022-11-28

## 2022-11-28 RX ORDER — FLUTICASONE FUROATE AND VILANTEROL 100; 25 UG/1; UG/1
1 POWDER RESPIRATORY (INHALATION) DAILY
Qty: 1 EACH | Refills: 0 | Status: SHIPPED | OUTPATIENT
Start: 2022-11-29

## 2022-11-28 RX ORDER — TRAMADOL HYDROCHLORIDE 50 MG/1
50 TABLET ORAL DAILY
Qty: 30 TABLET | Refills: 0 | Status: SHIPPED | OUTPATIENT
Start: 2022-11-28

## 2022-11-28 NOTE — CM/SW NOTE
Ambulance cannot  before 8:15pm.  Patient to be transported to [facility] tomorrow via ambulance which is on will call. Shoulder x-ray to be completed prior to transfer. / to remain available for support and/or discharge planning.      Ayla Ocampo MBA BSN RN [address]  RN Case Manager  [phone]

## 2022-11-28 NOTE — CM/SW NOTE
PMR recommendations were for acute. Choices provided to wife Marvin Rg and she wants patient to go to [facility]. Patient is in agreement. [place] reserved in [facility]. PLAN:  [facility] when medically cleared. / to remain available for support and/or discharge planning.      Hanna Mendietaist JOHANN DELGADON YADIRA Spence RN Case Manager  [phone]

## 2022-11-28 NOTE — DISCHARGE PLANNING
Patient chart reviewed for discharge: Medication Reconciliation completed, Specialist/PCP follow up listed, and disease specific Instructions/Education included in After Visit Summary. Discharge RN notified patient's RN of AVS completion. Per attending, discharge ok with consultants. Patient's RN to notify DC RN if discharge status changes.         Daisy Moyer RN, Discharge Leader [id number]

## 2022-11-28 NOTE — PLAN OF CARE
Problem: CARDIOVASCULAR - ADULT  Goal: Absence of cardiac arrhythmias or at baseline  Description: INTERVENTIONS:  - Continuous cardiac monitoring, monitor vital signs, obtain 12 lead EKG if indicated  - Evaluate effectiveness of antiarrhythmic and heart rate control medications as ordered  - Initiate emergency measures for life threatening arrhythmias  - Monitor electrolytes and administer replacement therapy as ordered  Outcome: Progressing     Problem: RESPIRATORY - ADULT  Goal: Achieves optimal ventilation and oxygenation  Description: INTERVENTIONS:  - Assess for changes in respiratory status  - Assess for changes in mentation and behavior  - Position to facilitate oxygenation and minimize respiratory effort  - Oxygen supplementation based on oxygen saturation or ABGs  - Provide Smoking Cessation handout, if applicable  - Encourage broncho-pulmonary hygiene including cough, deep breathe, Incentive Spirometry  - Assess the need for suctioning and perform as needed  - Assess and instruct to report SOB or any respiratory difficulty  - Respiratory Therapy support as indicated  - Manage/alleviate anxiety  - Monitor for signs/symptoms of CO2 retention  Outcome: Progressing     Problem: METABOLIC/FLUID AND ELECTROLYTES - ADULT  Goal: Electrolytes maintained within normal limits  Description: INTERVENTIONS:  - Monitor labs and rhythm and assess patient for signs and symptoms of electrolyte imbalances  - Administer electrolyte replacement as ordered  - Monitor response to electrolyte replacements, including rhythm and repeat lab results as appropriate  - Fluid restriction as ordered  - Instruct patient on fluid and nutrition restrictions as appropriate  Outcome: Progressing     Problem: SKIN/TISSUE INTEGRITY - ADULT  Goal: Skin integrity remains intact  Description: INTERVENTIONS  - Assess and document risk factors for pressure ulcer development  - Assess and document skin integrity  - Monitor for areas of redness and/or skin breakdown  - Initiate interventions, skin care algorithm/standards of care as needed  Outcome: Progressing

## 2022-11-29 VITALS
RESPIRATION RATE: 17 BRPM | SYSTOLIC BLOOD PRESSURE: 135 MMHG | OXYGEN SATURATION: 91 % | DIASTOLIC BLOOD PRESSURE: 73 MMHG | WEIGHT: 193.5 LBS | TEMPERATURE: 98 F | HEIGHT: 70 IN | BODY MASS INDEX: 27.7 KG/M2 | HEART RATE: 81 BPM

## 2022-11-29 LAB — PHOSPHATE SERPL-MCNC: 2.2 MG/DL (ref 2.5–4.9)

## 2022-11-29 PROCEDURE — 99232 SBSQ HOSP IP/OBS MODERATE 35: CPT | Performed by: INTERNAL MEDICINE

## 2022-11-29 NOTE — CM/SW NOTE
11/29/22 0800   Discharge disposition   Post Acute Care Provider [facility]   Discharge transportation [facility]   MDO for dc, DAILY confirmed w/ [facility] liaison that shoulder xr is done and covid is updated    Liaison requested 1230 admit. Plan  [facility] rehab  [facility] Jyoti Shipman done  RN report [phone]    RN to notify pt/wife of dc time    / to remain available for support and/or discharge planning.      Ferdinand Kevin, RN    Ext [phone]

## 2022-11-29 NOTE — PLAN OF CARE
Problem: Patient Centered Care  Goal: Patient preferences are identified and integrated in the patient's plan of care  Description: Interventions:  - What would you like us to know as we care for you?  \"I want to go home\"  - Provide timely, complete, and accurate information to patient/family  - Incorporate patient and family knowledge, values, beliefs, and cultural backgrounds into the planning and delivery of care  - Encourage patient/family to participate in care and decision-making at the level they choose  - Honor patient and family perspectives and choices  Outcome: Adequate for Discharge     Problem: CARDIOVASCULAR - ADULT  Goal: Absence of cardiac arrhythmias or at baseline  Description: INTERVENTIONS:  - Continuous cardiac monitoring, monitor vital signs, obtain 12 lead EKG if indicated  - Evaluate effectiveness of antiarrhythmic and heart rate control medications as ordered  - Initiate emergency measures for life threatening arrhythmias  - Monitor electrolytes and administer replacement therapy as ordered  Outcome: Adequate for Discharge     Problem: RESPIRATORY - ADULT  Goal: Achieves optimal ventilation and oxygenation  Description: INTERVENTIONS:  - Assess for changes in respiratory status  - Assess for changes in mentation and behavior  - Position to facilitate oxygenation and minimize respiratory effort  - Oxygen supplementation based on oxygen saturation or ABGs  - Provide Smoking Cessation handout, if applicable  - Encourage broncho-pulmonary hygiene including cough, deep breathe, Incentive Spirometry  - Assess the need for suctioning and perform as needed  - Assess and instruct to report SOB or any respiratory difficulty  - Respiratory Therapy support as indicated  - Manage/alleviate anxiety  - Monitor for signs/symptoms of CO2 retention  Outcome: Adequate for Discharge     Problem: METABOLIC/FLUID AND ELECTROLYTES - ADULT  Goal: Electrolytes maintained within normal limits  Description: INTERVENTIONS:  - Monitor labs and rhythm and assess patient for signs and symptoms of electrolyte imbalances  - Administer electrolyte replacement as ordered  - Monitor response to electrolyte replacements, including rhythm and repeat lab results as appropriate  - Fluid restriction as ordered  - Instruct patient on fluid and nutrition restrictions as appropriate  Outcome: Adequate for Discharge     Problem: SKIN/TISSUE INTEGRITY - ADULT  Goal: Skin integrity remains intact  Description: INTERVENTIONS  - Assess and document risk factors for pressure ulcer development  - Assess and document skin integrity  - Monitor for areas of redness and/or skin breakdown  - Initiate interventions, skin care algorithm/standards of care as needed  Outcome: Adequate for Discharge     Problem: Impaired Functional Mobility  Goal: Achieve highest/safest level of mobility/gait  Description: Interventions:  - Assess patient's functional ability and stability  - Promote increasing activity/tolerance for mobility and gait  - Educate and engage patient/family in tolerated activity level and precautions    Outcome: Adequate for Discharge     Problem: SAFETY ADULT - FALL  Goal: Free from fall injury  Description: INTERVENTIONS:  - Assess pt frequently for physical needs  - Identify cognitive and physical deficits and behaviors that affect risk of falls.   - [place] fall precautions as indicated by assessment.  - Educate pt/family on patient safety including physical limitations  - Instruct pt to call for assistance with activity based on assessment  - Modify environment to reduce risk of injury  - Provide assistive devices as appropriate  - Consider OT/PT consult to assist with strengthening/mobility  - Encourage toileting schedule  Outcome: Adequate for Discharge

## 2022-11-29 NOTE — PLAN OF CARE
Pt A/Ox4. On 4L of oxygen but takes out frequently. Electrolytes replaced. Pt reports back pain- scheduled tylenol given. Walk study performed. In chair for most of day. Repeat should xray complete. Pt cleared for DC. Plan to go to [facility] tomorrow via ambulance. Safety precautions in place.      Problem: Patient Centered Care  Goal: Patient preferences are identified and integrated in the patient's plan of care  Description: Interventions:  - What would you like us to know as we care for you?  - Provide timely, complete, and accurate information to patient/family  - Incorporate patient and family knowledge, values, beliefs, and cultural backgrounds into the planning and delivery of care  - Encourage patient/family to participate in care and decision-making at the level they choose  - Honor patient and family perspectives and choices  Outcome: Progressing     Problem: CARDIOVASCULAR - ADULT  Goal: Absence of cardiac arrhythmias or at baseline  Description: INTERVENTIONS:  - Continuous cardiac monitoring, monitor vital signs, obtain 12 lead EKG if indicated  - Evaluate effectiveness of antiarrhythmic and heart rate control medications as ordered  - Initiate emergency measures for life threatening arrhythmias  - Monitor electrolytes and administer replacement therapy as ordered  Outcome: Progressing     Problem: RESPIRATORY - ADULT  Goal: Achieves optimal ventilation and oxygenation  Description: INTERVENTIONS:  - Assess for changes in respiratory status  - Assess for changes in mentation and behavior  - Position to facilitate oxygenation and minimize respiratory effort  - Oxygen supplementation based on oxygen saturation or ABGs  - Provide Smoking Cessation handout, if applicable  - Encourage broncho-pulmonary hygiene including cough, deep breathe, Incentive Spirometry  - Assess the need for suctioning and perform as needed  - Assess and instruct to report SOB or any respiratory difficulty  - Respiratory Therapy support as indicated  - Manage/alleviate anxiety  - Monitor for signs/symptoms of CO2 retention  Outcome: Progressing     Problem: METABOLIC/FLUID AND ELECTROLYTES - ADULT  Goal: Electrolytes maintained within normal limits  Description: INTERVENTIONS:  - Monitor labs and rhythm and assess patient for signs and symptoms of electrolyte imbalances  - Administer electrolyte replacement as ordered  - Monitor response to electrolyte replacements, including rhythm and repeat lab results as appropriate  - Fluid restriction as ordered  - Instruct patient on fluid and nutrition restrictions as appropriate  Outcome: Progressing     Problem: SKIN/TISSUE INTEGRITY - ADULT  Goal: Skin integrity remains intact  Description: INTERVENTIONS  - Assess and document risk factors for pressure ulcer development  - Assess and document skin integrity  - Monitor for areas of redness and/or skin breakdown  - Initiate interventions, skin care algorithm/standards of care as needed  Outcome: Progressing     Problem: Impaired Functional Mobility  Goal: Achieve highest/safest level of mobility/gait  Description: Interventions:  - Assess patient's functional ability and stability  - Promote increasing activity/tolerance for mobility and gait  - Educate and engage patient/family in tolerated activity level and precautions    Outcome: Progressing     Problem: SAFETY ADULT - FALL  Goal: Free from fall injury  Description: INTERVENTIONS:  - Assess pt frequently for physical needs  - Identify cognitive and physical deficits and behaviors that affect risk of falls.   - [place] fall precautions as indicated by assessment.  - Educate pt/family on patient safety including physical limitations  - Instruct pt to call for assistance with activity based on assessment  - Modify environment to reduce risk of injury  - Provide assistive devices as appropriate  - Consider OT/PT consult to assist with strengthening/mobility  - Encourage toileting schedule  Outcome: Progressing

## 2022-11-29 NOTE — PLAN OF CARE
Pt A/Ox4- on 4L of O2. Plan to dc today to [place] at 12:30. Pt reports moderate pain in back and shoulder- scheduled tylenol given. Wife notified of dc. Medically cleared by md yesterday. Teresa Churchill for report- nurse not assigned yet so my name and call back number given. Waiting for callback to give report. AVS and transport paperwork printed- pain script included. Safety precautions in place and call light nearby.      Problem: Patient Centered Care  Goal: Patient preferences are identified and integrated in the patient's plan of care  Description: Interventions:  - What would you like us to know as we care for you?   - Provide timely, complete, and accurate information to patient/family  - Incorporate patient and family knowledge, values, beliefs, and cultural backgrounds into the planning and delivery of care  - Encourage patient/family to participate in care and decision-making at the level they choose  - Honor patient and family perspectives and choices  Outcome: Completed     Problem: CARDIOVASCULAR - ADULT  Goal: Absence of cardiac arrhythmias or at baseline  Description: INTERVENTIONS:  - Continuous cardiac monitoring, monitor vital signs, obtain 12 lead EKG if indicated  - Evaluate effectiveness of antiarrhythmic and heart rate control medications as ordered  - Initiate emergency measures for life threatening arrhythmias  - Monitor electrolytes and administer replacement therapy as ordered  Outcome: Completed     Problem: RESPIRATORY - ADULT  Goal: Achieves optimal ventilation and oxygenation  Description: INTERVENTIONS:  - Assess for changes in respiratory status  - Assess for changes in mentation and behavior  - Position to facilitate oxygenation and minimize respiratory effort  - Oxygen supplementation based on oxygen saturation or ABGs  - Provide Smoking Cessation handout, if applicable  - Encourage broncho-pulmonary hygiene including cough, deep breathe, Incentive Spirometry  - Assess the need for suctioning and perform as needed  - Assess and instruct to report SOB or any respiratory difficulty  - Respiratory Therapy support as indicated  - Manage/alleviate anxiety  - Monitor for signs/symptoms of CO2 retention  Outcome: Completed     Problem: METABOLIC/FLUID AND ELECTROLYTES - ADULT  Goal: Electrolytes maintained within normal limits  Description: INTERVENTIONS:  - Monitor labs and rhythm and assess patient for signs and symptoms of electrolyte imbalances  - Administer electrolyte replacement as ordered  - Monitor response to electrolyte replacements, including rhythm and repeat lab results as appropriate  - Fluid restriction as ordered  - Instruct patient on fluid and nutrition restrictions as appropriate  Outcome: Completed     Problem: SKIN/TISSUE INTEGRITY - ADULT  Goal: Skin integrity remains intact  Description: INTERVENTIONS  - Assess and document risk factors for pressure ulcer development  - Assess and document skin integrity  - Monitor for areas of redness and/or skin breakdown  - Initiate interventions, skin care algorithm/standards of care as needed  Outcome: Completed     Problem: Impaired Functional Mobility  Goal: Achieve highest/safest level of mobility/gait  Description: Interventions:  - Assess patient's functional ability and stability  - Promote increasing activity/tolerance for mobility and gait  - Educate and engage patient/family in tolerated activity level and precautions    Outcome: Completed     Problem: SAFETY ADULT - FALL  Goal: Free from fall injury  Description: INTERVENTIONS:  - Assess pt frequently for physical needs  - Identify cognitive and physical deficits and behaviors that affect risk of falls.   - [place] fall precautions as indicated by assessment.  - Educate pt/family on patient safety including physical limitations  - Instruct pt to call for assistance with activity based on assessment  - Modify environment to reduce risk of injury  - Provide assistive devices as appropriate  - Consider OT/PT consult to assist with strengthening/mobility  - Encourage toileting schedule  Outcome: Completed

## 2022-11-30 ENCOUNTER — PATIENT OUTREACH (OUTPATIENT)
Dept: CASE MANAGEMENT | Age: 82
End: 2022-11-30

## 2022-12-01 ENCOUNTER — TELEPHONE (OUTPATIENT)
Dept: PULMONOLOGY | Facility: CLINIC | Age: 82
End: 2022-12-01

## 2022-12-01 DIAGNOSIS — J44.9 CHRONIC OBSTRUCTIVE PULMONARY DISEASE, UNSPECIFIED COPD TYPE (HCC): Primary | ICD-10-CM

## 2022-12-01 DIAGNOSIS — J69.0 ASPIRATION PNEUMONIA, UNSPECIFIED ASPIRATION PNEUMONIA TYPE, UNSPECIFIED LATERALITY, UNSPECIFIED PART OF LUNG (HCC): ICD-10-CM

## 2022-12-01 NOTE — TELEPHONE ENCOUNTER
Pt's wife is calling to see if there is an alternative to the [facility] that was prescribed for pt. It's about $500 out of pocket for the patient and they wanted something in their budget     Pt was seen in the hospital by Dr. Arash Spencer on 11/23/2022     Pt has trouble in general using inhalers.

## 2022-12-01 NOTE — TELEPHONE ENCOUNTER
Of course. He can call his insurance and ask which of the following is on formulary/covered: Symbicort, Advair HFA, Advair diskus, Dulera, Stiolto Respimat. If he has trouble with inhalers in general, then he might do better with nebulizer instead.

## 2022-12-02 RX ORDER — ALBUTEROL SULFATE 2.5 MG/3ML
2.5 SOLUTION RESPIRATORY (INHALATION) EVERY 6 HOURS PRN
Qty: 120 ML | Refills: 1 | Status: SHIPPED | OUTPATIENT
Start: 2022-12-02

## 2022-12-02 RX ORDER — ALBUTEROL SULFATE 2.5 MG/3ML
2.5 SOLUTION RESPIRATORY (INHALATION) EVERY 6 HOURS PRN
Qty: 120 ML | Refills: 1 | Status: SHIPPED | OUTPATIENT
Start: 2022-12-02 | End: 2022-12-02 | Stop reason: CLARIF

## 2022-12-02 NOTE — TELEPHONE ENCOUNTER
Mary Villa PA-C-spouse very interested in nebulizer instead of inhaler. DME order pended below if agreeable.

## 2022-12-02 NOTE — TELEPHONE ENCOUNTER
Order signed. We can start with albuterol nebs every 6 hours as needed for shortness of breath or wheezing. He should follow-up with Dr. Hari Sanchez or me in office in a few weeks.

## 2022-12-08 ENCOUNTER — TELEPHONE (OUTPATIENT)
Dept: PULMONOLOGY | Facility: CLINIC | Age: 82
End: 2022-12-08

## 2022-12-08 NOTE — TELEPHONE ENCOUNTER
Received fax from [place], [place] form to be filled out for Albuterol. Placed on Dr. Padmini Finney folder for review.

## 2022-12-09 ENCOUNTER — TELEPHONE (OUTPATIENT)
Dept: PULMONOLOGY | Facility: CLINIC | Age: 82
End: 2022-12-09

## 2022-12-12 ENCOUNTER — TELEPHONE (OUTPATIENT)
Dept: INTERNAL MEDICINE CLINIC | Facility: CLINIC | Age: 82
End: 2022-12-12

## 2022-12-12 NOTE — TELEPHONE ENCOUNTER
Please call patient spouse, Lord Ball  Would like to discuss hospice for patient  Patient is being discharged from [facility] will need to be moved   Tasked to nursing

## 2022-12-12 NOTE — TELEPHONE ENCOUNTER
Spoke with Jean Gupta at [place], albuterol was processed through [employer] part [employer] and went through. Nothing further needed from pulmo office at this time.

## 2022-12-12 NOTE — TELEPHONE ENCOUNTER
Please advise - called spouse per hipaa stating patient is at [facility] and they are talking about hospice - she wants to know if he should go into hospice or subacute facility - spouse would like to speak with MD (  patient )  To DR. GONZALES

## 2022-12-12 NOTE — TELEPHONE ENCOUNTER
Addison                  Discussed with Mrs Elena Quick - Mr Elena Quick is completing in-pt at [facility] and will be transferred to subacute rehab for about 5 weeks. I advised no need to decide on hospice as this time as he is making progress, he is awake and oriented and eating. She will call us back in several weeks with an update on his condition.

## 2022-12-15 ENCOUNTER — SNF VISIT (OUTPATIENT)
Dept: INTERNAL MEDICINE CLINIC | Age: 82
End: 2022-12-15

## 2022-12-15 VITALS
WEIGHT: 189 LBS | HEART RATE: 98 BPM | DIASTOLIC BLOOD PRESSURE: 76 MMHG | SYSTOLIC BLOOD PRESSURE: 128 MMHG | TEMPERATURE: 98 F | OXYGEN SATURATION: 95 % | BODY MASS INDEX: 27 KG/M2 | RESPIRATION RATE: 18 BRPM

## 2022-12-15 DIAGNOSIS — G89.29 CHRONIC LEFT HIP PAIN: ICD-10-CM

## 2022-12-15 DIAGNOSIS — M25.552 CHRONIC LEFT HIP PAIN: ICD-10-CM

## 2022-12-15 DIAGNOSIS — S70.02XS CONTUSION OF LEFT HIP, SEQUELA: ICD-10-CM

## 2022-12-15 DIAGNOSIS — N18.31 STAGE 3A CHRONIC KIDNEY DISEASE (HCC): ICD-10-CM

## 2022-12-15 DIAGNOSIS — G81.94 LEFT HEMIPARESIS (HCC): Primary | ICD-10-CM

## 2022-12-15 DIAGNOSIS — G89.29 CHRONIC LEFT SHOULDER PAIN: ICD-10-CM

## 2022-12-15 DIAGNOSIS — R53.1 WEAKNESS: ICD-10-CM

## 2022-12-15 DIAGNOSIS — I69.359 CVA, OLD, HEMIPARESIS (HCC): ICD-10-CM

## 2022-12-15 DIAGNOSIS — S42.92XG: Primary | ICD-10-CM

## 2022-12-15 DIAGNOSIS — R53.81 PHYSICAL DECONDITIONING: ICD-10-CM

## 2022-12-15 DIAGNOSIS — M25.512 CHRONIC LEFT SHOULDER PAIN: ICD-10-CM

## 2022-12-15 DIAGNOSIS — G81.94 LEFT HEMIPARESIS (HCC): ICD-10-CM

## 2022-12-15 DIAGNOSIS — I10 ESSENTIAL HYPERTENSION: ICD-10-CM

## 2022-12-15 PROCEDURE — 99310 SBSQ NF CARE HIGH MDM 45: CPT | Performed by: NURSE PRACTITIONER

## 2022-12-15 PROCEDURE — 1111F DSCHRG MED/CURRENT MED MERGE: CPT | Performed by: NURSE PRACTITIONER

## 2022-12-15 PROCEDURE — 1123F ACP DISCUSS/DSCN MKR DOCD: CPT | Performed by: NURSE PRACTITIONER

## 2022-12-15 PROCEDURE — 1125F AMNT PAIN NOTED PAIN PRSNT: CPT | Performed by: NURSE PRACTITIONER

## 2022-12-15 RX ORDER — ENOXAPARIN SODIUM 100 MG/ML
40 INJECTION SUBCUTANEOUS DAILY
COMMUNITY

## 2022-12-15 RX ORDER — FLUTICASONE PROPIONATE AND SALMETEROL 250; 50 UG/1; UG/1
1 POWDER RESPIRATORY (INHALATION) EVERY 12 HOURS SCHEDULED
COMMUNITY

## 2022-12-15 RX ORDER — CALCIUM CARBONATE 200(500)MG
2 TABLET,CHEWABLE ORAL 3 TIMES DAILY PRN
COMMUNITY

## 2022-12-15 RX ORDER — GUAIFENESIN/DEXTROMETHORPHAN 100-10MG/5
10 SYRUP ORAL 3 TIMES DAILY PRN
COMMUNITY

## 2022-12-15 RX ORDER — LIDOCAINE 4 G/G
2 PATCH TOPICAL EVERY 24 HOURS
COMMUNITY

## 2022-12-15 RX ORDER — BISACODYL 10 MG
10 SUPPOSITORY, RECTAL RECTAL DAILY PRN
COMMUNITY

## 2022-12-16 VITALS
TEMPERATURE: 98 F | DIASTOLIC BLOOD PRESSURE: 76 MMHG | BODY MASS INDEX: 27 KG/M2 | RESPIRATION RATE: 18 BRPM | SYSTOLIC BLOOD PRESSURE: 128 MMHG | OXYGEN SATURATION: 95 % | HEART RATE: 98 BPM | WEIGHT: 189 LBS

## 2022-12-27 ENCOUNTER — TELEPHONE (OUTPATIENT)
Dept: PULMONOLOGY | Facility: CLINIC | Age: 82
End: 2022-12-27

## 2022-12-27 NOTE — TELEPHONE ENCOUNTER
Received [facility] via fax. Placed in [facility] folder for signature.

## 2022-12-28 ENCOUNTER — TELEPHONE (OUTPATIENT)
Dept: INTERNAL MEDICINE CLINIC | Facility: CLINIC | Age: 82
End: 2022-12-28

## 2022-12-28 NOTE — TELEPHONE ENCOUNTER
please recommend ortho for pt on behalf of Ines Gomes,[address] who is not currently in office.  Thank you

## 2022-12-28 NOTE — TELEPHONE ENCOUNTER
Merari Florentino / [employer] is calling for an Orthopedic recommendation  Patient was admitted to [address] [place] [address] on 11/23/22 and told to follow up with an Orthopedic    Please call Merari Florentino with information [phone]

## 2022-12-29 NOTE — TELEPHONE ENCOUNTER
Advise referral to orthopedics, Dr Hanna Shields    [address] [facility]  [address]  [facility]    [phone]    Please call Ravi Le

## 2022-12-29 NOTE — TELEPHONE ENCOUNTER
Spoke with Saundra Raymundo from [employer] provided Dr. Jeremi Jerez below for orthopedics.

## 2023-01-05 PROBLEM — M25.512 ACUTE PAIN OF LEFT SHOULDER: Status: ACTIVE | Noted: 2023-01-05

## 2023-01-05 PROBLEM — I12.9 HYPERTENSIVE CHRONIC KIDNEY DISEASE W STG 1-4/UNSP CHR KDNY: Status: ACTIVE | Noted: 2022-01-01

## 2023-01-05 PROBLEM — Z87.01 HISTORY OF PNEUMONIA: Status: ACTIVE | Noted: 2022-01-01

## 2023-01-05 PROBLEM — S42.202A CLOSED FRACTURE OF LEFT PROXIMAL HUMERUS: Status: ACTIVE | Noted: 2023-01-05

## 2023-01-05 PROBLEM — Z79.891 LONG TERM (CURRENT) USE OF OPIATE ANALGESIC: Status: ACTIVE | Noted: 2022-01-01

## 2023-01-05 PROBLEM — Z91.81 HISTORY OF FALLING: Status: ACTIVE | Noted: 2022-01-01

## 2023-01-18 ENCOUNTER — HOSPITAL ENCOUNTER (OUTPATIENT)
Dept: MRI IMAGING | Facility: HOSPITAL | Age: 83
Discharge: HOME | End: 2023-01-18
Attending: ORTHOPAEDIC SURGERY
Payer: MEDICARE

## 2023-01-18 DIAGNOSIS — S42.202A CLOSED FRACTURE OF PROXIMAL END OF LEFT HUMERUS, UNSPECIFIED FRACTURE MORPHOLOGY, INITIAL ENCOUNTER: ICD-10-CM

## 2023-01-18 DIAGNOSIS — M25.512 ACUTE PAIN OF LEFT SHOULDER: ICD-10-CM

## 2023-01-18 PROCEDURE — 73221 MRI JOINT UPR EXTREM W/O DYE: CPT | Performed by: ORTHOPAEDIC SURGERY

## 2023-01-23 RX ORDER — GABAPENTIN 600 MG/1
300 TABLET ORAL 3 TIMES DAILY
Qty: 180 TABLET | Refills: 3 | Status: CANCELLED | OUTPATIENT
Start: 2023-01-23

## 2023-01-23 NOTE — TELEPHONE ENCOUNTER
Patient last seen 7/2022. Has not been seen post rehab stay for f/u. FD: please call and schedule f/u visit.

## 2023-01-23 NOTE — TELEPHONE ENCOUNTER
New prescription needed for medication that was first prescribed while pt was in [facility]    Gabapentin 600 mg tablets   300 mg 3 X day for radiculopathy pain   Pt getting 1/2 tablet 3 X a day     Pt also has 300 mg capsules with directions taking twice a day morning & evening    Would twice a day be enough?     Please call wife Kalyn Ribeiro to advise [phone]

## 2023-01-23 NOTE — TELEPHONE ENCOUNTER
Wife advises it would be very difficult to get patient out of the house and to the office     Requests call back from RN     [phone]

## 2023-01-24 NOTE — TELEPHONE ENCOUNTER
To FD--    rx was not prescribed by one of our md's and prescribed in rehab. At minimum, pt will need tele/video visit.

## 2023-01-25 ENCOUNTER — TELEPHONE (OUTPATIENT)
Dept: INTERNAL MEDICINE CLINIC | Facility: CLINIC | Age: 83
End: 2023-01-25

## 2023-01-25 ENCOUNTER — VIRTUAL PHONE E/M (OUTPATIENT)
Dept: INTERNAL MEDICINE CLINIC | Facility: CLINIC | Age: 83
End: 2023-01-25

## 2023-01-25 DIAGNOSIS — E03.9 HYPOTHYROIDISM, UNSPECIFIED TYPE: ICD-10-CM

## 2023-01-25 DIAGNOSIS — S42.92XG: ICD-10-CM

## 2023-01-25 DIAGNOSIS — Z86.16 HISTORY OF COVID-19: ICD-10-CM

## 2023-01-25 DIAGNOSIS — Z87.01 HISTORY OF ASPIRATION PNEUMONIA: ICD-10-CM

## 2023-01-25 DIAGNOSIS — N18.31 STAGE 3A CHRONIC KIDNEY DISEASE (HCC): ICD-10-CM

## 2023-01-25 DIAGNOSIS — I69.359 CVA, OLD, HEMIPARESIS (HCC): ICD-10-CM

## 2023-01-25 DIAGNOSIS — I10 ESSENTIAL HYPERTENSION: Primary | ICD-10-CM

## 2023-01-25 PROCEDURE — 99443 PHONE E/M BY PHYS 21-30 MIN: CPT | Performed by: INTERNAL MEDICINE

## 2023-01-25 RX ORDER — GABAPENTIN 300 MG/1
300 CAPSULE ORAL 3 TIMES DAILY
Qty: 90 CAPSULE | Refills: 5 | Status: SHIPPED | OUTPATIENT
Start: 2023-01-25

## 2023-01-25 NOTE — TELEPHONE ENCOUNTER
[facility] called  Patient admitted for [address] yesterday  Will be sending orders for nursing, PT, OT and will plan to add aid to assist with personal care    Pt wife asked if there were any labs needed, if so fax to:  FAX#: [phone] or verbal order     Tasked to nursing

## 2023-01-25 NOTE — TELEPHONE ENCOUNTER
HHC was initiated by outside facility. Possibly subacute rehab as that was documented in previous encounters. To on-call Dr Ellen Cuba: any lab orders?

## 2023-01-25 NOTE — TELEPHONE ENCOUNTER
Pt spouse, Silvana Street called  Virtual appt scheduled with Dr Clair Burns for [facility] at 6:30 to discuss medication refill that was prescribed while patient in [place]  [facility]

## 2023-01-26 PROBLEM — M67.819 TENDINOSIS OF ROTATOR CUFF: Status: ACTIVE | Noted: 2023-01-26

## 2023-01-30 ENCOUNTER — TELEPHONE (OUTPATIENT)
Dept: INTERNAL MEDICINE CLINIC | Facility: CLINIC | Age: 83
End: 2023-01-30

## 2023-01-30 NOTE — TELEPHONE ENCOUNTER
Left voicemail for [facility] nurse. Advised to try labs later this week. If patient prefers going to the lab, asked her to call us back and let us know what labs need to be ordered.

## 2023-01-30 NOTE — TELEPHONE ENCOUNTER
[facility] called  Labs had been ordered for today   Phlebotomist unable to get sample, patient was refusing certain areas  Unable to draw  Nursing will see patient later this week, will ask patient if they prefer to go to a lab?   Tasked to nursing

## 2023-02-02 ENCOUNTER — LAB REQUISITION (OUTPATIENT)
Dept: LAB | Facility: HOSPITAL | Age: 83
End: 2023-02-02
Payer: MEDICARE

## 2023-02-02 DIAGNOSIS — D64.9 ANEMIA, UNSPECIFIED: ICD-10-CM

## 2023-02-02 LAB
ANION GAP SERPL CALC-SCNC: 5 MMOL/L (ref 0–18)
BASOPHILS # BLD AUTO: 0.07 X10(3) UL (ref 0–0.2)
BASOPHILS NFR BLD AUTO: 1.1 %
BUN BLD-MCNC: 11 MG/DL (ref 7–18)
CALCIUM BLD-MCNC: 8.8 MG/DL (ref 8.5–10.1)
CHLORIDE SERPL-SCNC: 112 MMOL/L (ref 98–112)
CO2 SERPL-SCNC: 23 MMOL/L (ref 21–32)
CREAT BLD-MCNC: 0.92 MG/DL
EOSINOPHIL # BLD AUTO: 0.2 X10(3) UL (ref 0–0.7)
EOSINOPHIL NFR BLD AUTO: 3.2 %
ERYTHROCYTE [DISTWIDTH] IN BLOOD BY AUTOMATED COUNT: 15.3 %
GFR SERPLBLD BASED ON 1.73 SQ M-ARVRAT: 84 ML/MIN/1.73M2 (ref 60–?)
GLUCOSE BLD-MCNC: 108 MG/DL (ref 70–99)
HCT VFR BLD AUTO: 30.3 %
HGB BLD-MCNC: 8.9 G/DL
IMM GRANULOCYTES # BLD AUTO: 0.02 X10(3) UL (ref 0–1)
IMM GRANULOCYTES NFR BLD: 0.3 %
LYMPHOCYTES # BLD AUTO: 1.72 X10(3) UL (ref 1–4)
LYMPHOCYTES NFR BLD AUTO: 27.6 %
MCH RBC QN AUTO: 29 PG (ref 26–34)
MCHC RBC AUTO-ENTMCNC: 29.4 G/DL (ref 31–37)
MCV RBC AUTO: 98.7 FL
MONOCYTES # BLD AUTO: 0.74 X10(3) UL (ref 0.1–1)
MONOCYTES NFR BLD AUTO: 11.9 %
NEUTROPHILS # BLD AUTO: 3.49 X10 (3) UL (ref 1.5–7.7)
NEUTROPHILS # BLD AUTO: 3.49 X10(3) UL (ref 1.5–7.7)
NEUTROPHILS NFR BLD AUTO: 55.9 %
OSMOLALITY SERPL CALC.SUM OF ELEC: 290 MOSM/KG (ref 275–295)
PLATELET # BLD AUTO: 257 10(3)UL (ref 150–450)
POTASSIUM SERPL-SCNC: 4 MMOL/L (ref 3.5–5.1)
RBC # BLD AUTO: 3.07 X10(6)UL
SODIUM SERPL-SCNC: 140 MMOL/L (ref 136–145)
WBC # BLD AUTO: 6.2 X10(3) UL (ref 4–11)

## 2023-02-02 PROCEDURE — 80048 BASIC METABOLIC PNL TOTAL CA: CPT

## 2023-02-02 PROCEDURE — 85025 COMPLETE CBC W/AUTO DIFF WBC: CPT

## 2023-02-09 NOTE — CM/SW NOTE
Patient enrolled in the [facility]/Medicare program. Patient will have 3 months from [address] [place] [address] after discharge from [facility]. Patient was enrolled under  .   [facility]/Medicare letter and brochure provided 50 y/o male with hx DM, HLD, GIB admitted with dx GIST now s/p resection. Pt remains NPO with IVF of LR and IVPB fluids. No GI distress at present; hypoactive bowel sounds noted. FS over past 24 hrs 161 - 311 with Admelog ISS coverage. Hb A1C 7.1%. Pt stated following a diabetic diet PTA. Offered to review dietary principles once pt is less medically acute. Pt experiencing pain and deferred review once he is more medically stable. Suggest advancing diet to DASH/TLC, Consistent Carbohydrate once medically appropriate. Pt denies food allergies or issues with chewing/swallowing. RDN services to remain available as needed.

## 2023-02-16 RX ORDER — CLOPIDOGREL BISULFATE 75 MG/1
75 TABLET ORAL DAILY
Qty: 90 TABLET | Refills: 3 | Status: SHIPPED | OUTPATIENT
Start: 2023-02-16

## 2023-02-16 RX ORDER — ATORVASTATIN CALCIUM 40 MG/1
40 TABLET, FILM COATED ORAL NIGHTLY
Qty: 90 TABLET | Refills: 3 | Status: SHIPPED | OUTPATIENT
Start: 2023-02-16

## 2023-03-07 ENCOUNTER — TELEPHONE (OUTPATIENT)
Dept: INTERNAL MEDICINE CLINIC | Facility: CLINIC | Age: 83
End: 2023-03-07

## 2023-03-07 NOTE — TELEPHONE ENCOUNTER
Calvin Edouard from [facility]:    Requesting an order to re-certify patient for Home Health.       # [phone], confidential voicemail

## 2023-03-07 NOTE — TELEPHONE ENCOUNTER
FYI - Provided Verbal Order / \"ok to recertify for [facility] services\" (per protocol)

## 2023-03-13 ENCOUNTER — TELEPHONE (OUTPATIENT)
Dept: INTERNAL MEDICINE CLINIC | Facility: CLINIC | Age: 83
End: 2023-03-13

## 2023-03-13 NOTE — TELEPHONE ENCOUNTER
Miah Lee from [facility] called to report the pt. Is having  increased weakness and lower back pain. She is looking for an order for a UA & Urine Culture.

## 2023-03-15 ENCOUNTER — LAB REQUISITION (OUTPATIENT)
Dept: LAB | Facility: HOSPITAL | Age: 83
End: 2023-03-15
Payer: MEDICARE

## 2023-03-15 DIAGNOSIS — N39.9 DISORDER OF URINARY SYSTEM, UNSPECIFIED: ICD-10-CM

## 2023-03-15 LAB
BILIRUB UR QL: NEGATIVE
CLARITY UR: CLEAR
COLOR UR: YELLOW
GLUCOSE UR-MCNC: NEGATIVE MG/DL
HGB UR QL STRIP.AUTO: NEGATIVE
KETONES UR-MCNC: NEGATIVE MG/DL
LEUKOCYTE ESTERASE UR QL STRIP.AUTO: NEGATIVE
NITRITE UR QL STRIP.AUTO: NEGATIVE
PH UR: 5 [PH] (ref 5–8)
PROT UR-MCNC: NEGATIVE MG/DL
SP GR UR STRIP: 1.01 (ref 1–1.03)
UROBILINOGEN UR STRIP-ACNC: <2
VIT C UR-MCNC: NEGATIVE MG/DL

## 2023-03-15 PROCEDURE — 81003 URINALYSIS AUTO W/O SCOPE: CPT | Performed by: INTERNAL MEDICINE

## 2023-03-15 PROCEDURE — 87086 URINE CULTURE/COLONY COUNT: CPT | Performed by: INTERNAL MEDICINE

## 2023-03-16 ENCOUNTER — TELEPHONE (OUTPATIENT)
Dept: INTERNAL MEDICINE CLINIC | Facility: CLINIC | Age: 83
End: 2023-03-16

## 2023-03-16 NOTE — TELEPHONE ENCOUNTER
Please notify patient/wife that patient's recent urinalysis and urine culture has turned out well. Patient's urinalysis looks good. Patient's urine culture reveals no growth. There is no urinary tract infection at this time. I will forward this message to nursing.   Thank you!!

## 2023-04-11 ENCOUNTER — TELEPHONE (OUTPATIENT)
Dept: INTERNAL MEDICINE CLINIC | Facility: CLINIC | Age: 83
End: 2023-04-11

## 2023-04-11 NOTE — TELEPHONE ENCOUNTER
Donis Cavanaugh patient wife calling for appointment with Dr. Steven Oliveira. Hasn't been seen for several months. Patient at this time is able to get out of house. Wife would like him to see Dr. Radha Catherine sooner than later. No opening until July.     Best call back number  [phone]

## 2023-04-12 NOTE — TELEPHONE ENCOUNTER
.  I have reviewed my schedule for next week. Okay to add patient to my schedule for an office visit next Wednesday, April 19, at 3:30 PM.  I will forward this message to the .   Thank you!!

## 2023-04-19 ENCOUNTER — OFFICE VISIT (OUTPATIENT)
Dept: INTERNAL MEDICINE CLINIC | Facility: CLINIC | Age: 83
End: 2023-04-19

## 2023-04-19 VITALS
OXYGEN SATURATION: 96 % | WEIGHT: 199.81 LBS | TEMPERATURE: 98 F | DIASTOLIC BLOOD PRESSURE: 86 MMHG | BODY MASS INDEX: 28.6 KG/M2 | SYSTOLIC BLOOD PRESSURE: 180 MMHG | HEIGHT: 70 IN | HEART RATE: 76 BPM

## 2023-04-19 DIAGNOSIS — M48.061 SPINAL STENOSIS OF LUMBAR REGION WITHOUT NEUROGENIC CLAUDICATION: ICD-10-CM

## 2023-04-19 DIAGNOSIS — E78.00 HYPERCHOLESTEROLEMIA: ICD-10-CM

## 2023-04-19 DIAGNOSIS — M15.9 PRIMARY OSTEOARTHRITIS INVOLVING MULTIPLE JOINTS: ICD-10-CM

## 2023-04-19 DIAGNOSIS — E03.9 HYPOTHYROIDISM, UNSPECIFIED TYPE: ICD-10-CM

## 2023-04-19 DIAGNOSIS — I69.359 CVA, OLD, HEMIPARESIS (HCC): ICD-10-CM

## 2023-04-19 DIAGNOSIS — N18.31 STAGE 3A CHRONIC KIDNEY DISEASE (HCC): ICD-10-CM

## 2023-04-19 DIAGNOSIS — M54.50 CHRONIC BILATERAL LOW BACK PAIN WITHOUT SCIATICA: ICD-10-CM

## 2023-04-19 DIAGNOSIS — I10 ESSENTIAL HYPERTENSION: Primary | ICD-10-CM

## 2023-04-19 DIAGNOSIS — G81.94 LEFT HEMIPARESIS (HCC): ICD-10-CM

## 2023-04-19 DIAGNOSIS — G89.29 CHRONIC BILATERAL LOW BACK PAIN WITHOUT SCIATICA: ICD-10-CM

## 2023-04-19 PROCEDURE — 1125F AMNT PAIN NOTED PAIN PRSNT: CPT | Performed by: INTERNAL MEDICINE

## 2023-04-19 PROCEDURE — 99214 OFFICE O/P EST MOD 30 MIN: CPT | Performed by: INTERNAL MEDICINE

## 2023-04-19 RX ORDER — TRAMADOL HYDROCHLORIDE 50 MG/1
50 TABLET ORAL 2 TIMES DAILY PRN
Qty: 60 TABLET | Refills: 5 | Status: SHIPPED | OUTPATIENT
Start: 2023-04-19 | End: 2024-04-18

## 2023-04-19 RX ORDER — AMLODIPINE BESYLATE 5 MG/1
5 TABLET ORAL DAILY
Qty: 90 TABLET | Refills: 3 | Status: SHIPPED | OUTPATIENT
Start: 2023-04-19 | End: 2024-04-18

## 2023-04-19 NOTE — PATIENT INSTRUCTIONS
He is to continue his current diet, medication and activity. I will increase patient's amlodipine to 5 mg orally every morning. Patient's wife is to monitor his leg for any sign of leg swelling. We will gradually taper the patient off his gabapentin. He will cut it back to 1 tablet twice a day for 1 week. He will then decrease it to 1 tablet daily for a week and then stop. I will send a prescription for tramadol 50 mg that the patient can take twice a day as necessary for pain. I will give the patient/wife an order for the patient have a CBC, CMP and lipid panel that can be drawn by the visiting nurse when she comes to visit the patient. I will tentatively plan to see the patient back in about 3 months. I will see the patient back sooner as necessary.

## 2023-04-26 ENCOUNTER — TELEPHONE (OUTPATIENT)
Dept: INTERNAL MEDICINE CLINIC | Facility: CLINIC | Age: 83
End: 2023-04-26

## 2023-04-26 NOTE — TELEPHONE ENCOUNTER
[address] called, left voicemail message  Adding additional nursing visit to this week  Wife called this morning requesting nursing assessment, patient is weaker  Please call with any questions   Sobeida Mullins will draw blood at the same time  Tasked to nursing

## 2023-04-27 ENCOUNTER — TELEPHONE (OUTPATIENT)
Dept: INTERNAL MEDICINE CLINIC | Facility: CLINIC | Age: 83
End: 2023-04-27

## 2023-04-27 NOTE — TELEPHONE ENCOUNTER
Please call Tonie Diaz, spouse  Tramadol is not helping with patient pain  Gabapentin was helping somewhat  Please call to advise  Tasked to nursing

## 2023-04-28 ENCOUNTER — TELEPHONE (OUTPATIENT)
Dept: INTERNAL MEDICINE CLINIC | Facility: CLINIC | Age: 83
End: 2023-04-28

## 2023-04-28 ENCOUNTER — LAB REQUISITION (OUTPATIENT)
Dept: LAB | Facility: HOSPITAL | Age: 83
End: 2023-04-28
Payer: MEDICARE

## 2023-04-28 ENCOUNTER — HOSPITAL ENCOUNTER (OUTPATIENT)
Age: 83
Discharge: STILL ADMITTED OR ADMINISTRATIVE | End: 2023-04-28
Payer: MEDICARE

## 2023-04-28 DIAGNOSIS — D72.829 LEUKOCYTOSIS, UNSPECIFIED TYPE: ICD-10-CM

## 2023-04-28 DIAGNOSIS — R05.1 ACUTE COUGH: ICD-10-CM

## 2023-04-28 DIAGNOSIS — D64.9 ANEMIA, UNSPECIFIED: ICD-10-CM

## 2023-04-28 DIAGNOSIS — R53.83 FATIGUE, UNSPECIFIED TYPE: Primary | ICD-10-CM

## 2023-04-28 DIAGNOSIS — D72.829 LEUKOCYTOSIS, UNSPECIFIED TYPE: Primary | ICD-10-CM

## 2023-04-28 LAB
ALBUMIN SERPL-MCNC: 3.6 G/DL (ref 3.4–5)
ALBUMIN/GLOB SERPL: 0.8 {RATIO} (ref 1–2)
ALP LIVER SERPL-CCNC: 99 U/L
ALT SERPL-CCNC: 13 U/L
ANION GAP SERPL CALC-SCNC: 12 MMOL/L (ref 0–18)
AST SERPL-CCNC: 10 U/L (ref 15–37)
BASOPHILS # BLD AUTO: 0.09 X10(3) UL (ref 0–0.2)
BASOPHILS NFR BLD AUTO: 0.6 %
BILIRUB SERPL-MCNC: 0.5 MG/DL (ref 0.1–2)
BUN BLD-MCNC: 28 MG/DL (ref 7–18)
BUN/CREAT SERPL: 19.9 (ref 10–20)
CALCIUM BLD-MCNC: 9.9 MG/DL (ref 8.5–10.1)
CHLORIDE SERPL-SCNC: 110 MMOL/L (ref 98–112)
CHOLEST SERPL-MCNC: 136 MG/DL (ref ?–200)
CO2 SERPL-SCNC: 22 MMOL/L (ref 21–32)
CREAT BLD-MCNC: 1.41 MG/DL
DEPRECATED RDW RBC AUTO: 54.9 FL (ref 35.1–46.3)
EOSINOPHIL # BLD AUTO: 0.13 X10(3) UL (ref 0–0.7)
EOSINOPHIL NFR BLD AUTO: 0.8 %
ERYTHROCYTE [DISTWIDTH] IN BLOOD BY AUTOMATED COUNT: 16 % (ref 11–15)
GFR SERPLBLD BASED ON 1.73 SQ M-ARVRAT: 50 ML/MIN/1.73M2 (ref 60–?)
GLOBULIN PLAS-MCNC: 4.3 G/DL (ref 2.8–4.4)
GLUCOSE BLD-MCNC: 127 MG/DL (ref 70–99)
HCT VFR BLD AUTO: 44.8 %
HDLC SERPL-MCNC: 51 MG/DL (ref 40–59)
HGB BLD-MCNC: 12.8 G/DL
IMM GRANULOCYTES # BLD AUTO: 0.07 X10(3) UL (ref 0–1)
IMM GRANULOCYTES NFR BLD: 0.4 %
LDLC SERPL CALC-MCNC: 67 MG/DL (ref ?–100)
LYMPHOCYTES # BLD AUTO: 1.49 X10(3) UL (ref 1–4)
LYMPHOCYTES NFR BLD AUTO: 9.5 %
MCH RBC QN AUTO: 26.9 PG (ref 26–34)
MCHC RBC AUTO-ENTMCNC: 28.6 G/DL (ref 31–37)
MCV RBC AUTO: 94.3 FL
MONOCYTES # BLD AUTO: 1.33 X10(3) UL (ref 0.1–1)
MONOCYTES NFR BLD AUTO: 8.5 %
NEUTROPHILS # BLD AUTO: 12.51 X10 (3) UL (ref 1.5–7.7)
NEUTROPHILS # BLD AUTO: 12.51 X10(3) UL (ref 1.5–7.7)
NEUTROPHILS NFR BLD AUTO: 80.2 %
NONHDLC SERPL-MCNC: 85 MG/DL (ref ?–130)
OSMOLALITY SERPL CALC.SUM OF ELEC: 305 MOSM/KG (ref 275–295)
PLATELET # BLD AUTO: 198 10(3)UL (ref 150–450)
POTASSIUM SERPL-SCNC: 3.6 MMOL/L (ref 3.5–5.1)
PROT SERPL-MCNC: 7.9 G/DL (ref 6.4–8.2)
RBC # BLD AUTO: 4.75 X10(6)UL
SODIUM SERPL-SCNC: 144 MMOL/L (ref 136–145)
TRIGL SERPL-MCNC: 96 MG/DL (ref 30–149)
VLDLC SERPL CALC-MCNC: 14 MG/DL (ref 0–30)
WBC # BLD AUTO: 15.6 X10(3) UL (ref 4–11)

## 2023-04-28 PROCEDURE — 80053 COMPREHEN METABOLIC PANEL: CPT | Performed by: INTERNAL MEDICINE

## 2023-04-28 PROCEDURE — 80061 LIPID PANEL: CPT | Performed by: INTERNAL MEDICINE

## 2023-04-28 PROCEDURE — 85025 COMPLETE CBC W/AUTO DIFF WBC: CPT | Performed by: INTERNAL MEDICINE

## 2023-04-28 NOTE — TELEPHONE ENCOUNTER
Telephone call to patient's wife. When patient was last in the office we were not sure how effective the gabapentin was for the patient. We elected to try to taper the patient off of the gabapentin and use tramadol for the pain. At this point it appears that the tramadol is not taking care of the pain. I have recommended to the patient's wife that we restart the gabapentin at 300 mg 3 times a day as he was taking before. In fact patient was only taking it twice a day. I have recommended to the patient's wife that he go back on the gabapentin and see how he does with that. She can back off the tramadol and use that as necessary. Wife will keep me updated.

## 2023-04-28 NOTE — TELEPHONE ENCOUNTER
Spoke with wife.  feels wk. Had elevated WBC on today's labs. Pt denies dysuria, fever, or new cough. Pt unable to come to urgent care. May be viral syndrome  Would not start abs unless we have source.   Suggested we obtain UA to r/o UTI  Unable to come for cxr  Wife is agreeable and will get supplies for UA

## 2023-04-28 NOTE — TELEPHONE ENCOUNTER
Reviewed the lab results from 4/28:  - CBC: White blood cell count 15.6, hemoglobin 12.8, ANC 12.51  - CMP: Creatinine 1.41, EGFR 50  -Cholesterol remains well controlled    Left voicemail, advised to call if there is concern for any infectious symptoms such as sinusitis/pneumonia/UTI. None apparent on most recent visit which was a follow-up with routine blood work per Dr. Yin Wang On 4/19. There was a telephone encounter 4/27 with generalized weakness difficulty with transfers. He denied any UTI symptoms at that time, denies dizziness/headache and was improving in terms of his appetite. If he is not having any symptoms and overall doing well, we advised to call back on Monday when the phone lines drawn on again.

## 2023-05-01 LAB
BILIRUB UR QL: NEGATIVE
COLOR UR: YELLOW
GLUCOSE UR-MCNC: NORMAL MG/DL
HGB UR QL STRIP.AUTO: NEGATIVE
KETONES UR-MCNC: NEGATIVE MG/DL
LEUKOCYTE ESTERASE UR QL STRIP.AUTO: NEGATIVE
NITRITE UR QL STRIP.AUTO: NEGATIVE
PH UR: 5.5 [PH] (ref 5–8)
PROT UR-MCNC: 30 MG/DL
SP GR UR STRIP: 1.03 (ref 1–1.03)
UROBILINOGEN UR STRIP-ACNC: NORMAL

## 2023-05-01 PROCEDURE — 81001 URINALYSIS AUTO W/SCOPE: CPT | Performed by: INTERNAL MEDICINE

## 2023-05-01 NOTE — TELEPHONE ENCOUNTER
To Dr. Celso Vega: also refer to other 4/28/23 TE. On-call provider s/w spouse. UA was ordered-yet to be completed.

## 2023-05-02 NOTE — TELEPHONE ENCOUNTER
Phone call to patient's wife. She feels patient is having one of his better days today. He does not have a fever. She is concerned about the elevated WBC count of about 15,000. Patient is not running a fever. I will put an order in the system to have a repeat CBC done tomorrow by the visiting nurse. I will also put an order in for the patient have a chest x-ray PA and lateral which the wife can bring him to the [address] [place] [address] for if he starts to run a fever or get worse. This is because patient does have a cough.

## 2023-05-04 ENCOUNTER — LAB REQUISITION (OUTPATIENT)
Dept: LAB | Facility: HOSPITAL | Age: 83
End: 2023-05-04
Payer: MEDICARE

## 2023-05-04 ENCOUNTER — TELEPHONE (OUTPATIENT)
Dept: INTERNAL MEDICINE CLINIC | Facility: CLINIC | Age: 83
End: 2023-05-04

## 2023-05-04 DIAGNOSIS — D64.9 ANEMIA, UNSPECIFIED: ICD-10-CM

## 2023-05-04 LAB
BASOPHILS # BLD AUTO: 0.08 X10(3) UL (ref 0–0.2)
BASOPHILS NFR BLD AUTO: 0.9 %
DEPRECATED RDW RBC AUTO: 53.5 FL (ref 35.1–46.3)
EOSINOPHIL # BLD AUTO: 0.23 X10(3) UL (ref 0–0.7)
EOSINOPHIL NFR BLD AUTO: 2.5 %
ERYTHROCYTE [DISTWIDTH] IN BLOOD BY AUTOMATED COUNT: 15.9 % (ref 11–15)
HCT VFR BLD AUTO: 40.8 %
HGB BLD-MCNC: 12.1 G/DL
IMM GRANULOCYTES # BLD AUTO: 0.06 X10(3) UL (ref 0–1)
IMM GRANULOCYTES NFR BLD: 0.7 %
LYMPHOCYTES # BLD AUTO: 2.14 X10(3) UL (ref 1–4)
LYMPHOCYTES NFR BLD AUTO: 23.2 %
MCH RBC QN AUTO: 27.1 PG (ref 26–34)
MCHC RBC AUTO-ENTMCNC: 29.7 G/DL (ref 31–37)
MCV RBC AUTO: 91.5 FL
MONOCYTES # BLD AUTO: 0.69 X10(3) UL (ref 0.1–1)
MONOCYTES NFR BLD AUTO: 7.5 %
NEUTROPHILS # BLD AUTO: 6.01 X10 (3) UL (ref 1.5–7.7)
NEUTROPHILS # BLD AUTO: 6.01 X10(3) UL (ref 1.5–7.7)
NEUTROPHILS NFR BLD AUTO: 65.2 %
PLATELET # BLD AUTO: 236 10(3)UL (ref 150–450)
RBC # BLD AUTO: 4.46 X10(6)UL
WBC # BLD AUTO: 9.2 X10(3) UL (ref 4–11)

## 2023-05-04 PROCEDURE — 85025 COMPLETE CBC W/AUTO DIFF WBC: CPT | Performed by: INTERNAL MEDICINE

## 2023-05-05 ENCOUNTER — LAB REQUISITION (OUTPATIENT)
Dept: LAB | Facility: HOSPITAL | Age: 83
End: 2023-05-05
Payer: MEDICARE

## 2023-05-05 ENCOUNTER — LAB REQUISITION (OUTPATIENT)
Dept: LAB | Facility: HOSPITAL | Age: 83
End: 2023-05-05
Attending: INTERNAL MEDICINE
Payer: MEDICARE

## 2023-05-05 DIAGNOSIS — D64.9 ANEMIA, UNSPECIFIED: ICD-10-CM

## 2023-05-05 LAB
BASOPHILS # BLD AUTO: 0.08 X10(3) UL (ref 0–0.2)
BASOPHILS NFR BLD AUTO: 0.8 %
DEPRECATED RDW RBC AUTO: 54.4 FL (ref 35.1–46.3)
EOSINOPHIL # BLD AUTO: 0.24 X10(3) UL (ref 0–0.7)
EOSINOPHIL NFR BLD AUTO: 2.3 %
ERYTHROCYTE [DISTWIDTH] IN BLOOD BY AUTOMATED COUNT: 15.9 % (ref 11–15)
HCT VFR BLD AUTO: 40.2 %
HGB BLD-MCNC: 11.9 G/DL
IMM GRANULOCYTES # BLD AUTO: 0.06 X10(3) UL (ref 0–1)
IMM GRANULOCYTES NFR BLD: 0.6 %
LYMPHOCYTES # BLD AUTO: 2.13 X10(3) UL (ref 1–4)
LYMPHOCYTES NFR BLD AUTO: 20.4 %
MCH RBC QN AUTO: 27.2 PG (ref 26–34)
MCHC RBC AUTO-ENTMCNC: 29.6 G/DL (ref 31–37)
MCV RBC AUTO: 91.8 FL
MONOCYTES # BLD AUTO: 0.89 X10(3) UL (ref 0.1–1)
MONOCYTES NFR BLD AUTO: 8.5 %
NEUTROPHILS # BLD AUTO: 7.05 X10 (3) UL (ref 1.5–7.7)
NEUTROPHILS # BLD AUTO: 7.05 X10(3) UL (ref 1.5–7.7)
NEUTROPHILS NFR BLD AUTO: 67.4 %
PLATELET # BLD AUTO: 240 10(3)UL (ref 150–450)
RBC # BLD AUTO: 4.38 X10(6)UL
WBC # BLD AUTO: 10.5 X10(3) UL (ref 4–11)

## 2023-05-05 PROCEDURE — 85025 COMPLETE CBC W/AUTO DIFF WBC: CPT

## 2023-05-05 NOTE — TELEPHONE ENCOUNTER
Please notify patient/patient's wife that patient's CBC shows that the patient's WBC count is back to normal.  Patient's WBC count is 9000 which is improved from earlier in the week. I will forward this message to nursing.   Thank you!!

## 2023-05-10 ENCOUNTER — TELEPHONE (OUTPATIENT)
Dept: INTERNAL MEDICINE CLINIC | Facility: CLINIC | Age: 83
End: 2023-05-10

## 2023-05-10 NOTE — TELEPHONE ENCOUNTER
Dr Patel Craven order given to [facility] at [facility] for pt re-cert enabling him  to continue receiving RN/PT and bath care at home.

## 2023-06-08 ENCOUNTER — TELEPHONE (OUTPATIENT)
Dept: INTERNAL MEDICINE CLINIC | Facility: CLINIC | Age: 83
End: 2023-06-08

## 2023-06-08 NOTE — TELEPHONE ENCOUNTER
Leslie Garza / Sintia called with FYI   Patient refused visit today 6/8/23  He prefers to wait for his main [facility] RN

## 2023-07-05 ENCOUNTER — TELEPHONE (OUTPATIENT)
Dept: INTERNAL MEDICINE CLINIC | Facility: CLINIC | Age: 83
End: 2023-07-05

## 2023-07-05 NOTE — TELEPHONE ENCOUNTER
Sofia Trejo from [facility]  is calling to let the doctor know that the patient would like to reschedule this weeks appt to the week of 7/9/23.

## 2023-07-05 NOTE — TELEPHONE ENCOUNTER
Spoke with Amanda/BALTAZAR, states pt requested Justin Lindsay RN to come next week instead of this week. Pt will be missing one RN visit. States no action is needed.  KAVYA CODY --

## 2023-07-10 ENCOUNTER — TELEPHONE (OUTPATIENT)
Dept: INTERNAL MEDICINE CLINIC | Facility: CLINIC | Age: 83
End: 2023-07-10

## 2023-07-10 NOTE — TELEPHONE ENCOUNTER
As FYI to ESCOBAR PACHECO - called Jeremiah Lozano from [facility] and gave verbal approval for plan of care per protocol - left on confidential VM

## 2023-07-10 NOTE — TELEPHONE ENCOUNTER
[address] [facility] called  Requesting verbal order to re-certify patient to continue home health services  Tasked to nursing

## 2023-07-28 ENCOUNTER — TELEPHONE (OUTPATIENT)
Dept: INTERNAL MEDICINE CLINIC | Facility: CLINIC | Age: 83
End: 2023-07-28

## 2023-07-28 NOTE — TELEPHONE ENCOUNTER
Telephone call to pt and message left. I left a message that I will call pt/wife next week that I will call pt/wife back to arrange for me to make a house call to see pt.

## 2023-07-28 NOTE — TELEPHONE ENCOUNTER
Patients wife is calling and states the patient is house bound do to not being unable to use the stairs. Patient currently has home health but the wife states he will eventually have to see a doctor. Patients wife is asking how does she go about finding a doctor who does house calls.       Please call and advise

## 2023-07-31 NOTE — PLAN OF CARE
Care Coordination Discharge Plan Summary    Admission Assessment Summary    General Information  Readmission Within the last 30 days: no previous admission in last 30 days  Does patient have a : No  Patient-Specific Goals (include timeframe):      Living Arrangements  Arrived From: home  Current Living Arrangements: home  People in Home: spouse  Home Accessibility: stairs to enter home (Group)  Living Arrangement Comments: 2SH, 2ste, FFSU    Social Determinants of Health - Screenings  Housing Stability  In the last 12 months, was there a time when you were not able to pay the mortgage or rent on time?: No  In the last 12 months, how many places have you lived?: 1  In the last 12 months, was there a time when you did not have a steady place to sleep or slept in a shelter (including now)?: No  Financial Resource Strain     Transportation Needs  In the past 12 months, has lack of transportation kept you from medical appointments or from getting medications?: No  In the past 12 months, has lack of transportation kept you from meetings, work, or from getting things needed for daily living?: No    Functional Status Prior to Admission  Assistive Device/Animal Currently Used at Home: none  Functional Status Comments: IND PTA  IADL Comments: IND PTA    Discharge Needs Assessment    Concerns to be Addressed: denies needs/concerns at this time, no discharge needs identified  Current Discharge Risk:    Anticipated Changes Related to Illness: none    Discharge Plan Summary    Patient Choice          Discharge Plan:  Disposition/Destination: Home  / Home       Connection to Community  Not applicable    Community Resources:      Discharge Transportation:  Is Out of Hospital DNR needed at Discharge: no  Does patient need discharge transport? No      Per encounter with treatment team, pt is medically cleared for discharge home today. No dc needs identified at this time. SW to remain available should any needs  Pt. admitted for aspirated pneumonia on continuous high flow NC @8L. Zosyn IV given @25 mL/ hr. Pt. on soft chew diet w/ thin liquids and no straws. Eccho results showed ejection fraction  50-55%. Plan is for discharge w/ [facility]. Problem: Patient Centered Care  Goal: Patient preferences are identified and integrated in the patient's plan of care  Description: Interventions:  - What would you like us to know as we care for you?  From home with wife, I do have home health services  - Provide timely, complete, and accurate information to patient/family  - Incorporate patient and family knowledge, values, beliefs, and cultural backgrounds into the planning and delivery of care  - Encourage patient/family to participate in care and decision-making at the level they choose  - Honor patient and family perspectives and choices  Outcome: Progressing     Problem: Patient/Family Goals  Goal: Patient/Family Long Term Goal  Description: Patient's Long Term Goal: Regain some of my strength and get back to walking    Interventions:  - PT/OT  - Ambulate as tolerated  - Educate patient on importance of mobility  - Wean O2 as tolerated  - Discharge planning for optimal recovery  - See additional Care Plan goals for specific interventions  Outcome: Progressing  Goal: Patient/Family Short Term Goal  Description: Patient's Short Term Goal: Improve my breathing and no longer have oxygen    Interventions:   - Wean O2  - Ambulate as tolerated  - Monitor vitals  - Incentive spirometer  - IV zosyn  - See additional Care Plan goals for specific interventions  Outcome: Progressing     Problem: RESPIRATORY - ADULT  Goal: Achieves optimal ventilation and oxygenation  Description: INTERVENTIONS:  - Assess for changes in respiratory status  - Assess for changes in mentation and behavior  - Position to facilitate oxygenation and minimize respiratory effort  - Oxygen supplementation based on oxygen saturation or ABGs  - Provide Smoking Cessation arise.    Dispo:  Home   handout, if applicable  - Encourage broncho-pulmonary hygiene including cough, deep breathe, Incentive Spirometry  - Assess the need for suctioning and perform as needed  - Assess and instruct to report SOB or any respiratory difficulty  - Respiratory Therapy support as indicated  - Manage/alleviate anxiety  - Monitor for signs/symptoms of CO2 retention  Outcome: Progressing     Problem: MUSCULOSKELETAL - ADULT  Goal: Return mobility to safest level of function  Description: INTERVENTIONS:  - Assess patient stability and activity tolerance for standing, transferring and ambulating w/ or w/o assistive devices  - Assist with transfers and ambulation using safe patient handling equipment as needed  - Ensure adequate protection for wounds/incisions during mobilization  - Obtain PT/OT consults as needed  - Advance activity as appropriate  - Communicate ordered activity level and limitations with patient/family  Outcome: Progressing     Problem: Impaired Functional Mobility  Goal: Achieve highest/safest level of mobility/gait  Description: Interventions:  - Assess patient's functional ability and stability  - Promote increasing activity/tolerance for mobility and gait  - Educate and engage patient/family in tolerated activity level and precautions  - Recommend use of total lift for transfers  - Recommend patient transfer to bedside chair toward strongest side  Outcome: Progressing     Problem: Impaired Swallowing  Goal: Minimize aspiration risk  Description: Interventions:  - Patient should be alert and upright for all feedings (90 degrees preferred)  - Offer food and liquids at a slow rate  - No straws  - Encourage small bites of food and small sips of liquid  - Offer pills one at a time, crush or deliver with applesauce as needed  - Discontinue feeding and notify MD (or speech pathologist) if coughing or persistent throat clearing or wet/gurgly vocal quality is noted  Outcome: Progressing     Problem: PAIN - ADULT  Goal: Verbalizes/displays adequate comfort level or patient's stated pain goal  Description: INTERVENTIONS:  - Encourage pt to monitor pain and request assistance  - Assess pain using appropriate pain scale  - Administer analgesics based on type and severity of pain and evaluate response  - Implement non-pharmacological measures as appropriate and evaluate response  - Consider cultural and social influences on pain and pain management  - Manage/alleviate anxiety  - Utilize distraction and/or relaxation techniques  - Monitor for opioid side effects  - Notify MD/LIP if interventions unsuccessful or patient reports new pain  - Anticipate increased pain with activity and pre-medicate as appropriate  Outcome: Progressing     Problem: SAFETY ADULT - FALL  Goal: Free from fall injury  Description: INTERVENTIONS:  - Assess pt frequently for physical needs  - Identify cognitive and physical deficits and behaviors that affect risk of falls.   - [place] fall precautions as indicated by assessment.  - Educate pt/family on patient safety including physical limitations  - Instruct pt to call for assistance with activity based on assessment  - Modify environment to reduce risk of injury  - Provide assistive devices as appropriate  - Consider OT/PT consult to assist with strengthening/mobility  - Encourage toileting schedule  Outcome: Progressing

## 2023-08-15 RX ORDER — LEVOTHYROXINE SODIUM 0.1 MG/1
TABLET ORAL
Qty: 90 TABLET | Refills: 3 | Status: SHIPPED | OUTPATIENT
Start: 2023-08-15

## 2023-08-15 NOTE — TELEPHONE ENCOUNTER
Refill request is for a maintenance medication and has met the criteria specified in the Ambulatory Medication Refill Standing Order for eligibility, visits, laboratory, alerts and was sent to the requested pharmacy.     Requested Prescriptions     Signed Prescriptions Disp Refills    LEVOTHYROXINE 100 MCG Oral Tab 90 tablet 3     Sig: TAKE 1 TABLET(100 MCG) BY MOUTH DAILY     Authorizing Provider: Mitali Ponce     Ordering User: Scottie White

## 2023-09-05 RX ORDER — GABAPENTIN 300 MG/1
300 CAPSULE ORAL 3 TIMES DAILY
Qty: 270 CAPSULE | Refills: 3 | Status: SHIPPED | OUTPATIENT
Start: 2023-09-05

## 2023-09-05 NOTE — TELEPHONE ENCOUNTER
Refill request is for a maintenance medication and has met the criteria specified in the Ambulatory Medication Refill Standing Order for eligibility, visits, laboratory, alerts and was sent to the requested pharmacy. Requested Prescriptions     Signed Prescriptions Disp Refills    gabapentin 300 MG Oral Cap 270 capsule 3     Sig: Take 1 capsule (300 mg total) by mouth 3 (three) times daily.      Authorizing Provider: Sunil Main     Ordering User: Ronen Murray

## 2023-09-06 ENCOUNTER — TELEPHONE (OUTPATIENT)
Dept: INTERNAL MEDICINE CLINIC | Facility: CLINIC | Age: 83
End: 2023-09-06

## 2023-09-06 NOTE — TELEPHONE ENCOUNTER
[address] [facility] called  Requesting order to re-certify patient for [address]  Tasked to nursing

## 2023-09-06 NOTE — TELEPHONE ENCOUNTER
As FYI to DR. ESCOBAR Lees from [facility] and gave verbal approval for plan of care per protocol

## 2023-09-13 ENCOUNTER — OFFICE VISIT (OUTPATIENT)
Dept: INTERNAL MEDICINE CLINIC | Facility: CLINIC | Age: 83
End: 2023-09-13

## 2023-09-13 VITALS
HEIGHT: 70 IN | SYSTOLIC BLOOD PRESSURE: 140 MMHG | BODY MASS INDEX: 29.92 KG/M2 | HEART RATE: 76 BPM | WEIGHT: 209 LBS | OXYGEN SATURATION: 95 % | DIASTOLIC BLOOD PRESSURE: 60 MMHG | TEMPERATURE: 98 F

## 2023-09-13 DIAGNOSIS — R53.81 PHYSICAL DECONDITIONING: ICD-10-CM

## 2023-09-13 DIAGNOSIS — Z87.01 HISTORY OF ASPIRATION PNEUMONIA: ICD-10-CM

## 2023-09-13 DIAGNOSIS — N18.31 STAGE 3A CHRONIC KIDNEY DISEASE (HCC): ICD-10-CM

## 2023-09-13 DIAGNOSIS — E03.9 HYPOTHYROIDISM, UNSPECIFIED TYPE: ICD-10-CM

## 2023-09-13 DIAGNOSIS — G81.94 LEFT HEMIPARESIS (HCC): ICD-10-CM

## 2023-09-13 DIAGNOSIS — M15.9 PRIMARY OSTEOARTHRITIS INVOLVING MULTIPLE JOINTS: ICD-10-CM

## 2023-09-13 DIAGNOSIS — M54.50 CHRONIC BILATERAL LOW BACK PAIN WITHOUT SCIATICA: ICD-10-CM

## 2023-09-13 DIAGNOSIS — G89.29 CHRONIC BILATERAL LOW BACK PAIN WITHOUT SCIATICA: ICD-10-CM

## 2023-09-13 DIAGNOSIS — I10 ESSENTIAL HYPERTENSION: ICD-10-CM

## 2023-09-13 DIAGNOSIS — I69.359 CVA, OLD, HEMIPARESIS (HCC): ICD-10-CM

## 2023-09-13 DIAGNOSIS — E78.00 HYPERCHOLESTEROLEMIA: ICD-10-CM

## 2023-09-13 DIAGNOSIS — R53.1 WEAKNESS: Primary | ICD-10-CM

## 2023-09-13 DIAGNOSIS — M48.061 SPINAL STENOSIS OF LUMBAR REGION WITHOUT NEUROGENIC CLAUDICATION: ICD-10-CM

## 2023-09-13 PROCEDURE — 99214 OFFICE O/P EST MOD 30 MIN: CPT | Performed by: INTERNAL MEDICINE

## 2023-09-13 PROCEDURE — 1125F AMNT PAIN NOTED PAIN PRSNT: CPT | Performed by: INTERNAL MEDICINE

## 2023-09-14 ENCOUNTER — TELEPHONE (OUTPATIENT)
Dept: INTERNAL MEDICINE CLINIC | Facility: CLINIC | Age: 83
End: 2023-09-14

## 2023-09-19 ENCOUNTER — LAB REQUISITION (OUTPATIENT)
Dept: LAB | Facility: HOSPITAL | Age: 83
End: 2023-09-19
Payer: MEDICARE

## 2023-09-19 DIAGNOSIS — I12.9 HYPERTENSIVE CHRONIC KIDNEY DISEASE WITH STAGE 1 THROUGH STAGE 4 CHRONIC KIDNEY DISEASE, OR UNSPECIFIED CHRONIC KIDNEY DISEASE: ICD-10-CM

## 2023-09-19 LAB
ALBUMIN SERPL-MCNC: 3.3 G/DL (ref 3.4–5)
ALBUMIN/GLOB SERPL: 0.8 {RATIO} (ref 1–2)
ALP LIVER SERPL-CCNC: 114 U/L
ALT SERPL-CCNC: 13 U/L
ANION GAP SERPL CALC-SCNC: 10 MMOL/L (ref 0–18)
AST SERPL-CCNC: 16 U/L (ref 15–37)
BASOPHILS # BLD AUTO: 0.06 X10(3) UL (ref 0–0.2)
BASOPHILS NFR BLD AUTO: 0.6 %
BILIRUB SERPL-MCNC: 0.4 MG/DL (ref 0.1–2)
BUN BLD-MCNC: 18 MG/DL (ref 7–18)
CALCIUM BLD-MCNC: 8.9 MG/DL (ref 8.5–10.1)
CHLORIDE SERPL-SCNC: 109 MMOL/L (ref 98–112)
CHOLEST SERPL-MCNC: 128 MG/DL (ref ?–200)
CO2 SERPL-SCNC: 21 MMOL/L (ref 21–32)
CREAT BLD-MCNC: 1.11 MG/DL
EGFRCR SERPLBLD CKD-EPI 2021: 66 ML/MIN/1.73M2 (ref 60–?)
EOSINOPHIL # BLD AUTO: 0.23 X10(3) UL (ref 0–0.7)
EOSINOPHIL NFR BLD AUTO: 2.5 %
ERYTHROCYTE [DISTWIDTH] IN BLOOD BY AUTOMATED COUNT: 14.3 %
GLOBULIN PLAS-MCNC: 4.2 G/DL (ref 2.8–4.4)
GLUCOSE BLD-MCNC: 101 MG/DL (ref 70–99)
HCT VFR BLD AUTO: 40.7 %
HDLC SERPL-MCNC: 35 MG/DL (ref 40–59)
HGB BLD-MCNC: 12.8 G/DL
IMM GRANULOCYTES # BLD AUTO: 0.04 X10(3) UL (ref 0–1)
IMM GRANULOCYTES NFR BLD: 0.4 %
LDLC SERPL CALC-MCNC: 74 MG/DL (ref ?–100)
LYMPHOCYTES # BLD AUTO: 1.84 X10(3) UL (ref 1–4)
LYMPHOCYTES NFR BLD AUTO: 19.9 %
MCH RBC QN AUTO: 29.5 PG (ref 26–34)
MCHC RBC AUTO-ENTMCNC: 31.4 G/DL (ref 31–37)
MCV RBC AUTO: 93.8 FL
MONOCYTES # BLD AUTO: 0.91 X10(3) UL (ref 0.1–1)
MONOCYTES NFR BLD AUTO: 9.8 %
NEUTROPHILS # BLD AUTO: 6.17 X10 (3) UL (ref 1.5–7.7)
NEUTROPHILS # BLD AUTO: 6.17 X10(3) UL (ref 1.5–7.7)
NEUTROPHILS NFR BLD AUTO: 66.8 %
NONHDLC SERPL-MCNC: 93 MG/DL (ref ?–130)
OSMOLALITY SERPL CALC.SUM OF ELEC: 292 MOSM/KG (ref 275–295)
PLATELET # BLD AUTO: 195 10(3)UL (ref 150–450)
POTASSIUM SERPL-SCNC: 4.3 MMOL/L (ref 3.5–5.1)
PROT SERPL-MCNC: 7.5 G/DL (ref 6.4–8.2)
RBC # BLD AUTO: 4.34 X10(6)UL
SODIUM SERPL-SCNC: 140 MMOL/L (ref 136–145)
TRIGL SERPL-MCNC: 99 MG/DL (ref 30–149)
TSI SER-ACNC: 1.57 MIU/ML (ref 0.36–3.74)
VLDLC SERPL CALC-MCNC: 15 MG/DL (ref 0–30)
WBC # BLD AUTO: 9.3 X10(3) UL (ref 4–11)

## 2023-09-19 PROCEDURE — 85025 COMPLETE CBC W/AUTO DIFF WBC: CPT | Performed by: INTERNAL MEDICINE

## 2023-09-19 PROCEDURE — 84443 ASSAY THYROID STIM HORMONE: CPT | Performed by: INTERNAL MEDICINE

## 2023-09-19 PROCEDURE — 80061 LIPID PANEL: CPT | Performed by: INTERNAL MEDICINE

## 2023-09-19 PROCEDURE — 80053 COMPREHEN METABOLIC PANEL: CPT | Performed by: INTERNAL MEDICINE

## 2023-09-27 ENCOUNTER — TELEPHONE (OUTPATIENT)
Dept: INTERNAL MEDICINE CLINIC | Facility: CLINIC | Age: 83
End: 2023-09-27

## 2023-09-27 NOTE — TELEPHONE ENCOUNTER
Patients wife came in office. Patient had labs done a couple weeks ago and has not heard about results.  Patients wife would like to be called to discuss the results    #[phone]

## 2023-09-27 NOTE — TELEPHONE ENCOUNTER
Phone call to patient's wife to discuss patient's recent blood test.  Patient had a CBC which showed him to have a very mild anemia which is improved from before. Patient's CMP had an FBS of 101 was otherwise normal.  Patient's lipid panel turned out well patient's TSH was normal.  Patient's blood test have turned out well. Patient still has issues at home but is currently stable. Patient's wife does a good job with keeping on top of them. She will call me back if she has more questions or problems.

## 2023-09-29 ENCOUNTER — TELEPHONE (OUTPATIENT)
Dept: INTERNAL MEDICINE CLINIC | Facility: CLINIC | Age: 83
End: 2023-09-29

## 2023-09-29 NOTE — TELEPHONE ENCOUNTER
Pat from [place] [address] is calling and states the patients wife called and asked if a nurse could come out. Nurse states she was told the patient is coughing more and has increased fatigue . Nurse is requesting to add an nurse visit to assess the patient this week. Ok to leave a verbal on voicemail.     Please call Pat at [phone]

## 2023-10-04 ENCOUNTER — TELEPHONE (OUTPATIENT)
Dept: INTERNAL MEDICINE CLINIC | Facility: CLINIC | Age: 83
End: 2023-10-04

## 2023-10-04 NOTE — TELEPHONE ENCOUNTER
Please call patient spouse  Was order rec'd from [facility] for hospital bed for patient  It is important that they get that as soon as possible  Tasked to nursing

## 2023-10-04 NOTE — TELEPHONE ENCOUNTER
Do not see anything documented in chart review/encounters. Maybe they sent a fax?     To Dr. Junior Montoya--

## 2023-10-04 NOTE — TELEPHONE ENCOUNTER
To Dr. Be Crawley with [address] [facility]. Pat saw patient on Saturday and Today. Overall, patient is the same since Saturday (Not better, not worse) -- he is just generally weak/fatigued and requiring assistance with any activity. No nasal/sinus congestion; no fever/chills/night sweats. No sore throat. No audible wheezing. Pat reports left lung base was crackly, but otherwise lung sounds were diminished. Patient has baseline dry cough (no changes in cough). SpO2 93% on Room air. No pain/burning while urinating, increased frequency or urgency, urinary retention, blood in urine, pressure/cramping/bloating in the groin/lower abdomen/bladder, cloudy urine, change in odor of urine, or lower back/flank pain. Pt is eating and drinking fluids. Pt alert and oriented. Nurse Stewart advised Urgent Care evaluation, but patient has such a difficult time with transportation. Nurse Francesca Avelar inquiring if MD would like any additional testing -- urinalysis/urine culture, home chest x-ray, etc.    Please advise. ([phone] faxin order for hospital bed and home aid to assist with ADLs).

## 2023-10-04 NOTE — TELEPHONE ENCOUNTER
Pat from [place] [address] is calling to let the doctor know that the patient has had increased weakness and fatigue in the last 5 days. Patient also has crackling in the lung bases. Nurse is asking how to precede. Nurse is going to put in a order request for a home health aid for bathing. Nurse states the patient is struggling.      Please advise

## 2023-10-05 ENCOUNTER — APPOINTMENT (OUTPATIENT)
Dept: GENERAL RADIOLOGY | Facility: HOSPITAL | Age: 83
End: 2023-10-05
Attending: EMERGENCY MEDICINE
Payer: MEDICARE

## 2023-10-05 ENCOUNTER — APPOINTMENT (OUTPATIENT)
Dept: CT IMAGING | Facility: HOSPITAL | Age: 83
End: 2023-10-05
Attending: EMERGENCY MEDICINE
Payer: MEDICARE

## 2023-10-05 ENCOUNTER — HOSPITAL ENCOUNTER (INPATIENT)
Facility: HOSPITAL | Age: 83
LOS: 6 days | Discharge: NURSING, REHAB OR LONG-TERM CARE | End: 2023-10-11
Attending: EMERGENCY MEDICINE | Admitting: HOSPITALIST
Payer: MEDICARE

## 2023-10-05 DIAGNOSIS — N17.9 AKI (ACUTE KIDNEY INJURY) (HCC): ICD-10-CM

## 2023-10-05 DIAGNOSIS — R09.02 HYPOXIA: Primary | ICD-10-CM

## 2023-10-05 DIAGNOSIS — E87.0 HYPERNATREMIA: ICD-10-CM

## 2023-10-05 DIAGNOSIS — J69.0 ASPIRATION PNEUMONITIS (HCC): ICD-10-CM

## 2023-10-05 LAB
ALBUMIN SERPL-MCNC: 3.2 G/DL (ref 3.4–5)
ALBUMIN/GLOB SERPL: 0.6 {RATIO} (ref 1–2)
ALP LIVER SERPL-CCNC: 104 U/L
ALT SERPL-CCNC: 17 U/L
ANION GAP SERPL CALC-SCNC: 8 MMOL/L (ref 0–18)
AST SERPL-CCNC: 11 U/L (ref 15–37)
ATRIAL RATE: 100 BPM
BASOPHILS # BLD AUTO: 0.1 X10(3) UL (ref 0–0.2)
BASOPHILS NFR BLD AUTO: 0.8 %
BILIRUB SERPL-MCNC: 0.3 MG/DL (ref 0.1–2)
BILIRUB UR QL: NEGATIVE
BUN BLD-MCNC: 20 MG/DL (ref 7–18)
BUN/CREAT SERPL: 14.2 (ref 10–20)
CALCIUM BLD-MCNC: 9.8 MG/DL (ref 8.5–10.1)
CHLORIDE SERPL-SCNC: 113 MMOL/L (ref 98–112)
CLARITY UR: CLEAR
CO2 SERPL-SCNC: 26 MMOL/L (ref 21–32)
CREAT BLD-MCNC: 1.41 MG/DL
D DIMER PPP FEU-MCNC: 3.93 UG/ML FEU (ref ?–0.82)
DEPRECATED RDW RBC AUTO: 50.4 FL (ref 35.1–46.3)
EGFRCR SERPLBLD CKD-EPI 2021: 50 ML/MIN/1.73M2 (ref 60–?)
EOSINOPHIL # BLD AUTO: 0.24 X10(3) UL (ref 0–0.7)
EOSINOPHIL NFR BLD AUTO: 1.9 %
ERYTHROCYTE [DISTWIDTH] IN BLOOD BY AUTOMATED COUNT: 14.3 % (ref 11–15)
FLUAV + FLUBV RNA SPEC NAA+PROBE: NEGATIVE
FLUAV + FLUBV RNA SPEC NAA+PROBE: NEGATIVE
GLOBULIN PLAS-MCNC: 5.7 G/DL (ref 2.8–4.4)
GLUCOSE BLD-MCNC: 117 MG/DL (ref 70–99)
GLUCOSE UR-MCNC: NORMAL MG/DL
HCT VFR BLD AUTO: 42.9 %
HGB BLD-MCNC: 13.3 G/DL
HGB UR QL STRIP.AUTO: NEGATIVE
IMM GRANULOCYTES # BLD AUTO: 0.07 X10(3) UL (ref 0–1)
IMM GRANULOCYTES NFR BLD: 0.6 %
KETONES UR-MCNC: NEGATIVE MG/DL
LACTATE SERPL-SCNC: 1.6 MMOL/L (ref 0.4–2)
LACTATE SERPL-SCNC: 2.1 MMOL/L (ref 0.4–2)
LEUKOCYTE ESTERASE UR QL STRIP.AUTO: NEGATIVE
LYMPHOCYTES # BLD AUTO: 2.19 X10(3) UL (ref 1–4)
LYMPHOCYTES NFR BLD AUTO: 17.4 %
MCH RBC QN AUTO: 29.5 PG (ref 26–34)
MCHC RBC AUTO-ENTMCNC: 31 G/DL (ref 31–37)
MCV RBC AUTO: 95.1 FL
MONOCYTES # BLD AUTO: 1.08 X10(3) UL (ref 0.1–1)
MONOCYTES NFR BLD AUTO: 8.6 %
NEUTROPHILS # BLD AUTO: 8.91 X10 (3) UL (ref 1.5–7.7)
NEUTROPHILS # BLD AUTO: 8.91 X10(3) UL (ref 1.5–7.7)
NEUTROPHILS NFR BLD AUTO: 70.7 %
NITRITE UR QL STRIP.AUTO: NEGATIVE
NT-PROBNP SERPL-MCNC: 142 PG/ML (ref ?–450)
OSMOLALITY SERPL CALC.SUM OF ELEC: 308 MOSM/KG (ref 275–295)
P AXIS: 69 DEGREES
P-R INTERVAL: 156 MS
PH UR: 5.5 [PH] (ref 5–8)
PLATELET # BLD AUTO: 287 10(3)UL (ref 150–450)
POTASSIUM SERPL-SCNC: 3.6 MMOL/L (ref 3.5–5.1)
PROCALCITONIN SERPL-MCNC: 0.08 NG/ML (ref ?–0.16)
PROT SERPL-MCNC: 8.9 G/DL (ref 6.4–8.2)
PROT UR-MCNC: 30 MG/DL
Q-T INTERVAL: 384 MS
QRS DURATION: 138 MS
QTC CALCULATION (BEZET): 495 MS
R AXIS: -61 DEGREES
RBC # BLD AUTO: 4.51 X10(6)UL
RSV RNA SPEC NAA+PROBE: NEGATIVE
SARS-COV-2 RNA RESP QL NAA+PROBE: NOT DETECTED
SODIUM SERPL-SCNC: 147 MMOL/L (ref 136–145)
SP GR UR STRIP: 1.02 (ref 1–1.03)
T AXIS: 65 DEGREES
UROBILINOGEN UR STRIP-ACNC: NORMAL
VENTRICULAR RATE: 100 BPM
WBC # BLD AUTO: 12.6 X10(3) UL (ref 4–11)

## 2023-10-05 PROCEDURE — 73030 X-RAY EXAM OF SHOULDER: CPT | Performed by: EMERGENCY MEDICINE

## 2023-10-05 PROCEDURE — 71260 CT THORAX DX C+: CPT | Performed by: EMERGENCY MEDICINE

## 2023-10-05 PROCEDURE — 99223 1ST HOSP IP/OBS HIGH 75: CPT | Performed by: HOSPITALIST

## 2023-10-05 PROCEDURE — 71045 X-RAY EXAM CHEST 1 VIEW: CPT | Performed by: EMERGENCY MEDICINE

## 2023-10-05 RX ORDER — ASPIRIN 81 MG/1
81 TABLET ORAL DAILY
Status: DISCONTINUED | OUTPATIENT
Start: 2023-10-05 | End: 2023-10-11

## 2023-10-05 RX ORDER — DEXTROSE AND SODIUM CHLORIDE 5; .45 G/100ML; G/100ML
INJECTION, SOLUTION INTRAVENOUS CONTINUOUS
Status: DISCONTINUED | OUTPATIENT
Start: 2023-10-05 | End: 2023-10-09

## 2023-10-05 RX ORDER — METOCLOPRAMIDE HYDROCHLORIDE 5 MG/ML
10 INJECTION INTRAMUSCULAR; INTRAVENOUS EVERY 8 HOURS PRN
Status: DISCONTINUED | OUTPATIENT
Start: 2023-10-05 | End: 2023-10-11

## 2023-10-05 RX ORDER — CLOPIDOGREL BISULFATE 75 MG/1
75 TABLET ORAL DAILY
Status: DISCONTINUED | OUTPATIENT
Start: 2023-10-05 | End: 2023-10-11

## 2023-10-05 RX ORDER — GABAPENTIN 300 MG/1
300 CAPSULE ORAL 3 TIMES DAILY
Status: DISCONTINUED | OUTPATIENT
Start: 2023-10-05 | End: 2023-10-11

## 2023-10-05 RX ORDER — HEPARIN SODIUM 5000 [USP'U]/ML
5000 INJECTION, SOLUTION INTRAVENOUS; SUBCUTANEOUS EVERY 12 HOURS SCHEDULED
Status: DISCONTINUED | OUTPATIENT
Start: 2023-10-05 | End: 2023-10-11

## 2023-10-05 RX ORDER — AMLODIPINE BESYLATE 5 MG/1
5 TABLET ORAL DAILY
Status: DISCONTINUED | OUTPATIENT
Start: 2023-10-05 | End: 2023-10-11

## 2023-10-05 RX ORDER — ONDANSETRON 2 MG/ML
4 INJECTION INTRAMUSCULAR; INTRAVENOUS EVERY 6 HOURS PRN
Status: DISCONTINUED | OUTPATIENT
Start: 2023-10-05 | End: 2023-10-11

## 2023-10-05 RX ORDER — ATORVASTATIN CALCIUM 40 MG/1
40 TABLET, FILM COATED ORAL NIGHTLY
Status: DISCONTINUED | OUTPATIENT
Start: 2023-10-05 | End: 2023-10-11

## 2023-10-05 RX ORDER — ACETAMINOPHEN 500 MG
500 TABLET ORAL EVERY 4 HOURS PRN
Status: DISCONTINUED | OUTPATIENT
Start: 2023-10-05 | End: 2023-10-11

## 2023-10-05 RX ORDER — BENZONATATE 200 MG/1
200 CAPSULE ORAL 3 TIMES DAILY PRN
Status: DISCONTINUED | OUTPATIENT
Start: 2023-10-05 | End: 2023-10-11

## 2023-10-05 RX ORDER — LEVOTHYROXINE SODIUM 0.1 MG/1
100 TABLET ORAL
Status: DISCONTINUED | OUTPATIENT
Start: 2023-10-05 | End: 2023-10-11

## 2023-10-05 NOTE — CM/SW NOTE
MDO to SW for [facility]. Pt is confirmed current w/[facility] (RN/PT). ANN MARIE entered. SW/CM to remain available for support and/or discharge planning.       EDWIN Tom, [facility]  [employer]   BZO:#[id number]

## 2023-10-05 NOTE — TELEPHONE ENCOUNTER
Noted.  I will check my inbox for any orders that I need to sign for the patient to get a hospital bed. However, on another thread today I have recommended the patient go to the hospital emergency room tomorrow for evaluation of his weakness. Oftentimes when patient develops profound weakness associate with a cough he has aspiration pneumonia and needs to be hospitalized.

## 2023-10-05 NOTE — ED QUICK NOTES
Orders for admission, patient is aware of plan and ready to go upstairs. Any questions, please call ED RN trupti at extension [phone].      Patient Covid vaccination status: Fully vaccinated     COVID Test Ordered in ED: SARS-CoV-2/Flu A and B/RSV by PCR (GeneXpert)    COVID Suspicion at Admission: N/A    Running Infusions:      Mental Status/LOC at time of transport: AXOX4    Other pertinent information:   CIWA score: N/A   NIH score:  N/A

## 2023-10-05 NOTE — ED QUICK NOTES
Patient straight catheterized for urine sample, had hard time tolerating procedure due to discomfort, verbal reassurance provided;after catheter removed pt continued to urinate onto bed, then attempted to changed soiled linen with urine underneath patient but pt refused to turn onto right arm; unable to change soiled linen pt aware if not allowing staff to assist turning him that he will remain on soiled linen.

## 2023-10-05 NOTE — ED INITIAL ASSESSMENT (HPI)
Patient to ED per PMD advised to come in worried about PNA development, +crackles low O2 89-90% with EMS, 96% with 4L NC en route and pt hx CVA, Stroke with left sided weakness. AXOX4 and at baseline. No SOB/no CP, denies fevers at home.

## 2023-10-05 NOTE — PLAN OF CARE
Problem: Patient Centered Care  Goal: Patient preferences are identified and integrated in the patient's plan of care  Description: Interventions:  - What would you like us to know as we care for you?   - Provide timely, complete, and accurate information to patient/family  - Incorporate patient and family knowledge, values, beliefs, and cultural backgrounds into the planning and delivery of care  - Encourage patient/family to participate in care and decision-making at the level they choose  - Honor patient and family perspectives and choices  Outcome: Progressing     Problem: Patient/Family Goals  Goal: Patient/Family Long Term Goal  Description: Patient's Long Term Goal:     Interventions:  -   - See additional Care Plan goals for specific interventions  Outcome: Progressing  Goal: Patient/Family Short Term Goal  Description: Patient's Short Term Goal:     Interventions:   -   - See additional Care Plan goals for specific interventions  Outcome: Progressing     Problem: CARDIOVASCULAR - ADULT  Goal: Maintains optimal cardiac output and hemodynamic stability  Description: INTERVENTIONS:  - Monitor vital signs, rhythm, and trends  - Monitor for bleeding, hypotension and signs of decreased cardiac output  - Evaluate effectiveness of vasoactive medications to optimize hemodynamic stability  - Monitor arterial and/or venous puncture sites for bleeding and/or hematoma  - Assess quality of pulses, skin color and temperature  - Assess for signs of decreased coronary artery perfusion - ex.  Angina  - Evaluate fluid balance, assess for edema, trend weights  Outcome: Progressing  Goal: Absence of cardiac arrhythmias or at baseline  Description: INTERVENTIONS:  - Continuous cardiac monitoring, monitor vital signs, obtain 12 lead EKG if indicated  - Evaluate effectiveness of antiarrhythmic and heart rate control medications as ordered  - Initiate emergency measures for life threatening arrhythmias  - Monitor electrolytes and administer replacement therapy as ordered  Outcome: Progressing     Problem: RESPIRATORY - ADULT  Goal: Achieves optimal ventilation and oxygenation  Description: INTERVENTIONS:  - Assess for changes in respiratory status  - Assess for changes in mentation and behavior  - Position to facilitate oxygenation and minimize respiratory effort  - Oxygen supplementation based on oxygen saturation or ABGs  - Provide Smoking Cessation handout, if applicable  - Encourage broncho-pulmonary hygiene including cough, deep breathe, Incentive Spirometry  - Assess the need for suctioning and perform as needed  - Assess and instruct to report SOB or any respiratory difficulty  - Respiratory Therapy support as indicated  - Manage/alleviate anxiety  - Monitor for signs/symptoms of CO2 retention  Outcome: Progressing     Problem: SKIN/TISSUE INTEGRITY - ADULT  Goal: Skin integrity remains intact  Description: INTERVENTIONS  - Assess and document risk factors for pressure ulcer development  - Assess and document skin integrity  - Monitor for areas of redness and/or skin breakdown  - Initiate interventions, skin care algorithm/standards of care as needed  Outcome: Progressing     Problem: MUSCULOSKELETAL - ADULT  Goal: Return mobility to safest level of function  Description: INTERVENTIONS:  - Assess patient stability and activity tolerance for standing, transferring and ambulating w/ or w/o assistive devices  - Assist with transfers and ambulation using safe patient handling equipment as needed  - Ensure adequate protection for wounds/incisions during mobilization  - Obtain PT/OT consults as needed  - Advance activity as appropriate  - Communicate ordered activity level and limitations with patient/family  Outcome: Progressing     Problem: PAIN - ADULT  Goal: Verbalizes/displays adequate comfort level or patient's stated pain goal  Description: INTERVENTIONS:  - Encourage pt to monitor pain and request assistance  - Assess pain using appropriate pain scale  - Administer analgesics based on type and severity of pain and evaluate response  - Implement non-pharmacological measures as appropriate and evaluate response  - Consider cultural and social influences on pain and pain management  - Manage/alleviate anxiety  - Utilize distraction and/or relaxation techniques  - Monitor for opioid side effects  - Notify MD/LIP if interventions unsuccessful or patient reports new pain  - Anticipate increased pain with activity and pre-medicate as appropriate  Outcome: Progressing     Problem: RISK FOR INFECTION - ADULT  Goal: Absence of fever/infection during anticipated neutropenic period  Description: INTERVENTIONS  - Monitor WBC  - Administer growth factors as ordered  - Implement neutropenic guidelines  Outcome: Progressing     Problem: SAFETY ADULT - FALL  Goal: Free from fall injury  Description: INTERVENTIONS:  - Assess pt frequently for physical needs  - Identify cognitive and physical deficits and behaviors that affect risk of falls.   - [place] fall precautions as indicated by assessment.  - Educate pt/family on patient safety including physical limitations  - Instruct pt to call for assistance with activity based on assessment  - Modify environment to reduce risk of injury  - Provide assistive devices as appropriate  - Consider OT/PT consult to assist with strengthening/mobility  - Encourage toileting schedule  Outcome: Progressing     Problem: DISCHARGE PLANNING  Goal: Discharge to home or other facility with appropriate resources  Description: INTERVENTIONS:  - Identify barriers to discharge w/pt and caregiver  - Include patient/family/discharge partner in discharge planning  - Arrange for needed discharge resources and transportation as appropriate  - Identify discharge learning needs (meds, wound care, etc)  - Arrange for interpreters to assist at discharge as needed  - Consider post-discharge preferences of patient/family/discharge partner  - Complete POLST form as appropriate  - Assess patient's ability to be responsible for managing their own health  - Refer to Case Management Department for coordinating discharge planning if the patient needs post-hospital services based on physician/LIP order or complex needs related to functional status, cognitive ability or social support system  Outcome: Progressing

## 2023-10-05 NOTE — H&P
[facility]    PATIENT'S NAME: Jacob Khan   ATTENDING PHYSICIAN: Jenna Duke MD   PATIENT ACCOUNT#:   [de-identified]    LOCATION:  [address]  MEDICAL RECORD #:   [id number]       YOB: 1940  ADMISSION DATE:       10/05/2023    HISTORY AND PHYSICAL EXAMINATION    CHIEF COMPLAINT:  Aspiration pneumonia. HISTORY OF PRESENT ILLNESS:  The patient is an 81-year-old  male with history of cerebrovascular accident and chronic left hemiparesis. He used to be on pureed diet and now currently on general soft diet. His wife brought him in today for progressive weakness in the last week with subjective chills. CBC showed white blood cell count of 12.6 with left shift. Chemistry showed sodium 147, calculated osmolality of 308, GFR 15, BUN 20, creatinine 1.41. Urinalysis showed no evidence of urinary tract infection. GeneXpert viral panel was negative. D-dimer was 3.93. Chest x-ray showed bibasilar atelectasis and infiltrates. CT scan of the chest showed no pulmonary embolism and left lower lobe aspiration pneumonia. The patient was started on IV fluids and IV Zosyn. He will be admitted to hospital for further management. Blood cultures were obtained. 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 PAST SURGICAL HISTORY:  Tonsillectomy, abdominal aortic aneurysm endovascular stent grafting, cholecystectomy, and cataract procedure. MEDICATIONS:  Please see medication reconciliation list.    ALLERGIES:  No known drug allergies. FAMILY HISTORY:  Father  of lung cancer. SOCIAL HISTORY:  Ex-tobacco user. No current tobacco, alcohol, or drug use. Lives with his wife. Had a home visiting nurse.   Requires some assistance in his basic activities of daily living. Uses one-sided tripod cane. REVIEW OF SYSTEMS:  Per the wife, the patient eats only general diet. Sometimes he chokes on thin liquids and food particles. Last time it happened was around 1 week ago. Shortly after started becoming weaker with subjective chills. He is not able to walk on his own at this point. Other 12-point review of systems negative. PHYSICAL EXAMINATION:    GENERAL:  Alert. Oriented. Able to answer questions. Seems fatigued. VITAL SIGNS:  Temperature 98.1, pulse 81, respiratory rate 28, blood pressure 145/78, pulse ox 90% to 92% on room air. HEENT:  Atraumatic. Oropharynx clear, dry mucous membranes. Eyes:  Anicteric sclerae. NECK:  Supple. No lymphadenopathy. Trachea midline. Full range of motion. LUNGS:  Rhonchi auscultated on left lung field at the base. HEART:  Regular rate and rhythm. S1, S2 auscultated. No murmur. ABDOMEN:  Soft, nondistended, no tenderness. Positive bowel sounds. EXTREMITIES:  No peripheral edema, clubbing, or cyanosis. NEUROLOGIC:  Chronic left hemiparesis. No other acute focal findings. EKG showed normal sinus rhythm, right bundle branch block, and mild sinus tachycardia. ASSESSMENT:    1. Left lower lobe aspiration pneumonia with underlying dysphagia secondary to prior cerebrovascular accident. 2.   Mild hypoxemia. 3.   Hyperosmolar hypernatremia and dehydration. 4.   Hypertension. PLAN:  The patient will be admitted to general medical floor. IV fluid D5, 0.5 normal saline; IV Zosyn; blood cultures; aspiration and fall precautions; monitor his hemodynamic and respiratory status; obtain speech, physical, and occupational therapy; re-place him on pureed diet. Further recommendations to follow.     Dictated By Lani Cruz MD  d: 10/05/2023 12:09:05  t: 10/05/2023 12:21:14  Job [id number]  FB/

## 2023-10-05 NOTE — TELEPHONE ENCOUNTER
Telephone call to patient's wife and situation discussed. Patient is again is doing poorly and is quite weak. In the past when this happens patient usually has developed a pneumonia and usually due to aspiration. Patient may have other infections going on also. At this point the best thing for the patient to do was to be brought to the emergency room for evaluation. I have advised the patient's wife to contact the paramedics tomorrow to bring the patient to the emergency room tomorrow for further evaluation. Patient's wife is in agreement with this and will do this tomorrow.

## 2023-10-05 NOTE — SLP NOTE
ADULT SWALLOWING EVALUATION    ASSESSMENT    ASSESSMENT/OVERALL IMPRESSION:  PPE REQUIRED. THIS THERAPIST WORE GLOVES AND MASK FOR DURATION OF EVALUATION. HANDS WASHED UPON ENTRANCE/EXIT. Per MD note, \"The patient is an 36-year-old  male with history of cerebrovascular accident and chronic left hemiparesis. He used to be on pureed diet and now currently on general soft diet. His wife brought him in today for progressive weakness in the last week with subjective chills. \"  SLP BSSE orders received and acknowledged. A swallow evaluation warranted as per RN dysphagia screen/eval & tx. Pt afebrile with clear vocal quality, on 2L/Min, with oxygen saturation at 95%. Pt with extensive hx of dysphagia at [address] [place] [address], last seen 11/24/22 with recommendations for regular/thin liquids. Pt has had 3 video swallows (2019, 2020, 2022). VFSS 5/14/22 with recommendations for minced & moist/mildly thick liquids. Per care everywhere pt [place] [facility] evidence of penetration/aspiration on any consistency. Pt will not eat/drink puree/mildly thick liquids. Pt positioned 90 degrees in bed, alert/cooperative/wife present. Pt with no complaints of pain. Oral motor examination reveals reduced strength, ROM, and rate of motion. Pt presented with trials of hard/soft solids, puree, mildly thick liquids via tsp, and thin liquids via cup sips. Pt with adequate oral acceptance and bilabial seal across all trials. Pt with intact bite, reduced mastication of hard solids, and delayed A-P transit. Pt's swallow response appears delayed with reduced hyolaryngeal elevation/excursion. 1x throat clear observed with hard solids. No clinical signs of aspiration (e.g., immediate/delayed throat clear, immediate/delayed cough, wet vocal quality, increased O2 effort) observed across soft solids, puree, mildly thick liquid and thin liquid trials. 10/5 CXR indicates \"Borderline cardiomegaly. Tortuous aorta.  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 No acute alveolar airspace consolidation\". Oxygen status remained stable t/o the entire evaluation. At this time, pt presents with mild oral dysphagia and probable pharyngeal dysfunction. Recommend a regular (pick softer foods) diet and thin liquids with strict adherence to safe swallowing compensatory strategies. Results and recommendations reviewed with RN, pt, and family. Pt/pt's family v/u to all results/recommendations. Recommendations remain written on whiteboard. SLP collaborated with RN for MD diet orders. PLAN: SLP to f/u x2 meal assessments, monitor imaging, and VFSS if clinically indicated           RECOMMENDATIONS   Diet Recommendations - Solids: Regular (pick softer foods)  Diet Recommendations - Liquids: Thin Liquids                        Compensatory Strategies Recommended: No straws; Slow rate;Small bites and sips  Aspiration Precautions: Upright position; Slow rate;Small bites and sips; No straw  Medication Administration Recommendations: Whole in puree  Treatment Plan/Recommendations: Aspiration precautions  Discharge Recommendations/Plan: Undetermined    HISTORY   MEDICAL HISTORY  Reason for Referral: RN dysphagia screen (eval/tx)    Problem List  Principal Problem:    Hypoxia  Active Problems:    Aspiration pneumonitis ([address]    Aspiration pneumonia ([address]    Hypernatremia    JOSE (acute kidney injury) ([address]      Past Medical History  Past Medical History:   Diagnosis Date    Abdominal aortic aneurysm (AAA) ([address]     Back problem     back pain    Disorder of thyroid     hypothyroidism    Falls frequently     High blood pressure     High cholesterol     Hx of pneumothorax     Muscle weakness     LUE hemiplegia    Osteoarthritis     PNA (pneumonia)     Pneumonia due to organism 11/2017    Stroke [facility])     left sided weakness       Prior Living Situation: Home with spouse  Diet Prior to Admission: Regular; Thin liquids  Precautions: Aspiration    Patient/Family Goals: did not state this session    SWALLOWING HISTORY  Current Diet Consistency: NPO  Dysphagia History: see above  Imaging Results: 10/5 CT CHEST  CONCLUSION:   1. No acute pulmonary embolus to the subsegmental pulmonary artery level. No acute aortic injury; no dissection. 2.  Probable retained secretions within the wilma and left main bronchus. Short and long segments of airway occlusion/mucous plugging within the left lower lobe with patchy alveolar opacities throughout the left lower lobe. Findings suggest retained   secretions and/or aspirated contents within the left main bronchus as well as the left lower lobe with postobstructive atelectasis and/or postobstructive pneumonia throughout the majority the left lower lobe. 3.  Coronary atherosclerosis. 4.  41 mm diameter fusiform aneurysmal enlargement of the ascending thoracic aorta. Surveillance of this finding is recommended with follow-up assessment in 12 months. Correlate for aortic stenosis if not previously assessed. 5.  Post cholecystectomy. 6.  Small hiatal hernia. 7.  Right renal atrophy/scar. Probable high-grade stenosis at the origin of the right renal artery. SUBJECTIVE       OBJECTIVE   ORAL MOTOR EXAMINATION  Dentition: Natural;Functional  Symmetry: Reduced left facial  Strength: Reduced left facial  Tone: Reduced left facial  Range of Motion: Reduced left facial  Rate of Motion: Reduced    Voice Quality: Clear  Respiratory Status: Nasal cannula; Supplemental O2  Consistencies Trialed: Thin liquids; Nectar thick liquids;Puree; Soft solid;Hard solid  Method of Presentation: Self presentation;Staff/Clinician assistance;Spoon;Cup;Single sips  Patient Positioning: Upright;Midline    Oral Phase of Swallow: Impaired  Bolus Retrieval: Intact  Bilabial Seal: Intact  Bolus Formation: Impaired  Bolus Propulsion: Impaired  Mastication: Impaired  Retention: Impaired    Pharyngeal Phase of Swallow: Impaired  Laryngeal Elevation Timing: Appears impaired  Laryngeal Elevation Strength: Appears impaired  Laryngeal Elevation Coordination: Appears intact  (Please note: Silent aspiration cannot be evaluated clinically. Videofluoroscopic Swallow Study is required to rule-out silent aspiration.)    Esophageal Phase of Swallow: No complaints consistent with possible esophageal involvement  Comments: NA              GOALS  Goal #1 The patient will tolerate regular (pick softer foods) consistency and thin liquids without overt signs or symptoms of aspiration with 100 % accuracy over 1-2 session(s). In Progress   Goal #2 The patient/family/caregiver will demonstrate understanding and implementation of aspiration precautions and swallow strategies independently over 1-2 session(s). In Progress   Goal #3 The patient will utilize compensatory strategies as outlined by  BSSE (clinical evaluation) including Slow rate, Small bites, Small sips, No straws, Upright 90 degrees, Supervision with meals with PRN assistance 100 % of the time across 2 sessions.   In Progress   Goal #4 VFSS if warranted and agreeable  In Progress     FOLLOW UP  Treatment Plan/Recommendations: Aspiration precautions  Number of Visits to Meet Established Goals: 2  Follow Up Needed (Documentation Required): Yes  SLP Follow-up Date: 10/06/23    Thank you for your referral.   If you have any questions, please contact Hadley Goldmann, BETHANIE [place]. [id number] [facility]  Speech Language Pathologist  Phone Number [phone]

## 2023-10-05 NOTE — HOME CARE LIAISON
This pt is current with [facility] for RN&PT services. Pt will need a ANN AMRIE entered and Quality data delivered by [facility]. Notified TRACEE Curiel.

## 2023-10-06 LAB
ANION GAP SERPL CALC-SCNC: 5 MMOL/L (ref 0–18)
BASOPHILS # BLD AUTO: 0.08 X10(3) UL (ref 0–0.2)
BASOPHILS NFR BLD AUTO: 0.8 %
BUN BLD-MCNC: 16 MG/DL (ref 7–18)
BUN/CREAT SERPL: 11.9 (ref 10–20)
CALCIUM BLD-MCNC: 8.7 MG/DL (ref 8.5–10.1)
CHLORIDE SERPL-SCNC: 113 MMOL/L (ref 98–112)
CO2 SERPL-SCNC: 26 MMOL/L (ref 21–32)
CREAT BLD-MCNC: 1.35 MG/DL
DEPRECATED RDW RBC AUTO: 49.3 FL (ref 35.1–46.3)
EGFRCR SERPLBLD CKD-EPI 2021: 52 ML/MIN/1.73M2 (ref 60–?)
EOSINOPHIL # BLD AUTO: 0.22 X10(3) UL (ref 0–0.7)
EOSINOPHIL NFR BLD AUTO: 2.2 %
ERYTHROCYTE [DISTWIDTH] IN BLOOD BY AUTOMATED COUNT: 14 % (ref 11–15)
GLUCOSE BLD-MCNC: 105 MG/DL (ref 70–99)
HCT VFR BLD AUTO: 38 %
HGB BLD-MCNC: 11.7 G/DL
IMM GRANULOCYTES # BLD AUTO: 0.06 X10(3) UL (ref 0–1)
IMM GRANULOCYTES NFR BLD: 0.6 %
LYMPHOCYTES # BLD AUTO: 1.86 X10(3) UL (ref 1–4)
LYMPHOCYTES NFR BLD AUTO: 18.8 %
MCH RBC QN AUTO: 29 PG (ref 26–34)
MCHC RBC AUTO-ENTMCNC: 30.8 G/DL (ref 31–37)
MCV RBC AUTO: 94.3 FL
MONOCYTES # BLD AUTO: 0.96 X10(3) UL (ref 0.1–1)
MONOCYTES NFR BLD AUTO: 9.7 %
NEUTROPHILS # BLD AUTO: 6.69 X10 (3) UL (ref 1.5–7.7)
NEUTROPHILS # BLD AUTO: 6.69 X10(3) UL (ref 1.5–7.7)
NEUTROPHILS NFR BLD AUTO: 67.9 %
OSMOLALITY SERPL CALC.SUM OF ELEC: 300 MOSM/KG (ref 275–295)
PLATELET # BLD AUTO: 243 10(3)UL (ref 150–450)
POTASSIUM SERPL-SCNC: 3.6 MMOL/L (ref 3.5–5.1)
RBC # BLD AUTO: 4.03 X10(6)UL
SODIUM SERPL-SCNC: 144 MMOL/L (ref 136–145)
WBC # BLD AUTO: 9.9 X10(3) UL (ref 4–11)

## 2023-10-06 PROCEDURE — 99233 SBSQ HOSP IP/OBS HIGH 50: CPT | Performed by: INTERNAL MEDICINE

## 2023-10-06 RX ORDER — CALCIUM CARBONATE 500 MG/1
500 TABLET, CHEWABLE ORAL 3 TIMES DAILY PRN
Status: DISCONTINUED | OUTPATIENT
Start: 2023-10-06 | End: 2023-10-11

## 2023-10-06 NOTE — DISCHARGE INSTRUCTIONS
Sometimes managing your health at home requires assistance. The [facility] team has recognized your preference to use [facility]. They can be reached by phone at [phone]. The fax number for your reference is [phone]. A representative from the home health agency will contact you or your family to schedule your first visit.

## 2023-10-06 NOTE — SLP NOTE
SPEECH DAILY NOTE - INPATIENT    ASSESSMENT & PLAN   ASSESSMENT  PPE REQUIRED. THIS THERAPIST WORE GLOVES AND MASK FOR DURATION OF EVALUATION. HANDS WASHED UPON ENTRANCE/EXIT. SLP f/u for ongoing dysphagia tx/meal assessment per recommendations of regular/thin liquids per BSE. RN reports pt tolerates diet and medication well with no overt clinical s/s aspiration. Pt denies any swallowing challenges. Pt positioned upright in bedside chair, alert/cooperative. Pt afebrile, tolerating 4L/Min O2NC with oxygen status 95% prior to the start of oral trials. SLP reviewed aspiration precautions and safe swallowing compensatory strategies with the patient. Safe swallow guidelines remain written on the white board in purple. Patient v/u. Provided min assistance, pt tolerates soft solids and thin liquids via cup with no overt clinical signs/symptoms of aspiration. Oxygen status remained stable t/o the entire session. Current diet remains appropriate as pt declining any modified diet. Pt also does not feel he needs another VFSS as he has at least 4 in the past with no significant dysphagia seen. SLP to f/u with meal assessment x1-2, monitor  CXR, and VFSS if any overt CSA and/or decline in CXR. RN alerted with results and recommendations. MOST RECENT CXR 10/5  CONCLUSION:   1. Borderline cardiomegaly. Tortuous aorta. 2. 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 3. No acute alveolar airspace consolidation. .          Diet Recommendations - Solids: Regular (pick softer foods)  Diet Recommendations - Liquids: Thin Liquids    Compensatory Strategies Recommended: No straws; Slow rate;Small bites and sips  Aspiration Precautions: Upright position; Slow rate;Small bites and sips; No straw  Medication Administration Recommendations: Whole in puree    Patient Experiencing Pain: No                Discharge Recommendations  Discharge Recommendations/Plan: Undetermined    Treatment Plan  Treatment Plan/Recommendations: Aspiration precautions    Interdisciplinary Communication: Plan posted at bedside            GOALS  Goal #1 The patient will tolerate regular (pick softer foods) consistency and thin liquids without overt signs or symptoms of aspiration with 100 % accuracy over 1-2 session(s). Pt tolerated soft solids and thin liquids without overt s/s of aspiration with 100% acc over 1st session  In Progress   Goal #2 The patient/family/caregiver will demonstrate understanding and implementation of aspiration precautions and swallow strategies independently over 1-2 session(s). Pt v/u of aspiration precautions and swallow strategies, wife not present. Will continue to reinforice  In Progress   Goal #3 The patient will utilize compensatory strategies as outlined by  BSSE (clinical evaluation) including Slow rate, Small bites, Small sips, No straws, Upright 90 degrees, Supervision with meals with PRN assistance 100 % of the time across 2 sessions.     Pt utilized compensatory strategies as outlined above given min cues across 1st session  In Progress   Goal #4 VFSS if warranted and agreeable    Declining at this time  In Progress     FOLLOW UP  Follow Up Needed (Documentation Required): Yes  SLP Follow-up Date: 10/07/23  Number of Visits to Meet Established Goals: 2    Session: 1    If you have any questions, please contact [place], BETHANIE [place] Pathologist  Phone Number [phone]. [phone]

## 2023-10-06 NOTE — CM/SW NOTE
10/06/23 0800   CM/SW Referral Data   Referral Source Physician   Reason for Referral Discharge planning   Informant Patient   Medical Hx   Does patient have an established PCP? Yes  (C[facility])   Patient Info   Patient's Current Mental Status at Time of Assessment Alert;Oriented   Patient's [address]   Patient lives with Spouse/Significant other   Patient Status Prior to Admission   Independent with ADLs and Mobility Yes   Services in place prior to admission [facility] Provider [address]  (RN and PT)   Discharge Needs   Anticipated D/C needs Home health care;Medical equipment     Pt discussed during nursing rounds. Dx hypoxia on 2L O2 (no home O2). Home w/spouse, independent w/walker at baseline. [facility], SW entered [facility] on 10/5. MDO received for hospital bed at [place]. Pt notified CM that he and spouse ordered a bed approximately a week ago but it never arrived. CM left message w/spouse to confirm if hospital bed was actually ordered already, waiting on call back. PT/OT evals pending. 1410: PT recommending [facility] at [place]. Pt declining [facility] but agreeable to resume HH w/PT and OT at [place]. New [facility] entered with addition of OT. CM still waiting to hear back from spouse regarding hospital bed, 2nd message left for spouse. 1: Pt's spouse notified pt's spouse that patient is declining [facility]. Spouse notified CM that patient has had poor experiences at [facility] in the past. Spouse is requesting [facility] for acute rehab at [place]. CM explained that patient is not appropriate for acute rehab at this time. Pt's spouse notified CM that hospital bed has already been ordered through [facility]. Liaison Corrinne Prayer at [facility] confirmed bed is being ordered by [facility]. Plan: Home w/spouse with [facility] pending medical clearance. / to remain available for support and/or discharge planning.      ALEJANDRA Manning  Case Manager  [phone]

## 2023-10-06 NOTE — PLAN OF CARE
Problem: CARDIOVASCULAR - ADULT  Goal: Maintains optimal cardiac output and hemodynamic stability  Description: INTERVENTIONS:  - Monitor vital signs, rhythm, and trends  - Monitor for bleeding, hypotension and signs of decreased cardiac output  - Evaluate effectiveness of vasoactive medications to optimize hemodynamic stability  - Monitor arterial and/or venous puncture sites for bleeding and/or hematoma  - Assess quality of pulses, skin color and temperature  - Assess for signs of decreased coronary artery perfusion - ex.  Angina  - Evaluate fluid balance, assess for edema, trend weights  Outcome: Progressing  Goal: Absence of cardiac arrhythmias or at baseline  Description: INTERVENTIONS:  - Continuous cardiac monitoring, monitor vital signs, obtain 12 lead EKG if indicated  - Evaluate effectiveness of antiarrhythmic and heart rate control medications as ordered  - Initiate emergency measures for life threatening arrhythmias  - Monitor electrolytes and administer replacement therapy as ordered  Outcome: Progressing     Problem: RESPIRATORY - ADULT  Goal: Achieves optimal ventilation and oxygenation  Description: INTERVENTIONS:  - Assess for changes in respiratory status  - Assess for changes in mentation and behavior  - Position to facilitate oxygenation and minimize respiratory effort  - Oxygen supplementation based on oxygen saturation or ABGs  - Provide Smoking Cessation handout, if applicable  - Encourage broncho-pulmonary hygiene including cough, deep breathe, Incentive Spirometry  - Assess the need for suctioning and perform as needed  - Assess and instruct to report SOB or any respiratory difficulty  - Respiratory Therapy support as indicated  - Manage/alleviate anxiety  - Monitor for signs/symptoms of CO2 retention  Outcome: Progressing     Problem: SKIN/TISSUE INTEGRITY - ADULT  Goal: Skin integrity remains intact  Description: INTERVENTIONS  - Assess and document risk factors for pressure ulcer development  - Assess and document skin integrity  - Monitor for areas of redness and/or skin breakdown  - Initiate interventions, skin care algorithm/standards of care as needed  Outcome: Progressing     Problem: MUSCULOSKELETAL - ADULT  Goal: Return mobility to safest level of function  Description: INTERVENTIONS:  - Assess patient stability and activity tolerance for standing, transferring and ambulating w/ or w/o assistive devices  - Assist with transfers and ambulation using safe patient handling equipment as needed  - Ensure adequate protection for wounds/incisions during mobilization  - Obtain PT/OT consults as needed  - Advance activity as appropriate  - Communicate ordered activity level and limitations with patient/family  Outcome: Progressing     Problem: PAIN - ADULT  Goal: Verbalizes/displays adequate comfort level or patient's stated pain goal  Description: INTERVENTIONS:  - Encourage pt to monitor pain and request assistance  - Assess pain using appropriate pain scale  - Administer analgesics based on type and severity of pain and evaluate response  - Implement non-pharmacological measures as appropriate and evaluate response  - Consider cultural and social influences on pain and pain management  - Manage/alleviate anxiety  - Utilize distraction and/or relaxation techniques  - Monitor for opioid side effects  - Notify MD/LIP if interventions unsuccessful or patient reports new pain  - Anticipate increased pain with activity and pre-medicate as appropriate  Outcome: Progressing     Problem: RISK FOR INFECTION - ADULT  Goal: Absence of fever/infection during anticipated neutropenic period  Description: INTERVENTIONS  - Monitor WBC  - Administer growth factors as ordered  - Implement neutropenic guidelines  Outcome: Progressing     Problem: SAFETY ADULT - FALL  Goal: Free from fall injury  Description: INTERVENTIONS:  - Assess pt frequently for physical needs  - Identify cognitive and physical deficits and behaviors that affect risk of falls.   - [place] fall precautions as indicated by assessment.  - Educate pt/family on patient safety including physical limitations  - Instruct pt to call for assistance with activity based on assessment  - Modify environment to reduce risk of injury  - Provide assistive devices as appropriate  - Consider OT/PT consult to assist with strengthening/mobility  - Encourage toileting schedule  Outcome: Progressing     Problem: DISCHARGE PLANNING  Goal: Discharge to home or other facility with appropriate resources  Description: INTERVENTIONS:  - Identify barriers to discharge w/pt and caregiver  - Include patient/family/discharge partner in discharge planning  - Arrange for needed discharge resources and transportation as appropriate  - Identify discharge learning needs (meds, wound care, etc)  - Arrange for interpreters to assist at discharge as needed  - Consider post-discharge preferences of patient/family/discharge partner  - Complete POLST form as appropriate  - Assess patient's ability to be responsible for managing their own health  - Refer to Case Management Department for coordinating discharge planning if the patient needs post-hospital services based on physician/LIP order or complex needs related to functional status, cognitive ability or social support system  Outcome: Progressing

## 2023-10-06 NOTE — PHYSICAL THERAPY NOTE
PHYSICAL THERAPY EVALUATION - INPATIENT     Room Number: 502/502-A  Evaluation Date: 10/6/2023  Type of Evaluation: Initial   Physician Order: PT Eval and Treat    Presenting Problem: hypoxia, left lower lobe aspiration pneumonia, progressive weakness  Co-Morbidities : history CVA iwth L sided weakness  Reason for Therapy: Mobility Dysfunction and Discharge Planning    PHYSICAL THERAPY ASSESSMENT     Patient is a 81year old male admitted 10/5/2023 for hypoxia, left lower lobe aspiration pneumonia, progressive weakness. Patient's current functional deficits include impaired bed mobility, transfers, gait, balance, L sided strength, coordination, and tolerance for activity, which are below the patient's pre-admission status. Patient in bed upon arrival. RN approved activity. Educated patient on POC and benefits of mobilization. Agreeable to participate. Patient reporting chronic shoulder pain, not quantified per the pain scale. Patient reporting that he prefers to utilize a tabitha-walker for functional mobility versus a rolling walker due to decreased L sided-strength. Bed Mobility: Max A x 2 supine>EOB. Patient tolerated sitting EOB approx 8 minutes, requiring BUE support and CGA in order to maintain static sitting balance. Max A to scoot towards EOB. Transfers: Min A sit<>stand with RW; cues provided for appropriate UE placement during functional transfers. Instruction on activity pacing upon standing to allow body time to acclimate to change in position. Tolerated static standing with BUE support on RW and Min A for approx 2 minutes prior to mobilization. Ambulation: Mod A with RW for 3 ft bed>chair; L foot drag, shuffled steps, decreased gerhard speed, slightly unsteady. Cues for safe management of RW with turns. Activity limited due to fatigue. Patient sitting in bedside chair at end of session. Needs in reach and alarm activated. RN aware.       The patient's Approx Degree of Impairment: 72.57% has been calculated based on documentation in the [facility] '6 clicks' Inpatient Basic Mobility Short Form. Research supports that patients with this level of impairment may benefit from subacute rehab in order to optimize functional outcomes. Patient currently declining rehab option - states that he would like to return home with HHPT and family support. States that his wife will be able to assist him upon discharge in addition to his daughter. Patient will benefit from continued IP PT services to address these deficits in preparation for discharge. DISCHARGE RECOMMENDATIONS  PT Discharge Recommendations: Sub-acute rehabilitation (patient declining - wants home with HHPT)    PLAN  PT Treatment Plan: Bed mobility; Body mechanics; Coordination; Endurance; Energy conservation;Patient education;Gait training;Strengthening;Balance training;Transfer training  Rehab Potential : Good  Frequency (Obs): 3-5x/week       PHYSICAL THERAPY MEDICAL/SOCIAL HISTORY     Problem List  Principal Problem:    Hypoxia  Active Problems:    Aspiration pneumonitis (HCC)    Aspiration pneumonia (HCC)    Hypernatremia    JOSE (acute kidney injury) ([facility] [address]    HOME SITUATION  Home Situation  Type of Home: House  Home Layout: Two level;Stairlift  Lives With: Spouse (daughter will be assisting upon discharge)  Drives: No  Patient Owned Equipment: Wheelchair;Mark-walker (in the process of ordering a hospital bed)     Prior Level of [place]: Patient receives assistance from his wife for all ADLs. Mod I for short distance ambulation with mark-walker. Utilizes a wheelchair for longer distances. SUBJECTIVE  \"I want to go home\"    PHYSICAL THERAPY EXAMINATION     OBJECTIVE  Precautions: Bed/chair alarm;Limb alert - left  Fall Risk: High fall risk    PAIN ASSESSMENT  Rating: Unable to rate  Location: chronic shoulder pain  Management Techniques: Activity promotion; Body mechanics;Repositioning    COGNITION  Overall Cognitive Status:  WFL - within functional limits    RANGE OF MOTION AND STRENGTH ASSESSMENT  Upper extremity ROM and strength are impaired; see OT evaluation note. Lower extremity ROM is within functional limits   Lower extremity strength is within functional limits; LLE slightly impaired, grossly 3+/5 to 4-/5    BALANCE  Static Sitting: Fair  Dynamic Sitting: Fair -  Static Standing: Poor +  Dynamic Standing: Poor    ACTIVITY TOLERANCE  Pulse: 71  Heart Rate Source: Monitor     BP: 116/52  BP Location: Right arm  BP Method: Automatic  Patient Position: Lying    O2 WALK  Oxygen Therapy  SPO2% on Oxygen at Rest: 95  At rest oxygen flow (liters per minute): 4  SPO2% Ambulation on Oxygen: 93  Ambulation oxygen flow (liters per minute): 4    AM-PAC '6-Clicks' INPATIENT SHORT FORM - BASIC MOBILITY  How much difficulty does the patient currently have. .. Patient Difficulty: Turning over in bed (including adjusting bedclothes, sheets and blankets)?: A Lot   Patient Difficulty: Sitting down on and standing up from a chair with arms (e.g., wheelchair, bedside commode, etc.): A Lot   Patient Difficulty: Moving from lying on back to sitting on the side of the bed?: A Lot   How much help from another person does the patient currently need. .. Help from Another: Moving to and from a bed to a chair (including a wheelchair)?: A Lot   Help from Another: Need to walk in hospital room?: A Lot   Help from Another: Climbing 3-5 steps with a railing?: Total     AM-PAC Score:  Raw Score: 11   Approx Degree of Impairment: 72.57%   Standardized Score (AM-PAC Scale): 33.86   CMS Modifier (G-Code): CL    FUNCTIONAL ABILITY STATUS  Functional Mobility/Gait Assessment  Gait Assistance:  Moderate assistance  Distance (ft): 3  Assistive Device: Rolling walker  Pattern: Shuffle (decreased gerhard speed, unsteady)    Exercise/Education Provided:  Bed mobility  Body mechanics  Energy conservation  Functional activity tolerated  Gait training  Posture  Transfer training    Patient End of Session: Up in chair;Needs met;Call light within reach;RN aware of session/findings; All patient questions and concerns addressed; Alarm set    CURRENT GOALS    Goals to be met by: 10/20/23  Patient Goal Patient's self-stated goal is: go home   Goal #1 Patient is able to demonstrate supine - sit EOB @ level: moderate assistance     Goal #1   Current Status    Goal #2 Patient is able to demonstrate transfers Sit to/from Stand at assistance level: SBA with  LRAD     Goal #2  Current Status    Goal #3 Patient is able to ambulate 25 feet with assist device:  LRAD  at assistance level: CGA   Goal #3   Current Status    Goal #4 Patient to demonstrate independence with home activity/exercise instructions provided to patient in preparation for discharge.    Goal #4   Current Status      Patient Evaluation Complexity Level:  History Moderate - 1 or 2 personal factors and/or co-morbidities   Examination of body systems Moderate - addressing a total of 3 or more elements   Clinical Presentation Moderate - Evolving   Clinical Decision Making Moderate Complexity     Therapeutic Activity: 25 minutes

## 2023-10-07 LAB
ALBUMIN SERPL-MCNC: 2.5 G/DL (ref 3.4–5)
ALBUMIN/GLOB SERPL: 0.5 {RATIO} (ref 1–2)
ALP LIVER SERPL-CCNC: 81 U/L
ALT SERPL-CCNC: 12 U/L
ANION GAP SERPL CALC-SCNC: 2 MMOL/L (ref 0–18)
AST SERPL-CCNC: 23 U/L (ref 15–37)
BASOPHILS # BLD AUTO: 0.07 X10(3) UL (ref 0–0.2)
BASOPHILS NFR BLD AUTO: 0.7 %
BILIRUB SERPL-MCNC: 0.5 MG/DL (ref 0.1–2)
BUN BLD-MCNC: 14 MG/DL (ref 7–18)
BUN/CREAT SERPL: 9.7 (ref 10–20)
CALCIUM BLD-MCNC: 9 MG/DL (ref 8.5–10.1)
CHLORIDE SERPL-SCNC: 113 MMOL/L (ref 98–112)
CO2 SERPL-SCNC: 28 MMOL/L (ref 21–32)
CREAT BLD-MCNC: 1.45 MG/DL
DEPRECATED RDW RBC AUTO: 48.5 FL (ref 35.1–46.3)
EGFRCR SERPLBLD CKD-EPI 2021: 48 ML/MIN/1.73M2 (ref 60–?)
EOSINOPHIL # BLD AUTO: 0.31 X10(3) UL (ref 0–0.7)
EOSINOPHIL NFR BLD AUTO: 3.2 %
ERYTHROCYTE [DISTWIDTH] IN BLOOD BY AUTOMATED COUNT: 13.9 % (ref 11–15)
GLOBULIN PLAS-MCNC: 4.7 G/DL (ref 2.8–4.4)
GLUCOSE BLD-MCNC: 139 MG/DL (ref 70–99)
HCT VFR BLD AUTO: 35.5 %
HGB BLD-MCNC: 10.8 G/DL
IMM GRANULOCYTES # BLD AUTO: 0.04 X10(3) UL (ref 0–1)
IMM GRANULOCYTES NFR BLD: 0.4 %
LYMPHOCYTES # BLD AUTO: 2.05 X10(3) UL (ref 1–4)
LYMPHOCYTES NFR BLD AUTO: 21.1 %
MCH RBC QN AUTO: 28.9 PG (ref 26–34)
MCHC RBC AUTO-ENTMCNC: 30.4 G/DL (ref 31–37)
MCV RBC AUTO: 94.9 FL
MONOCYTES # BLD AUTO: 1.2 X10(3) UL (ref 0.1–1)
MONOCYTES NFR BLD AUTO: 12.3 %
NEUTROPHILS # BLD AUTO: 6.06 X10 (3) UL (ref 1.5–7.7)
NEUTROPHILS # BLD AUTO: 6.06 X10(3) UL (ref 1.5–7.7)
NEUTROPHILS NFR BLD AUTO: 62.3 %
OSMOLALITY SERPL CALC.SUM OF ELEC: 299 MOSM/KG (ref 275–295)
PLATELET # BLD AUTO: 245 10(3)UL (ref 150–450)
POTASSIUM SERPL-SCNC: 4.1 MMOL/L (ref 3.5–5.1)
PROT SERPL-MCNC: 7.2 G/DL (ref 6.4–8.2)
RBC # BLD AUTO: 3.74 X10(6)UL
SODIUM SERPL-SCNC: 143 MMOL/L (ref 136–145)
WBC # BLD AUTO: 9.7 X10(3) UL (ref 4–11)

## 2023-10-07 PROCEDURE — 99233 SBSQ HOSP IP/OBS HIGH 50: CPT | Performed by: INTERNAL MEDICINE

## 2023-10-07 NOTE — PLAN OF CARE
Problem: CARDIOVASCULAR - ADULT  Goal: Maintains optimal cardiac output and hemodynamic stability  Description: INTERVENTIONS:  - Monitor vital signs, rhythm, and trends  - Monitor for bleeding, hypotension and signs of decreased cardiac output  - Evaluate effectiveness of vasoactive medications to optimize hemodynamic stability  - Monitor arterial and/or venous puncture sites for bleeding and/or hematoma  - Assess quality of pulses, skin color and temperature  - Assess for signs of decreased coronary artery perfusion - ex.  Angina  - Evaluate fluid balance, assess for edema, trend weights  Outcome: Progressing  Goal: Absence of cardiac arrhythmias or at baseline  Description: INTERVENTIONS:  - Continuous cardiac monitoring, monitor vital signs, obtain 12 lead EKG if indicated  - Evaluate effectiveness of antiarrhythmic and heart rate control medications as ordered  - Initiate emergency measures for life threatening arrhythmias  - Monitor electrolytes and administer replacement therapy as ordered  Outcome: Progressing     Problem: RESPIRATORY - ADULT  Goal: Achieves optimal ventilation and oxygenation  Description: INTERVENTIONS:  - Assess for changes in respiratory status  - Assess for changes in mentation and behavior  - Position to facilitate oxygenation and minimize respiratory effort  - Oxygen supplementation based on oxygen saturation or ABGs  - Provide Smoking Cessation handout, if applicable  - Encourage broncho-pulmonary hygiene including cough, deep breathe, Incentive Spirometry  - Assess the need for suctioning and perform as needed  - Assess and instruct to report SOB or any respiratory difficulty  - Respiratory Therapy support as indicated  - Manage/alleviate anxiety  - Monitor for signs/symptoms of CO2 retention  Outcome: Progressing     Problem: SKIN/TISSUE INTEGRITY - ADULT  Goal: Skin integrity remains intact  Description: INTERVENTIONS  - Assess and document risk factors for pressure ulcer development  - Assess and document skin integrity  - Monitor for areas of redness and/or skin breakdown  - Initiate interventions, skin care algorithm/standards of care as needed  Outcome: Progressing     Problem: MUSCULOSKELETAL - ADULT  Goal: Return mobility to safest level of function  Description: INTERVENTIONS:  - Assess patient stability and activity tolerance for standing, transferring and ambulating w/ or w/o assistive devices  - Assist with transfers and ambulation using safe patient handling equipment as needed  - Ensure adequate protection for wounds/incisions during mobilization  - Obtain PT/OT consults as needed  - Advance activity as appropriate  - Communicate ordered activity level and limitations with patient/family  Outcome: Progressing     Problem: PAIN - ADULT  Goal: Verbalizes/displays adequate comfort level or patient's stated pain goal  Description: INTERVENTIONS:  - Encourage pt to monitor pain and request assistance  - Assess pain using appropriate pain scale  - Administer analgesics based on type and severity of pain and evaluate response  - Implement non-pharmacological measures as appropriate and evaluate response  - Consider cultural and social influences on pain and pain management  - Manage/alleviate anxiety  - Utilize distraction and/or relaxation techniques  - Monitor for opioid side effects  - Notify MD/LIP if interventions unsuccessful or patient reports new pain  - Anticipate increased pain with activity and pre-medicate as appropriate  Outcome: Progressing     Problem: RISK FOR INFECTION - ADULT  Goal: Absence of fever/infection during anticipated neutropenic period  Description: INTERVENTIONS  - Monitor WBC  - Administer growth factors as ordered  - Implement neutropenic guidelines  Outcome: Progressing     Problem: SAFETY ADULT - FALL  Goal: Free from fall injury  Description: INTERVENTIONS:  - Assess pt frequently for physical needs  - Identify cognitive and physical deficits and behaviors that affect risk of falls.   - [place] fall precautions as indicated by assessment.  - Educate pt/family on patient safety including physical limitations  - Instruct pt to call for assistance with activity based on assessment  - Modify environment to reduce risk of injury  - Provide assistive devices as appropriate  - Consider OT/PT consult to assist with strengthening/mobility  - Encourage toileting schedule  Outcome: Progressing

## 2023-10-07 NOTE — RESPIRATORY THERAPY NOTE
PEP therapy provided to patient. Patient demonstrates proper technique with flutter valve. Will continue to provide therapy to patient and provide additional support as indicated.

## 2023-10-07 NOTE — CM/SW NOTE
RN informed SW that pt is now agreeable to [facility], as he had initially declined. SW requested [address] [place] [address] to send [facility] referrals for review. TRACEE/DAILY to follow up w/ [facility] choice. List of options will be ready Jerome 10/8/23 at 1100.      PASRR screening needed prior to DC    PLAN: [facility], location pending pt choice & medical clearance    MAO [place] - [id number]  10/07/23, [phone]

## 2023-10-07 NOTE — PLAN OF CARE
Complaints of pain, PRN tylenol given. No SOB overnight. IVF and abx running. Safety precautions in place. Plan to continue IV abx and discharge home with [facility] once medically cleared. Problem: Patient Centered Care  Goal: Patient preferences are identified and integrated in the patient's plan of care  Description: Interventions:  - What would you like us to know as we care for you?  From home with wife  - Provide timely, complete, and accurate information to patient/family  - Incorporate patient and family knowledge, values, beliefs, and cultural backgrounds into the planning and delivery of care  - Encourage patient/family to participate in care and decision-making at the level they choose  - Honor patient and family perspectives and choices  10/7/2023 0326 by Kerline Valdovinos RN  Outcome: Progressing  10/7/2023 0325 by Kerline Valdovinos RN  Outcome: Progressing     Problem: Patient/Family Goals  Goal: Patient/Family Long Term Goal  Description: Patient's Long Term Goal: Discharge home    Interventions:  - PT/OT  - tolerate diet  - IV abx  - See additional Care Plan goals for specific interventions  10/7/2023 0326 by Kerline Valdovinos RN  Outcome: Progressing  10/7/2023 0325 by Kerline Valdovinos RN  Outcome: Progressing  Goal: Patient/Family Short Term Goal  Description: Patient's Short Term Goal: Discontinue oxygen    Interventions:   - Wean off oxygen  - IV abx for pneumonia  - See additional Care Plan goals for specific interventions  10/7/2023 0326 by Kerline Valdovinos RN  Outcome: Progressing  10/7/2023 0325 by Kerline Valdovinos RN  Outcome: Progressing     Problem: CARDIOVASCULAR - ADULT  Goal: Maintains optimal cardiac output and hemodynamic stability  Description: INTERVENTIONS:  - Monitor vital signs, rhythm, and trends  - Monitor for bleeding, hypotension and signs of decreased cardiac output  - Evaluate effectiveness of vasoactive medications to optimize hemodynamic stability  - Monitor arterial and/or venous puncture sites for bleeding and/or hematoma  - Assess quality of pulses, skin color and temperature  - Assess for signs of decreased coronary artery perfusion - ex.  Angina  - Evaluate fluid balance, assess for edema, trend weights  10/7/2023 0326 by Sonali Doll RN  Outcome: Progressing  10/7/2023 0325 by Sonali Doll RN  Outcome: Progressing  Goal: Absence of cardiac arrhythmias or at baseline  Description: INTERVENTIONS:  - Continuous cardiac monitoring, monitor vital signs, obtain 12 lead EKG if indicated  - Evaluate effectiveness of antiarrhythmic and heart rate control medications as ordered  - Initiate emergency measures for life threatening arrhythmias  - Monitor electrolytes and administer replacement therapy as ordered  10/7/2023 0326 by Sonali Doll RN  Outcome: Progressing  10/7/2023 0325 by Sonali Doll RN  Outcome: Progressing     Problem: RESPIRATORY - ADULT  Goal: Achieves optimal ventilation and oxygenation  Description: INTERVENTIONS:  - Assess for changes in respiratory status  - Assess for changes in mentation and behavior  - Position to facilitate oxygenation and minimize respiratory effort  - Oxygen supplementation based on oxygen saturation or ABGs  - Provide Smoking Cessation handout, if applicable  - Encourage broncho-pulmonary hygiene including cough, deep breathe, Incentive Spirometry  - Assess the need for suctioning and perform as needed  - Assess and instruct to report SOB or any respiratory difficulty  - Respiratory Therapy support as indicated  - Manage/alleviate anxiety  - Monitor for signs/symptoms of CO2 retention  10/7/2023 0326 by Sonali Doll RN  Outcome: Progressing  10/7/2023 0325 by Sonali Doll RN  Outcome: Progressing     Problem: SKIN/TISSUE INTEGRITY - ADULT  Goal: Skin integrity remains intact  Description: INTERVENTIONS  - Assess and document risk factors for pressure ulcer development  - Assess and document skin integrity  - Monitor for areas of redness and/or skin breakdown  - Initiate interventions, skin care algorithm/standards of care as needed  10/7/2023 0326 by Kyleigh Hdz RN  Outcome: Progressing  10/7/2023 0325 by Kyleigh Hdz RN  Outcome: Progressing     Problem: MUSCULOSKELETAL - ADULT  Goal: Return mobility to safest level of function  Description: INTERVENTIONS:  - Assess patient stability and activity tolerance for standing, transferring and ambulating w/ or w/o assistive devices  - Assist with transfers and ambulation using safe patient handling equipment as needed  - Ensure adequate protection for wounds/incisions during mobilization  - Obtain PT/OT consults as needed  - Advance activity as appropriate  - Communicate ordered activity level and limitations with patient/family  10/7/2023 0326 by Kyleigh Hdz RN  Outcome: Progressing  10/7/2023 0325 by Kyleigh Hdz RN  Outcome: Progressing     Problem: PAIN - ADULT  Goal: Verbalizes/displays adequate comfort level or patient's stated pain goal  Description: INTERVENTIONS:  - Encourage pt to monitor pain and request assistance  - Assess pain using appropriate pain scale  - Administer analgesics based on type and severity of pain and evaluate response  - Implement non-pharmacological measures as appropriate and evaluate response  - Consider cultural and social influences on pain and pain management  - Manage/alleviate anxiety  - Utilize distraction and/or relaxation techniques  - Monitor for opioid side effects  - Notify MD/LIP if interventions unsuccessful or patient reports new pain  - Anticipate increased pain with activity and pre-medicate as appropriate  10/7/2023 0326 by Kyleigh Hdz RN  Outcome: Progressing  10/7/2023 0325 by Kyleigh Hdz RN  Outcome: Progressing     Problem: RISK FOR INFECTION - ADULT  Goal: Absence of fever/infection during anticipated neutropenic period  Description: INTERVENTIONS  - Monitor WBC  - Administer growth factors as ordered  - Implement neutropenic guidelines  10/7/2023 0326 by Kristin Sueais 666, Junior Streeter RN  Outcome: Progressing  10/7/2023 0325 by Sherman Curtis RN  Outcome: Progressing     Problem: SAFETY ADULT - FALL  Goal: Free from fall injury  Description: INTERVENTIONS:  - Assess pt frequently for physical needs  - Identify cognitive and physical deficits and behaviors that affect risk of falls.   - [place] fall precautions as indicated by assessment.  - Educate pt/family on patient safety including physical limitations  - Instruct pt to call for assistance with activity based on assessment  - Modify environment to reduce risk of injury  - Provide assistive devices as appropriate  - Consider OT/PT consult to assist with strengthening/mobility  - Encourage toileting schedule  10/7/2023 0326 by Sherman Curtis RN  Outcome: Progressing  10/7/2023 0325 by Sherman Curtis RN  Outcome: Progressing     Problem: DISCHARGE PLANNING  Goal: Discharge to home or other facility with appropriate resources  Description: INTERVENTIONS:  - Identify barriers to discharge w/pt and caregiver  - Include patient/family/discharge partner in discharge planning  - Arrange for needed discharge resources and transportation as appropriate  - Identify discharge learning needs (meds, wound care, etc)  - Arrange for interpreters to assist at discharge as needed  - Consider post-discharge preferences of patient/family/discharge partner  - Complete POLST form as appropriate  - Assess patient's ability to be responsible for managing their own health  - Refer to Case Management Department for coordinating discharge planning if the patient needs post-hospital services based on physician/LIP order or complex needs related to functional status, cognitive ability or social support system  10/7/2023 0326 by Sherman Curtis RN  Outcome: Progressing  10/7/2023 0325 by Sherman Curtis RN  Outcome: Progressing

## 2023-10-07 NOTE — PLAN OF CARE
Patient A/Ox3-4. Up with two person assist and walker to pivot to chair. IV fluids running, IV abx continued. Wife at th bedside during the day. Plan for [facility] upon discharge. Resting in chair with fall precautions in place and call light in reach. Problem: Patient Centered Care  Goal: Patient preferences are identified and integrated in the patient's plan of care  Description: Interventions:  - What would you like us to know as we care for you? From home with wife  - Provide timely, complete, and accurate information to patient/family  - Incorporate patient and family knowledge, values, beliefs, and cultural backgrounds into the planning and delivery of care  - Encourage patient/family to participate in care and decision-making at the level they choose  - Honor patient and family perspectives and choices  Outcome: Progressing     Problem: Patient/Family Goals  Goal: Patient/Family Long Term Goal  Description: Patient's Long Term Goal: Discharge home    Interventions:  - PT/OT  - tolerate diet  - IV abx  - See additional Care Plan goals for specific interventions  Outcome: Progressing  Goal: Patient/Family Short Term Goal  Description: Patient's Short Term Goal: Discontinue oxygen    Interventions:   - Wean off oxygen  - IV abx for pneumonia  - See additional Care Plan goals for specific interventions  Outcome: Progressing     Problem: CARDIOVASCULAR - ADULT  Goal: Maintains optimal cardiac output and hemodynamic stability  Description: INTERVENTIONS:  - Monitor vital signs, rhythm, and trends  - Monitor for bleeding, hypotension and signs of decreased cardiac output  - Evaluate effectiveness of vasoactive medications to optimize hemodynamic stability  - Monitor arterial and/or venous puncture sites for bleeding and/or hematoma  - Assess quality of pulses, skin color and temperature  - Assess for signs of decreased coronary artery perfusion - ex.  Angina  - Evaluate fluid balance, assess for edema, trend weights  Outcome: Progressing  Goal: Absence of cardiac arrhythmias or at baseline  Description: INTERVENTIONS:  - Continuous cardiac monitoring, monitor vital signs, obtain 12 lead EKG if indicated  - Evaluate effectiveness of antiarrhythmic and heart rate control medications as ordered  - Initiate emergency measures for life threatening arrhythmias  - Monitor electrolytes and administer replacement therapy as ordered  Outcome: Progressing     Problem: RESPIRATORY - ADULT  Goal: Achieves optimal ventilation and oxygenation  Description: INTERVENTIONS:  - Assess for changes in respiratory status  - Assess for changes in mentation and behavior  - Position to facilitate oxygenation and minimize respiratory effort  - Oxygen supplementation based on oxygen saturation or ABGs  - Provide Smoking Cessation handout, if applicable  - Encourage broncho-pulmonary hygiene including cough, deep breathe, Incentive Spirometry  - Assess the need for suctioning and perform as needed  - Assess and instruct to report SOB or any respiratory difficulty  - Respiratory Therapy support as indicated  - Manage/alleviate anxiety  - Monitor for signs/symptoms of CO2 retention  Outcome: Progressing     Problem: SKIN/TISSUE INTEGRITY - ADULT  Goal: Skin integrity remains intact  Description: INTERVENTIONS  - Assess and document risk factors for pressure ulcer development  - Assess and document skin integrity  - Monitor for areas of redness and/or skin breakdown  - Initiate interventions, skin care algorithm/standards of care as needed  Outcome: Progressing     Problem: MUSCULOSKELETAL - ADULT  Goal: Return mobility to safest level of function  Description: INTERVENTIONS:  - Assess patient stability and activity tolerance for standing, transferring and ambulating w/ or w/o assistive devices  - Assist with transfers and ambulation using safe patient handling equipment as needed  - Ensure adequate protection for wounds/incisions during mobilization  - Obtain PT/OT consults as needed  - Advance activity as appropriate  - Communicate ordered activity level and limitations with patient/family  Outcome: Progressing     Problem: PAIN - ADULT  Goal: Verbalizes/displays adequate comfort level or patient's stated pain goal  Description: INTERVENTIONS:  - Encourage pt to monitor pain and request assistance  - Assess pain using appropriate pain scale  - Administer analgesics based on type and severity of pain and evaluate response  - Implement non-pharmacological measures as appropriate and evaluate response  - Consider cultural and social influences on pain and pain management  - Manage/alleviate anxiety  - Utilize distraction and/or relaxation techniques  - Monitor for opioid side effects  - Notify MD/LIP if interventions unsuccessful or patient reports new pain  - Anticipate increased pain with activity and pre-medicate as appropriate  Outcome: Progressing     Problem: RISK FOR INFECTION - ADULT  Goal: Absence of fever/infection during anticipated neutropenic period  Description: INTERVENTIONS  - Monitor WBC  - Administer growth factors as ordered  - Implement neutropenic guidelines  Outcome: Progressing     Problem: SAFETY ADULT - FALL  Goal: Free from fall injury  Description: INTERVENTIONS:  - Assess pt frequently for physical needs  - Identify cognitive and physical deficits and behaviors that affect risk of falls.   - [place] fall precautions as indicated by assessment.  - Educate pt/family on patient safety including physical limitations  - Instruct pt to call for assistance with activity based on assessment  - Modify environment to reduce risk of injury  - Provide assistive devices as appropriate  - Consider OT/PT consult to assist with strengthening/mobility  - Encourage toileting schedule  Outcome: Progressing     Problem: DISCHARGE PLANNING  Goal: Discharge to home or other facility with appropriate resources  Description: INTERVENTIONS:  - Identify barriers to discharge w/pt and caregiver  - Include patient/family/discharge partner in discharge planning  - Arrange for needed discharge resources and transportation as appropriate  - Identify discharge learning needs (meds, wound care, etc)  - Arrange for interpreters to assist at discharge as needed  - Consider post-discharge preferences of patient/family/discharge partner  - Complete POLST form as appropriate  - Assess patient's ability to be responsible for managing their own health  - Refer to Case Management Department for coordinating discharge planning if the patient needs post-hospital services based on physician/LIP order or complex needs related to functional status, cognitive ability or social support system  Outcome: Progressing

## 2023-10-07 NOTE — CM/SW NOTE
Department  notified of request for rafia KAYE referrals started. Assigned CM/SW to follow up with pt/family on further discharge planning.        Ashley Wright   , [facility]

## 2023-10-08 LAB
ALBUMIN SERPL-MCNC: 2.5 G/DL (ref 3.4–5)
ALBUMIN/GLOB SERPL: 0.6 {RATIO} (ref 1–2)
ALP LIVER SERPL-CCNC: 77 U/L
ALT SERPL-CCNC: 9 U/L
ANION GAP SERPL CALC-SCNC: 5 MMOL/L (ref 0–18)
AST SERPL-CCNC: 9 U/L (ref 15–37)
BASOPHILS # BLD AUTO: 0.09 X10(3) UL (ref 0–0.2)
BASOPHILS NFR BLD AUTO: 1 %
BILIRUB SERPL-MCNC: 0.4 MG/DL (ref 0.1–2)
BUN BLD-MCNC: 13 MG/DL (ref 7–18)
BUN/CREAT SERPL: 8.4 (ref 10–20)
CALCIUM BLD-MCNC: 9.2 MG/DL (ref 8.5–10.1)
CHLORIDE SERPL-SCNC: 112 MMOL/L (ref 98–112)
CO2 SERPL-SCNC: 26 MMOL/L (ref 21–32)
CREAT BLD-MCNC: 1.54 MG/DL
DEPRECATED RDW RBC AUTO: 48.3 FL (ref 35.1–46.3)
EGFRCR SERPLBLD CKD-EPI 2021: 45 ML/MIN/1.73M2 (ref 60–?)
EOSINOPHIL # BLD AUTO: 0.26 X10(3) UL (ref 0–0.7)
EOSINOPHIL NFR BLD AUTO: 2.8 %
ERYTHROCYTE [DISTWIDTH] IN BLOOD BY AUTOMATED COUNT: 13.9 % (ref 11–15)
GLOBULIN PLAS-MCNC: 4.5 G/DL (ref 2.8–4.4)
GLUCOSE BLD-MCNC: 121 MG/DL (ref 70–99)
HCT VFR BLD AUTO: 35.3 %
HGB BLD-MCNC: 10.8 G/DL
IMM GRANULOCYTES # BLD AUTO: 0.03 X10(3) UL (ref 0–1)
IMM GRANULOCYTES NFR BLD: 0.3 %
LYMPHOCYTES # BLD AUTO: 2 X10(3) UL (ref 1–4)
LYMPHOCYTES NFR BLD AUTO: 21.6 %
MCH RBC QN AUTO: 29 PG (ref 26–34)
MCHC RBC AUTO-ENTMCNC: 30.6 G/DL (ref 31–37)
MCV RBC AUTO: 94.9 FL
MONOCYTES # BLD AUTO: 1.07 X10(3) UL (ref 0.1–1)
MONOCYTES NFR BLD AUTO: 11.6 %
NEUTROPHILS # BLD AUTO: 5.79 X10 (3) UL (ref 1.5–7.7)
NEUTROPHILS # BLD AUTO: 5.79 X10(3) UL (ref 1.5–7.7)
NEUTROPHILS NFR BLD AUTO: 62.7 %
OSMOLALITY SERPL CALC.SUM OF ELEC: 297 MOSM/KG (ref 275–295)
PLATELET # BLD AUTO: 245 10(3)UL (ref 150–450)
POTASSIUM SERPL-SCNC: 3.6 MMOL/L (ref 3.5–5.1)
PROT SERPL-MCNC: 7 G/DL (ref 6.4–8.2)
RBC # BLD AUTO: 3.72 X10(6)UL
SODIUM SERPL-SCNC: 143 MMOL/L (ref 136–145)
WBC # BLD AUTO: 9.2 X10(3) UL (ref 4–11)

## 2023-10-08 PROCEDURE — 99233 SBSQ HOSP IP/OBS HIGH 50: CPT | Performed by: INTERNAL MEDICINE

## 2023-10-08 RX ORDER — HYDROCODONE BITARTRATE AND ACETAMINOPHEN 5; 325 MG/1; MG/1
1 TABLET ORAL EVERY 8 HOURS PRN
Status: DISCONTINUED | OUTPATIENT
Start: 2023-10-08 | End: 2023-10-11

## 2023-10-08 RX ORDER — HYDRALAZINE HYDROCHLORIDE 20 MG/ML
10 INJECTION INTRAMUSCULAR; INTRAVENOUS EVERY 6 HOURS PRN
Status: DISCONTINUED | OUTPATIENT
Start: 2023-10-08 | End: 2023-10-11

## 2023-10-08 NOTE — PLAN OF CARE
Pt up x2 assist stand-pivot. Complaints of pain, PRN tylenol given. IVF running and abx continued. Safety precautions in place. Plan to discharge to [facility] pending choice and medical clearance. Problem: Patient Centered Care  Goal: Patient preferences are identified and integrated in the patient's plan of care  Description: Interventions:  - What would you like us to know as we care for you? From home with wife  - Provide timely, complete, and accurate information to patient/family  - Incorporate patient and family knowledge, values, beliefs, and cultural backgrounds into the planning and delivery of care  - Encourage patient/family to participate in care and decision-making at the level they choose  - Honor patient and family perspectives and choices  Outcome: Progressing     Problem: Patient/Family Goals  Goal: Patient/Family Long Term Goal  Description: Patient's Long Term Goal: Discharge home    Interventions:  - PT/OT  - tolerate diet  - IV abx  - See additional Care Plan goals for specific interventions  Outcome: Progressing  Goal: Patient/Family Short Term Goal  Description: Patient's Short Term Goal: Discontinue oxygen    Interventions:   - Wean off oxygen  - IV abx for pneumonia  - See additional Care Plan goals for specific interventions  Outcome: Progressing     Problem: CARDIOVASCULAR - ADULT  Goal: Maintains optimal cardiac output and hemodynamic stability  Description: INTERVENTIONS:  - Monitor vital signs, rhythm, and trends  - Monitor for bleeding, hypotension and signs of decreased cardiac output  - Evaluate effectiveness of vasoactive medications to optimize hemodynamic stability  - Monitor arterial and/or venous puncture sites for bleeding and/or hematoma  - Assess quality of pulses, skin color and temperature  - Assess for signs of decreased coronary artery perfusion - ex.  Angina  - Evaluate fluid balance, assess for edema, trend weights  Outcome: Progressing  Goal: Absence of cardiac arrhythmias or at baseline  Description: INTERVENTIONS:  - Continuous cardiac monitoring, monitor vital signs, obtain 12 lead EKG if indicated  - Evaluate effectiveness of antiarrhythmic and heart rate control medications as ordered  - Initiate emergency measures for life threatening arrhythmias  - Monitor electrolytes and administer replacement therapy as ordered  Outcome: Progressing     Problem: RESPIRATORY - ADULT  Goal: Achieves optimal ventilation and oxygenation  Description: INTERVENTIONS:  - Assess for changes in respiratory status  - Assess for changes in mentation and behavior  - Position to facilitate oxygenation and minimize respiratory effort  - Oxygen supplementation based on oxygen saturation or ABGs  - Provide Smoking Cessation handout, if applicable  - Encourage broncho-pulmonary hygiene including cough, deep breathe, Incentive Spirometry  - Assess the need for suctioning and perform as needed  - Assess and instruct to report SOB or any respiratory difficulty  - Respiratory Therapy support as indicated  - Manage/alleviate anxiety  - Monitor for signs/symptoms of CO2 retention  Outcome: Progressing     Problem: SKIN/TISSUE INTEGRITY - ADULT  Goal: Skin integrity remains intact  Description: INTERVENTIONS  - Assess and document risk factors for pressure ulcer development  - Assess and document skin integrity  - Monitor for areas of redness and/or skin breakdown  - Initiate interventions, skin care algorithm/standards of care as needed  Outcome: Progressing     Problem: MUSCULOSKELETAL - ADULT  Goal: Return mobility to safest level of function  Description: INTERVENTIONS:  - Assess patient stability and activity tolerance for standing, transferring and ambulating w/ or w/o assistive devices  - Assist with transfers and ambulation using safe patient handling equipment as needed  - Ensure adequate protection for wounds/incisions during mobilization  - Obtain PT/OT consults as needed  - Advance activity as appropriate  - Communicate ordered activity level and limitations with patient/family  Outcome: Progressing     Problem: PAIN - ADULT  Goal: Verbalizes/displays adequate comfort level or patient's stated pain goal  Description: INTERVENTIONS:  - Encourage pt to monitor pain and request assistance  - Assess pain using appropriate pain scale  - Administer analgesics based on type and severity of pain and evaluate response  - Implement non-pharmacological measures as appropriate and evaluate response  - Consider cultural and social influences on pain and pain management  - Manage/alleviate anxiety  - Utilize distraction and/or relaxation techniques  - Monitor for opioid side effects  - Notify MD/LIP if interventions unsuccessful or patient reports new pain  - Anticipate increased pain with activity and pre-medicate as appropriate  Outcome: Progressing     Problem: RISK FOR INFECTION - ADULT  Goal: Absence of fever/infection during anticipated neutropenic period  Description: INTERVENTIONS  - Monitor WBC  - Administer growth factors as ordered  - Implement neutropenic guidelines  Outcome: Progressing     Problem: SAFETY ADULT - FALL  Goal: Free from fall injury  Description: INTERVENTIONS:  - Assess pt frequently for physical needs  - Identify cognitive and physical deficits and behaviors that affect risk of falls.   - [place] fall precautions as indicated by assessment.  - Educate pt/family on patient safety including physical limitations  - Instruct pt to call for assistance with activity based on assessment  - Modify environment to reduce risk of injury  - Provide assistive devices as appropriate  - Consider OT/PT consult to assist with strengthening/mobility  - Encourage toileting schedule  Outcome: Progressing     Problem: DISCHARGE PLANNING  Goal: Discharge to home or other facility with appropriate resources  Description: INTERVENTIONS:  - Identify barriers to discharge w/pt and caregiver  - Include patient/family/discharge partner in discharge planning  - Arrange for needed discharge resources and transportation as appropriate  - Identify discharge learning needs (meds, wound care, etc)  - Arrange for interpreters to assist at discharge as needed  - Consider post-discharge preferences of patient/family/discharge partner  - Complete POLST form as appropriate  - Assess patient's ability to be responsible for managing their own health  - Refer to Case Management Department for coordinating discharge planning if the patient needs post-hospital services based on physician/LIP order or complex needs related to functional status, cognitive ability or social support system  Outcome: Progressing

## 2023-10-08 NOTE — PLAN OF CARE
Problem: Patient Centered Care  Goal: Patient preferences are identified and integrated in the patient's plan of care  Description: Interventions:  - What would you like us to know as we care for you? From home with wife  - Provide timely, complete, and accurate information to patient/family  - Incorporate patient and family knowledge, values, beliefs, and cultural backgrounds into the planning and delivery of care  - Encourage patient/family to participate in care and decision-making at the level they choose  - Honor patient and family perspectives and choices  Outcome: Progressing     Problem: Patient/Family Goals  Goal: Patient/Family Long Term Goal  Description: Patient's Long Term Goal: Discharge home    Interventions:  - PT/OT  - tolerate diet  - IV abx  - See additional Care Plan goals for specific interventions  Outcome: Progressing  Goal: Patient/Family Short Term Goal  Description: Patient's Short Term Goal: Discontinue oxygen    Interventions:   - Wean off oxygen  - IV abx for pneumonia  - See additional Care Plan goals for specific interventions  Outcome: Progressing     Problem: CARDIOVASCULAR - ADULT  Goal: Maintains optimal cardiac output and hemodynamic stability  Description: INTERVENTIONS:  - Monitor vital signs, rhythm, and trends  - Monitor for bleeding, hypotension and signs of decreased cardiac output  - Evaluate effectiveness of vasoactive medications to optimize hemodynamic stability  - Monitor arterial and/or venous puncture sites for bleeding and/or hematoma  - Assess quality of pulses, skin color and temperature  - Assess for signs of decreased coronary artery perfusion - ex.  Angina  - Evaluate fluid balance, assess for edema, trend weights  Outcome: Progressing  Goal: Absence of cardiac arrhythmias or at baseline  Description: INTERVENTIONS:  - Continuous cardiac monitoring, monitor vital signs, obtain 12 lead EKG if indicated  - Evaluate effectiveness of antiarrhythmic and heart rate control medications as ordered  - Initiate emergency measures for life threatening arrhythmias  - Monitor electrolytes and administer replacement therapy as ordered  Outcome: Progressing     Problem: RESPIRATORY - ADULT  Goal: Achieves optimal ventilation and oxygenation  Description: INTERVENTIONS:  - Assess for changes in respiratory status  - Assess for changes in mentation and behavior  - Position to facilitate oxygenation and minimize respiratory effort  - Oxygen supplementation based on oxygen saturation or ABGs  - Provide Smoking Cessation handout, if applicable  - Encourage broncho-pulmonary hygiene including cough, deep breathe, Incentive Spirometry  - Assess the need for suctioning and perform as needed  - Assess and instruct to report SOB or any respiratory difficulty  - Respiratory Therapy support as indicated  - Manage/alleviate anxiety  - Monitor for signs/symptoms of CO2 retention  Outcome: Progressing     Problem: SKIN/TISSUE INTEGRITY - ADULT  Goal: Skin integrity remains intact  Description: INTERVENTIONS  - Assess and document risk factors for pressure ulcer development  - Assess and document skin integrity  - Monitor for areas of redness and/or skin breakdown  - Initiate interventions, skin care algorithm/standards of care as needed  Outcome: Progressing     Problem: MUSCULOSKELETAL - ADULT  Goal: Return mobility to safest level of function  Description: INTERVENTIONS:  - Assess patient stability and activity tolerance for standing, transferring and ambulating w/ or w/o assistive devices  - Assist with transfers and ambulation using safe patient handling equipment as needed  - Ensure adequate protection for wounds/incisions during mobilization  - Obtain PT/OT consults as needed  - Advance activity as appropriate  - Communicate ordered activity level and limitations with patient/family  Outcome: Progressing     Problem: PAIN - ADULT  Goal: Verbalizes/displays adequate comfort level or patient's stated pain goal  Description: INTERVENTIONS:  - Encourage pt to monitor pain and request assistance  - Assess pain using appropriate pain scale  - Administer analgesics based on type and severity of pain and evaluate response  - Implement non-pharmacological measures as appropriate and evaluate response  - Consider cultural and social influences on pain and pain management  - Manage/alleviate anxiety  - Utilize distraction and/or relaxation techniques  - Monitor for opioid side effects  - Notify MD/LIP if interventions unsuccessful or patient reports new pain  - Anticipate increased pain with activity and pre-medicate as appropriate  Outcome: Progressing     Problem: RISK FOR INFECTION - ADULT  Goal: Absence of fever/infection during anticipated neutropenic period  Description: INTERVENTIONS  - Monitor WBC  - Administer growth factors as ordered  - Implement neutropenic guidelines  Outcome: Progressing     Problem: SAFETY ADULT - FALL  Goal: Free from fall injury  Description: INTERVENTIONS:  - Assess pt frequently for physical needs  - Identify cognitive and physical deficits and behaviors that affect risk of falls.   - [place] fall precautions as indicated by assessment.  - Educate pt/family on patient safety including physical limitations  - Instruct pt to call for assistance with activity based on assessment  - Modify environment to reduce risk of injury  - Provide assistive devices as appropriate  - Consider OT/PT consult to assist with strengthening/mobility  - Encourage toileting schedule  Outcome: Progressing     Problem: DISCHARGE PLANNING  Goal: Discharge to home or other facility with appropriate resources  Description: INTERVENTIONS:  - Identify barriers to discharge w/pt and caregiver  - Include patient/family/discharge partner in discharge planning  - Arrange for needed discharge resources and transportation as appropriate  - Identify discharge learning needs (meds, wound care, etc)  - Arrange for interpreters to assist at discharge as needed  - Consider post-discharge preferences of patient/family/discharge partner  - Complete POLST form as appropriate  - Assess patient's ability to be responsible for managing their own health  - Refer to Case Management Department for coordinating discharge planning if the patient needs post-hospital services based on physician/LIP order or complex needs related to functional status, cognitive ability or social support system  Outcome: Progressing    IV antibiotics. Norco and Tylenol prn for pain. Patient is on room air. Patient is alert. Up x 2 assist. Plan; 2D Echo.  Pending V[facility]AS choice once medically cleared per MD.

## 2023-10-08 NOTE — CM/SW NOTE
Obtained VARGAS list of quality data from [facility] and presented to pt and wife at bedside. Confirmed agreeable to VARGAS at discharge. Explained referral process, quality data, and next steps. No questions at this time. PASRR completed and uploaded. PLAN:  VARGAS - pending choice & med clear      SW/CM to remain available for support and/or discharge planning.          Kamille Hopkins, MSW, [facility]

## 2023-10-09 LAB
ALBUMIN SERPL-MCNC: 2.5 G/DL (ref 3.4–5)
ALBUMIN/GLOB SERPL: 0.5 {RATIO} (ref 1–2)
ALP LIVER SERPL-CCNC: 90 U/L
ALT SERPL-CCNC: 15 U/L
ANION GAP SERPL CALC-SCNC: 6 MMOL/L (ref 0–18)
AST SERPL-CCNC: 15 U/L (ref 15–37)
BASOPHILS # BLD AUTO: 0.06 X10(3) UL (ref 0–0.2)
BASOPHILS NFR BLD AUTO: 0.7 %
BILIRUB SERPL-MCNC: 0.4 MG/DL (ref 0.1–2)
BUN BLD-MCNC: 14 MG/DL (ref 7–18)
BUN/CREAT SERPL: 9 (ref 10–20)
CALCIUM BLD-MCNC: 9.2 MG/DL (ref 8.5–10.1)
CHLORIDE SERPL-SCNC: 111 MMOL/L (ref 98–112)
CO2 SERPL-SCNC: 26 MMOL/L (ref 21–32)
CREAT BLD-MCNC: 1.56 MG/DL
DEPRECATED RDW RBC AUTO: 49.8 FL (ref 35.1–46.3)
EGFRCR SERPLBLD CKD-EPI 2021: 44 ML/MIN/1.73M2 (ref 60–?)
EOSINOPHIL # BLD AUTO: 0.28 X10(3) UL (ref 0–0.7)
EOSINOPHIL NFR BLD AUTO: 3.2 %
ERYTHROCYTE [DISTWIDTH] IN BLOOD BY AUTOMATED COUNT: 13.9 % (ref 11–15)
GLOBULIN PLAS-MCNC: 4.7 G/DL (ref 2.8–4.4)
GLUCOSE BLD-MCNC: 101 MG/DL (ref 70–99)
HCT VFR BLD AUTO: 37.4 %
HGB BLD-MCNC: 11.2 G/DL
IMM GRANULOCYTES # BLD AUTO: 0.03 X10(3) UL (ref 0–1)
IMM GRANULOCYTES NFR BLD: 0.3 %
LYMPHOCYTES # BLD AUTO: 2.08 X10(3) UL (ref 1–4)
LYMPHOCYTES NFR BLD AUTO: 23.8 %
MCH RBC QN AUTO: 28.9 PG (ref 26–34)
MCHC RBC AUTO-ENTMCNC: 29.9 G/DL (ref 31–37)
MCV RBC AUTO: 96.6 FL
MONOCYTES # BLD AUTO: 0.94 X10(3) UL (ref 0.1–1)
MONOCYTES NFR BLD AUTO: 10.8 %
NEUTROPHILS # BLD AUTO: 5.34 X10 (3) UL (ref 1.5–7.7)
NEUTROPHILS # BLD AUTO: 5.34 X10(3) UL (ref 1.5–7.7)
NEUTROPHILS NFR BLD AUTO: 61.2 %
OSMOLALITY SERPL CALC.SUM OF ELEC: 297 MOSM/KG (ref 275–295)
PLATELET # BLD AUTO: 230 10(3)UL (ref 150–450)
POTASSIUM SERPL-SCNC: 3.6 MMOL/L (ref 3.5–5.1)
PROT SERPL-MCNC: 7.2 G/DL (ref 6.4–8.2)
RBC # BLD AUTO: 3.87 X10(6)UL
SODIUM SERPL-SCNC: 143 MMOL/L (ref 136–145)
WBC # BLD AUTO: 8.7 X10(3) UL (ref 4–11)

## 2023-10-09 PROCEDURE — 99233 SBSQ HOSP IP/OBS HIGH 50: CPT | Performed by: INTERNAL MEDICINE

## 2023-10-09 NOTE — PLAN OF CARE
Problem: Patient Centered Care  Goal: Patient preferences are identified and integrated in the patient's plan of care  Description: Interventions:  - What would you like us to know as we care for you? From home with wife  - Provide timely, complete, and accurate information to patient/family  - Incorporate patient and family knowledge, values, beliefs, and cultural backgrounds into the planning and delivery of care  - Encourage patient/family to participate in care and decision-making at the level they choose  - Honor patient and family perspectives and choices  Outcome: Progressing     Problem: Patient/Family Goals  Goal: Patient/Family Long Term Goal  Description: Patient's Long Term Goal: Discharge home    Interventions:  - PT/OT  - tolerate diet  - IV abx  - See additional Care Plan goals for specific interventions  Outcome: Progressing  Goal: Patient/Family Short Term Goal  Description: Patient's Short Term Goal: Discontinue oxygen    Interventions:   - Wean off oxygen  - IV abx for pneumonia  - See additional Care Plan goals for specific interventions  Outcome: Progressing     Problem: CARDIOVASCULAR - ADULT  Goal: Maintains optimal cardiac output and hemodynamic stability  Description: INTERVENTIONS:  - Monitor vital signs, rhythm, and trends  - Monitor for bleeding, hypotension and signs of decreased cardiac output  - Evaluate effectiveness of vasoactive medications to optimize hemodynamic stability  - Monitor arterial and/or venous puncture sites for bleeding and/or hematoma  - Assess quality of pulses, skin color and temperature  - Assess for signs of decreased coronary artery perfusion - ex.  Angina  - Evaluate fluid balance, assess for edema, trend weights  Outcome: Progressing  Goal: Absence of cardiac arrhythmias or at baseline  Description: INTERVENTIONS:  - Continuous cardiac monitoring, monitor vital signs, obtain 12 lead EKG if indicated  - Evaluate effectiveness of antiarrhythmic and heart rate control medications as ordered  - Initiate emergency measures for life threatening arrhythmias  - Monitor electrolytes and administer replacement therapy as ordered  Outcome: Progressing     Problem: RESPIRATORY - ADULT  Goal: Achieves optimal ventilation and oxygenation  Description: INTERVENTIONS:  - Assess for changes in respiratory status  - Assess for changes in mentation and behavior  - Position to facilitate oxygenation and minimize respiratory effort  - Oxygen supplementation based on oxygen saturation or ABGs  - Provide Smoking Cessation handout, if applicable  - Encourage broncho-pulmonary hygiene including cough, deep breathe, Incentive Spirometry  - Assess the need for suctioning and perform as needed  - Assess and instruct to report SOB or any respiratory difficulty  - Respiratory Therapy support as indicated  - Manage/alleviate anxiety  - Monitor for signs/symptoms of CO2 retention  Outcome: Progressing     Problem: SKIN/TISSUE INTEGRITY - ADULT  Goal: Skin integrity remains intact  Description: INTERVENTIONS  - Assess and document risk factors for pressure ulcer development  - Assess and document skin integrity  - Monitor for areas of redness and/or skin breakdown  - Initiate interventions, skin care algorithm/standards of care as needed  Outcome: Progressing     Problem: MUSCULOSKELETAL - ADULT  Goal: Return mobility to safest level of function  Description: INTERVENTIONS:  - Assess patient stability and activity tolerance for standing, transferring and ambulating w/ or w/o assistive devices  - Assist with transfers and ambulation using safe patient handling equipment as needed  - Ensure adequate protection for wounds/incisions during mobilization  - Obtain PT/OT consults as needed  - Advance activity as appropriate  - Communicate ordered activity level and limitations with patient/family  Outcome: Progressing     Problem: PAIN - ADULT  Goal: Verbalizes/displays adequate comfort level or patient's stated pain goal  Description: INTERVENTIONS:  - Encourage pt to monitor pain and request assistance  - Assess pain using appropriate pain scale  - Administer analgesics based on type and severity of pain and evaluate response  - Implement non-pharmacological measures as appropriate and evaluate response  - Consider cultural and social influences on pain and pain management  - Manage/alleviate anxiety  - Utilize distraction and/or relaxation techniques  - Monitor for opioid side effects  - Notify MD/LIP if interventions unsuccessful or patient reports new pain  - Anticipate increased pain with activity and pre-medicate as appropriate  Outcome: Progressing     Problem: RISK FOR INFECTION - ADULT  Goal: Absence of fever/infection during anticipated neutropenic period  Description: INTERVENTIONS  - Monitor WBC  - Administer growth factors as ordered  - Implement neutropenic guidelines  Outcome: Progressing     Problem: SAFETY ADULT - FALL  Goal: Free from fall injury  Description: INTERVENTIONS:  - Assess pt frequently for physical needs  - Identify cognitive and physical deficits and behaviors that affect risk of falls.   - [place] fall precautions as indicated by assessment.  - Educate pt/family on patient safety including physical limitations  - Instruct pt to call for assistance with activity based on assessment  - Modify environment to reduce risk of injury  - Provide assistive devices as appropriate  - Consider OT/PT consult to assist with strengthening/mobility  - Encourage toileting schedule  Outcome: Progressing     Problem: DISCHARGE PLANNING  Goal: Discharge to home or other facility with appropriate resources  Description: INTERVENTIONS:  - Identify barriers to discharge w/pt and caregiver  - Include patient/family/discharge partner in discharge planning  - Arrange for needed discharge resources and transportation as appropriate  - Identify discharge learning needs (meds, wound care, etc)  - Arrange for interpreters to assist at discharge as needed  - Consider post-discharge preferences of patient/family/discharge partner  - Complete POLST form as appropriate  - Assess patient's ability to be responsible for managing their own health  - Refer to Case Management Department for coordinating discharge planning if the patient needs post-hospital services based on physician/LIP order or complex needs related to functional status, cognitive ability or social support system  Outcome: Progressing    Patient was up to chair today. Patient is on 2L of oxygen. Iv antibiotics. Plavix. Norco prn for pain in shoulder.   Plan; [facility]AS once medically cleared per MD.

## 2023-10-09 NOTE — PLAN OF CARE
Problem: Patient Centered Care  Goal: Patient preferences are identified and integrated in the patient's plan of care  Description: Interventions:  - What would you like us to know as we care for you? From home with wife  - Provide timely, complete, and accurate information to patient/family  - Incorporate patient and family knowledge, values, beliefs, and cultural backgrounds into the planning and delivery of care  - Encourage patient/family to participate in care and decision-making at the level they choose  - Honor patient and family perspectives and choices  Outcome: Progressing     Problem: Patient/Family Goals  Goal: Patient/Family Long Term Goal  Description: Patient's Long Term Goal: Discharge home    Interventions:  - PT/OT  - tolerate diet  - IV abx  - See additional Care Plan goals for specific interventions  Outcome: Progressing  Goal: Patient/Family Short Term Goal  Description: Patient's Short Term Goal: Discontinue oxygen    Interventions:   - Wean off oxygen  - IV abx for pneumonia  - See additional Care Plan goals for specific interventions  Outcome: Progressing     Problem: CARDIOVASCULAR - ADULT  Goal: Maintains optimal cardiac output and hemodynamic stability  Description: INTERVENTIONS:  - Monitor vital signs, rhythm, and trends  - Monitor for bleeding, hypotension and signs of decreased cardiac output  - Evaluate effectiveness of vasoactive medications to optimize hemodynamic stability  - Monitor arterial and/or venous puncture sites for bleeding and/or hematoma  - Assess quality of pulses, skin color and temperature  - Assess for signs of decreased coronary artery perfusion - ex.  Angina  - Evaluate fluid balance, assess for edema, trend weights  Outcome: Progressing  Goal: Absence of cardiac arrhythmias or at baseline  Description: INTERVENTIONS:  - Continuous cardiac monitoring, monitor vital signs, obtain 12 lead EKG if indicated  - Evaluate effectiveness of antiarrhythmic and heart rate control medications as ordered  - Initiate emergency measures for life threatening arrhythmias  - Monitor electrolytes and administer replacement therapy as ordered  Outcome: Progressing     Problem: RESPIRATORY - ADULT  Goal: Achieves optimal ventilation and oxygenation  Description: INTERVENTIONS:  - Assess for changes in respiratory status  - Assess for changes in mentation and behavior  - Position to facilitate oxygenation and minimize respiratory effort  - Oxygen supplementation based on oxygen saturation or ABGs  - Provide Smoking Cessation handout, if applicable  - Encourage broncho-pulmonary hygiene including cough, deep breathe, Incentive Spirometry  - Assess the need for suctioning and perform as needed  - Assess and instruct to report SOB or any respiratory difficulty  - Respiratory Therapy support as indicated  - Manage/alleviate anxiety  - Monitor for signs/symptoms of CO2 retention  Outcome: Progressing     Problem: SKIN/TISSUE INTEGRITY - ADULT  Goal: Skin integrity remains intact  Description: INTERVENTIONS  - Assess and document risk factors for pressure ulcer development  - Assess and document skin integrity  - Monitor for areas of redness and/or skin breakdown  - Initiate interventions, skin care algorithm/standards of care as needed  Outcome: Progressing     Problem: MUSCULOSKELETAL - ADULT  Goal: Return mobility to safest level of function  Description: INTERVENTIONS:  - Assess patient stability and activity tolerance for standing, transferring and ambulating w/ or w/o assistive devices  - Assist with transfers and ambulation using safe patient handling equipment as needed  - Ensure adequate protection for wounds/incisions during mobilization  - Obtain PT/OT consults as needed  - Advance activity as appropriate  - Communicate ordered activity level and limitations with patient/family  Outcome: Progressing     Problem: RISK FOR INFECTION - ADULT  Goal: Absence of fever/infection during anticipated neutropenic period  Description: INTERVENTIONS  - Monitor WBC  - Administer growth factors as ordered  - Implement neutropenic guidelines  Outcome: Progressing     Problem: SAFETY ADULT - FALL  Goal: Free from fall injury  Description: INTERVENTIONS:  - Assess pt frequently for physical needs  - Identify cognitive and physical deficits and behaviors that affect risk of falls.   - [place] fall precautions as indicated by assessment.  - Educate pt/family on patient safety including physical limitations  - Instruct pt to call for assistance with activity based on assessment  - Modify environment to reduce risk of injury  - Provide assistive devices as appropriate  - Consider OT/PT consult to assist with strengthening/mobility  - Encourage toileting schedule  Outcome: Progressing     Problem: DISCHARGE PLANNING  Goal: Discharge to home or other facility with appropriate resources  Description: INTERVENTIONS:  - Identify barriers to discharge w/pt and caregiver  - Include patient/family/discharge partner in discharge planning  - Arrange for needed discharge resources and transportation as appropriate  - Identify discharge learning needs (meds, wound care, etc)  - Arrange for interpreters to assist at discharge as needed  - Consider post-discharge preferences of patient/family/discharge partner  - Complete POLST form as appropriate  - Assess patient's ability to be responsible for managing their own health  - Refer to Case Management Department for coordinating discharge planning if the patient needs post-hospital services based on physician/LIP order or complex needs related to functional status, cognitive ability or social support system  Outcome: Progressing

## 2023-10-09 NOTE — CM/SW NOTE
Pt's spouse notified CM that The [address] [place] [address] is the chosen facility for VARGAS at [facility]. Facility reserved in [address] [place], [place] [address]. Plan: The [address] [place] [address] for VARGAS pending medical clearance.     Nayely Johnson, BSN    [phone]

## 2023-10-09 NOTE — SLP NOTE
SPEECH DAILY NOTE - INPATIENT    ASSESSMENT & PLAN   ASSESSMENT  PPE REQUIRED. THIS THERAPIST WORE GLOVES AND MASK FOR DURATION OF EVALUATION. HANDS WASHED UPON ENTRANCE/EXIT. SLP f/u for ongoing meal assessment per recommendations of regular solids and thin liquids per speech therapy recommendations. RN reports pt tolerates diet and medication well with no overt clinical overt clinical signs aspiration. RN approves swallowing therapy session. The pt seen at bedside and agreeable to participate in swallowing therapy. The pt's daughter is present at the time of therapy. Pt denies any swallowing challenges. Pt denies any pain. Pt prefers education via verbal explanation. Pt positioned upright to 90 degrees in bedside chair eating his lunch. Pt alert, afebrile, tolerating 2 L/min NC with oxygen status 94% prior to the start of oral trials. SLP verbally reviewed aspiration precautions and safe swallowing compensatory strategies with the patient and his family. Patient acknowledged understanding of swallowing precautions and no straw precaution. Improving tolerance on po trials of regular solids and thin liquids via cup with no overt clinical signs of aspiration (e.g., immediate/delayed throat clear, immediate/delayed cough, wet vocal quality, increased O2 effort) observed across all trials. The pt demonstrated use of sitting upright, taking controlled amounts,no straw,and alternating consistencies. Oxygen status remained stable throughout the entire session. Oral/buccal cavities clear of residue at the end of the session and post the swallow. Cough was present 1x when the therapist was ready to leave the room when the pt was not eating. Pt and family report the pt has this baseline cough when not eating. No other coughing or overt clinical signs of aspiration noted. Recommend to continue current diet of regular solids and thin liquids with no straws.   Swallowing precautions posted in written format on white board to assist pt with carry over. The pt was left sitting upright in chair finishing his meal with family present. PLAN: Speech therapy to be discontinued at this time secondary to the pt demonstrating tolerance of current diet with picking softer foods and use of swallowing precautions. Pt declining any modified diet. Pt also does not feel he needs another VFSS as he has at least 4 in the past with no significant dysphagia seen. RN alerted with results and recommendations. MOST RECENT CXR 10/5  CONCLUSION:   1. Borderline cardiomegaly. Tortuous aorta. 2. 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 3. No acute alveolar airspace consolidation. .          Diet Recommendations - Solids: Regular (pick softer foods)  Diet Recommendations - Liquids: Thin Liquids    Compensatory Strategies Recommended: No straws; Slow rate;Small bites and sips  Aspiration Precautions: Upright position; Slow rate;Small bites and sips; No straw  Medication Administration Recommendations: Whole in puree    Patient Experiencing Pain: No                Discharge Recommendations  Discharge Recommendations/Plan: Undetermined    Treatment Plan  Treatment Plan/Recommendations: Aspiration precautions    Interdisciplinary Communication: Plan posted at bedside            GOALS  Goal #1 The patient will tolerate regular (pick softer foods) consistency and thin liquids without overt signs or symptoms of aspiration with 100 % accuracy over 1-2 session(s). Pt tolerated regular/soft solids and thin liquids without overt clinical signs of aspiration with 100% acc over 2nd session  Met   Goal #2 The patient/family/caregiver will demonstrate understanding and implementation of aspiration precautions and swallow strategies independently over 1-2 session(s). Pt v/u of aspiration precautions and swallow strategies, wife not present.  Will continue to reinforice  In Progress   Goal #3 The patient will utilize compensatory strategies as outlined by  BSSE (clinical evaluation) including Slow rate, Small bites, Small sips, No straws, Upright 90 degrees, Supervision with meals with PRN assistance 100 % of the time across 2 sessions. Pt utilized compensatory strategies as outlined above after swallowing education provided. Pt reports not using a straw at home. Improved control of small bites/small sips. Met   Goal #4 VFSS if warranted and agreeable    Pt declining any modified diet or VFSS at this time. Discontinued     FOLLOW UP  Follow Up Needed (Documentation Required): No    Number of Visits to Meet Established Goals: 0    Session: 2    If you have any questions, please contact     Pierre Dillard MS/CCC-SLP  Speech Language Pathologist  [facility]  EXT.  [phone]

## 2023-10-10 ENCOUNTER — APPOINTMENT (OUTPATIENT)
Dept: ULTRASOUND IMAGING | Facility: HOSPITAL | Age: 83
End: 2023-10-10
Attending: INTERNAL MEDICINE
Payer: MEDICARE

## 2023-10-10 ENCOUNTER — APPOINTMENT (OUTPATIENT)
Dept: CV DIAGNOSTICS | Facility: HOSPITAL | Age: 83
End: 2023-10-10
Attending: INTERNAL MEDICINE
Payer: MEDICARE

## 2023-10-10 LAB
ALBUMIN SERPL-MCNC: 2.6 G/DL (ref 3.4–5)
ALBUMIN/GLOB SERPL: 0.5 {RATIO} (ref 1–2)
ALP LIVER SERPL-CCNC: 105 U/L
ALT SERPL-CCNC: 16 U/L
ANION GAP SERPL CALC-SCNC: 4 MMOL/L (ref 0–18)
AST SERPL-CCNC: 20 U/L (ref 15–37)
BASOPHILS # BLD AUTO: 0.05 X10(3) UL (ref 0–0.2)
BASOPHILS NFR BLD AUTO: 0.5 %
BILIRUB SERPL-MCNC: 0.4 MG/DL (ref 0.1–2)
BILIRUB UR QL: NEGATIVE
BUN BLD-MCNC: 15 MG/DL (ref 7–18)
BUN/CREAT SERPL: 9.3 (ref 10–20)
CALCIUM BLD-MCNC: 9.3 MG/DL (ref 8.5–10.1)
CHLORIDE SERPL-SCNC: 110 MMOL/L (ref 98–112)
CLARITY UR: CLEAR
CO2 SERPL-SCNC: 29 MMOL/L (ref 21–32)
CREAT BLD-MCNC: 1.61 MG/DL
DEPRECATED RDW RBC AUTO: 49.1 FL (ref 35.1–46.3)
EGFRCR SERPLBLD CKD-EPI 2021: 42 ML/MIN/1.73M2 (ref 60–?)
EOSINOPHIL # BLD AUTO: 0.25 X10(3) UL (ref 0–0.7)
EOSINOPHIL NFR BLD AUTO: 2.7 %
ERYTHROCYTE [DISTWIDTH] IN BLOOD BY AUTOMATED COUNT: 14 % (ref 11–15)
GLOBULIN PLAS-MCNC: 4.9 G/DL (ref 2.8–4.4)
GLUCOSE BLD-MCNC: 92 MG/DL (ref 70–99)
GLUCOSE UR-MCNC: NORMAL MG/DL
HCT VFR BLD AUTO: 37.6 %
HGB BLD-MCNC: 11.4 G/DL
HGB UR QL STRIP.AUTO: NEGATIVE
IMM GRANULOCYTES # BLD AUTO: 0.04 X10(3) UL (ref 0–1)
IMM GRANULOCYTES NFR BLD: 0.4 %
KETONES UR-MCNC: NEGATIVE MG/DL
LEUKOCYTE ESTERASE UR QL STRIP.AUTO: NEGATIVE
LYMPHOCYTES # BLD AUTO: 2.38 X10(3) UL (ref 1–4)
LYMPHOCYTES NFR BLD AUTO: 25.5 %
MCH RBC QN AUTO: 28.9 PG (ref 26–34)
MCHC RBC AUTO-ENTMCNC: 30.3 G/DL (ref 31–37)
MCV RBC AUTO: 95.4 FL
MONOCYTES # BLD AUTO: 1.02 X10(3) UL (ref 0.1–1)
MONOCYTES NFR BLD AUTO: 10.9 %
NEUTROPHILS # BLD AUTO: 5.6 X10 (3) UL (ref 1.5–7.7)
NEUTROPHILS # BLD AUTO: 5.6 X10(3) UL (ref 1.5–7.7)
NEUTROPHILS NFR BLD AUTO: 60 %
NITRITE UR QL STRIP.AUTO: NEGATIVE
OSMOLALITY SERPL CALC.SUM OF ELEC: 296 MOSM/KG (ref 275–295)
PH UR: 6.5 [PH] (ref 5–8)
PLATELET # BLD AUTO: 243 10(3)UL (ref 150–450)
POTASSIUM SERPL-SCNC: 3.8 MMOL/L (ref 3.5–5.1)
PROT SERPL-MCNC: 7.5 G/DL (ref 6.4–8.2)
PROT UR-MCNC: NEGATIVE MG/DL
RBC # BLD AUTO: 3.94 X10(6)UL
SODIUM SERPL-SCNC: 143 MMOL/L (ref 136–145)
SP GR UR STRIP: 1.01 (ref 1–1.03)
UROBILINOGEN UR STRIP-ACNC: NORMAL
WBC # BLD AUTO: 9.3 X10(3) UL (ref 4–11)

## 2023-10-10 PROCEDURE — 76770 US EXAM ABDO BACK WALL COMP: CPT | Performed by: INTERNAL MEDICINE

## 2023-10-10 PROCEDURE — 99233 SBSQ HOSP IP/OBS HIGH 50: CPT | Performed by: INTERNAL MEDICINE

## 2023-10-10 PROCEDURE — 99223 1ST HOSP IP/OBS HIGH 75: CPT | Performed by: INTERNAL MEDICINE

## 2023-10-10 NOTE — PLAN OF CARE
Problem: Patient Centered Care  Goal: Patient preferences are identified and integrated in the patient's plan of care  Description: Interventions:  - What would you like us to know as we care for you? From home with wife  - Provide timely, complete, and accurate information to patient/family  - Incorporate patient and family knowledge, values, beliefs, and cultural backgrounds into the planning and delivery of care  - Encourage patient/family to participate in care and decision-making at the level they choose  - Honor patient and family perspectives and choices  Outcome: Progressing     Problem: Patient/Family Goals  Goal: Patient/Family Long Term Goal  Description: Patient's Long Term Goal: Discharge home    Interventions:  - PT/OT  - tolerate diet  - IV abx  - See additional Care Plan goals for specific interventions  Outcome: Progressing  Goal: Patient/Family Short Term Goal  Description: Patient's Short Term Goal: Discontinue oxygen    Interventions:   - Wean off oxygen  - IV abx for pneumonia  - See additional Care Plan goals for specific interventions  Outcome: Progressing     Problem: CARDIOVASCULAR - ADULT  Goal: Maintains optimal cardiac output and hemodynamic stability  Description: INTERVENTIONS:  - Monitor vital signs, rhythm, and trends  - Monitor for bleeding, hypotension and signs of decreased cardiac output  - Evaluate effectiveness of vasoactive medications to optimize hemodynamic stability  - Monitor arterial and/or venous puncture sites for bleeding and/or hematoma  - Assess quality of pulses, skin color and temperature  - Assess for signs of decreased coronary artery perfusion - ex.  Angina  - Evaluate fluid balance, assess for edema, trend weights  Outcome: Progressing  Goal: Absence of cardiac arrhythmias or at baseline  Description: INTERVENTIONS:  - Continuous cardiac monitoring, monitor vital signs, obtain 12 lead EKG if indicated  - Evaluate effectiveness of antiarrhythmic and heart rate control medications as ordered  - Initiate emergency measures for life threatening arrhythmias  - Monitor electrolytes and administer replacement therapy as ordered  Outcome: Progressing     Problem: RESPIRATORY - ADULT  Goal: Achieves optimal ventilation and oxygenation  Description: INTERVENTIONS:  - Assess for changes in respiratory status  - Assess for changes in mentation and behavior  - Position to facilitate oxygenation and minimize respiratory effort  - Oxygen supplementation based on oxygen saturation or ABGs  - Provide Smoking Cessation handout, if applicable  - Encourage broncho-pulmonary hygiene including cough, deep breathe, Incentive Spirometry  - Assess the need for suctioning and perform as needed  - Assess and instruct to report SOB or any respiratory difficulty  - Respiratory Therapy support as indicated  - Manage/alleviate anxiety  - Monitor for signs/symptoms of CO2 retention  Outcome: Progressing     Problem: SKIN/TISSUE INTEGRITY - ADULT  Goal: Skin integrity remains intact  Description: INTERVENTIONS  - Assess and document risk factors for pressure ulcer development  - Assess and document skin integrity  - Monitor for areas of redness and/or skin breakdown  - Initiate interventions, skin care algorithm/standards of care as needed  Outcome: Progressing     Problem: MUSCULOSKELETAL - ADULT  Goal: Return mobility to safest level of function  Description: INTERVENTIONS:  - Assess patient stability and activity tolerance for standing, transferring and ambulating w/ or w/o assistive devices  - Assist with transfers and ambulation using safe patient handling equipment as needed  - Ensure adequate protection for wounds/incisions during mobilization  - Obtain PT/OT consults as needed  - Advance activity as appropriate  - Communicate ordered activity level and limitations with patient/family  Outcome: Progressing     Problem: PAIN - ADULT  Goal: Verbalizes/displays adequate comfort level or patient's stated pain goal  Description: INTERVENTIONS:  - Encourage pt to monitor pain and request assistance  - Assess pain using appropriate pain scale  - Administer analgesics based on type and severity of pain and evaluate response  - Implement non-pharmacological measures as appropriate and evaluate response  - Consider cultural and social influences on pain and pain management  - Manage/alleviate anxiety  - Utilize distraction and/or relaxation techniques  - Monitor for opioid side effects  - Notify MD/LIP if interventions unsuccessful or patient reports new pain  - Anticipate increased pain with activity and pre-medicate as appropriate  Outcome: Progressing     Problem: RISK FOR INFECTION - ADULT  Goal: Absence of fever/infection during anticipated neutropenic period  Description: INTERVENTIONS  - Monitor WBC  - Administer growth factors as ordered  - Implement neutropenic guidelines  Outcome: Progressing     Problem: SAFETY ADULT - FALL  Goal: Free from fall injury  Description: INTERVENTIONS:  - Assess pt frequently for physical needs  - Identify cognitive and physical deficits and behaviors that affect risk of falls.   - [place] fall precautions as indicated by assessment.  - Educate pt/family on patient safety including physical limitations  - Instruct pt to call for assistance with activity based on assessment  - Modify environment to reduce risk of injury  - Provide assistive devices as appropriate  - Consider OT/PT consult to assist with strengthening/mobility  - Encourage toileting schedule  Outcome: Progressing     Problem: DISCHARGE PLANNING  Goal: Discharge to home or other facility with appropriate resources  Description: INTERVENTIONS:  - Identify barriers to discharge w/pt and caregiver  - Include patient/family/discharge partner in discharge planning  - Arrange for needed discharge resources and transportation as appropriate  - Identify discharge learning needs (meds, wound care, etc)  - Arrange for interpreters to assist at discharge as needed  - Consider post-discharge preferences of patient/family/discharge partner  - Complete POLST form as appropriate  - Assess patient's ability to be responsible for managing their own health  - Refer to Case Management Department for coordinating discharge planning if the patient needs post-hospital services based on physician/LIP order or complex needs related to functional status, cognitive ability or social support system  Outcome: Progressing     Pt alert and oriented X 4. Pt on 2 L nasal cannula. Pt had complaints of left shoulder pain, PRN medication given- see MAR. Pt on IV abx. Pt had an ultrasound of his kidneys, see results. Safety precautions in place, call light within reach. Plan is for an echo.

## 2023-10-10 NOTE — CM/SW NOTE
Prachi from [facility] inquired of pt's DC status. Per RN, pt not yet medically cleared to [address] consult, pending labs. SW to update [facility] of anticipated DC date, pending pt's clinical course. Pending DC date and bed availability, may need to discuss back up plan w/ family. Per Erick Arenas from [facility], will likely have a bed Wednesday - but will need to follow up thereafter of next available.      PLAN: [facility], pending bed availability and medical clearance    Anthony [place], [place] - [id number]  10/10/23, [id number]

## 2023-10-10 NOTE — PLAN OF CARE
Pt is aox4. On 2L NC. Plan for 2D echo today. Plan for [facility] landing once medially clear. Problem: Patient Centered Care  Goal: Patient preferences are identified and integrated in the patient's plan of care  Description: Interventions:  - What would you like us to know as we care for you? From home with wife  - Provide timely, complete, and accurate information to patient/family  - Incorporate patient and family knowledge, values, beliefs, and cultural backgrounds into the planning and delivery of care  - Encourage patient/family to participate in care and decision-making at the level they choose  - Honor patient and family perspectives and choices  Outcome: Progressing     Problem: Patient/Family Goals  Goal: Patient/Family Long Term Goal  Description: Patient's Long Term Goal: Discharge home    Interventions:  - PT/OT  - tolerate diet  - IV abx  - See additional Care Plan goals for specific interventions  Outcome: Progressing  Goal: Patient/Family Short Term Goal  Description: Patient's Short Term Goal: Discontinue oxygen    Interventions:   - Wean off oxygen  - IV abx for pneumonia  - See additional Care Plan goals for specific interventions  Outcome: Progressing     Problem: CARDIOVASCULAR - ADULT  Goal: Maintains optimal cardiac output and hemodynamic stability  Description: INTERVENTIONS:  - Monitor vital signs, rhythm, and trends  - Monitor for bleeding, hypotension and signs of decreased cardiac output  - Evaluate effectiveness of vasoactive medications to optimize hemodynamic stability  - Monitor arterial and/or venous puncture sites for bleeding and/or hematoma  - Assess quality of pulses, skin color and temperature  - Assess for signs of decreased coronary artery perfusion - ex.  Angina  - Evaluate fluid balance, assess for edema, trend weights  Outcome: Progressing  Goal: Absence of cardiac arrhythmias or at baseline  Description: INTERVENTIONS:  - Continuous cardiac monitoring, monitor vital signs, obtain 12 lead EKG if indicated  - Evaluate effectiveness of antiarrhythmic and heart rate control medications as ordered  - Initiate emergency measures for life threatening arrhythmias  - Monitor electrolytes and administer replacement therapy as ordered  Outcome: Progressing     Problem: RESPIRATORY - ADULT  Goal: Achieves optimal ventilation and oxygenation  Description: INTERVENTIONS:  - Assess for changes in respiratory status  - Assess for changes in mentation and behavior  - Position to facilitate oxygenation and minimize respiratory effort  - Oxygen supplementation based on oxygen saturation or ABGs  - Provide Smoking Cessation handout, if applicable  - Encourage broncho-pulmonary hygiene including cough, deep breathe, Incentive Spirometry  - Assess the need for suctioning and perform as needed  - Assess and instruct to report SOB or any respiratory difficulty  - Respiratory Therapy support as indicated  - Manage/alleviate anxiety  - Monitor for signs/symptoms of CO2 retention  Outcome: Progressing     Problem: SKIN/TISSUE INTEGRITY - ADULT  Goal: Skin integrity remains intact  Description: INTERVENTIONS  - Assess and document risk factors for pressure ulcer development  - Assess and document skin integrity  - Monitor for areas of redness and/or skin breakdown  - Initiate interventions, skin care algorithm/standards of care as needed  Outcome: Progressing     Problem: MUSCULOSKELETAL - ADULT  Goal: Return mobility to safest level of function  Description: INTERVENTIONS:  - Assess patient stability and activity tolerance for standing, transferring and ambulating w/ or w/o assistive devices  - Assist with transfers and ambulation using safe patient handling equipment as needed  - Ensure adequate protection for wounds/incisions during mobilization  - Obtain PT/OT consults as needed  - Advance activity as appropriate  - Communicate ordered activity level and limitations with patient/family  Outcome: Progressing     Problem: PAIN - ADULT  Goal: Verbalizes/displays adequate comfort level or patient's stated pain goal  Description: INTERVENTIONS:  - Encourage pt to monitor pain and request assistance  - Assess pain using appropriate pain scale  - Administer analgesics based on type and severity of pain and evaluate response  - Implement non-pharmacological measures as appropriate and evaluate response  - Consider cultural and social influences on pain and pain management  - Manage/alleviate anxiety  - Utilize distraction and/or relaxation techniques  - Monitor for opioid side effects  - Notify MD/LIP if interventions unsuccessful or patient reports new pain  - Anticipate increased pain with activity and pre-medicate as appropriate  Outcome: Progressing     Problem: RISK FOR INFECTION - ADULT  Goal: Absence of fever/infection during anticipated neutropenic period  Description: INTERVENTIONS  - Monitor WBC  - Administer growth factors as ordered  - Implement neutropenic guidelines  Outcome: Progressing     Problem: SAFETY ADULT - FALL  Goal: Free from fall injury  Description: INTERVENTIONS:  - Assess pt frequently for physical needs  - Identify cognitive and physical deficits and behaviors that affect risk of falls.   - [place] fall precautions as indicated by assessment.  - Educate pt/family on patient safety including physical limitations  - Instruct pt to call for assistance with activity based on assessment  - Modify environment to reduce risk of injury  - Provide assistive devices as appropriate  - Consider OT/PT consult to assist with strengthening/mobility  - Encourage toileting schedule  Outcome: Progressing     Problem: DISCHARGE PLANNING  Goal: Discharge to home or other facility with appropriate resources  Description: INTERVENTIONS:  - Identify barriers to discharge w/pt and caregiver  - Include patient/family/discharge partner in discharge planning  - Arrange for needed discharge resources and transportation as appropriate  - Identify discharge learning needs (meds, wound care, etc)  - Arrange for interpreters to assist at discharge as needed  - Consider post-discharge preferences of patient/family/discharge partner  - Complete POLST form as appropriate  - Assess patient's ability to be responsible for managing their own health  - Refer to Case Management Department for coordinating discharge planning if the patient needs post-hospital services based on physician/LIP order or complex needs related to functional status, cognitive ability or social support system  Outcome: Progressing

## 2023-10-11 ENCOUNTER — APPOINTMENT (OUTPATIENT)
Dept: GENERAL RADIOLOGY | Facility: HOSPITAL | Age: 83
End: 2023-10-11
Attending: INTERNAL MEDICINE
Payer: MEDICARE

## 2023-10-11 ENCOUNTER — APPOINTMENT (OUTPATIENT)
Dept: CV DIAGNOSTICS | Facility: HOSPITAL | Age: 83
End: 2023-10-11
Attending: INTERNAL MEDICINE
Payer: MEDICARE

## 2023-10-11 VITALS
TEMPERATURE: 99 F | HEIGHT: 70.5 IN | DIASTOLIC BLOOD PRESSURE: 81 MMHG | WEIGHT: 187.88 LBS | HEART RATE: 88 BPM | BODY MASS INDEX: 26.6 KG/M2 | OXYGEN SATURATION: 93 % | RESPIRATION RATE: 18 BRPM | SYSTOLIC BLOOD PRESSURE: 157 MMHG

## 2023-10-11 LAB
ALBUMIN SERPL-MCNC: 2.8 G/DL (ref 3.4–5)
ALBUMIN/GLOB SERPL: 0.6 {RATIO} (ref 1–2)
ALP LIVER SERPL-CCNC: 154 U/L
ALT SERPL-CCNC: 26 U/L
ANION GAP SERPL CALC-SCNC: 7 MMOL/L (ref 0–18)
AST SERPL-CCNC: 39 U/L (ref 15–37)
BASOPHILS # BLD AUTO: 0.06 X10(3) UL (ref 0–0.2)
BASOPHILS NFR BLD AUTO: 0.6 %
BILIRUB SERPL-MCNC: 0.6 MG/DL (ref 0.1–2)
BUN BLD-MCNC: 15 MG/DL (ref 7–18)
BUN/CREAT SERPL: 10.4 (ref 10–20)
CALCIUM BLD-MCNC: 9.3 MG/DL (ref 8.5–10.1)
CHLORIDE SERPL-SCNC: 106 MMOL/L (ref 98–112)
CO2 SERPL-SCNC: 27 MMOL/L (ref 21–32)
CREAT BLD-MCNC: 1.44 MG/DL
DEPRECATED RDW RBC AUTO: 49.3 FL (ref 35.1–46.3)
EGFRCR SERPLBLD CKD-EPI 2021: 49 ML/MIN/1.73M2 (ref 60–?)
EOSINOPHIL # BLD AUTO: 0.16 X10(3) UL (ref 0–0.7)
EOSINOPHIL NFR BLD AUTO: 1.5 %
ERYTHROCYTE [DISTWIDTH] IN BLOOD BY AUTOMATED COUNT: 13.9 % (ref 11–15)
GLOBULIN PLAS-MCNC: 5 G/DL (ref 2.8–4.4)
GLUCOSE BLD-MCNC: 111 MG/DL (ref 70–99)
HCT VFR BLD AUTO: 37.6 %
HGB BLD-MCNC: 11.6 G/DL
IMM GRANULOCYTES # BLD AUTO: 0.04 X10(3) UL (ref 0–1)
IMM GRANULOCYTES NFR BLD: 0.4 %
LYMPHOCYTES # BLD AUTO: 1.66 X10(3) UL (ref 1–4)
LYMPHOCYTES NFR BLD AUTO: 15.9 %
MCH RBC QN AUTO: 29.5 PG (ref 26–34)
MCHC RBC AUTO-ENTMCNC: 30.9 G/DL (ref 31–37)
MCV RBC AUTO: 95.7 FL
MONOCYTES # BLD AUTO: 0.98 X10(3) UL (ref 0.1–1)
MONOCYTES NFR BLD AUTO: 9.4 %
NEUTROPHILS # BLD AUTO: 7.57 X10 (3) UL (ref 1.5–7.7)
NEUTROPHILS # BLD AUTO: 7.57 X10(3) UL (ref 1.5–7.7)
NEUTROPHILS NFR BLD AUTO: 72.2 %
OSMOLALITY SERPL CALC.SUM OF ELEC: 292 MOSM/KG (ref 275–295)
PHOSPHATE SERPL-MCNC: 2.2 MG/DL (ref 2.5–4.9)
PLATELET # BLD AUTO: 250 10(3)UL (ref 150–450)
POTASSIUM SERPL-SCNC: 3.7 MMOL/L (ref 3.5–5.1)
PROT SERPL-MCNC: 7.8 G/DL (ref 6.4–8.2)
RBC # BLD AUTO: 3.93 X10(6)UL
SODIUM SERPL-SCNC: 140 MMOL/L (ref 136–145)
WBC # BLD AUTO: 10.5 X10(3) UL (ref 4–11)

## 2023-10-11 PROCEDURE — 71046 X-RAY EXAM CHEST 2 VIEWS: CPT | Performed by: INTERNAL MEDICINE

## 2023-10-11 PROCEDURE — 99233 SBSQ HOSP IP/OBS HIGH 50: CPT | Performed by: INTERNAL MEDICINE

## 2023-10-11 PROCEDURE — 93306 TTE W/DOPPLER COMPLETE: CPT | Performed by: INTERNAL MEDICINE

## 2023-10-11 PROCEDURE — 99233 SBSQ HOSP IP/OBS HIGH 50: CPT | Performed by: HOSPITALIST

## 2023-10-11 RX ORDER — FUROSEMIDE 10 MG/ML
20 INJECTION INTRAMUSCULAR; INTRAVENOUS ONCE
Status: COMPLETED | OUTPATIENT
Start: 2023-10-11 | End: 2023-10-11

## 2023-10-11 NOTE — CM/SW NOTE
10/11/23 1312   Discharge disposition   Expected discharge disposition subacute   Post Acute Care Provider [facility] at [facility]   Discharge transportation [facility]       Confirmed pt is cleared for DC. Issac at [facility] to 4:30PM discharge. RN/Acacia updated. Pt's wife Charleen Ayers notified via pt's room phone per request earlier. Agreeable to DC plans and time.     SW spoke to [facility] w/ [facility] (O2) set for 4:30PM. PCS completed in [facility] - pt's RN to print w/ pt's AVS.    Report phone #: [phone]    PLAN: [address] at [employer], Ambulance set for 4:30PM, [address] completed        EDWIN Alvarez, [address] [facility]

## 2023-10-11 NOTE — PLAN OF CARE
Pt is aox4. On 2L o2. Continued IV abx. Prn norco given. Awaiting 2D echo. Plan for [facility] once medically cleared. Problem: Patient Centered Care  Goal: Patient preferences are identified and integrated in the patient's plan of care  Description: Interventions:  - What would you like us to know as we care for you? From home with wife  - Provide timely, complete, and accurate information to patient/family  - Incorporate patient and family knowledge, values, beliefs, and cultural backgrounds into the planning and delivery of care  - Encourage patient/family to participate in care and decision-making at the level they choose  - Honor patient and family perspectives and choices  Outcome: Progressing     Problem: Patient/Family Goals  Goal: Patient/Family Long Term Goal  Description: Patient's Long Term Goal: Discharge home    Interventions:  - PT/OT  - tolerate diet  - IV abx  - See additional Care Plan goals for specific interventions  Outcome: Progressing  Goal: Patient/Family Short Term Goal  Description: Patient's Short Term Goal: Discontinue oxygen    Interventions:   - Wean off oxygen  - IV abx for pneumonia  - See additional Care Plan goals for specific interventions  Outcome: Progressing     Problem: CARDIOVASCULAR - ADULT  Goal: Maintains optimal cardiac output and hemodynamic stability  Description: INTERVENTIONS:  - Monitor vital signs, rhythm, and trends  - Monitor for bleeding, hypotension and signs of decreased cardiac output  - Evaluate effectiveness of vasoactive medications to optimize hemodynamic stability  - Monitor arterial and/or venous puncture sites for bleeding and/or hematoma  - Assess quality of pulses, skin color and temperature  - Assess for signs of decreased coronary artery perfusion - ex.  Angina  - Evaluate fluid balance, assess for edema, trend weights  Outcome: Progressing  Goal: Absence of cardiac arrhythmias or at baseline  Description: INTERVENTIONS:  - Continuous cardiac monitoring, monitor vital signs, obtain 12 lead EKG if indicated  - Evaluate effectiveness of antiarrhythmic and heart rate control medications as ordered  - Initiate emergency measures for life threatening arrhythmias  - Monitor electrolytes and administer replacement therapy as ordered  Outcome: Progressing     Problem: RESPIRATORY - ADULT  Goal: Achieves optimal ventilation and oxygenation  Description: INTERVENTIONS:  - Assess for changes in respiratory status  - Assess for changes in mentation and behavior  - Position to facilitate oxygenation and minimize respiratory effort  - Oxygen supplementation based on oxygen saturation or ABGs  - Provide Smoking Cessation handout, if applicable  - Encourage broncho-pulmonary hygiene including cough, deep breathe, Incentive Spirometry  - Assess the need for suctioning and perform as needed  - Assess and instruct to report SOB or any respiratory difficulty  - Respiratory Therapy support as indicated  - Manage/alleviate anxiety  - Monitor for signs/symptoms of CO2 retention  Outcome: Progressing     Problem: SKIN/TISSUE INTEGRITY - ADULT  Goal: Skin integrity remains intact  Description: INTERVENTIONS  - Assess and document risk factors for pressure ulcer development  - Assess and document skin integrity  - Monitor for areas of redness and/or skin breakdown  - Initiate interventions, skin care algorithm/standards of care as needed  Outcome: Progressing     Problem: MUSCULOSKELETAL - ADULT  Goal: Return mobility to safest level of function  Description: INTERVENTIONS:  - Assess patient stability and activity tolerance for standing, transferring and ambulating w/ or w/o assistive devices  - Assist with transfers and ambulation using safe patient handling equipment as needed  - Ensure adequate protection for wounds/incisions during mobilization  - Obtain PT/OT consults as needed  - Advance activity as appropriate  - Communicate ordered activity level and limitations with patient/family  Outcome: Progressing     Problem: PAIN - ADULT  Goal: Verbalizes/displays adequate comfort level or patient's stated pain goal  Description: INTERVENTIONS:  - Encourage pt to monitor pain and request assistance  - Assess pain using appropriate pain scale  - Administer analgesics based on type and severity of pain and evaluate response  - Implement non-pharmacological measures as appropriate and evaluate response  - Consider cultural and social influences on pain and pain management  - Manage/alleviate anxiety  - Utilize distraction and/or relaxation techniques  - Monitor for opioid side effects  - Notify MD/LIP if interventions unsuccessful or patient reports new pain  - Anticipate increased pain with activity and pre-medicate as appropriate  Outcome: Progressing     Problem: RISK FOR INFECTION - ADULT  Goal: Absence of fever/infection during anticipated neutropenic period  Description: INTERVENTIONS  - Monitor WBC  - Administer growth factors as ordered  - Implement neutropenic guidelines  Outcome: Progressing     Problem: SAFETY ADULT - FALL  Goal: Free from fall injury  Description: INTERVENTIONS:  - Assess pt frequently for physical needs  - Identify cognitive and physical deficits and behaviors that affect risk of falls.   - [place] fall precautions as indicated by assessment.  - Educate pt/family on patient safety including physical limitations  - Instruct pt to call for assistance with activity based on assessment  - Modify environment to reduce risk of injury  - Provide assistive devices as appropriate  - Consider OT/PT consult to assist with strengthening/mobility  - Encourage toileting schedule  Outcome: Progressing     Problem: DISCHARGE PLANNING  Goal: Discharge to home or other facility with appropriate resources  Description: INTERVENTIONS:  - Identify barriers to discharge w/pt and caregiver  - Include patient/family/discharge partner in discharge planning  - Arrange for needed discharge resources and transportation as appropriate  - Identify discharge learning needs (meds, wound care, etc)  - Arrange for interpreters to assist at discharge as needed  - Consider post-discharge preferences of patient/family/discharge partner  - Complete POLST form as appropriate  - Assess patient's ability to be responsible for managing their own health  - Refer to Case Management Department for coordinating discharge planning if the patient needs post-hospital services based on physician/LIP order or complex needs related to functional status, cognitive ability or social support system  Outcome: Progressing

## 2023-10-11 NOTE — CM/SW NOTE
Notified by pt's RN Nahomi Bourgeois - pt needing xray and then likely OK for Discharge. [facility] at [facility] and can accept once cleared. RN to notify SW when discharge confirmed. Received call from pt's wife Julio Cesar Ocampo - provided update. SW spoke to [facility] (O2) set on WILL CALL through 10/12. PCS completed w/ today's date. PLAN: [facility] at [facility] on [place], [place] completed - pending med clear      SW/CM to remain available for support and/or discharge planning.          Thad Sena, MSW, [address] [facility]

## 2023-10-11 NOTE — PLAN OF CARE
Problem: Patient Centered Care  Goal: Patient preferences are identified and integrated in the patient's plan of care  Description: Interventions:  - What would you like us to know as we care for you? From home with wife  - Provide timely, complete, and accurate information to patient/family  - Incorporate patient and family knowledge, values, beliefs, and cultural backgrounds into the planning and delivery of care  - Encourage patient/family to participate in care and decision-making at the level they choose  - Honor patient and family perspectives and choices  Outcome: Adequate for Discharge     Problem: Patient/Family Goals  Goal: Patient/Family Long Term Goal  Description: Patient's Long Term Goal: Discharge home    Interventions:  - PT/OT  - tolerate diet  - IV abx  - See additional Care Plan goals for specific interventions  Outcome: Adequate for Discharge  Goal: Patient/Family Short Term Goal  Description: Patient's Short Term Goal: Discontinue oxygen    Interventions:   - Wean off oxygen  - IV abx for pneumonia  - See additional Care Plan goals for specific interventions  Outcome: Adequate for Discharge     Problem: CARDIOVASCULAR - ADULT  Goal: Maintains optimal cardiac output and hemodynamic stability  Description: INTERVENTIONS:  - Monitor vital signs, rhythm, and trends  - Monitor for bleeding, hypotension and signs of decreased cardiac output  - Evaluate effectiveness of vasoactive medications to optimize hemodynamic stability  - Monitor arterial and/or venous puncture sites for bleeding and/or hematoma  - Assess quality of pulses, skin color and temperature  - Assess for signs of decreased coronary artery perfusion - ex.  Angina  - Evaluate fluid balance, assess for edema, trend weights  Outcome: Adequate for Discharge  Goal: Absence of cardiac arrhythmias or at baseline  Description: INTERVENTIONS:  - Continuous cardiac monitoring, monitor vital signs, obtain 12 lead EKG if indicated  - Evaluate effectiveness of antiarrhythmic and heart rate control medications as ordered  - Initiate emergency measures for life threatening arrhythmias  - Monitor electrolytes and administer replacement therapy as ordered  Outcome: Adequate for Discharge     Problem: RESPIRATORY - ADULT  Goal: Achieves optimal ventilation and oxygenation  Description: INTERVENTIONS:  - Assess for changes in respiratory status  - Assess for changes in mentation and behavior  - Position to facilitate oxygenation and minimize respiratory effort  - Oxygen supplementation based on oxygen saturation or ABGs  - Provide Smoking Cessation handout, if applicable  - Encourage broncho-pulmonary hygiene including cough, deep breathe, Incentive Spirometry  - Assess the need for suctioning and perform as needed  - Assess and instruct to report SOB or any respiratory difficulty  - Respiratory Therapy support as indicated  - Manage/alleviate anxiety  - Monitor for signs/symptoms of CO2 retention  Outcome: Adequate for Discharge     Problem: SKIN/TISSUE INTEGRITY - ADULT  Goal: Skin integrity remains intact  Description: INTERVENTIONS  - Assess and document risk factors for pressure ulcer development  - Assess and document skin integrity  - Monitor for areas of redness and/or skin breakdown  - Initiate interventions, skin care algorithm/standards of care as needed  Outcome: Adequate for Discharge     Problem: MUSCULOSKELETAL - ADULT  Goal: Return mobility to safest level of function  Description: INTERVENTIONS:  - Assess patient stability and activity tolerance for standing, transferring and ambulating w/ or w/o assistive devices  - Assist with transfers and ambulation using safe patient handling equipment as needed  - Ensure adequate protection for wounds/incisions during mobilization  - Obtain PT/OT consults as needed  - Advance activity as appropriate  - Communicate ordered activity level and limitations with patient/family  Outcome: Adequate for Discharge     Problem: PAIN - ADULT  Goal: Verbalizes/displays adequate comfort level or patient's stated pain goal  Description: INTERVENTIONS:  - Encourage pt to monitor pain and request assistance  - Assess pain using appropriate pain scale  - Administer analgesics based on type and severity of pain and evaluate response  - Implement non-pharmacological measures as appropriate and evaluate response  - Consider cultural and social influences on pain and pain management  - Manage/alleviate anxiety  - Utilize distraction and/or relaxation techniques  - Monitor for opioid side effects  - Notify MD/LIP if interventions unsuccessful or patient reports new pain  - Anticipate increased pain with activity and pre-medicate as appropriate  Outcome: Adequate for Discharge     Problem: RISK FOR INFECTION - ADULT  Goal: Absence of fever/infection during anticipated neutropenic period  Description: INTERVENTIONS  - Monitor WBC  - Administer growth factors as ordered  - Implement neutropenic guidelines  Outcome: Adequate for Discharge     Problem: SAFETY ADULT - FALL  Goal: Free from fall injury  Description: INTERVENTIONS:  - Assess pt frequently for physical needs  - Identify cognitive and physical deficits and behaviors that affect risk of falls.   - [place] fall precautions as indicated by assessment.  - Educate pt/family on patient safety including physical limitations  - Instruct pt to call for assistance with activity based on assessment  - Modify environment to reduce risk of injury  - Provide assistive devices as appropriate  - Consider OT/PT consult to assist with strengthening/mobility  - Encourage toileting schedule  Outcome: Adequate for Discharge     Problem: DISCHARGE PLANNING  Goal: Discharge to home or other facility with appropriate resources  Description: INTERVENTIONS:  - Identify barriers to discharge w/pt and caregiver  - Include patient/family/discharge partner in discharge planning  - Arrange for needed discharge resources and transportation as appropriate  - Identify discharge learning needs (meds, wound care, etc)  - Arrange for interpreters to assist at discharge as needed  - Consider post-discharge preferences of patient/family/discharge partner  - Complete POLST form as appropriate  - Assess patient's ability to be responsible for managing their own health  - Refer to Case Management Department for coordinating discharge planning if the patient needs post-hospital services based on physician/LIP order or complex needs related to functional status, cognitive ability or social support system  Outcome: Adequate for Discharge

## 2023-10-12 ENCOUNTER — INITIAL APN SNF VISIT (OUTPATIENT)
Dept: INTERNAL MEDICINE CLINIC | Age: 83
End: 2023-10-12

## 2023-10-12 VITALS
TEMPERATURE: 98 F | OXYGEN SATURATION: 90 % | HEART RATE: 75 BPM | SYSTOLIC BLOOD PRESSURE: 126 MMHG | RESPIRATION RATE: 19 BRPM | DIASTOLIC BLOOD PRESSURE: 71 MMHG

## 2023-10-12 DIAGNOSIS — J69.0 ASPIRATION PNEUMONIA OF LEFT LOWER LOBE, UNSPECIFIED ASPIRATION PNEUMONIA TYPE (HCC): ICD-10-CM

## 2023-10-12 DIAGNOSIS — M15.9 PRIMARY OSTEOARTHRITIS INVOLVING MULTIPLE JOINTS: ICD-10-CM

## 2023-10-12 DIAGNOSIS — N18.31 STAGE 3A CHRONIC KIDNEY DISEASE (HCC): ICD-10-CM

## 2023-10-12 DIAGNOSIS — E03.9 HYPOTHYROIDISM, UNSPECIFIED TYPE: ICD-10-CM

## 2023-10-12 DIAGNOSIS — I10 ESSENTIAL HYPERTENSION: ICD-10-CM

## 2023-10-12 DIAGNOSIS — I69.359 CVA, OLD, HEMIPARESIS (HCC): ICD-10-CM

## 2023-10-12 DIAGNOSIS — R53.81 PHYSICAL DECONDITIONING: ICD-10-CM

## 2023-10-12 DIAGNOSIS — R53.1 WEAKNESS: Primary | ICD-10-CM

## 2023-10-12 DIAGNOSIS — E78.00 HYPERCHOLESTEROLEMIA: ICD-10-CM

## 2023-10-12 DIAGNOSIS — G81.94 LEFT HEMIPARESIS (HCC): ICD-10-CM

## 2023-10-12 PROCEDURE — 99310 SBSQ NF CARE HIGH MDM 45: CPT | Performed by: NURSE PRACTITIONER

## 2023-10-12 PROCEDURE — 1126F AMNT PAIN NOTED NONE PRSNT: CPT | Performed by: NURSE PRACTITIONER

## 2023-10-12 PROCEDURE — 1111F DSCHRG MED/CURRENT MED MERGE: CPT | Performed by: NURSE PRACTITIONER

## 2023-10-12 RX ORDER — DIAPER,BRIEF,INFANT-TODD,DISP
1 EACH MISCELLANEOUS 2 TIMES DAILY
COMMUNITY

## 2023-10-13 ENCOUNTER — TELEPHONE (OUTPATIENT)
Dept: FAMILY MEDICINE CLINIC | Facility: CLINIC | Age: 83
End: 2023-10-13

## 2023-10-13 ENCOUNTER — EXTERNAL FACILITY (OUTPATIENT)
Dept: FAMILY MEDICINE CLINIC | Facility: CLINIC | Age: 83
End: 2023-10-13

## 2023-10-13 DIAGNOSIS — G81.94 LEFT HEMIPARESIS (HCC): ICD-10-CM

## 2023-10-13 DIAGNOSIS — Z86.73 HISTORY OF STROKE: ICD-10-CM

## 2023-10-13 DIAGNOSIS — R53.1 GENERALIZED WEAKNESS: ICD-10-CM

## 2023-10-13 DIAGNOSIS — R13.10 DYSPHAGIA, UNSPECIFIED TYPE: ICD-10-CM

## 2023-10-13 DIAGNOSIS — I10 ESSENTIAL HYPERTENSION: ICD-10-CM

## 2023-10-13 DIAGNOSIS — N18.30 STAGE 3 CHRONIC KIDNEY DISEASE, UNSPECIFIED WHETHER STAGE 3A OR 3B CKD (HCC): ICD-10-CM

## 2023-10-13 DIAGNOSIS — J69.0 ASPIRATION PNEUMONIA OF LEFT LOWER LOBE, UNSPECIFIED ASPIRATION PNEUMONIA TYPE (HCC): ICD-10-CM

## 2023-10-13 DIAGNOSIS — R53.81 PHYSICAL DECONDITIONING: Primary | ICD-10-CM

## 2023-10-13 PROCEDURE — 1111F DSCHRG MED/CURRENT MED MERGE: CPT | Performed by: FAMILY MEDICINE

## 2023-10-13 PROCEDURE — 99306 1ST NF CARE HIGH MDM 50: CPT | Performed by: FAMILY MEDICINE

## 2023-10-13 PROCEDURE — G0317 PROLNG NSG FAC E/M EA ADDL 15 MIN: HCPCS | Performed by: FAMILY MEDICINE

## 2023-10-15 ENCOUNTER — HOSPITAL ENCOUNTER (OUTPATIENT)
Facility: HOSPITAL | Age: 83
Setting detail: OBSERVATION
LOS: 1 days | Discharge: NURSING, REHAB OR LONG-TERM CARE | End: 2023-10-16
Attending: EMERGENCY MEDICINE | Admitting: HOSPITALIST
Payer: MEDICARE

## 2023-10-15 ENCOUNTER — APPOINTMENT (OUTPATIENT)
Dept: GENERAL RADIOLOGY | Facility: HOSPITAL | Age: 83
End: 2023-10-15
Attending: EMERGENCY MEDICINE
Payer: MEDICARE

## 2023-10-15 DIAGNOSIS — R09.02 HYPOXIA: Primary | ICD-10-CM

## 2023-10-15 DIAGNOSIS — J69.0 ASPIRATION PNEUMONITIS (HCC): ICD-10-CM

## 2023-10-15 LAB
ALBUMIN SERPL-MCNC: 3.1 G/DL (ref 3.4–5)
ALBUMIN/GLOB SERPL: 0.5 {RATIO} (ref 1–2)
ALP LIVER SERPL-CCNC: 124 U/L
ALT SERPL-CCNC: 22 U/L
ANION GAP SERPL CALC-SCNC: 6 MMOL/L (ref 0–18)
AST SERPL-CCNC: 21 U/L (ref 15–37)
BASOPHILS # BLD AUTO: 0.07 X10(3) UL (ref 0–0.2)
BASOPHILS NFR BLD AUTO: 0.5 %
BILIRUB SERPL-MCNC: 0.3 MG/DL (ref 0.1–2)
BUN BLD-MCNC: 28 MG/DL (ref 7–18)
CALCIUM BLD-MCNC: 9.3 MG/DL (ref 8.5–10.1)
CHLORIDE SERPL-SCNC: 107 MMOL/L (ref 98–112)
CO2 SERPL-SCNC: 28 MMOL/L (ref 21–32)
CREAT BLD-MCNC: 1.84 MG/DL
EGFRCR SERPLBLD CKD-EPI 2021: 36 ML/MIN/1.73M2 (ref 60–?)
EOSINOPHIL # BLD AUTO: 0.22 X10(3) UL (ref 0–0.7)
EOSINOPHIL NFR BLD AUTO: 1.7 %
ERYTHROCYTE [DISTWIDTH] IN BLOOD BY AUTOMATED COUNT: 14.2 %
FLUAV + FLUBV RNA SPEC NAA+PROBE: NEGATIVE
FLUAV + FLUBV RNA SPEC NAA+PROBE: NEGATIVE
GLOBULIN PLAS-MCNC: 5.7 G/DL (ref 2.8–4.4)
GLUCOSE BLD-MCNC: 103 MG/DL (ref 70–99)
HCT VFR BLD AUTO: 41 %
HGB BLD-MCNC: 12.6 G/DL
IMM GRANULOCYTES # BLD AUTO: 0.06 X10(3) UL (ref 0–1)
IMM GRANULOCYTES NFR BLD: 0.5 %
LYMPHOCYTES # BLD AUTO: 2.9 X10(3) UL (ref 1–4)
LYMPHOCYTES NFR BLD AUTO: 22.6 %
MCH RBC QN AUTO: 29.4 PG (ref 26–34)
MCHC RBC AUTO-ENTMCNC: 30.7 G/DL (ref 31–37)
MCV RBC AUTO: 95.8 FL
MONOCYTES # BLD AUTO: 1.43 X10(3) UL (ref 0.1–1)
MONOCYTES NFR BLD AUTO: 11.1 %
NEUTROPHILS # BLD AUTO: 8.17 X10 (3) UL (ref 1.5–7.7)
NEUTROPHILS # BLD AUTO: 8.17 X10(3) UL (ref 1.5–7.7)
NEUTROPHILS NFR BLD AUTO: 63.6 %
NT-PROBNP SERPL-MCNC: 149 PG/ML (ref ?–450)
OSMOLALITY SERPL CALC.SUM OF ELEC: 298 MOSM/KG (ref 275–295)
PLATELET # BLD AUTO: 273 10(3)UL (ref 150–450)
POTASSIUM SERPL-SCNC: 3.6 MMOL/L (ref 3.5–5.1)
PROT SERPL-MCNC: 8.8 G/DL (ref 6.4–8.2)
RBC # BLD AUTO: 4.28 X10(6)UL
RSV RNA SPEC NAA+PROBE: NEGATIVE
SARS-COV-2 RNA RESP QL NAA+PROBE: NOT DETECTED
SODIUM SERPL-SCNC: 141 MMOL/L (ref 136–145)
TROPONIN I HIGH SENSITIVITY: 8 NG/L
WBC # BLD AUTO: 12.9 X10(3) UL (ref 4–11)

## 2023-10-15 PROCEDURE — 71045 X-RAY EXAM CHEST 1 VIEW: CPT | Performed by: EMERGENCY MEDICINE

## 2023-10-15 PROCEDURE — 99223 1ST HOSP IP/OBS HIGH 75: CPT | Performed by: HOSPITALIST

## 2023-10-15 RX ORDER — BISACODYL 10 MG
10 SUPPOSITORY, RECTAL RECTAL
Status: DISCONTINUED | OUTPATIENT
Start: 2023-10-15 | End: 2023-10-16

## 2023-10-15 RX ORDER — GABAPENTIN 300 MG/1
300 CAPSULE ORAL 3 TIMES DAILY
Status: DISCONTINUED | OUTPATIENT
Start: 2023-10-15 | End: 2023-10-16

## 2023-10-15 RX ORDER — BENZONATATE 100 MG/1
100 CAPSULE ORAL 3 TIMES DAILY PRN
Status: DISCONTINUED | OUTPATIENT
Start: 2023-10-15 | End: 2023-10-16

## 2023-10-15 RX ORDER — ASPIRIN 81 MG/1
81 TABLET ORAL DAILY
Status: DISCONTINUED | OUTPATIENT
Start: 2023-10-16 | End: 2023-10-16

## 2023-10-15 RX ORDER — MELATONIN
3 NIGHTLY PRN
Status: DISCONTINUED | OUTPATIENT
Start: 2023-10-15 | End: 2023-10-16

## 2023-10-15 RX ORDER — CLOPIDOGREL BISULFATE 75 MG/1
75 TABLET ORAL DAILY
Status: DISCONTINUED | OUTPATIENT
Start: 2023-10-16 | End: 2023-10-16

## 2023-10-15 RX ORDER — LEVOTHYROXINE SODIUM 0.1 MG/1
100 TABLET ORAL DAILY
Status: DISCONTINUED | OUTPATIENT
Start: 2023-10-16 | End: 2023-10-16

## 2023-10-15 RX ORDER — AMLODIPINE BESYLATE 5 MG/1
5 TABLET ORAL DAILY
Status: DISCONTINUED | OUTPATIENT
Start: 2023-10-16 | End: 2023-10-16

## 2023-10-15 RX ORDER — ONDANSETRON 2 MG/ML
4 INJECTION INTRAMUSCULAR; INTRAVENOUS EVERY 4 HOURS PRN
Status: DISCONTINUED | OUTPATIENT
Start: 2023-10-15 | End: 2023-10-15 | Stop reason: HOSPADM

## 2023-10-15 RX ORDER — HEPARIN SODIUM 5000 [USP'U]/ML
5000 INJECTION, SOLUTION INTRAVENOUS; SUBCUTANEOUS EVERY 12 HOURS SCHEDULED
Status: DISCONTINUED | OUTPATIENT
Start: 2023-10-16 | End: 2023-10-16

## 2023-10-15 RX ORDER — CALCIUM CARBONATE 500 MG/1
500 TABLET, CHEWABLE ORAL
Status: DISCONTINUED | OUTPATIENT
Start: 2023-10-15 | End: 2023-10-16

## 2023-10-15 RX ORDER — SODIUM CHLORIDE 9 MG/ML
INJECTION, SOLUTION INTRAVENOUS CONTINUOUS
Status: ACTIVE | OUTPATIENT
Start: 2023-10-15 | End: 2023-10-15

## 2023-10-15 RX ORDER — ACETAMINOPHEN 500 MG
500 TABLET ORAL EVERY 4 HOURS PRN
Status: DISCONTINUED | OUTPATIENT
Start: 2023-10-15 | End: 2023-10-16

## 2023-10-15 RX ORDER — POLYETHYLENE GLYCOL 3350 17 G/17G
17 POWDER, FOR SOLUTION ORAL DAILY PRN
Status: DISCONTINUED | OUTPATIENT
Start: 2023-10-15 | End: 2023-10-16

## 2023-10-15 RX ORDER — ATORVASTATIN CALCIUM 40 MG/1
40 TABLET, FILM COATED ORAL NIGHTLY
Status: DISCONTINUED | OUTPATIENT
Start: 2023-10-15 | End: 2023-10-16

## 2023-10-15 RX ORDER — ECHINACEA PURPUREA EXTRACT 125 MG
1 TABLET ORAL
Status: DISCONTINUED | OUTPATIENT
Start: 2023-10-15 | End: 2023-10-16

## 2023-10-15 RX ORDER — ONDANSETRON 2 MG/ML
8 INJECTION INTRAMUSCULAR; INTRAVENOUS EVERY 6 HOURS PRN
Status: DISCONTINUED | OUTPATIENT
Start: 2023-10-15 | End: 2023-10-16

## 2023-10-15 RX ORDER — SODIUM CHLORIDE 9 MG/ML
INJECTION, SOLUTION INTRAVENOUS CONTINUOUS
Status: DISCONTINUED | OUTPATIENT
Start: 2023-10-15 | End: 2023-10-16

## 2023-10-15 RX ORDER — BENZONATATE 200 MG/1
200 CAPSULE ORAL 3 TIMES DAILY PRN
Status: DISCONTINUED | OUTPATIENT
Start: 2023-10-15 | End: 2023-10-15

## 2023-10-15 RX ORDER — SENNOSIDES 8.6 MG
17.2 TABLET ORAL NIGHTLY PRN
Status: DISCONTINUED | OUTPATIENT
Start: 2023-10-15 | End: 2023-10-16

## 2023-10-15 NOTE — PROGRESS NOTES
NURSING ADMISSION NOTE      Patient admitted via Cart  Oriented to room. Safety precautions initiated. Bed in low position. Call light in reach. Patient is A+Ox4, forgetful at times, upper/lower dentures. Maintaining sats >90% on 2L, RA is baseline. NSR on tele. Strict NPO- Ice chips okay/ sips w/ meds okay. Up 1 w/ walker. IV ABX per MAR.  Patient + wife at bedside updated on POC, CXR + SLP eval in AM.

## 2023-10-15 NOTE — ED INITIAL ASSESSMENT (HPI)
Pt from [facility] via EMS. Reports pt was eating and started coughing. Medics reports pt coughed up \"food\" PTA. Pt w/ hx aspiration PNA. +cough. Denies sob, denies chest pain.

## 2023-10-15 NOTE — ED QUICK NOTES
Orders for admission, patient is aware of plan and ready to go upstairs. Any questions, please call ED RN  at extension [phone] . Patient Covid vaccination status: Fully vaccinated     COVID Test Ordered in ED: SARS-CoV-2/Flu A and B/RSV by PCR (GeneXpert)    COVID Suspicion at Admission: N/A    Running Infusions:      Mental Status/LOC at time of transport: a/ox4 dtr at bedside.  2L NC    Other pertinent information:   CIWA score: N/A   NIH score:  N/A

## 2023-10-16 ENCOUNTER — PATIENT OUTREACH (OUTPATIENT)
Dept: CASE MANAGEMENT | Age: 83
End: 2023-10-16

## 2023-10-16 VITALS
HEART RATE: 77 BPM | TEMPERATURE: 98 F | HEIGHT: 70 IN | DIASTOLIC BLOOD PRESSURE: 63 MMHG | BODY MASS INDEX: 26.77 KG/M2 | RESPIRATION RATE: 18 BRPM | SYSTOLIC BLOOD PRESSURE: 130 MMHG | WEIGHT: 187 LBS | OXYGEN SATURATION: 92 %

## 2023-10-16 LAB
ANION GAP SERPL CALC-SCNC: 6 MMOL/L (ref 0–18)
ATRIAL RATE: 101 BPM
BUN BLD-MCNC: 20 MG/DL (ref 7–18)
CALCIUM BLD-MCNC: 8.6 MG/DL (ref 8.5–10.1)
CHLORIDE SERPL-SCNC: 113 MMOL/L (ref 98–112)
CO2 SERPL-SCNC: 26 MMOL/L (ref 21–32)
CREAT BLD-MCNC: 1.37 MG/DL
EGFRCR SERPLBLD CKD-EPI 2021: 52 ML/MIN/1.73M2 (ref 60–?)
ERYTHROCYTE [DISTWIDTH] IN BLOOD BY AUTOMATED COUNT: 14.2 %
GLUCOSE BLD-MCNC: 99 MG/DL (ref 70–99)
HCT VFR BLD AUTO: 35.5 %
HGB BLD-MCNC: 11.2 G/DL
MAGNESIUM SERPL-MCNC: 2.3 MG/DL (ref 1.6–2.6)
MCH RBC QN AUTO: 29.3 PG (ref 26–34)
MCHC RBC AUTO-ENTMCNC: 31.5 G/DL (ref 31–37)
MCV RBC AUTO: 92.9 FL
OSMOLALITY SERPL CALC.SUM OF ELEC: 303 MOSM/KG (ref 275–295)
P AXIS: 47 DEGREES
P-R INTERVAL: 154 MS
PHOSPHATE SERPL-MCNC: 2.4 MG/DL (ref 2.5–4.9)
PLATELET # BLD AUTO: 251 10(3)UL (ref 150–450)
POTASSIUM SERPL-SCNC: 3.5 MMOL/L (ref 3.5–5.1)
PROCALCITONIN SERPL-MCNC: <0.05 NG/ML (ref ?–0.16)
Q-T INTERVAL: 388 MS
QRS DURATION: 148 MS
QTC CALCULATION (BEZET): 503 MS
R AXIS: -63 DEGREES
RBC # BLD AUTO: 3.82 X10(6)UL
SODIUM SERPL-SCNC: 145 MMOL/L (ref 136–145)
T AXIS: 70 DEGREES
VENTRICULAR RATE: 101 BPM
WBC # BLD AUTO: 9.4 X10(3) UL (ref 4–11)

## 2023-10-16 PROCEDURE — 99238 HOSP IP/OBS DSCHRG MGMT 30/<: CPT | Performed by: HOSPITALIST

## 2023-10-16 RX ORDER — AMOXICILLIN AND CLAVULANATE POTASSIUM 875; 125 MG/1; MG/1
1 TABLET, FILM COATED ORAL 2 TIMES DAILY
Qty: 10 TABLET | Refills: 0 | Status: SHIPPED | OUTPATIENT
Start: 2023-10-16 | End: 2023-10-21

## 2023-10-16 NOTE — PROGRESS NOTES
NURSING DISCHARGE NOTE    Discharged Nursing home via Ambulance. Accompanied by Support staff  Belongings Taken by patient/family.     - Removed Tele and IV  - Completed education and discharge teaching  - Patient will follow up with PCP  - Patient taken home via ambulance

## 2023-10-16 NOTE — CM/SW NOTE
Patient failed inpatient criteria. Second level of review completed and supports observation. UR committee in agreement. Discussed with Dr. Bo Mcnally who approves observation status. Observation letter given to the patient and order written.   Karin Flores RN, [address] [place] [address]  Extension [phone]

## 2023-10-16 NOTE — CM/SW NOTE
Pt is an 82 yo male admitted for hypoxia. Pt has a history of a CVA with some left sided weakness. Pt came from [facility] where he was getting VARGAS. He is no longer on 02. Pt is ready to return to [facility] for VARGAS today. Coordinated dc time with Rogerioofnuno from [facility]. RN to call report to [phone]. [facility] is scheduled to  pt at 2:00pm.  PCS form completed. RN to call family to tell the, dc time. 10/16/23 1100   CM/SW Referral Data   Referral Source Physician   Reason for Referral Discharge planning   Readmission Assessment   Pt's living situation prior to admission?  Subacute rehab   Discharge Needs   Anticipated D/C needs Subacute rehab

## 2023-10-16 NOTE — CM/SW NOTE
Department  notified of request for rafia KAYE referrals started. Assigned CM/SW to follow up with pt/family on further discharge planning.       Freddie Oropeza  [facility]

## 2023-10-16 NOTE — PROGRESS NOTES
Patient seen and examined. Discharge if only needing 1-2L oxygen AND if going to NH that will manage and wean the O2. Hospitalist portion of med rec completed.     Sonia Rosado MD  [facility]  Internal Medicine Hospitalist

## 2023-10-16 NOTE — PLAN OF CARE
Problem: Patient/Family Goals  Goal: Patient/Family Long Term Goal  Description: Patient's Long Term Goal: to return home    Interventions:  - IV antbx  IV fluids  SLP  - See additional Care Plan goals for specific interventions  Outcome: Progressing  Goal: Patient/Family Short Term Goal  Description: Patient's Short Term Goal: 10/16 noc: sleep    Interventions:   - cluster care, minimize interruptions  - See additional Care Plan goals for specific interventions  Outcome: Progressing   Dx: aspiration PNA  Plan of care: IV antbx, IV fluids, SLP eval, NPO except sips with meds  Patient communicates understanding, currently on 2 liters

## 2023-10-16 NOTE — SLP NOTE
ADULT SWALLOWING EVALUATION    ASSESSMENT    ASSESSMENT/OVERALL IMPRESSION:  Patient is an 80 y/o male admitted with cough and PMHx significant for HTN and CVA. SLP order received to evaluate oropharyngeal swallow. Patient recently seen by SLP service at [facility] 10/5-10/9. Patient was recommended a regular diet and thin liquids, but to choose softer items. Patient refused instrumental evaluation at that time as he noted a number of prior swallow studies completed in the past. Patient reported good tolerance of PO intake prior to hospitalization. He feels choking incident at rehab was [place] freak accident\" and does not typically occur. He was unable to recall recommendations for choosing softer items during his prior hospitalization. Patient presenting with a largely intact oropharyngeal swallow. Bolus acceptance was adequate without evidence of anterior bolus loss. Mastication was mildly prolonged with trace scattered oral residue. This was cleared with a liquid wash. No overt s/s of aspiration observed and patient denied odynophagia and globus sensation across consistencies. Recommend patient initiate a soft/easy-to-chew diet and thin liquids. Bolus size and rate of intake should be limited. Education provided re: VFSS and patient politely declined. He noted disinterest in modified diet consistencies. Education provided re: safe swallowing strategies. No further SLP services warranted at this time. Education provided re: recommendations. Service remains available for consult should patient status change or patient agreeable to VFSS. RECOMMENDATIONS   Diet Recommendations - Solids: Soft/ Easy to chew  Diet Recommendations - Liquids:  Thin Liquids                        Compensatory Strategies Recommended: Small bites and sips  Aspiration Precautions: Upright position  Medication Administration Recommendations: One pill at a time  Treatment Plan/Recommendations: No further inpatient SLP service warranted       HISTORY   MEDICAL HISTORY  Reason for Referral: R/O aspiration    Problem List  Principal Problem:    Hypoxia  Active Problems:    Aspiration pneumonitis [facility])      Past Medical History  Past Medical History:   Diagnosis Date    Abdominal aortic aneurysm (AAA) ([address]     Back problem     back pain    Disorder of thyroid     hypothyroidism    Falls frequently     High blood pressure     High cholesterol     Hx of pneumothorax     Muscle weakness     LUE hemiplegia    Osteoarthritis     PNA (pneumonia)     Pneumonia due to organism 11/2017    Stroke [facility])     left sided weakness       Prior Living Situation: Skilled nursing facility  Diet Prior to Admission: Regular; Thin liquids  Precautions: Aspiration    Patient/Family Goals: none stated    SWALLOWING HISTORY  Current Diet Consistency: NPO  Dysphagia History: as above  Imaging Results:   CXR 10/15/23  CONCLUSION:  Reticular opacities in the lungs appear improved compared to the prior exam.         LOCATION:  [address] [place] [address]                  Dictated by (CST): Keila Leiva MD on 10/15/2023 at 2:08 PM       Finalized by (CST): Keila Leiva MD on 10/15/2023 at 2:09 PM     SUBJECTIVE       OBJECTIVE   ORAL MOTOR EXAMINATION  Dentition: Upper dentures     Strength: Within Functional Limits     Range of Motion: Within Functional Limits       Voice Quality: Clear  Respiratory Status: Unlabored  Consistencies Trialed: Thin liquids;Puree;Hard solid  Method of Presentation: Self presentation  Patient Positioning: Upright;Midline    Oral Phase of Swallow: Within Functional Limits                      Pharyngeal Phase of Swallow: Within Functional Limits           (Please note: Silent aspiration cannot be evaluated clinically.  Videofluoroscopic Swallow Study is required to rule-out silent aspiration.)    Esophageal Phase of Swallow: No complaints consistent with possible esophageal involvement  Comments: d/w RN              FOLLOW UP  Treatment Plan/Recommendations: No further inpatient SLP service warranted     Follow Up Needed (Documentation Required): No       Thank you for your referral.   If you have any questions, please contact BETHANIE Marcos

## 2023-10-17 ENCOUNTER — SNF VISIT (OUTPATIENT)
Dept: INTERNAL MEDICINE CLINIC | Age: 83
End: 2023-10-17

## 2023-10-17 VITALS
HEART RATE: 80 BPM | SYSTOLIC BLOOD PRESSURE: 121 MMHG | DIASTOLIC BLOOD PRESSURE: 71 MMHG | WEIGHT: 206 LBS | RESPIRATION RATE: 19 BRPM | BODY MASS INDEX: 30 KG/M2 | OXYGEN SATURATION: 91 % | TEMPERATURE: 98 F

## 2023-10-17 DIAGNOSIS — N18.31 STAGE 3A CHRONIC KIDNEY DISEASE (HCC): ICD-10-CM

## 2023-10-17 DIAGNOSIS — I69.359 CVA, OLD, HEMIPARESIS (HCC): ICD-10-CM

## 2023-10-17 DIAGNOSIS — I10 ESSENTIAL HYPERTENSION: ICD-10-CM

## 2023-10-17 DIAGNOSIS — J18.9 PNEUMONIA DUE TO INFECTIOUS ORGANISM, UNSPECIFIED LATERALITY, UNSPECIFIED PART OF LUNG: ICD-10-CM

## 2023-10-17 DIAGNOSIS — J69.0 ASPIRATION PNEUMONITIS (HCC): Primary | ICD-10-CM

## 2023-10-17 DIAGNOSIS — R53.81 PHYSICAL DECONDITIONING: ICD-10-CM

## 2023-10-17 DIAGNOSIS — R53.1 WEAKNESS: ICD-10-CM

## 2023-10-17 DIAGNOSIS — G81.94 LEFT HEMIPARESIS (HCC): ICD-10-CM

## 2023-10-17 PROCEDURE — 99310 SBSQ NF CARE HIGH MDM 45: CPT | Performed by: NURSE PRACTITIONER

## 2023-10-20 ENCOUNTER — SNF VISIT (OUTPATIENT)
Dept: INTERNAL MEDICINE CLINIC | Age: 83
End: 2023-10-20

## 2023-10-20 VITALS
SYSTOLIC BLOOD PRESSURE: 147 MMHG | BODY MASS INDEX: 29 KG/M2 | WEIGHT: 205 LBS | DIASTOLIC BLOOD PRESSURE: 78 MMHG | TEMPERATURE: 98 F | HEART RATE: 83 BPM | RESPIRATION RATE: 18 BRPM | OXYGEN SATURATION: 92 %

## 2023-10-20 DIAGNOSIS — I10 ESSENTIAL HYPERTENSION: ICD-10-CM

## 2023-10-20 DIAGNOSIS — J18.9 PNEUMONIA DUE TO INFECTIOUS ORGANISM, UNSPECIFIED LATERALITY, UNSPECIFIED PART OF LUNG: ICD-10-CM

## 2023-10-20 DIAGNOSIS — M15.9 PRIMARY OSTEOARTHRITIS INVOLVING MULTIPLE JOINTS: ICD-10-CM

## 2023-10-20 DIAGNOSIS — J69.0 ASPIRATION PNEUMONITIS (HCC): Primary | ICD-10-CM

## 2023-10-20 DIAGNOSIS — G81.94 LEFT HEMIPARESIS (HCC): ICD-10-CM

## 2023-10-20 DIAGNOSIS — I69.359 CVA, OLD, HEMIPARESIS (HCC): ICD-10-CM

## 2023-10-20 DIAGNOSIS — R53.81 PHYSICAL DECONDITIONING: ICD-10-CM

## 2023-10-20 DIAGNOSIS — R53.1 WEAKNESS: ICD-10-CM

## 2023-10-20 DIAGNOSIS — R09.02 HYPOXIA: ICD-10-CM

## 2023-10-20 PROCEDURE — 99309 SBSQ NF CARE MODERATE MDM 30: CPT | Performed by: NURSE PRACTITIONER

## 2023-10-24 ENCOUNTER — SNF VISIT (OUTPATIENT)
Dept: INTERNAL MEDICINE CLINIC | Age: 83
End: 2023-10-24

## 2023-10-24 VITALS
DIASTOLIC BLOOD PRESSURE: 61 MMHG | SYSTOLIC BLOOD PRESSURE: 132 MMHG | BODY MASS INDEX: 29 KG/M2 | HEART RATE: 82 BPM | WEIGHT: 205 LBS | TEMPERATURE: 98 F | RESPIRATION RATE: 17 BRPM | OXYGEN SATURATION: 90 %

## 2023-10-24 DIAGNOSIS — J18.9 PNEUMONIA DUE TO INFECTIOUS ORGANISM, UNSPECIFIED LATERALITY, UNSPECIFIED PART OF LUNG: ICD-10-CM

## 2023-10-24 DIAGNOSIS — I69.359 CVA, OLD, HEMIPARESIS (HCC): ICD-10-CM

## 2023-10-24 DIAGNOSIS — R53.1 WEAKNESS: ICD-10-CM

## 2023-10-24 DIAGNOSIS — G81.94 LEFT HEMIPARESIS (HCC): ICD-10-CM

## 2023-10-24 DIAGNOSIS — I10 ESSENTIAL HYPERTENSION: ICD-10-CM

## 2023-10-24 DIAGNOSIS — M15.9 PRIMARY OSTEOARTHRITIS INVOLVING MULTIPLE JOINTS: ICD-10-CM

## 2023-10-24 DIAGNOSIS — J69.0 ASPIRATION PNEUMONITIS (HCC): Primary | ICD-10-CM

## 2023-10-24 DIAGNOSIS — R53.81 PHYSICAL DECONDITIONING: ICD-10-CM

## 2023-10-24 PROCEDURE — 99309 SBSQ NF CARE MODERATE MDM 30: CPT | Performed by: NURSE PRACTITIONER

## 2023-10-26 ENCOUNTER — SNF VISIT (OUTPATIENT)
Dept: INTERNAL MEDICINE CLINIC | Age: 83
End: 2023-10-26

## 2023-10-26 VITALS
SYSTOLIC BLOOD PRESSURE: 148 MMHG | WEIGHT: 208.81 LBS | HEART RATE: 75 BPM | BODY MASS INDEX: 30 KG/M2 | RESPIRATION RATE: 18 BRPM | DIASTOLIC BLOOD PRESSURE: 73 MMHG | TEMPERATURE: 98 F | OXYGEN SATURATION: 90 %

## 2023-10-26 DIAGNOSIS — J69.0 ASPIRATION PNEUMONITIS (HCC): Primary | ICD-10-CM

## 2023-10-26 DIAGNOSIS — G81.94 LEFT HEMIPARESIS (HCC): ICD-10-CM

## 2023-10-26 DIAGNOSIS — M15.9 PRIMARY OSTEOARTHRITIS INVOLVING MULTIPLE JOINTS: ICD-10-CM

## 2023-10-26 DIAGNOSIS — D64.9 ANEMIA, UNSPECIFIED TYPE: ICD-10-CM

## 2023-10-26 DIAGNOSIS — R53.1 WEAKNESS: ICD-10-CM

## 2023-10-26 DIAGNOSIS — R09.02 HYPOXIA: ICD-10-CM

## 2023-10-26 DIAGNOSIS — I10 ESSENTIAL HYPERTENSION: ICD-10-CM

## 2023-10-26 DIAGNOSIS — R53.81 PHYSICAL DECONDITIONING: ICD-10-CM

## 2023-10-26 PROCEDURE — 99309 SBSQ NF CARE MODERATE MDM 30: CPT | Performed by: NURSE PRACTITIONER

## 2023-10-30 ENCOUNTER — SNF VISIT (OUTPATIENT)
Dept: INTERNAL MEDICINE CLINIC | Age: 83
End: 2023-10-30

## 2023-10-30 VITALS
RESPIRATION RATE: 18 BRPM | WEIGHT: 208.81 LBS | DIASTOLIC BLOOD PRESSURE: 82 MMHG | SYSTOLIC BLOOD PRESSURE: 160 MMHG | BODY MASS INDEX: 30 KG/M2 | HEART RATE: 85 BPM | TEMPERATURE: 98 F | OXYGEN SATURATION: 90 %

## 2023-10-30 DIAGNOSIS — R60.0 LOCALIZED EDEMA: ICD-10-CM

## 2023-10-30 DIAGNOSIS — J69.0 ASPIRATION PNEUMONITIS (HCC): ICD-10-CM

## 2023-10-30 DIAGNOSIS — I10 ESSENTIAL HYPERTENSION: ICD-10-CM

## 2023-10-30 DIAGNOSIS — G81.94 LEFT HEMIPARESIS (HCC): Primary | ICD-10-CM

## 2023-10-30 DIAGNOSIS — R53.1 WEAKNESS: ICD-10-CM

## 2023-10-30 DIAGNOSIS — R53.81 PHYSICAL DECONDITIONING: ICD-10-CM

## 2023-10-30 DIAGNOSIS — D64.9 ANEMIA, UNSPECIFIED TYPE: ICD-10-CM

## 2023-11-01 ENCOUNTER — SNF VISIT (OUTPATIENT)
Dept: INTERNAL MEDICINE CLINIC | Age: 83
End: 2023-11-01

## 2023-11-01 VITALS
OXYGEN SATURATION: 90 % | WEIGHT: 208.81 LBS | HEART RATE: 77 BPM | DIASTOLIC BLOOD PRESSURE: 72 MMHG | SYSTOLIC BLOOD PRESSURE: 133 MMHG | BODY MASS INDEX: 30 KG/M2 | RESPIRATION RATE: 18 BRPM | TEMPERATURE: 98 F

## 2023-11-01 DIAGNOSIS — R60.0 LOCALIZED EDEMA: ICD-10-CM

## 2023-11-01 DIAGNOSIS — R53.81 PHYSICAL DECONDITIONING: ICD-10-CM

## 2023-11-01 DIAGNOSIS — J69.0 ASPIRATION PNEUMONITIS (HCC): Primary | ICD-10-CM

## 2023-11-01 DIAGNOSIS — D64.9 ANEMIA, UNSPECIFIED TYPE: ICD-10-CM

## 2023-11-01 DIAGNOSIS — R53.1 WEAKNESS: ICD-10-CM

## 2023-11-01 DIAGNOSIS — N18.31 STAGE 3A CHRONIC KIDNEY DISEASE (HCC): ICD-10-CM

## 2023-11-01 DIAGNOSIS — G81.94 LEFT HEMIPARESIS (HCC): ICD-10-CM

## 2023-11-01 DIAGNOSIS — I10 ESSENTIAL HYPERTENSION: ICD-10-CM

## 2023-11-01 PROCEDURE — 99309 SBSQ NF CARE MODERATE MDM 30: CPT | Performed by: NURSE PRACTITIONER

## 2023-11-03 ENCOUNTER — SNF DISCHARGE (OUTPATIENT)
Dept: INTERNAL MEDICINE CLINIC | Age: 83
End: 2023-11-03

## 2023-11-03 VITALS
DIASTOLIC BLOOD PRESSURE: 62 MMHG | BODY MASS INDEX: 30 KG/M2 | RESPIRATION RATE: 17 BRPM | OXYGEN SATURATION: 93 % | HEART RATE: 72 BPM | SYSTOLIC BLOOD PRESSURE: 127 MMHG | TEMPERATURE: 98 F | WEIGHT: 209.63 LBS

## 2023-11-03 DIAGNOSIS — R53.1 WEAKNESS: ICD-10-CM

## 2023-11-03 DIAGNOSIS — G81.94 LEFT HEMIPARESIS (HCC): ICD-10-CM

## 2023-11-03 DIAGNOSIS — D64.9 ANEMIA, UNSPECIFIED TYPE: ICD-10-CM

## 2023-11-03 DIAGNOSIS — I10 ESSENTIAL HYPERTENSION: Primary | ICD-10-CM

## 2023-11-03 DIAGNOSIS — N18.31 STAGE 3A CHRONIC KIDNEY DISEASE (HCC): ICD-10-CM

## 2023-11-03 DIAGNOSIS — M15.9 PRIMARY OSTEOARTHRITIS INVOLVING MULTIPLE JOINTS: ICD-10-CM

## 2023-11-03 DIAGNOSIS — R60.0 LOCALIZED EDEMA: ICD-10-CM

## 2023-11-03 DIAGNOSIS — R53.81 PHYSICAL DECONDITIONING: ICD-10-CM

## 2023-11-03 DIAGNOSIS — J69.0 ASPIRATION PNEUMONITIS (HCC): ICD-10-CM

## 2023-11-03 PROCEDURE — 99316 NF DSCHRG MGMT 30 MIN+: CPT | Performed by: NURSE PRACTITIONER

## 2023-11-06 ENCOUNTER — TELEPHONE (OUTPATIENT)
Dept: INTERNAL MEDICINE CLINIC | Facility: CLINIC | Age: 83
End: 2023-11-06

## 2023-11-06 ENCOUNTER — PATIENT OUTREACH (OUTPATIENT)
Dept: CASE MANAGEMENT | Age: 83
End: 2023-11-06

## 2023-11-06 DIAGNOSIS — J69.0 ASPIRATION PNEUMONIA, UNSPECIFIED ASPIRATION PNEUMONIA TYPE, UNSPECIFIED LATERALITY, UNSPECIFIED PART OF LUNG (HCC): Primary | ICD-10-CM

## 2023-11-06 DIAGNOSIS — Z02.9 ENCOUNTERS FOR UNSPECIFIED ADMINISTRATIVE PURPOSE: ICD-10-CM

## 2023-11-06 PROCEDURE — 1111F DSCHRG MED/CURRENT MED MERGE: CPT

## 2023-11-06 RX ORDER — PANTOPRAZOLE SODIUM 20 MG/1
20 TABLET, DELAYED RELEASE ORAL
COMMUNITY

## 2023-11-06 NOTE — PROGRESS NOTES
Initial Post Discharge Follow Up   Discharge Date: 11/04/2023  Contact Date: 11/6/2023    Consent Verification:  Assessment Completed With: Spouse: Roxanna Reilly received per patient?  written  HIPAA Verified? Yes    Discharge Dx:   Aspiration pneumonia/pneumonitis    General:   How have you been since your discharge from the hospital? Spouse reports pt feeling better, since discharged home from [facility] 11/04/2023. Pt tolerating diet and medications, spouse denies any difficulty swallowing. Spouse denies pt with any fever, chills, nausea, vomiting, diarrhea, productive cough, chest pain or shortness of breath at this time. Aileen Zamora confirms pt has wheelchair and walker at home--has not yet heard from [place] [id number] to resume care. Do you have any pain since discharge? No    How well was your pain managed while in the hospital?   On a scale of 1-5   1- Very Poor and 5- Very well   Very Well  When you were leaving the hospital were your discharge instructions reviewed with you? Yes  How well were your discharge instructions explained to you? On a scale of 1-5   1- Very Poor and 5- Very well   Very Well  Do you have any questions about your discharge instructions? No  Before leaving the hospital was your diagnoses explained to you? Yes  Do you have any questions about your diagnoses? No  Are you able to perform normal daily activities of living as you have prior to your hospital stay (dressing, bathing, ambulating to the bathroom, etc)? yes  (NCM) Was patient given a different diet per AVS? no    Medications:   Current Outpatient Medications   Medication Sig Dispense Refill    hydrocortisone 1 % External Cream Apply 1 Application topically 2 (two) times daily. Apply to face (Patient not taking: Reported on 11/3/2023)      gabapentin 300 MG Oral Cap Take 1 capsule (300 mg total) by mouth 3 (three) times daily.  270 capsule 3    LEVOTHYROXINE 100 MCG Oral Tab TAKE 1 TABLET(100 MCG) BY MOUTH DAILY 90 tablet 3    amLODIPine (NORVASC) 5 MG Oral Tab Take 1 tablet (5 mg total) by mouth daily. 90 tablet 3    clopidogrel 75 MG Oral Tab Take 1 tablet (75 mg total) by mouth daily. 90 tablet 3    atorvastatin 40 MG Oral Tab Take 1 tablet (40 mg total) by mouth nightly. 90 tablet 3    magnesium hydroxide 400 MG/5ML Oral Suspension Take 30 mL by mouth daily as needed. lidocaine 4 % External Patch Place 2 patches onto the skin daily. L Shoulder  &L hip as needed      benzonatate 200 MG Oral Cap Take 1 capsule (200 mg total) by mouth 3 (three) times daily as needed for cough. (Patient not taking: Reported on 11/3/2023) 60 capsule 1    acetaminophen 500 MG Oral Tab Take 2 tablets (1,000 mg total) by mouth in the morning and 2 tablets (1,000 mg total) before bedtime. Also may have 500 mg PO Q6 prn but not to exceed 3 gm total of acetaminophen in 24 hours. Cholecalciferol (VITAMIN D) 2000 units Oral Cap Take 1 capsule (2,000 Units total) by mouth daily. aspirin 81 MG Oral Tab EC Take 1 tablet (81 mg total) by mouth daily. 0     Were there any changes to your current medication(s) noted on the AVS? Yes: Pantoprazole 20 mg one tab daily, Diclofenac 50 mg tab BID  If so, were these medication changes discussed with you prior to leaving the hospital? Yes  If a new medication was prescribed:    Was the new medication's purpose & side effects reviewed? Yes  Do you have any questions about your new medication? No  Did you  your discharge medications when you left the hospital? Yes  Let's go over your medications together to make sure we are not missing anything. Medications Reviewed  Are there any reasons that keep you from taking your medication as prescribed? No  Are you having any concerns with constipation? No  Did patient receive their flu shot (Sept-March)? Yes--10/11/2023    Discharge medications reviewed/discussed/and reconciled against outpatient medications with patient.   Any changes or updates to medications sent to PCP. Patient Acknowledged     Referrals/orders at D/C:  Referrals/orders placed at D/C? yes  What services:   Home health--RN/PT/OT/ST/Bath Aide    (If HH was ordered) Has HH been set up? No    If Yes: With Whom: [facility]  DME ordered at D/C? No--has wheelchair and walker    Discharge orders, AVS reviewed and discussed with patient. Any changes or updates to orders sent to PCP. Patient Acknowledged      SDOH:   Transportation:  Transportation Needs: No Transportation Needs (11/6/2023)      Transportation Needs          Lack of Transportation: No  Financial Strain: Financial Resource Strain: Low Risk  (11/6/2023)      Financial Resource Strain          Difficulty of Paying Living Expenses: Not very hard          Med Affordability: No     Diagnosis specifics:   Pneumonia: Pneumonia:   Tell me what you understand about the signs and symptoms of pneumonia reoccurrence?   (guide to discuss: fever, chills, shaking, chest pain, productive cough, difficulty breathing)    Comments: Spouse denies any of the above symptoms--she will monitor and return pt to ER, if suddenly worsens    Follow up appointments:      TCC  Was TCC ordered: Yes  Was TCC scheduled: No, Explain: spouse prefers to f/u with PCP    PCP (If no TCC appointment)  Does patient already have a PCP appointment scheduled? No  NCM Attempted to schedule PCP office TCM appointment with patient   If no appointment scheduled: Explain: spouse needs to check with her son's schedule, prior to booking    Specialist    Does the patient have any other follow up appointment(s) needing to be scheduled? No    Is there any reason as to why you cannot make your appointment(s)?   No     Needs post D/C:   Now that you are home, are there any needs or concerns you need addressed before your next visit with your PCP?  (DME, meds, questions, etc.): No    Interventions by NCM:   Spoke with spouse, Meme--pt feeling better, since discharged home from [facility] 11/04/2023. Pt tolerating diet and medications, spouse denies any difficulty swallowing. Spouse denies pt with any fever, chills, nausea, vomiting, diarrhea, productive cough, chest pain or shortness of breath at this time. Zena Reich confirms pt has wheelchair and walker at home--has not yet heard from [facility] to resume care. Discussed diet, activity, medications and need for f/u visits. Offered TCC appt, as ordered at SNF discharge--spouse declines, prefers to f/u with Dr. Veena Martin, only. Offered to book TCM appt--spouse needs to check with her son's schedule prior to booking, as she needs his help to get him to office. Sent TE to office staff for appt f/u, per TCM protocol, left detailed message for [facility] intake to verify they will resume HH RN, PT/OT and HH aide and to contact spouse for start of care date. Spouse aware when to contact PCP/specialists and when to seek emergency care. No further questions/concerns at this time. Overall Rating:    How would you rate the care you received while in the hospital? excellent    CCM referral placed:    Yes    BOOK BY DATE: 11/18/2023

## 2023-11-06 NOTE — TELEPHONE ENCOUNTER
Spoke with spouse, Cruz Marie, for TCM today--pt feeling better, since discharged home from [facility] 11/04/2023. Offered TCC appt, as ordered at SNF discharge--spouse declines, prefers to f/u with Dr. Nannette Henriquez, only. Offered to book TCM appt--spouse needs to check with her son's schedule prior to booking, as she needs his help to get him to office. Also left detailed message for [facility] intake to verify they will resume [facility] RN, PT/OT and HH aide and to contact spouse for start of care date. TCM appointment recommended by 11/11/2023, as patient is a High risk for readmission. Please advise. BOOK BY DATE (last date for TCM): 11/18/2023    Clinical staff:  Please discuss with PCP and follow-up with spouse and try to get them to schedule as patient would greatly benefit from TCM appointment. Thank you!

## 2023-11-06 NOTE — PROGRESS NOTES
TCM order received from SNF. Patient discharged to home on 11/4/23 (Saturday). TCM episode set up for patient. See next patient outreach encounter for patient contact. Encounter closing.

## 2023-11-07 ENCOUNTER — TELEPHONE (OUTPATIENT)
Dept: INTERNAL MEDICINE CLINIC | Facility: CLINIC | Age: 83
End: 2023-11-07

## 2023-11-07 DIAGNOSIS — G81.94 LEFT HEMIPARESIS (HCC): Primary | ICD-10-CM

## 2023-11-07 DIAGNOSIS — R53.81 PHYSICAL DECONDITIONING: ICD-10-CM

## 2023-11-07 DIAGNOSIS — N18.31 STAGE 3A CHRONIC KIDNEY DISEASE (HCC): ICD-10-CM

## 2023-11-07 DIAGNOSIS — I69.359 CVA, OLD, HEMIPARESIS (HCC): ICD-10-CM

## 2023-11-07 NOTE — TELEPHONE ENCOUNTER
Patient's wife Joanne Harper is calling patient was released from Rehab on Saturday     Patient is home and does not have home health set up she is requesting an order for Home nursing, physical therapy & aid to help with showers    Phone [phone]

## 2023-11-07 NOTE — TELEPHONE ENCOUNTER
To Dr. Radha Breen---    [facility] order pended for review.  It appears patient has used them in the past.

## 2023-11-09 NOTE — TELEPHONE ENCOUNTER
.  I have approved patient's request for home physical therapy with [facility] as noted. I will forward this to nursing to check to see if this is the proper home health agency to see the patient.   Thank you

## 2023-11-09 NOTE — TELEPHONE ENCOUNTER
Order faxed to [facility] along with patient's face sheet and last office visit notes. [facility] to contact patient to schedule.

## 2023-11-29 ENCOUNTER — TELEPHONE (OUTPATIENT)
Dept: INTERNAL MEDICINE CLINIC | Facility: CLINIC | Age: 83
End: 2023-11-29

## 2023-11-29 NOTE — TELEPHONE ENCOUNTER
Jovanni Borjas / [facility] is calling to request the OT Evaluation be moved to Monday 12/4/23    Phone [phone], this is not a confidential VM if she doesn't answer she will call the office back

## 2023-12-28 ENCOUNTER — TELEPHONE (OUTPATIENT)
Dept: INTERNAL MEDICINE CLINIC | Facility: CLINIC | Age: 83
End: 2023-12-28

## 2023-12-28 NOTE — TELEPHONE ENCOUNTER
Julissa Galan / [facility] is calling to request an order to add 1 nursing visit to see the patient today  Wife reported cough & weakness    Phone [phone], confidential VM

## 2023-12-28 NOTE — TELEPHONE ENCOUNTER
S/w Don Showers, and gave verbal approval for additional visit for today. Jake Perez will call the office back with condition update.

## 2023-12-29 NOTE — TELEPHONE ENCOUNTER
[address] Simeon Thomas called with update   /100 asymptomatic  Increased nasal congestion, negative for COVID  No fever, lungs sound diminished, appears to be viral infection/bad head cold  Fyi, no call back needed  Tasked to nursing

## 2024-01-02 ENCOUNTER — TELEPHONE (OUTPATIENT)
Dept: INTERNAL MEDICINE CLINIC | Facility: CLINIC | Age: 84
End: 2024-01-02

## 2024-01-02 DIAGNOSIS — I69.391 DYSPHAGIA S/P CVA (CEREBROVASCULAR ACCIDENT): ICD-10-CM

## 2024-01-02 DIAGNOSIS — R53.83 FATIGUE, UNSPECIFIED TYPE: Primary | ICD-10-CM

## 2024-01-02 DIAGNOSIS — E78.00 HYPERCHOLESTEROLEMIA: ICD-10-CM

## 2024-01-02 NOTE — TELEPHONE ENCOUNTER
Pat with [facility] called     Pt's wife requests repeat blood work for routine labs - Pat asking for orders to have labs drawn at home    Call back # [phone]

## 2024-01-02 NOTE — TELEPHONE ENCOUNTER
Called Violet/GERALDO and let her know what labs  wants to have drawn on patient -verbalized understanding

## 2024-01-02 NOTE — TELEPHONE ENCOUNTER
NOted.  I have placed orders in the system for pt to have done by [facility].  Please call VN to notify her that the orders have been placed and are in the system.  OK to fax a copy of the orders to [facility]N as necessary.  I will forward this message to nursing.  Thank you!!

## 2024-01-08 ENCOUNTER — LAB REQUISITION (OUTPATIENT)
Dept: LAB | Facility: HOSPITAL | Age: 84
End: 2024-01-08
Payer: MEDICARE

## 2024-01-08 DIAGNOSIS — I12.9 HYPERTENSIVE CHRONIC KIDNEY DISEASE WITH STAGE 1 THROUGH STAGE 4 CHRONIC KIDNEY DISEASE, OR UNSPECIFIED CHRONIC KIDNEY DISEASE: ICD-10-CM

## 2024-01-08 LAB
ALBUMIN SERPL-MCNC: 3.9 G/DL (ref 3.2–4.8)
ALBUMIN/GLOB SERPL: 1.1 {RATIO} (ref 1–2)
ALP LIVER SERPL-CCNC: 108 U/L
ALT SERPL-CCNC: 7 U/L
ANION GAP SERPL CALC-SCNC: 7 MMOL/L (ref 0–18)
AST SERPL-CCNC: 14 U/L (ref ?–34)
BASOPHILS # BLD AUTO: 0.07 X10(3) UL (ref 0–0.2)
BASOPHILS NFR BLD AUTO: 0.7 %
BILIRUB SERPL-MCNC: 0.3 MG/DL (ref 0.2–1.1)
BUN BLD-MCNC: 17 MG/DL (ref 9–23)
BUN/CREAT SERPL: 14.9 (ref 10–20)
CALCIUM BLD-MCNC: 9.1 MG/DL (ref 8.7–10.4)
CHLORIDE SERPL-SCNC: 111 MMOL/L (ref 98–112)
CHOLEST SERPL-MCNC: 120 MG/DL (ref ?–200)
CO2 SERPL-SCNC: 23 MMOL/L (ref 21–32)
CREAT BLD-MCNC: 1.14 MG/DL
DEPRECATED RDW RBC AUTO: 50.4 FL (ref 35.1–46.3)
EGFRCR SERPLBLD CKD-EPI 2021: 64 ML/MIN/1.73M2 (ref 60–?)
EOSINOPHIL # BLD AUTO: 0.26 X10(3) UL (ref 0–0.7)
EOSINOPHIL NFR BLD AUTO: 2.5 %
ERYTHROCYTE [DISTWIDTH] IN BLOOD BY AUTOMATED COUNT: 14.3 % (ref 11–15)
GLOBULIN PLAS-MCNC: 3.7 G/DL (ref 2.8–4.4)
GLUCOSE BLD-MCNC: 153 MG/DL (ref 70–99)
HCT VFR BLD AUTO: 38.9 %
HDLC SERPL-MCNC: 29 MG/DL (ref 40–59)
HGB BLD-MCNC: 12.2 G/DL
IMM GRANULOCYTES # BLD AUTO: 0.04 X10(3) UL (ref 0–1)
IMM GRANULOCYTES NFR BLD: 0.4 %
LDLC SERPL CALC-MCNC: 72 MG/DL (ref ?–100)
LYMPHOCYTES # BLD AUTO: 1.75 X10(3) UL (ref 1–4)
LYMPHOCYTES NFR BLD AUTO: 16.5 %
MCH RBC QN AUTO: 29.5 PG (ref 26–34)
MCHC RBC AUTO-ENTMCNC: 31.4 G/DL (ref 31–37)
MCV RBC AUTO: 94.2 FL
MONOCYTES # BLD AUTO: 0.74 X10(3) UL (ref 0.1–1)
MONOCYTES NFR BLD AUTO: 7 %
NEUTROPHILS # BLD AUTO: 7.72 X10 (3) UL (ref 1.5–7.7)
NEUTROPHILS # BLD AUTO: 7.72 X10(3) UL (ref 1.5–7.7)
NEUTROPHILS NFR BLD AUTO: 72.9 %
NONHDLC SERPL-MCNC: 91 MG/DL (ref ?–130)
OSMOLALITY SERPL CALC.SUM OF ELEC: 297 MOSM/KG (ref 275–295)
PLATELET # BLD AUTO: 244 10(3)UL (ref 150–450)
POTASSIUM SERPL-SCNC: 4 MMOL/L (ref 3.5–5.1)
PROT SERPL-MCNC: 7.6 G/DL (ref 5.7–8.2)
RBC # BLD AUTO: 4.13 X10(6)UL
SODIUM SERPL-SCNC: 141 MMOL/L (ref 136–145)
TRIGL SERPL-MCNC: 100 MG/DL (ref 30–149)
TSI SER-ACNC: 0.73 MIU/ML (ref 0.55–4.78)
VLDLC SERPL CALC-MCNC: 15 MG/DL (ref 0–30)
WBC # BLD AUTO: 10.6 X10(3) UL (ref 4–11)

## 2024-01-08 PROCEDURE — 80061 LIPID PANEL: CPT | Performed by: INTERNAL MEDICINE

## 2024-01-08 PROCEDURE — 84443 ASSAY THYROID STIM HORMONE: CPT | Performed by: INTERNAL MEDICINE

## 2024-01-08 PROCEDURE — 85025 COMPLETE CBC W/AUTO DIFF WBC: CPT | Performed by: INTERNAL MEDICINE

## 2024-01-08 PROCEDURE — 80053 COMPREHEN METABOLIC PANEL: CPT | Performed by: INTERNAL MEDICINE

## 2024-01-11 ENCOUNTER — HOSPITAL ENCOUNTER (INPATIENT)
Facility: HOSPITAL | Age: 84
LOS: 6 days | Discharge: NURSING, REHAB OR LONG-TERM CARE | End: 2024-01-17
Attending: EMERGENCY MEDICINE | Admitting: HOSPITALIST
Payer: MEDICARE

## 2024-01-11 ENCOUNTER — APPOINTMENT (OUTPATIENT)
Dept: GENERAL RADIOLOGY | Facility: HOSPITAL | Age: 84
End: 2024-01-11
Attending: EMERGENCY MEDICINE
Payer: MEDICARE

## 2024-01-11 ENCOUNTER — TELEPHONE (OUTPATIENT)
Dept: INTERNAL MEDICINE CLINIC | Facility: CLINIC | Age: 84
End: 2024-01-11

## 2024-01-11 DIAGNOSIS — J69.0 ASPIRATION PNEUMONITIS (HCC): Primary | ICD-10-CM

## 2024-01-11 DIAGNOSIS — J96.01 ACUTE RESPIRATORY FAILURE WITH HYPOXIA (HCC): ICD-10-CM

## 2024-01-11 LAB
ALBUMIN SERPL-MCNC: 4.6 G/DL (ref 3.2–4.8)
ALP LIVER SERPL-CCNC: 117 U/L
ALT SERPL-CCNC: <7 U/L
ANION GAP SERPL CALC-SCNC: 10 MMOL/L (ref 0–18)
AST SERPL-CCNC: 12 U/L (ref ?–34)
BASOPHILS # BLD AUTO: 0.08 X10(3) UL (ref 0–0.2)
BASOPHILS NFR BLD AUTO: 0.4 %
BILIRUB DIRECT SERPL-MCNC: 0.2 MG/DL (ref ?–0.3)
BILIRUB SERPL-MCNC: 0.5 MG/DL (ref 0.2–1.1)
BUN BLD-MCNC: 14 MG/DL (ref 9–23)
BUN/CREAT SERPL: 11.5 (ref 10–20)
CALCIUM BLD-MCNC: 10 MG/DL (ref 8.7–10.4)
CHLORIDE SERPL-SCNC: 107 MMOL/L (ref 98–112)
CO2 SERPL-SCNC: 23 MMOL/L (ref 21–32)
CREAT BLD-MCNC: 1.22 MG/DL
DEPRECATED RDW RBC AUTO: 49.2 FL (ref 35.1–46.3)
EGFRCR SERPLBLD CKD-EPI 2021: 59 ML/MIN/1.73M2 (ref 60–?)
EOSINOPHIL # BLD AUTO: 0.06 X10(3) UL (ref 0–0.7)
EOSINOPHIL NFR BLD AUTO: 0.3 %
ERYTHROCYTE [DISTWIDTH] IN BLOOD BY AUTOMATED COUNT: 14.5 % (ref 11–15)
FLUAV + FLUBV RNA SPEC NAA+PROBE: NEGATIVE
FLUAV + FLUBV RNA SPEC NAA+PROBE: NEGATIVE
GLUCOSE BLD-MCNC: 110 MG/DL (ref 70–99)
HCT VFR BLD AUTO: 45.9 %
HGB BLD-MCNC: 14.3 G/DL
IMM GRANULOCYTES # BLD AUTO: 0.09 X10(3) UL (ref 0–1)
IMM GRANULOCYTES NFR BLD: 0.5 %
L PNEUMO AG UR QL: NEGATIVE
LACTATE SERPL-SCNC: 1.7 MMOL/L (ref 0.5–2)
LACTATE SERPL-SCNC: 2.3 MMOL/L (ref 0.5–2)
LYMPHOCYTES # BLD AUTO: 1.72 X10(3) UL (ref 1–4)
LYMPHOCYTES NFR BLD AUTO: 8.7 %
MCH RBC QN AUTO: 28.9 PG (ref 26–34)
MCHC RBC AUTO-ENTMCNC: 31.2 G/DL (ref 31–37)
MCV RBC AUTO: 92.9 FL
MONOCYTES # BLD AUTO: 1.18 X10(3) UL (ref 0.1–1)
MONOCYTES NFR BLD AUTO: 6 %
NEUTROPHILS # BLD AUTO: 16.68 X10 (3) UL (ref 1.5–7.7)
NEUTROPHILS # BLD AUTO: 16.68 X10(3) UL (ref 1.5–7.7)
NEUTROPHILS NFR BLD AUTO: 84.1 %
OSMOLALITY SERPL CALC.SUM OF ELEC: 291 MOSM/KG (ref 275–295)
PLATELET # BLD AUTO: 297 10(3)UL (ref 150–450)
POTASSIUM SERPL-SCNC: 3.7 MMOL/L (ref 3.5–5.1)
PROCALCITONIN SERPL-MCNC: 0.13 NG/ML (ref ?–0.05)
PROT SERPL-MCNC: 9.2 G/DL (ref 5.7–8.2)
RBC # BLD AUTO: 4.94 X10(6)UL
RSV RNA SPEC NAA+PROBE: NEGATIVE
SARS-COV-2 RNA RESP QL NAA+PROBE: NOT DETECTED
SODIUM SERPL-SCNC: 140 MMOL/L (ref 136–145)
STREP PNEUMO ANTIGEN, URINE: NEGATIVE
WBC # BLD AUTO: 19.8 X10(3) UL (ref 4–11)

## 2024-01-11 PROCEDURE — 99223 1ST HOSP IP/OBS HIGH 75: CPT | Performed by: HOSPITALIST

## 2024-01-11 PROCEDURE — 71045 X-RAY EXAM CHEST 1 VIEW: CPT | Performed by: EMERGENCY MEDICINE

## 2024-01-11 RX ORDER — GABAPENTIN 300 MG/1
300 CAPSULE ORAL 3 TIMES DAILY
Status: DISCONTINUED | OUTPATIENT
Start: 2024-01-11 | End: 2024-01-17

## 2024-01-11 RX ORDER — LEVOTHYROXINE SODIUM 0.1 MG/1
100 TABLET ORAL
Status: DISCONTINUED | OUTPATIENT
Start: 2024-01-12 | End: 2024-01-17

## 2024-01-11 RX ORDER — ACETAMINOPHEN 500 MG
1000 TABLET ORAL ONCE
Status: DISCONTINUED | OUTPATIENT
Start: 2024-01-11 | End: 2024-01-11

## 2024-01-11 RX ORDER — METOCLOPRAMIDE HYDROCHLORIDE 5 MG/ML
10 INJECTION INTRAMUSCULAR; INTRAVENOUS EVERY 8 HOURS PRN
Status: DISCONTINUED | OUTPATIENT
Start: 2024-01-11 | End: 2024-01-17

## 2024-01-11 RX ORDER — ACETAMINOPHEN 500 MG
500 TABLET ORAL EVERY 4 HOURS PRN
Status: DISCONTINUED | OUTPATIENT
Start: 2024-01-11 | End: 2024-01-13

## 2024-01-11 RX ORDER — AMLODIPINE BESYLATE 5 MG/1
5 TABLET ORAL DAILY
Status: DISCONTINUED | OUTPATIENT
Start: 2024-01-11 | End: 2024-01-17

## 2024-01-11 RX ORDER — ACETAMINOPHEN 160 MG/5ML
650 SOLUTION ORAL ONCE
Status: COMPLETED | OUTPATIENT
Start: 2024-01-11 | End: 2024-01-11

## 2024-01-11 RX ORDER — HEPARIN SODIUM 5000 [USP'U]/ML
5000 INJECTION, SOLUTION INTRAVENOUS; SUBCUTANEOUS EVERY 12 HOURS SCHEDULED
Status: DISCONTINUED | OUTPATIENT
Start: 2024-01-11 | End: 2024-01-17

## 2024-01-11 RX ORDER — BENZONATATE 200 MG/1
200 CAPSULE ORAL 3 TIMES DAILY PRN
Status: DISCONTINUED | OUTPATIENT
Start: 2024-01-11 | End: 2024-01-17

## 2024-01-11 RX ORDER — ATORVASTATIN CALCIUM 40 MG/1
40 TABLET, FILM COATED ORAL NIGHTLY
Status: DISCONTINUED | OUTPATIENT
Start: 2024-01-11 | End: 2024-01-17

## 2024-01-11 RX ORDER — CLOPIDOGREL BISULFATE 75 MG/1
75 TABLET ORAL DAILY
Status: DISCONTINUED | OUTPATIENT
Start: 2024-01-11 | End: 2024-01-17

## 2024-01-11 RX ORDER — ONDANSETRON 2 MG/ML
4 INJECTION INTRAMUSCULAR; INTRAVENOUS EVERY 6 HOURS PRN
Status: DISCONTINUED | OUTPATIENT
Start: 2024-01-11 | End: 2024-01-17

## 2024-01-11 RX ORDER — SODIUM CHLORIDE 9 MG/ML
INJECTION, SOLUTION INTRAVENOUS CONTINUOUS
Status: DISCONTINUED | OUTPATIENT
Start: 2024-01-11 | End: 2024-01-13

## 2024-01-11 NOTE — ED INITIAL ASSESSMENT (HPI)
Patient arrives to ER for productive cough x 3 days and a fever x 1 day. Patient is bed bound due to a stroke 19yrs ago.

## 2024-01-11 NOTE — H&P
[facility]    PATIENT'S NAME: HARMAN KUMAR   ATTENDING PHYSICIAN: Juwan Heredia MD   PATIENT ACCOUNT#:   [id number]    LOCATION:  [place] [address]  MEDICAL RECORD #:   [id number]       YOB: 1940  ADMISSION DATE:       01/11/2024    HISTORY AND PHYSICAL EXAMINATION    CHIEF COMPLAINT:  Recurrent aspiration pneumonia and sepsis.    HISTORY OF PRESENT ILLNESS:  The patient is an 83-year-old  male who was brought into the emergency department for evaluation of cough and fever at home.  CBC showed white blood cell count of 19.8 with left shift.  Chemistry showed GFR of 59 which is below his baseline.  Procalcitonin 0.13 and lactic acid 2.3.  GeneXpert viral panel was negative.  Chest x-ray showed prominent vasculature with pulmonary interstitial prominence.  The patient was started on IV antibiotics, azithromycin and Unasyn, and he will be admitted to hospital for further management.    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    PAST SURGICAL HISTORY:  Tonsillectomy, abdominal aortic aneurysm endovascular stent graft, cholecystectomy, cataract procedure.    MEDICATIONS:  Please see medication reconciliation list.    ALLERGIES:  No known drug allergies.    FAMILY HISTORY:  Father had lung cancer and hypertension.    SOCIAL HISTORY:  Ex-tobacco user.  No current tobacco, alcohol, or drug use.  Lives with his wife.  Requires assistance in his basic activities of daily living.    REVIEW OF SYSTEMS:  The patient is a poor historian.  He will respond no to anything.  It is not clear if he has been having difficulty choking on his food or thin liquids at home.  He was brought in today for fever, cough, and generalized malaise.       PHYSICAL EXAMINATION:    GENERAL:  The patient seemed a bit flushed and fatigued.  VITAL SIGNS:  Temperature 100.6, pulse 114, sinus tachycardia on monitor, respiratory rate 22, blood pressure 160/90, pulse ox 88% on room air.  HEENT:  Atraumatic.  Oropharynx clear, dry mucous membranes.  NECK:  Supple.  No lymphadenopathy.  Trachea midline.  Full range of motion.  LUNGS:  Rhonchi auscultated on both lung bases with diminished breathing sounds both lung fields.  HEART:  Regular rate and rhythm.  S1, S2 auscultated.  No murmur.  Sinus tachycardia on monitor with right bundle branch block.  ABDOMEN:  Soft, nondistended, no tenderness.  Positive bowel sounds.  EXTREMITIES:  No edema, clubbing, or cyanosis.  NEUROLOGIC:  Chronic left hemiparesis.  No other acute findings.    ASSESSMENT:    1.   Hypoxemic respiratory failure.  2.   Sepsis with possible aspiration versus community-acquired pneumonia.  3.   Chronic kidney disease stage 2 to 3.  4.   Hypertension.    PLAN:  The patient will be admitted to general medical floor.  Continue to monitor his clinical status, obtain speech therapy evaluation, IV fluids, soft diet, IV Unasyn and azithromycin, sputum and blood cultures, monitor his hemodynamic status, follow up on lactic acid.  Further recommendations to follow.    Dictated By Johanny Pearson MD  d: 01/11/2024 13:04:50  t: 01/11/2024 13:29:36  Job [id number]  FB/

## 2024-01-11 NOTE — TELEPHONE ENCOUNTER
Pt currently admitted at [facility].  Dx: Recurrent aspiration pneumonia and sepsis.

## 2024-01-11 NOTE — ED PROVIDER NOTES
Patient Seen in: [facility] Emergency Department    History     Chief Complaint   Patient presents with    Cough/URI    Fever       HPI    83-year-old male with past medical history significant for hypothyroidism, AAA, high blood pressure, high cholesterol, CVA with residual left-sided weakness, pneumonia, presents to the ED for evaluation of cough, shortness of breath, fever, chills.  Patient's wife states that he has had coughing persistent for several weeks but seem to have gotten worse in the last few days.  He spiked a temperature today prompting them to bring him to the hospital.  Patient denies any vomiting, diarrhea, chest pain.    History reviewed.   Past Medical History:   Diagnosis Date    Abdominal aortic aneurysm (AAA) (HCC)     Back problem     back pain    Disorder of thyroid     hypothyroidism    Falls frequently     High blood pressure     High cholesterol     Hx of pneumothorax     Muscle weakness     LUE hemiplegia    Osteoarthritis     PNA (pneumonia)     Pneumonia due to organism 11/2017    Stroke (HCC)     left sided weakness       History reviewed.   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 - Dr. Stahl    TONSILLECTOMY      at age 24         Medications :  (Not in a hospital admission)       Family History   Problem Relation Age of Onset    Cancer Father         Lung        Smoking Status:   Social History     Socioeconomic History    Marital status:    Tobacco Use    Smoking status: Former     Packs/day: 2.00     Years: 50.00     Additional pack years: 0.00     Total pack years: 100.00     Types: Cigarettes     Quit date: 1/18/2008     Years since quitting: 15.9    Smokeless tobacco: Never   Vaping Use    Vaping Use: Never used   Substance and Sexual Activity     Alcohol use: Never     Comment: socially; about 1 glass of wine once a week     Drug use: No   Other Topics Concern    Caffeine Concern Yes     Comment: 1 cup of coffee daily    Exercise No       Constitutional and vital signs reviewed.      Social History and Family History elements reviewed from today, pertinent positives to the presenting problem noted.    Physical Exam     ED Triage Vitals [01/11/24 1102]   /90   Pulse 114   Resp 22   Temp (!) 100.6 °F (38.1 °C)   Temp src Temporal   SpO2 (!) 88 %   O2 Device None (Room air)       Physical Exam   Constitutional: AAOx3, chronically ill-appearing, no acute distress  HEENT: Normocephalic, PERRLA, MMM  CV: s1s2+, RRR, no m/r/g, normal distal pulses  Pulmonary/Chest: Rhonchi in bilateral lung fields.  No chest wall tenderness  Abdominal: Nontender.  Nondistended. Soft. Bowel sounds are normal.   Neck/Back:   :   Musculoskeletal: Normal range of motion. No deformity.   Neurological: Awake, alert. Normal reflexes. No cranial nerve deficit.    Skin: Skin is warm and dry. No rash noted. No erythema.   Psychiatric:      All measures to prevent infection transmission during my interaction with the patient were taken. The patient was already wearing a droplet mask on my arrival to the room. Personal protective equipment was worn throughout the duration of the exam.      ED Course        Labs Reviewed   BASIC METABOLIC PANEL (8) - Abnormal; Notable for the following components:       Result Value    Glucose 110 (*)     eGFR-Cr 59 (*)     All other components within normal limits   LACTIC ACID, PLASMA - Abnormal; Notable for the following components:    Lactic Acid 2.3 (*)     All other components within normal limits   HEPATIC FUNCTION PANEL (7) - Abnormal; Notable for the following components:    ALT <7 (*)     Total Protein 9.2 (*)     All other components within normal limits   PROCALCITONIN - Abnormal; Notable for the following components:    Procalcitonin 0.13  (*)     All other components within normal limits   CBC W/ DIFFERENTIAL - Abnormal; Notable for the following components:    WBC 19.8 (*)     RDW-SD 49.2 (*)     Neutrophil Absolute Prelim 16.68 (*)     Neutrophil Absolute 16.68 (*)     Monocyte Absolute 1.18 (*)     All other components within normal limits   SARS-COV-2/FLU A AND B/RSV BY PCR (GENEXPERT) - Normal    Narrative:     This test is intended for the qualitative detection and differentiation of SARS-CoV-2, influenza A, influenza B, and respiratory syncytial virus (RSV) viral RNA in nasopharyngeal or nares swabs from individuals suspected of respiratory viral infection consistent with COVID-19 by their healthcare provider. Signs and symptoms of respiratory viral infection due to SARS-CoV-2, influenza, and RSV can be similar.                                    Test performed using the Xpert Xpress SARS-CoV-2/FLU/RSV (real time RT-PCR)  assay on the [employer] instrument, [place], [place] [address].                   This test is being used under the Food and Drug Administration's Emergency Use Authorization.                                    The authorized Fact Sheet for Healthcare Providers for this assay is available upon request from the laboratory.   CBC WITH DIFFERENTIAL WITH PLATELET    Narrative:     The following orders were created for panel order CBC With Differential With Platelet.                  Procedure                               Abnormality         Status                                     ---------                               -----------         ------                                     CBC W/ DIFFERENTIAL[[id number]]          Abnormal            Final result                                                 Please view results for these tests on the individual orders.   LACTIC ACID REFLEX POST POSTIVE   URINALYSIS WITH CULTURE REFLEX   RAINBOW DRAW LAVENDER   RAINBOW DRAW LIGHT GREEN   RAINBOW DRAW BLUE   BLOOD CULTURE   BLOOD  CULTURE     My Independent Interpretation of EKG (if performed):     Monitor Interpretation:   Sinus tachycardia as interpreted by me.      Imaging Results Available and Reviewed while in ED: XR CHEST AP PORTABLE  (CPT=71045)    Result Date: 1/11/2024  CONCLUSION:  1. Borderline heart size.  Tortuous aorta. 2. Prominent central vasculature.  Pulmonary interstitial prominence has improved.    Dictated by (CST): Huan Ramirez MD on 1/11/2024 at 11:56 AM     Finalized by (CST): Huan Ramirez MD on 1/11/2024 at 11:59 AM         ED Medications Administered:   Medications   acetaminophen (Tylenol Extra Strength) tab 1,000 mg (has no administration in time range)   ampicillin-sulbactam (Unasyn) 3 g in sodium chloride 0.9% 100mL IVPB-ADD (has no administration in time range)   sodium chloride 0.9 % IV bolus 1,000 mL (1,000 mL Intravenous New Bag 1/11/24 1118)             MDM     Vitals:    01/11/24 1102   BP: 160/90   Pulse: 114   Resp: 22   Temp: (!) 100.6 °F (38.1 °C)   TempSrc: Temporal   SpO2: (!) 88%     *I personally reviewed and interpreted all ED vitals.    Pulse Ox: (!) 88%, Room air, Normal     Independent Interpretation of Studies: Chest x-ray does not show any significant acute findings    History from Independent Source: Patient's wife gave initial history as stated above    External Records Reviewed: Reviewed prior hospitalization records and patient was admitted multiple times in October of this past year with aspiration pneumonia and hypoxia    Social Determinants of Health:     Procedures:      Differential/MDM/Shared Decision Making: Differential diagnosis includes aspiration pneumonia, CHF, dehydration, electrolyte abnormality, COVID, influenza, others.      The patient already has history of asked for aspiration pneumonia, CVA with residual left-sided weakness, AAA, hypothyroidism, high cholesterol, to contribute to the complexity of this ED evaluation.    I believe patient likely has  aspiration pneumonia once again.  Patient's lactic acid is elevated at 2.3 with a mildly elevated procalcitonin.  Patient was hypoxic in the ED with room air oxygen saturations of 88% but is satting normally on 3 L currently.  Patient covered with blood cultures and Unasyn.  [facility] and seen patient at bedside.    Critical care time exclusive of procedure time spent on this patient was 35 min for taking history from patient examining patient, medical decision-making, reviewing lab work and radiology studies, explaining results to patient and family, discussing with consultants/admitting physician, and documenting in patient's chart.      Condition upon leaving the department: Stable    Disposition and Plan     Clinical Impression:  1. Aspiration pneumonitis (HCC)    2. Acute respiratory failure with hypoxia (HCC)        Disposition:  Admit    Follow-up:  No follow-up provider specified.    Medications Prescribed:  Current Discharge Medication List          Hospital Problems       Present on Admission  Date Reviewed: 9/13/2023            ICD-10-CM Noted POA    * (Principal) Aspiration pneumonitis (HCC) J69.0 5/13/2019 Unknown

## 2024-01-11 NOTE — PLAN OF CARE
Problem: Patient Centered Care  Goal: Patient preferences are identified and integrated in the patient's plan of care  Description: Interventions:    - Provide timely, complete, and accurate information to patient/family  - Incorporate patient and family knowledge, values, beliefs, and cultural backgrounds into the planning and delivery of care  - Encourage patient/family to participate in care and decision-making at the level they choose  - Honor patient and family perspectives and choices  Outcome: Progressing

## 2024-01-11 NOTE — ED QUICK NOTES
Orders for admission, patient is aware of plan and ready to go upstairs. Any questions, please call ED YADIRA Nickerson at extension [phone].     Patient Covid vaccination status: Fully vaccinated     COVID Test Ordered in ED: SARS-CoV-2/Flu A and B/RSV by PCR (GeneXpert)    COVID Suspicion at Admission: N/A    Running Infusions:      Mental Status/LOC at time of transport: A&Ox4    Other pertinent information:   CIWA score: N/A   NIH score:  N/A

## 2024-01-11 NOTE — TELEPHONE ENCOUNTER
Spoke with patient's wife, Meme.  Pt has had cough for 2+ wks.  No other symptoms, until today -- developed fever (102.8F) and home health nurse reported hearing \"crackles\" on exam.    Advised either ER or UC for CXR (hx of PNA).  Meme agreeable -- pt is unable to walk; Meme will call EMS to bring pt to [facility] ER.    Nursing to f/up.

## 2024-01-11 NOTE — TELEPHONE ENCOUNTER
Wife Meme called  Pt's had a cough for a few weeks which HH has been monitoring  Pt has a temperature today of 102, cough a little worse    Please return call to Meme on her cell #     also looking for lab results HH tarsha on Monday  Wife isn't able to access them on my chart

## 2024-01-12 LAB
ANION GAP SERPL CALC-SCNC: 7 MMOL/L (ref 0–18)
BASOPHILS # BLD AUTO: 0.09 X10(3) UL (ref 0–0.2)
BASOPHILS NFR BLD AUTO: 0.7 %
BILIRUB UR QL: NEGATIVE
BUN BLD-MCNC: 13 MG/DL (ref 9–23)
BUN/CREAT SERPL: 11.4 (ref 10–20)
CALCIUM BLD-MCNC: 8.9 MG/DL (ref 8.7–10.4)
CHLORIDE SERPL-SCNC: 114 MMOL/L (ref 98–112)
CLARITY UR: CLEAR
CO2 SERPL-SCNC: 24 MMOL/L (ref 21–32)
CREAT BLD-MCNC: 1.14 MG/DL
DEPRECATED RDW RBC AUTO: 51.5 FL (ref 35.1–46.3)
EGFRCR SERPLBLD CKD-EPI 2021: 64 ML/MIN/1.73M2 (ref 60–?)
EOSINOPHIL # BLD AUTO: 0.18 X10(3) UL (ref 0–0.7)
EOSINOPHIL NFR BLD AUTO: 1.4 %
ERYTHROCYTE [DISTWIDTH] IN BLOOD BY AUTOMATED COUNT: 14.6 % (ref 11–15)
GLUCOSE BLD-MCNC: 91 MG/DL (ref 70–99)
GLUCOSE UR-MCNC: NORMAL MG/DL
HCT VFR BLD AUTO: 36.3 %
HGB BLD-MCNC: 10.8 G/DL
HGB UR QL STRIP.AUTO: NEGATIVE
IMM GRANULOCYTES # BLD AUTO: 0.06 X10(3) UL (ref 0–1)
IMM GRANULOCYTES NFR BLD: 0.5 %
KETONES UR-MCNC: NEGATIVE MG/DL
LEUKOCYTE ESTERASE UR QL STRIP.AUTO: NEGATIVE
LYMPHOCYTES # BLD AUTO: 2.01 X10(3) UL (ref 1–4)
LYMPHOCYTES NFR BLD AUTO: 15.6 %
MCH RBC QN AUTO: 28.6 PG (ref 26–34)
MCHC RBC AUTO-ENTMCNC: 29.8 G/DL (ref 31–37)
MCV RBC AUTO: 96 FL
MONOCYTES # BLD AUTO: 1.35 X10(3) UL (ref 0.1–1)
MONOCYTES NFR BLD AUTO: 10.5 %
NEUTROPHILS # BLD AUTO: 9.2 X10 (3) UL (ref 1.5–7.7)
NEUTROPHILS # BLD AUTO: 9.2 X10(3) UL (ref 1.5–7.7)
NEUTROPHILS NFR BLD AUTO: 71.3 %
NITRITE UR QL STRIP.AUTO: NEGATIVE
OSMOLALITY SERPL CALC.SUM OF ELEC: 300 MOSM/KG (ref 275–295)
PH UR: 5.5 [PH] (ref 5–8)
PLATELET # BLD AUTO: 242 10(3)UL (ref 150–450)
POTASSIUM SERPL-SCNC: 3.4 MMOL/L (ref 3.5–5.1)
RBC # BLD AUTO: 3.78 X10(6)UL
SODIUM SERPL-SCNC: 145 MMOL/L (ref 136–145)
SP GR UR STRIP: 1.01 (ref 1–1.03)
UROBILINOGEN UR STRIP-ACNC: NORMAL
WBC # BLD AUTO: 12.9 X10(3) UL (ref 4–11)

## 2024-01-12 PROCEDURE — 99233 SBSQ HOSP IP/OBS HIGH 50: CPT | Performed by: INTERNAL MEDICINE

## 2024-01-12 RX ORDER — POLYETHYLENE GLYCOL 3350 17 G/17G
17 POWDER, FOR SOLUTION ORAL DAILY PRN
Status: DISCONTINUED | OUTPATIENT
Start: 2024-01-12 | End: 2024-01-17

## 2024-01-12 RX ORDER — BISACODYL 10 MG
10 SUPPOSITORY, RECTAL RECTAL
Status: DISCONTINUED | OUTPATIENT
Start: 2024-01-12 | End: 2024-01-17

## 2024-01-12 RX ORDER — ENEMA 19; 7 G/133ML; G/133ML
1 ENEMA RECTAL ONCE AS NEEDED
Status: DISCONTINUED | OUTPATIENT
Start: 2024-01-12 | End: 2024-01-17

## 2024-01-12 RX ORDER — POTASSIUM CHLORIDE 1.5 G/1.58G
40 POWDER, FOR SOLUTION ORAL ONCE
Status: COMPLETED | OUTPATIENT
Start: 2024-01-12 | End: 2024-01-12

## 2024-01-12 RX ORDER — DOCUSATE SODIUM 100 MG/1
100 CAPSULE, LIQUID FILLED ORAL 2 TIMES DAILY
Status: DISCONTINUED | OUTPATIENT
Start: 2024-01-12 | End: 2024-01-17

## 2024-01-12 NOTE — CM/SW NOTE
01/12/24 1200   CM/SW Referral Data   Referral Source Physician   Reason for Referral Discharge planning   Informant Clinical Staff Member;EMR   Medical Hx   Does patient have an established PCP? Yes  (Dr. Bam Hampton)   Patient Info   Patient's Current Mental Status at Time of Assessment Alert;Oriented   Patient's Home Environment House   Number of Levels in Home 2   Patient lives with Spouse/Significant other   Patient Status Prior to Admission   Services in place prior to admission Home Health Care   Home Health Provider Info [facility]   Discharge Needs   Anticipated D/C needs Home health care     SW received MD order for discharge planning.    Pt was discussed during nursing rounds.  Pt has has had episodes of recurring aspiration pneumonia.  Pt does not use O2 at home.  Pt using 5 L O2 at time of note.    YADIRA Harris from [facility] notified  that pt is current w/RN and PT services.     entered resumption of care order and attached it to referral.    Gabriel Howard/Yane from therapy team notified  of [facility] recommendation.  SW sent [facility] referrals in [place].  Pt will need inpatient midnights for [facility] benefit.    PLAN: DC to [facility] pending  3 midnights/choice/choice/bed availability.  PASRR completed and uploaded.    / to remain available for support and/or discharge planning.     Alisa Merritt MSW, LSW [id number]           Yes

## 2024-01-12 NOTE — PLAN OF CARE
A&Ox4- forgetful. Pt max assist with lift, IVF and IV abx infusing, voiding via purewick, tolerating cardiac soft/easy chew diet, on 3L via NC, tylenol provided as needed for pain. Frequent rounding by nursing staff. No acute changes noted throughout shift.    Problem: RESPIRATORY - ADULT  Goal: Achieves optimal ventilation and oxygenation  Description: INTERVENTIONS:  - Assess for changes in respiratory status  - Assess for changes in mentation and behavior  - Position to facilitate oxygenation and minimize respiratory effort  - Oxygen supplementation based on oxygen saturation or ABGs  - Provide Smoking Cessation handout, if applicable  - Encourage broncho-pulmonary hygiene including cough, deep breathe, Incentive Spirometry  - Assess the need for suctioning and perform as needed  - Assess and instruct to report SOB or any respiratory difficulty  - Respiratory Therapy support as indicated  - Manage/alleviate anxiety  - Monitor for signs/symptoms of CO2 retention  Outcome: Progressing     Problem: MUSCULOSKELETAL - ADULT  Goal: Return mobility to safest level of function  Description: INTERVENTIONS:  - Assess patient stability and activity tolerance for standing, transferring and ambulating w/ or w/o assistive devices  - Assist with transfers and ambulation using safe patient handling equipment as needed  - Ensure adequate protection for wounds/incisions during mobilization  - Obtain PT/OT consults as needed  - Advance activity as appropriate  - Communicate ordered activity level and limitations with patient/family  Outcome: Progressing     Problem: PAIN - ADULT  Goal: Verbalizes/displays adequate comfort level or patient's stated pain goal  Description: INTERVENTIONS:  - Encourage pt to monitor pain and request assistance  - Assess pain using appropriate pain scale  - Administer analgesics based on type and severity of pain and evaluate response  - Implement non-pharmacological measures as appropriate and evaluate  response  - Consider cultural and social influences on pain and pain management  - Manage/alleviate anxiety  - Utilize distraction and/or relaxation techniques  - Monitor for opioid side effects  - Notify MD/LIP if interventions unsuccessful or patient reports new pain  - Anticipate increased pain with activity and pre-medicate as appropriate  Outcome: Progressing     Problem: RISK FOR INFECTION - ADULT  Goal: Absence of fever/infection during anticipated neutropenic period  Description: INTERVENTIONS  - Monitor WBC  - Administer growth factors as ordered  - Implement neutropenic guidelines  Outcome: Progressing     Problem: SAFETY ADULT - FALL  Goal: Free from fall injury  Description: INTERVENTIONS:  - Assess pt frequently for physical needs  - Identify cognitive and physical deficits and behaviors that affect risk of falls.  - [place] fall precautions as indicated by assessment.  - Educate pt/family on patient safety including physical limitations  - Instruct pt to call for assistance with activity based on assessment  - Modify environment to reduce risk of injury  - Provide assistive devices as appropriate  - Consider OT/PT consult to assist with strengthening/mobility  - Encourage toileting schedule  Outcome: Progressing     Problem: DISCHARGE PLANNING  Goal: Discharge to home or other facility with appropriate resources  Description: INTERVENTIONS:  - Identify barriers to discharge w/pt and caregiver  - Include patient/family/discharge partner in discharge planning  - Arrange for needed discharge resources and transportation as appropriate  - Identify discharge learning needs (meds, wound care, etc)  - Arrange for interpreters to assist at discharge as needed  - Consider post-discharge preferences of patient/family/discharge partner  - Complete POLST form as appropriate  - Assess patient's ability to be responsible for managing their own health  - Refer to Case Management Department for coordinating  discharge planning if the patient needs post-hospital services based on physician/LIP order or complex needs related to functional status, cognitive ability or social support system  Outcome: Progressing

## 2024-01-12 NOTE — HOME CARE LIAISON
This pt is current with [facility] for RN&PT services. Pt will need a ANN MARIE entered and Quality data delivered by DAILY EASLEY. Notified TRACEE Cuellar.

## 2024-01-12 NOTE — OCCUPATIONAL THERAPY NOTE
OCCUPATIONAL THERAPY EVALUATION - INPATIENT     Room Number: 516/516-A  Evaluation Date: 1/12/2024  Type of Evaluation: Initial  Presenting Problem: recurrent aspiration PNA, sepsis, hypoxic resp failure    Physician Order: IP Consult to Occupational Therapy  Reason for Therapy: ADL/IADL Dysfunction and Discharge Planning    OCCUPATIONAL THERAPY ASSESSMENT   Patient is a 83 year old male admitted 1/11/2024 for recurrent aspiration PNA, hypoxic resp failure. RN approved session. Pt seen in coordination with PT. Pt has a h/o R CVA with chronic left hemiparesis. Pt received in bed, agreeable to tx. Pt is on 5L O2 satting 90-92% during session. Pt does not wear home O2. Pt is AxOx4. He reports chronic back pain and reports knee pain with weight bearing activity. He also expresses fear of falling. Pt required Max A x2 for supine to sit. He c/o back pain with bed mobility. He is able to sit EOB with SBA. He required Max A for donning socks and shoes at EOB. Pt attempted sit to stand from bed height with hemiwalker and Max A x2 however unable to achieve standing- pt requesting immediately to return to sit due to c/o knee pain. He was willing to attempt again with no success. Pt performed squat pivot transfer from bed to chair with Max A x2. Pt is fearful and retropulsive which impacts transfers. Pt required Max A x2 to attempt sit to stand from chair height with RN present to adjust and re-position sheets on the chair. Pt yelling out, \"put me down\" and \"I'm scared of falling\". He was positioned in the chair for comfort with all needs in reach and chair alarm in place. RN present in room. RN notified pt will need lift transfer back to bed. Will cont to follow.        In this OT evaluation patient presents with the following impairments: general weakness, h/o left HP, pt expresses fear of falling.  These deficits manifest functionally while performing functional mob and ADLs.   The patient is below baseline and would benefit  from skilled inpatient OT to address the above deficits, maximizing patient's ability to return to prior level of function.    The patient's Approx Degree of Impairment: 59.67% has been calculated based on documentation in the [facility] '6 clicks' Inpatient Daily Activity Short Form.  Research supports that patients with this level of impairment may benefit from [facility].     DISCHARGE RECOMMENDATIONS  OT Discharge Recommendations: Sub-acute rehabilitation  OT Device Recommendations: TBD    PLAN  OT Treatment Plan: Balance activities;ADL training;Functional transfer training;Endurance training;Patient/Family education;UE strengthening/ROM       OCCUPATIONAL THERAPY MEDICAL/SOCIAL HISTORY   Problem List  Principal Problem:    Aspiration pneumonitis (HCC)  Active Problems:    Aspiration pneumonia (HCC)    Acute respiratory failure with hypoxia (HCC)    HOME SITUATION  Type of Home: House  Home Layout: Two level; Stairlift  Lives With: Spouse; Daughter  Shower/Tub and Equipment: Walk-in shower; Grab bar; Shower chair  Other Equipment: -- (hemiwalker, w/c)  Hand Dominance: Right  Drives: No    Stairs in Home: stairlift  Use of Assistive Device(s): hemiwalker, w/c    Prior Level of [place]: Pt lives with his wife in a house with a stairlift. Pt reports wife assists with bathing, dressing, and short distance ambulation with hemiwalker. Pt reports he completes toileting independently. He has a walk in shower with a GB, comfort height toilet, and hospital bed.     SUBJECTIVE  \"I'm sorry girls but I'm scared to fall\"    OCCUPATIONAL THERAPY EXAMINATION    OBJECTIVE  Precautions: Bed/chair alarm; Low vision; Limb alert - left  Fall Risk: High fall risk    PAIN ASSESSMENT  Rating: Unable to rate  Location: back and knee pain  Management Techniques: Repositioning; Relaxation    ACTIVITY TOLERANCE  Pulse: 82  Heart Rate Source: Monitor     BP: (!) 111/97  BP Location: Right arm  BP Method: Automatic  Patient Position:  Semi-[place]    O2 SATURATIONS  Oxygen Therapy  SPO2% on Oxygen at Rest: 90  At rest oxygen flow (liters per minute): 5    COGNITION  Orientation Level:  oriented x4    VISION  Pt reports low vision.       Communication: wfl    Behavioral/Emotional/Social: appears anxious with mobility related tasks    RANGE OF MOTION   Upper extremity ROM is within functional limits     STRENGTH ASSESSMENT  Upper extremity strength is within functional limits except baseline left HP. He is able to weakly grasp items in left hand. He c/o pain in (B) hands.     COORDINATION  Gross Motor: WFL RUE  Fine Motor: WFL RUE    ACTIVITIES OF DAILY LIVING ASSESSMENT  AM-PAC ‘6-Clicks’ Inpatient Daily Activity Short Form  How much help from another person does the patient currently need…  -   Putting on and taking off regular lower body clothing?: A Lot  -   Bathing (including washing, rinsing, drying)?: A Lot  -   Toileting, which includes using toilet, bedpan or urinal? : A Lot  -   Putting on and taking off regular upper body clothing?: A Lot  -   Taking care of personal grooming such as brushing teeth?: A Little  -   Eating meals?: A Little    AM-PAC Score:  Score: 14  Approx Degree of Impairment: 59.67%  Standardized Score (AM-PAC Scale): 33.39  CMS Modifier (G-Code): CK    FUNCTIONAL TRANSFER ASSESSMENT  Sit to Stand: Edge of Bed; Chair  Edge of Bed: Maximum Assist (Max A x2 (unable to acheive full upright stand))  Chair: Maximum Assist (Max A x2 (unable to acheive full upright stand))    BED MOBILITY  Supine to Sit : Maximum Assist (Max A x2)    BALANCE ASSESSMENT  Static Sitting: Stand-by Assist  Sitting Unilateral: Moderate Assist    FUNCTIONAL ADL ASSESSMENT  Eating: Not Tested  Grooming Seated: Moderate Assist  Bathing Seated: Maximum Assist  LB Dressing Seated: Maximum Assist  Toileting Standing: Dependent        EDUCATION PROVIDED  Patient : Role of Occupational Therapy; Plan of Care; Functional Transfer Techniques  Patient's  Response to Education: Requires Further Education    Patient End of Session: Up in chair;Needs met;Call light within reach;RN aware of session/findings;All patient questions and concerns addressed;Alarm set  OT Goals  Patients self stated goal is: to go home with his wife     Patient will complete functional transfer with Min A x1  Comment:     Patient will complete toileting with Min A  Comment:     Patient will tolerate standing for 2 minutes in prep for adls with Min A   Comment:     Comment:          Goals  on: 24  Frequency: 3-5x/week    Patient Evaluation Complexity Level:   Occupational Profile/Medical History MODERATE - Expanded review of history including review of medical or therapy record   Specific performance deficits impacting engagement in ADL/IADL MODERATE  3 - 5 performance deficits   Client Assessment/Performance Deficits MODERATE - Comorbidities and min to mod modifications of tasks    Clinical Decision Making MODERATE - Analysis of occupational profile, detailed assessments, several treatment options    Overall Complexity MODERATE     OT Session   Self-Care Home Management: 3 minutes  Therapeutic Activity:15 minutes

## 2024-01-12 NOTE — SLP NOTE
ADULT SWALLOWING EVALUATION    ASSESSMENT    ASSESSMENT/OVERALL IMPRESSION:  PPE REQUIRED. THIS THERAPIST WORE GLOVES AND MASK FOR DURATION OF EVALUATION. HANDS WASHED UPON ENTRANCE/EXIT.    Per MD H&P, \"The patient is an 83-year-old  male who was brought into the emergency department for evaluation of cough and fever at home.  CBC showed white blood cell count of 19.8 with left shift.  Chemistry showed GFR of 59 which is below his baseline.  Procalcitonin 0.13 and lactic acid 2.3.  GeneXpert viral panel was negative.  Chest x-ray showed prominent vasculature with pulmonary interstitial prominence.  The patient was started on IV antibiotics, azithromycin and Unasyn, and he will be admitted to hospital for further management.\"    SLP BSSE orders received and acknowledged. A swallow evaluation warranted per \"hx of aspiration pna\". Pt afebrile with clear vocal quality, on 5L/Min, with oxygen saturation at 92%. Pt with extensive hx of dysphagia at [facility], last seen for BSE on 10/16/23 with recommendations for soft easy to chew/thin liquids. At this time pt declining another VFSS and any diet modifications. Per care everywhere, pt's most recent VFSS was on 3/26/23 at [facility] with recommendations for regular/thin liquids, no straws/small sips with left head turn.   Pt positioned 90 degrees in bed, alert/cooperative. Pt with no complaints of pain. Oral motor skills within functional limits. Pt presented with trials of hard solids and thin liquids via straw/cup. Pt with adequate oral acceptance and bilabial seal across all trials. Pt with intact bite, mastication of solids, and timely A-P transit. Pt's swallow response appears delayed with reduced hyolaryngeal elevation/excursion. No clinical signs of aspiration (e.g., immediate/delayed throat clear, immediate/delayed cough, wet vocal quality, increased O2 effort) observed across all trials. 1/11 CXR indicates \"Borderline heart size.  Tortuous aorta.  Prominent central  vasculature.  Pulmonary interstitial prominence has improved\". Oxygen status remained stable t/o the entire evaluation.     At this time, pt presents with functional oral swallow stage and probable pharyngeal dysfunction. Recommend a soft easy to chew diet and thin liquids with strict adherence to safe swallowing compensatory strategies (no straws, small/controlled sips/left head turn). Results and recommendations reviewed with RN, pt. Pt v/u to all results/recommendations, no family present. Recommendations remain written on whiteboard. SLP collaborated with RN for MD diet orders.     PLAN: SLP to f/u x1-2 meal assessments, monitor imaging, and VFSS if clinically indicated and pt agreeable. At this time, pt is declining         RECOMMENDATIONS   Diet Recommendations - Solids: Soft/ Easy to chew  Diet Recommendations - Liquids: Thin Liquids                        Compensatory Strategies Recommended: No straws;Slow rate;Small bites and sips  Aspiration Precautions: Upright position;Slow rate;Small bites and sips;No straw  Medication Administration Recommendations:  (as tolerated)  Treatment Plan/Recommendations: Aspiration precautions  Discharge Recommendations/Plan: Undetermined    HISTORY   MEDICAL HISTORY  Reason for Referral:  (\"hx of aspiration pna\")    Problem List  Principal Problem:    Aspiration pneumonitis (HCC)  Active Problems:    Aspiration pneumonia (HCC)    Acute respiratory failure with hypoxia (HCC)      Past Medical History  Past Medical History:   Diagnosis Date    Abdominal aortic aneurysm (AAA) (HCC)     Back problem     back pain    Disorder of thyroid     hypothyroidism    Falls frequently     High blood pressure     High cholesterol     Hx of pneumothorax     Muscle weakness     LUE hemiplegia    Osteoarthritis     PNA (pneumonia)     Pneumonia due to organism 11/2017    Stroke (HCC)     left sided weakness       Prior Living Situation: Home with spouse  Diet Prior to Admission: Regular;Thin  liquids  Precautions: Aspiration    Patient/Family Goals: did not state    SWALLOWING HISTORY  Current Diet Consistency: Soft/ Easy to Chew;Thin liquids  Dysphagia History: see above  Imaging Results: see above    SUBJECTIVE       OBJECTIVE   ORAL MOTOR EXAMINATION  Dentition: Functional  Symmetry: Within Functional Limits  Strength: Within Functional Limits  Tone: Within Functional Limits  Range of Motion: Within Functional Limits  Rate of Motion: Within Functional Limits    Voice Quality: Clear  Respiratory Status: Nasal cannula;Supplemental O2  Consistencies Trialed: Thin liquids;Hard solid  Method of Presentation: Self presentation;Cup;Straw  Patient Positioning: Upright;Midline    Oral Phase of Swallow: Within Functional Limits                      Pharyngeal Phase of Swallow: Impaired  Laryngeal Elevation Timing: Appears impaired  Laryngeal Elevation Strength: Appears impaired  Laryngeal Elevation Coordination: Appears intact  (Please note: Silent aspiration cannot be evaluated clinically. Videofluoroscopic Swallow Study is required to rule-out silent aspiration.)    Esophageal Phase of Swallow: No complaints consistent with possible esophageal involvement  Comments: NA              GOALS  Goal #1 The patient will tolerate soft easy to chew consistency and thin liquids without overt signs or symptoms of aspiration with 100 % accuracy over 1-2 session(s).  In Progress   Goal #2 The patient/family/caregiver will demonstrate understanding and implementation of aspiration precautions and swallow strategies independently over 1-2 session(s).    In Progress   Goal #3 The patient will utilize compensatory strategies as outlined by  BSSE (clinical evaluation) including Slow rate, Small bites, Small sips, No straws, Upright 90 degrees, Head turn Left with PRN assistance 100 % of the time across 1-2 sessions.  In Progress     FOLLOW UP  Treatment Plan/Recommendations: Aspiration precautions  Number of Visits to Meet  Established Goals:  (1-2)  Follow Up Needed (Documentation Required): Yes  SLP Follow-up Date: 01/13/24    Thank you for your referral.   If you have any questions, please contact BETHANIE Ibarra M.S. CCC-SLP  Speech Language Pathologist  Phone Number [phone]

## 2024-01-12 NOTE — PHYSICAL THERAPY NOTE
PHYSICAL THERAPY EVALUATION - INPATIENT     Room Number: 516/516-A  Evaluation Date: 1/12/2024  Type of Evaluation: Initial   Physician Order: PT Eval and Treat (functional mobility screen)    Presenting Problem: PNA, sepsis  Co-Morbidities : h/o R CVA with L HP  Reason for Therapy: Mobility Dysfunction and Discharge Planning    PHYSICAL THERAPY ASSESSMENT     Patient is a 83 year old male admitted 1/11/2024 for aspiration PNA, sepsis. Prior to admission, patient ambulatory with assist using a hemiwalker for short household distances, requiring assistance from wife with ADLs. Patient is currently functioning below baseline with bed mobility, transfers, and gait; requiring maxAx2 with bed mobility and transfers as a result of the following impairments: decreased functional strength, decreased endurance/aerobic capacity, pain, medical status, and anxiety. Pt unable to achieve full stand 2/2 decreased hip extensor strength and anxiety with pt unable to forward weight shift. Patient would benefit from continued skilled therapy services to promote return to PLOF while maximizing function and safety with continuous assistance and gradual rehabilitative therapy. Final discharge placement decision is directed by the inter-disciplinary team.    Patient history and/or personal factors that may impact the plan of care include limited functional status at baseline and co-morbidities (CVA with chronic L hemiparesis, recurrent aspiration PNA, hypothyroidism, OA, HTN, CKD, HLD) affecting strength, balance, endurance, activity tolerance, medical status . Based on the physical therapy examination of the noted systems and functional activity/participation limitations, the patient presentation is evolving given the patient demonstrates worsening of previously stable condition and demonstrates worsening of co-morbidities.     The patient's Approx Degree of Impairment: 92.36% has been calculated based on documentation in the [facility] '6  clicks' Inpatient Basic Mobility Short Form.  Research supports that patients with this level of impairment may benefit from LTAC, however based on patient's PLOF recommend [facility].    Patient will benefit from continued IP PT services to address these deficits in preparation for discharge.    DISCHARGE RECOMMENDATIONS  PT Discharge Recommendations: Sub-acute rehabilitation    PLAN  PT Treatment Plan: Bed mobility;Body mechanics;Energy conservation;Patient education;Family education;Strengthening;Gait training;Transfer training;Balance training;Neuromuscular re-educate  Rehab Potential : Guarded  Frequency (Obs): 3-5x/week       PHYSICAL THERAPY MEDICAL/SOCIAL HISTORY     Problem List  Principal Problem:    Aspiration pneumonitis (HCC)  Active Problems:    Aspiration pneumonia (HCC)    Acute respiratory failure with hypoxia (HCC)      HOME SITUATION  Home Situation  Type of Home: House  Home Layout: Two level;Stairlift  Stairs to Enter : 4 (uses chairlift)  Railing: Yes  Lives With: Spouse;Daughter  Drives: No  Patient Owned Equipment: Mark-walker;Hospital bed;Wheelchair     Prior Level of [place]: ambulatory with assistx1 using hemiwalker, assist with all ADLs    Admitted 10/2023 for hypoxia and aspiration PNA, modA using a walker, recommended [facility] however patient declined and returned home with home-health PT and family support    SUBJECTIVE  \"Owie, I always feel like I am going to fall.\"    PHYSICAL THERAPY EXAMINATION     OBJECTIVE  Precautions: Bed/chair alarm;Low vision;Limb alert - left  Fall Risk: High fall risk    WEIGHT BEARING RESTRICTION  Weight Bearing Restriction: None                PAIN ASSESSMENT  Rating: Unable to rate  Location: buttocks  Management Techniques: Activity promotion;Repositioning    COGNITION  Overall Cognitive Status:  WFL - within functional limits  Awareness of Deficits:  decreased awareness of deficits    RANGE OF MOTION AND STRENGTH ASSESSMENT  Upper extremity ROM and strength  are within functional limits  RUE, LUE limited 2/2 hx CVA  Lower extremity ROM is within functional limits  BLEs  Lower extremity strength is functionally limited 2/2 pain and deconditioning, unable to achieve full stand    BALANCE  Static Sitting: Fair +  Dynamic Sitting: Fair -  Static Standing: Dependent  Dynamic Standing: Not tested    NEUROLOGICAL FINDINGS  Coordination - Finger to Nose: Left decreased speed;Left decreased accuracy                   ACTIVITY TOLERANCE  Pulse: 85  Heart Rate Source: Monitor     BP: (!) 111/97  BP Location: Right arm  BP Method: Automatic  Patient Position: Semi-[place]    O2 WALK  Oxygen Therapy  SPO2% on Oxygen at Rest: 90  At rest oxygen flow (liters per minute): 5  SPO2% Ambulation on Oxygen: 92  Ambulation oxygen flow (liters per minute): 5    AM-PAC '6-Clicks' INPATIENT SHORT FORM - BASIC MOBILITY  How much difficulty does the patient currently have...  Patient Difficulty: Turning over in bed (including adjusting bedclothes, sheets and blankets)?: A Lot   Patient Difficulty: Sitting down on and standing up from a chair with arms (e.g., wheelchair, bedside commode, etc.): Unable   Patient Difficulty: Moving from lying on back to sitting on the side of the bed?: Unable   How much help from another person does the patient currently need...   Help from Another: Moving to and from a bed to a chair (including a wheelchair)?: Total   Help from Another: Need to walk in hospital room?: Total   Help from Another: Climbing 3-5 steps with a railing?: Total     AM-PAC Score:  Raw Score: 7   Approx Degree of Impairment: 92.36%   Standardized Score (AM-PAC Scale): 26.42   CMS Modifier (G-Code): CM    FUNCTIONAL ABILITY STATUS  Functional Mobility/Gait Assessment  Gait Assistance: Not tested (deferred 2/2 impaired standing tolerance, posture, balance)    ROLLING: maximum assist   SUPINE TO SIT: dependent - maxAx2 with head of bed elevated  SIT TO STAND: dependent - x4 trials, unable to  achieve full stand, maxAx2, standing tolerance <10s  STAND TO SIT: dependent   BED TO/FROM CHAIR: dependent - maxAx2 squat pivot transfer    Patient received semi-fowlers in bed, agreeable to physical therapy evaluation. Patient seen for evaluation in coordination with occupational therapy to maximize patient safety and function given pain and weakness limiting activity tolerance and requiring skilled assistx2. Vital signs monitored as noted above, no adverse symptoms and patient stable during session. Education with patient provided on Physical therapy plan of care and physiological benefits of out of bed mobility. Next session anticipate to progress bed mobility and transfers.    Exercise/Education Provided:  Bed mobility  Body mechanics  Energy conservation  Strengthening  Transfer training    Patient End of Session: Up in chair;Needs met;Call light within reach;RN aware of session/findings;Alarm set    CURRENT GOALS    Goals to be met by: 1/30/23  Patient Goal Patient's self-stated goal is: return home safely   Goal #1 Patient is able to demonstrate supine - sit EOB @ level: minimum assistance     Goal #1   Current Status    Goal #2 Patient is able to demonstrate transfers EOB to/from Chair/Wheelchair at assistance level: moderate assistance with walker - tabitha and 1 person     Goal #2  Current Status    Goal #3 Patient is able to ambulate 20 feet with assist device: walker - tabitha and 1 person at assistance level: minimum assistance   Goal #3   Current Status    Goal #4 Patient will tolerate static standing edge of bed for 1 minute with CGA using hemiwalker.    Goal #4   Current Status    Goal #5 Patient to demonstrate independence with home activity/exercise instructions provided to patient in preparation for discharge.   Goal #5   Current Status    Goal #6    Goal #6  Current Status      Patient Evaluation Complexity Level:  History High - 3 or more personal factors and/or co-morbidities   Examination of body  systems Moderate - addressing a total of 3 or more elements   Clinical Presentation Moderate - Evolving   Clinical Decision Making Moderate Complexity       Therapeutic Activity: 28 minutes      Anita Polk, PT, DPT  [facility]

## 2024-01-12 NOTE — PLAN OF CARE
Pt alert and oriented and afebrile at this time. UA sample pending, blood cultures pending. Pt currently on 4 L O2. Plan to wean pt down O2 when appropriate  Problem: Patient Centered Care  Goal: Patient preferences are identified and integrated in the patient's plan of care  Description: Interventions:  - What would you like us to know as we care for you?   - Provide timely, complete, and accurate information to patient/family  - Incorporate patient and family knowledge, values, beliefs, and cultural backgrounds into the planning and delivery of care  - Encourage patient/family to participate in care and decision-making at the level they choose  - Honor patient and family perspectives and choices  Outcome: Progressing     Problem: RESPIRATORY - ADULT  Goal: Achieves optimal ventilation and oxygenation  Description: INTERVENTIONS:  - Assess for changes in respiratory status  - Assess for changes in mentation and behavior  - Position to facilitate oxygenation and minimize respiratory effort  - Oxygen supplementation based on oxygen saturation or ABGs  - Provide Smoking Cessation handout, if applicable  - Encourage broncho-pulmonary hygiene including cough, deep breathe, Incentive Spirometry  - Assess the need for suctioning and perform as needed  - Assess and instruct to report SOB or any respiratory difficulty  - Respiratory Therapy support as indicated  - Manage/alleviate anxiety  - Monitor for signs/symptoms of CO2 retention  Outcome: Progressing     Problem: MUSCULOSKELETAL - ADULT  Goal: Return mobility to safest level of function  Description: INTERVENTIONS:  - Assess patient stability and activity tolerance for standing, transferring and ambulating w/ or w/o assistive devices  - Assist with transfers and ambulation using safe patient handling equipment as needed  - Ensure adequate protection for wounds/incisions during mobilization  - Obtain PT/OT consults as needed  - Advance activity as appropriate  -  Communicate ordered activity level and limitations with patient/family  Outcome: Progressing     Problem: Impaired Activities of Daily Living  Goal: Achieve highest/safest level of independence in self care  Description: Interventions:  - Assess ability and encourage patient to participate in ADLs to maximize function  - Promote sitting position while performing ADLs such as feeding, grooming, and bathing  - Educate and encourage patient/family in tolerated functional activity level and precautions during self-care  - Provide support under elbow of weak side to prevent shoulder subluxation  Outcome: Progressing     Problem: PAIN - ADULT  Goal: Verbalizes/displays adequate comfort level or patient's stated pain goal  Description: INTERVENTIONS:  - Encourage pt to monitor pain and request assistance  - Assess pain using appropriate pain scale  - Administer analgesics based on type and severity of pain and evaluate response  - Implement non-pharmacological measures as appropriate and evaluate response  - Consider cultural and social influences on pain and pain management  - Manage/alleviate anxiety  - Utilize distraction and/or relaxation techniques  - Monitor for opioid side effects  - Notify MD/LIP if interventions unsuccessful or patient reports new pain  - Anticipate increased pain with activity and pre-medicate as appropriate  Outcome: Progressing     Problem: RISK FOR INFECTION - ADULT  Goal: Absence of fever/infection during anticipated neutropenic period  Description: INTERVENTIONS  - Monitor WBC  - Administer growth factors as ordered  - Implement neutropenic guidelines  Outcome: Progressing     Problem: SAFETY ADULT - FALL  Goal: Free from fall injury  Description: INTERVENTIONS:  - Assess pt frequently for physical needs  - Identify cognitive and physical deficits and behaviors that affect risk of falls.  - [place] fall precautions as indicated by assessment.  - Educate pt/family on patient safety  including physical limitations  - Instruct pt to call for assistance with activity based on assessment  - Modify environment to reduce risk of injury  - Provide assistive devices as appropriate  - Consider OT/PT consult to assist with strengthening/mobility  - Encourage toileting schedule  Outcome: Progressing     Problem: DISCHARGE PLANNING  Goal: Discharge to home or other facility with appropriate resources  Description: INTERVENTIONS:  - Identify barriers to discharge w/pt and caregiver  - Include patient/family/discharge partner in discharge planning  - Arrange for needed discharge resources and transportation as appropriate  - Identify discharge learning needs (meds, wound care, etc)  - Arrange for interpreters to assist at discharge as needed  - Consider post-discharge preferences of patient/family/discharge partner  - Complete POLST form as appropriate  - Assess patient's ability to be responsible for managing their own health  - Refer to Case Management Department for coordinating discharge planning if the patient needs post-hospital services based on physician/LIP order or complex needs related to functional status, cognitive ability or social support system  Outcome: Progressing     Problem: Patient/Family Goals  Goal: Patient/Family Long Term Goal  Description: Patient's Long Term Goal:     Interventions:  -   - See additional Care Plan goals for specific interventions  Outcome: Progressing  Goal: Patient/Family Short Term Goal  Description: Patient's Short Term Goal:     Interventions:   -   - See additional Care Plan goals for specific interventions  Outcome: Progressing

## 2024-01-12 NOTE — PROGRESS NOTES
[facility]     Hospitalist Progress Note     Cody Fitzgerald Patient Status:  Inpatient    1940 MRN [id number]   Location [facility]5W Attending Ana La MD   Hosp Day # 1 PCP Bam Hampton MD     Subjective:     Patient resting comfortably and in NAD. Denied any active complaints at the time of interview. Saturating well on 5L NC.     No acute overnight events reported by the nursing staff.      Objective:    Review of Systems:   ROS completed; pertinent positive and negatives stated in subjective.      Vital signs:  Temp:  [98 °F (36.7 °C)-100.6 °F (38.1 °C)] 98.4 °F (36.9 °C)  Pulse:  [] 83  Resp:  [18-22] 19  BP: (114-167)/(64-90) 150/72  SpO2:  [88 %-95 %] 92 %      Physical Exam:    Gen: NAD AO x3  Chest: good air entry, coarse breath sounds bilaterally  CVS: normal s1 and s2 RR  Abd: NABS soft NT ND  Neuro: CN 2-12 grossly intact  Ext: no edema in bilateral LE      Diagnostic Data:    Labs:  Recent Labs   Lab 24  0940 24  1108 24  0448   WBC 10.6 19.8* 12.9*   HGB 12.2* 14.3 10.8*   MCV 94.2 92.9 96.0   .0 297.0 242.0       Recent Labs   Lab 24  0940 24  1107 24  0448   * 110* 91   BUN 17 14 13   CREATSERUM 1.14 1.22 1.14   CA 9.1 10.0 8.9   ALB 3.9 4.6  --     140 145   K 4.0 3.7 3.4*    107 114*   CO2 23.0 23.0 24.0   ALKPHO 108 117  --    AST 14 12  --    ALT 7* <7*  --    BILT 0.3 0.5  --    TP 7.6 9.2*  --        Estimated Creatinine Clearance: 51.6 mL/min (based on SCr of 1.14 mg/dL).    No results for input(s): \"PTP\", \"INR\" in the last 168 hours.           Imaging: Imaging data reviewed in Epic.    Medications:    heparin  5,000 Units Subcutaneous 2 times per day    azithromycin  500 mg Intravenous Q24H    ampicillin-sulbactam  3 g Intravenous q6h    amLODIPine  5 mg Oral Daily    atorvastatin  40 mg Oral Nightly    clopidogrel  75 mg Oral Daily    gabapentin  300 mg Oral TID    levothyroxine   100 mcg Oral Daily @ 0700       Assessment & Plan:     Acute hypoxic respiratory failure due to sepsis likely 2/2 CAP and/or aspiration PNA  CXR reviewed  LA improved  Strep pneumo and legionella Ag negative  Passed swallow evaluation  Bcx pending  Started on IV Unasyn - deescalate as indicated  IS  Hypokalemia  Replaced  Continue to monitor  CKD 2-3  Renal function at baseline  Monitor labs  HTN  BP well controlled  Continue home meds  Hypothyroidism  Continue home meds  Hx of CVA  Continue home meds      Plan of care discussed with patient or family at bedside.      Supplementary Documentation:     Quality:  DVT Prophylaxis: Heparin s/c  CODE status: Full  Lao: No  Central line: No      Estimated date of discharge: TBD  Discharge is dependent on: clinical stability  At this point Mr. Fitzgerald is expected to be discharge to: home            **Certification      PHYSICIAN Certification of Need for Inpatient Hospitalization - Initial Certification    Patient will require inpatient services that will reasonably be expected to span two midnight's based on the clinical documentation in H+P.   Based on patients current state of illness, I anticipate that, after discharge, patient will require TBD.      Ana La MD  Hospitalist    MDM: High, I personally reviewed the available laboratories, imaging including CXR. I discussed the case with RN. I ordered laboratories, studies including AM labs.   Medical decision making high, risk is high.       The 21st Century Cures Act makes medical notes like these available to patients in the interest of transparency. Please be advised this is a medical document. Medical documents are intended to carry relevant information, facts as evident, and the clinical opinion of the practitioner. The medical note is intended as peer to peer communication and may appear blunt or direct. It is written in medical language and may contain abbreviations or verbiage that are unfamiliar.

## 2024-01-13 ENCOUNTER — APPOINTMENT (OUTPATIENT)
Dept: GENERAL RADIOLOGY | Facility: HOSPITAL | Age: 84
End: 2024-01-13
Attending: INTERNAL MEDICINE
Payer: MEDICARE

## 2024-01-13 LAB
ALBUMIN SERPL-MCNC: 3.5 G/DL (ref 3.2–4.8)
ALBUMIN/GLOB SERPL: 1 {RATIO} (ref 1–2)
ALP LIVER SERPL-CCNC: 80 U/L
ALT SERPL-CCNC: <7 U/L
ANION GAP SERPL CALC-SCNC: 7 MMOL/L (ref 0–18)
AST SERPL-CCNC: 9 U/L (ref ?–34)
BASOPHILS # BLD AUTO: 0.06 X10(3) UL (ref 0–0.2)
BASOPHILS NFR BLD AUTO: 0.6 %
BILIRUB SERPL-MCNC: 0.3 MG/DL (ref 0.2–1.1)
BUN BLD-MCNC: 14 MG/DL (ref 9–23)
BUN/CREAT SERPL: 12.3 (ref 10–20)
CALCIUM BLD-MCNC: 8.9 MG/DL (ref 8.7–10.4)
CHLORIDE SERPL-SCNC: 114 MMOL/L (ref 98–112)
CO2 SERPL-SCNC: 24 MMOL/L (ref 21–32)
CREAT BLD-MCNC: 1.14 MG/DL
DEPRECATED RDW RBC AUTO: 50.4 FL (ref 35.1–46.3)
EGFRCR SERPLBLD CKD-EPI 2021: 64 ML/MIN/1.73M2 (ref 60–?)
EOSINOPHIL # BLD AUTO: 0.25 X10(3) UL (ref 0–0.7)
EOSINOPHIL NFR BLD AUTO: 2.5 %
ERYTHROCYTE [DISTWIDTH] IN BLOOD BY AUTOMATED COUNT: 14.4 % (ref 11–15)
GLOBULIN PLAS-MCNC: 3.4 G/DL (ref 2.8–4.4)
GLUCOSE BLD-MCNC: 82 MG/DL (ref 70–99)
HCT VFR BLD AUTO: 33.1 %
HGB BLD-MCNC: 10.5 G/DL
IMM GRANULOCYTES # BLD AUTO: 0.04 X10(3) UL (ref 0–1)
IMM GRANULOCYTES NFR BLD: 0.4 %
LYMPHOCYTES # BLD AUTO: 2.04 X10(3) UL (ref 1–4)
LYMPHOCYTES NFR BLD AUTO: 20.6 %
MCH RBC QN AUTO: 30.1 PG (ref 26–34)
MCHC RBC AUTO-ENTMCNC: 31.7 G/DL (ref 31–37)
MCV RBC AUTO: 94.8 FL
MONOCYTES # BLD AUTO: 1.01 X10(3) UL (ref 0.1–1)
MONOCYTES NFR BLD AUTO: 10.2 %
NEUTROPHILS # BLD AUTO: 6.49 X10 (3) UL (ref 1.5–7.7)
NEUTROPHILS # BLD AUTO: 6.49 X10(3) UL (ref 1.5–7.7)
NEUTROPHILS NFR BLD AUTO: 65.7 %
OSMOLALITY SERPL CALC.SUM OF ELEC: 300 MOSM/KG (ref 275–295)
PLATELET # BLD AUTO: 217 10(3)UL (ref 150–450)
POTASSIUM SERPL-SCNC: 3.7 MMOL/L (ref 3.5–5.1)
POTASSIUM SERPL-SCNC: 3.7 MMOL/L (ref 3.5–5.1)
PROT SERPL-MCNC: 6.9 G/DL (ref 5.7–8.2)
RBC # BLD AUTO: 3.49 X10(6)UL
SODIUM SERPL-SCNC: 145 MMOL/L (ref 136–145)
WBC # BLD AUTO: 9.9 X10(3) UL (ref 4–11)

## 2024-01-13 PROCEDURE — 99233 SBSQ HOSP IP/OBS HIGH 50: CPT | Performed by: INTERNAL MEDICINE

## 2024-01-13 PROCEDURE — 71045 X-RAY EXAM CHEST 1 VIEW: CPT | Performed by: INTERNAL MEDICINE

## 2024-01-13 RX ORDER — ACETAMINOPHEN 500 MG
500 TABLET ORAL EVERY 4 HOURS PRN
Status: DISCONTINUED | OUTPATIENT
Start: 2024-01-13 | End: 2024-01-17

## 2024-01-13 NOTE — PROGRESS NOTES
[facility]     Hospitalist Progress Note     Cody Fitzgerald Patient Status:  Inpatient    1940 MRN [id number]   Location [facility] Attending Ana La MD   Hosp Day # 2 PCP Bam Hampton MD     Subjective:     Patient resting comfortably and in NAD. Denied any active complaints at the time of interview. Requiring 7L HFNC at the time of interview. Denies any chest pain, sob or any other complaints.    No acute overnight events reported by the nursing staff.      Objective:    Review of Systems:   ROS completed; pertinent positive and negatives stated in subjective.      Vital signs:  Temp:  [97.2 °F (36.2 °C)-98.2 °F (36.8 °C)] 97.4 °F (36.3 °C)  Pulse:  [] 88  Resp:  [16-20] 20  BP: (111-159)/() 155/72  SpO2:  [90 %-97 %] 90 %      Physical Exam:    Gen: NAD AO x3  Chest: good air entry, coarse breath sounds bilaterally  CVS: normal s1 and s2 RR  Abd: NABS soft NT ND  Neuro: CN 2-12 grossly intact  Ext: no edema in bilateral LE      Diagnostic Data:    Labs:  Recent Labs   Lab 24  0940 24  1108 24  0448 24  0507   WBC 10.6 19.8* 12.9* 9.9   HGB 12.2* 14.3 10.8* 10.5*   MCV 94.2 92.9 96.0 94.8   .0 297.0 242.0 217.0       Recent Labs   Lab 24  0940 24  1107 24  0448 24  0507   * 110* 91 82   BUN 17 14 13 14   CREATSERUM 1.14 1.22 1.14 1.14   CA 9.1 10.0 8.9 8.9   ALB 3.9 4.6  --  3.5    140 145 145   K 4.0 3.7 3.4* 3.7  3.7    107 114* 114*   CO2 23.0 23.0 24.0 24.0   ALKPHO 108 117  --  80   AST 14 12  --  9   ALT 7* <7*  --  <7*   BILT 0.3 0.5  --  0.3   TP 7.6 9.2*  --  6.9       Estimated Creatinine Clearance: 51.6 mL/min (based on SCr of 1.14 mg/dL).    No results for input(s): \"PTP\", \"INR\" in the last 168 hours.           Imaging: Imaging data reviewed in Epic.    Medications:    docusate sodium  100 mg Oral BID    heparin  5,000 Units Subcutaneous 2 times per day    azithromycin   500 mg Intravenous Q24H    ampicillin-sulbactam  3 g Intravenous q6h    amLODIPine  5 mg Oral Daily    atorvastatin  40 mg Oral Nightly    clopidogrel  75 mg Oral Daily    gabapentin  300 mg Oral TID    levothyroxine  100 mcg Oral Daily @ 0700       Assessment & Plan:     Acute hypoxic respiratory failure due to sepsis likely 2/2 CAP and/or aspiration PNA  CXR reviewed  LA improved  Strep pneumo and Legionella Ag negative  Passed bedside swallow evaluation  Patient refusing a VFSS at this time  Bcx showing NGTD  Started on IV Azithromycin x3 days, IV Unasyn - deescalate as indicated  IS, chest PT  Generalized weakness  PT/OT recommending [facility]  Awaiting placement  CM/SW on board  Hypokalemia  Replaced  Continue to monitor  CKD 2-3  Renal function at baseline  Monitor labs  HTN  BP well controlled  Continue home meds  Hypothyroidism  Continue home meds  Hx of CVA  Continue home meds      Plan of care discussed with patient or family at bedside.      Supplementary Documentation:     Quality:  DVT Prophylaxis: Heparin s/c  CODE status: Full  Lao: No  Central line: No      Estimated date of discharge: TBD  Discharge is dependent on: clinical stability  At this point Mr. Fitzgerald is expected to be discharge to: home            **Certification      PHYSICIAN Certification of Need for Inpatient Hospitalization - Initial Certification    Patient will require inpatient services that will reasonably be expected to span two midnight's based on the clinical documentation in H+P.   Based on patients current state of illness, I anticipate that, after discharge, patient will require TBD.      Ana La MD  Hospitalist    MDM: High, I personally reviewed the available laboratories, imaging including CXR. I discussed the case with RN. I ordered laboratories, studies including AM labs.   Medical decision making high, risk is high.       The 21st Century Cures Act makes medical notes like these available to patients in the interest  of transparency. Please be advised this is a medical document. Medical documents are intended to carry relevant information, facts as evident, and the clinical opinion of the practitioner. The medical note is intended as peer to peer communication and may appear blunt or direct. It is written in medical language and may contain abbreviations or verbiage that are unfamiliar.

## 2024-01-13 NOTE — PLAN OF CARE
No acute changes overnight. Stool softener ordered per pt request- pt reported feeling constipated. Primofit in place. Tylenol given for pain. Iv antibiotics infused.   Problem: Patient Centered Care  Goal: Patient preferences are identified and integrated in the patient's plan of care  Description: Interventions:  - What would you like us to know as we care for you? From home with spouse  - Provide timely, complete, and accurate information to patient/family  - Incorporate patient and family knowledge, values, beliefs, and cultural backgrounds into the planning and delivery of care  - Encourage patient/family to participate in care and decision-making at the level they choose  - Honor patient and family perspectives and choices  1/13/2024 0408 by Keiko Zamora, RN  Outcome: Progressing     Problem: RESPIRATORY - ADULT  Goal: Achieves optimal ventilation and oxygenation  Description: INTERVENTIONS:  - Assess for changes in respiratory status  - Assess for changes in mentation and behavior  - Position to facilitate oxygenation and minimize respiratory effort  - Oxygen supplementation based on oxygen saturation or ABGs  - Provide Smoking Cessation handout, if applicable  - Encourage broncho-pulmonary hygiene including cough, deep breathe, Incentive Spirometry  - Assess the need for suctioning and perform as needed  - Assess and instruct to report SOB or any respiratory difficulty  - Respiratory Therapy support as indicated  - Manage/alleviate anxiety  - Monitor for signs/symptoms of CO2 retention  1/13/2024 0408 by Keiko Zamora, RN  Outcome: Progressing     Problem: MUSCULOSKELETAL - ADULT  Goal: Return mobility to safest level of function  Description: INTERVENTIONS:  - Assess patient stability and activity tolerance for standing, transferring and ambulating w/ or w/o assistive devices  - Assist with transfers and ambulation using safe patient handling equipment as needed  - Ensure adequate protection for  wounds/incisions during mobilization  - Obtain PT/OT consults as needed  - Advance activity as appropriate  - Communicate ordered activity level and limitations with patient/family  1/13/2024 0408 by Keiko Zamora RN  Outcome: Progressing     Problem: Impaired Activities of Daily Living  Goal: Achieve highest/safest level of independence in self care  Description: Interventions:  - Assess ability and encourage patient to participate in ADLs to maximize function  - Promote sitting position while performing ADLs such as feeding, grooming, and bathing  - Educate and encourage patient/family in tolerated functional activity level and precautions during self-care  - Encourage patient to incorporate impaired side during daily activities to promote function  1/13/2024 0408 by Keiko Zamora RN  Outcome: Progressing     Problem: PAIN - ADULT  Goal: Verbalizes/displays adequate comfort level or patient's stated pain goal  Description: INTERVENTIONS:  - Encourage pt to monitor pain and request assistance  - Assess pain using appropriate pain scale  - Administer analgesics based on type and severity of pain and evaluate response  - Implement non-pharmacological measures as appropriate and evaluate response  - Consider cultural and social influences on pain and pain management  - Manage/alleviate anxiety  - Utilize distraction and/or relaxation techniques  - Monitor for opioid side effects  - Notify MD/LIP if interventions unsuccessful or patient reports new pain  - Anticipate increased pain with activity and pre-medicate as appropriate  1/13/2024 0408 by Keiko Zamora RN  Outcome: Progressing     Problem: RISK FOR INFECTION - ADULT  Goal: Absence of fever/infection during anticipated neutropenic period  Description: INTERVENTIONS  - Monitor WBC  - Administer growth factors as ordered  - Implement neutropenic guidelines  1/13/2024 0408 by Keiko Zamora, RN  Outcome: Progressing  1/12/2024 2327 by Noah  YADIRA Aden  Outcome: Progressing     Problem: SAFETY ADULT - FALL  Goal: Free from fall injury  Description: INTERVENTIONS:  - Assess pt frequently for physical needs  - Identify cognitive and physical deficits and behaviors that affect risk of falls.  - [place] fall precautions as indicated by assessment.  - Educate pt/family on patient safety including physical limitations  - Instruct pt to call for assistance with activity based on assessment  - Modify environment to reduce risk of injury  - Provide assistive devices as appropriate  - Consider OT/PT consult to assist with strengthening/mobility  - Encourage toileting schedule  1/13/2024 0408 by Keiko Zamora RN  Outcome: Progressing  1/12/2024 2327 by Keiko Zamora RN  Outcome: Progressing     Problem: DISCHARGE PLANNING  Goal: Discharge to home or other facility with appropriate resources  Description: INTERVENTIONS:  - Identify barriers to discharge w/pt and caregiver  - Include patient/family/discharge partner in discharge planning  - Arrange for needed discharge resources and transportation as appropriate  - Identify discharge learning needs (meds, wound care, etc)  - Arrange for interpreters to assist at discharge as needed  - Consider post-discharge preferences of patient/family/discharge partner  - Complete POLST form as appropriate  - Assess patient's ability to be responsible for managing their own health  - Refer to Case Management Department for coordinating discharge planning if the patient needs post-hospital services based on physician/LIP order or complex needs related to functional status, cognitive ability or social support system  1/13/2024 0408 by Keiko Zamora RN  Outcome: Progressing  1/12/2024 2327 by Keiko Zamora RN  Outcome: Progressing     Problem: Patient/Family Goals  Goal: Patient/Family Long Term Goal  Description: Patient's Long Term Goal: discharge home    Interventions:  - follow md orders.  -   - See  additional Care Plan goals for specific interventions  1/13/2024 0408 by Keiko Zamora RN  Outcome: Progressing  1/12/2024 2327 by Keiko Zamora RN  Outcome: Progressing     Problem: Patient/Family Goals  Goal: Patient/Family Short Term Goal  Description: Patient's Short Term Goal:     Interventions:   -   - See additional Care Plan goals for specific interventions  1/13/2024 0408 by Keiko Zamora RN  Outcome: Progressing  1/12/2024 2327 by Keiko Zamora RN  Outcome: Progressing

## 2024-01-13 NOTE — PLAN OF CARE
Problem: PAIN - ADULT  Goal: Verbalizes/displays adequate comfort level or patient's stated pain goal  Description: INTERVENTIONS:  - Encourage pt to monitor pain and request assistance  - Assess pain using appropriate pain scale  - Administer analgesics based on type and severity of pain and evaluate response  - Implement non-pharmacological measures as appropriate and evaluate response  - Consider cultural and social influences on pain and pain management  - Manage/alleviate anxiety  - Utilize distraction and/or relaxation techniques  - Monitor for opioid side effects  - Notify MD/LIP if interventions unsuccessful or patient reports new pain  - Anticipate increased pain with activity and pre-medicate as appropriate  Outcome: Progressing     Problem: RISK FOR INFECTION - ADULT  Goal: Absence of fever/infection during anticipated neutropenic period  Description: INTERVENTIONS  - Monitor WBC  - Administer growth factors as ordered  - Implement neutropenic guidelines  Outcome: Progressing     Problem: SAFETY ADULT - FALL  Goal: Free from fall injury  Description: INTERVENTIONS:  - Assess pt frequently for physical needs  - Identify cognitive and physical deficits and behaviors that affect risk of falls.  - [place] fall precautions as indicated by assessment.  - Educate pt/family on patient safety including physical limitations  - Instruct pt to call for assistance with activity based on assessment  - Modify environment to reduce risk of injury  - Provide assistive devices as appropriate  - Consider OT/PT consult to assist with strengthening/mobility  - Encourage toileting schedule  Outcome: Progressing     Problem: RESPIRATORY - ADULT  Goal: Achieves optimal ventilation and oxygenation  Description: INTERVENTIONS:  - Assess for changes in respiratory status  - Assess for changes in mentation and behavior  - Position to facilitate oxygenation and minimize respiratory effort  - Oxygen supplementation based on oxygen  saturation or ABGs  - Provide Smoking Cessation handout, if applicable  - Encourage broncho-pulmonary hygiene including cough, deep breathe, Incentive Spirometry  - Assess the need for suctioning and perform as needed  - Assess and instruct to report SOB or any respiratory difficulty  - Respiratory Therapy support as indicated  - Manage/alleviate anxiety  - Monitor for signs/symptoms of CO2 retention  Outcome: Not Progressing     Problem: MUSCULOSKELETAL - ADULT  Goal: Return mobility to safest level of function  Description: INTERVENTIONS:  - Assess patient stability and activity tolerance for standing, transferring and ambulating w/ or w/o assistive devices  - Assist with transfers and ambulation using safe patient handling equipment as needed  - Ensure adequate protection for wounds/incisions during mobilization  - Obtain PT/OT consults as needed  - Advance activity as appropriate  - Communicate ordered activity level and limitations with patient/family  Outcome: Not Progressing     On 6 L high flow nasal cannula. CXR done at bedside.   Safety precautions in place, frequent nursing rounding completed, call light within reach. All questions answered and needs met.

## 2024-01-13 NOTE — SLP NOTE
SPEECH DAILY NOTE - INPATIENT    ASSESSMENT & PLAN   ASSESSMENT  Pt seen for dysphagia tx to assess tolerance with recommended diet, ensure proper utilization of aspiration precautions and provide pt/family education.  Patient's wife at the bedside. Patient and his wife were educated on results and recommendations of BSSE on 1/12 and [place] of Aspiration PNA. Understanding verbalized.  Patient independent in recall of aspiration precautions and able to provide demonstration of aspiration precautions. Patient demonstrates safe and efficient po intake of recommended diet. No overt clinical s/s of aspiration precautions with recommended diet and utilization of aspiration precautions.   Will discharge from skilled dysphagia therapy. Continue recommended diet with aspiration precautions.       Diet Recommendations - Solids: Soft/ Easy to chew  Diet Recommendations - Liquids: Thin Liquids    Compensatory Strategies Recommended: No straws;Slow rate;Small bites and sips  Aspiration Precautions: Upright position;Slow rate;Small bites and sips;No straw  Medication Administration Recommendations:  (as tolerated)    Patient Experiencing Pain: No                Discharge Recommendations  Discharge Recommendations/Plan: Undetermined    Treatment Plan  Treatment Plan/Recommendations: Aspiration precautions    Interdisciplinary Communication: Disussed with other staff             GOALS  Goal #1 The patient will tolerate soft easy to chew consistency and thin liquids without overt signs or symptoms of aspiration with 100 % accuracy over 1-2 session(s).  Met   Goal #2 The patient/family/caregiver will demonstrate understanding and implementation of aspiration precautions and swallow strategies independently over 1-2 session(s).     Met   Goal #3 The patient will utilize compensatory strategies as outlined by  BSSE (clinical evaluation) including Slow rate, Small bites, Small sips, No straws, Upright 90 degrees, Head turn Left with  PRN assistance 100 % of the time across 1-2 sessions.  Met         FOLLOW UP  Follow Up Needed (Documentation Required): No  SLP Follow-up Date: 01/13/24  Number of Visits to Meet Established Goals:  (1-2)    Session: 1 of 2 (recalls and demonstrates independence in aspiration precautions)    If you have any questions, please contact Suzette NATH SLP

## 2024-01-13 NOTE — PAYOR COMM NOTE
INPT/ MCR  Acute hypoxic respiratory failure due to sepsis likely 2/2 CAP and/or aspiration PNA  CXR reviewed  LA improved  Strep pneumo and Legionella Ag negative  Passed bedside swallow evaluation  Patient refusing a VFSS at this time  Bcx showing NGTD  Started on IV Azithromycin x3 days, IV Unasyn - deescalate as indicated  IS, chest PT

## 2024-01-14 LAB
ALBUMIN SERPL-MCNC: 3.7 G/DL (ref 3.2–4.8)
ALBUMIN/GLOB SERPL: 1.1 {RATIO} (ref 1–2)
ALP LIVER SERPL-CCNC: 91 U/L
ALT SERPL-CCNC: <7 U/L
ANION GAP SERPL CALC-SCNC: 9 MMOL/L (ref 0–18)
AST SERPL-CCNC: 15 U/L (ref ?–34)
BASOPHILS # BLD AUTO: 0.06 X10(3) UL (ref 0–0.2)
BASOPHILS NFR BLD AUTO: 0.6 %
BILIRUB SERPL-MCNC: 0.2 MG/DL (ref 0.2–1.1)
BUN BLD-MCNC: 12 MG/DL (ref 9–23)
BUN/CREAT SERPL: 10.7 (ref 10–20)
CALCIUM BLD-MCNC: 9.1 MG/DL (ref 8.7–10.4)
CHLORIDE SERPL-SCNC: 113 MMOL/L (ref 98–112)
CO2 SERPL-SCNC: 24 MMOL/L (ref 21–32)
CREAT BLD-MCNC: 1.12 MG/DL
DEPRECATED RDW RBC AUTO: 49.4 FL (ref 35.1–46.3)
EGFRCR SERPLBLD CKD-EPI 2021: 66 ML/MIN/1.73M2 (ref 60–?)
EOSINOPHIL # BLD AUTO: 0.4 X10(3) UL (ref 0–0.7)
EOSINOPHIL NFR BLD AUTO: 4 %
ERYTHROCYTE [DISTWIDTH] IN BLOOD BY AUTOMATED COUNT: 14.1 % (ref 11–15)
GLOBULIN PLAS-MCNC: 3.5 G/DL (ref 2.8–4.4)
GLUCOSE BLD-MCNC: 90 MG/DL (ref 70–99)
HCT VFR BLD AUTO: 34.1 %
HGB BLD-MCNC: 10.7 G/DL
IMM GRANULOCYTES # BLD AUTO: 0.05 X10(3) UL (ref 0–1)
IMM GRANULOCYTES NFR BLD: 0.5 %
LYMPHOCYTES # BLD AUTO: 2.19 X10(3) UL (ref 1–4)
LYMPHOCYTES NFR BLD AUTO: 22 %
MCH RBC QN AUTO: 29.6 PG (ref 26–34)
MCHC RBC AUTO-ENTMCNC: 31.4 G/DL (ref 31–37)
MCV RBC AUTO: 94.5 FL
MONOCYTES # BLD AUTO: 0.93 X10(3) UL (ref 0.1–1)
MONOCYTES NFR BLD AUTO: 9.4 %
NEUTROPHILS # BLD AUTO: 6.31 X10 (3) UL (ref 1.5–7.7)
NEUTROPHILS # BLD AUTO: 6.31 X10(3) UL (ref 1.5–7.7)
NEUTROPHILS NFR BLD AUTO: 63.5 %
OSMOLALITY SERPL CALC.SUM OF ELEC: 301 MOSM/KG (ref 275–295)
PLATELET # BLD AUTO: 227 10(3)UL (ref 150–450)
POTASSIUM SERPL-SCNC: 3.5 MMOL/L (ref 3.5–5.1)
PROT SERPL-MCNC: 7.2 G/DL (ref 5.7–8.2)
RBC # BLD AUTO: 3.61 X10(6)UL
SODIUM SERPL-SCNC: 146 MMOL/L (ref 136–145)
WBC # BLD AUTO: 9.9 X10(3) UL (ref 4–11)

## 2024-01-14 PROCEDURE — 99233 SBSQ HOSP IP/OBS HIGH 50: CPT | Performed by: INTERNAL MEDICINE

## 2024-01-14 RX ORDER — CALCIUM CARBONATE 500 MG/1
500 TABLET, CHEWABLE ORAL 3 TIMES DAILY PRN
Status: DISCONTINUED | OUTPATIENT
Start: 2024-01-14 | End: 2024-01-17

## 2024-01-14 RX ORDER — LABETALOL HYDROCHLORIDE 5 MG/ML
10 INJECTION, SOLUTION INTRAVENOUS EVERY 4 HOURS PRN
Status: DISCONTINUED | OUTPATIENT
Start: 2024-01-14 | End: 2024-01-17

## 2024-01-14 RX ORDER — HYDRALAZINE HYDROCHLORIDE 20 MG/ML
10 INJECTION INTRAMUSCULAR; INTRAVENOUS EVERY 6 HOURS PRN
Status: DISCONTINUED | OUTPATIENT
Start: 2024-01-14 | End: 2024-01-17

## 2024-01-14 NOTE — CM/SW NOTE
SW provided pt [facility] list. Assigned CM/SW will need to follow up for [facility] choice.     Plan: Pending medical clearance, DC to [facility], PASRR completed ,*choice    SW/CM to remain available for support and/or discharge planning.     Veena Ramirez, MSW, LSW   x [phone]

## 2024-01-14 NOTE — PLAN OF CARE
6L NC and switch to mask overnight due to desaturation.  CXR showed infiltrates.  Blood  IV abx.  Non productive cough.  NPO status for now    Problem: RESPIRATORY - ADULT  Goal: Achieves optimal ventilation and oxygenation  Description: INTERVENTIONS:  - Assess for changes in respiratory status  - Assess for changes in mentation and behavior  - Position to facilitate oxygenation and minimize respiratory effort  - Oxygen supplementation based on oxygen saturation or ABGs  - Provide Smoking Cessation handout, if applicable  - Encourage broncho-pulmonary hygiene including cough, deep breathe, Incentive Spirometry  - Assess the need for suctioning and perform as needed  - Assess and instruct to report SOB or any respiratory difficulty  - Respiratory Therapy support as indicated  - Manage/alleviate anxiety  - Monitor for signs/symptoms of CO2 retention  Outcome: Not Progressing

## 2024-01-14 NOTE — PROGRESS NOTES
01/14/24 0507   Oxygen Utilization   $ RT Standby Charge (per 15 min) 1   $ Oxygen in use? Yes   O2 Device Venturi mask   O2 Flow Rate (L/min) 6 L/min   FiO2 (%) 35 %     Pt not diagnosed with MARIANGEL. Pt breathing through mouth, venturi mask indicated.

## 2024-01-14 NOTE — PROGRESS NOTES
[facility]     Hospitalist Progress Note     Cody Fitzgerald Patient Status:  Inpatient    1940 MRN [id number]   Location [facility]5W Attending Ana La MD   Hosp Day # 3 PCP Bam Hampton MD     Subjective:     Patient resting comfortably and in NAD. Denied any active complaints at the time of interview. Requiring 8L HFNC at the time of interview. Denies any chest pain, sob or any other complaints.  Patient unable to ambulate due to pain and weakness.     No acute overnight events reported by the nursing staff.      Objective:    Review of Systems:   ROS completed; pertinent positive and negatives stated in subjective.      Vital signs:  Temp:  [97.7 °F (36.5 °C)-98 °F (36.7 °C)] 97.7 °F (36.5 °C)  Pulse:  [76-86] 78  Resp:  [18-20] 18  BP: (133-164)/(72-77) 164/74  SpO2:  [90 %-95 %] 93 %  FiO2 (%):  [35 %] 35 %      Physical Exam:    Gen: NAD AO x3  Chest: good air entry, CTABL  CVS: normal s1 and s2 RR  Abd: NABS soft NT ND  Neuro: CN 2-12 grossly intact  Ext: no edema in bilateral LE      Diagnostic Data:    Labs:  Recent Labs   Lab 24  0940 24  1108 24  0448 24  0507 24  0519   WBC 10.6 19.8* 12.9* 9.9 9.9   HGB 12.2* 14.3 10.8* 10.5* 10.7*   MCV 94.2 92.9 96.0 94.8 94.5   .0 297.0 242.0 217.0 227.0       Recent Labs   Lab 24  1107 24  0448 24  0507 24  0519   * 91 82 90   BUN 14 13 14 12   CREATSERUM 1.22 1.14 1.14 1.12   CA 10.0 8.9 8.9 9.1   ALB 4.6  --  3.5 3.7    145 145 146*   K 3.7 3.4* 3.7  3.7 3.5    114* 114* 113*   CO2 23.0 24.0 24.0 24.0   ALKPHO 117  --  80 91   AST 12  --  9 15   ALT <7*  --  <7* <7*   BILT 0.5  --  0.3 0.2   TP 9.2*  --  6.9 7.2       Estimated Creatinine Clearance: 52.5 mL/min (based on SCr of 1.12 mg/dL).    No results for input(s): \"PTP\", \"INR\" in the last 168 hours.           Imaging: Imaging data reviewed in Epic.    Medications:    docusate sodium  100  mg Oral BID    heparin  5,000 Units Subcutaneous 2 times per day    ampicillin-sulbactam  3 g Intravenous q6h    amLODIPine  5 mg Oral Daily    atorvastatin  40 mg Oral Nightly    clopidogrel  75 mg Oral Daily    gabapentin  300 mg Oral TID    levothyroxine  100 mcg Oral Daily @ 0700       Assessment & Plan:     Acute hypoxic respiratory failure due to sepsis likely 2/2 CAP and/or aspiration PNA  CXR reviewed  LA improved  Strep pneumo and Legionella Ag negative  Passed bedside swallow evaluation  Patient to get a VFSS in a.m.  Bcx showing NGTD  Started on IV Azithromycin x3 days, IV Unasyn - deescalate as indicated - day 3/5 of abx  IS, chest PT  Generalized weakness  PT/OT recommending [facility]  Awaiting placement  CM/SW on board  Hypokalemia  Replaced  Continue to monitor  CKD 2-3  Renal function at baseline  Monitor labs  HTN  BP well controlled  Continue home meds  Hydralazine and Labetalol PRN  Hypothyroidism  Continue home meds  Hx of CVA  Continue home meds      Plan of care discussed with patient or family at bedside.      Supplementary Documentation:     Quality:  DVT Prophylaxis: Heparin s/c  CODE status: Full  [place]: No  Central line: No      Estimated date of discharge: TBD  Discharge is dependent on: clinical stability  At this point Mr. Fitzgerald is expected to be discharge to: home      **Certification      PHYSICIAN Certification of Need for Inpatient Hospitalization - Initial Certification    Patient will require inpatient services that will reasonably be expected to span two midnight's based on the clinical documentation in H+P.   Based on patients current state of illness, I anticipate that, after discharge, patient will require TBD.      Ana La MD  Hospitalist    MDM: High, I personally reviewed the available laboratories, imaging including CXR. I discussed the case with RN. I ordered laboratories, studies including AM labs.   Medical decision making high, risk is high.       The 21st Century  Cures Act makes medical notes like these available to patients in the interest of transparency. Please be advised this is a medical document. Medical documents are intended to carry relevant information, facts as evident, and the clinical opinion of the practitioner. The medical note is intended as peer to peer communication and may appear blunt or direct. It is written in medical language and may contain abbreviations or verbiage that are unfamiliar.

## 2024-01-14 NOTE — PLAN OF CARE
Problem: PAIN - ADULT  Goal: Verbalizes/displays adequate comfort level or patient's stated pain goal  Description: INTERVENTIONS:  - Encourage pt to monitor pain and request assistance  - Assess pain using appropriate pain scale  - Administer analgesics based on type and severity of pain and evaluate response  - Implement non-pharmacological measures as appropriate and evaluate response  - Consider cultural and social influences on pain and pain management  - Manage/alleviate anxiety  - Utilize distraction and/or relaxation techniques  - Monitor for opioid side effects  - Notify MD/LIP if interventions unsuccessful or patient reports new pain  - Anticipate increased pain with activity and pre-medicate as appropriate  Outcome: Progressing     Problem: RISK FOR INFECTION - ADULT  Goal: Absence of fever/infection during anticipated neutropenic period  Description: INTERVENTIONS  - Monitor WBC  - Administer growth factors as ordered  - Implement neutropenic guidelines  Outcome: Progressing     Problem: SAFETY ADULT - FALL  Goal: Free from fall injury  Description: INTERVENTIONS:  - Assess pt frequently for physical needs  - Identify cognitive and physical deficits and behaviors that affect risk of falls.  - [place] fall precautions as indicated by assessment.  - Educate pt/family on patient safety including physical limitations  - Instruct pt to call for assistance with activity based on assessment  - Modify environment to reduce risk of injury  - Provide assistive devices as appropriate  - Consider OT/PT consult to assist with strengthening/mobility  - Encourage toileting schedule  Outcome: Progressing     Problem: RESPIRATORY - ADULT  Goal: Achieves optimal ventilation and oxygenation  Description: INTERVENTIONS:  - Assess for changes in respiratory status  - Assess for changes in mentation and behavior  - Position to facilitate oxygenation and minimize respiratory effort  - Oxygen supplementation based on oxygen  saturation or ABGs  - Provide Smoking Cessation handout, if applicable  - Encourage broncho-pulmonary hygiene including cough, deep breathe, Incentive Spirometry  - Assess the need for suctioning and perform as needed  - Assess and instruct to report SOB or any respiratory difficulty  - Respiratory Therapy support as indicated  - Manage/alleviate anxiety  - Monitor for signs/symptoms of CO2 retention  Outcome: Not Progressing     Problem: MUSCULOSKELETAL - ADULT  Goal: Return mobility to safest level of function  Description: INTERVENTIONS:  - Assess patient stability and activity tolerance for standing, transferring and ambulating w/ or w/o assistive devices  - Assist with transfers and ambulation using safe patient handling equipment as needed  - Ensure adequate protection for wounds/incisions during mobilization  - Obtain PT/OT consults as needed  - Advance activity as appropriate  - Communicate ordered activity level and limitations with patient/family  Outcome: Not Progressing     On 7 L High Flow nasal cannula. Tolerating cardia diet with thin liquids. No acute events this shift.   Safety precautions in place, frequent nursing rounding completed, call light within reach. All questions answered and needs met.

## 2024-01-15 LAB
ALBUMIN SERPL-MCNC: 3.5 G/DL (ref 3.2–4.8)
ALBUMIN/GLOB SERPL: 0.9 {RATIO} (ref 1–2)
ALP LIVER SERPL-CCNC: 86 U/L
ALT SERPL-CCNC: <7 U/L
ANION GAP SERPL CALC-SCNC: 5 MMOL/L (ref 0–18)
AST SERPL-CCNC: 12 U/L (ref ?–34)
BASOPHILS # BLD AUTO: 0.05 X10(3) UL (ref 0–0.2)
BASOPHILS NFR BLD AUTO: 0.6 %
BILIRUB SERPL-MCNC: 0.3 MG/DL (ref 0.2–1.1)
BUN BLD-MCNC: 12 MG/DL (ref 9–23)
BUN/CREAT SERPL: 10.7 (ref 10–20)
CALCIUM BLD-MCNC: 8.9 MG/DL (ref 8.7–10.4)
CHLORIDE SERPL-SCNC: 109 MMOL/L (ref 98–112)
CO2 SERPL-SCNC: 27 MMOL/L (ref 21–32)
CREAT BLD-MCNC: 1.12 MG/DL
DEPRECATED RDW RBC AUTO: 47.7 FL (ref 35.1–46.3)
EGFRCR SERPLBLD CKD-EPI 2021: 66 ML/MIN/1.73M2 (ref 60–?)
EOSINOPHIL # BLD AUTO: 0.28 X10(3) UL (ref 0–0.7)
EOSINOPHIL NFR BLD AUTO: 3.2 %
ERYTHROCYTE [DISTWIDTH] IN BLOOD BY AUTOMATED COUNT: 13.9 % (ref 11–15)
GLOBULIN PLAS-MCNC: 3.7 G/DL (ref 2.8–4.4)
GLUCOSE BLD-MCNC: 90 MG/DL (ref 70–99)
HCT VFR BLD AUTO: 35.1 %
HGB BLD-MCNC: 10.8 G/DL
IMM GRANULOCYTES # BLD AUTO: 0.03 X10(3) UL (ref 0–1)
IMM GRANULOCYTES NFR BLD: 0.3 %
LYMPHOCYTES # BLD AUTO: 1.94 X10(3) UL (ref 1–4)
LYMPHOCYTES NFR BLD AUTO: 22.5 %
MCH RBC QN AUTO: 28.8 PG (ref 26–34)
MCHC RBC AUTO-ENTMCNC: 30.8 G/DL (ref 31–37)
MCV RBC AUTO: 93.6 FL
MONOCYTES # BLD AUTO: 0.85 X10(3) UL (ref 0.1–1)
MONOCYTES NFR BLD AUTO: 9.9 %
NEUTROPHILS # BLD AUTO: 5.47 X10 (3) UL (ref 1.5–7.7)
NEUTROPHILS # BLD AUTO: 5.47 X10(3) UL (ref 1.5–7.7)
NEUTROPHILS NFR BLD AUTO: 63.5 %
OSMOLALITY SERPL CALC.SUM OF ELEC: 291 MOSM/KG (ref 275–295)
PLATELET # BLD AUTO: 213 10(3)UL (ref 150–450)
POTASSIUM SERPL-SCNC: 3.7 MMOL/L (ref 3.5–5.1)
PROT SERPL-MCNC: 7.2 G/DL (ref 5.7–8.2)
RBC # BLD AUTO: 3.75 X10(6)UL
SODIUM SERPL-SCNC: 141 MMOL/L (ref 136–145)
WBC # BLD AUTO: 8.6 X10(3) UL (ref 4–11)

## 2024-01-15 PROCEDURE — 99233 SBSQ HOSP IP/OBS HIGH 50: CPT | Performed by: INTERNAL MEDICINE

## 2024-01-15 NOTE — PROGRESS NOTES
[facility]     Hospitalist Progress Note     Cody Fitzgerald Patient Status:  Inpatient    1940 MRN [id number]   Location [facility]5W Attending Ana La MD   Hosp Day # 4 PCP Bam Hampton MD     Subjective:     Patient resting comfortably and in NAD. Denied any active complaints at the time of interview.   No acute overnight events reported by the nursing staff.  Currently saturating well on 6L NC. In good spirits this morning.     Plan of care discussed with wife over the phone.      Objective:    Review of Systems:   ROS completed; pertinent positive and negatives stated in subjective.      Vital signs:  Temp:  [97.1 °F (36.2 °C)-98.3 °F (36.8 °C)] 98.2 °F (36.8 °C)  Pulse:  [69-79] 79  Resp:  [16-20] 17  BP: (124-143)/(68-76) 143/73  SpO2:  [93 %-98 %] 93 %      Physical Exam:    Gen: NAD AO x3  Chest: good air entry, CTABL  CVS: normal s1 and s2 RR  Abd: NABS soft NT ND  Neuro: CN 2-12 grossly intact  Ext: no edema in bilateral LE      Diagnostic Data:    Labs:  Recent Labs   Lab 24  1108 24  0448 24  0507 24  0519 01/15/24  0548   WBC 19.8* 12.9* 9.9 9.9 8.6   HGB 14.3 10.8* 10.5* 10.7* 10.8*   MCV 92.9 96.0 94.8 94.5 93.6   .0 242.0 217.0 227.0 213.0       Recent Labs   Lab 24  0507 24  0519 01/15/24  0548   GLU 82 90 90   BUN 14 12 12   CREATSERUM 1.14 1.12 1.12   CA 8.9 9.1 8.9   ALB 3.5 3.7 3.5    146* 141   K 3.7  3.7 3.5 3.7   * 113* 109   CO2 24.0 24.0 27.0   ALKPHO 80 91 86   AST 9 15 12   ALT <7* <7* <7*   BILT 0.3 0.2 0.3   TP 6.9 7.2 7.2       Estimated Creatinine Clearance: 52.5 mL/min (based on SCr of 1.12 mg/dL).    No results for input(s): \"PTP\", \"INR\" in the last 168 hours.       Imaging: Imaging data reviewed in Epic.    Medications:    docusate sodium  100 mg Oral BID    heparin  5,000 Units Subcutaneous 2 times per day    ampicillin-sulbactam  3 g Intravenous q6h    amLODIPine  5 mg Oral Daily     atorvastatin  40 mg Oral Nightly    clopidogrel  75 mg Oral Daily    gabapentin  300 mg Oral TID    levothyroxine  100 mcg Oral Daily @ 0700       Assessment & Plan:     Acute hypoxic respiratory failure due to sepsis likely 2/2 CAP and/or aspiration PNA  CXR reviewed  LA improved  Strep pneumo and Legionella Ag negative  Passed bedside swallow evaluation  VFSS pending  Bcx showing NGTD  Started on IV Azithromycin x3 days, IV Unasyn - deescalate as indicated - day 4/5 of abx  IS, chest PT  Generalized weakness  PT/OT recommending [facility]  Awaiting placement  CM/SW on board  Hypokalemia  Replaced  Continue to monitor  CKD 2-3  Renal function at baseline  Monitor labs  HTN  BP well controlled  Continue home meds  Hydralazine and Labetalol PRN  Hypothyroidism  Continue home meds  Hx of CVA  Continue home meds  Disposition  [facility] placement  CM/SW on board      Plan of care discussed with patient or family at bedside.      Supplementary Documentation:     Quality:  DVT Prophylaxis: Heparin s/c  CODE status: Full  Lao: No  Central line: No      Estimated date of discharge: TBD  Discharge is dependent on: clinical stability  At this point Mr. Fitzgerald is expected to be discharge to: home      **Certification      PHYSICIAN Certification of Need for Inpatient Hospitalization - Initial Certification    Patient will require inpatient services that will reasonably be expected to span two midnight's based on the clinical documentation in H+P.   Based on patients current state of illness, I anticipate that, after discharge, patient will require TBD.      Ana La MD  Hospitalist    MDM: High, I personally reviewed the available laboratories, imaging including CXR. I discussed the case with RN. I ordered laboratories, studies including AM labs.   Medical decision making high, risk is high.       The 21st Century Cures Act makes medical notes like these available to patients in the interest of transparency. Please be advised this  is a medical document. Medical documents are intended to carry relevant information, facts as evident, and the clinical opinion of the practitioner. The medical note is intended as peer to peer communication and may appear blunt or direct. It is written in medical language and may contain abbreviations or verbiage that are unfamiliar.

## 2024-01-15 NOTE — PLAN OF CARE
A&Ox4. Pt max assist with lift, IV abx infused, voiding via purewick, tolerating soft/easy chew diet, on 5L via high flow NC, tylenol provided as needed for pain. Frequent rounding by nursing staff. No acute changes noted throughout shift. Plan for [facility] pending medical clearance, choice and insurance auth.     Problem: Patient Centered Care  Goal: Patient preferences are identified and integrated in the patient's plan of care  Description: Interventions:  - What would you like us to know as we care for you? From home with spouse  - Provide timely, complete, and accurate information to patient/family  - Incorporate patient and family knowledge, values, beliefs, and cultural backgrounds into the planning and delivery of care  - Encourage patient/family to participate in care and decision-making at the level they choose  - Honor patient and family perspectives and choices  Outcome: Progressing     Problem: RESPIRATORY - ADULT  Goal: Achieves optimal ventilation and oxygenation  Description: INTERVENTIONS:  - Assess for changes in respiratory status  - Assess for changes in mentation and behavior  - Position to facilitate oxygenation and minimize respiratory effort  - Oxygen supplementation based on oxygen saturation or ABGs  - Provide Smoking Cessation handout, if applicable  - Encourage broncho-pulmonary hygiene including cough, deep breathe, Incentive Spirometry  - Assess the need for suctioning and perform as needed  - Assess and instruct to report SOB or any respiratory difficulty  - Respiratory Therapy support as indicated  - Manage/alleviate anxiety  - Monitor for signs/symptoms of CO2 retention  Outcome: Progressing     Problem: MUSCULOSKELETAL - ADULT  Goal: Return mobility to safest level of function  Description: INTERVENTIONS:  - Assess patient stability and activity tolerance for standing, transferring and ambulating w/ or w/o assistive devices  - Assist with transfers and ambulation using safe patient  handling equipment as needed  - Ensure adequate protection for wounds/incisions during mobilization  - Obtain PT/OT consults as needed  - Advance activity as appropriate  - Communicate ordered activity level and limitations with patient/family  Outcome: Progressing     Problem: PAIN - ADULT  Goal: Verbalizes/displays adequate comfort level or patient's stated pain goal  Description: INTERVENTIONS:  - Encourage pt to monitor pain and request assistance  - Assess pain using appropriate pain scale  - Administer analgesics based on type and severity of pain and evaluate response  - Implement non-pharmacological measures as appropriate and evaluate response  - Consider cultural and social influences on pain and pain management  - Manage/alleviate anxiety  - Utilize distraction and/or relaxation techniques  - Monitor for opioid side effects  - Notify MD/LIP if interventions unsuccessful or patient reports new pain  - Anticipate increased pain with activity and pre-medicate as appropriate  Outcome: Progressing     Problem: RISK FOR INFECTION - ADULT  Goal: Absence of fever/infection during anticipated neutropenic period  Description: INTERVENTIONS  - Monitor WBC  - Administer growth factors as ordered  - Implement neutropenic guidelines  Outcome: Progressing     Problem: SAFETY ADULT - FALL  Goal: Free from fall injury  Description: INTERVENTIONS:  - Assess pt frequently for physical needs  - Identify cognitive and physical deficits and behaviors that affect risk of falls.  - [place] fall precautions as indicated by assessment.  - Educate pt/family on patient safety including physical limitations  - Instruct pt to call for assistance with activity based on assessment  - Modify environment to reduce risk of injury  - Provide assistive devices as appropriate  - Consider OT/PT consult to assist with strengthening/mobility  - Encourage toileting schedule  Outcome: Progressing     Problem: DISCHARGE PLANNING  Goal: Discharge  to home or other facility with appropriate resources  Description: INTERVENTIONS:  - Identify barriers to discharge w/pt and caregiver  - Include patient/family/discharge partner in discharge planning  - Arrange for needed discharge resources and transportation as appropriate  - Identify discharge learning needs (meds, wound care, etc)  - Arrange for interpreters to assist at discharge as needed  - Consider post-discharge preferences of patient/family/discharge partner  - Complete POLST form as appropriate  - Assess patient's ability to be responsible for managing their own health  - Refer to Case Management Department for coordinating discharge planning if the patient needs post-hospital services based on physician/LIP order or complex needs related to functional status, cognitive ability or social support system  Outcome: Progressing

## 2024-01-15 NOTE — CM/SW NOTE
TRACEE followed up on discharge planning.    TRACEE spoke with pt and wife Meme at bedside.    TRACEE and Meme discussed recommended DC plan as [facility]- pt and wife agreeable and pt has been to [facility] in the past.    Meme/pt would like [facility].  [facility] due to max assist x2.  Pt/wife's backup choice is [facility] (wife needs to tour before making an informed decision).    TRACEE sent message in [facility] referral asking [facility] to reconsider.    SW to attach new therapy notes when available.    Update 1614    No new therapy notes from today in [facility].  [facility] unable to accommodate max assist x2.  TRACEE reserved [facility] in [facility] and confirmed with liaison Guillermina that a bed is available.      PLAN: DC to [facility] pending med clear    / to remain available for support and/or discharge planning.     Alisa Merritt MSW, LSW [phone]

## 2024-01-15 NOTE — PLAN OF CARE
Pt was on venturi mask temporarily during the night, but unsuccessful. Mask would not remain on pt's face and pt switched back to 7L high flow nasal cannula. Pt had large bowel movement during the night, expressed fear while rolling side to side. \"afraid I'm going to fall\". Fall precautions in place. Frequent rounding done.   Problem: Patient Centered Care  Goal: Patient preferences are identified and integrated in the patient's plan of care  Description: Interventions:  - What would you like us to know as we care for you? From home with spouse  - Provide timely, complete, and accurate information to patient/family  - Incorporate patient and family knowledge, values, beliefs, and cultural backgrounds into the planning and delivery of care  - Encourage patient/family to participate in care and decision-making at the level they choose  - Honor patient and family perspectives and choices  Outcome: Progressing     Problem: RESPIRATORY - ADULT  Goal: Achieves optimal ventilation and oxygenation  Description: INTERVENTIONS:  - Assess for changes in respiratory status  - Assess for changes in mentation and behavior  - Position to facilitate oxygenation and minimize respiratory effort  - Oxygen supplementation based on oxygen saturation or ABGs  - Provide Smoking Cessation handout, if applicable  - Encourage broncho-pulmonary hygiene including cough, deep breathe, Incentive Spirometry  - Assess the need for suctioning and perform as needed  - Assess and instruct to report SOB or any respiratory difficulty  - Respiratory Therapy support as indicated  - Manage/alleviate anxiety  - Monitor for signs/symptoms of CO2 retention  Outcome: Progressing     Problem: MUSCULOSKELETAL - ADULT  Goal: Return mobility to safest level of function  Description: INTERVENTIONS:  - Assess patient stability and activity tolerance for standing, transferring and ambulating w/ or w/o assistive devices  - Assist with transfers and ambulation using  safe patient handling equipment as needed  - Ensure adequate protection for wounds/incisions during mobilization  - Obtain PT/OT consults as needed  - Advance activity as appropriate  - Communicate ordered activity level and limitations with patient/family  Outcome: Progressing     Problem: Impaired Activities of Daily Living  Goal: Achieve highest/safest level of independence in self care  Description: Interventions:  - Assess ability and encourage patient to participate in ADLs to maximize function  - Promote sitting position while performing ADLs such as feeding, grooming, and bathing  - Educate and encourage patient/family in tolerated functional activity level and precautions during self-care  - Provide support under elbow of weak side to prevent shoulder subluxation  Outcome: Progressing     Problem: PAIN - ADULT  Goal: Verbalizes/displays adequate comfort level or patient's stated pain goal  Description: INTERVENTIONS:  - Encourage pt to monitor pain and request assistance  - Assess pain using appropriate pain scale  - Administer analgesics based on type and severity of pain and evaluate response  - Implement non-pharmacological measures as appropriate and evaluate response  - Consider cultural and social influences on pain and pain management  - Manage/alleviate anxiety  - Utilize distraction and/or relaxation techniques  - Monitor for opioid side effects  - Notify MD/LIP if interventions unsuccessful or patient reports new pain  - Anticipate increased pain with activity and pre-medicate as appropriate  Outcome: Progressing     Problem: RISK FOR INFECTION - ADULT  Goal: Absence of fever/infection during anticipated neutropenic period  Description: INTERVENTIONS  - Monitor WBC  - Administer growth factors as ordered  - Implement neutropenic guidelines  Outcome: Progressing     Problem: SAFETY ADULT - FALL  Goal: Free from fall injury  Description: INTERVENTIONS:  - Assess pt frequently for physical needs  -  Identify cognitive and physical deficits and behaviors that affect risk of falls.  - [place] fall precautions as indicated by assessment.  - Educate pt/family on patient safety including physical limitations  - Instruct pt to call for assistance with activity based on assessment  - Modify environment to reduce risk of injury  - Provide assistive devices as appropriate  - Consider OT/PT consult to assist with strengthening/mobility  - Encourage toileting schedule  Outcome: Progressing     Problem: DISCHARGE PLANNING  Goal: Discharge to home or other facility with appropriate resources  Description: INTERVENTIONS:  - Identify barriers to discharge w/pt and caregiver  - Include patient/family/discharge partner in discharge planning  - Arrange for needed discharge resources and transportation as appropriate  - Identify discharge learning needs (meds, wound care, etc)  - Arrange for interpreters to assist at discharge as needed  - Consider post-discharge preferences of patient/family/discharge partner  - Complete POLST form as appropriate  - Assess patient's ability to be responsible for managing their own health  - Refer to Case Management Department for coordinating discharge planning if the patient needs post-hospital services based on physician/LIP order or complex needs related to functional status, cognitive ability or social support system  Outcome: Progressing     Problem: Patient/Family Goals  Goal: Patient/Family Long Term Goal  Description: Patient's Long Term Goal: discharge home    Interventions:  - follow md orders.  -   - See additional Care Plan goals for specific interventions  Outcome: Progressing  Goal: Patient/Family Short Term Goal  Description: Patient's Short Term Goal: feel better    Interventions:   - administer iv antibiotics  -encourage IS and acapella  - turn frequently  - See additional Care Plan goals for specific interventions  Outcome: Progressing

## 2024-01-16 LAB
ALBUMIN SERPL-MCNC: 3.7 G/DL (ref 3.2–4.8)
ALBUMIN/GLOB SERPL: 1 {RATIO} (ref 1–2)
ALP LIVER SERPL-CCNC: 92 U/L
ALT SERPL-CCNC: <7 U/L
ANION GAP SERPL CALC-SCNC: 6 MMOL/L (ref 0–18)
AST SERPL-CCNC: 14 U/L (ref ?–34)
BASOPHILS # BLD AUTO: 0.08 X10(3) UL (ref 0–0.2)
BASOPHILS NFR BLD AUTO: 0.9 %
BILIRUB SERPL-MCNC: 0.2 MG/DL (ref 0.2–1.1)
BUN BLD-MCNC: 13 MG/DL (ref 9–23)
BUN/CREAT SERPL: 10.5 (ref 10–20)
CALCIUM BLD-MCNC: 9.2 MG/DL (ref 8.7–10.4)
CHLORIDE SERPL-SCNC: 106 MMOL/L (ref 98–112)
CO2 SERPL-SCNC: 28 MMOL/L (ref 21–32)
CREAT BLD-MCNC: 1.24 MG/DL
DEPRECATED RDW RBC AUTO: 47.4 FL (ref 35.1–46.3)
EGFRCR SERPLBLD CKD-EPI 2021: 58 ML/MIN/1.73M2 (ref 60–?)
EOSINOPHIL # BLD AUTO: 0.26 X10(3) UL (ref 0–0.7)
EOSINOPHIL NFR BLD AUTO: 3 %
ERYTHROCYTE [DISTWIDTH] IN BLOOD BY AUTOMATED COUNT: 13.9 % (ref 11–15)
GLOBULIN PLAS-MCNC: 3.8 G/DL (ref 2.8–4.4)
GLUCOSE BLD-MCNC: 89 MG/DL (ref 70–99)
HCT VFR BLD AUTO: 34.4 %
HGB BLD-MCNC: 11.1 G/DL
IMM GRANULOCYTES # BLD AUTO: 0.05 X10(3) UL (ref 0–1)
IMM GRANULOCYTES NFR BLD: 0.6 %
LYMPHOCYTES # BLD AUTO: 2.29 X10(3) UL (ref 1–4)
LYMPHOCYTES NFR BLD AUTO: 26.8 %
MCH RBC QN AUTO: 30 PG (ref 26–34)
MCHC RBC AUTO-ENTMCNC: 32.3 G/DL (ref 31–37)
MCV RBC AUTO: 93 FL
MONOCYTES # BLD AUTO: 0.85 X10(3) UL (ref 0.1–1)
MONOCYTES NFR BLD AUTO: 10 %
NEUTROPHILS # BLD AUTO: 5 X10 (3) UL (ref 1.5–7.7)
NEUTROPHILS # BLD AUTO: 5 X10(3) UL (ref 1.5–7.7)
NEUTROPHILS NFR BLD AUTO: 58.7 %
OSMOLALITY SERPL CALC.SUM OF ELEC: 290 MOSM/KG (ref 275–295)
PLATELET # BLD AUTO: 232 10(3)UL (ref 150–450)
POTASSIUM SERPL-SCNC: 3.5 MMOL/L (ref 3.5–5.1)
PROT SERPL-MCNC: 7.5 G/DL (ref 5.7–8.2)
RBC # BLD AUTO: 3.7 X10(6)UL
SODIUM SERPL-SCNC: 140 MMOL/L (ref 136–145)
WBC # BLD AUTO: 8.5 X10(3) UL (ref 4–11)

## 2024-01-16 PROCEDURE — 99233 SBSQ HOSP IP/OBS HIGH 50: CPT | Performed by: INTERNAL MEDICINE

## 2024-01-16 NOTE — CM/SW NOTE
SW followed up on DC planning.    Pt discussed in nursing rounds.  Video swallow cancelled- pt close to medically stable to DC.    SW spoke with pt's spouse Meme.  Per Meme, she had toured [facility]  [place] this morning.  Meme also stated she had spoke to admissions at [facility] and was told they can take him.    TRACEE called [facility] and spoke with admissions who notified that Suzette would look at referral.    TRACEE contacted rehab office to have therapy work with pt for updated notes.    PLAN: DC to [facility] pending bed availability ([facility] [place] or [place])    / to remain available for support and/or discharge planning.     Alisa Merritt MSW, LSW [phone]

## 2024-01-16 NOTE — PROGRESS NOTES
[facility]     Hospitalist Progress Note     Cody Fitzgerald Patient Status:  Inpatient    1940 MRN [id number]   Location [facility]5W Attending Ana La MD   Hosp Day # 5 PCP Bam Hampton MD     Subjective:     Patient resting comfortably and in NAD. Denied any active complaints at the time of interview.   No acute overnight events reported by the nursing staff.  Currently saturating well on 4L NC.   Hopeful about discharging soon.     Plan of care discussed with wife over the phone.      Objective:    Review of Systems:   ROS completed; pertinent positive and negatives stated in subjective.      Vital signs:  Temp:  [97.3 °F (36.3 °C)-98.4 °F (36.9 °C)] 97.3 °F (36.3 °C)  Pulse:  [63-79] 69  Resp:  [16-18] 16  BP: (132-153)/(66-77) 132/66  SpO2:  [90 %-96 %] 93 %      Physical Exam:    Gen: NAD AO x3  Chest: good air entry, CTABL  CVS: normal s1 and s2 RR  Abd: NABS soft NT ND  Neuro: CN 2-12 grossly intact  Ext: no edema in bilateral LE      Diagnostic Data:    Labs:  Recent Labs   Lab 24  0448 24  0507 24  0519 01/15/24  0548 24  0443   WBC 12.9* 9.9 9.9 8.6 8.5   HGB 10.8* 10.5* 10.7* 10.8* 11.1*   MCV 96.0 94.8 94.5 93.6 93.0   .0 217.0 227.0 213.0 232.0       Recent Labs   Lab 24  0519 01/15/24  0548 24  0443   GLU 90 90 89   BUN 12 12 13   CREATSERUM 1.12 1.12 1.24   CA 9.1 8.9 9.2   ALB 3.7 3.5 3.7   * 141 140   K 3.5 3.7 3.5   * 109 106   CO2 24.0 27.0 28.0   ALKPHO 91 86 92   AST 15 12 14   ALT <7* <7* <7*   BILT 0.2 0.3 0.2   TP 7.2 7.2 7.5       Estimated Creatinine Clearance: 47.4 mL/min (based on SCr of 1.24 mg/dL).    No results for input(s): \"PTP\", \"INR\" in the last 168 hours.       Imaging: Imaging data reviewed in Epic.    Medications:    docusate sodium  100 mg Oral BID    heparin  5,000 Units Subcutaneous 2 times per day    amLODIPine  5 mg Oral Daily    atorvastatin  40 mg Oral Nightly    clopidogrel   75 mg Oral Daily    gabapentin  300 mg Oral TID    levothyroxine  100 mcg Oral Daily @ 0700       Assessment & Plan:     Acute hypoxic respiratory failure due to sepsis likely 2/2 CAP and/or aspiration PNA  CXR reviewed  LA improved  Strep pneumo and Legionella Ag negative  Bcx showing NGTD  Started on IV Azithromycin x3 days, IV Unasyn - deescalate as indicated - day 5/5 of abx  Passed bedside swallow evaluation  SLP do not recommend a VFSS  Continue IS, chest PT  Generalized weakness  PT/OT recommending [facility]  Awaiting placement  CM/SW on board  Hypokalemia  Replaced  Continue to monitor  CKD 2-3  Renal function at baseline  Monitor labs  HTN  BP well controlled  Continue home meds  Hydralazine and Labetalol PRN  Hypothyroidism  Continue home meds  Hx of CVA  Continue home meds  Disposition  [facility] placement  CM/SW on board      Plan of care discussed with patient or family at bedside.      Supplementary Documentation:     Quality:  DVT Prophylaxis: Heparin s/c  CODE status: Full  Lao: No  Central line: No      Estimated date of discharge: TBD  Discharge is dependent on: clinical stability  At this point Mr. Fitzgerald is expected to be discharge to: home      **Certification      PHYSICIAN Certification of Need for Inpatient Hospitalization - Initial Certification    Patient will require inpatient services that will reasonably be expected to span two midnight's based on the clinical documentation in H+P.   Based on patients current state of illness, I anticipate that, after discharge, patient will require TBD.      Ana La MD  Hospitalist    MDM: High, I personally reviewed the available laboratories, imaging including CXR. I discussed the case with RN. I ordered laboratories, studies including AM labs.   Medical decision making high, risk is high.       The 21st Century Cures Act makes medical notes like these available to patients in the interest of transparency. Please be advised this is a medical document.  Medical documents are intended to carry relevant information, facts as evident, and the clinical opinion of the practitioner. The medical note is intended as peer to peer communication and may appear blunt or direct. It is written in medical language and may contain abbreviations or verbiage that are unfamiliar.

## 2024-01-16 NOTE — PLAN OF CARE
Pt's vital signs stable. Pt weaned to 4L O2 HFNC- tolerating well. Tylenol given for pain relief. Fall precautions in place. Plan to D/C to [facility] pending med clearance/insurance authorization  Problem: Patient Centered Care  Goal: Patient preferences are identified and integrated in the patient's plan of care  Description: Interventions:  - What would you like us to know as we care for you? From home with spouse  - Provide timely, complete, and accurate information to patient/family  - Incorporate patient and family knowledge, values, beliefs, and cultural backgrounds into the planning and delivery of care  - Encourage patient/family to participate in care and decision-making at the level they choose  - Honor patient and family perspectives and choices  Outcome: Progressing     Problem: RESPIRATORY - ADULT  Goal: Achieves optimal ventilation and oxygenation  Description: INTERVENTIONS:  - Assess for changes in respiratory status  - Assess for changes in mentation and behavior  - Position to facilitate oxygenation and minimize respiratory effort  - Oxygen supplementation based on oxygen saturation or ABGs  - Provide Smoking Cessation handout, if applicable  - Encourage broncho-pulmonary hygiene including cough, deep breathe, Incentive Spirometry  - Assess the need for suctioning and perform as needed  - Assess and instruct to report SOB or any respiratory difficulty  - Respiratory Therapy support as indicated  - Manage/alleviate anxiety  - Monitor for signs/symptoms of CO2 retention  Outcome: Progressing     Problem: MUSCULOSKELETAL - ADULT  Goal: Return mobility to safest level of function  Description: INTERVENTIONS:  - Assess patient stability and activity tolerance for standing, transferring and ambulating w/ or w/o assistive devices  - Assist with transfers and ambulation using safe patient handling equipment as needed  - Ensure adequate protection for wounds/incisions during mobilization  - Obtain PT/OT consults  as needed  - Advance activity as appropriate  - Communicate ordered activity level and limitations with patient/family  Outcome: Progressing     Problem: Impaired Activities of Daily Living  Goal: Achieve highest/safest level of independence in self care  Description: Interventions:  - Assess ability and encourage patient to participate in ADLs to maximize function  - Promote sitting position while performing ADLs such as feeding, grooming, and bathing  - Educate and encourage patient/family in tolerated functional activity level and precautions during self-care  - Encourage patient to incorporate impaired side during daily activities to promote function  Outcome: Progressing     Problem: PAIN - ADULT  Goal: Verbalizes/displays adequate comfort level or patient's stated pain goal  Description: INTERVENTIONS:  - Encourage pt to monitor pain and request assistance  - Assess pain using appropriate pain scale  - Administer analgesics based on type and severity of pain and evaluate response  - Implement non-pharmacological measures as appropriate and evaluate response  - Consider cultural and social influences on pain and pain management  - Manage/alleviate anxiety  - Utilize distraction and/or relaxation techniques  - Monitor for opioid side effects  - Notify MD/LIP if interventions unsuccessful or patient reports new pain  - Anticipate increased pain with activity and pre-medicate as appropriate  Outcome: Progressing     Problem: RISK FOR INFECTION - ADULT  Goal: Absence of fever/infection during anticipated neutropenic period  Description: INTERVENTIONS  - Monitor WBC  - Administer growth factors as ordered  - Implement neutropenic guidelines  Outcome: Progressing     Problem: SAFETY ADULT - FALL  Goal: Free from fall injury  Description: INTERVENTIONS:  - Assess pt frequently for physical needs  - Identify cognitive and physical deficits and behaviors that affect risk of falls.  - [place] fall precautions as  indicated by assessment.  - Educate pt/family on patient safety including physical limitations  - Instruct pt to call for assistance with activity based on assessment  - Modify environment to reduce risk of injury  - Provide assistive devices as appropriate  - Consider OT/PT consult to assist with strengthening/mobility  - Encourage toileting schedule  Outcome: Progressing     Problem: DISCHARGE PLANNING  Goal: Discharge to home or other facility with appropriate resources  Description: INTERVENTIONS:  - Identify barriers to discharge w/pt and caregiver  - Include patient/family/discharge partner in discharge planning  - Arrange for needed discharge resources and transportation as appropriate  - Identify discharge learning needs (meds, wound care, etc)  - Arrange for interpreters to assist at discharge as needed  - Consider post-discharge preferences of patient/family/discharge partner  - Complete POLST form as appropriate  - Assess patient's ability to be responsible for managing their own health  - Refer to Case Management Department for coordinating discharge planning if the patient needs post-hospital services based on physician/LIP order or complex needs related to functional status, cognitive ability or social support system  Outcome: Progressing     Problem: Patient/Family Goals  Goal: Patient/Family Long Term Goal  Description: Patient's Long Term Goal: discharge home    Interventions:  - follow md orders.  -   - See additional Care Plan goals for specific interventions  Outcome: Progressing  Goal: Patient/Family Short Term Goal  Description: Patient's Short Term Goal:     Interventions:   -   - See additional Care Plan goals for specific interventions  Outcome: Progressing

## 2024-01-17 VITALS
BODY MASS INDEX: 28 KG/M2 | TEMPERATURE: 97 F | DIASTOLIC BLOOD PRESSURE: 73 MMHG | HEART RATE: 73 BPM | RESPIRATION RATE: 20 BRPM | WEIGHT: 193.69 LBS | SYSTOLIC BLOOD PRESSURE: 141 MMHG | OXYGEN SATURATION: 93 %

## 2024-01-17 LAB
ALBUMIN SERPL-MCNC: 4 G/DL (ref 3.2–4.8)
ALBUMIN/GLOB SERPL: 1 {RATIO} (ref 1–2)
ALP LIVER SERPL-CCNC: 96 U/L
ALT SERPL-CCNC: 11 U/L
ANION GAP SERPL CALC-SCNC: 7 MMOL/L (ref 0–18)
AST SERPL-CCNC: 16 U/L (ref ?–34)
BASOPHILS # BLD AUTO: 0.09 X10(3) UL (ref 0–0.2)
BASOPHILS NFR BLD AUTO: 1 %
BILIRUB SERPL-MCNC: 0.2 MG/DL (ref 0.2–1.1)
BUN BLD-MCNC: 16 MG/DL (ref 9–23)
BUN/CREAT SERPL: 12.3 (ref 10–20)
CALCIUM BLD-MCNC: 9.5 MG/DL (ref 8.7–10.4)
CHLORIDE SERPL-SCNC: 108 MMOL/L (ref 98–112)
CO2 SERPL-SCNC: 27 MMOL/L (ref 21–32)
CREAT BLD-MCNC: 1.3 MG/DL
DEPRECATED RDW RBC AUTO: 49.1 FL (ref 35.1–46.3)
EGFRCR SERPLBLD CKD-EPI 2021: 55 ML/MIN/1.73M2 (ref 60–?)
EOSINOPHIL # BLD AUTO: 0.24 X10(3) UL (ref 0–0.7)
EOSINOPHIL NFR BLD AUTO: 2.7 %
ERYTHROCYTE [DISTWIDTH] IN BLOOD BY AUTOMATED COUNT: 14.1 % (ref 11–15)
GLOBULIN PLAS-MCNC: 3.9 G/DL (ref 2.8–4.4)
GLUCOSE BLD-MCNC: 94 MG/DL (ref 70–99)
HCT VFR BLD AUTO: 38.5 %
HGB BLD-MCNC: 11.6 G/DL
IMM GRANULOCYTES # BLD AUTO: 0.05 X10(3) UL (ref 0–1)
IMM GRANULOCYTES NFR BLD: 0.6 %
LYMPHOCYTES # BLD AUTO: 2.27 X10(3) UL (ref 1–4)
LYMPHOCYTES NFR BLD AUTO: 25.3 %
MCH RBC QN AUTO: 28.6 PG (ref 26–34)
MCHC RBC AUTO-ENTMCNC: 30.1 G/DL (ref 31–37)
MCV RBC AUTO: 94.8 FL
MONOCYTES # BLD AUTO: 0.91 X10(3) UL (ref 0.1–1)
MONOCYTES NFR BLD AUTO: 10.2 %
NEUTROPHILS # BLD AUTO: 5.4 X10 (3) UL (ref 1.5–7.7)
NEUTROPHILS # BLD AUTO: 5.4 X10(3) UL (ref 1.5–7.7)
NEUTROPHILS NFR BLD AUTO: 60.2 %
OSMOLALITY SERPL CALC.SUM OF ELEC: 295 MOSM/KG (ref 275–295)
PLATELET # BLD AUTO: 245 10(3)UL (ref 150–450)
POTASSIUM SERPL-SCNC: 3.5 MMOL/L (ref 3.5–5.1)
PROT SERPL-MCNC: 7.9 G/DL (ref 5.7–8.2)
RBC # BLD AUTO: 4.06 X10(6)UL
SODIUM SERPL-SCNC: 142 MMOL/L (ref 136–145)
WBC # BLD AUTO: 9 X10(3) UL (ref 4–11)

## 2024-01-17 PROCEDURE — 99239 HOSP IP/OBS DSCHRG MGMT >30: CPT | Performed by: HOSPITALIST

## 2024-01-17 RX ORDER — MELATONIN
325
Qty: 30 TABLET | Refills: 0 | Status: SHIPPED | OUTPATIENT
Start: 2024-01-17

## 2024-01-17 NOTE — PLAN OF CARE
Problem: RESPIRATORY - ADULT  Goal: Achieves optimal ventilation and oxygenation  Description: INTERVENTIONS:  - Assess for changes in respiratory status  - Assess for changes in mentation and behavior  - Position to facilitate oxygenation and minimize respiratory effort  - Oxygen supplementation based on oxygen saturation or ABGs  - Provide Smoking Cessation handout, if applicable  - Encourage broncho-pulmonary hygiene including cough, deep breathe, Incentive Spirometry  - Assess the need for suctioning and perform as needed  - Assess and instruct to report SOB or any respiratory difficulty  - Respiratory Therapy support as indicated  - Manage/alleviate anxiety  - Monitor for signs/symptoms of CO2 retention  Outcome: Progressing     Problem: PAIN - ADULT  Goal: Verbalizes/displays adequate comfort level or patient's stated pain goal  Description: INTERVENTIONS:  - Encourage pt to monitor pain and request assistance  - Assess pain using appropriate pain scale  - Administer analgesics based on type and severity of pain and evaluate response  - Implement non-pharmacological measures as appropriate and evaluate response  - Consider cultural and social influences on pain and pain management  - Manage/alleviate anxiety  - Utilize distraction and/or relaxation techniques  - Monitor for opioid side effects  - Notify MD/LIP if interventions unsuccessful or patient reports new pain  - Anticipate increased pain with activity and pre-medicate as appropriate  Outcome: Progressing     Problem: RISK FOR INFECTION - ADULT  Goal: Absence of fever/infection during anticipated neutropenic period  Description: INTERVENTIONS  - Monitor WBC  - Administer growth factors as ordered  - Implement neutropenic guidelines  Outcome: Progressing     Problem: SAFETY ADULT - FALL  Goal: Free from fall injury  Description: INTERVENTIONS:  - Assess pt frequently for physical needs  - Identify cognitive and physical deficits and behaviors that  affect risk of falls.  - [place] fall precautions as indicated by assessment.  - Educate pt/family on patient safety including physical limitations  - Instruct pt to call for assistance with activity based on assessment  - Modify environment to reduce risk of injury  - Provide assistive devices as appropriate  - Consider OT/PT consult to assist with strengthening/mobility  - Encourage toileting schedule  Outcome: Progressing     On 4 L nasal cannula. Tolerating soft/easy chew diet with thin liquids. Safety precautions in place, frequent nursing rounding completed, call light within reach. All questions answered and needs met.

## 2024-01-17 NOTE — CM/SW NOTE
01/17/24 1200   Discharge disposition   Expected discharge disposition subacute   Post Acute Care Provider   ([facility])   Discharge transportation [facility]       SW confirmed with YADIRA Saha that pt is medically ready for discharge today.  Pt has discharge order.  SW discussed with liaison Guillermina from [facility] to arrange a time for discharge.  SW scheduled [facility] to transport pt at 3:00 PM.  PCS completed.  YADIRA Saha is aware of discharge time and location and will inform patient/ family. RN to attach IP Transfer Report to After Visit Summary packet for transfer to [facility].   SW confirmed PASR screen was completed. SW/CM to remain available for support and/or discharge planning.     PLAN: DC to [facility] via [facility] at 3:00 PM.  PCS completed.    RN number to report: [phone]    Alisa PINEDA, LSW [id number]

## 2024-01-17 NOTE — DISCHARGE SUMMARY
[facility]    Discharge Summary    Cody Fitzgerald Patient Status:  Inpatient    1940 MRN [id number]   Location [facility] Attending Sha Lira MD   Hosp Day # 6 PCP Bam Hampton MD     Date of Admission: 2024 Disposition: SNF Subacute Rehab     Date of Discharge: 24    Admitting Diagnosis: Aspiration pneumonitis (HCC) [J69.0]  Acute respiratory failure with hypoxia (HCC) [J96.01]    Hospital Discharge Diagnoses: Acute resp failure, aspiration Pneumonitis     Lace+ Score: 86  59-90 High Risk  29-58 Medium Risk  0-28   Low Risk.    TCM Follow-Up Recommendation:  LACE > 58: High Risk of readmission after discharge from the hospital.    Problem List:   Patient Active Problem List   Diagnosis    Left hemiparesis (HCC)    CVA, old, hemiparesis (HCC)    Essential hypertension    Hypercholesterolemia    Hypothyroidism    Benign prostatic hyperplasia with lower urinary tract symptoms    Status post repair of abdominal aortic aneurysm (AAA) using bifurcation graft    Stage 3a chronic kidney disease (HCC)    Primary osteoarthritis involving multiple joints    Chronic bilateral low back pain without sciatica    Fatigue    Aspiration pneumonia of left lower lobe (HCC)    Aspiration pneumonitis (HCC)    Pneumonia due to infectious organism    Pneumonia due to infectious organism, unspecified laterality, unspecified part of lung    Sepsis, due to unspecified organism    Spinal stenosis of lumbar region without neurogenic claudication    Cerebrovascular accident (CVA) (HCC)    Primary osteoarthritis of right knee    Acute bronchospasm    Viral syndrome    Community acquired pneumonia of left lower lobe of lung    Lumbar spondylosis    Anticoagulant long-term use    Gait disturbance    Dyspnea on exertion    Weakness generalized    Inability to walk    Aspiration pneumonia (HCC)    Primary osteoarthritis of both knees    Acute respiratory failure with hypoxia (HCC)     Aspiration pneumonia, unspecified aspiration pneumonia type, unspecified laterality, unspecified part of lung (HCC)    Leukocytosis, unspecified type    Renal insufficiency    Elevated troponin    Hypoxia    COVID    Acute on chronic respiratory failure with hypoxia (HCC)    Contusion of left hip, initial encounter    History of falling    Personal history of nicotine dependence    Hypertensive chronic kidney disease w stg 1-4/unsp chr kdny    Long term (current) use of opiate analgesic    History of pneumonia    Acute pain of left shoulder    Closed fracture of left proximal humerus    Tendinosis of rotator cuff    Hypernatremia    JOSE (acute kidney injury) ([facility])       Reason for Admission:  Recurrent aspiration pneumonia and sepsis.     Physical Exam:   Vitals:    01/17/24 1315   BP: 141/73   Pulse: 73   Resp: 20   Temp: 97.2 °F (36.2 °C)     Gen: NAD AO x3  Chest: good air entry, CTABL  CVS: normal s1 and s2 RR  Abd: NABS soft NT ND  Neuro: CN 2-12 grossly intact  Ext: no edema in bilateral LE    History of Present Illness:   Per Dr. Pearson  The patient is an 83-year-old  male who was brought into the emergency department for evaluation of cough and fever at home.  CBC showed white blood cell count of 19.8 with left shift.  Chemistry showed GFR of 59 which is below his baseline.  Procalcitonin 0.13 and lactic acid 2.3.  GeneXpert viral panel was negative.  Chest x-ray showed prominent vasculature with pulmonary interstitial prominence.  The patient was started on IV antibiotics, azithromycin and Unasyn, and he will be admitted to hospital for further management.     Hospital Course:   Acute hypoxic respiratory failure due to sepsis likely 2/2 CAP and/or aspiration PNA  CXR reviewed  LA improved  Strep pneumo and Legionella Ag negative  Bcx showing NGTD  Started on IV Azithromycin x3 days, IV Unasyn - deescalate as indicated - day 5/5 of abx  Passed bedside swallow evaluation  SLP do not recommend a  VFSS  Continue IS, stable for dc  Generalized weakness  PT/OT recommending [facility]  Hypokalemia  Replaced  CKD 2-3  Renal function at baseline  HTN  BP well controlled  Continue home meds  Hypothyroidism  Continue home meds  Hx of CVA  Continue home meds  Disposition  [facility] placement  CM/SW on board    Consultations: none    Procedures: none    Complications: none    Discharge Condition: Good    Discharge Medications:      Discharge Medications        START taking these medications        Instructions Prescription details   ferrous sulfate 325 (65 FE) MG Tbec      Take 1 tablet (325 mg total) by mouth daily with breakfast.   Quantity: 30 tablet  Refills: 0            CONTINUE taking these medications        Instructions Prescription details   acetaminophen 500 MG Tabs  Commonly known as: Tylenol Extra Strength      Take 2 tablets (1,000 mg total) by mouth in the morning and 2 tablets (1,000 mg total) before bedtime. Also may have 500 mg PO Q6 prn but not to exceed 3 gm total of acetaminophen in 24 hours.   Refills: 0     amLODIPine 5 MG Tabs  Commonly known as: Norvasc      Take 1 tablet (5 mg total) by mouth daily.   Quantity: 90 tablet  Refills: 3     atorvastatin 40 MG Tabs  Commonly known as: Lipitor      Take 1 tablet (40 mg total) by mouth nightly.   Quantity: 90 tablet  Refills: 3     benzonatate 200 MG Caps  Commonly known as: Tessalon      Take 1 capsule (200 mg total) by mouth 3 (three) times daily as needed for cough.   Quantity: 60 capsule  Refills: 1     clopidogrel 75 MG Tabs  Commonly known as: Plavix      Take 1 tablet (75 mg total) by mouth daily.   Quantity: 90 tablet  Refills: 3     gabapentin 300 MG Caps  Commonly known as: Neurontin      Take 1 capsule (300 mg total) by mouth 3 (three) times daily.   Quantity: 270 capsule  Refills: 3     levothyroxine 100 MCG Tabs  Commonly known as: Synthroid      TAKE 1 TABLET(100 MCG) BY MOUTH DAILY   Quantity: 90 tablet  Refills: 3     lidocaine 4 % Ptch  Commonly  known as: LIDODERM      Place 2 patches onto the skin daily. L Shoulder  &L hip as needed   Refills: 0     Vitamin D 50 MCG (2000 UT) Caps      Take 1 capsule (2,000 Units total) by mouth daily.   Refills: 0            STOP taking these medications      amoxicillin clavulanate 875-125 MG Tabs  Commonly known as: Augmentin                  Where to Get Your Medications        These medications were sent to [id number] - [place], [place] - [address] AT [facility], [phone], [phone]  [address] [place] [id number]      Phone: [phone]   ferrous sulfate 325 (65 FE) MG Tbec         Greater than 35 minutes spent, >50% spent counseling re: treatment plan and workup     Sha Lira MD  1/17/2024

## 2024-01-17 NOTE — PLAN OF CARE
Problem: MUSCULOSKELETAL - ADULT  Goal: Return mobility to safest level of function  Description: INTERVENTIONS:  - Assess patient stability and activity tolerance for standing, transferring and ambulating w/ or w/o assistive devices  - Assist with transfers and ambulation using safe patient handling equipment as needed  - Ensure adequate protection for wounds/incisions during mobilization  - Obtain PT/OT consults as needed  - Advance activity as appropriate  - Communicate ordered activity level and limitations with patient/family  Outcome: Adequate for Discharge     Problem: Impaired Activities of Daily Living  Goal: Achieve highest/safest level of independence in self care  Description: Interventions:  - Assess ability and encourage patient to participate in ADLs to maximize function  - Promote sitting position while performing ADLs such as feeding, grooming, and bathing  - Educate and encourage patient/family in tolerated functional activity level and precautions during self-care  - Encourage patient to incorporate impaired side during daily activities to promote function  Outcome: Adequate for Discharge     Problem: Patient Centered Care  Goal: Patient preferences are identified and integrated in the patient's plan of care  Description: Interventions:  - What would you like us to know as we care for you? From home with spouse  - Provide timely, complete, and accurate information to patient/family  - Incorporate patient and family knowledge, values, beliefs, and cultural backgrounds into the planning and delivery of care  - Encourage patient/family to participate in care and decision-making at the level they choose  - Honor patient and family perspectives and choices  Outcome: Completed     Problem: RESPIRATORY - ADULT  Goal: Achieves optimal ventilation and oxygenation  Description: INTERVENTIONS:  - Assess for changes in respiratory status  - Assess for changes in mentation and behavior  - Position to  facilitate oxygenation and minimize respiratory effort  - Oxygen supplementation based on oxygen saturation or ABGs  - Provide Smoking Cessation handout, if applicable  - Encourage broncho-pulmonary hygiene including cough, deep breathe, Incentive Spirometry  - Assess the need for suctioning and perform as needed  - Assess and instruct to report SOB or any respiratory difficulty  - Respiratory Therapy support as indicated  - Manage/alleviate anxiety  - Monitor for signs/symptoms of CO2 retention  Outcome: Completed     Problem: PAIN - ADULT  Goal: Verbalizes/displays adequate comfort level or patient's stated pain goal  Description: INTERVENTIONS:  - Encourage pt to monitor pain and request assistance  - Assess pain using appropriate pain scale  - Administer analgesics based on type and severity of pain and evaluate response  - Implement non-pharmacological measures as appropriate and evaluate response  - Consider cultural and social influences on pain and pain management  - Manage/alleviate anxiety  - Utilize distraction and/or relaxation techniques  - Monitor for opioid side effects  - Notify MD/LIP if interventions unsuccessful or patient reports new pain  - Anticipate increased pain with activity and pre-medicate as appropriate  Outcome: Completed     Problem: RISK FOR INFECTION - ADULT  Goal: Absence of fever/infection during anticipated neutropenic period  Description: INTERVENTIONS  - Monitor WBC  - Administer growth factors as ordered  - Implement neutropenic guidelines  Outcome: Completed     Problem: SAFETY ADULT - FALL  Goal: Free from fall injury  Description: INTERVENTIONS:  - Assess pt frequently for physical needs  - Identify cognitive and physical deficits and behaviors that affect risk of falls.  - [place] fall precautions as indicated by assessment.  - Educate pt/family on patient safety including physical limitations  - Instruct pt to call for assistance with activity based on assessment  -  Modify environment to reduce risk of injury  - Provide assistive devices as appropriate  - Consider OT/PT consult to assist with strengthening/mobility  - Encourage toileting schedule  Outcome: Completed     Problem: DISCHARGE PLANNING  Goal: Discharge to home or other facility with appropriate resources  Description: INTERVENTIONS:  - Identify barriers to discharge w/pt and caregiver  - Include patient/family/discharge partner in discharge planning  - Arrange for needed discharge resources and transportation as appropriate  - Identify discharge learning needs (meds, wound care, etc)  - Arrange for interpreters to assist at discharge as needed  - Consider post-discharge preferences of patient/family/discharge partner  - Complete POLST form as appropriate  - Assess patient's ability to be responsible for managing their own health  - Refer to Case Management Department for coordinating discharge planning if the patient needs post-hospital services based on physician/LIP order or complex needs related to functional status, cognitive ability or social support system  Outcome: Completed     Problem: Patient/Family Goals  Goal: Patient/Family Long Term Goal  Description: Patient's Long Term Goal: discharge home    Interventions:  - follow md orders.  -   - See additional Care Plan goals for specific interventions  Outcome: Completed  Goal: Patient/Family Short Term Goal  Description: Patient's Short Term Goal: to breathe better    Interventions:   - Pulmonary consult  -oxygen therapy  -IV antibiotics    - See additional Care Plan goals for specific interventions  Outcome: Completed     On 4 L nasal cannula. Tolerating soft/easy chew diet.   Per MD, patient cleared for discharge today. Wife at bedside, aware and agreeable to discharge plan.   Remote telemetry monitor returned. PIV removed. Personal belongings returned to patient.   Report given and care endorsed to YADIRA Sommer.   Patient discharged to [facility]  [facility]. Transport via [facility].

## 2024-01-17 NOTE — PLAN OF CARE
Problem: Patient Centered Care  Goal: Patient preferences are identified and integrated in the patient's plan of care  Description: Interventions:  - What would you like us to know as we care for you? From home with spouse  - Provide timely, complete, and accurate information to patient/family  - Incorporate patient and family knowledge, values, beliefs, and cultural backgrounds into the planning and delivery of care  - Encourage patient/family to participate in care and decision-making at the level they choose  - Honor patient and family perspectives and choices  Outcome: Progressing     Problem: RESPIRATORY - ADULT  Goal: Achieves optimal ventilation and oxygenation  Description: INTERVENTIONS:  - Assess for changes in respiratory status  - Assess for changes in mentation and behavior  - Position to facilitate oxygenation and minimize respiratory effort  - Oxygen supplementation based on oxygen saturation or ABGs  - Provide Smoking Cessation handout, if applicable  - Encourage broncho-pulmonary hygiene including cough, deep breathe, Incentive Spirometry  - Assess the need for suctioning and perform as needed  - Assess and instruct to report SOB or any respiratory difficulty  - Respiratory Therapy support as indicated  - Manage/alleviate anxiety  - Monitor for signs/symptoms of CO2 retention  Outcome: Progressing     Problem: MUSCULOSKELETAL - ADULT  Goal: Return mobility to safest level of function  Description: INTERVENTIONS:  - Assess patient stability and activity tolerance for standing, transferring and ambulating w/ or w/o assistive devices  - Assist with transfers and ambulation using safe patient handling equipment as needed  - Ensure adequate protection for wounds/incisions during mobilization  - Obtain PT/OT consults as needed  - Advance activity as appropriate  - Communicate ordered activity level and limitations with patient/family  Outcome: Progressing     Problem: Impaired Activities of Daily  Living  Goal: Achieve highest/safest level of independence in self care  Description: Interventions:  - Assess ability and encourage patient to participate in ADLs to maximize function  - Promote sitting position while performing ADLs such as feeding, grooming, and bathing  - Educate and encourage patient/family in tolerated functional activity level and precautions during self-care    Outcome: Progressing     Problem: PAIN - ADULT  Goal: Verbalizes/displays adequate comfort level or patient's stated pain goal  Description: INTERVENTIONS:  - Encourage pt to monitor pain and request assistance  - Assess pain using appropriate pain scale  - Administer analgesics based on type and severity of pain and evaluate response  - Implement non-pharmacological measures as appropriate and evaluate response  - Consider cultural and social influences on pain and pain management  - Manage/alleviate anxiety  - Utilize distraction and/or relaxation techniques  - Monitor for opioid side effects  - Notify MD/LIP if interventions unsuccessful or patient reports new pain  - Anticipate increased pain with activity and pre-medicate as appropriate  Outcome: Progressing     Problem: RISK FOR INFECTION - ADULT  Goal: Absence of fever/infection during anticipated neutropenic period  Description: INTERVENTIONS  - Monitor WBC  - Administer growth factors as ordered  - Implement neutropenic guidelines  Outcome: Progressing     Problem: SAFETY ADULT - FALL  Goal: Free from fall injury  Description: INTERVENTIONS:  - Assess pt frequently for physical needs  - Identify cognitive and physical deficits and behaviors that affect risk of falls.  - Hazel fall precautions as indicated by assessment.  - Educate pt/family on patient safety including physical limitations  - Instruct pt to call for assistance with activity based on assessment  - Modify environment to reduce risk of injury  - Provide assistive devices as appropriate  - Consider OT/PT  consult to assist with strengthening/mobility  - Encourage toileting schedule  Outcome: Progressing     Problem: DISCHARGE PLANNING  Goal: Discharge to home or other facility with appropriate resources  Description: INTERVENTIONS:  - Identify barriers to discharge w/pt and caregiver  - Include patient/family/discharge partner in discharge planning  - Arrange for needed discharge resources and transportation as appropriate  - Identify discharge learning needs (meds, wound care, etc)  - Arrange for interpreters to assist at discharge as needed  - Consider post-discharge preferences of patient/family/discharge partner  - Complete POLST form as appropriate  - Assess patient's ability to be responsible for managing their own health  - Refer to Case Management Department for coordinating discharge planning if the patient needs post-hospital services based on physician/LIP order or complex needs related to functional status, cognitive ability or social support system  Outcome: Progressing     Problem: Patient/Family Goals  Goal: Patient/Family Long Term Goal  Description: Patient's Long Term Goal: discharge home    Interventions:  - follow md orders.  -   - See additional Care Plan goals for specific interventions  Outcome: Progressing  Goal: Patient/Family Short Term Goal  Description: Patient's Short Term Goal:     Interventions:  - See additional Care Plan goals for specific interventions  Outcome: Progressing     No acute changes overnight, vital signs being monitored, frequent rounding by nursing staff, appropriate safety precautions in place, call light within reach.

## 2024-02-13 RX ORDER — ATORVASTATIN CALCIUM 40 MG/1
40 TABLET, FILM COATED ORAL NIGHTLY
Qty: 90 TABLET | Refills: 3 | Status: SHIPPED | OUTPATIENT
Start: 2024-02-13

## 2024-02-13 RX ORDER — CLOPIDOGREL BISULFATE 75 MG/1
75 TABLET ORAL DAILY
Qty: 90 TABLET | Refills: 3 | Status: SHIPPED | OUTPATIENT
Start: 2024-02-13

## 2024-02-13 NOTE — TELEPHONE ENCOUNTER
Refill request is for a maintenance medication and has met the criteria specified in the Ambulatory Medication Refill Standing Order for eligibility, visits, laboratory, alerts and was sent to the requested pharmacy.    Requested Prescriptions     Signed Prescriptions Disp Refills    ATORVASTATIN 40 MG Oral Tab 90 tablet 3     Sig: TAKE 1 TABLET(40 MG) BY MOUTH NIGHTLY     Authorizing Provider: LIZZ THOMAS     Ordering User: DARIO MARSHALL    CLOPIDOGREL 75 MG Oral Tab 90 tablet 3     Sig: TAKE 1 TABLET(75 MG) BY MOUTH DAILY     Authorizing Provider: LIZZ THOMAS     Ordering User: DARIO MARSHALL

## 2024-02-21 ENCOUNTER — PATIENT OUTREACH (OUTPATIENT)
Dept: CASE MANAGEMENT | Age: 84
End: 2024-02-21

## 2024-03-05 NOTE — PROGRESS NOTES
Patient identified with a potential need for Chronic Care Management services.  Called patient to introduce self and availability of Chronic Care Management services.  Patient informed about the following service elements:  Health information sharing - complying with all laws related to privacy and security of their health information  Patient/Insurance Cost sharing - includes deductible and any ongoing copays and/or coinsurance  Only one provider can bill for this service monthly  Patient right to discontinue services at any time for any reason effective last day of current month  Patient verbally declines to participate in Chronic Care Management services and any associated charges.

## 2024-03-19 NOTE — TELEPHONE ENCOUNTER
Physician Progress Note      PATIENT:               VLADIMIR LYNN  CSN #:                  [id number]  :                       1943  ADMIT DATE:       3/11/2024 4:45 PM  DISCH DATE:        3/12/2024 12:30 AM  RESPONDING  PROVIDER #:        Alejo Mendoza MD          QUERY TEXT:    Pt admitted with left thalamic hematoma Pt noted to have mass effect.  If   clinically significant, please document in progress notes and discharge   summary if you are evaluating/treating any of the following:    The medical record reflects the following:  Risk Factors: fall withleft thalamic hematoma  Clinical Indicators: CT head \"CT head . 1.9 x 1.7 x 2.1 cm HEMATOMA in the   medial aspect of the left thalamus.  2. Prominent mass effect on the 3rd ventricle.  No intraventricular   hemorrhage. 3. Dilatation of the lateral ventricles, which may be due to mass   effect on the 3rd ventricle and the foramina of [place].  No transependymal   flow of CSF.  Treatment: transfer to tertiary care center    Rosanna AGUSTIN, RN, [facility]  [phone]  Options provided:  -- Brain compression  -- Traumatic brain compression  -- Other - I will add my own diagnosis  -- Disagree - Not applicable / Not valid  -- Disagree - Clinically unable to determine / Unknown  -- Refer to Clinical Documentation Reviewer    PROVIDER RESPONSE TEXT:    This patient has brain compression.    Query created by: Rosanna De Leon on 3/19/2024 10:27 AM      Electronically signed by:  Alejo Mendoza MD 3/19/2024 11:20 AM           To Dr. Be Shanks:    Pt was seen 4/19 for office visit--Pt has tapered off gabapentin and is taking tramadol 50 mg BID for the last 3 days. Lord Ball states Tramadol is not providing any relief with patient's shoulder, back, and knee pain. Lord Ball reports pt has had increased generalized weakness. Started Tues afternoon and all day yesterday, 4/26. Lord Ball states he has had difficulty with transfers d/t legs shaking. Weakness was more generalized, not one sided. Denies any slurred speech or facial droop. No mental status changes or acute confusion; pt alert and oriented. Pt has not reported any dizziness or headache. States pt is better today (able to transfer with assistance). Pt has been taking increased amlodipine dose (5 mg daily). Pt's appetite has been poor the past few days. No n/v. Pt is drinking fluids. Having regular bowel movements and denies any UTI symptoms. Lord Ball states the home health nurse is coming today at 12:30. She will call back to provided update (vitals, etc) after nurse visit. Advised Meme call back with any questions/concerns/worsening symptoms. ER precautions reviewed with Lord Ball who verbalized understanding. 04/27/2023 10:56 AM       Spoke with Lord Ball. States nurse visit went well. All vital signs were within normal limits. Cody ate lunch and is drinking fluids; alert & oriented. Reports pt was able to transfer from chair to [facility] with assitance, but still seems to be more tired than usual (dozing off while sitting in chair while watching television, but arouses easily). Patient was notified that this message will be routed to the physician to determine what their recommendation (s) would be.  In the meantime, if they develop new or worsening symptoms, they were advised to call back or seek emergent evaluation at the ER.   04/27/2023 05:13 PM

## 2024-03-22 ENCOUNTER — TELEPHONE (OUTPATIENT)
Dept: INTERNAL MEDICINE CLINIC | Facility: CLINIC | Age: 84
End: 2024-03-22

## 2024-03-22 RX ORDER — METHYLPREDNISOLONE 4 MG/1
TABLET ORAL
Qty: 1 EACH | Refills: 0 | Status: SHIPPED | OUTPATIENT
Start: 2024-03-22

## 2024-03-22 NOTE — TELEPHONE ENCOUNTER
Meme patient wife calling for stronger medication for arthritic back pain. Just discharged from rehab.  Tramadol not helping.  Asking if maybe a steroid would help.     Forest Loweison    Wife is aware Dr. Hampton is out of office today.  Can wait until Monday for call back.    Meme call back number [phone]

## 2024-03-22 NOTE — TELEPHONE ENCOUNTER
Telephone call to pt's wife.  Pt has just returned home from rehab.  Pt c/o various pains.  Pt has Tramadol but it is not working.  Wife wonders if a steroid might help.  I will send a Rx for a Medrol dose pack to pt's pharmacy.  Wife will keep me updated.

## 2024-04-01 ENCOUNTER — TELEPHONE (OUTPATIENT)
Dept: INTERNAL MEDICINE CLINIC | Facility: CLINIC | Age: 84
End: 2024-04-01

## 2024-04-01 NOTE — TELEPHONE ENCOUNTER
Naima from [facility] left a message with the answering service on 3/29/24.    Regarding OT:  Evaluation is completed, will pass it along for someone else to treat    Naima's call back # is [phone]

## 2024-04-09 ENCOUNTER — TELEPHONE (OUTPATIENT)
Dept: INTERNAL MEDICINE CLINIC | Facility: CLINIC | Age: 84
End: 2024-04-09

## 2024-04-09 NOTE — TELEPHONE ENCOUNTER
Pt's wife called    Requests appointment for patient to see you for rehab follow up/check up     please call Meme to advise [phone]    Message sent to Dr Hampton

## 2024-04-10 NOTE — TELEPHONE ENCOUNTER
Noted.  I have checked my schedule.  There is an opening tomorrow, April 11, at 10:30 AM.  Please call patient/wife and tell them that I can see the patient tomorrow morning at 10:30 AM.  I will forward this message to the .  Thank you!!

## 2024-04-11 NOTE — TELEPHONE ENCOUNTER
Meme called to cancel today's appointment    Her son is not able to assist her in getting patient to the office     Meme apologizes for having to cancel     Message sent to Dr Hampton

## 2024-04-14 ENCOUNTER — HOSPITAL ENCOUNTER (INPATIENT)
Facility: HOSPITAL | Age: 84
LOS: 4 days | Discharge: NURSING, REHAB OR LONG-TERM CARE | End: 2024-04-18
Attending: STUDENT IN AN ORGANIZED HEALTH CARE EDUCATION/TRAINING PROGRAM | Admitting: INTERNAL MEDICINE
Payer: MEDICARE

## 2024-04-14 ENCOUNTER — APPOINTMENT (OUTPATIENT)
Dept: GENERAL RADIOLOGY | Facility: HOSPITAL | Age: 84
End: 2024-04-14
Attending: STUDENT IN AN ORGANIZED HEALTH CARE EDUCATION/TRAINING PROGRAM
Payer: MEDICARE

## 2024-04-14 DIAGNOSIS — J18.9 COMMUNITY ACQUIRED PNEUMONIA OF LEFT LOWER LOBE OF LUNG: Primary | ICD-10-CM

## 2024-04-14 LAB
ALBUMIN SERPL-MCNC: 3.9 G/DL (ref 3.2–4.8)
ALBUMIN SERPL-MCNC: 4.5 G/DL (ref 3.2–4.8)
ALBUMIN/GLOB SERPL: 1.2 {RATIO} (ref 1–2)
ALBUMIN/GLOB SERPL: 1.3 {RATIO} (ref 1–2)
ALP LIVER SERPL-CCNC: 100 U/L
ALP LIVER SERPL-CCNC: 129 U/L
ALT SERPL-CCNC: <7 U/L
ALT SERPL-CCNC: <7 U/L
ANION GAP SERPL CALC-SCNC: 6 MMOL/L (ref 0–18)
ANION GAP SERPL CALC-SCNC: 8 MMOL/L (ref 0–18)
APTT PPP: 31 SECONDS (ref 23.3–35.6)
AST SERPL-CCNC: 10 U/L (ref ?–34)
AST SERPL-CCNC: 18 U/L (ref ?–34)
BASOPHILS # BLD AUTO: 0.05 X10(3) UL (ref 0–0.2)
BASOPHILS NFR BLD AUTO: 0.3 %
BILIRUB SERPL-MCNC: 0.5 MG/DL (ref 0.2–1.1)
BILIRUB SERPL-MCNC: 0.8 MG/DL (ref 0.2–1.1)
BUN BLD-MCNC: 16 MG/DL (ref 9–23)
BUN BLD-MCNC: 18 MG/DL (ref 9–23)
BUN/CREAT SERPL: 13.9 (ref 10–20)
BUN/CREAT SERPL: 14 (ref 10–20)
CALCIUM BLD-MCNC: 9.3 MG/DL (ref 8.7–10.4)
CALCIUM BLD-MCNC: 9.9 MG/DL (ref 8.7–10.4)
CHLORIDE SERPL-SCNC: 108 MMOL/L (ref 98–112)
CHLORIDE SERPL-SCNC: 111 MMOL/L (ref 98–112)
CO2 SERPL-SCNC: 23 MMOL/L (ref 21–32)
CO2 SERPL-SCNC: 23 MMOL/L (ref 21–32)
CREAT BLD-MCNC: 1.15 MG/DL
CREAT BLD-MCNC: 1.29 MG/DL
DEPRECATED RDW RBC AUTO: 51.5 FL (ref 35.1–46.3)
EGFRCR SERPLBLD CKD-EPI 2021: 55 ML/MIN/1.73M2 (ref 60–?)
EGFRCR SERPLBLD CKD-EPI 2021: 64 ML/MIN/1.73M2 (ref 60–?)
EOSINOPHIL # BLD AUTO: 0.03 X10(3) UL (ref 0–0.7)
EOSINOPHIL NFR BLD AUTO: 0.2 %
ERYTHROCYTE [DISTWIDTH] IN BLOOD BY AUTOMATED COUNT: 15.1 % (ref 11–15)
FLUAV + FLUBV RNA SPEC NAA+PROBE: NEGATIVE
FLUAV + FLUBV RNA SPEC NAA+PROBE: NEGATIVE
GLOBULIN PLAS-MCNC: 3.1 G/DL (ref 2.8–4.4)
GLOBULIN PLAS-MCNC: 3.7 G/DL (ref 2.8–4.4)
GLUCOSE BLD-MCNC: 118 MG/DL (ref 70–99)
GLUCOSE BLD-MCNC: 170 MG/DL (ref 70–99)
HCT VFR BLD AUTO: 42.1 %
HGB BLD-MCNC: 13.7 G/DL
IMM GRANULOCYTES # BLD AUTO: 0.08 X10(3) UL (ref 0–1)
IMM GRANULOCYTES NFR BLD: 0.4 %
INR BLD: 0.99 (ref 0.8–1.2)
LACTATE SERPL-SCNC: 1.5 MMOL/L (ref 0.5–2)
LACTATE SERPL-SCNC: 2.4 MMOL/L (ref 0.5–2)
LYMPHOCYTES # BLD AUTO: 1.13 X10(3) UL (ref 1–4)
LYMPHOCYTES NFR BLD AUTO: 6.3 %
MCH RBC QN AUTO: 30.2 PG (ref 26–34)
MCHC RBC AUTO-ENTMCNC: 32.5 G/DL (ref 31–37)
MCV RBC AUTO: 92.9 FL
MONOCYTES # BLD AUTO: 1.01 X10(3) UL (ref 0.1–1)
MONOCYTES NFR BLD AUTO: 5.6 %
NEUTROPHILS # BLD AUTO: 15.69 X10 (3) UL (ref 1.5–7.7)
NEUTROPHILS # BLD AUTO: 15.69 X10(3) UL (ref 1.5–7.7)
NEUTROPHILS NFR BLD AUTO: 87.2 %
OSMOLALITY SERPL CALC.SUM OF ELEC: 292 MOSM/KG (ref 275–295)
OSMOLALITY SERPL CALC.SUM OF ELEC: 294 MOSM/KG (ref 275–295)
PLATELET # BLD AUTO: 202 10(3)UL (ref 150–450)
POTASSIUM SERPL-SCNC: 3.8 MMOL/L (ref 3.5–5.1)
POTASSIUM SERPL-SCNC: 3.9 MMOL/L (ref 3.5–5.1)
PROT SERPL-MCNC: 7 G/DL (ref 5.7–8.2)
PROT SERPL-MCNC: 8.2 G/DL (ref 5.7–8.2)
PROTHROMBIN TIME: 13.7 SECONDS (ref 11.6–14.8)
RBC # BLD AUTO: 4.53 X10(6)UL
RSV RNA SPEC NAA+PROBE: NEGATIVE
SARS-COV-2 RNA RESP QL NAA+PROBE: DETECTED
SODIUM SERPL-SCNC: 139 MMOL/L (ref 136–145)
SODIUM SERPL-SCNC: 140 MMOL/L (ref 136–145)
WBC # BLD AUTO: 18 X10(3) UL (ref 4–11)

## 2024-04-14 PROCEDURE — 99223 1ST HOSP IP/OBS HIGH 75: CPT | Performed by: INTERNAL MEDICINE

## 2024-04-14 PROCEDURE — 3E0DX3Z INTRODUCTION OF ANTI-INFLAMMATORY INTO MOUTH AND PHARYNX, EXTERNAL APPROACH: ICD-10-PCS | Performed by: INTERNAL MEDICINE

## 2024-04-14 PROCEDURE — 71045 X-RAY EXAM CHEST 1 VIEW: CPT | Performed by: STUDENT IN AN ORGANIZED HEALTH CARE EDUCATION/TRAINING PROGRAM

## 2024-04-14 PROCEDURE — XW033E5 INTRODUCTION OF REMDESIVIR ANTI-INFECTIVE INTO PERIPHERAL VEIN, PERCUTANEOUS APPROACH, NEW TECHNOLOGY GROUP 5: ICD-10-PCS | Performed by: INTERNAL MEDICINE

## 2024-04-14 RX ORDER — ACETAMINOPHEN 500 MG
500 TABLET ORAL EVERY 4 HOURS PRN
Status: DISCONTINUED | OUTPATIENT
Start: 2024-04-14 | End: 2024-04-18

## 2024-04-14 RX ORDER — SODIUM CHLORIDE 9 MG/ML
INJECTION, SOLUTION INTRAVENOUS CONTINUOUS
Status: DISCONTINUED | OUTPATIENT
Start: 2024-04-14 | End: 2024-04-16

## 2024-04-14 RX ORDER — CLOPIDOGREL BISULFATE 75 MG/1
75 TABLET ORAL DAILY
Status: DISCONTINUED | OUTPATIENT
Start: 2024-04-14 | End: 2024-04-18

## 2024-04-14 RX ORDER — ACETAMINOPHEN 500 MG
1000 TABLET ORAL ONCE
Status: COMPLETED | OUTPATIENT
Start: 2024-04-14 | End: 2024-04-14

## 2024-04-14 RX ORDER — DEXAMETHASONE 6 MG/1
6 TABLET ORAL DAILY
Status: DISCONTINUED | OUTPATIENT
Start: 2024-04-14 | End: 2024-04-17

## 2024-04-14 RX ORDER — LEVOTHYROXINE SODIUM 0.1 MG/1
100 TABLET ORAL DAILY
Status: DISCONTINUED | OUTPATIENT
Start: 2024-04-14 | End: 2024-04-18

## 2024-04-14 RX ORDER — AZITHROMYCIN 250 MG/1
500 TABLET, FILM COATED ORAL ONCE
Status: COMPLETED | OUTPATIENT
Start: 2024-04-14 | End: 2024-04-14

## 2024-04-14 RX ORDER — BENZONATATE 200 MG/1
200 CAPSULE ORAL 3 TIMES DAILY PRN
Status: DISCONTINUED | OUTPATIENT
Start: 2024-04-14 | End: 2024-04-18

## 2024-04-14 RX ORDER — ATORVASTATIN CALCIUM 40 MG/1
40 TABLET, FILM COATED ORAL NIGHTLY
Status: DISCONTINUED | OUTPATIENT
Start: 2024-04-14 | End: 2024-04-18

## 2024-04-14 RX ORDER — AMLODIPINE BESYLATE 5 MG/1
5 TABLET ORAL DAILY
Status: DISCONTINUED | OUTPATIENT
Start: 2024-04-14 | End: 2024-04-18

## 2024-04-14 RX ORDER — ONDANSETRON 2 MG/ML
4 INJECTION INTRAMUSCULAR; INTRAVENOUS EVERY 6 HOURS PRN
Status: DISCONTINUED | OUTPATIENT
Start: 2024-04-14 | End: 2024-04-18

## 2024-04-14 RX ORDER — MELATONIN
3 NIGHTLY PRN
Status: DISCONTINUED | OUTPATIENT
Start: 2024-04-14 | End: 2024-04-18

## 2024-04-14 RX ORDER — ENOXAPARIN SODIUM 100 MG/ML
40 INJECTION SUBCUTANEOUS DAILY
Status: DISCONTINUED | OUTPATIENT
Start: 2024-04-15 | End: 2024-04-18

## 2024-04-14 RX ORDER — MELATONIN
325
Status: DISCONTINUED | OUTPATIENT
Start: 2024-04-15 | End: 2024-04-18

## 2024-04-14 RX ORDER — FLUTICASONE FUROATE AND VILANTEROL 100; 25 UG/1; UG/1
1 POWDER RESPIRATORY (INHALATION) EVERY 24 HOURS
Status: DISCONTINUED | OUTPATIENT
Start: 2024-04-14 | End: 2024-04-18

## 2024-04-14 RX ORDER — ACETAMINOPHEN 500 MG
1000 TABLET ORAL 2 TIMES DAILY
Status: DISCONTINUED | OUTPATIENT
Start: 2024-04-14 | End: 2024-04-18

## 2024-04-14 RX ORDER — GABAPENTIN 300 MG/1
300 CAPSULE ORAL 3 TIMES DAILY
Status: DISCONTINUED | OUTPATIENT
Start: 2024-04-14 | End: 2024-04-18

## 2024-04-14 RX ORDER — IPRATROPIUM BROMIDE AND ALBUTEROL SULFATE 2.5; .5 MG/3ML; MG/3ML
3 SOLUTION RESPIRATORY (INHALATION) EVERY 6 HOURS PRN
Status: DISCONTINUED | OUTPATIENT
Start: 2024-04-14 | End: 2024-04-18

## 2024-04-14 RX ORDER — MELATONIN
2000 DAILY
Status: DISCONTINUED | OUTPATIENT
Start: 2024-04-14 | End: 2024-04-18

## 2024-04-14 NOTE — ED PROVIDER NOTES
Patient Seen in: [facility] Emergency Department      History     Chief Complaint   Patient presents with    Weakness     Stated Complaint: sepsis    Subjective:   HPI    83-year-old male brought in by EMS for weakness and fever.  Past medical history of hypertension hyperlipidemia frequent falls, CVA with residual left-sided weakness, AAA status post repair.  Noted by family to be increasingly weak today, found by EMS to be febrile 103.  He endorses chronic neck and back pain as well as chronic left-sided weakness.  No chest pain or trouble breathing does endorse a cough.  No urinary symptoms no vomiting or diarrhea.    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 - Dr. Stahl    Tonsillectomy      at age 24                Social History     Socioeconomic History    Marital status:    Tobacco Use    Smoking status: Former     Current packs/day: 0.00     Average packs/day: 2.0 packs/day for 50.0 years (100.0 ttl pk-yrs)     Types: Cigarettes     Start date: 1958     Quit date: 2008     Years since quittin.2    Smokeless tobacco: Never   Vaping Use    Vaping status: Never Used   Substance and Sexual Activity    Alcohol use: Never     Comment: socially; about 1 glass of wine once a week     Drug use: No   Other Topics Concern    Caffeine Concern Yes     Comment: 1 cup of coffee daily    Exercise No    Social History Narrative    The patient uses the following assistive device(s):  quad cane and pick-up walker.      The patient does live in a home with stairs.     Social Determinants of Health     Financial Resource Strain: Low Risk  (11/6/2023)    Financial Resource Strain     Difficulty of Paying Living Expenses: Not very hard     Med Affordability: No   Food Insecurity: No Food Insecurity (4/14/2024)    Food Insecurity     Food Insecurity: Never true   Transportation Needs: No Transportation Needs (4/14/2024)    Transportation Needs     Lack of Transportation: No   Housing Stability: Low Risk  (4/14/2024)    Housing Stability     Housing Instability: No              Review of Systems    Positive for stated complaint: sepsis  Other systems are as noted in HPI.  Constitutional and vital signs reviewed.      All other systems reviewed and negative except as noted above.    Physical Exam     ED Triage Vitals   BP 04/14/24 1755 135/72   Pulse 04/14/24 1752 118   Resp 04/14/24 1752 (!) 29   Temp 04/14/24 1752 (!) 101.8 °F (38.8 °C)   Temp src 04/14/24 1752 Temporal   SpO2 04/14/24 1752 (!) 88 %   O2 Device 04/14/24 1752 None (Room air)       Current:/57 (BP Location: Right arm)   Pulse 67   Temp 97.6 °F (36.4 °C) (Oral)   Resp 22   Ht 177.8 cm (5' 10\")   Wt 89.5 kg   SpO2 96%   BMI 28.32 kg/m²         Physical Exam    Constitutional: awake, alert, no sig distress  HENT: mmm, no lesions,  Neck: normal range of motion, no tenderness, supple.  Eyes: PERRL, EOMI, conjunctiva normal, no discharge. Sclera anicteric.  Cardiovascular: rr no murmur  Respiratory: Normal breath sounds, no respiratory distress, no wheezing, no chest tenderness.  GI: Bowel sounds normal, Soft, no tenderness, no masses, no pulsatile masses.  : No CVA tenderness.  Skin: Warm, dry, no erythema, no rash.  Musculoskeletal: Intact distal pulses, no edema, no tenderness, no cyanosis, no clubbing. Good range of motion in all major  joints. No tenderness to palpation or major deformities noted. Back- No tenderness.  Neurologic: Alert & oriented x 3, residual left hemiplegia  Psych: Calm, cooperative, nl affect        ED Course     Labs Reviewed   COMP METABOLIC PANEL (14) - Abnormal; Notable for the following components:       Result Value    Glucose 170 (*)     eGFR-Cr 55 (*)     ALT <7 (*)     Alkaline Phosphatase 129 (*)     All other components within normal limits   LACTIC ACID, PLASMA - Abnormal; Notable for the following components:    Lactic Acid 2.4 (*)     All other components within normal limits   COMP METABOLIC PANEL (14) - Abnormal; Notable for the following components:    Glucose 118 (*)     ALT <7 (*)     All other components within normal limits   SARS-COV-2/FLU A AND B/RSV BY PCR (GENEXPERT) - Abnormal; Notable for the following components:    SARS-CoV-2 (COVID-19) - (GeneXpert) Detected (*)     All other components within normal limits    Narrative:     This test is intended for the qualitative detection and differentiation of SARS-CoV-2, influenza A, influenza B, and respiratory syncytial virus (RSV) viral RNA in nasopharyngeal or nares swabs from individuals suspected of respiratory viral infection consistent with COVID-19 by their healthcare provider. Signs and symptoms of respiratory viral infection due to SARS-CoV-2, influenza, and RSV can be similar.    Test performed using the Xpert Xpress SARS-CoV-2/FLU/RSV (real time RT-PCR)  assay on the GeneXpert instrument, [employer], [place] [address].   This test is being used under the Food and Drug Administration's Emergency Use Authorization.    The authorized Fact Sheet for Healthcare Providers for this assay is available upon request from the laboratory.   CBC W/ DIFFERENTIAL - Abnormal; Notable for the following components:    WBC 18.0 (*)     RDW-SD 51.5 (*)     RDW 15.1 (*)     Neutrophil Absolute Prelim 15.69 (*)     Neutrophil Absolute 15.69 (*)     Monocyte Absolute  1.01 (*)     All other components within normal limits   PROTHROMBIN TIME (PT) - Normal   PTT, ACTIVATED - Normal   LACTIC ACID REFLEX POST POSTIVE - Normal   CBC WITH DIFFERENTIAL WITH PLATELET    Narrative:     The following orders were created for panel order CBC With Differential With Platelet.  Procedure                               Abnormality         Status                     ---------                               -----------         ------                     CBC W/ DIFFERENTIAL[[id number]]          Abnormal            Final result                 Please view results for these tests on the individual orders.   URINALYSIS WITH CULTURE REFLEX   CBC WITH DIFFERENTIAL WITH PLATELET   BASIC METABOLIC PANEL (8)   BLOOD CULTURE   BLOOD CULTURE   ED/MRSA SCREEN BY PCR-CC   SPUTUM CULTURE          ED Course as of 04/15/24 0049  ------------------------------------------------------------  Time: 04/14 1834  Comment: WBC 18k,               MDM      83M hx as above presenting with generalized weakness.  Found to be febrile tachycardic tachypneic hypoxic.  He declines oxygen.  He is alert and oriented and decisional.  Review of chart shows admission in January for aspiration pneumonia.  Concern clinically is for recurrent aspiration pneumonia given history of CVA and impaired swallow  Admission disposition: 4/14/2024  8:06 PM       Plan for full septic workup he is normotensive at this time will monitor hemodynamics closely  Ddx: Pneumonia, Viral Syndrome, UTI, Bacteremia                            [facility]  part of [facility]      Sepsis Reassessment Note    /72   Pulse 118   Temp (!) 101.8 °F (38.8 °C) (Temporal)   Resp (!) 29   Ht 177.8 cm (5' 10\")   Wt 95.3 kg   SpO2 (!) 88%   BMI 30.13 kg/m²      I completed the sepsis reassessment at 2200    Cardiac:  Regularity: Regular  Rate: Normal  Heart Sounds: S1,S2    Lungs:   Right: Clear  Left: Clear    Peripheral Pulses:  Radial:  Right 1+ or Left 1+      Capillary Refill:  <3 Secs    Skin:  Temp/Moisture: Warm and Dry  Color: Normal      Bam Chan MD  4/14/2024  10:00 PM            Medical Decision Making      Disposition and Plan     Clinical Impression:  1. Community acquired pneumonia of left lower lobe of lung         Disposition:  Admit  4/14/2024  8:06 pm    Follow-up:  No follow-up provider specified.  We recommend that you schedule follow up care with a primary care provider within the next three months to obtain basic health screening including reassessment of your blood pressure.      Medications Prescribed:  Current Discharge Medication List                            Hospital Problems       Present on Admission  Date Reviewed: 4/14/2024            ICD-10-CM Noted POA    * (Principal) Community acquired pneumonia of left lower lobe of lung J18.9 4/14/2024 Unknown    Left lower lobe pneumonia J18.9 4/14/2024 Yes

## 2024-04-14 NOTE — ED INITIAL ASSESSMENT (HPI)
Pt arrives via ems from home for generalized weakness and pain. Pt a/o x 4 and lives at home with his wife. Wife is the primary caregiver. Pt febrile at ED. Pt denies shortness of breath and chest pain, pt reports a chronic cough.

## 2024-04-15 ENCOUNTER — TELEPHONE (OUTPATIENT)
Dept: INTERNAL MEDICINE CLINIC | Facility: CLINIC | Age: 84
End: 2024-04-15

## 2024-04-15 LAB
ANION GAP SERPL CALC-SCNC: 6 MMOL/L (ref 0–18)
BASOPHILS # BLD AUTO: 0.05 X10(3) UL (ref 0–0.2)
BASOPHILS NFR BLD AUTO: 0.5 %
BILIRUB UR QL: NEGATIVE
BUN BLD-MCNC: 14 MG/DL (ref 9–23)
BUN/CREAT SERPL: 13.5 (ref 10–20)
CALCIUM BLD-MCNC: 9 MG/DL (ref 8.7–10.4)
CHLORIDE SERPL-SCNC: 113 MMOL/L (ref 98–112)
CLARITY UR: CLEAR
CO2 SERPL-SCNC: 25 MMOL/L (ref 21–32)
CREAT BLD-MCNC: 1.04 MG/DL
DEPRECATED RDW RBC AUTO: 53.2 FL (ref 35.1–46.3)
EGFRCR SERPLBLD CKD-EPI 2021: 72 ML/MIN/1.73M2 (ref 60–?)
EOSINOPHIL # BLD AUTO: 0.11 X10(3) UL (ref 0–0.7)
EOSINOPHIL NFR BLD AUTO: 1 %
ERYTHROCYTE [DISTWIDTH] IN BLOOD BY AUTOMATED COUNT: 15.1 % (ref 11–15)
GLUCOSE BLD-MCNC: 101 MG/DL (ref 70–99)
GLUCOSE UR-MCNC: NORMAL MG/DL
HCT VFR BLD AUTO: 36.5 %
HGB BLD-MCNC: 11.8 G/DL
HGB UR QL STRIP.AUTO: NEGATIVE
IMM GRANULOCYTES # BLD AUTO: 0.04 X10(3) UL (ref 0–1)
IMM GRANULOCYTES NFR BLD: 0.4 %
KETONES UR-MCNC: NEGATIVE MG/DL
LEUKOCYTE ESTERASE UR QL STRIP.AUTO: NEGATIVE
LYMPHOCYTES # BLD AUTO: 1.53 X10(3) UL (ref 1–4)
LYMPHOCYTES NFR BLD AUTO: 14 %
MCH RBC QN AUTO: 30.7 PG (ref 26–34)
MCHC RBC AUTO-ENTMCNC: 32.3 G/DL (ref 31–37)
MCV RBC AUTO: 95.1 FL
MONOCYTES # BLD AUTO: 1.03 X10(3) UL (ref 0.1–1)
MONOCYTES NFR BLD AUTO: 9.4 %
MRSA DNA SPEC QL NAA+PROBE: POSITIVE
NEUTROPHILS # BLD AUTO: 8.17 X10 (3) UL (ref 1.5–7.7)
NEUTROPHILS # BLD AUTO: 8.17 X10(3) UL (ref 1.5–7.7)
NEUTROPHILS NFR BLD AUTO: 74.7 %
NITRITE UR QL STRIP.AUTO: NEGATIVE
OSMOLALITY SERPL CALC.SUM OF ELEC: 299 MOSM/KG (ref 275–295)
PH UR: 5.5 [PH] (ref 5–8)
PLATELET # BLD AUTO: 151 10(3)UL (ref 150–450)
POTASSIUM SERPL-SCNC: 3.7 MMOL/L (ref 3.5–5.1)
PROT UR-MCNC: 20 MG/DL
RBC # BLD AUTO: 3.84 X10(6)UL
SODIUM SERPL-SCNC: 144 MMOL/L (ref 136–145)
SP GR UR STRIP: 1.02 (ref 1–1.03)
UROBILINOGEN UR STRIP-ACNC: NORMAL
WBC # BLD AUTO: 10.9 X10(3) UL (ref 4–11)

## 2024-04-15 PROCEDURE — 99232 SBSQ HOSP IP/OBS MODERATE 35: CPT | Performed by: INTERNAL MEDICINE

## 2024-04-15 PROCEDURE — 99233 SBSQ HOSP IP/OBS HIGH 50: CPT | Performed by: HOSPITALIST

## 2024-04-15 RX ORDER — AZITHROMYCIN 250 MG/1
500 TABLET, FILM COATED ORAL NIGHTLY
Status: COMPLETED | OUTPATIENT
Start: 2024-04-15 | End: 2024-04-16

## 2024-04-15 RX ORDER — VANCOMYCIN 1.75 GRAM/500 ML IN 0.9 % SODIUM CHLORIDE INTRAVENOUS
20 ONCE
Status: COMPLETED | OUTPATIENT
Start: 2024-04-15 | End: 2024-04-15

## 2024-04-15 RX ORDER — VANCOMYCIN HYDROCHLORIDE
15 EVERY 24 HOURS
Status: DISCONTINUED | OUTPATIENT
Start: 2024-04-16 | End: 2024-04-16

## 2024-04-15 NOTE — TELEPHONE ENCOUNTER
Noted- spoke to patient wife conner (pk per HIPAA) relayed MD message. Verbalized understanding.

## 2024-04-15 NOTE — TELEPHONE ENCOUNTER
For any family that has a compromised immune system, would avoid visiting him at the hospital (not necessarily due to mrsa, but just because lots of patients with various infectious etiologies in general in the hospital)    For the MRSA specifically-no precautions necessary at this time

## 2024-04-15 NOTE — CONSULTS
[facility]  part of [facility]    Consult Note    Date:  2024  Date of Admission:  2024    Chief Complaint:   Cody Fitzgerald is a(n) 83 year old male with weakness and fever.    HPI:   The patient has a prior history of COVID infection.  He also has been hospitalized with multiple episodes of pneumonia.  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  He was well until today when he developed a temperature up to 103.  He notes that he always has a cough and this was a little worse.  He denies choking on food, hemoptysis, chest pain, pleurisy, personal nor family history of deep venous thrombosis, TB exposure.  He does admit to fever, chills and shakes.  He was found to be COVID-positive.  He is on supplemental oxygen currently in the ER.    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 - Dr. Stahl    Tonsillectomy      at age 24     Family History   Problem Relation Age of Onset    Cancer Father         Lung      .  Mother  old age.    Social History: , 5 kids, quit tobacco 10 years ago after greater than 1 pack/day, slight alcohol,   Social History     Socioeconomic History    Marital status:    Tobacco Use    Smoking status: Former     Current packs/day: 0.00     Average packs/day: 2.0 packs/day for 50.0 years (100.0 ttl pk-yrs)     Types: Cigarettes     Start  date: 1958     Quit date: 2008     Years since quittin.2    Smokeless tobacco: Never   Vaping Use    Vaping status: Never Used   Substance and Sexual Activity    Alcohol use: Never     Comment: socially; about 1 glass of wine once a week     Drug use: No   Other Topics Concern    Caffeine Concern Yes     Comment: 1 cup of coffee daily    Exercise No   Social History Narrative    The patient uses the following assistive device(s):  quad cane and pick-up walker.      The patient does live in a home with stairs.     Social Determinants of Health     Financial Resource Strain: Low Risk  (2023)    Financial Resource Strain     Difficulty of Paying Living Expenses: Not very hard     Med Affordability: No   Food Insecurity: No Food Insecurity (2024)    Food Insecurity     Food Insecurity: Never true   Transportation Needs: No Transportation Needs (2024)    Transportation Needs     Lack of Transportation: No   Housing Stability: Low Risk  (2024)    Housing Stability     Housing Instability: No     Allergies/Medications:   Allergies: No Known Allergies  Medications Prior to Admission   Medication Sig    methylPREDNISolone (MEDROL) 4 MG Oral Tablet Therapy Pack As directed.    ATORVASTATIN 40 MG Oral Tab TAKE 1 TABLET(40 MG) BY MOUTH NIGHTLY    CLOPIDOGREL 75 MG Oral Tab TAKE 1 TABLET(75 MG) BY MOUTH DAILY    ferrous sulfate 325 (65 FE) MG Oral Tab EC Take 1 tablet (325 mg total) by mouth daily with breakfast.    gabapentin 300 MG Oral Cap Take 1 capsule (300 mg total) by mouth 3 (three) times daily.    LEVOTHYROXINE 100 MCG Oral Tab TAKE 1 TABLET(100 MCG) BY MOUTH DAILY    amLODIPine (NORVASC) 5 MG Oral Tab Take 1 tablet (5 mg total) by mouth daily.    lidocaine 4 % External Patch Place 2 patches onto the skin daily. L Shoulder  &L hip as needed    benzonatate 200 MG Oral Cap Take 1 capsule (200 mg total) by mouth 3 (three) times daily as needed for cough.    acetaminophen 500 MG Oral Tab  Take 2 tablets (1,000 mg total) by mouth in the morning and 2 tablets (1,000 mg total) before bedtime. Also may have 500 mg PO Q6 prn but not to exceed 3 gm total of acetaminophen in 24 hours.    Cholecalciferol (VITAMIN D) 2000 units Oral Cap Take 1 capsule (2,000 Units total) by mouth daily.       Review of Systems:   Review of Systems:  Vision normal. Ear nose and throat normal. Bowel normal. Bladder function normal. No depression. Tthyroid disease. No lymphatic system concerns.  No rash. Muscles and joints notable for left-sided palsy. No weight loss no weight gain.    Physical Exam:   Vital Signs:  Blood pressure 119/57, pulse 79, temperature 97.6 °F (36.4 °C), temperature source Oral, resp. rate 22, height 5' 10\" (1.778 m), weight 197 lb 6.4 oz (89.5 kg), SpO2 96%.     Alert white male  HEENT examination is unremarkable with pupils equal round and reactive to light and accommodation.   Neck without adenopathy, thyromegaly, JVD nor bruit.   Lungs diminished with couple central rattles to auscultation and percussion.  Cardiac regular rate and rhythm no murmur.   Abdomen nontender, without hepatosplenomegaly and no mass appreciable.   Extremities without clubbing cyanosis nor edema.   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  Skin without gross abnormality    Results:     Lab Results   Component Value Date    WBC 18.0 04/14/2024    HGB 13.7 04/14/2024    HCT 42.1 04/14/2024    .0 04/14/2024    CREATSERUM 1.29 04/14/2024    BUN 18 04/14/2024     04/14/2024    K 3.9 04/14/2024     04/14/2024    CO2 23.0 04/14/2024     04/14/2024    CA 9.9 04/14/2024    ALB 4.5 04/14/2024    ALKPHO 129 04/14/2024    BILT 0.5 04/14/2024    TP 8.2 04/14/2024    AST 18 04/14/2024    ALT <7 04/14/2024    PTT 31.0 04/14/2024    INR 0.99 04/14/2024     Chest x-ray-subtle left basilar haziness    COVID positivity    Assessment/Plan:   1.  COVID on  underlying COPD-possible bacterial superinfection and I am concerned regarding the possibility of aspiration with the prior stroke and the multiple episodes of pneumonia.  Requiring supplemental oxygen.    Recommendations:  1.  Decadron  2.  Remdesivir  3.  Empiric antibiotic  4.  Will follow clinically.  5.  Tylenol  6.  Breo    2.  DVT prophylaxis-Lovenox    3.  History of CVA-rehabilitative services    4.  Hypothyroidism-to supplement    I am delighted to assist with this patient's care.    Babar Ferraro MD  Medical Director, Critical Care, [facility]  Medical Director, [facility]  Pager: [phone]

## 2024-04-15 NOTE — PROGRESS NOTES
[facility] Pharmacy Dosing Service      Initial Pharmacokinetic Consult for Vancomycin Dosing     Cody Fitzgerald is a 83 year old male who is being initiated on vancomycin therapy for pneumonia.  Pharmacy has been asked to dose vancomycin by Adriana Betancur MD.  The initial treatment and monitoring approach will be steady state AUC strategy.        Weight and Temperature:    Wt Readings from Last 1 Encounters:   24 89.5 kg (197 lb 6.4 oz)        Temp Readings from Last 1 Encounters:   04/15/24 98 °F (36.7 °C) (Oral)      Labs:   Recent Labs   Lab 24  1816 24  2228   CREATSERUM 1.29 1.15      Estimated Creatinine Clearance: 51.1 mL/min (based on SCr of 1.15 mg/dL).     Recent Labs   Lab 24   WBC 18.0*          The Pharmacokinetic Target is:     to 600 mg-h/L and trough <=15 mg/L    Renal Dosing Considerations:    None     Assessment/Plan:   Initial/Loading dose: Will receive 1750 mg IV (20 mg/kg, capped at 2250 mg) x 1 initial dose.      Maintenance dose: Pharmacy will dose vancomycin at 1250 mg IV every 24 hours    Monitorin) Plan for vancomycin peak and trough to be obtained at steady state    2) Pharmacy will order SCr as clinically indicated to assess renal function.    3) Pharmacy will monitor for toxicity and efficacy, adjust vancomycin dose and/or frequency, and order vancomycin levels as appropriate per the Pharmacy and Therapeutics Committee approved protocol until discontinuation of the medication.       We appreciate the opportunity to assist in the care of this patient.     Jerry Babb [facility]  4/15/2024  7:21 AM  [place]  Pharmacy Extension: [phone]

## 2024-04-15 NOTE — PLAN OF CARE
Problem: SAFETY ADULT - FALL  Goal: Free from fall injury  Description: INTERVENTIONS:  - Assess pt frequently for physical needs  - Identify cognitive and physical deficits and behaviors that affect risk of falls.  - [place] fall precautions as indicated by assessment.  - Educate pt/family on patient safety including physical limitations  - Instruct pt to call for assistance with activity based on assessment  - Modify environment to reduce risk of injury  - Provide assistive devices as appropriate  - Consider OT/PT consult to assist with strengthening/mobility  - Encourage toileting schedule  Outcome: Progressing     Problem: RESPIRATORY - ADULT  Goal: Achieves optimal ventilation and oxygenation  Description: INTERVENTIONS:  - Assess for changes in respiratory status  - Assess for changes in mentation and behavior  - Position to facilitate oxygenation and minimize respiratory effort  - Oxygen supplementation based on oxygen saturation or ABGs  - Provide Smoking Cessation handout, if applicable  - Encourage broncho-pulmonary hygiene including cough, deep breathe, Incentive Spirometry  - Assess the need for suctioning and perform as needed  - Assess and instruct to report SOB or any respiratory difficulty  - Respiratory Therapy support as indicated  - Manage/alleviate anxiety  - Monitor for signs/symptoms of CO2 retention  Outcome: Progressing     Problem: MUSCULOSKELETAL - ADULT  Goal: Return mobility to safest level of function  Description: INTERVENTIONS:  - Assess patient stability and activity tolerance for standing, transferring and ambulating w/ or w/o assistive devices  - Assist with transfers and ambulation using safe patient handling equipment as needed  - Ensure adequate protection for wounds/incisions during mobilization  - Obtain PT/OT consults as needed  - Advance activity as appropriate  - Communicate ordered activity level and limitations with patient/family  Outcome: Progressing     Problem:  Impaired Functional Mobility  Goal: Achieve highest/safest level of mobility/gait  Description: Interventions:  - Assess patient's functional ability and stability  - Promote increasing activity/tolerance for mobility and gait  - Educate and engage patient/family in tolerated activity level and precautions  - Recommend use of  RW for transfers and ambulation  Outcome: Progressing     Problem: Impaired Swallowing  Goal: Minimize aspiration risk  Description: Interventions:  - Patient should be alert and upright for all feedings (90 degrees preferred)  - Offer food and liquids at a slow rate  - No straws  - Encourage small bites of food and small sips of liquid  - Offer pills one at a time, crush or deliver with applesauce as needed  - Discontinue feeding and notify MD (or speech pathologist) if coughing or persistent throat clearing or wet/gurgly vocal quality is noted  Outcome: Progressing     Isolation precautions in place. Weaned to 1 L nasal cannula. Speech therapist evaluated patient this AM. Patient tolerating soft/easy chew diet with thin liquids and no straws. Aspiration precautions in place. Up to chair for meals.   Safety precautions in place, frequent nursing rounding completed, call light within reach. All questions answered and needs met.

## 2024-04-15 NOTE — OCCUPATIONAL THERAPY NOTE
OCCUPATIONAL THERAPY EVALUATION - INPATIENT     Room Number: 516/516-A  Evaluation Date: 4/15/2024  Type of Evaluation: Initial    Presenting Problem: COVID-19 viral infection, PNA    Co-Morbidities: Frequent falls, HTN, OA, PNA, CVA with L sided weakness, COPD       Physician Order: IP Consult to Occupational Therapy  Reason for Therapy: ADL/IADL Dysfunction and Discharge Planning    OCCUPATIONAL THERAPY ASSESSMENT   Patient is a 83 year old male admitted 4/14/2024 for COVID-19 viral infection, PNA.  Prior to admission, patient's baseline is MI with ADL and mobility.  Patient is currently functioning below baseline with  ADL and mobility .  Patient is requiring maximum assist as a result of the following impairments: decreased functional strength, decreased functional reach, decreased endurance, impaired standing balance, decreased muscular endurance, increased O2 needs from baseline, decreased safety awareness, and decreased aerobic capacity . Occupational Therapy will continue to follow for duration of hospitalization.    Patient will benefit from continued skilled OT Services to promote return to prior level of function and safety with continuous assistance and gradual rehabilitative therapy     PLAN  OT Treatment Plan: Balance activities;Energy conservation/work simplification techniques;ADL training;Functional transfer training;Endurance training;Patient/Family education;Patient/Family training;Compensatory technique education  OT Device Recommendations: TBD    OCCUPATIONAL THERAPY MEDICAL/SOCIAL HISTORY   Problem List  Principal Problem:    Community acquired pneumonia of left lower lobe of lung  Active Problems:    Left lower lobe pneumonia    HOME SITUATION  Type of Home: House  Home Layout: Two level (10 stairs to bedroom); Stair lift  Lives With: Spouse; Family    Stairs in Home: 10; however, pt  has a stair lift  Use of Assistive Device(s): R/W    Prior Level of [place]: Pt was MI with ADL and  mobility    SUBJECTIVE  \"I don't like to use Oxygen\"     OCCUPATIONAL THERAPY EXAMINATION    OBJECTIVE  Fall Risk: High fall risk    PAIN ASSESSMENT  Rating: Unable to rate  Location: Chronic Back and knees  Management Techniques: Activity promotion; Body mechanics; Breathing techniques; Relaxation    ACTIVITY TOLERANCE  Pulse: 77  Heart Rate Source: Monitor        BP Location: Right arm  BP Method: Automatic  Patient Position: Semi-[place]    O2 SATURATIONS  Oxygen Therapy  SPO2% on Oxygen at Rest: 93  At rest oxygen flow (liters per minute): 1.5  SPO2% Ambulation on Oxygen: 97 (HR 90 BPM)  Ambulation oxygen flow (liters per minute): 1.5    COGNITION  Overall Cognitive Status:  WFL - within functional limits    RANGE OF MOTION   R Upper extremity ROM is within functional limits; Limited due to HX of CVA    STRENGTH ASSESSMENT  R Upper extremity strength is within functional limits; L sisde weakness post CVA     COORDINATION  Gross Motor: WFL   Fine Motor: WFL     ACTIVITIES OF DAILY LIVING ASSESSMENT  AM-PAC ‘6-Clicks’ Inpatient Daily Activity Short Form  How much help from another person does the patient currently need…  -   Putting on and taking off regular lower body clothing?: A Lot  -   Bathing (including washing, rinsing, drying)?: A Lot  -   Toileting, which includes using toilet, bedpan or urinal? : A Lot  -   Putting on and taking off regular upper body clothing?: A Lot  -   Taking care of personal grooming such as brushing teeth?: A Little  -   Eating meals?: None    AM-PAC Score:  Score: 15  Approx Degree of Impairment: 56.46%  Standardized Score (AM-PAC Scale): 34.69  CMS Modifier (G-Code): CK    FUNCTIONAL TRANSFER ASSESSMENT  Sit to Stand: Edge of Bed  Edge of Bed: Minimal Assist (With bed margy elevated)    BED MOBILITY  Rolling: Minimal Assist  Supine to Sit : Minimal Assist  Sit to Supine (OT): Not Tested  Scooting: Max A    BALANCE ASSESSMENT  Static Sitting: Contact Guard Assist  Static Standing:  Moderate Assist    FUNCTIONAL ADL ASSESSMENT          Skilled Therapy Provided:  sitting balance, standing balance, functional transfers, pt education, standing tolerance.    EDUCATION PROVIDED  Patient: Role of Occupational Therapy; Plan of Care; Functional Transfer Techniques; Fall Prevention; Energy Conservation; Compensatory ADL Techniques  Patient's Response to Education: Verbalized Understanding; Returned Demonstration; Requires Further Education    The patient's Approx Degree of Impairment: 56.46% has been calculated based on documentation in the [facility] '6 clicks' Inpatient Daily Activity Short Form.  Research supports that patients with this level of impairment may benefit from inpatient rehabilitative services.  Final disposition will be made by interdisciplinary medical team.     Patient End of Session: Up in chair;Needs met;Call light within reach;RN aware of session/findings;All patient questions and concerns addressed;Alarm set    OT Goals  Patients self stated goal is: Return to home     Patient will complete functional transfer with Min A  Comment:     Patient will complete toileting with Min A  Comment:     Patient will tolerate standing for 2 minutes in prep for adls with Min A   Comment:    Patient will complete item retrieval with Min A  Comment:          Goals  on: 24  Frequency: 3-5x/week    Patient Evaluation Complexity Level:   Occupational Profile/Medical History MODERATE - Expanded review of history including review of medical or therapy record   Specific performance deficits impacting engagement in ADL/IADL MODERATE  3 - 5 performance deficits   Client Assessment/Performance Deficits MODERATE - Comorbidities and min to mod modifications of tasks    Clinical Decision Making MODERATE - Analysis of occupational profile, detailed assessments, several treatment options    Overall Complexity MODERATE     OT Session Time: 30 minutes    Neuromuscular Re-education: 20 minutes      Lindsay  HUYEN ChristopherR/L  Occupational Therapy  [facility]

## 2024-04-15 NOTE — SLP NOTE
ADULT SWALLOWING EVALUATION    ASSESSMENT    ASSESSMENT/OVERALL IMPRESSION:  PT COVID-19 POSITIVE. CONTACT AND DROPLET ISOLATION PPE REQUIRED. THIS THERAPIST WORE GOWN, GLOVES, FACE SHIELD, AND DROPLET/N95 RESPIRATOR MASK. HANDS SANITIZED/WASHED UPON ENTRANCE/EXIT.      SLP BSSE orders received and acknowledged. A swallow evaluation warranted as pt at risk for aspiration and extensive dysphagia hx. Pt afebrile with clear vocal quality, on 2L/Min, with oxygen saturation at 94. Pt with extensive hx of dysphagia at [facility] in which last recommended diet was regular/thin per  tx in 1/2024. Pt positioned upright in bed. Pt with complaints of back pain, RN aware. Oral [facility] exam completed and overall reduced strength observed. Pt with adequate oral acceptance and bilabial seal across all trials. Pt with intact bite, prolonged mastication of solids, and increased JAIR. Pt's swallow response appears timely with reduced hyolaryngeal elevation/excursion. No clinical signs of aspiration (e.g., immediate/delayed throat clear, immediate/delayed cough, wet vocal quality, increased O2 effort) observed across all trials. Oxygen status remained >92 t/o the entire evaluation.     discussed further objective assessment via VFSS to assess oropharyngeal function, r/o silent aspiration, and determine the safest and least restrictive diet consistencies. Pt educated and informed of the risk/benefits of VFSS assessment. Patient v/u and expressed he would complete VFSS IF MD desired but would not be agreeable to thickened liquids if recommended. Defer VFSS at this time.     At this time, pt presents with mild oral dysphagia and probable pharyngeal dysfunction. Recommend an easy to chew diet and thin liquids with strict adherence to safe swallowing compensatory strategies. Results and recommendations reviewed with RN and pt. Pt v/u to all results/recommendations. Recommendations remain written on whiteboard. SLP collaborated with RN for MD diet  orders.     PLAN: SLP to f/u x2 meal assessments, monitor imaging, and VFSS if MD desires.     RECOMMENDATIONS   Diet Recommendations - Solids: Soft/ Easy to chew  Diet Recommendations - Liquids: Thin Liquids      Compensatory Strategies Recommended: No straws;Slow rate;Alternate consistencies;Small bites and sips  Aspiration Precautions: Upright position;Slow rate;Small bites and sips;No straw  Medication Administration Recommendations: Whole in puree  Treatment Plan/Recommendations: Aspiration precautions    HISTORY   MEDICAL HISTORY  Reason for Referral: R/O aspiration    Problem List  Principal Problem:    Community acquired pneumonia of left lower lobe of lung  Active Problems:    Left lower lobe pneumonia      Past Medical History  Past Medical History:    Abdominal aortic aneurysm (AAA) (HCC)    Back problem    back pain    Disorder of thyroid    hypothyroidism    Falls frequently    High blood pressure    High cholesterol    Hx of pneumothorax    Muscle weakness    LUE hemiplegia    Osteoarthritis    PNA (pneumonia)    Pneumonia due to organism    Stroke (HCC)    left sided weakness       Prior Living Situation: Home with spouse  Diet Prior to Admission: Regular;Thin liquids  Precautions: Aspiration    Patient/Family Goals: Pt is not agreeable to modified liquids    SWALLOWING HISTORY  Current Diet Consistency: NPO  Dysphagia History:     Bse 5/14/19: regular/thin  BSE 7/7/19: regular/thin  VFSS 7/8/19: regular/thin  BSE 1/5/20: regular/thin  VFSS 1/6/20: regular/thin  BSE 4/6/22: easy to chew/thin  BSE 5/14/22: puree mildly thick  VFSS 5/14/22: minced and moist/mild via tsp  BSE 11/8/22: easy to chew/mildly thick  BSE 11/24/22: regular/thin  VFSS @ [place] 12/12/22: regular/thin  BSE 10/5/23: regular/thin  BSE 10/16/23: easy to chew/thin  BSE 1/12/24: easy to chew/thin      Imaging Results:     CXR 4/14/24:  CONCLUSION:      Patchy retrocardiac/left basilar opacities are new or more conspicuous since  comparison chest radiograph from January, 2024.  In the appropriate clinical setting, findings are suspicious for pneumonia/aspiration.  Radiographic follow-up to resolution is   advised.         Dictated by (CST): Arsenio De Luna MD on 4/14/2024 at 6:36 PM       Finalized by (CST): Arsenio De Luna MD on 4/14/2024 at 6:39 PM       OBJECTIVE   ORAL MOTOR EXAMINATION  Dentition: Upper dentures;Lower dentures  Symmetry: Within Functional Limits  Strength:  (reduced)     Range of Motion: Within Functional Limits  Rate of Motion: Within Functional Limits    Voice Quality: Clear  Respiratory Status: Supplemental O2;Nasal cannula  Consistencies Trialed: Thin liquids;Puree;Hard solid  Method of Presentation: Self presentation;Spoon;Cup;Single sips  Patient Positioning: Upright;Midline    Oral Phase of Swallow: Impaired  Bolus Retrieval: Intact  Bilabial Seal: Intact  Bolus Formation: Impaired  Bolus Propulsion: Impaired  Mastication: Impaired       Pharyngeal Phase of Swallow: Impaired  Laryngeal Elevation Timing: Appears intact  Laryngeal Elevation Strength: Appears impaired     (Please note: Silent aspiration cannot be evaluated clinically. Videofluoroscopic Swallow Study is required to rule-out silent aspiration.)    Esophageal Phase of Swallow: No complaints consistent with possible esophageal involvement      GOALS  Goal #1 The patient will tolerate easy to chew consistency and thin liquids without overt signs or symptoms of aspiration with 100 % accuracy over 2 session(s).  In Progress   Goal #2 The patient/family/caregiver will demonstrate understanding and implementation of aspiration precautions and swallow strategies independently over 2 session(s).    In Progress   Goal #3 The patient will utilize compensatory strategies as outlined by  BSSE (clinical evaluation) including Slow rate, Small bites, Small sips, Alternate liquids/solids, No straws, Upright 90 degrees with minimal assistance 100 % of the time across 2  sessions.  In Progress     FOLLOW UP  Treatment Plan/Recommendations: Aspiration precautions  Number of Visits to Meet Established Goals: 2  Follow Up Needed (Documentation Required): Yes  SLP Follow-up Date: 04/16/24    Thank you for your referral.   If you have any questions, please contact BETHANIE Mercedes M.S. CCC-SLP  Speech Language Pathologist  Phone Number [phone]

## 2024-04-15 NOTE — PHYSICAL THERAPY NOTE
PHYSICAL THERAPY EVALUATION - INPATIENT     Room Number: 516/516-A  Evaluation Date: 4/15/2024  Type of Evaluation: Initial   Physician Order: PT Eval and Treat    Presenting Problem: community acquired pneumonia     Reason for Therapy: Mobility Dysfunction and Discharge Planning    PHYSICAL THERAPY ASSESSMENT   Patient is a 83 year old male admitted 4/14/2024 for community acquired pneumonia.  Prior to admission, patient's baseline is assistance with all ADLs and functional mobility with a RW.  Patient is currently functioning below baseline with bed mobility, transfers, gait, stair negotiation, standing prolonged periods, and performing household tasks.  Patient is requiring minimal assist and moderate assist as a result of the following impairments: decreased functional strength, pain, impaired dynamic standing balance, and medical status.  Physical Therapy will continue to follow for duration of hospitalization.    Patient will benefit from continued skilled PT Services to promote return to prior level of function and safety with continuous assistance and gradual rehabilitative therapy .    PLAN  PT Treatment Plan: Bed mobility;Body mechanics;Endurance;Energy conservation;Patient education;Gait training;Strengthening;Stair training;Transfer training;Balance training  Rehab Potential : Good  Frequency (Obs): 3-5x/week    PHYSICAL THERAPY MEDICAL/SOCIAL HISTORY     Problem List  Principal Problem:    Community acquired pneumonia of left lower lobe of lung  Active Problems:    Left lower lobe pneumonia      HOME SITUATION  Home Situation  Type of Home: House  Home Layout: Two level;Stairlift  Stairs to Enter : 5 (stairlift)  Railing: Yes  Stairs to Bedroom: 10  Railing: Yes  Lives With: Spouse;Family  Drives: No  Patient Owned Equipment: Rolling walker;Mark-walker  Patient Regularly Uses: None     Prior Level of [place]: family assists with ADLs, has home health CG to assist with bathing, ambulates short  distances with RW    SUBJECTIVE  Agreeable to activity.     PHYSICAL THERAPY EXAMINATION   OBJECTIVE  Precautions: Bed/chair alarm  Fall Risk: High fall risk    WEIGHT BEARING RESTRICTION  Weight Bearing Restriction: None    PAIN ASSESSMENT  Rating: Unable to rate  Location: back and knees, chronic  Management Techniques: Activity promotion;Body mechanics;Repositioning    COGNITION  Overall Cognitive Status:  WFL - within functional limits    RANGE OF MOTION AND STRENGTH ASSESSMENT  Upper extremity ROM and strength are within functional limits.  Lower extremity ROM is within functional limits.  Lower extremity strength is within functional limits.    BALANCE  Static Sitting: Good  Dynamic Sitting: Fair +  Static Standing: Poor +  Dynamic Standing: Poor    ACTIVITY TOLERANCE  Pulse: 90  Heart Rate Source: Monitor    AM-PAC '6-Clicks' INPATIENT SHORT FORM - BASIC MOBILITY  How much difficulty does the patient currently have...  Patient Difficulty: Turning over in bed (including adjusting bedclothes, sheets and blankets)?: A Little   Patient Difficulty: Sitting down on and standing up from a chair with arms (e.g., wheelchair, bedside commode, etc.): A Lot   Patient Difficulty: Moving from lying on back to sitting on the side of the bed?: A Little   How much help from another person does the patient currently need...   Help from Another: Moving to and from a bed to a chair (including a wheelchair)?: A Lot   Help from Another: Need to walk in hospital room?: A Lot   Help from Another: Climbing 3-5 steps with a railing?: A Lot     AM-PAC Score:  Raw Score: 14   Approx Degree of Impairment: 61.29%   Standardized Score (AM-PAC Scale): 38.1   CMS Modifier (G-Code): CL    FUNCTIONAL ABILITY STATUS  Functional Mobility/Gait Assessment  Gait Assistance: Moderate assistance  Distance (ft): 15  Assistive Device: Rolling walker  Pattern: Shuffle;L Foot drag;L Flexed knee (L knee buckling, slow pace, flexed posture, no LOB)  Supine  to Sit: minimal assist  Sit to Stand: minimal assist    Exercise/Education Provided:  Bed mobility  Body mechanics  Energy conservation  Functional activity tolerated  Gait training  Posture  Transfer training    Additional Information: RN approved PT eval. Pt received resting in bed. Introduced self and role. Educated on benefits of oob mobility and transfer to chair, PT plan of care, goals for today. Pt verbalized understanding and agreeable to participate. L sided weakness at baseline due to hx of CVA. Demos bed mobility, STS transfer from EOB with RW, and walked in the room with RW. 1 instance of L knee buckling noted but no LOB. Requires mod A for gait. Pt was left sitting in chair with chair alarm on, needs within reach, handoff to RN complete.      The patient's Approx Degree of Impairment: 61.29% has been calculated based on documentation in the [facility] '6 clicks' Inpatient Basic Mobility Short Form.  Research supports that patients with this level of impairment may benefit from a rehab facility.  Final disposition will be made by interdisciplinary medical team.    Patient End of Session: Up in chair;Needs met;RN aware of session/findings;Call light within reach;All patient questions and concerns addressed;Alarm set    CURRENT GOALS  Goals to be met by: 3/22/24  Patient Goal Patient's self-stated goal is: go home   Goal #1 Patient is able to demonstrate supine - sit EOB @ level: Supervision     Goal #1   Current Status    Goal #2 Patient is able to demonstrate transfers Sit to/from Stand at assistance level: supervision with walker - rolling     Goal #2  Current Status    Goal #3 Patient is able to ambulate 75 feet with assist device: walker - rolling at assistance level: supervision   Goal #3   Current Status    Goal #4 Patient will negotiate 5 stairs/one curb w/ assistive device and supervision   Goal #4   Current Status    Goal #5 Patient to demonstrate independence with home activity/exercise instructions  provided to patient in preparation for discharge.   Goal #5   Current Status    Goal #6    Goal #6  Current Status      Patient Evaluation Complexity Level:  History Moderate - 1 or 2 personal factors and/or co-morbidities   Examination of body systems Moderate - addressing a total of 3 or more elements   Clinical Presentation  Moderate - Evolving   Clinical Decision Making  Moderate Complexity     Therapeutic Activity:  15 minutes

## 2024-04-15 NOTE — CM/SW NOTE
Addendum 11:47:   SW met w/ pt to discuss SNF option/recommendation at discharge.  Pt declining and states that he resides with spouse and adult children and will continue with HH at discharge.    [facility] confirmed pt is current w/[facility] per Kimberly (PT/RN/HHA), ANN MARIE uploaded.    MDO to TRACEE for HH eval per protocol.  Per chart review, pt resides with spouse and uses a walker at baseline to ambulate.  Pt has hx w/[facility], SW to follow up to confirm if pt is still current w/agency.  Pt currently requires 2L O2, and does not use at home.    SW/CM to remain available for support and/or discharge planning.      Alysia Espinoza, MSW, LSW  Social Work/Case Management

## 2024-04-15 NOTE — TELEPHONE ENCOUNTER
To Dr. GRAFF     Please advise in absence of Dr. CODY   Patient was came back positive through MRSA Screen by PCR

## 2024-04-15 NOTE — H&P
History and Physical     Cody Fitzgerald Patient Status:  Emergency    1940 MRN [id number]   Location [facility] EMERGENCY DEPARTMENT Attending Bam Chan*   Hosp Day # 0 PCP Bam Hampton MD       Chief Complaint: Generalized weakness, chronic cough    Subjective:    Cody Fitzgerald is a 83 year old male with history of AAA, hypothyroidism, HTN, HLD, OA, CVA who presented to the ED with complaints of generalized weakness and pain.  He denied any chest pain or shortness of breath but noted a chronic cough which is nonproductive.  No fever.  Due to OA, he has chronic joint pains and was on a trial of steroids which he completed last week.  No nausea, vomiting, diarrhea, headaches or dizziness.  He does have residual left-sided weakness from previous stroke as well as chronic neck and back pain.  Wife notes that he occasionally coughs when he swallows, she cuts his food into small pieces.    Due to progressive weakness, EMT was called.  On arrival patient was noted to be febrile at 103.  At presentation he was noted with temp 101.8, heart rate 118, respirations 29 with O2 saturations of 88% on room air.  Significant labs with WBC 18.0, lactic acid 2.4    GENexpert: COVID-positive.  CXR: Patchy retrocardiac/left basilar opacities are new or more conspicuous findings suspicious for pneumonia/aspiration.  He has been given IV fluids and started on antibiotics per sepsis protocol    Pulmonology on consult  He will need admission for ongoing treatment.    Of note, patient was recently in [facility] following hospitalization 3 months ago for pneumonia.  At the time passed bedside swallow evaluation and did not require VFSS.  Per wife, just prior to discharge from [facility], he had COVID.  About a month ago.      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 - Dr. Stahl    Tonsillectomy      at age 24       Social History:  reports that he quit smoking about 16 years ago. His smoking use included cigarettes. He started smoking about 66 years ago. He has a 100 pack-year smoking history. He has never used smokeless tobacco. He reports that he does not drink alcohol and does not use drugs.    Family History:   Family History   Problem Relation Age of Onset    Cancer Father         Lung        Allergies: No Known Allergies    Medications:    No current facility-administered medications on file prior to encounter.     Current Outpatient Medications on File Prior to Encounter   Medication Sig Dispense Refill    methylPREDNISolone (MEDROL) 4 MG Oral Tablet Therapy Pack As directed. 1 each 0    ATORVASTATIN 40 MG Oral Tab TAKE 1 TABLET(40 MG) BY MOUTH NIGHTLY 90 tablet 3    CLOPIDOGREL 75 MG Oral Tab TAKE 1 TABLET(75 MG) BY MOUTH DAILY 90 tablet 3    ferrous sulfate 325 (65 FE) MG Oral Tab EC Take 1 tablet (325 mg total) by mouth daily with breakfast. 30 tablet 0    gabapentin 300 MG Oral Cap Take 1 capsule (300 mg total) by mouth 3 (three) times daily. 270 capsule 3    LEVOTHYROXINE 100 MCG Oral Tab TAKE 1 TABLET(100 MCG) BY MOUTH DAILY 90 tablet 3    amLODIPine (NORVASC) 5 MG Oral Tab Take 1 tablet (5 mg total) by mouth daily. 90 tablet 3    lidocaine 4 % External Patch Place 2 patches onto the skin daily. L Shoulder  &L hip as needed      benzonatate 200 MG Oral Cap Take 1  capsule (200 mg total) by mouth 3 (three) times daily as needed for cough. 60 capsule 1    acetaminophen 500 MG Oral Tab Take 2 tablets (1,000 mg total) by mouth in the morning and 2 tablets (1,000 mg total) before bedtime. Also may have 500 mg PO Q6 prn but not to exceed 3 gm total of acetaminophen in 24 hours.      Cholecalciferol (VITAMIN D) 2000 units Oral Cap Take 1 capsule (2,000 Units total) by mouth daily.         Review of Systems:   A comprehensive 14 point review of systems was completed.    Pertinent positives and negatives noted in the HPI.    Objective:     /72   Pulse 118   Temp (!) 101.8 °F (38.8 °C) (Temporal)   Resp (!) 29   Ht 5' 10\" (1.778 m)   Wt 210 lb (95.3 kg)   SpO2 91%   BMI 30.13 kg/m²   General: No acute distress.    HEENT: Normocephalic, atraumatic.  Neck: Supple.  Respiratory: Normal effort.  Diminished breath sounds bilaterally  Cardiovascular: S1, S2 regular, no murmur.   Abdomen: Soft, not tender or distended.  Neurologic: Alert, oriented x 4.  No focal deficits.  Musculoskeletal: No tenderness or deformity.  Extremities: No edema  Integument: No rashes.   Psychiatric: Cooperative    Results:      Labs:  Labs Last 24 Hours:   BMP     CBC    Other     Na 139 Cl 108 BUN 18 Glu 170   Hb 13.7   PTT 31.0 Procal -   K 3.9 CO2 23.0 Cr 1.29   WBC 18.0 >< .0  INR 0.99 CRP -   Renal Lytes Endo    Hct 42.1   Trop - D dim -   eGFR - Ca 9.9 POC Gluc  -    LFT   pBNP - Lactic 2.4   eGFR AA - PO4 - A1c -   AST 18 APk 129 Prot 8.2  LDL -     Mg - TSH -   ALT <7 T joss 0.5 Alb 4.5          COVID-19 Lab Results    COVID-19  Lab Results   Component Value Date    COVID19 Detected (A) 04/14/2024    COVID19 Not Detected 01/11/2024    COVID19 Not Detected 10/15/2023       Pro-Calcitonin  No results for input(s): \"PCT\" in the last 168 hours.    Cardiac  No results for input(s): \"TROP\", \"PBNP\" in the last 168 hours.    Creatinine Kinase  No results for input(s): \"CK\" in the last 168  hours.    Inflammatory Markers  No results for input(s): \"CRP\", \"JAZZY\", \"LDH\", \"DDIMER\" in the last 168 hours.    Imaging: Imaging data reviewed in Epic.    Assessment & Plan:    Acute respiratory failure with hypoxia  Left lung pneumonia, R/o aspiration  Severe sepsis due to pneumonia with leukocytosis, fever, tachycardia, elevated lactate  Acute respiratory failure with hypoxia  -Admit  -Sepsis protocol with IV fluids  -IV antibiotics with Zosyn and azithromycin  -Incentive spirometer, as needed nebs  -Follow-up cultures  -Monitor lactic acid levels  -Wean oxygen as tolerated  -Repeat swallow eval    COVID-positive, unclear if new infection versus residual from a month ago  -Likely not candidate for antiviral therapy  -Continue management as above    Generalized weakness due to above  -PT/OT    AAA,  Hypothyroidism,  HTN,  HLD,  OA,  CVA  -Resume home medications when reconciled    Quality:  DVT Prophylaxis: Lovenox  CODE status: Full code  DEBORAH: 3 to 4 days    Plan of care discussed with patient and wife at bedside. Acknowledged understanding and agrees to plan. Also discussed with the ED physician.    High MDM  Time spent on this admission - examining patient, obtaining history, reviewing previous medical records, going over test results/imaging and discussing plan of care. More than 50% of the time was spent in counseling and/or coordination of care related to pneumonia, sepsis.   All questions answered.     Adriana Villavicencio MD  4/14/2024

## 2024-04-15 NOTE — ED QUICK NOTES
Care endorsed to YADIRA Ratliff  
Patient alert and oriented. Patient is refusing oxygen. Patient oxygen 88% on room air.   
Patient cleaned, pericare provided, linen changed, barrier cream applied.  Patient still refusing straight cath. Will continue to collect a voided specimen.   
WDL

## 2024-04-15 NOTE — PROGRESS NOTES
[facility]  part of [facility]    Progress Note    Cody Fitzgerald Patient Status:  Inpatient    1940 MRN [id number]   Location [facility]5W Attending Sha Lira MD   Hosp Day # 1 PCP Bam Hampton MD       Subjective:   Cody Fitzgerald is a(n) 83 year old male was seen and examined  Sitting in chair, comfortably  No acute distress  Claims he feels better  No cp, sob f,c,n,v abd pain or HA     Objective:   Blood pressure 133/64, pulse 77, temperature 97.6 °F (36.4 °C), temperature source Oral, resp. rate 22, height 5' 10\" (1.778 m), weight 197 lb 6.4 oz (89.5 kg), SpO2 94%.    General: No acute distress.    HEENT: Normocephalic, atraumatic.  Neck: Supple.  Respiratory: Normal effort.  Diminished breath sounds bilaterally  Cardiovascular: S1, S2 regular, no murmur.   Abdomen: Soft, not tender or distended.  Neurologic: Alert, oriented x 4.  No focal deficits.  Musculoskeletal: No tenderness or deformity.  Extremities: No edema  Integument: No rashes.   Psychiatric: Cooperative    Current Inpatient Medications:     Current Facility-Administered Medications:     vancomycin (Vancocin) 1.75 g in sodium chloride 0.9% 500mL IVPB premix, 20 mg/kg, Intravenous, Once    [START ON 2024] vancomycin (Vancocin) 1.25 g in sodium chloride 0.9% 250mL IVPB premix, 15 mg/kg, Intravenous, Q24H    mupirocin (Bactroban) 2% nasal ointment 1 Application, 1 Application, Each Nare, BID    acetaminophen (Tylenol Extra Strength) tab 1,000 mg, 1,000 mg, Oral, BID    amLODIPine (Norvasc) tab 5 mg, 5 mg, Oral, Daily    atorvastatin (Lipitor) tab 40 mg, 40 mg, Oral, Nightly    benzonatate (Tessalon) cap 200 mg, 200 mg, Oral, TID PRN    cholecalciferol (Vitamin D3) tab 2,000 Units, 2,000 Units, Oral, Daily    clopidogrel (Plavix) tab 75 mg, 75 mg, Oral, Daily    ferrous sulfate DR tab 325 mg, 325 mg, Oral, Daily with breakfast    gabapentin (Neurontin) cap 300 mg, 300 mg, Oral, TID     levothyroxine (Synthroid) tab 100 mcg, 100 mcg, Oral, Daily    sodium chloride 0.9% infusion, , Intravenous, Continuous    enoxaparin (Lovenox) 40 MG/0.4ML SUBQ injection 40 mg, 40 mg, Subcutaneous, Daily    acetaminophen (Tylenol Extra Strength) tab 500 mg, 500 mg, Oral, Q4H PRN    melatonin tab 3 mg, 3 mg, Oral, Nightly PRN    ondansetron (Zofran) 4 MG/2ML injection 4 mg, 4 mg, Intravenous, Q6H PRN    azithromycin (Zithromax) 500 mg in sodium chloride 0.9% 250mL IVPB premix, 500 mg, Intravenous, Q24H    piperacillin-tazobactam (Zosyn) 3.375 g in dextrose 5% 100 mL IVPB-ADDV, 3.375 g, Intravenous, Q8H    ipratropium-albuterol (Duoneb) 0.5-2.5 (3) MG/3ML inhalation solution 3 mL, 3 mL, Nebulization, Q6H PRN    dexamethasone (Decadron) tab 6 mg, 6 mg, Oral, Daily    remdesivir (Veklury) 100 mg in sodium chloride 0.9% 270 mL IVPB, 100 mg, Intravenous, Q24H    fluticasone furoate-vilanterol (Breo Ellipta) 100-25 MCG/ACT inhaler 1 puff, 1 puff, Inhalation, Q24H    Assessment and Plan:   Acute respiratory failure with hypoxia  Left lung pneumonia, R/o aspiration  Severe sepsis due to pneumonia with leukocytosis, fever, tachycardia, elevated lactate  Acute respiratory failure with hypoxia  Somewhat improved today  -s/p Sepsis protocol with IV fluids  -cont IV antibiotics with Zosyn and azithromycin  -Incentive spirometer, as needed nebs  -Follow-up cultures  -Monitor lactic acid levels  -Wean oxygen as tolerated  -SLP following  -pulm following     COVID-positive, unclear if new infection versus residual from a month ago  -Likely not candidate for antiviral therapy  -Continue management as above     Generalized weakness due to above  -PT/OT/SLP     Other issues   AAA,  Hypothyroidism,  HTN,  HLD,  OA,  CVA     Quality:  DVT Prophylaxis: Lovenox  CODE status: Full code  DEBORAH: 3 to 4 days      Results:     Recent Labs   Lab 04/14/24  1816 04/15/24  0847   RBC 4.53 3.84   HGB 13.7 11.8*   HCT 42.1 36.5*   MCV 92.9 95.1   MCH  30.2 30.7   MCHC 32.5 32.3   RDW 15.1* 15.1*   NEPRELIM 15.69* 8.17*   WBC 18.0* 10.9   .0 151.0         Recent Labs   Lab 04/14/24  1816 04/14/24  2228 04/15/24  0847   * 118* 101*   BUN 18 16 14   CREATSERUM 1.29 1.15 1.04   EGFRCR 55* 64 72   CA 9.9 9.3 9.0    140 144   K 3.9 3.8 3.7    111 113*   CO2 23.0 23.0 25.0         Imaging:   XR CHEST AP PORTABLE  (CPT=71045)    Result Date: 4/14/2024  CONCLUSION:   Patchy retrocardiac/left basilar opacities are new or more conspicuous since comparison chest radiograph from January, 2024.  In the appropriate clinical setting, findings are suspicious for pneumonia/aspiration.  Radiographic follow-up to resolution is advised.   Dictated by (CST): Arsenio De Luna MD on 4/14/2024 at 6:36 PM     Finalized by (CST): Arsenio De Luna MD on 4/14/2024 at 6:39 PM                 Sha Lira MD  4/15/2024

## 2024-04-15 NOTE — PROGRESS NOTES
[facility]  part of [facility]     Progress Note    Cody Fitzgerald Patient Status:  Inpatient    1940 MRN [id number]   Location [facility]5W Attending Sha Lira MD   Hosp Day # 1 PCP Bam Hampton MD       Subjective:   Patient seen and examined.  Resting in bed.  Denies significant dyspnea.  Some ongoing cough present.  On 2 L oxygen.  Tmax 101.8 degrees over last 24 hours    Objective:   Blood pressure 122/56, pulse 72, temperature 97.2 °F (36.2 °C), temperature source Oral, resp. rate 20, height 5' 10\" (1.778 m), weight 197 lb 6.4 oz (89.5 kg), SpO2 95%.  Intake/Output:   Last 3 shifts: I/O last 3 completed shifts:  In: 3575.7 [I.V.:3375.7; IV PIGGYBACK:200]  Out: 500 [Urine:500]   This shift: No intake/output data recorded.     Vent Settings:      Hemodynamic parameters (last 24 hours):      Scheduled Meds:   Current Facility-Administered Medications   Medication Dose Route Frequency    [START ON 2024] vancomycin (Vancocin) 1.25 g in sodium chloride 0.9% 250mL IVPB premix  15 mg/kg Intravenous Q24H    mupirocin (Bactroban) 2% nasal ointment 1 Application  1 Application Each Nare BID    acetaminophen (Tylenol Extra Strength) tab 1,000 mg  1,000 mg Oral BID    amLODIPine (Norvasc) tab 5 mg  5 mg Oral Daily    atorvastatin (Lipitor) tab 40 mg  40 mg Oral Nightly    benzonatate (Tessalon) cap 200 mg  200 mg Oral TID PRN    cholecalciferol (Vitamin D3) tab 2,000 Units  2,000 Units Oral Daily    clopidogrel (Plavix) tab 75 mg  75 mg Oral Daily    ferrous sulfate DR tab 325 mg  325 mg Oral Daily with breakfast    gabapentin (Neurontin) cap 300 mg  300 mg Oral TID    levothyroxine (Synthroid) tab 100 mcg  100 mcg Oral Daily    sodium chloride 0.9% infusion   Intravenous Continuous    enoxaparin (Lovenox) 40 MG/0.4ML SUBQ injection 40 mg  40 mg Subcutaneous Daily    acetaminophen (Tylenol Extra Strength) tab 500 mg  500 mg Oral Q4H PRN    melatonin tab 3 mg  3 mg  Oral Nightly PRN    ondansetron (Zofran) 4 MG/2ML injection 4 mg  4 mg Intravenous Q6H PRN    azithromycin (Zithromax) 500 mg in sodium chloride 0.9% 250mL IVPB premix  500 mg Intravenous Q24H    piperacillin-tazobactam (Zosyn) 3.375 g in dextrose 5% 100 mL IVPB-ADDV  3.375 g Intravenous Q8H    ipratropium-albuterol (Duoneb) 0.5-2.5 (3) MG/3ML inhalation solution 3 mL  3 mL Nebulization Q6H PRN    dexamethasone (Decadron) tab 6 mg  6 mg Oral Daily    remdesivir (Veklury) 100 mg in sodium chloride 0.9% 270 mL IVPB  100 mg Intravenous Q24H    fluticasone furoate-vilanterol (Breo Ellipta) 100-25 MCG/ACT inhaler 1 puff  1 puff Inhalation Q24H       Continuous Infusions:    sodium chloride 50 mL/hr at 04/15/24 1121       Physical Exam  Constitutional: no acute distress  Eyes: PERRL  ENT: nares pateint  Neck: supple, no JVD  Cardio: RRR, S1 S2  Respiratory: clear to auscultation bilaterally, no wheezing, rales, rhonchi, crackles  GI: abdomen soft, non tender, active bowel sounds, no organomegaly  Extremities: no clubbing, cyanosis, edema  Neurologic: no gross motor deficits  Skin: warm, dry      Results:     Lab Results   Component Value Date    WBC 10.9 04/15/2024    HGB 11.8 04/15/2024    HCT 36.5 04/15/2024    .0 04/15/2024    CREATSERUM 1.04 04/15/2024    BUN 14 04/15/2024     04/15/2024    K 3.7 04/15/2024     04/15/2024    CO2 25.0 04/15/2024     04/15/2024    CA 9.0 04/15/2024    ALB 3.9 04/14/2024    ALKPHO 100 04/14/2024    BILT 0.8 04/14/2024    TP 7.0 04/14/2024    AST 10 04/14/2024    ALT <7 04/14/2024    PTT 31.0 04/14/2024    INR 0.99 04/14/2024       XR CHEST AP PORTABLE  (CPT=71045)    Result Date: 4/14/2024  CONCLUSION:   Patchy retrocardiac/left basilar opacities are new or more conspicuous since comparison chest radiograph from January, 2024.  In the appropriate clinical setting, findings are suspicious for pneumonia/aspiration.  Radiographic follow-up to resolution is advised.    Dictated by (CST): Arsenio De Luna MD on 4/14/2024 at 6:36 PM     Finalized by (CST): Arsenio De Luna MD on 4/14/2024 at 6:39 PM                 Assessment   1.  Acute hypoxemic respiratory failure  2.  COVID-19  3.  COPD  4.  Prior CVA  5.  Hypothyroidism     Plan   -Patient presents with evidence of acute hypoxemic respiratory failure likely secondary to COVID-19 with underlying history of COPD  -Chest x-ray with patchy left basilar opacity seen.  -Continue remdesivir and Decadron at this time  -Empiric antibiotic therapy  -Wean oxygen as tolerated currently on 2 L  -ICS/IV  -DVT prophylaxis: Lovenox    Ivelisse Law DO  Pulmonary Critical Care Medicine  [facility]

## 2024-04-15 NOTE — PLAN OF CARE
Monitoring vitals. Bed is in lowest setting, locked and has call light within reach. Frequent rounding by nursing staff overnight. Failed RN dysphagia screening after admission, now NPO.       Problem: Patient Centered Care  Goal: Patient preferences are identified and integrated in the patient's plan of care  Description: Interventions:  - What would you like us to know as we care for you? From home with my wife  - Provide timely, complete, and accurate information to patient/family  - Incorporate patient and family knowledge, values, beliefs, and cultural backgrounds into the planning and delivery of care  - Encourage patient/family to participate in care and decision-making at the level they choose  - Honor patient and family perspectives and choices  Outcome: Progressing     Problem: Patient/Family Goals  Goal: Patient/Family Long Term Goal  Description: Patient's Long Term Goal: to be discharged    Interventions:  - Maintain stable vitals and labs  - See additional Care Plan goals for specific interventions  Outcome: Progressing  Goal: Patient/Family Short Term Goal  Description: Patient's Short Term Goal: return to baseline     Interventions:   - Maintain stable oxygenation  -Remain comfortable  -Reposition as needed  - See additional Care Plan goals for specific interventions  Outcome: Progressing     Problem: SAFETY ADULT - FALL  Goal: Free from fall injury  Description: INTERVENTIONS:  - Assess pt frequently for physical needs  - Identify cognitive and physical deficits and behaviors that affect risk of falls.  - [place] fall precautions as indicated by assessment.  - Educate pt/family on patient safety including physical limitations  - Instruct pt to call for assistance with activity based on assessment  - Modify environment to reduce risk of injury  - Provide assistive devices as appropriate  - Consider OT/PT consult to assist with strengthening/mobility  - Encourage toileting schedule  Outcome:  Progressing     Problem: RESPIRATORY - ADULT  Goal: Achieves optimal ventilation and oxygenation  Description: INTERVENTIONS:  - Assess for changes in respiratory status  - Assess for changes in mentation and behavior  - Position to facilitate oxygenation and minimize respiratory effort  - Oxygen supplementation based on oxygen saturation or ABGs  - Provide Smoking Cessation handout, if applicable  - Encourage broncho-pulmonary hygiene including cough, deep breathe, Incentive Spirometry  - Assess the need for suctioning and perform as needed  - Assess and instruct to report SOB or any respiratory difficulty  - Respiratory Therapy support as indicated  - Manage/alleviate anxiety  - Monitor for signs/symptoms of CO2 retention  Outcome: Progressing     Problem: MUSCULOSKELETAL - ADULT  Goal: Maintain proper alignment of affected body part  Description: INTERVENTIONS:  - Support and protect limb and body alignment per provider's orders  - Instruct and reinforce with patient and family use of appropriate assistive device and precautions (e.g. spinal or hip dislocation precautions)  Outcome: Progressing     Problem: Impaired Functional Mobility  Goal: Achieve highest/safest level of mobility/gait  Description: Interventions:  - Assess patient's functional ability and stability  - Promote increasing activity/tolerance for mobility and gait  - Educate and engage patient/family in tolerated activity level and precautions  Outcome: Progressing     Problem: SKIN/TISSUE INTEGRITY - ADULT  Goal: Skin integrity remains intact  Description: INTERVENTIONS  - Assess and document risk factors for pressure ulcer development  - Assess and document skin integrity  - Monitor for areas of redness and/or skin breakdown  - Initiate interventions, skin care algorithm/standards of care as needed  Outcome: Not Progressing     Problem: MUSCULOSKELETAL - ADULT  Goal: Return mobility to safest level of function  Description: INTERVENTIONS:  -  Assess patient stability and activity tolerance for standing, transferring and ambulating w/ or w/o assistive devices  - Assist with transfers and ambulation using safe patient handling equipment as needed  - Ensure adequate protection for wounds/incisions during mobilization  - Obtain PT/OT consults as needed  - Advance activity as appropriate  - Communicate ordered activity level and limitations with patient/family  Outcome: Not Progressing     Problem: Impaired Swallowing  Goal: Minimize aspiration risk  Description: Interventions:  - Patient should be alert and upright for all feedings (90 degrees preferred)  - Offer food and liquids at a slow rate  - No straws  - Encourage small bites of food and small sips of liquid  - Offer pills one at a time, crush or deliver with applesauce as needed  - Discontinue feeding and notify MD (or speech pathologist) if coughing or persistent throat clearing or wet/gurgly vocal quality is noted  Outcome: Not Progressing

## 2024-04-15 NOTE — TELEPHONE ENCOUNTER
Wife called stating pt. Is in [facility] with Pneumonia.  Lab results came back that he is also positive for MRSA.  Wife asking if there is anything that the family who resides with the pt. Needs to do.  There are 3 other adults and a 2 year old.

## 2024-04-16 ENCOUNTER — TELEPHONE (OUTPATIENT)
Dept: INTERNAL MEDICINE CLINIC | Facility: CLINIC | Age: 84
End: 2024-04-16

## 2024-04-16 ENCOUNTER — APPOINTMENT (OUTPATIENT)
Dept: GENERAL RADIOLOGY | Facility: HOSPITAL | Age: 84
End: 2024-04-16
Attending: HOSPITALIST
Payer: MEDICARE

## 2024-04-16 PROCEDURE — 99232 SBSQ HOSP IP/OBS MODERATE 35: CPT | Performed by: INTERNAL MEDICINE

## 2024-04-16 PROCEDURE — 99233 SBSQ HOSP IP/OBS HIGH 50: CPT | Performed by: HOSPITALIST

## 2024-04-16 PROCEDURE — 71045 X-RAY EXAM CHEST 1 VIEW: CPT | Performed by: HOSPITALIST

## 2024-04-16 RX ORDER — IPRATROPIUM BROMIDE AND ALBUTEROL SULFATE 2.5; .5 MG/3ML; MG/3ML
3 SOLUTION RESPIRATORY (INHALATION)
Status: DISCONTINUED | OUTPATIENT
Start: 2024-04-16 | End: 2024-04-17

## 2024-04-16 RX ORDER — ALBUTEROL SULFATE 90 UG/1
4 AEROSOL, METERED RESPIRATORY (INHALATION)
Status: DISCONTINUED | OUTPATIENT
Start: 2024-04-16 | End: 2024-04-16

## 2024-04-16 NOTE — PROGRESS NOTES
[facility]    Progress Note    Cody Fitzgerald Patient Status:  Inpatient    1940 MRN [id number]   Location [facility]5W Attending India Still MD   Hosp Day # 2 PCP Bam Hampton MD     Chief Complaint:   Chief Complaint   Patient presents with    Weakness       Subjective:   Cody Fitzgerald is feelng better. Has a chronic cough but no change. No SOB no F/C. Wife at bedside. Had fever on admit afebrile since then. Down to 1L NC o2.   No events overnight per RN       Objective:   Objective:    Blood pressure 136/66, pulse 77, temperature 97.3 °F (36.3 °C), temperature source Oral, resp. rate 20, height 5' 10\" (1.778 m), weight 197 lb 6.4 oz (89.5 kg), SpO2 94%.    Physical Exam:    General: No acute distress.   Respiratory: Diminished L base   Cardiovascular: S1, S2. Regular rate and rhythm. No murmurs, rubs or gallops.   Abdomen: Soft, nontender, nondistended.  Positive bowel sounds. No rebound or guarding.  Neurologic: Chronic L weakness from prev CVA  Extremities: No edema.      Results:   Results:    Labs:  Recent Labs   Lab 04/14/24  1816 04/15/24  0847   WBC 18.0* 10.9   HGB 13.7 11.8*   MCV 92.9 95.1   .0 151.0   INR 0.99  --        Recent Labs   Lab 24  2228 04/15/24  0847   * 118* 101*   BUN 18 16 14   CREATSERUM 1.29 1.15 1.04   CA 9.9 9.3 9.0   ALB 4.5 3.9  --     140 144   K 3.9 3.8 3.7    111 113*   CO2 23.0 23.0 25.0   ALKPHO 129* 100  --    AST 18 10  --    ALT <7* <7*  --    BILT 0.5 0.8  --    TP 8.2 7.0  --        Estimated Creatinine Clearance: 56.5 mL/min (based on SCr of 1.04 mg/dL).    Recent Labs   Lab 24   PTP 13.7   INR 0.99            Culture:  Hospital Encounter on 24   1. Blood Culture     Status: None (Preliminary result)    Collection Time: 24  6:16 PM    Specimen: Blood,peripheral   Result Value Ref Range    Blood Culture Result No Growth 1 Day N/A        Cardiac  No results for input(s): \"TROP\", \"PBNP\" in the last 168 hours.      Imaging: Imaging data reviewed in [facility].  XR CHEST AP PORTABLE  (CPT=71045)    Result Date: 4/14/2024  CONCLUSION:   Patchy retrocardiac/left basilar opacities are new or more conspicuous since comparison chest radiograph from January, 2024.  In the appropriate clinical setting, findings are suspicious for pneumonia/aspiration.  Radiographic follow-up to resolution is advised.   Dictated by (CST): Arsenio De Luna MD on 4/14/2024 at 6:36 PM     Finalized by (CST): Arsenio De Luna MD on 4/14/2024 at 6:39 PM           Medications:    vancomycin  15 mg/kg Intravenous Q24H    mupirocin  1 Application Each Nare BID    azithromycin  500 mg Oral Nightly    acetaminophen  1,000 mg Oral BID    amLODIPine  5 mg Oral Daily    atorvastatin  40 mg Oral Nightly    cholecalciferol  2,000 Units Oral Daily    clopidogrel  75 mg Oral Daily    ferrous sulfate  325 mg Oral Daily with breakfast    gabapentin  300 mg Oral TID    levothyroxine  100 mcg Oral Daily    enoxaparin  40 mg Subcutaneous Daily    piperacillin-tazobactam  3.375 g Intravenous Q8H    dexamethasone  6 mg Oral Daily    remdesivir  100 mg Intravenous Q24H    fluticasone furoate-vilanterol  1 puff Inhalation Q24H         Assessment and Plan:   Assessment & Plan:      Acute respiratory failure with hypoxia  Left lung pneumonia, R/o aspiration  - CXR patchy L basilar opacities  - Lactic acid normal.   - IV Zosyn and azithromycin day #2. Stop vancomycin.   - WBC 18 on admit now normal.   - duonebs, IS/Flutter valve.   - blood cx x 2 NGTD, MRSA nares +   - sputum cx.   - wean o2   - SLP - modified diet.   - pulm following  - no further fevers.   - rpt CXR.      COVID-positive - likely old infection  -Had COVID infection at [facility] 3/2024.   - was started on Remdesivir and decadron by pulm. Day #2. Consider short course as his COVID infection was several weeks ago at [facility]. No benefit of antiviral  now.   - IS.   - Albuterol inh.   - decadron will plan to stop soon      Generalized weakness due to above  - PT/OT - Agreeable to [facility].      Other issues   AAA,  Hypothyroidism,  HTN,  HLD,  OA,  CVA    >55min spent, >50% spent counseling and coordinating care in the form of educating pt/family and d/w consultants and staff. Most of the time spent discussing the above plan.        Plan of care discussed with patient or family at bedside.    India Still MD  Hospitalist          Supplementary Documentation:     Quality:  DVT Prophylaxis: lovenox  CODE status: DNR  Dispo: per clinical course           Estimated date of discharge: TBD  Discharge is dependent on: clinical stability  At this point Mr. Fitzgerald is expected to be discharge to: [facility]        **Certification      PHYSICIAN Certification of Need for Inpatient Hospitalization - Initial Certification    Patient will require inpatient services that will reasonably be expected to span two midnight's based on the clinical documentation in H+P.   Based on patients current state of illness, I anticipate that, after discharge, patient will require TBD.

## 2024-04-16 NOTE — PROGRESS NOTES
[facility]  part of [facility]     Progress Note    Cody Fitzgerald Patient Status:  Inpatient    1940 MRN [id number]   Location [facility]5W Attending Sha Lira MD   Hosp Day # 2 PCP Bam Hampton MD       Subjective:   Patient seen and examined.  Resting in bed.  Denies significant dyspnea.  Cough similar to his chronic cough.    Objective:   Blood pressure 119/56, pulse 76, temperature 97.3 °F (36.3 °C), temperature source Oral, resp. rate 20, height 5' 10\" (1.778 m), weight 197 lb 6.4 oz (89.5 kg), SpO2 95%.  Intake/Output:   Last 3 shifts: I/O last 3 completed shifts:  In: 5703.2 [I.V.:5133.2; IV PIGGYBACK:570]  Out: 2100 [Urine:2100]   This shift: I/O this shift:  In: 490 [P.O.:240; IV PIGGYBACK:250]  Out: 800 [Urine:800]     Vent Settings:      Hemodynamic parameters (last 24 hours):      Scheduled Meds:   Current Facility-Administered Medications   Medication Dose Route Frequency    ipratropium-albuterol (Duoneb) 0.5-2.5 (3) MG/3ML inhalation solution 3 mL  3 mL Nebulization Q6H WA    mupirocin (Bactroban) 2% nasal ointment 1 Application  1 Application Each Nare BID    azithromycin (Zithromax) tab 500 mg  500 mg Oral Nightly    acetaminophen (Tylenol Extra Strength) tab 1,000 mg  1,000 mg Oral BID    amLODIPine (Norvasc) tab 5 mg  5 mg Oral Daily    atorvastatin (Lipitor) tab 40 mg  40 mg Oral Nightly    benzonatate (Tessalon) cap 200 mg  200 mg Oral TID PRN    cholecalciferol (Vitamin D3) tab 2,000 Units  2,000 Units Oral Daily    clopidogrel (Plavix) tab 75 mg  75 mg Oral Daily    ferrous sulfate DR tab 325 mg  325 mg Oral Daily with breakfast    gabapentin (Neurontin) cap 300 mg  300 mg Oral TID    levothyroxine (Synthroid) tab 100 mcg  100 mcg Oral Daily    enoxaparin (Lovenox) 40 MG/0.4ML SUBQ injection 40 mg  40 mg Subcutaneous Daily    acetaminophen (Tylenol Extra Strength) tab 500 mg  500 mg Oral Q4H PRN    melatonin tab 3 mg  3 mg Oral Nightly PRN     ondansetron (Zofran) 4 MG/2ML injection 4 mg  4 mg Intravenous Q6H PRN    piperacillin-tazobactam (Zosyn) 3.375 g in dextrose 5% 100 mL IVPB-ADDV  3.375 g Intravenous Q8H    ipratropium-albuterol (Duoneb) 0.5-2.5 (3) MG/3ML inhalation solution 3 mL  3 mL Nebulization Q6H PRN    dexamethasone (Decadron) tab 6 mg  6 mg Oral Daily    remdesivir (Veklury) 100 mg in sodium chloride 0.9% 270 mL IVPB  100 mg Intravenous Q24H    fluticasone furoate-vilanterol (Breo Ellipta) 100-25 MCG/ACT inhaler 1 puff  1 puff Inhalation Q24H       Continuous Infusions:         Physical Exam  Constitutional: no acute distress  Eyes: PERRL  ENT: nares pateint  Neck: supple, no JVD  Cardio: RRR, S1 S2  Respiratory: clear to auscultation bilaterally, no wheezing, rales, rhonchi, crackles  GI: abdomen soft, non tender, active bowel sounds, no organomegaly  Extremities: no clubbing, cyanosis, edema  Neurologic: no gross motor deficits  Skin: warm, dry      Results:            XR CHEST AP PORTABLE  (CPT=71045)    Result Date: 4/14/2024  CONCLUSION:   Patchy retrocardiac/left basilar opacities are new or more conspicuous since comparison chest radiograph from January, 2024.  In the appropriate clinical setting, findings are suspicious for pneumonia/aspiration.  Radiographic follow-up to resolution is advised.   Dictated by (CST): Arsenio De Luna MD on 4/14/2024 at 6:36 PM     Finalized by (CST): Arsenio De Luna MD on 4/14/2024 at 6:39 PM                 Assessment   1.  Acute hypoxemic respiratory failure  2.  COVID-19  3.  COPD  4.  Prior CVA  5.  Hypothyroidism     Plan   -Patient presents with evidence of acute hypoxemic respiratory failure likely secondary to COVID-19 with underlying history of COPD  -Chest x-ray with patchy left basilar opacity seen.  -Continue remdesivir and Decadron at this time  -Empiric antibiotic therapy  -Wean oxygen as tolerated   -ICS/IV  -DVT prophylaxis: Lovenox  -Discussed with hospitalist.  Possible discharge  tomorrow if weaned off oxygen.  Will discontinue remdesivir potentially tomorrow.  Okay to discontinue Noah Law, DO  Pulmonary Critical Care Medicine  [facility]

## 2024-04-16 NOTE — PLAN OF CARE
Problem: SAFETY ADULT - FALL  Goal: Free from fall injury  Description: INTERVENTIONS:  - Assess pt frequently for physical needs  - Identify cognitive and physical deficits and behaviors that affect risk of falls.  - [place] fall precautions as indicated by assessment.  - Educate pt/family on patient safety including physical limitations  - Instruct pt to call for assistance with activity based on assessment  - Modify environment to reduce risk of injury  - Provide assistive devices as appropriate  - Consider OT/PT consult to assist with strengthening/mobility  - Encourage toileting schedule  Outcome: Progressing     Problem: RESPIRATORY - ADULT  Goal: Achieves optimal ventilation and oxygenation  Description: INTERVENTIONS:  - Assess for changes in respiratory status  - Assess for changes in mentation and behavior  - Position to facilitate oxygenation and minimize respiratory effort  - Oxygen supplementation based on oxygen saturation or ABGs  - Provide Smoking Cessation handout, if applicable  - Encourage broncho-pulmonary hygiene including cough, deep breathe, Incentive Spirometry  - Assess the need for suctioning and perform as needed  - Assess and instruct to report SOB or any respiratory difficulty  - Respiratory Therapy support as indicated  - Manage/alleviate anxiety  - Monitor for signs/symptoms of CO2 retention  Outcome: Progressing     Problem: MUSCULOSKELETAL - ADULT  Goal: Return mobility to safest level of function  Description: INTERVENTIONS:  - Assess patient stability and activity tolerance for standing, transferring and ambulating w/ or w/o assistive devices  - Assist with transfers and ambulation using safe patient handling equipment as needed  - Ensure adequate protection for wounds/incisions during mobilization  - Obtain PT/OT consults as needed  - Advance activity as appropriate  - Communicate ordered activity level and limitations with patient/family  Outcome: Progressing     Problem:  Impaired Functional Mobility  Goal: Achieve highest/safest level of mobility/gait  Description: Interventions:  - Assess patient's functional ability and stability  - Promote increasing activity/tolerance for mobility and gait  - Educate and engage patient/family in tolerated activity level and precautions  - Recommend use of  RW for transfers and ambulation  Outcome: Progressing     Problem: Impaired Swallowing  Goal: Minimize aspiration risk  Description: Interventions:  - Patient should be alert and upright for all feedings (90 degrees preferred)  - Offer food and liquids at a slow rate  - No straws  - Encourage small bites of food and small sips of liquid  - Offer pills one at a time, crush or deliver with applesauce as needed  - Discontinue feeding and notify MD (or speech pathologist) if coughing or persistent throat clearing or wet/gurgly vocal quality is noted  Outcome: Progressing     Weaned to room air today. Able to stand and pivot to chair with assistance. Contact and Droplet isolation discontinued.   Safety precautions in place, frequent nursing rounding completed, call light within reach. All questions answered and needs met.

## 2024-04-16 NOTE — PLAN OF CARE
Patient alert and oriented. Tele. Continuous pulse ox. Call light within reach. Frequent rounding by staff.  Problem: Patient Centered Care  Goal: Patient preferences are identified and integrated in the patient's plan of care  Description: Interventions:  - What would you like us to know as we care for you? From home with spouse  - Provide timely, complete, and accurate information to patient/family  - Incorporate patient and family knowledge, values, beliefs, and cultural backgrounds into the planning and delivery of care  - Encourage patient/family to participate in care and decision-making at the level they choose  - Honor patient and family perspectives and choices  4/16/2024 0330 by Jennifer Hartley RN  Outcome: Progressing  4/16/2024 0329 by Jennifer Hartley RN  Outcome: Progressing     Problem: Patient/Family Goals  Goal: Patient/Family Long Term Goal  Description: Patient's Long Term Goal: to go home    Interventions:  - - Monitor vitals  - Monitor appropriate labs  - Pain management  - Follow MD order  - Diagnostics per order  - Update/Informing patient and family on plan of care  - Discharge planning  - See additional Care Plan goals for specific interventions  4/16/2024 0330 by Jennifer Hartley RN  Outcome: Progressing  4/16/2024 0329 by Jennifer Hartley RN  Outcome: Progressing  Goal: Patient/Family Short Term Goal  Description: Patient's Short Term Goal: to feel better    Interventions:   - - Monitor vitals  - Monitor appropriate labs  - Pain management  - Follow MD order  - Diagnostics per order  - Update/Informing patient and family on plan of care  - Discharge planning  - See additional Care Plan goals for specific interventions  4/16/2024 0330 by Jennifer Hartley RN  Outcome: Progressing  4/16/2024 0329 by Jennifer Hartley RN  Outcome: Progressing     Problem: SAFETY ADULT - FALL  Goal: Free from fall injury  Description: INTERVENTIONS:  - Assess pt frequently for physical needs  - Identify cognitive and  physical deficits and behaviors that affect risk of falls.  - [place] fall precautions as indicated by assessment.  - Educate pt/family on patient safety including physical limitations  - Instruct pt to call for assistance with activity based on assessment  - Modify environment to reduce risk of injury  - Provide assistive devices as appropriate  - Consider OT/PT consult to assist with strengthening/mobility  - Encourage toileting schedule  4/16/2024 0330 by Jennifer Hartley RN  Outcome: Progressing  4/16/2024 0329 by Jennifer Hartley RN  Outcome: Progressing     Problem: RESPIRATORY - ADULT  Goal: Achieves optimal ventilation and oxygenation  Description: INTERVENTIONS:  - Assess for changes in respiratory status  - Assess for changes in mentation and behavior  - Position to facilitate oxygenation and minimize respiratory effort  - Oxygen supplementation based on oxygen saturation or ABGs  - Provide Smoking Cessation handout, if applicable  - Encourage broncho-pulmonary hygiene including cough, deep breathe, Incentive Spirometry  - Assess the need for suctioning and perform as needed  - Assess and instruct to report SOB or any respiratory difficulty  - Respiratory Therapy support as indicated  - Manage/alleviate anxiety  - Monitor for signs/symptoms of CO2 retention  4/16/2024 0330 by Jennifer Hartley RN  Outcome: Progressing  4/16/2024 0329 by Jennifer Hartley RN  Outcome: Progressing     Problem: SKIN/TISSUE INTEGRITY - ADULT  Goal: Skin integrity remains intact  Description: INTERVENTIONS  - Assess and document risk factors for pressure ulcer development  - Assess and document skin integrity  - Monitor for areas of redness and/or skin breakdown  - Initiate interventions, skin care algorithm/standards of care as needed  4/16/2024 0330 by Jennifer Hartley RN  Outcome: Progressing  4/16/2024 0329 by Jennifer Hartley RN  Outcome: Progressing     Problem: MUSCULOSKELETAL - ADULT  Goal: Return mobility to safest level of  function  Description: INTERVENTIONS:  - Assess patient stability and activity tolerance for standing, transferring and ambulating w/ or w/o assistive devices  - Assist with transfers and ambulation using safe patient handling equipment as needed  - Ensure adequate protection for wounds/incisions during mobilization  - Obtain PT/OT consults as needed  - Advance activity as appropriate  - Communicate ordered activity level and limitations with patient/family  4/16/2024 0330 by Jennifer Hartley RN  Outcome: Progressing  4/16/2024 0329 by Jennifer Hartley RN  Outcome: Progressing  Goal: Maintain proper alignment of affected body part  Description: INTERVENTIONS:  - Support and protect limb and body alignment per provider's orders  - Instruct and reinforce with patient and family use of appropriate assistive device and precautions (e.g. spinal or hip dislocation precautions)  4/16/2024 0330 by Jennifer Hartley RN  Outcome: Progressing  4/16/2024 0329 by Jennifer Hartley RN  Outcome: Progressing     Problem: Impaired Functional Mobility  Goal: Achieve highest/safest level of mobility/gait  Description: Interventions:  - Assess patient's functional ability and stability  - Promote increasing activity/tolerance for mobility and gait  - Educate and engage patient/family in tolerated activity level and precautions    4/16/2024 0330 by Jennifer Hartley RN  Outcome: Progressing  4/16/2024 0329 by Jennifer Hartley RN  Outcome: Progressing     Problem: Impaired Swallowing  Goal: Minimize aspiration risk  Description: Interventions:  - Patient should be alert and upright for all feedings (90 degrees preferred)  - Offer food and liquids at a slow rate  - No straws  - Encourage small bites of food and small sips of liquid  - Offer pills one at a time, crush or deliver with applesauce as needed  - Discontinue feeding and notify MD (or speech pathologist) if coughing or persistent throat clearing or wet/gurgly vocal quality is noted  4/16/2024  0330 by Jennifer Hartley, RN  Outcome: Progressing  4/16/2024 0329 by Jennifer Hartley, RN  Outcome: Progressing      Donor Site Anesthesia Type: same as repair anesthesia

## 2024-04-17 LAB
ANION GAP SERPL CALC-SCNC: 10 MMOL/L (ref 0–18)
BUN BLD-MCNC: 18 MG/DL (ref 9–23)
BUN/CREAT SERPL: 14.3 (ref 10–20)
CALCIUM BLD-MCNC: 8.4 MG/DL (ref 8.7–10.4)
CHLORIDE SERPL-SCNC: 111 MMOL/L (ref 98–112)
CO2 SERPL-SCNC: 21 MMOL/L (ref 21–32)
CREAT BLD-MCNC: 1.26 MG/DL
DEPRECATED RDW RBC AUTO: 52.7 FL (ref 35.1–46.3)
EGFRCR SERPLBLD CKD-EPI 2021: 57 ML/MIN/1.73M2 (ref 60–?)
ERYTHROCYTE [DISTWIDTH] IN BLOOD BY AUTOMATED COUNT: 14.8 % (ref 11–15)
EST. AVERAGE GLUCOSE BLD GHB EST-MCNC: 128 MG/DL (ref 68–126)
GLUCOSE BLD-MCNC: 316 MG/DL (ref 70–99)
GLUCOSE BLDC GLUCOMTR-MCNC: 233 MG/DL (ref 70–99)
GLUCOSE BLDC GLUCOMTR-MCNC: 240 MG/DL (ref 70–99)
GLUCOSE BLDC GLUCOMTR-MCNC: 249 MG/DL (ref 70–99)
GLUCOSE BLDC GLUCOMTR-MCNC: 250 MG/DL (ref 70–99)
HBA1C MFR BLD: 6.1 % (ref ?–5.7)
HCT VFR BLD AUTO: 34.4 %
HGB BLD-MCNC: 11.4 G/DL
MCH RBC QN AUTO: 31.5 PG (ref 26–34)
MCHC RBC AUTO-ENTMCNC: 33.1 G/DL (ref 31–37)
MCV RBC AUTO: 95 FL
OSMOLALITY SERPL CALC.SUM OF ELEC: 308 MOSM/KG (ref 275–295)
PLATELET # BLD AUTO: 205 10(3)UL (ref 150–450)
POTASSIUM SERPL-SCNC: 3.5 MMOL/L (ref 3.5–5.1)
PROCALCITONIN SERPL-MCNC: 0.08 NG/ML (ref ?–0.05)
RBC # BLD AUTO: 3.62 X10(6)UL
SODIUM SERPL-SCNC: 142 MMOL/L (ref 136–145)
WBC # BLD AUTO: 10.5 X10(3) UL (ref 4–11)

## 2024-04-17 PROCEDURE — 99233 SBSQ HOSP IP/OBS HIGH 50: CPT | Performed by: HOSPITALIST

## 2024-04-17 PROCEDURE — 99232 SBSQ HOSP IP/OBS MODERATE 35: CPT | Performed by: INTERNAL MEDICINE

## 2024-04-17 RX ORDER — IPRATROPIUM BROMIDE AND ALBUTEROL SULFATE 2.5; .5 MG/3ML; MG/3ML
3 SOLUTION RESPIRATORY (INHALATION)
Status: DISCONTINUED | OUTPATIENT
Start: 2024-04-17 | End: 2024-04-18

## 2024-04-17 RX ORDER — NICOTINE POLACRILEX 4 MG
15 LOZENGE BUCCAL
Status: DISCONTINUED | OUTPATIENT
Start: 2024-04-17 | End: 2024-04-18

## 2024-04-17 RX ORDER — NICOTINE POLACRILEX 4 MG
30 LOZENGE BUCCAL
Status: DISCONTINUED | OUTPATIENT
Start: 2024-04-17 | End: 2024-04-18

## 2024-04-17 RX ORDER — DEXTROSE MONOHYDRATE 25 G/50ML
50 INJECTION, SOLUTION INTRAVENOUS
Status: DISCONTINUED | OUTPATIENT
Start: 2024-04-17 | End: 2024-04-18

## 2024-04-17 NOTE — PLAN OF CARE
Patient alert, oriented, forgetful at times. Vitals stable. C/o pain to R arm, scheduled tylenol, ice pack and elevation with relief. Sitting up in chair for meals, assisted with adls. On room air, no respiratory distress noted. Call light within reach, able to make needs known. Wife given nursing update.         Problem: Patient Centered Care  Goal: Patient preferences are identified and integrated in the patient's plan of care  Description: Interventions:  - What would you like us to know as we care for you? From home with spouse  - Provide timely, complete, and accurate information to patient/family  - Incorporate patient and family knowledge, values, beliefs, and cultural backgrounds into the planning and delivery of care  - Encourage patient/family to participate in care and decision-making at the level they choose  - Honor patient and family perspectives and choices  Outcome: Progressing     Problem: Patient/Family Goals  Goal: Patient/Family Long Term Goal  Description: Patient's Long Term Goal: to go home    Interventions:  - - Monitor vitals  - Monitor appropriate labs  - Pain management  - Follow MD order  - Diagnostics per order  - Update/Informing patient and family on plan of care  - Discharge planning  - See additional Care Plan goals for specific interventions  Outcome: Progressing  Goal: Patient/Family Short Term Goal  Description: Patient's Short Term Goal: to feel better    Interventions:   - - Monitor vitals  - Monitor appropriate labs  - Pain management  - Follow MD order  - Diagnostics per order  - Update/Informing patient and family on plan of care  - Discharge planning  - See additional Care Plan goals for specific interventions  Outcome: Progressing     Problem: SAFETY ADULT - FALL  Goal: Free from fall injury  Description: INTERVENTIONS:  - Assess pt frequently for physical needs  - Identify cognitive and physical deficits and behaviors that affect risk of falls.  - [place] fall  precautions as indicated by assessment.  - Educate pt/family on patient safety including physical limitations  - Instruct pt to call for assistance with activity based on assessment  - Modify environment to reduce risk of injury  - Provide assistive devices as appropriate  - Consider OT/PT consult to assist with strengthening/mobility  - Encourage toileting schedule  Outcome: Progressing     Problem: RESPIRATORY - ADULT  Goal: Achieves optimal ventilation and oxygenation  Description: INTERVENTIONS:  - Assess for changes in respiratory status  - Assess for changes in mentation and behavior  - Position to facilitate oxygenation and minimize respiratory effort  - Oxygen supplementation based on oxygen saturation or ABGs  - Provide Smoking Cessation handout, if applicable  - Encourage broncho-pulmonary hygiene including cough, deep breathe, Incentive Spirometry  - Assess the need for suctioning and perform as needed  - Assess and instruct to report SOB or any respiratory difficulty  - Respiratory Therapy support as indicated  - Manage/alleviate anxiety  - Monitor for signs/symptoms of CO2 retention  Outcome: Progressing     Problem: SKIN/TISSUE INTEGRITY - ADULT  Goal: Skin integrity remains intact  Description: INTERVENTIONS  - Assess and document risk factors for pressure ulcer development  - Assess and document skin integrity  - Monitor for areas of redness and/or skin breakdown  - Initiate interventions, skin care algorithm/standards of care as needed  Outcome: Progressing     Problem: MUSCULOSKELETAL - ADULT  Goal: Return mobility to safest level of function  Description: INTERVENTIONS:  - Assess patient stability and activity tolerance for standing, transferring and ambulating w/ or w/o assistive devices  - Assist with transfers and ambulation using safe patient handling equipment as needed  - Ensure adequate protection for wounds/incisions during mobilization  - Obtain PT/OT consults as needed  - Advance  activity as appropriate  - Communicate ordered activity level and limitations with patient/family  Outcome: Progressing  Goal: Maintain proper alignment of affected body part  Description: INTERVENTIONS:  - Support and protect limb and body alignment per provider's orders  - Instruct and reinforce with patient and family use of appropriate assistive device and precautions (e.g. spinal or hip dislocation precautions)  Outcome: Progressing     Problem: Impaired Functional Mobility  Goal: Achieve highest/safest level of mobility/gait  Description: Interventions:  - Assess patient's functional ability and stability  - Promote increasing activity/tolerance for mobility and gait  - Educate and engage patient/family in tolerated activity level and precautions  - Recommend use of  RW for transfers and ambulation  Outcome: Progressing     Problem: Impaired Swallowing  Goal: Minimize aspiration risk  Description: Interventions:  - Patient should be alert and upright for all feedings (90 degrees preferred)  - Offer food and liquids at a slow rate  - No straws  - Encourage small bites of food and small sips of liquid  - Offer pills one at a time, crush or deliver with applesauce as needed  - Discontinue feeding and notify MD (or speech pathologist) if coughing or persistent throat clearing or wet/gurgly vocal quality is noted  Outcome: Progressing

## 2024-04-17 NOTE — CM/SW NOTE
Anticipated therapy need: Gradual Rehabilitative Therapy. Pt now agreeable to [facility] and requesting [facility]. [facility] review pending. [facility] referral sent to facility in [facility]. CM waiting on response for acceptance as patient may be ready for dc today.     PASRR level 1 screen completed and uploaded to [facility] referral.    1520: [facility] approved [facility]. [facility] has accepted patient for [facility], facility reserved in [facility].    Plan: [facility] for [facility] pending medical clearance.    SANDY DumontN    [phone]

## 2024-04-17 NOTE — PROGRESS NOTES
[facility]  part of [facility]     Progress Note    Cody Fitzgerald Patient Status:  Inpatient    1940 MRN [id number]   Location [facility]5W Attending Sha Lira MD   Hosp Day # 3 PCP Bam Hampton MD       Subjective:   Patient seen and examined.  Resting in bed.  Denies significant dyspnea.  Cough similar to his chronic cough.    Objective:   Blood pressure 130/64, pulse 75, temperature 97.5 °F (36.4 °C), temperature source Oral, resp. rate 18, height 5' 10\" (1.778 m), weight 197 lb 6.4 oz (89.5 kg), SpO2 93%.  Intake/Output:   Last 3 shifts: I/O last 3 completed shifts:  In: 2038.3 [P.O.:480; I.V.:468.3; IV PIGGYBACK:1090]  Out: 2700 [Urine:2700]   This shift: I/O this shift:  In: 440 [P.O.:440]  Out: 525 [Urine:525]     Vent Settings:      Hemodynamic parameters (last 24 hours):      Scheduled Meds:   Current Facility-Administered Medications   Medication Dose Route Frequency    glucose (Dex4) 15 GM/59ML oral liquid 15 g  15 g Oral Q15 Min PRN    Or    glucose (Glutose) 40% oral gel 15 g  15 g Oral Q15 Min PRN    Or    glucose-vitamin C (Dex-4) chewable tab 4 tablet  4 tablet Oral Q15 Min PRN    Or    dextrose 50% injection 50 mL  50 mL Intravenous Q15 Min PRN    Or    glucose (Dex4) 15 GM/59ML oral liquid 30 g  30 g Oral Q15 Min PRN    Or    glucose (Glutose) 40% oral gel 30 g  30 g Oral Q15 Min PRN    Or    glucose-vitamin C (Dex-4) chewable tab 8 tablet  8 tablet Oral Q15 Min PRN    insulin aspart (NovoLOG) 100 Units/mL FlexPen 1-7 Units  1-7 Units Subcutaneous TID CC    ipratropium-albuterol (Duoneb) 0.5-2.5 (3) MG/3ML inhalation solution 3 mL  3 mL Nebulization 2 times daily    mupirocin (Bactroban) 2% nasal ointment 1 Application  1 Application Each Nare BID    acetaminophen (Tylenol Extra Strength) tab 1,000 mg  1,000 mg Oral BID    amLODIPine (Norvasc) tab 5 mg  5 mg Oral Daily    atorvastatin (Lipitor) tab 40 mg  40 mg Oral Nightly    benzonatate  (Tessalon) cap 200 mg  200 mg Oral TID PRN    cholecalciferol (Vitamin D3) tab 2,000 Units  2,000 Units Oral Daily    clopidogrel (Plavix) tab 75 mg  75 mg Oral Daily    ferrous sulfate DR tab 325 mg  325 mg Oral Daily with breakfast    gabapentin (Neurontin) cap 300 mg  300 mg Oral TID    levothyroxine (Synthroid) tab 100 mcg  100 mcg Oral Daily    enoxaparin (Lovenox) 40 MG/0.4ML SUBQ injection 40 mg  40 mg Subcutaneous Daily    acetaminophen (Tylenol Extra Strength) tab 500 mg  500 mg Oral Q4H PRN    melatonin tab 3 mg  3 mg Oral Nightly PRN    ondansetron (Zofran) 4 MG/2ML injection 4 mg  4 mg Intravenous Q6H PRN    piperacillin-tazobactam (Zosyn) 3.375 g in dextrose 5% 100 mL IVPB-ADDV  3.375 g Intravenous Q8H    ipratropium-albuterol (Duoneb) 0.5-2.5 (3) MG/3ML inhalation solution 3 mL  3 mL Nebulization Q6H PRN    fluticasone furoate-vilanterol (Breo Ellipta) 100-25 MCG/ACT inhaler 1 puff  1 puff Inhalation Q24H       Continuous Infusions:         Physical Exam  Constitutional: no acute distress  Eyes: PERRL  ENT: nares pateint  Neck: supple, no JVD  Cardio: RRR, S1 S2  Respiratory: clear to auscultation bilaterally, no wheezing, rales, rhonchi, crackles  GI: abdomen soft, non tender, active bowel sounds, no organomegaly  Extremities: no clubbing, cyanosis, edema  Neurologic: no gross motor deficits  Skin: warm, dry      Results:     Lab Results   Component Value Date    WBC 10.5 04/17/2024    HGB 11.4 04/17/2024    HCT 34.4 04/17/2024    .0 04/17/2024    CREATSERUM 1.26 04/17/2024    BUN 18 04/17/2024     04/17/2024    K 3.5 04/17/2024     04/17/2024    CO2 21.0 04/17/2024     04/17/2024    CA 8.4 04/17/2024         XR CHEST AP PORTABLE  (CPT=71045)    Result Date: 4/16/2024  CONCLUSION:  Unchanged left retrocardiac opacity.  Minimal hazy right basilar opacity, unchanged.  This may represent additional pneumonia or atelectasis.  Suspected trace bilateral pleural effusions.     Dictated by (CST): Mague Hernandez MD on 4/16/2024 at 4:14 PM     Finalized by (CST): Mague Hernandez MD on 4/16/2024 at 4:15 PM                 Assessment   1.  Acute hypoxemic respiratory failure  2.  COVID-19  3.  COPD  4.  Prior CVA  5.  Hypothyroidism     Plan   -Patient presents with evidence of acute hypoxemic respiratory failure likely secondary to COVID-19 with underlying history of COPD  -Chest x-ray with patchy left basilar opacity seen no significant change on 4/16/2024  -Empiric antibiotic therapy  -Wean oxygen as tolerated   -ICS/IV  -DVT prophylaxis: Lovenox  -Discussed with hospitalist wife.  Anticipate likely discharge tomorrow.  Okay to discontinue Decadron and remdesivir from pulmonary perspective.    Ivelisse Law, DO  Pulmonary Critical Care Medicine  [facility]

## 2024-04-17 NOTE — PROGRESS NOTES
[facility]  part of [facility]    Progress Note    Cody Fitzgerald Patient Status:  Inpatient    1940 MRN [id number]   Location [facility]5W Attending India Still MD   Hosp Day # 3 PCP Bam Hampton MD     Chief Complaint:   Chief Complaint   Patient presents with    Weakness       Subjective:   Cody Fitzgerald is doing better. Up to chair off oxygen. Says he always coughs but it is chronic. No SOB. No F/C no CP   Per RN - BG this AM on labs 300's   Objective:   Objective:    Blood pressure 124/52, pulse 84, temperature 97.3 °F (36.3 °C), temperature source Oral, resp. rate 19, height 5' 10\" (1.778 m), weight 197 lb 6.4 oz (89.5 kg), SpO2 90%.    Physical Exam:    General: No acute distress.   Respiratory: Diminished L base   Cardiovascular: S1, S2. Regular rate and rhythm. No murmurs, rubs or gallops.   Abdomen: Soft, nontender, nondistended.  Positive bowel sounds. No rebound or guarding.  Neurologic: Chronic L weakness from prev CVA  Extremities: No edema.      Results:   Results:    Labs:  Recent Labs   Lab 24  1816 04/15/24  0847 24  0523   WBC 18.0* 10.9 10.5   HGB 13.7 11.8* 11.4*   MCV 92.9 95.1 95.0   .0 151.0 205.0   INR 0.99  --   --        Recent Labs   Lab 24  1816 24  2228 04/15/24  0847 24  0523   * 118* 101* 316*   BUN 18 16 14 18   CREATSERUM 1.29 1.15 1.04 1.26   CA 9.9 9.3 9.0 8.4*   ALB 4.5 3.9  --   --     140 144 142   K 3.9 3.8 3.7 3.5    111 113* 111   CO2 23.0 23.0 25.0 21.0   ALKPHO 129* 100  --   --    AST 18 10  --   --    ALT <7* <7*  --   --    BILT 0.5 0.8  --   --    TP 8.2 7.0  --   --        Estimated Creatinine Clearance: 46.7 mL/min (based on SCr of 1.26 mg/dL).    Recent Labs   Lab 24  1816   PTP 13.7   INR 0.99            Culture:  Hospital Encounter on 24   1. Blood Culture     Status: None (Preliminary result)    Collection Time: 24  6:16 PM    Specimen:  Blood,peripheral   Result Value Ref Range    Blood Culture Result No Growth 2 Days N/A       Cardiac  No results for input(s): \"TROP\", \"PBNP\" in the last 168 hours.      Imaging: Imaging data reviewed in [facility].  XR CHEST AP PORTABLE  (CPT=71045)    Result Date: 4/16/2024  CONCLUSION:  Unchanged left retrocardiac opacity.  Minimal hazy right basilar opacity, unchanged.  This may represent additional pneumonia or atelectasis.  Suspected trace bilateral pleural effusions.    Dictated by (CST): Mague Hernandez MD on 4/16/2024 at 4:14 PM     Finalized by (CST): Mague Hernandez MD on 4/16/2024 at 4:15 PM           Medications:    insulin aspart  1-7 Units Subcutaneous TID CC    ipratropium-albuterol  3 mL Nebulization Q6H WA    mupirocin  1 Application Each Nare BID    acetaminophen  1,000 mg Oral BID    amLODIPine  5 mg Oral Daily    atorvastatin  40 mg Oral Nightly    cholecalciferol  2,000 Units Oral Daily    clopidogrel  75 mg Oral Daily    ferrous sulfate  325 mg Oral Daily with breakfast    gabapentin  300 mg Oral TID    levothyroxine  100 mcg Oral Daily    enoxaparin  40 mg Subcutaneous Daily    piperacillin-tazobactam  3.375 g Intravenous Q8H    remdesivir  100 mg Intravenous Q24H    fluticasone furoate-vilanterol  1 puff Inhalation Q24H         Assessment and Plan:   Assessment & Plan:      Acute respiratory failure with hypoxia  Left lung pneumonia, R/o aspiration  - CXR patchy L basilar opacities. R base atelectasis.   - Lactic acid normal.   - IV Zosyn and azithromycin day #3.   - WBC 18 on admit now normal.   - duonebs, IS/Flutter valve.   - blood cx x 2 NGTD, MRSA nares +   - off o2.   - SLP - modified diet.   - pulm following  - no further fevers.   - Clinically better.     Hyperglycemia  - no h/o DM. Prob from decadron  - stop steroids  - ISS  - A1c      COVID-positive - old infection  -Had COVID infection at [facility] 3/2024.   - was started on Remdesivir and decadron will stop as not an active infection.   -  no need for isolation.   - IS.      Generalized weakness due to above  - PT/OT - Agreeable to [facility].      Other issues   AAA,  Hypothyroidism,  HTN,  HLD,  OA,  CVA    To [facility] tomorrow     >55min spent, >50% spent counseling and coordinating care in the form of educating pt/family and d/w consultants and staff. Most of the time spent discussing the above plan.        Plan of care discussed with patient or family at bedside.    India Still MD  Hospitalist          Supplementary Documentation:     Quality:  DVT Prophylaxis: lovenox  CODE status: DNR  Dispo: per clinical course           Estimated date of discharge: TBD  Discharge is dependent on: clinical stability  At this point Mr. Fitzgerald is expected to be discharge to: [facility]

## 2024-04-17 NOTE — CONGREGATE LIVING REVIEW
Critical access hospital Living Authorization    The [facility] Review Committee has reviewed this case and the patient IS APPROVED for discharge to a facility for Short Term Skilled once the following procedure is followed:     - The physician discharge instructions (contained within the MATT note for SNF) must inlcude the below appropriate and approved COVID instructions to the facility    For questions regarding CLRC approval process, please contact the CM assigned to the case.  For questions regarding RN discharge workflow, please contact the unit Clinical Leader.

## 2024-04-17 NOTE — PLAN OF CARE
Patient alert and oriented. Tele. Continuous pulse ox. Call light within reach. Frequent rounding by staff. Safety precautions in place.  Problem: Patient Centered Care  Goal: Patient preferences are identified and integrated in the patient's plan of care  Description: Interventions:  - What would you like us to know as we care for you? From home with spouse  - Provide timely, complete, and accurate information to patient/family  - Incorporate patient and family knowledge, values, beliefs, and cultural backgrounds into the planning and delivery of care  - Encourage patient/family to participate in care and decision-making at the level they choose  - Honor patient and family perspectives and choices  Outcome: Progressing     Problem: Patient/Family Goals  Goal: Patient/Family Long Term Goal  Description: Patient's Long Term Goal: to go home    Interventions:  - - Monitor vitals  - Monitor appropriate labs  - Pain management  - Follow MD order  - Diagnostics per order  - Update/Informing patient and family on plan of care  - Discharge planning  - See additional Care Plan goals for specific interventions  Outcome: Progressing  Goal: Patient/Family Short Term Goal  Description: Patient's Short Term Goal: to feel better    Interventions:   - - Monitor vitals  - Monitor appropriate labs  - Pain management  - Follow MD order  - Diagnostics per order  - Update/Informing patient and family on plan of care  - Discharge planning  - See additional Care Plan goals for specific interventions  Outcome: Progressing     Problem: SAFETY ADULT - FALL  Goal: Free from fall injury  Description: INTERVENTIONS:  - Assess pt frequently for physical needs  - Identify cognitive and physical deficits and behaviors that affect risk of falls.  - [place] fall precautions as indicated by assessment.  - Educate pt/family on patient safety including physical limitations  - Instruct pt to call for assistance with activity based on assessment  -  Modify environment to reduce risk of injury  - Provide assistive devices as appropriate  - Consider OT/PT consult to assist with strengthening/mobility  - Encourage toileting schedule  Outcome: Progressing     Problem: RESPIRATORY - ADULT  Goal: Achieves optimal ventilation and oxygenation  Description: INTERVENTIONS:  - Assess for changes in respiratory status  - Assess for changes in mentation and behavior  - Position to facilitate oxygenation and minimize respiratory effort  - Oxygen supplementation based on oxygen saturation or ABGs  - Provide Smoking Cessation handout, if applicable  - Encourage broncho-pulmonary hygiene including cough, deep breathe, Incentive Spirometry  - Assess the need for suctioning and perform as needed  - Assess and instruct to report SOB or any respiratory difficulty  - Respiratory Therapy support as indicated  - Manage/alleviate anxiety  - Monitor for signs/symptoms of CO2 retention  Outcome: Progressing     Problem: SKIN/TISSUE INTEGRITY - ADULT  Goal: Skin integrity remains intact  Description: INTERVENTIONS  - Assess and document risk factors for pressure ulcer development  - Assess and document skin integrity  - Monitor for areas of redness and/or skin breakdown  - Initiate interventions, skin care algorithm/standards of care as needed  Outcome: Progressing     Problem: MUSCULOSKELETAL - ADULT  Goal: Return mobility to safest level of function  Description: INTERVENTIONS:  - Assess patient stability and activity tolerance for standing, transferring and ambulating w/ or w/o assistive devices  - Assist with transfers and ambulation using safe patient handling equipment as needed  - Ensure adequate protection for wounds/incisions during mobilization  - Obtain PT/OT consults as needed  - Advance activity as appropriate  - Communicate ordered activity level and limitations with patient/family  Outcome: Progressing  Goal: Maintain proper alignment of affected body part  Description:  INTERVENTIONS:  - Support and protect limb and body alignment per provider's orders  - Instruct and reinforce with patient and family use of appropriate assistive device and precautions (e.g. spinal or hip dislocation precautions)  Outcome: Progressing     Problem: Impaired Functional Mobility  Goal: Achieve highest/safest level of mobility/gait  Description: Interventions:  - Assess patient's functional ability and stability  - Promote increasing activity/tolerance for mobility and gait  - Educate and engage patient/family in tolerated activity level and precautions    Outcome: Progressing     Problem: Impaired Swallowing  Goal: Minimize aspiration risk  Description: Interventions:  - Patient should be alert and upright for all feedings (90 degrees preferred)  - Offer food and liquids at a slow rate  - No straws  - Encourage small bites of food and small sips of liquid  - Offer pills one at a time, crush or deliver with applesauce as needed  - Discontinue feeding and notify MD (or speech pathologist) if coughing or persistent throat clearing or wet/gurgly vocal quality is noted  Outcome: Progressing

## 2024-04-17 NOTE — PROGRESS NOTES
Oxygen Walk results:      SPO2% on Room Air at Rest: 97 (HR 84) (04/17/24 1800)    SPO2% Ambulation on Room Air: 89 (HR 90) (04/17/24 1800)

## 2024-04-18 VITALS
TEMPERATURE: 97 F | HEART RATE: 66 BPM | HEIGHT: 70 IN | OXYGEN SATURATION: 96 % | SYSTOLIC BLOOD PRESSURE: 117 MMHG | BODY MASS INDEX: 28.26 KG/M2 | WEIGHT: 197.38 LBS | DIASTOLIC BLOOD PRESSURE: 70 MMHG | RESPIRATION RATE: 20 BRPM

## 2024-04-18 LAB
GLUCOSE BLDC GLUCOMTR-MCNC: 101 MG/DL (ref 70–99)
GLUCOSE BLDC GLUCOMTR-MCNC: 118 MG/DL (ref 70–99)

## 2024-04-18 PROCEDURE — 99232 SBSQ HOSP IP/OBS MODERATE 35: CPT | Performed by: INTERNAL MEDICINE

## 2024-04-18 PROCEDURE — 99239 HOSP IP/OBS DSCHRG MGMT >30: CPT | Performed by: HOSPITALIST

## 2024-04-18 RX ORDER — POLYETHYLENE GLYCOL 3350 17 G/17G
17 POWDER, FOR SOLUTION ORAL DAILY
Status: DISCONTINUED | OUTPATIENT
Start: 2024-04-18 | End: 2024-04-18

## 2024-04-18 RX ORDER — PANTOPRAZOLE SODIUM 40 MG/1
40 TABLET, DELAYED RELEASE ORAL
Status: SHIPPED | COMMUNITY
Start: 2024-04-19

## 2024-04-18 RX ORDER — IPRATROPIUM BROMIDE AND ALBUTEROL SULFATE 2.5; .5 MG/3ML; MG/3ML
3 SOLUTION RESPIRATORY (INHALATION) 3 TIMES DAILY
Status: SHIPPED | COMMUNITY
Start: 2024-04-18

## 2024-04-18 RX ORDER — AMOXICILLIN AND CLAVULANATE POTASSIUM 875; 125 MG/1; MG/1
1 TABLET, FILM COATED ORAL 2 TIMES DAILY
Qty: 8 TABLET | Refills: 0 | Status: SHIPPED | OUTPATIENT
Start: 2024-04-18 | End: 2024-04-22

## 2024-04-18 RX ORDER — CALCIUM CARBONATE 500 MG/1
500 TABLET, CHEWABLE ORAL 3 TIMES DAILY PRN
Status: SHIPPED | COMMUNITY
Start: 2024-04-18

## 2024-04-18 RX ORDER — POLYETHYLENE GLYCOL 3350 17 G/17G
17 POWDER, FOR SOLUTION ORAL DAILY
Status: SHIPPED | COMMUNITY
Start: 2024-04-19

## 2024-04-18 RX ORDER — FLUTICASONE FUROATE AND VILANTEROL 100; 25 UG/1; UG/1
1 POWDER RESPIRATORY (INHALATION) EVERY 24 HOURS
Status: SHIPPED | COMMUNITY
Start: 2024-04-19

## 2024-04-18 RX ORDER — CALCIUM CARBONATE 500 MG/1
500 TABLET, CHEWABLE ORAL 3 TIMES DAILY PRN
Status: DISCONTINUED | OUTPATIENT
Start: 2024-04-18 | End: 2024-04-18

## 2024-04-18 RX ORDER — PANTOPRAZOLE SODIUM 40 MG/1
40 TABLET, DELAYED RELEASE ORAL
Status: DISCONTINUED | OUTPATIENT
Start: 2024-04-19 | End: 2024-04-18

## 2024-04-18 NOTE — DISCHARGE SUMMARY
[facility] Hospitalist Discharge Summary   Patient ID:  Cody Fitzgerald  [id number]  83 year old  6/24/1940    Admit date: 4/14/2024  Discharge date: 4/18/2024  Primary Care Physician: Bam Hampton MD   Attending Physician: India Still MD   Consults:   Consultants         Provider   Role Specialty     Babar Ferraro MD      Consulting Physician PULMONARY DISEASES            Discharge Diagnoses:   Community acquired pneumonia of left lower lobe of lung    Reason for admission  Copied from admission H&P: ***    Hospital Course:  ***    EXAM:   GENERAL: no apparent distress, comfortable  NEURO: A/A Ox3, no focal deficits  RESP: non labored, CTAB/L  CARDIO: Regular, no murmur  ABD: soft, NT, ND  EXTREMITIES: no edema, no calf tenderness    Operative Procedures:     Discharge Instructions     Medication List        START taking these medications      amoxicillin clavulanate 875-125 MG Tabs  Commonly known as: Augmentin  Take 1 tablet by mouth 2 (two) times daily for 4 days.     calcium carbonate 500 MG Chew  Commonly known as: Tums     fluticasone furoate-vilanterol 100-25 MCG/ACT Aepb  Commonly known as: Breo Ellipta  Start taking on: April 19, 2024     ipratropium-albuterol 0.5-2.5 (3) MG/3ML Soln  Commonly known as: Duoneb            CONTINUE taking these medications      acetaminophen 500 MG Tabs  Commonly known as: Tylenol Extra Strength     amLODIPine 5 MG Tabs  Commonly known as: Norvasc  Take 1 tablet (5 mg total) by mouth daily.     atorvastatin 40 MG Tabs  Commonly known as: Lipitor  TAKE 1 TABLET(40 MG) BY MOUTH NIGHTLY     benzonatate 200 MG Caps  Commonly known as: Tessalon  Take 1 capsule (200 mg total) by mouth 3 (three) times daily as needed for cough.     clopidogrel 75 MG Tabs  Commonly known as: Plavix  TAKE 1 TABLET(75 MG) BY MOUTH DAILY     ferrous sulfate 325 (65 FE) MG Tbec  Take 1 tablet (325 mg total) by mouth daily with breakfast.     gabapentin 300 MG Caps  Commonly known as:  Neurontin  Take 1 capsule (300 mg total) by mouth 3 (three) times daily.     levothyroxine 100 MCG Tabs  Commonly known as: Synthroid  TAKE 1 TABLET(100 MCG) BY MOUTH DAILY     lidocaine 4 % Ptch  Commonly known as: LIDODERM     Vitamin D 50 MCG (2000 UT) Caps            STOP taking these medications      methylPREDNISolone 4 MG Tbpk  Commonly known as: Medrol               Where to Get Your Medications        These medications were sent to [id number] - [place], [place] - [address] AT [facility] [address] [place] [address], [phone], [phone]  [address] [facility] [address]      Phone: [phone]   amoxicillin clavulanate 875-125 MG Tabs         Activity: {discharge activity:04111}  Diet: {diet:58828}  Wound Care: NA  Code Status: DNAR/Comfort Care        Discharge Instructions         Complete course of antibiotics  Continue nebulizers at rehab.   Cont spirometer and flutter at rehab.   Repeat CXR in 4-6 weeks.           Important follow up:   Follow-up Information       Johana Julian NP Follow up in 2 week(s).    Specialty: Nurse Practitioner  Contact information:  [address]   [address]  [facility] [id number] [phone]               Bam Hampton MD Follow up in 2 week(s).    Specialty: Internal Medicine  Contact information:  [address] [facility] [address] [phone]               Ivelisse Law,  Follow up in 3 week(s).    Specialty: PULMONARY DISEASES  Contact information:  [address] [facility]  [address]  [facility] [id number] [phone]                             -PCP in [] within 7 days [] within 14 days [] other     Disposition: {disposition:61914}  Discharged Condition: {condition:43091}    Hospital Discharge Diagnoses: {Enter hospital discharge diagnoses here (please select the wildcards and then replace with free text; this allows us to save this data discretely for reporting purposes:5961::\"***\"}    Lace+ Score: 81  59-90 High Risk  29-58 Medium Risk  0-28   Low  Risk.    TCM Follow-Up Recommendation:  {Care Managers will evaluate the need for follow-up for all patients ages 50+, and high/moderate risk patients ages 25-49. Low risk patients (LACE < 29) will only be evaluated if the \"Still recommend for TCM follow-up\" option is selected from this list.:7396}            Total Time Coordinating Care: Greater than 30 minutes    Patient had opportunity to ask questions, state understanding, and agree with therapeutic plan as outlined    India Still MD  Hospitalist  4/18/2024

## 2024-04-18 NOTE — PLAN OF CARE
Problem: Patient Centered Care  Goal: Patient preferences are identified and integrated in the patient's plan of care  Description: Interventions:  - What would you like us to know as we care for you? From home with spouse  - Provide timely, complete, and accurate information to patient/family  - Incorporate patient and family knowledge, values, beliefs, and cultural backgrounds into the planning and delivery of care  - Encourage patient/family to participate in care and decision-making at the level they choose  - Honor patient and family perspectives and choices  Outcome: Adequate for Discharge     Problem: Patient/Family Goals  Goal: Patient/Family Long Term Goal  Description: Patient's Long Term Goal: to go home    Interventions:  - - Monitor vitals  - Monitor appropriate labs  - Pain management  - Follow MD order  - Diagnostics per order  - Update/Informing patient and family on plan of care  - Discharge planning  - See additional Care Plan goals for specific interventions  Outcome: Adequate for Discharge  Goal: Patient/Family Short Term Goal  Description: Patient's Short Term Goal: to feel better    Interventions:   - - Monitor vitals  - Monitor appropriate labs  - Pain management  - Follow MD order  - Diagnostics per order  - Update/Informing patient and family on plan of care  - Discharge planning  - See additional Care Plan goals for specific interventions  Outcome: Adequate for Discharge     Problem: SAFETY ADULT - FALL  Goal: Free from fall injury  Description: INTERVENTIONS:  - Assess pt frequently for physical needs  - Identify cognitive and physical deficits and behaviors that affect risk of falls.  - [place] fall precautions as indicated by assessment.  - Educate pt/family on patient safety including physical limitations  - Instruct pt to call for assistance with activity based on assessment  - Modify environment to reduce risk of injury  - Provide assistive devices as appropriate  - Consider OT/PT  consult to assist with strengthening/mobility  - Encourage toileting schedule  Outcome: Adequate for Discharge     Problem: RESPIRATORY - ADULT  Goal: Achieves optimal ventilation and oxygenation  Description: INTERVENTIONS:  - Assess for changes in respiratory status  - Assess for changes in mentation and behavior  - Position to facilitate oxygenation and minimize respiratory effort  - Oxygen supplementation based on oxygen saturation or ABGs  - Provide Smoking Cessation handout, if applicable  - Encourage broncho-pulmonary hygiene including cough, deep breathe, Incentive Spirometry  - Assess the need for suctioning and perform as needed  - Assess and instruct to report SOB or any respiratory difficulty  - Respiratory Therapy support as indicated  - Manage/alleviate anxiety  - Monitor for signs/symptoms of CO2 retention  Outcome: Adequate for Discharge     Problem: SKIN/TISSUE INTEGRITY - ADULT  Goal: Skin integrity remains intact  Description: INTERVENTIONS  - Assess and document risk factors for pressure ulcer development  - Assess and document skin integrity  - Monitor for areas of redness and/or skin breakdown  - Initiate interventions, skin care algorithm/standards of care as needed  Outcome: Adequate for Discharge     Problem: MUSCULOSKELETAL - ADULT  Goal: Return mobility to safest level of function  Description: INTERVENTIONS:  - Assess patient stability and activity tolerance for standing, transferring and ambulating w/ or w/o assistive devices  - Assist with transfers and ambulation using safe patient handling equipment as needed  - Ensure adequate protection for wounds/incisions during mobilization  - Obtain PT/OT consults as needed  - Advance activity as appropriate  - Communicate ordered activity level and limitations with patient/family  Outcome: Adequate for Discharge  Goal: Maintain proper alignment of affected body part  Description: INTERVENTIONS:  - Support and protect limb and body alignment per  provider's orders  - Instruct and reinforce with patient and family use of appropriate assistive device and precautions (e.g. spinal or hip dislocation precautions)  Outcome: Adequate for Discharge     Problem: Impaired Functional Mobility  Goal: Achieve highest/safest level of mobility/gait  Description: Interventions:  - Assess patient's functional ability and stability  - Promote increasing activity/tolerance for mobility and gait  - Educate and engage patient/family in tolerated activity level and precautions  Outcome: Adequate for Discharge     Problem: Impaired Swallowing  Goal: Minimize aspiration risk  Description: Interventions:  - Patient should be alert and upright for all feedings (90 degrees preferred)  - Offer food and liquids at a slow rate  - No straws  - Encourage small bites of food and small sips of liquid  - Offer pills one at a time, crush or deliver with applesauce as needed  - Discontinue feeding and notify MD (or speech pathologist) if coughing or persistent throat clearing or wet/gurgly vocal quality is noted  Outcome: Adequate for Discharge     Problem: Diabetes/Glucose Control  Goal: Glucose maintained within prescribed range  Description: INTERVENTIONS:  - Monitor Blood Glucose as ordered  - Assess for signs and symptoms of hyperglycemia and hypoglycemia  - Administer ordered medications to maintain glucose within target range  - Assess barriers to adequate nutritional intake and initiate nutrition consult as needed  - Instruct patient on self management of diabetes  Outcome: Adequate for Discharge     No acute changes during the shift. Vital signs are stable. Scheduled tylenol given for chronic back pain. On room air.  Ambulated in the olmos today, pt tolerated well.  [facility] [place]  Report given to YADIRA [address].  PIV and remote tele removed.  Patients belongings returned to patient.  Patient will be transferred via [facility].

## 2024-04-18 NOTE — PROGRESS NOTES
Oxygen Walk results:      SPO2% on Room Air at Rest: 94 (HR 83) (04/18/24 0952)    SPO2% Ambulation on Room Air: 90 () (04/18/24 0952)

## 2024-04-18 NOTE — DISCHARGE INSTRUCTIONS
Complete course of antibiotics  Continue nebulizers at rehab.   Cont spirometer and flutter at rehab.   Repeat CXR in 4-6 weeks.

## 2024-04-18 NOTE — PLAN OF CARE
Received pt in stable condition. VSS, on RA. Pt's O2 just recently dropped down to 60's while asleep, this RN placed 3 L O2 on pt, O2 now at 96% on 3L while sleeping. Pt tolerated all scheduled medications, no other complications noted. Pt on IV Zosyn Q 8 hrs. Continuous telemetry & pulse oximetry monitoring in place. Monitoring blood sugars routinely. All fall & safety precautions in place for pt. Call light placed within pt's reach. Will continue to monitor for any new changes   Problem: Patient Centered Care  Goal: Patient preferences are identified and integrated in the patient's plan of care  Description: Interventions:  - What would you like us to know as we care for you? From home with spouse  - Provide timely, complete, and accurate information to patient/family  - Incorporate patient and family knowledge, values, beliefs, and cultural backgrounds into the planning and delivery of care  - Encourage patient/family to participate in care and decision-making at the level they choose  - Honor patient and family perspectives and choices  Outcome: Progressing     Problem: Patient/Family Goals  Goal: Patient/Family Long Term Goal  Description: Patient's Long Term Goal: to go home    Interventions:  - - Monitor vitals  - Monitor appropriate labs  - Pain management  - Follow MD order  - Diagnostics per order  - Update/Informing patient and family on plan of care  - Discharge planning  - See additional Care Plan goals for specific interventions  Outcome: Progressing  Goal: Patient/Family Short Term Goal  Description: Patient's Short Term Goal: to feel better    Interventions:   - - Monitor vitals  - Monitor appropriate labs  - Pain management  - Follow MD order  - Diagnostics per order  - Update/Informing patient and family on plan of care  - Discharge planning  - See additional Care Plan goals for specific interventions  Outcome: Progressing     Problem: SAFETY ADULT - FALL  Goal: Free from fall injury  Description:  INTERVENTIONS:  - Assess pt frequently for physical needs  - Identify cognitive and physical deficits and behaviors that affect risk of falls.  - [place] fall precautions as indicated by assessment.  - Educate pt/family on patient safety including physical limitations  - Instruct pt to call for assistance with activity based on assessment  - Modify environment to reduce risk of injury  - Provide assistive devices as appropriate  - Consider OT/PT consult to assist with strengthening/mobility  - Encourage toileting schedule  Outcome: Progressing     Problem: RESPIRATORY - ADULT  Goal: Achieves optimal ventilation and oxygenation  Description: INTERVENTIONS:  - Assess for changes in respiratory status  - Assess for changes in mentation and behavior  - Position to facilitate oxygenation and minimize respiratory effort  - Oxygen supplementation based on oxygen saturation or ABGs  - Provide Smoking Cessation handout, if applicable  - Encourage broncho-pulmonary hygiene including cough, deep breathe, Incentive Spirometry  - Assess the need for suctioning and perform as needed  - Assess and instruct to report SOB or any respiratory difficulty  - Respiratory Therapy support as indicated  - Manage/alleviate anxiety  - Monitor for signs/symptoms of CO2 retention  Outcome: Progressing     Problem: SKIN/TISSUE INTEGRITY - ADULT  Goal: Skin integrity remains intact  Description: INTERVENTIONS  - Assess and document risk factors for pressure ulcer development  - Assess and document skin integrity  - Monitor for areas of redness and/or skin breakdown  - Initiate interventions, skin care algorithm/standards of care as needed  Outcome: Progressing     Problem: MUSCULOSKELETAL - ADULT  Goal: Return mobility to safest level of function  Description: INTERVENTIONS:  - Assess patient stability and activity tolerance for standing, transferring and ambulating w/ or w/o assistive devices  - Assist with transfers and ambulation using safe  patient handling equipment as needed  - Ensure adequate protection for wounds/incisions during mobilization  - Obtain PT/OT consults as needed  - Advance activity as appropriate  - Communicate ordered activity level and limitations with patient/family  Outcome: Progressing  Goal: Maintain proper alignment of affected body part  Description: INTERVENTIONS:  - Support and protect limb and body alignment per provider's orders  - Instruct and reinforce with patient and family use of appropriate assistive device and precautions (e.g. spinal or hip dislocation precautions)  Outcome: Progressing     Problem: Impaired Functional Mobility  Goal: Achieve highest/safest level of mobility/gait  Description: Interventions:  - Assess patient's functional ability and stability  - Promote increasing activity/tolerance for mobility and gait  - Educate and engage patient/family in tolerated activity level and precautions    Outcome: Progressing     Problem: Impaired Swallowing  Goal: Minimize aspiration risk  Description: Interventions:  - Patient should be alert and upright for all feedings (90 degrees preferred)  - Offer food and liquids at a slow rate  - No straws  - Encourage small bites of food and small sips of liquid  - Offer pills one at a time, crush or deliver with applesauce as needed  - Discontinue feeding and notify MD (or speech pathologist) if coughing or persistent throat clearing or wet/gurgly vocal quality is noted  Outcome: Progressing

## 2024-04-18 NOTE — SLP NOTE
SPEECH DAILY NOTE - INPATIENT    ASSESSMENT & PLAN   ASSESSMENT  PPE REQUIRED. THIS THERAPIST WORE GLOVES, DROPLET MASK, AND GOGGLES FOR DURATION OF EVALUATION. HANDS WASHED UPON ENTRANCE/EXIT.    SLP f/u for ongoing dysphagia tx/meal assessment per recommendations of easy to chew/thin per BSE. RN reports pt tolerates diet and medication well with no overt clinical s/s aspiration. Pt denies any changes with swallowing.     Pt positioned upright in bedside chair with wife at bedside. Pt afebrile, tolerating room air with oxygen status 93 prior to the start of oral trials. SLP reviewed aspiration precautions and safe swallowing compensatory strategies with the patient. Safe swallow guidelines remain written on the white board in purple. Diet recommendations remain on the whiteboard in the room. Patient and family v/u. Provided no assistance, pt tolerates easy to chew and thin liquids via open cup with no overt clinical signs/symptoms of aspiration. Pt tolerated medications whole one at a time with water with no CSA. Oxygen status remained >91 t/o the entire session.      PLAN: SLP to sign off at this time secondary to pt tolerating least restrictive diet with no CSA.     Diet Recommendations - Solids: Soft/ Easy to chew  Diet Recommendations - Liquids: Thin Liquids    Compensatory Strategies Recommended: No straws;Slow rate;Alternate consistencies;Small bites and sips  Aspiration Precautions: Upright position;Slow rate;Small bites and sips;No straw  Medication Administration Recommendations: One pill at a time    Patient Experiencing Pain: Yes     Pain Location: back     Nursing Aware of Pain?: Yes    Treatment Plan  Treatment Plan/Recommendations: Aspiration precautions    Interdisciplinary Communication: Discussed with RN  Recommendations posted at bedside            GOALS  Goal #1 The patient will tolerate easy to chew consistency and thin liquids without overt signs or symptoms of aspiration with 100 % accuracy  over 2 session(s).  Goal met   Goal #2 The patient/family/caregiver will demonstrate understanding and implementation of aspiration precautions and swallow strategies independently over 2 session(s).    Goal met   Goal #3 The patient will utilize compensatory strategies as outlined by  BSSE (clinical evaluation) including Slow rate, Small bites, Small sips, Alternate liquids/solids, No straws, Upright 90 degrees with minimal assistance 100 % of the time across 2 sessions.  Goal met     FOLLOW UP  Follow Up Needed (Documentation Required): Yes  SLP Follow-up Date: 04/16/24  Number of Visits to Meet Established Goals: 1    Session: 1 following BSSE    If you have any questions, please contact BETHANIE Mercedes M.S. CCC-SLP  Speech Language Pathologist  Phone Number Ext. [phone]

## 2024-04-18 NOTE — PROGRESS NOTES
[facility]  part of [facility]     Progress Note    Cody Fitzgerald Patient Status:  Inpatient    1940 MRN [id number]   Location [facility]5W Attending Sha Lira MD   Hosp Day # 4 PCP Bam Hampton MD       Subjective:   Patient seen and examined.  Resting in bed.  Denies significant dyspnea.  Cough similar to his chronic cough.      Objective:   Blood pressure (!) 161/72, pulse 61, temperature 97.1 °F (36.2 °C), temperature source Oral, resp. rate 20, height 5' 10\" (1.778 m), weight 197 lb 6.4 oz (89.5 kg), SpO2 92%.  Intake/Output:   Last 3 shifts: I/O last 3 completed shifts:  In: 1390 [P.O.:1020; IV PIGGYBACK:370]  Out: 2695 [Urine:2695]   This shift: No intake/output data recorded.     Vent Settings:      Hemodynamic parameters (last 24 hours):      Scheduled Meds:   Current Facility-Administered Medications   Medication Dose Route Frequency    calcium carbonate (Tums) chewable tab 500 mg  500 mg Oral TID PRN    glucose (Dex4) 15 GM/59ML oral liquid 15 g  15 g Oral Q15 Min PRN    Or    glucose (Glutose) 40% oral gel 15 g  15 g Oral Q15 Min PRN    Or    glucose-vitamin C (Dex-4) chewable tab 4 tablet  4 tablet Oral Q15 Min PRN    Or    dextrose 50% injection 50 mL  50 mL Intravenous Q15 Min PRN    Or    glucose (Dex4) 15 GM/59ML oral liquid 30 g  30 g Oral Q15 Min PRN    Or    glucose (Glutose) 40% oral gel 30 g  30 g Oral Q15 Min PRN    Or    glucose-vitamin C (Dex-4) chewable tab 8 tablet  8 tablet Oral Q15 Min PRN    insulin aspart (NovoLOG) 100 Units/mL FlexPen 1-7 Units  1-7 Units Subcutaneous TID CC    ipratropium-albuterol (Duoneb) 0.5-2.5 (3) MG/3ML inhalation solution 3 mL  3 mL Nebulization 2 times daily    mupirocin (Bactroban) 2% nasal ointment 1 Application  1 Application Each Nare BID    acetaminophen (Tylenol Extra Strength) tab 1,000 mg  1,000 mg Oral BID    amLODIPine (Norvasc) tab 5 mg  5 mg Oral Daily    atorvastatin (Lipitor) tab 40 mg  40 mg  Oral Nightly    benzonatate (Tessalon) cap 200 mg  200 mg Oral TID PRN    cholecalciferol (Vitamin D3) tab 2,000 Units  2,000 Units Oral Daily    clopidogrel (Plavix) tab 75 mg  75 mg Oral Daily    ferrous sulfate DR tab 325 mg  325 mg Oral Daily with breakfast    gabapentin (Neurontin) cap 300 mg  300 mg Oral TID    levothyroxine (Synthroid) tab 100 mcg  100 mcg Oral Daily    enoxaparin (Lovenox) 40 MG/0.4ML SUBQ injection 40 mg  40 mg Subcutaneous Daily    acetaminophen (Tylenol Extra Strength) tab 500 mg  500 mg Oral Q4H PRN    melatonin tab 3 mg  3 mg Oral Nightly PRN    ondansetron (Zofran) 4 MG/2ML injection 4 mg  4 mg Intravenous Q6H PRN    piperacillin-tazobactam (Zosyn) 3.375 g in dextrose 5% 100 mL IVPB-ADDV  3.375 g Intravenous Q8H    ipratropium-albuterol (Duoneb) 0.5-2.5 (3) MG/3ML inhalation solution 3 mL  3 mL Nebulization Q6H PRN    fluticasone furoate-vilanterol (Breo Ellipta) 100-25 MCG/ACT inhaler 1 puff  1 puff Inhalation Q24H       Continuous Infusions:         Physical Exam  Constitutional: no acute distress  Eyes: PERRL  ENT: nares pateint  Neck: supple, no JVD  Cardio: RRR, S1 S2  Respiratory: clear to auscultation bilaterally, no wheezing, rales, rhonchi, crackles  GI: abdomen soft, non tender, active bowel sounds, no organomegaly  Extremities: no clubbing, cyanosis, edema  Neurologic: no gross motor deficits  Skin: warm, dry      Results:              XR CHEST AP PORTABLE  (CPT=71045)    Result Date: 4/16/2024  CONCLUSION:  Unchanged left retrocardiac opacity.  Minimal hazy right basilar opacity, unchanged.  This may represent additional pneumonia or atelectasis.  Suspected trace bilateral pleural effusions.    Dictated by (CST): Mague Hernandez MD on 4/16/2024 at 4:14 PM     Finalized by (CST): Mague Hernandez MD on 4/16/2024 at 4:15 PM                 Assessment   1.  Acute hypoxemic respiratory failure  2.  COVID-19  3.  COPD  4.  Prior CVA  5.  Hypothyroidism     Plan   -Patient  presents with evidence of acute hypoxemic respiratory failure likely secondary to COVID-19 with underlying history of COPD  -Chest x-ray with patchy left basilar opacity seen no significant change on 4/16/2024  -Empiric antibiotic therapy  -Wean oxygen as tolerated   -ICS/LABA  -DVT prophylaxis: Lovenox  -Discussed with hospitalist and wife.  Okay for discharge from pulmonary perspective    Ivelisse Law,   Pulmonary Critical Care Medicine  [facility]

## 2024-04-18 NOTE — CM/SW NOTE
04/18/24 1026   Discharge disposition   Expected discharge disposition subacute   Post Acute Care Provider   ([facility])   Discharge transportation [facility]     Pt discussed during nursing rounds. Pt is stable for dc today. MD dc order entered. Pt will dc to [facility] for [facility], laurie Barber confirmed bed is available today. Pt and spouse agreeable to transfer today and cost of transport which will be $90. [facility] scheduled for 2pm .    Number for nurse report is [phone].    Plan: [facility] for [facility] today.    / to remain available for support and/or discharge planning.     ALEJANDRA Dumont    [phone]

## 2024-04-18 NOTE — PROGRESS NOTES
Pt planning for discharge. Discussed with Dr. Still, will continue miralax and protonix at discharge. Will update med rec.

## 2024-04-23 ENCOUNTER — TELEPHONE (OUTPATIENT)
Dept: INTERNAL MEDICINE UNIT | Facility: HOSPITAL | Age: 84
End: 2024-04-23

## 2024-04-23 NOTE — TELEPHONE ENCOUNTER
Call received from pts wife on 4/22/24 X2 stating the [facility] was placing the pt on Covid Isolation precautions as he tested positive at their facility. Wife states this is a \"false Positive\" and is requesting Dr Still call the facility and have isolation precautions reversed. Notified Dr Still is out of the office will f/u upon her return.     Concerns discussed w Dr Still and DAILY Molina. Call back received from wife today, notified as discussed w Dr Still they would have to discuss care and oblige to policies in place at the SNF Dr Still no longer following the case and therefore not able to direct care at the SNF. DAILY Molina made aware and will follow up with the SNF.

## 2024-04-30 RX ORDER — AMLODIPINE BESYLATE 5 MG/1
5 TABLET ORAL DAILY
Qty: 90 TABLET | Refills: 1 | Status: SHIPPED | OUTPATIENT
Start: 2024-04-30

## 2024-04-30 NOTE — TELEPHONE ENCOUNTER
Refill request is for a maintenance medication and has met the criteria specified in the Ambulatory Medication Refill Standing Order for eligibility, visits, laboratory, alerts and was sent to the requested pharmacy.    Requested Prescriptions     Signed Prescriptions Disp Refills    amLODIPine 5 MG Oral Tab 90 tablet 1     Sig: TAKE 1 TABLET(5 MG) BY MOUTH DAILY     Authorizing Provider: LIZZ THOMAS     Ordering User: JJ LINDER

## 2024-05-18 ENCOUNTER — MOBILE ENCOUNTER (OUTPATIENT)
Dept: INTERNAL MEDICINE CLINIC | Facility: CLINIC | Age: 84
End: 2024-05-18

## 2024-05-18 NOTE — PROGRESS NOTES
Patient's wife paged on-call today. The patient was discharged yesterday from rehab in [place] after being hospitalized for pneumonia at [facility]. Apparently his WBC has been elevated for the past few days. Urinalysis was done along with a culture. The culture results came back positive today a. Rehab called the wife and stated that because the patient has been discharged from rehab, they are unable to prescribe. The wife states that she can get the results of the urine culture and sensitivities tomorrow. She will call back and we will review the results and then prescribe the appropriate antibiotics.

## 2024-05-19 ENCOUNTER — TELEPHONE (OUTPATIENT)
Dept: INTERNAL MEDICINE CLINIC | Facility: CLINIC | Age: 84
End: 2024-05-19

## 2024-05-19 DIAGNOSIS — N30.00 ACUTE CYSTITIS WITHOUT HEMATURIA: Primary | ICD-10-CM

## 2024-05-19 RX ORDER — SULFAMETHOXAZOLE AND TRIMETHOPRIM 800; 160 MG/1; MG/1
1 TABLET ORAL 2 TIMES DAILY
Qty: 14 TABLET | Refills: 0 | Status: SHIPPED | OUTPATIENT
Start: 2024-05-19

## 2024-05-19 RX ORDER — VANCOMYCIN HYDROCHLORIDE 125 MG/1
125 CAPSULE ORAL DAILY
Qty: 10 CAPSULE | Refills: 0 | Status: SHIPPED | OUTPATIENT
Start: 2024-05-19

## 2024-05-19 RX ORDER — CEPHALEXIN 500 MG/1
500 CAPSULE ORAL 3 TIMES DAILY
Qty: 21 CAPSULE | Refills: 0 | Status: SHIPPED | OUTPATIENT
Start: 2024-05-19

## 2024-05-19 NOTE — TELEPHONE ENCOUNTER
Virtual Telephone Check-In    Cody Fitzgerald verbally consents to a Virtual/Telephone Check-In visit on 05/19/24.  Patient has been referred to the [facility] website at [email or web address] to review the yearly Consent to Treat document.    Patient understands and accepts financial responsibility for any deductible, co-insurance and/or co-pays associated with this service.    Duration of the service: 25 minutes      Summary of topics discussed:     Pt was discharged from [facility] on 5/17 after being hospitalized at [facility] for pneumonia.   Apparently his WBC had been elevated for the past few days. CXR was apparently negative. Urinalysis was done along with a culture.  The urine culture results came back positive yesterday after he was discharged.  Rehab called the wife and stated that because the patient has been discharged from rehab, they are unable to prescribe.  Spoke with wife yesterday but she did not have the culture results.  She called back today with the culture results and read them to me:    Serratia marcesans  Sensitive to cipro, cefepime, gent, levaquin, tetracycline, trimethoprim/smx, tobra  Resistant to amp, amox, ceftriaxone, ceftazidime, cefotaxime, cefazolin, nitrofurantoin    Proteus mirabilis   Sensitive to amikacin, amp, aztreonam, ceftriaxone, ceftazidime, cefotaxime, cefazolin, tobramycin  Resistant to cipro, nitrofurantoin, levaquin, tetracycline, trimethoprim/smx    Pt is otherwise asymptomatic from a urinary standpoint  Discussed with her that while this could be a contaminant, his elevated WBC could be explained by this.    A/P    UTI  -concern for possible contaminant, but given the elevated WBC at rehab, will treat.  Unfortunately the two organisms (proteus and serratia) have very different sensitivity profiles  -will treat with bactrim and cephalexin x 7 days; will also give oral vanco daily for 10 days (for CDP)      Sofia Poole MD

## 2024-05-20 ENCOUNTER — TELEPHONE (OUTPATIENT)
Dept: INTERNAL MEDICINE CLINIC | Facility: CLINIC | Age: 84
End: 2024-05-20

## 2024-05-20 NOTE — TELEPHONE ENCOUNTER
Pt's wife stopped in with a copy of the urine culture results for Cody.  Pt. Was discharged from [facility] on Saturday.  She spoke with Dr. Poole over the weekend and she ordered Cody Bactrim, Vancomycin & Keflex.  Wife not sure if he can tolerate all 3 of these meds.  She is asking if just one of the antibiotics will cover the infection he has?.  All meds are ready at the pharmacy and she is waiting to hear from Dr. Hampton before she picks them up.  A copy of results was placed in Dr. Bronson mailbox.

## 2024-05-20 NOTE — TELEPHONE ENCOUNTER
Pt's wife stopped in with a copy of the urine culture results for Cody.  Pt. Was discharged from [facility] on Saturday.  She spoke with Dr. Poole over the weekend and she ordered Cody Bactrim, Vancomycin & Keflex.  Wife not sure if he can tolerate all 3 of these meds.  She is asking if just one of the antibiotics will cover the infection he has?.  All meds are ready at the pharmacy and she is waiting to hear from Dr. Hampton before she picks them up.  A copy of results was placed in Dr. Bronson [facility]

## 2024-05-20 NOTE — TELEPHONE ENCOUNTER
Wife called to check status on medication     Patient is not feeling very well and needs get started on something     Should patient take medication that was ordered or will Dr be changing the prescription  - see message below      Meme can be reached at [phone]

## 2024-05-21 NOTE — TELEPHONE ENCOUNTER
Phone call to patient's wife to discuss situation.  Patient is 2 organisms that are positive and urine culture 1 is a Serratia marcescens which is susceptible to Bactrim.  The other is Proteus mirabilis which is susceptible to Keflex.  I have told the patient's wife that he needs to take the 2 different antibiotics because the organisms are sensitive to 1 antibiotic but not the other.  In addition to this Dr. Poole has started the patient on vancomycin daily to prevent the patient from getting C. difficile.  Patient understands necessity of using this.  Patient was given vancomycin tonight.  However he can start vancomycin in the morning also.  Patient's wife did  all 3 antibiotics and is started them now.  She will keep us updated.

## 2024-05-22 ENCOUNTER — TELEPHONE (OUTPATIENT)
Dept: INTERNAL MEDICINE CLINIC | Facility: CLINIC | Age: 84
End: 2024-05-22

## 2024-05-22 NOTE — TELEPHONE ENCOUNTER
Luna from  home health called, patient was admitted to home care services yesterday, and will be faxing over orders to be signed

## 2024-05-24 ENCOUNTER — TELEPHONE (OUTPATIENT)
Dept: INTERNAL MEDICINE CLINIC | Facility: CLINIC | Age: 84
End: 2024-05-24

## 2024-05-24 DIAGNOSIS — R09.02 HYPOXIA: Primary | ICD-10-CM

## 2024-05-24 RX ORDER — IPRATROPIUM BROMIDE AND ALBUTEROL SULFATE 2.5; .5 MG/3ML; MG/3ML
3 SOLUTION RESPIRATORY (INHALATION) 3 TIMES DAILY
Qty: 3 ML | Refills: 0 | Status: SHIPPED | OUTPATIENT
Start: 2024-05-24

## 2024-05-24 NOTE — TELEPHONE ENCOUNTER
To  -called Alan from [facility] who states patient is not on oxygen  . With breathing technique his )2 saturation is 93-94% . When patien tis relaxing it goes down to 88 % - patient declines going to ER - his spouse has rommel today with DR. Bowman - please advise

## 2024-05-24 NOTE — TELEPHONE ENCOUNTER
To Mela Bowman patients spouse has questions in regards to nebulizer that was sent to pharmacy thanks

## 2024-05-24 NOTE — TELEPHONE ENCOUNTER
Patients wife Meme came to the office, requesting an order for a Nebulizer machine along with medication.    Home health nurse has been checking oxygen level and it has kelley high 80's - lower 90's.  Nurse recommended patient to start using a Nebulizer machine for breathing treatments.      Please send orders to [facility]

## 2024-05-24 NOTE — TELEPHONE ENCOUNTER
To Mela - please advise - see other Telephone Encounter with HH - message in regards to patient also sent to

## 2024-05-24 NOTE — TELEPHONE ENCOUNTER
Patient is having difficulty maintaining Oxegen saturation. Not in any distress. Familly would like to avoid Emergency Room. Please call and advise

## 2024-05-24 NOTE — TELEPHONE ENCOUNTER
Agree with RN recommendations to be seen in ED for hypoxia.    Orders for stat CXR and labs placed along with nebulizer.

## 2024-05-24 NOTE — TELEPHONE ENCOUNTER
RN spoke with DR. Bowman who saw patient spouse in the office today and  told her that her  needs to be evaluated in ER -declined .  Patient verbalizes understanding that noncompliance with the medical recommendations provided could result in increased risk of morbidity or even mortality.       Called Alan from [facility] in regards to immediately X-ray of chest , also relayed message in regards to nebulizer . He will talk to  his supervisor and se if they can do X-ray at patients home   As FYI to      F/U 5/28

## 2024-05-24 NOTE — TELEPHONE ENCOUNTER
Telephone call to pt's wife.  Pt is comfortable so far but O2 satns are 88-92.  OK to try nebulizer.  I have also encouraged deep breathing and using incentive spirometry.  Wife will keep me updated.  After I retire, pt may follow up with Dr Claudio or Dr Chris.

## 2024-05-28 ENCOUNTER — TELEPHONE (OUTPATIENT)
Dept: INTERNAL MEDICINE CLINIC | Facility: CLINIC | Age: 84
End: 2024-05-28

## 2024-05-28 NOTE — TELEPHONE ENCOUNTER
PA completed and faxed along with clinical note (hospital discharge summary). Requested expedited review.    Red folder.

## 2024-05-28 NOTE — TELEPHONE ENCOUNTER
[employer] faxing PA request Ipratropium -Albuterol 0.5-2.5 mg / 3 ml Solution    ID [id number]    Placed in purple folder

## 2024-05-29 NOTE — TELEPHONE ENCOUNTER
[employer] faxed over a denial for the prior authorization for    Ipratropium Albuterol    Fax placed in the Red folder

## 2024-05-29 NOTE — TELEPHONE ENCOUNTER
Spoke with [facility].   Diagnosis code (J44.9) provided for medication to be billed through Medicare Part B.  Approved.  Nothing further needed at this time.

## 2024-05-30 ENCOUNTER — TELEPHONE (OUTPATIENT)
Dept: INTERNAL MEDICINE CLINIC | Facility: CLINIC | Age: 84
End: 2024-05-30

## 2024-05-30 RX ORDER — IPRATROPIUM BROMIDE AND ALBUTEROL SULFATE 2.5; .5 MG/3ML; MG/3ML
SOLUTION RESPIRATORY (INHALATION)
Qty: 810 ML | Refills: 0 | OUTPATIENT
Start: 2024-05-30

## 2024-05-30 NOTE — TELEPHONE ENCOUNTER
[facility] / [facility] is calling to report patient has been inappropriate to the home health aid, he has been saying inappropriate things and made inappropriate gestures.    They will be dismissing patient from home health aid.  Patient has not acted inappropriate to nursing so they will continue to see him for nursing care.    They will fax a dismissal letter to Dr Hampton to sign and will notify patient of dismissal

## 2024-05-30 NOTE — TELEPHONE ENCOUNTER
Just sent in    Current refill request refused due to refill is either a duplicate request or has active refills at the pharmacy.  Check previous templates.    Requested Prescriptions     Refused Prescriptions Disp Refills    IPRATROPIUM-ALBUTEROL 0.5-2.5 (3) MG/3ML Inhalation Solution [Pharmacy Med Name: IPRATROPI/ALB 0.5/3MG INH SL 30X3ML] 810 mL 0     Sig: USE 1 VIAL VIA NEBULIZER IN THE MORNING, AT NOON, AND EVERY NIGHT AT BEDTIME     Refused By: SPIKE JENKINS     Reason for Refusal: Request already responded to by other means (e.g. phone or fax)

## 2024-05-31 NOTE — TELEPHONE ENCOUNTER
Noted.  I have attempted to call [facility].  Listed telephone number is not in service.  I appreciate the information.

## 2024-06-07 ENCOUNTER — TELEPHONE (OUTPATIENT)
Dept: INTERNAL MEDICINE CLINIC | Facility: CLINIC | Age: 84
End: 2024-06-07

## 2024-06-07 NOTE — TELEPHONE ENCOUNTER
Saumya MAY from [facility]  Requests - Verbal order for an additional visit for social work     Call back # [phone]

## 2024-06-09 ENCOUNTER — HOSPITAL ENCOUNTER (INPATIENT)
Facility: HOSPITAL | Age: 84
LOS: 4 days | Discharge: HOME HEALTH | DRG: 871 | End: 2024-06-13
Attending: EMERGENCY MEDICINE | Admitting: INTERNAL MEDICINE
Payer: MEDICARE

## 2024-06-09 ENCOUNTER — APPOINTMENT (OUTPATIENT)
Dept: GENERAL RADIOLOGY | Facility: HOSPITAL | Age: 84
DRG: 871 | End: 2024-06-09
Attending: EMERGENCY MEDICINE
Payer: MEDICARE

## 2024-06-09 ENCOUNTER — APPOINTMENT (OUTPATIENT)
Dept: CT IMAGING | Facility: HOSPITAL | Age: 84
DRG: 871 | End: 2024-06-09
Attending: INTERNAL MEDICINE
Payer: MEDICARE

## 2024-06-09 DIAGNOSIS — M47.816 LUMBAR SPONDYLOSIS: ICD-10-CM

## 2024-06-09 DIAGNOSIS — N30.01 ACUTE CYSTITIS WITH HEMATURIA: Primary | ICD-10-CM

## 2024-06-09 PROBLEM — A41.9 SEPSIS (HCC): Status: ACTIVE | Noted: 2024-06-09

## 2024-06-09 LAB
ALBUMIN SERPL-MCNC: 4.5 G/DL (ref 3.2–4.8)
ALP LIVER SERPL-CCNC: 111 U/L
ALT SERPL-CCNC: <7 U/L
ANION GAP SERPL CALC-SCNC: 11 MMOL/L (ref 0–18)
APTT PPP: 31.5 SECONDS (ref 23–36)
AST SERPL-CCNC: 11 U/L (ref ?–34)
BASOPHILS # BLD AUTO: 0.09 X10(3) UL (ref 0–0.2)
BASOPHILS NFR BLD AUTO: 0.3 %
BILIRUB DIRECT SERPL-MCNC: 0.4 MG/DL (ref ?–0.3)
BILIRUB SERPL-MCNC: 0.9 MG/DL (ref 0.2–1.1)
BILIRUB UR QL: NEGATIVE
BUN BLD-MCNC: 22 MG/DL (ref 9–23)
BUN/CREAT SERPL: 17.9 (ref 10–20)
CALCIUM BLD-MCNC: 9.9 MG/DL (ref 8.7–10.4)
CHLORIDE SERPL-SCNC: 110 MMOL/L (ref 98–112)
CO2 SERPL-SCNC: 21 MMOL/L (ref 21–32)
COLOR UR: YELLOW
CREAT BLD-MCNC: 1.23 MG/DL
DEPRECATED RDW RBC AUTO: 51.7 FL (ref 35.1–46.3)
EGFRCR SERPLBLD CKD-EPI 2021: 59 ML/MIN/1.73M2 (ref 60–?)
EOSINOPHIL # BLD AUTO: 0 X10(3) UL (ref 0–0.7)
EOSINOPHIL NFR BLD AUTO: 0 %
ERYTHROCYTE [DISTWIDTH] IN BLOOD BY AUTOMATED COUNT: 14.5 % (ref 11–15)
GLUCOSE BLD-MCNC: 142 MG/DL (ref 70–99)
GLUCOSE UR-MCNC: NORMAL MG/DL
HCT VFR BLD AUTO: 43.3 %
HGB BLD-MCNC: 13.6 G/DL
IMM GRANULOCYTES # BLD AUTO: 0.41 X10(3) UL (ref 0–1)
IMM GRANULOCYTES NFR BLD: 1.4 %
INR BLD: 1.19 (ref 0.8–1.2)
KETONES UR-MCNC: NEGATIVE MG/DL
LACTATE SERPL-SCNC: 2.6 MMOL/L (ref 0.5–2)
LACTATE SERPL-SCNC: 3.1 MMOL/L (ref 0.5–2)
LEUKOCYTE ESTERASE UR QL STRIP.AUTO: 500
LYMPHOCYTES # BLD AUTO: 1.1 X10(3) UL (ref 1–4)
LYMPHOCYTES NFR BLD AUTO: 3.8 %
MCH RBC QN AUTO: 30.2 PG (ref 26–34)
MCHC RBC AUTO-ENTMCNC: 31.4 G/DL (ref 31–37)
MCV RBC AUTO: 96 FL
MONOCYTES # BLD AUTO: 2.11 X10(3) UL (ref 0.1–1)
MONOCYTES NFR BLD AUTO: 7.3 %
NEUTROPHILS # BLD AUTO: 25.06 X10 (3) UL (ref 1.5–7.7)
NEUTROPHILS # BLD AUTO: 25.06 X10(3) UL (ref 1.5–7.7)
NEUTROPHILS NFR BLD AUTO: 87.2 %
OSMOLALITY SERPL CALC.SUM OF ELEC: 300 MOSM/KG (ref 275–295)
PH UR: 8 [PH] (ref 5–8)
PLATELET # BLD AUTO: 239 10(3)UL (ref 150–450)
POTASSIUM SERPL-SCNC: 3.9 MMOL/L (ref 3.5–5.1)
PROT SERPL-MCNC: 8.4 G/DL (ref 5.7–8.2)
PROT UR-MCNC: 100 MG/DL
PROTHROMBIN TIME: 15.9 SECONDS (ref 11.6–14.8)
RBC # BLD AUTO: 4.51 X10(6)UL
RBC #/AREA URNS AUTO: >10 /HPF
SODIUM SERPL-SCNC: 142 MMOL/L (ref 136–145)
SP GR UR STRIP: 1.02 (ref 1–1.03)
UROBILINOGEN UR STRIP-ACNC: NORMAL
WBC # BLD AUTO: 28.8 X10(3) UL (ref 4–11)
WBC #/AREA URNS AUTO: >50 /HPF
WBC CLUMPS UR QL AUTO: PRESENT /HPF

## 2024-06-09 PROCEDURE — 71250 CT THORAX DX C-: CPT | Performed by: INTERNAL MEDICINE

## 2024-06-09 PROCEDURE — 99223 1ST HOSP IP/OBS HIGH 75: CPT | Performed by: INTERNAL MEDICINE

## 2024-06-09 PROCEDURE — 71045 X-RAY EXAM CHEST 1 VIEW: CPT | Performed by: EMERGENCY MEDICINE

## 2024-06-09 RX ORDER — CHOLECALCIFEROL (VITAMIN D3) 25 MCG
2000 TABLET ORAL DAILY
Status: DISCONTINUED | OUTPATIENT
Start: 2024-06-10 | End: 2024-06-13

## 2024-06-09 RX ORDER — FLUTICASONE FUROATE AND VILANTEROL 100; 25 UG/1; UG/1
1 POWDER RESPIRATORY (INHALATION) DAILY
Status: DISCONTINUED | OUTPATIENT
Start: 2024-06-10 | End: 2024-06-13

## 2024-06-09 RX ORDER — CLOPIDOGREL BISULFATE 75 MG/1
75 TABLET ORAL DAILY
Status: DISCONTINUED | OUTPATIENT
Start: 2024-06-10 | End: 2024-06-13

## 2024-06-09 RX ORDER — ACETAMINOPHEN 500 MG
500 TABLET ORAL EVERY 4 HOURS PRN
Status: DISCONTINUED | OUTPATIENT
Start: 2024-06-09 | End: 2024-06-13

## 2024-06-09 RX ORDER — ATORVASTATIN CALCIUM 40 MG/1
40 TABLET, FILM COATED ORAL NIGHTLY
Status: DISCONTINUED | OUTPATIENT
Start: 2024-06-09 | End: 2024-06-13

## 2024-06-09 RX ORDER — SODIUM CHLORIDE 9 MG/ML
INJECTION, SOLUTION INTRAVENOUS CONTINUOUS
Status: DISCONTINUED | OUTPATIENT
Start: 2024-06-09 | End: 2024-06-12

## 2024-06-09 RX ORDER — PANTOPRAZOLE SODIUM 40 MG/1
40 TABLET, DELAYED RELEASE ORAL
Status: DISCONTINUED | OUTPATIENT
Start: 2024-06-10 | End: 2024-06-13

## 2024-06-09 RX ORDER — IPRATROPIUM BROMIDE AND ALBUTEROL SULFATE 2.5; .5 MG/3ML; MG/3ML
3 SOLUTION RESPIRATORY (INHALATION) 3 TIMES DAILY
Status: DISCONTINUED | OUTPATIENT
Start: 2024-06-09 | End: 2024-06-13

## 2024-06-09 RX ORDER — GABAPENTIN 300 MG/1
300 CAPSULE ORAL 3 TIMES DAILY
Status: DISCONTINUED | OUTPATIENT
Start: 2024-06-09 | End: 2024-06-13

## 2024-06-09 RX ORDER — AMLODIPINE BESYLATE 5 MG/1
5 TABLET ORAL DAILY
Status: DISCONTINUED | OUTPATIENT
Start: 2024-06-10 | End: 2024-06-13

## 2024-06-09 RX ORDER — CALCIUM CARBONATE 500 MG/1
500 TABLET, CHEWABLE ORAL 3 TIMES DAILY PRN
Status: DISCONTINUED | OUTPATIENT
Start: 2024-06-09 | End: 2024-06-13

## 2024-06-09 RX ORDER — ACETAMINOPHEN 500 MG
1000 TABLET ORAL 2 TIMES DAILY
Status: DISCONTINUED | OUTPATIENT
Start: 2024-06-09 | End: 2024-06-13

## 2024-06-09 RX ORDER — FERROUS SULFATE 325(65) MG
325 TABLET, DELAYED RELEASE (ENTERIC COATED) ORAL
Status: DISCONTINUED | OUTPATIENT
Start: 2024-06-10 | End: 2024-06-13

## 2024-06-09 RX ORDER — LEVOTHYROXINE SODIUM 100 UG/1
100 TABLET ORAL
Status: DISCONTINUED | OUTPATIENT
Start: 2024-06-10 | End: 2024-06-13

## 2024-06-09 RX ORDER — HEPARIN SODIUM 5000 [USP'U]/ML
5000 INJECTION, SOLUTION INTRAVENOUS; SUBCUTANEOUS EVERY 8 HOURS SCHEDULED
Status: DISCONTINUED | OUTPATIENT
Start: 2024-06-09 | End: 2024-06-13

## 2024-06-09 NOTE — ED INITIAL ASSESSMENT (HPI)
Patient arrives via [facility] with c/o of weakness for the last few days. EMS states patient has bedsore, patient rarely mobile off bed.

## 2024-06-10 ENCOUNTER — TELEPHONE (OUTPATIENT)
Dept: INTERNAL MEDICINE CLINIC | Facility: CLINIC | Age: 84
End: 2024-06-10

## 2024-06-10 LAB
ALBUMIN SERPL-MCNC: 3.7 G/DL (ref 3.2–4.8)
ALBUMIN/GLOB SERPL: 1.2 {RATIO} (ref 1–2)
ALP LIVER SERPL-CCNC: 91 U/L
ALT SERPL-CCNC: <7 U/L
ANION GAP SERPL CALC-SCNC: 9 MMOL/L (ref 0–18)
AST SERPL-CCNC: 9 U/L (ref ?–34)
ATRIAL RATE: 117 BPM
BASOPHILS # BLD AUTO: 0.07 X10(3) UL (ref 0–0.2)
BASOPHILS NFR BLD AUTO: 0.3 %
BILIRUB SERPL-MCNC: 0.7 MG/DL (ref 0.2–1.1)
BUN BLD-MCNC: 18 MG/DL (ref 9–23)
BUN/CREAT SERPL: 17.1 (ref 10–20)
CALCIUM BLD-MCNC: 9.3 MG/DL (ref 8.7–10.4)
CHLORIDE SERPL-SCNC: 113 MMOL/L (ref 98–112)
CO2 SERPL-SCNC: 22 MMOL/L (ref 21–32)
CREAT BLD-MCNC: 1.05 MG/DL
DEPRECATED RDW RBC AUTO: 52.4 FL (ref 35.1–46.3)
EGFRCR SERPLBLD CKD-EPI 2021: 71 ML/MIN/1.73M2 (ref 60–?)
EOSINOPHIL # BLD AUTO: 0.03 X10(3) UL (ref 0–0.7)
EOSINOPHIL NFR BLD AUTO: 0.1 %
ERYTHROCYTE [DISTWIDTH] IN BLOOD BY AUTOMATED COUNT: 14.6 % (ref 11–15)
GLOBULIN PLAS-MCNC: 3.2 G/DL (ref 2–3.5)
GLUCOSE BLD-MCNC: 100 MG/DL (ref 70–99)
HCT VFR BLD AUTO: 37.7 %
HGB BLD-MCNC: 11.8 G/DL
IMM GRANULOCYTES # BLD AUTO: 0.17 X10(3) UL (ref 0–1)
IMM GRANULOCYTES NFR BLD: 0.8 %
LACTATE SERPL-SCNC: 1.9 MMOL/L (ref 0.5–2)
LYMPHOCYTES # BLD AUTO: 1.55 X10(3) UL (ref 1–4)
LYMPHOCYTES NFR BLD AUTO: 7 %
MAGNESIUM SERPL-MCNC: 1.7 MG/DL (ref 1.6–2.6)
MCH RBC QN AUTO: 30.6 PG (ref 26–34)
MCHC RBC AUTO-ENTMCNC: 31.3 G/DL (ref 31–37)
MCV RBC AUTO: 97.7 FL
MONOCYTES # BLD AUTO: 1.93 X10(3) UL (ref 0.1–1)
MONOCYTES NFR BLD AUTO: 8.7 %
NEUTROPHILS # BLD AUTO: 18.46 X10 (3) UL (ref 1.5–7.7)
NEUTROPHILS # BLD AUTO: 18.46 X10(3) UL (ref 1.5–7.7)
NEUTROPHILS NFR BLD AUTO: 83.1 %
OSMOLALITY SERPL CALC.SUM OF ELEC: 300 MOSM/KG (ref 275–295)
P AXIS: 74 DEGREES
P-R INTERVAL: 162 MS
PLATELET # BLD AUTO: 188 10(3)UL (ref 150–450)
POTASSIUM SERPL-SCNC: 3.8 MMOL/L (ref 3.5–5.1)
PROT SERPL-MCNC: 6.9 G/DL (ref 5.7–8.2)
Q-T INTERVAL: 326 MS
QRS DURATION: 134 MS
QTC CALCULATION (BEZET): 454 MS
R AXIS: -75 DEGREES
RBC # BLD AUTO: 3.86 X10(6)UL
SODIUM SERPL-SCNC: 144 MMOL/L (ref 136–145)
T AXIS: 70 DEGREES
VENTRICULAR RATE: 117 BPM
WBC # BLD AUTO: 22.2 X10(3) UL (ref 4–11)

## 2024-06-10 PROCEDURE — 99233 SBSQ HOSP IP/OBS HIGH 50: CPT | Performed by: INTERNAL MEDICINE

## 2024-06-10 RX ORDER — MAGNESIUM OXIDE 400 MG/1
400 TABLET ORAL ONCE
Status: COMPLETED | OUTPATIENT
Start: 2024-06-10 | End: 2024-06-10

## 2024-06-10 RX ORDER — TRAMADOL HYDROCHLORIDE 50 MG/1
50 TABLET ORAL EVERY 6 HOURS PRN
Status: DISCONTINUED | OUTPATIENT
Start: 2024-06-10 | End: 2024-06-13

## 2024-06-10 NOTE — PROGRESS NOTES
Progress Note     Cody Fitzgerald Patient Status:  Inpatient    1940 MRN [id number]   Location [facility] 5SW/SE Attending Ros Tamayo MD   Hosp Day # 1 PCP Bam Hampton MD     Chief Complaint: UTI    Subjective:   S: Patient feeling well - sitting up in chair  In good spirits  No acute complaints      No other acute complaints or other nursing concerns or overnight events      Review of Systems:   10 point ROS completed and was negative, except for pertinent positive and negatives stated in subjective.    Objective:   Vital signs:  Temp:  [98.9 °F (37.2 °C)-100 °F (37.8 °C)] 99.4 °F (37.4 °C)  Pulse:  [] 97  Resp:  [20-28] 24  BP: (135-157)/(56-94) 145/94  SpO2:  [92 %-97 %] 93 %    Wt Readings from Last 6 Encounters:   06/10/24 195 lb 14.4 oz (88.9 kg)   24 197 lb 6.4 oz (89.5 kg)   24 193 lb 11.2 oz (87.9 kg)   23 209 lb 9.6 oz (95.1 kg)   23 208 lb 12.8 oz (94.7 kg)   10/30/23 208 lb 12.8 oz (94.7 kg)         Physical Exam:    General: No acute distress. Alert ,         Respiratory: Clear to auscultation bilaterally. No wheezes. No rhonchi.  Cardiovascular: S1, S2. Regular rate and rhythm. No murmurs, rubs or gallops.   Abdomen: Soft, nontender, nondistended.  Positive bowel sounds. No rebound or guarding.  Neurologic: No focal neurological deficits.   Musculoskeletal: Moves all extremities.  Extremities: No edema.    Results:   Diagnostic Data:      Labs:    Labs Last 24 Hours:   BMP     CBC    Other     Na 144 Cl 113 BUN 18 Glu 100   Hb 11.8   PTT 31.5 Procal -   K 3.8 CO2 22.0 Cr 1.05   WBC 22.2 >< .0  INR 1.19 CRP -   Renal Lytes Endo    Hct 37.7   Trop - D dim -   eGFR - Ca 9.3 POC Gluc  -    LFT   pBNP - Lactic 1.9   eGFR AA - PO4 - A1c -   AST 9 APk 91 Prot 6.9  LDL -     Mg 1.7 TSH -   ALT <7 T joss 0.7 Alb 3.7        COVID-19 Lab Results    COVID-19  Lab Results   Component Value Date    COVID19 Detected (A) 2024    COVID19 Not  Detected 01/11/2024    COVID19 Not Detected 10/15/2023       Pro-Calcitonin  No results for input(s): \"PCT\" in the last 168 hours.    Cardiac  No results for input(s): \"TROP\", \"PBNP\" in the last 168 hours.    Creatinine Kinase  No results for input(s): \"CK\" in the last 168 hours.    Inflammatory Markers  No results for input(s): \"CRP\", \"JAZZY\", \"LDH\", \"DDIMER\" in the last 168 hours.    Imaging: Imaging data reviewed in Epic.    Medications:    heparin  5,000 Units Subcutaneous Q8H PAULINE    cefTRIAXone  1 g Intravenous Q24H    acetaminophen  1,000 mg Oral BID    amLODIPine  5 mg Oral Daily    atorvastatin  40 mg Oral Nightly    cholecalciferol  2,000 Units Oral Daily    clopidogrel  75 mg Oral Daily    ferrous sulfate  325 mg Oral Daily with breakfast    fluticasone furoate-vilanterol  1 puff Inhalation Daily    gabapentin  300 mg Oral TID    ipratropium-albuterol  3 mL Nebulization TID    levothyroxine  100 mcg Oral Before breakfast    pantoprazole  40 mg Oral QAM AC       Assessment & Plan:   ASSESSMENT / PLAN:     UTI  Sepsis with tachycardia, tachypnea, elevated lactic acid, leukocytosis  -Already received 1 L of fluids in the ED.  Normal BP  -Continue IV antibiotics with ceftriaxone  -Continue IV fluids  -Monitor lactic acid  -Follow-up blood and urine cultures  - 6/10: d/w wife who has results from Ucx from Rehab facility states has multiresistant proteus and serratia- will start IV zosyn until official resumts requested and corroborated. Dc iv ceftriaxone.      Left retrocardiac opacity, possible pneumonia/aspiration  -Discussed with wife, will go ahead and get chest CT given persistent since April.  -Adjust antibiotic coverage based on results  -ct shows debris in L bronchi with possible postop PNA  Pt with ho aspiration PNA  Slp ordered  Azithromycin added     Hypothyroidism  Hypertension  Hyperlipidemia  History of stroke with residual left-sided weakness  -Resume home medications when reconciled      Quality:  DVT Prophylaxis: Heparin  CODE status: DNR/comfort from previous.  Goals of care needs to be readdressed  DEBORAH: TBD              Coordinated care with providers and counseling re: treatment plan and workup     Ros Tamayo MD    Supplementary Documentation:         **Certification      PHYSICIAN Certification of Need for Inpatient Hospitalization - Initial Certification    Patient will require inpatient services that will reasonably be expected to span two midnight's based on the clinical documentation in H+P.   Based on patients current state of illness, I anticipate that, after discharge, patient will require TBD.

## 2024-06-10 NOTE — PHYSICAL THERAPY NOTE
PHYSICAL THERAPY EVALUATION - INPATIENT     Room Number: 529/529-A  Evaluation Date: 6/10/2024  Type of Evaluation: Initial   Physician Order: PT Eval and Treat    Presenting Problem: acute cystitis wiht hematuria, weakness  Co-Morbidities : AAA, hypothyroidism, HTN, HLD, frequent falls, CVA with left-sided weakness  Reason for Therapy: Mobility Dysfunction and Discharge Planning    PHYSICAL THERAPY ASSESSMENT   Patient is a 83 year old male admitted 6/9/2024 for acute cystitis wiht hematuria, weakness. Prior to admission, patient's baseline is assist with all ADLs and functional mobility with RW or tabitha-walker. Patient is currently functioning below baseline with bed mobility, transfers, gait, standing prolonged periods, and performing household tasks.  Patient is requiring moderate assist and maximum assist as a result of the following impairments: decreased functional strength, decreased endurance/aerobic capacity, pain, impaired standing balance, decreased muscular endurance, and medical status.  Physical Therapy will continue to follow for duration of hospitalization.    Patient will benefit from continued skilled PT Services to promote return to prior level of function and safety with continuous assistance and gradual rehabilitative therapy . Should patient decline rehab option, patient would require HHPT and 24 hour hands-on physical assistance.    PLAN  PT Treatment Plan: Bed mobility;Body mechanics;Coordination;Endurance;Energy conservation;Patient education;Gait training;Strengthening;Transfer training;Balance training  Rehab Potential : Good  Frequency (Obs): 3-5x/week    PHYSICAL THERAPY MEDICAL/SOCIAL HISTORY     Problem List  Principal Problem:    Acute cystitis with hematuria  Active Problems:    Sepsis (HCC)      HOME SITUATION  Home Situation  Type of Home: House  Home Layout: Two level;Stairlift  Stairs to Enter : 4 (chairlift)  Railing: Yes  Lives With: Spouse;Daughter (granddaughter)  Drives:  No  Patient Owned Equipment: Mark-walker;Rolling walker;Hospital bed;Wheelchair     SUBJECTIVE  \"I need to sit\"    PHYSICAL THERAPY EXAMINATION   OBJECTIVE  Precautions: Bed/chair alarm  Fall Risk: High fall risk    WEIGHT BEARING RESTRICTION  Weight Bearing Restriction: None    PAIN ASSESSMENT  Rating: Unable to rate  Location: back  Management Techniques: Activity promotion;Body mechanics;Repositioning    COGNITION  Following Commands:  follows one step commands with increased time and follows one step commands with repetition  Awareness of Deficits:  decreased awareness of deficits    RANGE OF MOTION AND STRENGTH ASSESSMENT  Upper extremity ROM and strength are within functional limits; LUE limited due to history of CVA  Lower extremity ROM is within functional limits   Lower extremity strength is within functional limits; LLE limited due to history of CVA, approx 3/5    BALANCE  Static Sitting: Fair  Dynamic Sitting: Fair -  Static Standing: Poor +  Dynamic Standing: Poor    ACTIVITY TOLERANCE  BP: (!) 145/94  BP Location: Right arm  BP Method: Automatic  Patient Position: Sitting    AM-PAC '6-Clicks' INPATIENT SHORT FORM - BASIC MOBILITY  How much difficulty does the patient currently have...  Patient Difficulty: Turning over in bed (including adjusting bedclothes, sheets and blankets)?: A Lot   Patient Difficulty: Sitting down on and standing up from a chair with arms (e.g., wheelchair, bedside commode, etc.): A Lot   Patient Difficulty: Moving from lying on back to sitting on the side of the bed?: A Lot   How much help from another person does the patient currently need...   Help from Another: Moving to and from a bed to a chair (including a wheelchair)?: A Lot   Help from Another: Need to walk in hospital room?: A Lot   Help from Another: Climbing 3-5 steps with a railing?: Total     AM-PAC Score:  Raw Score: 11   Approx Degree of Impairment: 72.57%   Standardized Score (AM-PAC Scale): 33.86   CMS Modifier  (G-Code): CL    FUNCTIONAL ABILITY STATUS  Functional Mobility/Gait Assessment  Gait Assistance: Not tested  Supine to Sit: moderate assist x 2. Patient tolerated static sitting EOB with RUE support and Min A/CGA in order to maintain static sitting balance.  Sit to Stand: moderate assist with RW; patient tolerated static standing < 15 seconds with BUE support on RW and Min A. Patient reporting weakness, unable to maintain static standing.   SPT bed>chair: Mod A with RW and second person CGA for safety. Cues provided for appropriate step sequencing.     Exercise/Education Provided:  Bed mobility  Body mechanics  Energy conservation  Functional activity tolerated  Posture  Transfer training    The patient's Approx Degree of Impairment: 72.57% has been calculated based on documentation in the [facility] '6 clicks' Inpatient Basic Mobility Short Form.  Research supports that patients with this level of impairment may benefit from rehab.  Final disposition will be made by interdisciplinary medical team.    Patient in bed upon arrival. RN approved activity. Educated patient on POC and benefits of mobilization. Agreeable to participate. Patient reporting back pain, not quantified per the pain scale.  Patient End of Session: Up in chair;Needs met;Call light within reach;RN aware of session/findings;All patient questions and concerns addressed;Alarm set    CURRENT GOALS  Goals to be met by: 6/24/24  Patient Goal Patient's self-stated goal is: go home   Goal #1 Patient is able to demonstrate supine - sit EOB @ level: minimum assistance     Goal #1   Current Status    Goal #2 Patient is able to demonstrate transfers Sit to/from Stand at assistance level: CGA with walker - tabitha and/or walker - rolling     Goal #2  Current Status    Goal #3 Patient is able to ambulate 30 feet with assist device: walker - tabitha and/or walker - rolling at assistance level: minimum assistance   Goal #3   Current Status    Goal #4 Patient will tolerate  static standing for 3 minutes with BUE support on RW and CGA in order to prepare for future out of bed activities.   Goal #4   Current Status    Goal #5 Patient to demonstrate independence with home activity/exercise instructions provided to patient in preparation for discharge.   Goal #5   Current Status      Patient Evaluation Complexity Level:  History Moderate - 1 or 2 personal factors and/or co-morbidities   Examination of body systems Moderate - addressing a total of 3 or more elements   Clinical Presentation  Moderate - Evolving   Clinical Decision Making  Moderate Complexity     Therapeutic Activity:  15 minutes

## 2024-06-10 NOTE — ED QUICK NOTES
Report given by RN.  Patient transport in to go to the floor.  Patient family at bedside.   Patient on monitor.

## 2024-06-10 NOTE — PLAN OF CARE
Pt A&Ox4, at times forgetful. Pt came up from ED c/o body weakness the past few days per wife report. Pt's wife present to complete med rec. Iv fluids infusing. Ct chest completed. Bed low and locked. Fall precautions in place  Problem: Patient Centered Care  Goal: Patient preferences are identified and integrated in the patient's plan of care  Description: Interventions:  - What would you like us to know as we care for you? From home with wife  - Provide timely, complete, and accurate information to patient/family  - Incorporate patient and family knowledge, values, beliefs, and cultural backgrounds into the planning and delivery of care  - Encourage patient/family to participate in care and decision-making at the level they choose  - Honor patient and family perspectives and choices  Outcome: Progressing     Problem: Patient/Family Goals  Goal: Patient/Family Long Term Goal  Description: Patient's Long Term Goal:     Interventions:  -   - See additional Care Plan goals for specific interventions  Outcome: Progressing  Goal: Patient/Family Short Term Goal  Description: Patient's Short Term Goal:     Interventions:   -   - See additional Care Plan goals for specific interventions  Outcome: Progressing     Problem: SKIN/TISSUE INTEGRITY - ADULT  Goal: Skin integrity remains intact  Description: INTERVENTIONS  - Assess and document risk factors for pressure ulcer development  - Assess and document skin integrity  - Monitor for areas of redness and/or skin breakdown  - Initiate interventions, skin care algorithm/standards of care as needed  Outcome: Progressing     Problem: Impaired Functional Mobility  Goal: Achieve highest/safest level of mobility/gait  Description: Interventions:  - Assess patient's functional ability and stability  - Promote increasing activity/tolerance for mobility and gait  - Educate and engage patient/family in tolerated activity level and precautions    Outcome: Progressing     Problem: SAFETY  ADULT - FALL  Goal: Free from fall injury  Description: INTERVENTIONS:  - Assess pt frequently for physical needs  - Identify cognitive and physical deficits and behaviors that affect risk of falls.  - [place] fall precautions as indicated by assessment.  - Educate pt/family on patient safety including physical limitations  - Instruct pt to call for assistance with activity based on assessment  - Modify environment to reduce risk of injury  - Provide assistive devices as appropriate  - Consider OT/PT consult to assist with strengthening/mobility  - Encourage toileting schedule  Outcome: Progressing     Problem: CARDIOVASCULAR - ADULT  Goal: Maintains optimal cardiac output and hemodynamic stability  Description: INTERVENTIONS:  - Monitor vital signs, rhythm, and trends  - Monitor for bleeding, hypotension and signs of decreased cardiac output  - Evaluate effectiveness of vasoactive medications to optimize hemodynamic stability  - Monitor arterial and/or venous puncture sites for bleeding and/or hematoma  - Assess quality of pulses, skin color and temperature  - Assess for signs of decreased coronary artery perfusion - ex. Angina  - Evaluate fluid balance, assess for edema, trend weights  Outcome: Progressing  Goal: Absence of cardiac arrhythmias or at baseline  Description: INTERVENTIONS:  - Continuous cardiac monitoring, monitor vital signs, obtain 12 lead EKG if indicated  - Evaluate effectiveness of antiarrhythmic and heart rate control medications as ordered  - Initiate emergency measures for life threatening arrhythmias  - Monitor electrolytes and administer replacement therapy as ordered  Outcome: Progressing

## 2024-06-10 NOTE — H&P
History and Physical     Cody Fitzgerald Patient Status:  Emergency    1940 MRN [id number]   Location [facility] EMERGENCY DEPARTMENT Attending Harry Mendez MD   Hosp Day # 0 PCP Bam Hampton MD     Patient somewhat lethargic.  History obtained from wife.    Chief Complaint: Generalized weakness    Subjective:    Cody Fitzgerald is a 83 year old male with history of AAA, hypothyroidism, HTN, HLD, frequent falls, previous stroke with left-sided weakness, who was brought into the ED for evaluation of weakness.  Per wife, in the last few days he has been weak and today required maximum assistance to move around.  Today he had a fever of 102 at home.  He has a mild cough, minimally productive.  Vitals with temp 99.7, , respirations 28  CMP stable  CBC with WBC 28.8.  Otherwise stable  Lactic acid 2.6  UA: Positive for nitrites and leuk esterase  EKG with no acute changes  Blood and urine cultures obtained.  Patient has been started on ceftriaxone for UTI.  CXR: No significant change in patchy left retrocardiac opacity which is concerning for pneumonia/aspiration.    Per wife, he recently completed a course of antibiotics for UTI.  Patient denies shortness of breath, chest pain, nausea or vomiting.    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 - Dr. Stahl     Tonsillectomy      at age 24       Social History:  reports that he quit smoking about 16 years ago. His smoking use included cigarettes. He started smoking about 66 years ago. He has a 100 pack-year smoking history. He has never used smokeless tobacco. He reports that he does not drink alcohol and does not use drugs.    Family History:   Family History   Problem Relation Age of Onset    Cancer Father         Lung        Allergies: No Known Allergies    Medications:    Current Facility-Administered Medications on File Prior to Encounter   Medication Dose Route Frequency Provider Last Rate Last Admin    [COMPLETED] vancomycin (Vancocin) 1.75 g in sodium chloride 0.9% 500mL IVPB premix  20 mg/kg Intravenous Once Adriana Villavicencio  mL/hr at 04/15/24 0857 1,750 mg at 04/15/24 0857    [COMPLETED] azithromycin (Zithromax) tab 500 mg  500 mg Oral Nightly Adriana Villavicencio MD   500 mg at 04/16/24 2118    [COMPLETED] sodium chloride 0.9 % IV bolus 2,859 mL  30 mL/kg Intravenous Once Bam Chan MD   Stopped at 04/14/24 2024    [COMPLETED] acetaminophen (Tylenol Extra Strength) tab 1,000 mg  1,000 mg Oral Once Bam Chan MD   1,000 mg at 04/14/24 1812    [COMPLETED] piperacillin-tazobactam (Zosyn) 4.5 g in dextrose 5% 100 mL IVPB-ADDV  4.5 g Intravenous Once Bam Chan MD   Stopped at 04/14/24 2024    [COMPLETED] azithromycin (Zithromax) tab 500 mg  500 mg Oral Once Bam Chan MD   500 mg at 04/14/24 1948    [COMPLETED] remdesivir (Veklury) 200 mg in sodium chloride 0.9% 100 mL IVPB  200 mg Intravenous Once Babar Ferraro  mL/hr at 04/14/24 2352 200 mg at 04/14/24 2352     Current Outpatient Medications on File Prior to Encounter   Medication Sig Dispense Refill    ipratropium-albuterol 0.5-2.5 (3) MG/3ML Inhalation Solution Take 3 mL by nebulization in the morning, at noon, and at bedtime. 3 mL 0    sulfamethoxazole-trimethoprim -160 MG Oral  Tab per tablet Take 1 tablet by mouth 2 (two) times daily. 14 tablet 0    cephalexin 500 MG Oral Cap Take 1 capsule (500 mg total) by mouth 3 (three) times daily. 21 capsule 0    vancomycin (VANCOCIN) 125 MG Oral Cap Take 1 capsule (125 mg total) by mouth daily. 10 capsule 0    amLODIPine 5 MG Oral Tab TAKE 1 TABLET(5 MG) BY MOUTH DAILY 90 tablet 1    [] amoxicillin clavulanate 875-125 MG Oral Tab Take 1 tablet by mouth 2 (two) times daily for 4 days. 8 tablet 0    fluticasone furoate-vilanterol 100-25 MCG/ACT Inhalation Aerosol Powder, Breath Activated Inhale 1 puff into the lungs daily.      calcium carbonate 500 MG Oral Chew Tab Chew 1 tablet (500 mg total) by mouth 3 (three) times daily as needed.      pantoprazole 40 MG Oral Tab EC Take 1 tablet (40 mg total) by mouth every morning before breakfast.      polyethylene glycol, PEG 3350, 17 g Oral Powd Pack Take 17 g by mouth daily.      ATORVASTATIN 40 MG Oral Tab TAKE 1 TABLET(40 MG) BY MOUTH NIGHTLY 90 tablet 3    CLOPIDOGREL 75 MG Oral Tab TAKE 1 TABLET(75 MG) BY MOUTH DAILY 90 tablet 3    ferrous sulfate 325 (65 FE) MG Oral Tab EC Take 1 tablet (325 mg total) by mouth daily with breakfast. 30 tablet 0    gabapentin 300 MG Oral Cap Take 1 capsule (300 mg total) by mouth 3 (three) times daily. 270 capsule 3    LEVOTHYROXINE 100 MCG Oral Tab TAKE 1 TABLET(100 MCG) BY MOUTH DAILY 90 tablet 3    lidocaine 4 % External Patch Place 2 patches onto the skin daily. L Shoulder  &L hip as needed      benzonatate 200 MG Oral Cap Take 1 capsule (200 mg total) by mouth 3 (three) times daily as needed for cough. 60 capsule 1    acetaminophen 500 MG Oral Tab Take 2 tablets (1,000 mg total) by mouth in the morning and 2 tablets (1,000 mg total) before bedtime. Also may have 500 mg PO Q6 prn but not to exceed 3 gm total of acetaminophen in 24 hours.      Cholecalciferol (VITAMIN D) 2000 units Oral Cap Take 1 capsule (2,000 Units total) by mouth daily.         Review of  Systems:   A comprehensive 14 point review of systems was completed.    Pertinent positives and negatives noted in the HPI.    Objective:     /84   Pulse 116   Temp 99.7 °F (37.6 °C) (Oral)   Resp (!) 28   SpO2 92%   General: No acute distress.    HEENT: Normocephalic, atraumatic.  Neck: Supple.  Respiratory: Normal effort.  CTAB  Cardiovascular: S1, S2 regular.  Mild tachycardia, no murmur.  Abdomen: Soft, not tender or distended.  Neurologic: Lethargic but arousable, with appropriate responses.  Follows commands.  Musculoskeletal: No tenderness or deformity.  Extremities: No edema  Psychiatric: Calm    Results:      Labs:  Labs Last 24 Hours:   BMP     CBC    Other     Na 142 Cl 110 BUN 22 Glu 142   Hb -   PTT - Procal -   K 3.9 CO2 21.0 Cr 1.23   WBC - >< PLT -  INR - CRP -   Renal Lytes Endo    Hct -   Trop - D dim -   eGFR - Ca 9.9 POC Gluc  -    LFT   pBNP - Lactic 2.6   eGFR AA - PO4 - A1c -   AST 11 APk 111 Prot 8.4  LDL -     Mg - TSH -   ALT <7 T joss 0.9 Alb 4.5          COVID-19 Lab Results    COVID-19  Lab Results   Component Value Date    COVID19 Detected (A) 04/14/2024    COVID19 Not Detected 01/11/2024    COVID19 Not Detected 10/15/2023       Pro-Calcitonin  No results for input(s): \"PCT\" in the last 168 hours.    Cardiac  No results for input(s): \"TROP\", \"PBNP\" in the last 168 hours.    Creatinine Kinase  No results for input(s): \"CK\" in the last 168 hours.    Inflammatory Markers  No results for input(s): \"CRP\", \"JAZZY\", \"LDH\", \"DDIMER\" in the last 168 hours.    Imaging: Imaging data reviewed in Epic.    Assessment & Plan:    UTI  Sepsis with tachycardia, tachypnea, elevated lactic acid, leukocytosis  -Admit  -Already received 1 L of fluids in the ED.  Normal BP  -Continue IV antibiotics with ceftriaxone  -Continue IV fluids  -Monitor lactic acid  -Follow-up blood and urine cultures    Left retrocardiac opacity, possible pneumonia/aspiration  -Discussed with wife, will go ahead and get chest  CT given persistent since April.  -Adjust antibiotic coverage based on results    Hypothyroidism  Hypertension  Hyperlipidemia  History of stroke with residual left-sided weakness  -Resume home medications when reconciled    Quality:  DVT Prophylaxis: Heparin  CODE status: DNR/comfort from previous.  Goals of care needs to be readdressed  DEBORAH: TBD      Plan of care discussed with patient and wife. Acknowledged understanding and agrees to plan. Also discussed with the ED physician.    High MDM  Time spent on this admission - examining patient, obtaining history, reviewing previous medical records, going over test results/imaging and discussing plan of care. More than 50% of the time was spent in counseling and/or coordination of care related to sepsis/UTI.  Possible pneumonia.   All questions answered.     Adriana Villavicencio MD  6/9/2024

## 2024-06-10 NOTE — TELEPHONE ENCOUNTER
[employer] for Nebulizer supplies form signed and completed by Dr Hampton. Faxed back to [phone] along with last office visit notes with confirmation received.

## 2024-06-10 NOTE — ED NOTES
Orders for admission, patient is aware of plan and ready to go upstairs. Any questions, please call ED RN Arnaldo  at extension [phone].   Type of COVID test sent:n/a  COVID Suspicion level: Low    Titratable drug(s) infusing:ceftriaxone  Rate:200ml/hr    LOC at time of transport:x3    Other pertinent information:    Non ambulatory through     CIWA score=n/a  NIH score=n/a

## 2024-06-10 NOTE — TELEPHONE ENCOUNTER
Per Dr Hampton, no outside lab results noted in his possession. Only labs noted are already  in Epic labs. Unable to call back [facility] nurse with no call back number provided.

## 2024-06-10 NOTE — PLAN OF CARE
Call placed to Dr. Hampton's office to obtain copy of patient's most recent urine culture and sensitivity report, at request of hospitalist. Fax number and call back phone number provided. Waiting to hear back if they have these results available.

## 2024-06-10 NOTE — OCCUPATIONAL THERAPY NOTE
OCCUPATIONAL THERAPY EVALUATION - INPATIENT     Room Number: 529/529-A  Evaluation Date: 6/10/2024  Type of Evaluation: Initial  Presenting Problem: acute cystitis, sepsis with tachycardia  Hx AAA, HTN, frequent falls, CVA with L side weakness     Physician Order: IP Consult to Occupational Therapy  Reason for Therapy: ADL/IADL Dysfunction and Discharge Planning    OCCUPATIONAL THERAPY ASSESSMENT   Patient is a 83 year old male admitted 6/9/2024 for acute cystitis, sepsis with tachycardia.  Patient reported his family assists with ADLs; SUP for ambulating short household distances using tabitha-walker or RW. Patient is currently functioning below baseline with ADLs and fx mobility/transfers.  Patient is requiring up to max/total assist for ADLs as a result of the following impairments: decreased functional strength, decreased functional reach, decreased endurance, pain, impaired balance, decreased muscular endurance, and limited L UE ROM. Occupational Therapy will continue to follow for duration of hospitalization.    Patient will benefit from continued skilled OT Services to promote return to prior level of function and safety with continuous assistance and gradual rehabilitative therapy     PLAN  OT Treatment Plan: Energy conservation/work simplification techniques;Balance activities;ADL training;Functional transfer training;Endurance training;Patient/Family education;Patient/Family training;Equipment eval/education;Compensatory technique education  OT Device Recommendations: TBD    OCCUPATIONAL THERAPY MEDICAL/SOCIAL HISTORY   Problem List  Principal Problem:    Acute cystitis with hematuria  Active Problems:    Sepsis (HCC)    HOME SITUATION  Type of Home: House  Home Layout: Two level; Stairlift  Lives With: Spouse; Family (daughter, granddaughter)  Toilet and Equipment: Comfort height toilet; Grab bar  Shower/Tub and Equipment: Walk-in shower; Shower chair  Other Equipment: -- (hospital bed, w/c)  Drives:  No  Patient Regularly Uses: Glasses  Use of Assistive Device(s): RW, tabitha-walker; owns w/c    SUBJECTIVE  \"My daughter and granddaughter are very helpful.\"    OCCUPATIONAL THERAPY EXAMINATION    OBJECTIVE  Precautions: Bed/chair alarm  Fall Risk: High fall risk    PAIN ASSESSMENT  Rating: -- (not rated)  Location: back  Management Techniques: Activity promotion; Body mechanics; Repositioning; Nurse notified    ACTIVITY TOLERANCE           BP: (!) 145/94  BP Location: Right arm  BP Method: Automatic  Patient Position: Sitting    COGNITION  Orientation Level:  oriented to place, oriented to situation, and oriented to person  Following Commands:  follows one step commands with repetition    SENSATION  Light touch:  intact    RANGE OF MOTION / STRENGTH ASSESSMENT  Upper extremity ROM/strength WFL except L UE d/t hx CVA  L UE shoulder flexion <15 degrees  L hand  strength 3+/5      ACTIVITIES OF DAILY LIVING ASSESSMENT  AM-PAC ‘6-Clicks’ Inpatient Daily Activity Short Form  How much help from another person does the patient currently need…  -   Putting on and taking off regular lower body clothing?: A Lot  -   Bathing (including washing, rinsing, drying)?: A Lot  -   Toileting, which includes using toilet, bedpan or urinal? : A Lot  -   Putting on and taking off regular upper body clothing?: A Little  -   Taking care of personal grooming such as brushing teeth?: A Little  -   Eating meals?: A Little    AM-PAC Score:  Score: 15  Approx Degree of Impairment: 56.46%  Standardized Score (AM-PAC Scale): 34.69  CMS Modifier (G-Code): CK    BED MOBILITY  Supine to Sit: Mod assist x2     FUNCTIONAL TRANSFER ASSESSMENT  Sit to Stand from EOB: Mod assist x1 using RW, tolerated standing <15 seconds before requiring seated recovery s/t fatigue  Stand Pivot Transfer from EOB to Chair: Mod assist x1 using RW, second person rpesent for safety  Comments:  B knee flexion and rounded flexed posture upon initial stand at  EOB    FUNCTIONAL ADL ASSESSMENT  Eating: setup assist (per obs)   Grooming: min assist seated (per obs)   UB Dressing: mod assist   LB Dressing: max assist  Toileting: total assist (per obs)     EDUCATION PROVIDED  Patient: Plan of Care; Role of Occupational Therapy; Discharge Recommendations; Fall Prevention; Functional Transfer Techniques; Posture/Positioning; Energy Conservation; Compensatory ADL Techniques; Proper Body Mechanics  Patient's Response to Education: Returned Demonstration; Verbalized Understanding; Requires Further Education    The patient's Approx Degree of Impairment: 56.46% has been calculated based on documentation in the [facility] '6 clicks' Inpatient Daily Activity Short Form.  Research supports that patients with this level of impairment may benefit from rehab.  Final disposition will be made by interdisciplinary medical team.     Patient End of Session: Up in chair;Needs met;Call light within reach;RN aware of session/findings;All patient questions and concerns addressed;Alarm set    OT Goals  Patients self stated goal is: none stated     Patient will complete functional transfer with SBA  Comment:     Patient will complete LB dressing with min assist  Comment:     Patient will tolerate standing for 2-3 minutes minutes in prep for adls with SBA  Comment:    Patient will complete item retrieval with SBA  Comment:          Goals  on: 24  Frequency: 3-5x/week    Patient Evaluation Complexity Level:   Occupational Profile/Medical History MODERATE - Expanded review of history including review of medical or therapy record   Specific performance deficits impacting engagement in ADL/IADL MODERATE  3 - 5 performance deficits   Client Assessment/Performance Deficits MODERATE - Comorbidities and min to mod modifications of tasks    Clinical Decision Making MODERATE - Analysis of occupational profile, detailed assessments, several treatment options    Overall Complexity MODERATE     OT Session  Time  Therapeutic Activity: 16 minutes    Marquita Sears OTR/L  [facility]  #[id number]

## 2024-06-10 NOTE — HOME CARE LIAISON
This pt is current with [facility] for RN/PT/HHA services. Pt will need a ANN MARIE entered and Quality data delivered by DAILY EASLEY. Notified TRACEE Cuellar.

## 2024-06-10 NOTE — TELEPHONE ENCOUNTER
[facility] / [facility] Nurse is calling on behalf of Dr King  Patient is admitted to [facility] his wife said Dr Hampton should have received results from an outside facility for Urine Culture & Sensitivity    Asking if he has the results please fax # [phone]

## 2024-06-10 NOTE — CM/SW NOTE
06/10/24 1100   CM/SW Referral Data   Referral Source Social Work (self-referral)   Reason for Referral Discharge planning   Informant Patient;Spouse/Significant Other;EMR;Clinical Staff Member   Medical Hx   Does patient have an established PCP? Yes  (Dr. Bam Hampton)   Significant Past Medical/Mental Health Hx CVA; Recurrent aspiration pneumonia   Patient Info   Advanced directives? Yes   Patient's Current Mental Status at Time of Assessment Alert;Oriented   Patient's Home Environment House   Number of Levels in Home 3   Number of Stair in Home 5 to enter   Patient lives with Spouse/Significant other   Patient Status Prior to Admission   Independent with ADLs and Mobility No   Pt. requires assistance with Housework;Driving;Ambulating;Bathing;Medications   Services in place prior to admission Home Health Care;DME/Supplies at home   Home Health Provider Info [facility]   Type of DME/Supplies Wheeled Walker;Wheelchair;Shower Chair;Grab Bars;Stair Lift;Quad Cane;Raised Toilet Seat;Hospital Bed   Discharge Needs   Anticipated D/C needs Home health care     SW self-referred to this case for discharge planning.    Pt admitted for weakness and possible aspiration pneumonia.  Patient started on IV antibiotics.    TRACEE met with pt and spouse Meme bedside to complete assessment.  Patient up in chair, stable in room air.    TRACEE confirmed address and PCP on file (Dr. Hampton retiring end of June).    The patient stated he has been using wheelchair at home.  Meme stated the patient has been mostly bed bound due to weakness.  Meme stated pt has not been able to step into the shower due to this.    The patient is current with [facility] for RN/PT/HHA.  ANN MARIE entered and liaison Kimberly aware of admission.  TRACEE confirmed no intermediate care services.  Meme is able to drive spouse in the community.    The patient will return home w/home health care pending medical clearance.   Patient/spouse in agreement.    PLAN: DC home w/[facility] pending med clear    / to remain available for support and/or discharge planning.     Alisa Merritt MSW, LSW [phone]

## 2024-06-10 NOTE — SLP NOTE
ADULT SWALLOWING EVALUATION    ASSESSMENT    ASSESSMENT/OVERALL IMPRESSION:  Pt was seen for a clinical bedside swallowing exam. The assessment is indicative of moderate oropharyngeal dysphagia.     Pt has an extensive history for dysphagia and has been seen by our service several times before. During his last visit in April 2024, he was discharged with thin liquids and soft ETC recommendation. A VFSS was recommended - pt not agreeable to VFSS then and was not agreeable for thickened liquids even if they were recommended.     RN was consulted prior to the assessment this afternoon, Pt was fully alert and seated independently. Pt is conversational and followed simple verbal commands with 100% accuracy. Pt had stable respiratory status, however some evident shortness of breath was apparent. Oral motor exam is indicative of bilateral weakness, with more evident weakness on the right. Pt was given thin liquids, puree and solids as part of trial swallow assessment. Pt accepted all trials adequately. Pt had mild difficulties for a labial seal. Pt  exhibited mildly prolonged bolus prep and bolus propulsion in the oral cavity. Hyolaryngeal excursion upon palpation was perceived to be mildly reduced and incoordinated. Pt produced consistent coughs and increased shortness of breath for thin liquid trials. No overt s/s of laryngeal penetration or aspiration were observed for other consistencies. Pt also had moderate oral residue, which he spontaneously cleared with a liquid rinse.     Pt demonstrates overt clinical signs of aspiration with thin liquids, however is insistent on not trying thickened viscosities. Pt was educated in detail about the risks of possible aspiration and negative consequences of pneumonia. Pt insisted that he is not aspirating and is not agreeable to trials of modified diets at this time. Based on pt preference, thin liquids are continued to be recommended, however compensatory strategies such as small  sips and bites, slow rate are strictly to be adhered to.     SLP to f/u x1-2 to monitor status further and more education. Possibly consider VFSS if pt is agreeable and if overt s/s of aspiration persist and/or worsening of chest Xray findings. RN appraised of the session.          RECOMMENDATIONS   Diet Recommendations - Solids: Soft/ Easy to chew  Diet Recommendations - Liquids: Thin Liquids                        Compensatory Strategies Recommended: Slow rate;Alternate consistencies;Small bites and sips;Multiple swallows  Aspiration Precautions: Upright position;Slow rate;Small bites and sips  Medication Administration Recommendations: Whole in puree  Treatment Plan/Recommendations: Aspiration precautions;Dysphagia therapy    HISTORY   MEDICAL HISTORY  Reason for Referral: R/O aspiration    Problem List  Principal Problem:    Acute cystitis with hematuria  Active Problems:    Sepsis (HCC)      Past Medical History  Past Medical History:    Abdominal aortic aneurysm (AAA) (HCC)    Back problem    back pain    Disorder of thyroid    hypothyroidism    Falls frequently    High blood pressure    High cholesterol    Hx of pneumothorax    Muscle weakness    LUE hemiplegia    Osteoarthritis    PNA (pneumonia)    Pneumonia due to organism    Stroke (HCC)    left sided weakness       Prior Living Situation: Home with spouse  Diet Prior to Admission: Regular;Thin liquids  Precautions: Aspiration    Patient/Family Goals: \"I am fine swallowing. Period.\"    SWALLOWING HISTORY  Current Diet Consistency: Regular;Thin liquids  Dysphagia History: Extensive history. Most recent - 04/2024. discharged with a rec of soft ETC and thin liquids.  Imaging Results:     Chest Xray:    1. No significant change in the patchy left retrocardiac opacity, which is concerning for pneumonia/aspiration.  Recommend follow-up chest radiograph in 4-6 weeks to monitor for resolution or nonemergent further evaluation with a CT chest.   2. Redemonstrated  prominence of the interstitial markings, which likely reflects components of both small airways disease/bronchitis as well as emphysema.   3. Lesser incidental findings described above.       SUBJECTIVE       OBJECTIVE   ORAL MOTOR EXAMINATION  Dentition: Upper dentures;Lower dentures  Symmetry: Reduced right facial;Lingual deviation to right  Strength: Reduced right facial;Reduced right lingual  Tone: Reduced right facial;Reduced left facial;Reduced right lingual;Reduced left lingual  Range of Motion: Reduced right facial;Reduced right lingual  Rate of Motion: Reduced    Voice Quality: Weak  Respiratory Status: Unlabored  Consistencies Trialed: Thin liquids;Hard solid;Puree  Method of Presentation: Self presentation;Straw;Spoon  Patient Positioning: Upright;Standard chair    Oral Phase of Swallow: Impaired  Bolus Retrieval: Impaired  Bilabial Seal: Impaired  Bolus Formation: Impaired  Bolus Propulsion: Impaired  Mastication: Impaired  Retention: Impaired    Pharyngeal Phase of Swallow: Impaired  Laryngeal Elevation Timing: Appears impaired  Laryngeal Elevation Strength: Appears impaired  Laryngeal Elevation Coordination: Appears impaired  (Please note: Silent aspiration cannot be evaluated clinically. Videofluoroscopic Swallow Study is required to rule-out silent aspiration.)    Esophageal Phase of Swallow: No complaints consistent with possible esophageal involvement              GOALS  Goal #1 The patient will tolerate soft easy to chew solid consistency and thin liquids without overt signs or symptoms of aspiration with 90 % accuracy over 2 session(s).  In Progress   Goal #2 The patient/family/caregiver will demonstrate understanding and implementation of aspiration precautions and swallow strategies independently over 2 session(s).    In Progress     FOLLOW UP  Treatment Plan/Recommendations: Aspiration precautions;Dysphagia therapy  Number of Visits to Meet Established Goals: 2  Follow Up Needed (Documentation  Required): Yes  SLP Follow-up Date: 06/11/24    Thank you for your referral.   If you have any questions, please contact Chito Lainez, BETHANIE Lainez, Ph.D., [facility]-SLP  Speech-Language Pathologist  Phone number Ext.: [phone]

## 2024-06-11 ENCOUNTER — APPOINTMENT (OUTPATIENT)
Dept: GENERAL RADIOLOGY | Facility: HOSPITAL | Age: 84
DRG: 871 | End: 2024-06-11
Attending: INTERNAL MEDICINE
Payer: MEDICARE

## 2024-06-11 ENCOUNTER — APPOINTMENT (OUTPATIENT)
Dept: CT IMAGING | Facility: HOSPITAL | Age: 84
DRG: 871 | End: 2024-06-11
Attending: INTERNAL MEDICINE
Payer: MEDICARE

## 2024-06-11 LAB
ANION GAP SERPL CALC-SCNC: 9 MMOL/L (ref 0–18)
BASOPHILS # BLD AUTO: 0.05 X10(3) UL (ref 0–0.2)
BASOPHILS NFR BLD AUTO: 0.4 %
BUN BLD-MCNC: 21 MG/DL (ref 9–23)
BUN/CREAT SERPL: 16.9 (ref 10–20)
CALCIUM BLD-MCNC: 8.8 MG/DL (ref 8.7–10.4)
CHLORIDE SERPL-SCNC: 112 MMOL/L (ref 98–112)
CO2 SERPL-SCNC: 21 MMOL/L (ref 21–32)
CREAT BLD-MCNC: 1.24 MG/DL
DEPRECATED RDW RBC AUTO: 53.8 FL (ref 35.1–46.3)
EGFRCR SERPLBLD CKD-EPI 2021: 58 ML/MIN/1.73M2 (ref 60–?)
EOSINOPHIL # BLD AUTO: 0.14 X10(3) UL (ref 0–0.7)
EOSINOPHIL NFR BLD AUTO: 1.3 %
ERYTHROCYTE [DISTWIDTH] IN BLOOD BY AUTOMATED COUNT: 15 % (ref 11–15)
GLUCOSE BLD-MCNC: 90 MG/DL (ref 70–99)
HCT VFR BLD AUTO: 34.4 %
HGB BLD-MCNC: 10.5 G/DL
IMM GRANULOCYTES # BLD AUTO: 0.04 X10(3) UL (ref 0–1)
IMM GRANULOCYTES NFR BLD: 0.4 %
LYMPHOCYTES # BLD AUTO: 1.83 X10(3) UL (ref 1–4)
LYMPHOCYTES NFR BLD AUTO: 16.4 %
MAGNESIUM SERPL-MCNC: 1.7 MG/DL (ref 1.6–2.6)
MCH RBC QN AUTO: 29.9 PG (ref 26–34)
MCHC RBC AUTO-ENTMCNC: 30.5 G/DL (ref 31–37)
MCV RBC AUTO: 98 FL
MONOCYTES # BLD AUTO: 1.21 X10(3) UL (ref 0.1–1)
MONOCYTES NFR BLD AUTO: 10.9 %
NEUTROPHILS # BLD AUTO: 7.88 X10 (3) UL (ref 1.5–7.7)
NEUTROPHILS # BLD AUTO: 7.88 X10(3) UL (ref 1.5–7.7)
NEUTROPHILS NFR BLD AUTO: 70.6 %
OSMOLALITY SERPL CALC.SUM OF ELEC: 297 MOSM/KG (ref 275–295)
PLATELET # BLD AUTO: 160 10(3)UL (ref 150–450)
POTASSIUM SERPL-SCNC: 3.6 MMOL/L (ref 3.5–5.1)
RBC # BLD AUTO: 3.51 X10(6)UL
SODIUM SERPL-SCNC: 142 MMOL/L (ref 136–145)
WBC # BLD AUTO: 11.2 X10(3) UL (ref 4–11)

## 2024-06-11 PROCEDURE — 72131 CT LUMBAR SPINE W/O DYE: CPT | Performed by: INTERNAL MEDICINE

## 2024-06-11 PROCEDURE — 71046 X-RAY EXAM CHEST 2 VIEWS: CPT | Performed by: INTERNAL MEDICINE

## 2024-06-11 PROCEDURE — 99233 SBSQ HOSP IP/OBS HIGH 50: CPT | Performed by: INTERNAL MEDICINE

## 2024-06-11 RX ORDER — MAGNESIUM OXIDE 400 MG/1
400 TABLET ORAL ONCE
Status: COMPLETED | OUTPATIENT
Start: 2024-06-11 | End: 2024-06-11

## 2024-06-11 NOTE — PLAN OF CARE
Problem: SKIN/TISSUE INTEGRITY - ADULT  Goal: Skin integrity remains intact  Description: INTERVENTIONS  - Assess and document risk factors for pressure ulcer development  - Assess and document skin integrity  - Monitor for areas of redness and/or skin breakdown  - Initiate interventions, skin care algorithm/standards of care as needed  Outcome: Progressing     Problem: Impaired Functional Mobility  Goal: Achieve highest/safest level of mobility/gait  Description: Interventions:  - Assess patient's functional ability and stability  - Promote increasing activity/tolerance for mobility and gait  - Educate and engage patient/family in tolerated activity level and precautions    Outcome: Progressing     Problem: SAFETY ADULT - FALL  Goal: Free from fall injury  Description: INTERVENTIONS:  - Assess pt frequently for physical needs  - Identify cognitive and physical deficits and behaviors that affect risk of falls.  - [place] fall precautions as indicated by assessment.  - Educate pt/family on patient safety including physical limitations  - Instruct pt to call for assistance with activity based on assessment  - Modify environment to reduce risk of injury  - Provide assistive devices as appropriate  - Consider OT/PT consult to assist with strengthening/mobility  - Encourage toileting schedule  Outcome: Progressing     Problem: CARDIOVASCULAR - ADULT  Goal: Maintains optimal cardiac output and hemodynamic stability  Description: INTERVENTIONS:  - Monitor vital signs, rhythm, and trends  - Monitor for bleeding, hypotension and signs of decreased cardiac output  - Evaluate effectiveness of vasoactive medications to optimize hemodynamic stability  - Monitor arterial and/or venous puncture sites for bleeding and/or hematoma  - Assess quality of pulses, skin color and temperature  - Assess for signs of decreased coronary artery perfusion - ex. Angina  - Evaluate fluid balance, assess for edema, trend weights  Outcome:  Progressing  Goal: Absence of cardiac arrhythmias or at baseline  Description: INTERVENTIONS:  - Continuous cardiac monitoring, monitor vital signs, obtain 12 lead EKG if indicated  - Evaluate effectiveness of antiarrhythmic and heart rate control medications as ordered  - Initiate emergency measures for life threatening arrhythmias  - Monitor electrolytes and administer replacement therapy as ordered  Outcome: Progressing

## 2024-06-11 NOTE — PLAN OF CARE
Problem: Patient Centered Care  Goal: Patient preferences are identified and integrated in the patient's plan of care  Description: Interventions:  - What would you like us to know as we care for you? Lives at home with wife  - Provide timely, complete, and accurate information to patient/family  - Incorporate patient and family knowledge, values, beliefs, and cultural backgrounds into the planning and delivery of care  - Encourage patient/family to participate in care and decision-making at the level they choose  - Honor patient and family perspectives and choices  Outcome: Progressing     Problem: SKIN/TISSUE INTEGRITY - ADULT  Goal: Skin integrity remains intact  Description: INTERVENTIONS  - Assess and document risk factors for pressure ulcer development  - Assess and document skin integrity  - Monitor for areas of redness and/or skin breakdown  - Initiate interventions, skin care algorithm/standards of care as needed  Outcome: Progressing     Problem: Impaired Functional Mobility  Goal: Achieve highest/safest level of mobility/gait  Description: Interventions:  - Assess patient's functional ability and stability  - Promote increasing activity/tolerance for mobility and gait  - Educate and engage patient/family in tolerated activity level and precautions  - Recommend use of  RW for transfers and ambulation  Outcome: Progressing     Problem: SAFETY ADULT - FALL  Goal: Free from fall injury  Description: INTERVENTIONS:  - Assess pt frequently for physical needs  - Identify cognitive and physical deficits and behaviors that affect risk of falls.  - [place] fall precautions as indicated by assessment.  - Educate pt/family on patient safety including physical limitations  - Instruct pt to call for assistance with activity based on assessment  - Modify environment to reduce risk of injury  - Provide assistive devices as appropriate  - Consider OT/PT consult to assist with strengthening/mobility  - Encourage  toileting schedule  Outcome: Progressing     Problem: CARDIOVASCULAR - ADULT  Goal: Maintains optimal cardiac output and hemodynamic stability  Description: INTERVENTIONS:  - Monitor vital signs, rhythm, and trends  - Monitor for bleeding, hypotension and signs of decreased cardiac output  - Evaluate effectiveness of vasoactive medications to optimize hemodynamic stability  - Monitor arterial and/or venous puncture sites for bleeding and/or hematoma  - Assess quality of pulses, skin color and temperature  - Assess for signs of decreased coronary artery perfusion - ex. Angina  - Evaluate fluid balance, assess for edema, trend weights  Outcome: Progressing  Goal: Absence of cardiac arrhythmias or at baseline  Description: INTERVENTIONS:  - Continuous cardiac monitoring, monitor vital signs, obtain 12 lead EKG if indicated  - Evaluate effectiveness of antiarrhythmic and heart rate control medications as ordered  - Initiate emergency measures for life threatening arrhythmias  - Monitor electrolytes and administer replacement therapy as ordered  Outcome: Progressing    Patient on room air at baseline but was desat'ing to 87% this morning, patient initially refused O2 via nasal cannula but spouse persuaded. Patient was briefly on 4L, but was able to wean off- currently on room air and in chair. Chest X ray ordered per MD. IV fluids/antibiotics on. Calls appropriately.

## 2024-06-11 NOTE — ED PROVIDER NOTES
Patient Seen in: [facility] 5sw/se    History     Chief Complaint   Patient presents with    Weakness    Infection     Stated Complaint: weakness, infection    HPI    Patient complains of gen weakness and malaise. Concern for uti as this is how he was with previous uti..  no cough. No rash.  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 - Dr. Stahl    Tonsillectomy      at age 24            Family History   Problem Relation Age of Onset    Cancer Father         Lung        Social History     Socioeconomic History    Marital status:    Tobacco Use    Smoking status: Former     Current packs/day: 0.00     Average packs/day: 2.0 packs/day for 50.0 years (100.0 ttl pk-yrs)     Types: Cigarettes     Start date: 1958     Quit date: 2008     Years since quittin.4    Smokeless tobacco: Never   Vaping Use    Vaping status: Never Used   Substance and Sexual Activity    Alcohol use: Never     Comment: socially; about 1 glass of wine once a week     Drug use: No   Other Topics Concern    Caffeine Concern Yes     Comment: 1 cup of coffee daily    Exercise No   Social History Narrative    The patient uses the following assistive device(s):  quad cane and pick-up walker.      The patient does live in a home with stairs.     Social Determinants of Health     Financial Resource  Strain: Low Risk  (11/6/2023)    Financial Resource Strain     Difficulty of Paying Living Expenses: Not very hard     Med Affordability: No   Food Insecurity: No Food Insecurity (6/10/2024)    Food Insecurity     Food Insecurity: Never true   Transportation Needs: No Transportation Needs (6/10/2024)    Transportation Needs     Lack of Transportation: No   Housing Stability: Low Risk  (6/10/2024)    Housing Stability     Housing Instability: No       Review of Systems    Positive for stated complaint: weakness, infection  Other systems are as noted in HPI.  Constitutional and vital signs reviewed.      All other systems reviewed and negative except as noted above.    PSFH elements reviewed from today and agreed except as otherwise stated in HPI.    Physical Exam     ED Triage Vitals   BP 06/09/24 1855 157/84   Pulse 06/09/24 1855 117   Resp 06/09/24 1855 (!) 28   Temp 06/09/24 1855 99.7 °F (37.6 °C)   Temp src 06/09/24 1855 Oral   SpO2 06/09/24 1855 92 %   O2 Device 06/09/24 2156 None (Room air)       Current:/60 (BP Location: Right arm)   Pulse 96   Temp 98.4 °F (36.9 °C) (Axillary)   Resp 24   Wt 88.9 kg   SpO2 93%   BMI 28.11 kg/m²    PULSE OX nl  GENERAL: appears tired  HEAD: normocephalic, atraumatic,   EYES: PERRLA, EOMI, conj sclera clear  THROAT: mmm, no lesions  NECK: supple, no meningeal signs  LUNGS: no resp distress, cta bilateral  CARDIO: RRR without murmur  GI: abdomen is soft and non tender, no masses, nl bowel sounds   EXTREMITIES: from, 5/5 strength in all 4 ext, no edema  NEURO: alert and oiented *3, 2-12 intact, no focal deficit noted  SKIN: good skin turgor, no  rashes  PSYCH: calm, cooperative,    Differential includes:uti vs. Viral syndrome vs. Metabolic abnormalities    ED Course     Labs Reviewed   BASIC METABOLIC PANEL (8) - Abnormal; Notable for the following components:       Result Value    Glucose 142 (*)     Calculated Osmolality 300 (*)     eGFR-Cr 59 (*)     All other  components within normal limits   HEPATIC FUNCTION PANEL (7) - Abnormal; Notable for the following components:    ALT <7 (*)     Bilirubin, Direct 0.4 (*)     Total Protein 8.4 (*)     All other components within normal limits   URINALYSIS WITH CULTURE REFLEX - Abnormal; Notable for the following components:    Clarity Urine Turbid (*)     Blood Urine 2+ (*)     Protein Urine 100 (*)     Nitrite Urine 2+ (*)     Leukocyte Esterase Urine 500 (*)     WBC Urine >50 (*)     RBC Urine >10 (*)     Bacteria Urine 1+ (*)     Squamous Epi. Cells Few (*)     WBC Clump Present (*)     All other components within normal limits   LACTIC ACID, PLASMA - Abnormal; Notable for the following components:    Lactic Acid 2.6 (*)     All other components within normal limits   LACTIC ACID REFLEX POST POSTIVE - Abnormal; Notable for the following components:    Lactic Acid 3.1 (*)     All other components within normal limits   PROTHROMBIN TIME (PT) - Abnormal; Notable for the following components:    PT 15.9 (*)     All other components within normal limits   COMP METABOLIC PANEL (14) - Abnormal; Notable for the following components:    Glucose 100 (*)     Chloride 113 (*)     Calculated Osmolality 300 (*)     ALT <7 (*)     All other components within normal limits   URINE CULTURE, ROUTINE - Abnormal; Notable for the following components:    Urine Culture >100,000 CFU/ML Gram Negative Chele (*)     All other components within normal limits   CBC W/ DIFFERENTIAL - Abnormal; Notable for the following components:    WBC 28.8 (*)     RDW-SD 51.7 (*)     Neutrophil Absolute Prelim 25.06 (*)     Neutrophil Absolute 25.06 (*)     Monocyte Absolute 2.11 (*)     All other components within normal limits   CBC W/ DIFFERENTIAL - Abnormal; Notable for the following components:    WBC 22.2 (*)     HGB 11.8 (*)     HCT 37.7 (*)     RDW-SD 52.4 (*)     Neutrophil Absolute Prelim 18.46 (*)     Neutrophil Absolute 18.46 (*)     Monocyte Absolute 1.93 (*)      All other components within normal limits   PTT, ACTIVATED - Normal   MAGNESIUM - Normal   LACTIC ACID REFLEX POST POSITIVE [id number] - Normal   CBC WITH DIFFERENTIAL WITH PLATELET    Narrative:     The following orders were created for panel order CBC With Differential With Platelet.  Procedure                               Abnormality         Status                     ---------                               -----------         ------                     CBC W/ DIFFERENTIAL[[id number]]          Abnormal            Final result                 Please view results for these tests on the individual orders.   SCAN SLIDE   CBC WITH DIFFERENTIAL WITH PLATELET    Narrative:     The following orders were created for panel order CBC With Differential With Platelet.  Procedure                               Abnormality         Status                     ---------                               -----------         ------                     CBC W/ DIFFERENTIAL[[id number]]          Abnormal            Final result                 Please view results for these tests on the individual orders.   RAINBOW DRAW LAVENDER   RAINBOW DRAW LIGHT GREEN   RAINBOW DRAW BLUE   RAINBOW DRAW GOLD   BLOOD CULTURE   BLOOD CULTURE     EKG    Rate, intervals and axes as noted on EKG Report.  Rate: 117  Rhythm: Sinus Rhythm  Reading: s tach with rbbb no sig change from previous           MDM       Cardiac Monitor:   Pulse Readings from Last 1 Encounters:   06/10/24 96   , sinus, 98  interpreted by me.    Radiology findings:   CT CHEST (CPT=71250)    Result Date: 6/10/2024  CONCLUSION: Occlusive debris throughout the LLL bronchus with postobstructive consolidation throughout the basilar segments of the lower lobe    Dictated by (CST): Louis Flynn MD on 6/10/2024 at 10:59 AM     Finalized by (CST): Louis Flynn MD on 6/10/2024 at 11:02 AM           I reviewed xray noted no infiltrates no pneumothorax      Medical Decision Making  Problems  Addressed:  Acute cystitis with hematuria: acute illness or injury    Amount and/or Complexity of Data Reviewed  Labs: ordered. Decision-making details documented in ED Course.  Radiology: ordered and independent interpretation performed. Decision-making details documented in ED Course.  ECG/medicine tests: ordered and independent interpretation performed. Decision-making details documented in ED Course.  Discussion of management or test interpretation with external provider(s): Iv abx admit spoke with Dr. Humphreys accept admission    Risk  Decision regarding hospitalization.        Disposition and Plan     Clinical Impression:  1. Acute cystitis with hematuria        Disposition:  Admit    Follow-up:  No follow-up provider specified.    Medications Prescribed:  Current Discharge Medication List          Hospital Problems       Present on Admission  Date Reviewed: 6/9/2024            ICD-10-CM Noted POA    * (Principal) Acute cystitis with hematuria N30.01 6/9/2024 Yes    Sepsis (HCC) A41.9 6/9/2024 Unknown

## 2024-06-11 NOTE — PLAN OF CARE
Vitals as listed. Up to chair with therapy this afternoon. Continue with IV abx.   Problem: Patient Centered Care  Goal: Patient preferences are identified and integrated in the patient's plan of care  Description: Interventions:  - What would you like us to know as we care for you?   - Provide timely, complete, and accurate information to patient/family  - Incorporate patient and family knowledge, values, beliefs, and cultural backgrounds into the planning and delivery of care  - Encourage patient/family to participate in care and decision-making at the level they choose  - Honor patient and family perspectives and choices  Outcome: Progressing     Problem: Patient/Family Goals  Goal: Patient/Family Long Term Goal  Description: Patient's Long Term Goal:     Interventions:  -   - See additional Care Plan goals for specific interventions  Outcome: Progressing  Goal: Patient/Family Short Term Goal  Description: Patient's Short Term Goal:     Interventions:   -   - See additional Care Plan goals for specific interventions  Outcome: Progressing     Problem: SKIN/TISSUE INTEGRITY - ADULT  Goal: Skin integrity remains intact  Description: INTERVENTIONS  - Assess and document risk factors for pressure ulcer development  - Assess and document skin integrity  - Monitor for areas of redness and/or skin breakdown  - Initiate interventions, skin care algorithm/standards of care as needed  Outcome: Progressing     Problem: Impaired Functional Mobility  Goal: Achieve highest/safest level of mobility/gait  Description: Interventions:  - Assess patient's functional ability and stability  - Promote increasing activity/tolerance for mobility and gait  - Educate and engage patient/family in tolerated activity level and precautions    Outcome: Progressing     Problem: SAFETY ADULT - FALL  Goal: Free from fall injury  Description: INTERVENTIONS:  - Assess pt frequently for physical needs  - Identify cognitive and physical deficits and  behaviors that affect risk of falls.  - [place] fall precautions as indicated by assessment.  - Educate pt/family on patient safety including physical limitations  - Instruct pt to call for assistance with activity based on assessment  - Modify environment to reduce risk of injury  - Provide assistive devices as appropriate  - Consider OT/PT consult to assist with strengthening/mobility  - Encourage toileting schedule  Outcome: Progressing     Problem: CARDIOVASCULAR - ADULT  Goal: Maintains optimal cardiac output and hemodynamic stability  Description: INTERVENTIONS:  - Monitor vital signs, rhythm, and trends  - Monitor for bleeding, hypotension and signs of decreased cardiac output  - Evaluate effectiveness of vasoactive medications to optimize hemodynamic stability  - Monitor arterial and/or venous puncture sites for bleeding and/or hematoma  - Assess quality of pulses, skin color and temperature  - Assess for signs of decreased coronary artery perfusion - ex. Angina  - Evaluate fluid balance, assess for edema, trend weights  Outcome: Progressing  Goal: Absence of cardiac arrhythmias or at baseline  Description: INTERVENTIONS:  - Continuous cardiac monitoring, monitor vital signs, obtain 12 lead EKG if indicated  - Evaluate effectiveness of antiarrhythmic and heart rate control medications as ordered  - Initiate emergency measures for life threatening arrhythmias  - Monitor electrolytes and administer replacement therapy as ordered  Outcome: Progressing

## 2024-06-12 LAB
ANION GAP SERPL CALC-SCNC: 6 MMOL/L (ref 0–18)
BASOPHILS # BLD AUTO: 0.05 X10(3) UL (ref 0–0.2)
BASOPHILS NFR BLD AUTO: 0.6 %
BUN BLD-MCNC: 17 MG/DL (ref 9–23)
BUN/CREAT SERPL: 13.1 (ref 10–20)
CALCIUM BLD-MCNC: 8.8 MG/DL (ref 8.7–10.4)
CHLORIDE SERPL-SCNC: 116 MMOL/L (ref 98–112)
CO2 SERPL-SCNC: 23 MMOL/L (ref 21–32)
CREAT BLD-MCNC: 1.3 MG/DL
DEPRECATED RDW RBC AUTO: 50.8 FL (ref 35.1–46.3)
EGFRCR SERPLBLD CKD-EPI 2021: 55 ML/MIN/1.73M2 (ref 60–?)
EOSINOPHIL # BLD AUTO: 0.26 X10(3) UL (ref 0–0.7)
EOSINOPHIL NFR BLD AUTO: 3.3 %
ERYTHROCYTE [DISTWIDTH] IN BLOOD BY AUTOMATED COUNT: 14.6 % (ref 11–15)
GLUCOSE BLD-MCNC: 93 MG/DL (ref 70–99)
HCT VFR BLD AUTO: 32.3 %
HGB BLD-MCNC: 10.3 G/DL
IMM GRANULOCYTES # BLD AUTO: 0.04 X10(3) UL (ref 0–1)
IMM GRANULOCYTES NFR BLD: 0.5 %
LYMPHOCYTES # BLD AUTO: 1.64 X10(3) UL (ref 1–4)
LYMPHOCYTES NFR BLD AUTO: 20.5 %
MAGNESIUM SERPL-MCNC: 2 MG/DL (ref 1.6–2.6)
MCH RBC QN AUTO: 30.3 PG (ref 26–34)
MCHC RBC AUTO-ENTMCNC: 31.9 G/DL (ref 31–37)
MCV RBC AUTO: 95 FL
MONOCYTES # BLD AUTO: 0.72 X10(3) UL (ref 0.1–1)
MONOCYTES NFR BLD AUTO: 9 %
NEUTROPHILS # BLD AUTO: 5.29 X10 (3) UL (ref 1.5–7.7)
NEUTROPHILS # BLD AUTO: 5.29 X10(3) UL (ref 1.5–7.7)
NEUTROPHILS NFR BLD AUTO: 66.1 %
OSMOLALITY SERPL CALC.SUM OF ELEC: 301 MOSM/KG (ref 275–295)
PLATELET # BLD AUTO: 190 10(3)UL (ref 150–450)
POTASSIUM SERPL-SCNC: 3.6 MMOL/L (ref 3.5–5.1)
RBC # BLD AUTO: 3.4 X10(6)UL
SODIUM SERPL-SCNC: 145 MMOL/L (ref 136–145)
WBC # BLD AUTO: 8 X10(3) UL (ref 4–11)

## 2024-06-12 PROCEDURE — 99233 SBSQ HOSP IP/OBS HIGH 50: CPT | Performed by: HOSPITALIST

## 2024-06-12 NOTE — PROGRESS NOTES
Progress Note     Cody Fitzgerald Patient Status:  Inpatient    1940 MRN [id number]   Location [facility] 5SW/SE Attending Ros Tamayo MD   Hosp Day # 2 PCP Bam Hampton MD     Chief Complaint: UTI    Subjective:   Hypoxic on 4L NC today  Pt c/u acute on chronic R LBP radiating to RLE   'down to toe\"  In good spirits  No acute complaints otherwise  Wife at bedside    No other acute complaints or other nursing concerns or overnight events      Review of Systems:   10 point ROS completed and was negative, except for pertinent positive and negatives stated in subjective.    Objective:   Vital signs:  Temp:  [97.8 °F (36.6 °C)-99.5 °F (37.5 °C)] 97.8 °F (36.6 °C)  Pulse:  [82-98] 82  Resp:  [18-22] 18  BP: (111-134)/(57-65) 124/63  SpO2:  [87 %-92 %] 91 %    Wt Readings from Last 6 Encounters:   24 (!) 492 lb 4.6 oz (223.3 kg)   24 197 lb 6.4 oz (89.5 kg)   24 193 lb 11.2 oz (87.9 kg)   23 209 lb 9.6 oz (95.1 kg)   23 208 lb 12.8 oz (94.7 kg)   10/30/23 208 lb 12.8 oz (94.7 kg)         Physical Exam:    General: No acute distress. Alert ,      , morbidly obese  Respiratory: decreased BS BL but Clear to auscultation  No wheezes. No rhonchi.  Cardiovascular: S1, S2. Regular rate and rhythm. No murmurs, rubs or gallops.   Abdomen: Soft, nontender, nondistended.  Positive bowel sounds. No rebound or guarding.  Neurologic: No focal neurological deficits.   Musculoskeletal: Moves all extremities.  Extremities: No edema.    Results:   Diagnostic Data:      Labs:    Labs Last 24 Hours:   BMP     CBC    Other     Na 142 Cl 112 BUN 21 Glu 90   Hb 10.5   PTT - Procal -   K 3.6 CO2 21.0 Cr 1.24   WBC 11.2 >< .0  INR - CRP -   Renal Lytes Endo    Hct 34.4   Trop - D dim -   eGFR - Ca 8.8 POC Gluc  -    LFT   pBNP - Lactic -   eGFR AA - PO4 - A1c -   AST - APk - Prot -  LDL -     Mg 1.7 TSH -   ALT - T joss - Alb -        COVID-19 Lab Results    COVID-19  Lab Results    Component Value Date    COVID19 Detected (A) 04/14/2024    COVID19 Not Detected 01/11/2024    COVID19 Not Detected 10/15/2023       Pro-Calcitonin  No results for input(s): \"PCT\" in the last 168 hours.    Cardiac  No results for input(s): \"TROP\", \"PBNP\" in the last 168 hours.    Creatinine Kinase  No results for input(s): \"CK\" in the last 168 hours.    Inflammatory Markers  No results for input(s): \"CRP\", \"JAZZY\", \"LDH\", \"DDIMER\" in the last 168 hours.    Imaging: Imaging data reviewed in Epic.    Medications:    azithromycin  500 mg Intravenous Q24H    piperacillin-tazobactam  3.375 g Intravenous Q8H    heparin  5,000 Units Subcutaneous Q8H PAULINE    acetaminophen  1,000 mg Oral BID    amLODIPine  5 mg Oral Daily    atorvastatin  40 mg Oral Nightly    cholecalciferol  2,000 Units Oral Daily    clopidogrel  75 mg Oral Daily    ferrous sulfate  325 mg Oral Daily with breakfast    fluticasone furoate-vilanterol  1 puff Inhalation Daily    gabapentin  300 mg Oral TID    ipratropium-albuterol  3 mL Nebulization TID    levothyroxine  100 mcg Oral Before breakfast    pantoprazole  40 mg Oral QAM AC       Assessment & Plan:   ASSESSMENT / PLAN:     UTI  Sepsis with tachycardia, tachypnea, elevated lactic acid, leukocytosis  -Already received 1 L of fluids in the ED.  Normal BP  -Continue IV antibiotics with ceftriaxone  -Continue IV fluids  -Monitor lactic acid  -Follow-up blood and urine cultures  -BCX NGTD; Ucx  + ve proteus MDR- on zosyn  cpm     Left retrocardiac opacity, possible pneumonia/aspiration/ARF with hypoxia  -Discussed with wife, will go ahead and get chest CT given persistent since April.  -Adjust antibiotic coverage based on results  -ct shows debris in L bronchi with possible postop PNA  Pt with ho aspiration PNA  Slp ordered: soft easy chew and thick liquids  Azithromycin added  6/11: today pt with hypoxia/increased O2 needs on 4L NC from RA yesterday ; was instructed to do thick liquids but refused and was  drinking thins; wife at bedside and explained importance of compliance they understand. Check CXR.  IF clinical worsening consider pulmonary consult.   Duonebs, O2 support       LBP R side radiating to RLE/radicular pattern:  MRI in 2019 showed multilevel degenerative changes of the lumbar spine, worst at L4-L5, where there is moderate spinal canal stenosis with moderate to severe right worse than left foraminal impingement.     Will start with ordering CT lumbar spine, may require MRI if pt can tolerate.      Hypothyroidism  Hypertension  Hyperlipidemia  History of stroke with residual left-sided weakness  -Resume home medications when reconciled     Quality:  DVT Prophylaxis: Heparin  CODE status: DNR/comfort from previous.  Goals of care needs to be readdressed  DEBORAH: TBD              Coordinated care with providers and counseling re: treatment plan and workup     Ros Tamayo MD    Supplementary Documentation:         **Certification      PHYSICIAN Certification of Need for Inpatient Hospitalization - Initial Certification    Patient will require inpatient services that will reasonably be expected to span two midnight's based on the clinical documentation in H+P.   Based on patients current state of illness, I anticipate that, after discharge, patient will require TBD.

## 2024-06-12 NOTE — CONGREGATE LIVING REVIEW
[facility] Living Authorization    The [facility] Review Committee has reviewed this case and the patient IS APPROVED for discharge to a facility for Short Term Skilled once the following procedure is followed:     - The physician discharge instructions (contained within the MATT note for SNF) must inlcude the below appropriate and approved COVID instructions to the facility    For questions regarding CLRC approval process, please contact the CM assigned to the case.  For questions regarding RN discharge workflow, please contact the unit Clinical Leader.

## 2024-06-12 NOTE — PROGRESS NOTES
[facility]  Progress Note     Cody Fitzgerald  : 1940    Status: Inpatient  Day #: 3    Attending: Ashutosh Gaspar MD  PCP: Bam Hampton MD     Assessment and Plan:    UTI  Sepsis with tachycardia, tachypnea, elevated lactic acid, leukocytosis  -Already received 1 L of fluids in the ED.  Normal BP  -Continue IV antibiotics with ceftriaxone  -stop IVF  -Monitor lactic acid -normalized  -BCX NGTD; Ucx  + ve proteus MDR- on zosyn  -cpm     Acute aspiration pneumonia  -ct shows debris in L bronchi with possible postop PNA  -Pt with ho aspiration PNA  -SLP ordered: soft easy chew and thick liquids  -noncompliant with thickened liquids at times  -wean off O2  -azithro, zosyn     LBP R side radiating to RLE/radicular pattern:  -MRI in 2019 showed multilevel degenerative changes of the lumbar spine, worst at L4-L5, where there is moderate spinal canal stenosis with moderate to severe right worse than left foraminal impingement.   -CT lumbar spine similar - moderate stenosis L4-5  -PT/OT     Hypothyroidism  Hypertension  Hyperlipidemia  History of stroke with residual left-sided weakness  -Resumed home medications       DVT Mechanical Prophylaxis:   SCDs,    DVT Pharmacologic Prophylaxis   Medication    heparin (Porcine) 5000 UNIT/ML injection 5,000 Units               Subjective:      Initial Chief Complaint:  generalized weakness    Feels better. Stable low back pain. Ambulated with walker.     10 point ROS completed and was negative, except for pertinent positive and negatives stated in subjective.      Objective:      Temp:  [97.5 °F (36.4 °C)-98.6 °F (37 °C)] 97.6 °F (36.4 °C)  Pulse:  [78-99] 86  Resp:  [20] 20  BP: (123-141)/(70-88) 130/70  SpO2:  [91 %-95 %] 94 %  General:  Alert, no distress  HEENT:  Normocephalic, atraumatic  Cardiac:  Regular rate, regular rhythm  Pulmonary:  Clear to auscultation bilaterally, respirations unlabored  Gastrointestinal:  Soft, non-tender, normal bowel  sounds  Musculoskeletal:  No joint swelling  Extremities:  No edema, no cyanosis, no clubbing  Neurologic:  nonfocal  Psychiatric:  Normal affect, calm and appropriate    Intake/Output Summary (Last 24 hours) at 6/12/2024 1440  Last data filed at 6/12/2024 0529  Gross per 24 hour   Intake 240 ml   Output 1150 ml   Net -910 ml         Recent Labs   Lab 06/10/24  0622 06/11/24  0559 06/12/24  0654   WBC 22.2* 11.2* 8.0   HGB 11.8* 10.5* 10.3*   HCT 37.7* 34.4* 32.3*   .0 160.0 190.0   RBC 3.86 3.51* 3.40*   MCV 97.7 98.0 95.0   MCH 30.6 29.9 30.3   MCHC 31.3 30.5* 31.9   RDW 14.6 15.0 14.6   NEPRELIM 18.46* 7.88* 5.29     Recent Labs   Lab 06/09/24  1906 06/09/24 2255 06/10/24  0622 06/11/24  0530 06/12/24  0654   BUN 22  --  18 21 17   CREATSERUM 1.23  --  1.05 1.24 1.30   CA 9.9  --  9.3 8.8 8.8   ALB 4.5  --  3.7  --   --      --  144 142 145   K 3.9  --  3.8 3.6 3.6     --  113* 112 116*   CO2 21.0  --  22.0 21.0 23.0   *  --  100* 90 93   MG  --   --  1.7 1.7 2.0   BILT 0.9  --  0.7  --   --    AST 11  --  9  --   --    ALT <7*  --  <7*  --   --    ALKPHO 111  --  91  --   --    TP 8.4*  --  6.9  --   --    PTT  --  31.5  --   --   --    INR  --  1.19  --   --   --        CT SPINE LUMBAR (CPT=72131)    Result Date: 6/11/2024  CONCLUSION:   Multilevel lumbar spine degenerative changes described above.  4.2 cm infrarenal abdominal aortic aneurysm status post aorto bi iliac stent graft.  Lesser incidental findings as above.     Dictated by (CST): Yariel Keith MD on 6/11/2024 at 8:33 PM     Finalized by (CST): Yariel Keith MD on 6/11/2024 at 8:40 PM          XR CHEST PA + LAT CHEST (CPT=71046)    Result Date: 6/11/2024  CONCLUSION:   Mild cardiomegaly with mild bilateral interstitial opacity which could reflect mild interstitial edema.  Mild left basilar opacity which may reflect combination of small pleural effusion with associated and or pneumonia.    Dictated by (CST): Sagar  Yariel GONZALES MD on 6/11/2024 at 5:34 PM     Finalized by (CST): Yariel Keith MD on 6/11/2024 at 5:36 PM             Medications:   azithromycin  500 mg Intravenous Q24H    piperacillin-tazobactam  3.375 g Intravenous Q8H    heparin  5,000 Units Subcutaneous Q8H PAULINE    acetaminophen  1,000 mg Oral BID    amLODIPine  5 mg Oral Daily    atorvastatin  40 mg Oral Nightly    cholecalciferol  2,000 Units Oral Daily    clopidogrel  75 mg Oral Daily    ferrous sulfate  325 mg Oral Daily with breakfast    fluticasone furoate-vilanterol  1 puff Inhalation Daily    gabapentin  300 mg Oral TID    ipratropium-albuterol  3 mL Nebulization TID    levothyroxine  100 mcg Oral Before breakfast    pantoprazole  40 mg Oral QAM AC      PRN Meds:   traMADol    acetaminophen    melatonin    calcium carbonate    Supplementary Documentation:        MDM High. Time spent on chart/note review, review of labs/imaging, discussion with patient, physical exam, discussion with staff, consultants, coordinating care, writing progress note, discussion of plan of care.      Ashutosh Gaspar MD

## 2024-06-12 NOTE — SLP NOTE
SPEECH DAILY NOTE - INPATIENT    ASSESSMENT & PLAN   ASSESSMENT  PPE REQUIRED. THIS THERAPIST WORE GLOVES AND MASK FOR DURATION OF EVALUATION. HANDS WASHED UPON ENTRANCE/EXIT.    SLP f/u for ongoing dysphagia tx/meal assessment per recommendations of soft easy to chew/thin liquids per BSE. RN reports pt tolerates diet and medication well with no overt clinical s/s aspiration. Pt denies any swallowing challenges, still opposed to trialing any modified liquids and complete further diagnostic testing.     Pt positioned upright in bedside chair, alert/cooperative. Pt afebrile, tolerating room air with oxygen status 94% prior to the start of oral trials. SLP reviewed aspiration precautions and safe swallowing compensatory strategies with the patient. Safe swallow guidelines remain written on the white board in purple. Patient v/u, no family present. Provided no assistance, pt tolerates soft easy to chew consistency and thin liquids via straw with no overt clinical signs/symptoms of aspiration. Oxygen status remained stable t/o the entire session. Recommend remain on current diet with strict adherence to aspiration precautions and swallow strategies. No further swallow services warranted at this time. Please re consult if needed. RN alerted with results and recommendations.     MOST RECENT CXR 6/9  CONCLUSION:   1. No significant change in the patchy left retrocardiac opacity, which is concerning for pneumonia/aspiration.  Recommend follow-up chest radiograph in 4-6 weeks to monitor for resolution or nonemergent further evaluation with a CT chest.   2. Redemonstrated prominence of the interstitial markings, which likely reflects components of both small airways disease/bronchitis as well as emphysema.   3. Lesser incidental findings described above.           Diet Recommendations - Solids: Soft/ Easy to chew  Diet Recommendations - Liquids: Thin Liquids    Compensatory Strategies Recommended: Slow rate;Alternate  consistencies;Small bites and sips;Multiple swallows  Aspiration Precautions: Upright position;Slow rate;Small bites and sips  Medication Administration Recommendations:  (as tolerated)    Patient Experiencing Pain: No                Treatment Plan  Treatment Plan/Recommendations: Aspiration precautions;No further inpatient SLP service warranted    Interdisciplinary Communication: Plan posted at bedside            GOALS  Goal #1 The patient will tolerate soft easy to chew solid consistency and thin liquids without overt signs or symptoms of aspiration with 90 % accuracy over 2 session(s).  Goal Met 6/12   Goal #2 The patient/family/caregiver will demonstrate understanding and implementation of aspiration precautions and swallow strategies independently over 2 session(s).    Goal Met 6/12     FOLLOW UP  Follow Up Needed (Documentation Required): No  SLP Follow-up Date: 06/12/24  Number of Visits to Meet Established Goals: 2    Session: 1    If you have any questions, please contact BETHANIE Ibarra M.S. CCC-SLP  Speech Language Pathologist  Phone Number Ext. [phone]

## 2024-06-12 NOTE — PLAN OF CARE
Problem: Patient Centered Care  Goal: Patient preferences are identified and integrated in the patient's plan of care  Description: Interventions:  - What would you like us to know as we care for you? Lives at home with wife  - Provide timely, complete, and accurate information to patient/family  - Incorporate patient and family knowledge, values, beliefs, and cultural backgrounds into the planning and delivery of care  - Encourage patient/family to participate in care and decision-making at the level they choose  - Honor patient and family perspectives and choices  Outcome: Progressing    Problem: SKIN/TISSUE INTEGRITY - ADULT  Goal: Skin integrity remains intact  Description: INTERVENTIONS  - Assess and document risk factors for pressure ulcer development  - Assess and document skin integrity  - Monitor for areas of redness and/or skin breakdown  - Initiate interventions, skin care algorithm/standards of care as needed  Outcome: Progressing     Problem: Impaired Functional Mobility  Goal: Achieve highest/safest level of mobility/gait  Description: Interventions:  - Assess patient's functional ability and stability  - Promote increasing activity/tolerance for mobility and gait  - Educate and engage patient/family in tolerated activity level and precautions  - Recommend use of  RW for transfers and ambulation  Outcome: Progressing     Problem: SAFETY ADULT - FALL  Goal: Free from fall injury  Description: INTERVENTIONS:  - Assess pt frequently for physical needs  - Identify cognitive and physical deficits and behaviors that affect risk of falls.  - [place] fall precautions as indicated by assessment.  - Educate pt/family on patient safety including physical limitations  - Instruct pt to call for assistance with activity based on assessment  - Modify environment to reduce risk of injury  - Provide assistive devices as appropriate  - Consider OT/PT consult to assist with strengthening/mobility  - Encourage  toileting schedule  Outcome: Progressing     Problem: CARDIOVASCULAR - ADULT  Goal: Maintains optimal cardiac output and hemodynamic stability  Description: INTERVENTIONS:  - Monitor vital signs, rhythm, and trends  - Monitor for bleeding, hypotension and signs of decreased cardiac output  - Evaluate effectiveness of vasoactive medications to optimize hemodynamic stability  - Monitor arterial and/or venous puncture sites for bleeding and/or hematoma  - Assess quality of pulses, skin color and temperature  - Assess for signs of decreased coronary artery perfusion - ex. Angina  - Evaluate fluid balance, assess for edema, trend weights  Outcome: Progressing  Goal: Absence of cardiac arrhythmias or at baseline  Description: INTERVENTIONS:  - Continuous cardiac monitoring, monitor vital signs, obtain 12 lead EKG if indicated  - Evaluate effectiveness of antiarrhythmic and heart rate control medications as ordered  - Initiate emergency measures for life threatening arrhythmias  - Monitor electrolytes and administer replacement therapy as ordered  Outcome: Progressing

## 2024-06-12 NOTE — PLAN OF CARE
Problem: Patient Centered Care  Goal: Patient preferences are identified and integrated in the patient's plan of care  Description: Interventions:  - What would you like us to know as we care for you?   - Provide timely, complete, and accurate information to patient/family  - Incorporate patient and family knowledge, values, beliefs, and cultural backgrounds into the planning and delivery of care  - Encourage patient/family to participate in care and decision-making at the level they choose  - Honor patient and family perspectives and choices  Outcome: Progressing     Problem: Patient/Family Goals  Goal: Patient/Family Long Term Goal  Description: Patient's Long Term Goal: pain management    Interventions:  - Monitor vitals  - Monitor appropriate labs  - Administer medications as ordered  - Follow MD's orders  - Update patient on plan of care   - Discharge planning   - See additional Care Plan goals for specific interventions  Outcome: Progressing  Goal: Patient/Family Short Term Goal  Description: Patient's Short Term Goal: Dc from hospital     Interventions:   - Monitor vitals  - Monitor appropriate labs  - Administer medications as ordered  - Follow MD's orders  - Update patient on plan of care   - Discharge planning   - See additional Care Plan goals for specific interventions  Outcome: Progressing     Problem: SKIN/TISSUE INTEGRITY - ADULT  Goal: Skin integrity remains intact  Description: INTERVENTIONS  - Assess and document risk factors for pressure ulcer development  - Assess and document skin integrity  - Monitor for areas of redness and/or skin breakdown  - Initiate interventions, skin care algorithm/standards of care as needed  Outcome: Progressing     Problem: Impaired Functional Mobility  Goal: Achieve highest/safest level of mobility/gait  Description: Interventions:  - Assess patient's functional ability and stability  - Promote increasing activity/tolerance for mobility and gait  - Educate and  engage patient/family in tolerated activity level and precautions  - Recommend use of  RW for transfers and ambulation  Outcome: Progressing     Problem: SAFETY ADULT - FALL  Goal: Free from fall injury  Description: INTERVENTIONS:  - Assess pt frequently for physical needs  - Identify cognitive and physical deficits and behaviors that affect risk of falls.  - [place] fall precautions as indicated by assessment.  - Educate pt/family on patient safety including physical limitations  - Instruct pt to call for assistance with activity based on assessment  - Modify environment to reduce risk of injury  - Provide assistive devices as appropriate  - Consider OT/PT consult to assist with strengthening/mobility  - Encourage toileting schedule  Outcome: Progressing     Problem: CARDIOVASCULAR - ADULT  Goal: Maintains optimal cardiac output and hemodynamic stability  Description: INTERVENTIONS:  - Monitor vital signs, rhythm, and trends  - Monitor for bleeding, hypotension and signs of decreased cardiac output  - Evaluate effectiveness of vasoactive medications to optimize hemodynamic stability  - Monitor arterial and/or venous puncture sites for bleeding and/or hematoma  - Assess quality of pulses, skin color and temperature  - Assess for signs of decreased coronary artery perfusion - ex. Angina  - Evaluate fluid balance, assess for edema, trend weights  Outcome: Progressing  Goal: Absence of cardiac arrhythmias or at baseline  Description: INTERVENTIONS:  - Continuous cardiac monitoring, monitor vital signs, obtain 12 lead EKG if indicated  - Evaluate effectiveness of antiarrhythmic and heart rate control medications as ordered  - Initiate emergency measures for life threatening arrhythmias  - Monitor electrolytes and administer replacement therapy as ordered  Outcome: Progressing

## 2024-06-13 VITALS
RESPIRATION RATE: 20 BRPM | WEIGHT: 189.81 LBS | OXYGEN SATURATION: 90 % | DIASTOLIC BLOOD PRESSURE: 65 MMHG | HEART RATE: 83 BPM | TEMPERATURE: 98 F | BODY MASS INDEX: 27 KG/M2 | SYSTOLIC BLOOD PRESSURE: 150 MMHG

## 2024-06-13 LAB
ANION GAP SERPL CALC-SCNC: 6 MMOL/L (ref 0–18)
BASOPHILS # BLD AUTO: 0.08 X10(3) UL (ref 0–0.2)
BASOPHILS NFR BLD AUTO: 1.2 %
BUN BLD-MCNC: 15 MG/DL (ref 9–23)
BUN/CREAT SERPL: 12.2 (ref 10–20)
CALCIUM BLD-MCNC: 9.1 MG/DL (ref 8.7–10.4)
CHLORIDE SERPL-SCNC: 114 MMOL/L (ref 98–112)
CO2 SERPL-SCNC: 24 MMOL/L (ref 21–32)
CREAT BLD-MCNC: 1.23 MG/DL
DEPRECATED RDW RBC AUTO: 53.6 FL (ref 35.1–46.3)
EGFRCR SERPLBLD CKD-EPI 2021: 59 ML/MIN/1.73M2 (ref 60–?)
EOSINOPHIL # BLD AUTO: 0.31 X10(3) UL (ref 0–0.7)
EOSINOPHIL NFR BLD AUTO: 4.6 %
ERYTHROCYTE [DISTWIDTH] IN BLOOD BY AUTOMATED COUNT: 14.6 % (ref 11–15)
GLUCOSE BLD-MCNC: 90 MG/DL (ref 70–99)
HCT VFR BLD AUTO: 36.8 %
HGB BLD-MCNC: 11.2 G/DL
IMM GRANULOCYTES # BLD AUTO: 0.04 X10(3) UL (ref 0–1)
IMM GRANULOCYTES NFR BLD: 0.6 %
LYMPHOCYTES # BLD AUTO: 2.24 X10(3) UL (ref 1–4)
LYMPHOCYTES NFR BLD AUTO: 33.1 %
MCH RBC QN AUTO: 30 PG (ref 26–34)
MCHC RBC AUTO-ENTMCNC: 30.4 G/DL (ref 31–37)
MCV RBC AUTO: 98.7 FL
MONOCYTES # BLD AUTO: 0.72 X10(3) UL (ref 0.1–1)
MONOCYTES NFR BLD AUTO: 10.6 %
NEUTROPHILS # BLD AUTO: 3.38 X10 (3) UL (ref 1.5–7.7)
NEUTROPHILS # BLD AUTO: 3.38 X10(3) UL (ref 1.5–7.7)
NEUTROPHILS NFR BLD AUTO: 49.9 %
OSMOLALITY SERPL CALC.SUM OF ELEC: 298 MOSM/KG (ref 275–295)
PLATELET # BLD AUTO: 188 10(3)UL (ref 150–450)
POTASSIUM SERPL-SCNC: 3.5 MMOL/L (ref 3.5–5.1)
RBC # BLD AUTO: 3.73 X10(6)UL
SODIUM SERPL-SCNC: 144 MMOL/L (ref 136–145)
WBC # BLD AUTO: 6.8 X10(3) UL (ref 4–11)

## 2024-06-13 PROCEDURE — 99239 HOSP IP/OBS DSCHRG MGMT >30: CPT | Performed by: HOSPITALIST

## 2024-06-13 RX ORDER — TRAMADOL HYDROCHLORIDE 50 MG/1
50 TABLET ORAL EVERY 6 HOURS PRN
Qty: 20 TABLET | Refills: 0 | Status: SHIPPED | OUTPATIENT
Start: 2024-06-13 | End: 2024-08-27

## 2024-06-13 NOTE — DISCHARGE SUMMARY
[facility]  Discharge Summary     Cody Fitzgerald  : 1940    Status: Inpatient  Day #: 4    Attending: Ashutosh Gaspar MD  PCP: Bam Hampton MD     Date of Admission:  2024  Date of Discharge:  2024     Hospital Discharge Diagnoses:     Acute Proteus mirabilis UTI  Sepsis secondary to UTI  Acute aspiration pneumonia  Dysphagia  Right lower extremity radiculopathy related to lumbar stenosis  Hypothyroidism  Hypertension  Hyperlipidemia  History of CVA with left-sided weakness      History of Present Illness:     Copied from Admission H&P:    Cody Fitzgerald is a 83 year old male with history of AAA, hypothyroidism, HTN, HLD, frequent falls, previous stroke with left-sided weakness, who was brought into the ED for evaluation of weakness.  Per wife, in the last few days he has been weak and today required maximum assistance to move around.  Today he had a fever of 102 at home.  He has a mild cough, minimally productive.  Vitals with temp 99.7, , respirations 28  CMP stable  CBC with WBC 28.8.  Otherwise stable  Lactic acid 2.6  UA: Positive for nitrites and leuk esterase  EKG with no acute changes  Blood and urine cultures obtained.  Patient has been started on ceftriaxone for UTI.  CXR: No significant change in patchy left retrocardiac opacity which is concerning for pneumonia/aspiration.     Per wife, he recently completed a course of antibiotics for UTI.  Patient denies shortness of breath, chest pain, nausea or vomiting.      Hospital Course:     UTI  Sepsis with tachycardia, tachypnea, elevated lactic acid, leukocytosis  -Already received 1 L of fluids in the ED.  Normal BP  -stopped IVF  -lactic acid -normalized  -BCX NGTD; Ucx  + ve proteus MDR- on zosyn  -oral abx on discharge     Acute aspiration pneumonia  -ct shows debris in L bronchi with possible postop PNA  -Pt with ho aspiration PNA  -SLP ordered: soft easy chew and thick liquids  -noncompliant with thickened liquids  at times   -weaned off O2  -augmentin on discharge     LBP R side radiating to RLE/radicular pattern:  -MRI in 2019 showed multilevel degenerative changes of the lumbar spine, worst at L4-L5, where there is moderate spinal canal stenosis with moderate to severe right worse than left foraminal impingement.   -CT lumbar spine similar - moderate stenosis L4-5  -PT/OT  -tramadol prn     Hypothyroidism  Hypertension  Hyperlipidemia  History of stroke with residual left-sided weakness  -Cont home medications        Physical Exam:   Blood pressure 150/65, pulse 83, temperature 97.7 °F (36.5 °C), temperature source Oral, resp. rate 20, weight 189 lb 12.8 oz (86.1 kg), SpO2 90%.  General:  Alert, no distress  HEENT:  Normocephalic, atraumatic  Cardiac:  Regular rate, regular rhythm  Pulmonary:  Clear to auscultation bilaterally, respirations unlabored  Gastrointestinal:  Soft, non-tender, normal bowel sounds  Musculoskeletal:  No joint swelling  Extremities:  No edema, no cyanosis, no clubbing  Neurologic:  nonfocal  Psychiatric:  Normal affect, calm and appropriate         Discharge Medications        START taking these medications        Instructions Prescription details   traMADol 50 MG Tabs  Commonly known as: Ultram      Take 1 tablet (50 mg total) by mouth every 6 (six) hours as needed.   Quantity: 20 tablet  Refills: 0            CONTINUE taking these medications        Instructions Prescription details   acetaminophen 500 MG Tabs  Commonly known as: Tylenol Extra Strength      Take 2 tablets (1,000 mg total) by mouth in the morning and 2 tablets (1,000 mg total) before bedtime. Also may have 500 mg PO Q6 prn but not to exceed 3 gm total of acetaminophen in 24 hours.   Refills: 0     amLODIPine 5 MG Tabs  Commonly known as: Norvasc      TAKE 1 TABLET(5 MG) BY MOUTH DAILY   Quantity: 90 tablet  Refills: 1     amoxicillin clavulanate 875-125 MG Tabs  Commonly known as: Augmentin      Take 1 tablet by mouth 2 (two) times  daily for 4 days.   Stop taking on: June 17, 2024  Quantity: 8 tablet  Refills: 0     atorvastatin 40 MG Tabs  Commonly known as: Lipitor      TAKE 1 TABLET(40 MG) BY MOUTH NIGHTLY   Quantity: 90 tablet  Refills: 3     benzonatate 200 MG Caps  Commonly known as: Tessalon      Take 1 capsule (200 mg total) by mouth 3 (three) times daily as needed for cough.   Quantity: 60 capsule  Refills: 1     calcium carbonate 500 MG Chew  Commonly known as: Tums      Chew 1 tablet (500 mg total) by mouth 3 (three) times daily as needed.   Refills: 0     clopidogrel 75 MG Tabs  Commonly known as: Plavix      TAKE 1 TABLET(75 MG) BY MOUTH DAILY   Quantity: 90 tablet  Refills: 3     ferrous sulfate 325 (65 FE) MG Tbec      Take 1 tablet (325 mg total) by mouth daily with breakfast.   Quantity: 30 tablet  Refills: 0     fluticasone furoate-vilanterol 100-25 MCG/ACT Aepb  Commonly known as: Breo Ellipta      Inhale 1 puff into the lungs daily.   Refills: 0     gabapentin 300 MG Caps  Commonly known as: Neurontin      Take 1 capsule (300 mg total) by mouth 3 (three) times daily.   Quantity: 270 capsule  Refills: 3     ipratropium-albuterol 0.5-2.5 (3) MG/3ML Soln  Commonly known as: Duoneb      Take 3 mL by nebulization in the morning, at noon, and at bedtime.   Quantity: 3 mL  Refills: 0     levothyroxine 100 MCG Tabs  Commonly known as: Synthroid      TAKE 1 TABLET(100 MCG) BY MOUTH DAILY   Quantity: 90 tablet  Refills: 3     lidocaine 4 % Ptch  Commonly known as: LIDODERM      Place 2 patches onto the skin daily. L Shoulder  &L hip as needed   Refills: 0     pantoprazole 40 MG Tbec  Commonly known as: Protonix      Take 1 tablet (40 mg total) by mouth every morning before breakfast.   Refills: 0     Polyethylene Glycol 3350 17 g Pack  Commonly known as: MIRALAX      Take 17 g by mouth daily.   Refills: 0     Vitamin D 50 MCG (2000 UT) Caps      Take 1 capsule (2,000 Units total) by mouth daily.   Refills: 0               Where to  Get Your Medications        These medications were sent to [id number] - [place], [place] - [address] AT [facility], [phone], [phone]  [address] [facility] [address]      Phone: [phone]   amoxicillin clavulanate 875-125 MG Tabs  traMADol 50 MG Tabs           Hospital Discharge Diagnoses:  acute proteus UTI with sepsis    Lace+ Score: 84  59-90 High Risk  29-58 Medium Risk  0-28   Low Risk.    TCM Follow-Up Recommendation:  LACE > 58: High Risk of readmission after discharge from the hospital.        I spent >30 minutes on this discharge. Discussed treatment and discharge plans.       Ashutosh Gaspar MD

## 2024-06-13 NOTE — PLAN OF CARE
Problem: Patient Centered Care  Goal: Patient preferences are identified and integrated in the patient's plan of care  Description: Interventions:  - What would you like us to know as we care for you? From home with family   - Provide timely, complete, and accurate information to patient/family  - Incorporate patient and family knowledge, values, beliefs, and cultural backgrounds into the planning and delivery of care  - Encourage patient/family to participate in care and decision-making at the level they choose  - Honor patient and family perspectives and choices  Outcome: Adequate for Discharge       Problem: SKIN/TISSUE INTEGRITY - ADULT  Goal: Skin integrity remains intact  Description: INTERVENTIONS  - Assess and document risk factors for pressure ulcer development  - Assess and document skin integrity  - Monitor for areas of redness and/or skin breakdown  - Initiate interventions, skin care algorithm/standards of care as needed  Outcome: Adequate for Discharge     Problem: Impaired Functional Mobility  Goal: Achieve highest/safest level of mobility/gait  Description: Interventions:  - Assess patient's functional ability and stability  - Promote increasing activity/tolerance for mobility and gait  - Educate and engage patient/family in tolerated activity level and precautions  - Recommend use of sit-stand lift for transfers  Outcome: Adequate for Discharge     Problem: SAFETY ADULT - FALL  Goal: Free from fall injury  Description: INTERVENTIONS:  - Assess pt frequently for physical needs  - Identify cognitive and physical deficits and behaviors that affect risk of falls.  - [place] fall precautions as indicated by assessment.  - Educate pt/family on patient safety including physical limitations  - Instruct pt to call for assistance with activity based on assessment  - Modify environment to reduce risk of injury  - Provide assistive devices as appropriate  - Consider OT/PT consult to assist with  strengthening/mobility  - Encourage toileting schedule  Outcome: Adequate for Discharge     Problem: CARDIOVASCULAR - ADULT  Goal: Maintains optimal cardiac output and hemodynamic stability  Description: INTERVENTIONS:  - Monitor vital signs, rhythm, and trends  - Monitor for bleeding, hypotension and signs of decreased cardiac output  - Evaluate effectiveness of vasoactive medications to optimize hemodynamic stability  - Monitor arterial and/or venous puncture sites for bleeding and/or hematoma  - Assess quality of pulses, skin color and temperature  - Assess for signs of decreased coronary artery perfusion - ex. Angina  - Evaluate fluid balance, assess for edema, trend weights  Outcome: Adequate for Discharge  Goal: Absence of cardiac arrhythmias or at baseline  Description: INTERVENTIONS:  - Continuous cardiac monitoring, monitor vital signs, obtain 12 lead EKG if indicated  - Evaluate effectiveness of antiarrhythmic and heart rate control medications as ordered  - Initiate emergency measures for life threatening arrhythmias  - Monitor electrolytes and administer replacement therapy as ordered  Outcome: Adequate for Discharge

## 2024-06-13 NOTE — PHYSICAL THERAPY NOTE
PHYSICAL THERAPY TREATMENT NOTE - INPATIENT     Room Number: 529/529-A       Presenting Problem: acute cystitis wiht hematuria, weakness  Co-Morbidities : Hx AAA, HTN, frequent falls, CVA with L side weakness    Problem List  Principal Problem:    Acute cystitis with hematuria  Active Problems:    Sepsis (HCC)      PHYSICAL THERAPY ASSESSMENT   Patient demonstrates fair progress this session, goals  remain in progress.    Patient continues to function below baseline with bed mobility, transfers, gait, stair negotiation, maintaining seated position, standing prolonged periods, performing household tasks, and wheelchair mobility.  Contributing factors to remaining limitations include decreased functional strength, decreased endurance/aerobic capacity, pain, decreased muscular endurance, and medical status.  Next session anticipate patient to progress bed mobility, transfers, gait, stair negotiation, maintaining seated position, standing prolonged periods, and performing household tasks.  Physical Therapy will continue to follow patient for duration of hospitalization.    Patient continues to benefit from continued skilled PT services: to promote return to prior level of function and safety with continuous assistance and gradual rehabilitative therapy .    PLAN  PT Treatment Plan: Bed mobility;Body mechanics;Coordination;Endurance;Energy conservation;Strengthening;Stair training;Transfer training;Balance training  Frequency (Obs): 3-5x/week    SUBJECTIVE  \"I'm always ready\"    OBJECTIVE  Precautions: Bed/chair alarm    PAIN ASSESSMENT   Ratin  Location: generalized body aches  Management Techniques: Activity promotion;Body mechanics;Relaxation;Repositioning;Breathing techniques    BALANCE  Static Sitting: Fair -  Dynamic Sitting: Poor  Static Standing: Fair  Dynamic Standing: Poor    AM-PAC '6-Clicks' INPATIENT SHORT FORM - BASIC MOBILITY  How much difficulty does the patient currently have...  Patient Difficulty:  Turning over in bed (including adjusting bedclothes, sheets and blankets)?: A Lot   Patient Difficulty: Sitting down on and standing up from a chair with arms (e.g., wheelchair, bedside commode, etc.): A Lot   Patient Difficulty: Moving from lying on back to sitting on the side of the bed?: A Lot   How much help from another person does the patient currently need...   Help from Another: Moving to and from a bed to a chair (including a wheelchair)?: A Lot   Help from Another: Need to walk in hospital room?: A Lot   Help from Another: Climbing 3-5 steps with a railing?: Total     AM-PAC Score:  Raw Score: 11   Approx Degree of Impairment: 72.57%   Standardized Score (AM-PAC Scale): 33.86   CMS Modifier (G-Code): CL    FUNCTIONAL ABILITY STATUS  Functional Mobility/Gait Assessment  Gait Assistance: Moderate assistance (with a close chair follow)  Distance (ft): 2 feet x 2   Assistive Device: Rolling walker  Pattern:  (shuffle pattern, decreased gerhard, decreased step length, forward flexed posture, narrow base of support, verbal cues for sequencing and rolling walker management.)  Stairs:  (Deferred due to poor standing tolerance.)  Rolling:  not tested  Supine to Sit:  not tested (patient up in chair at start of session)  Sit to Supine:  not tested  Sit to Stand: maximum assist    Additional information: Patient received seated in recliner chair at initiation of session agreeable to participation in PT. Patient tolerates treatment fairly, demonstrates improvement in ambulation tolerance in comparison to previous session, however continues to require extensive 1 person assistance for all out of bed mobility. Patient requires maximal assistance to perform sit to stand transfers from recliner. Ambulates ~2 feet x 2 with moderate assistance and a close chair follow. Patient left in chair, lines intact, needs in reach and handoff to RN.      The patient's Approx Degree of Impairment: 72.57% has been calculated based on  documentation in the [facility] '6 clicks' Inpatient Daily Activity Short Form.  Research supports that patients with this level of impairment may benefit from rehab.  Final disposition will be made by interdisciplinary medical team.    THERAPEUTIC EXERCISES  Lower Extremity Alternating marching  Ankle pumps  Glut sets  Knee extension     Position Sitting       Patient End of Session: Up in chair;Call light within reach;Needs met;All patient questions and concerns addressed;Alarm set;Family present    CURRENT GOALS   Goals to be met by: 24  Patient Goal Patient's self-stated goal is: go home   Goal #1 Patient is able to demonstrate supine - sit EOB @ level: minimum assistance      Goal #1   Current Status  Not tested   Goal #2 Patient is able to demonstrate transfers Sit to/from Stand at assistance level: CGA with walker - tabitha and/or walker - rolling      Goal #2  Current Status  Maximal assistance with RW   Goal #3 Patient is able to ambulate 30 feet with assist device: walker - tabitha and/or walker - rolling at assistance level: minimum assistance   Goal #3   Current Status  2 feet x 2 moderate assistance with a RW   Goal #4 Patient will tolerate static standing for 3 minutes with BUE support on RW and CGA in order to prepare for future out of bed activities.   Goal #4   Current Status  Progressing, ~2 minutes   Goal #5 Patient to demonstrate independence with home activity/exercise instructions provided to patient in preparation for discharge.   Goal #5   Current Status  Progressing     Gait Trainin minutes  Therapeutic Activity: 10 minutes    Sarah Chavez PT, DPT

## 2024-06-13 NOTE — PLAN OF CARE
Problem: Patient Centered Care  Goal: Patient preferences are identified and integrated in the patient's plan of care  Description: Interventions:  - What would you like us to know as we care for you? From home with wife   - Provide timely, complete, and accurate information to patient/family  - Incorporate patient and family knowledge, values, beliefs, and cultural backgrounds into the planning and delivery of care  - Encourage patient/family to participate in care and decision-making at the level they choose  - Honor patient and family perspectives and choices  Outcome: Progressing     Problem: Patient/Family Goals  Goal: Patient/Family Long Term Goal  Description: Patient's Long Term Goal: return home     Interventions:  -   - See additional Care Plan goals for specific interventions  Outcome: Progressing  Goal: Patient/Family Short Term Goal  Description: Patient's Short Term Goal: get better     Interventions:   -   - See additional Care Plan goals for specific interventions  Outcome: Progressing     Problem: SKIN/TISSUE INTEGRITY - ADULT  Goal: Skin integrity remains intact  Description: INTERVENTIONS  - Assess and document risk factors for pressure ulcer development  - Assess and document skin integrity  - Monitor for areas of redness and/or skin breakdown  - Initiate interventions, skin care algorithm/standards of care as needed  Outcome: Progressing     Problem: Impaired Functional Mobility  Goal: Achieve highest/safest level of mobility/gait  Description: Interventions:  - Assess patient's functional ability and stability  - Promote increasing activity/tolerance for mobility and gait  - Educate and engage patient/family in tolerated activity level and precautions    Outcome: Progressing     Problem: SAFETY ADULT - FALL  Goal: Free from fall injury  Description: INTERVENTIONS:  - Assess pt frequently for physical needs  - Identify cognitive and physical deficits and behaviors that affect risk of falls.  -  Unity fall precautions as indicated by assessment.  - Educate pt/family on patient safety including physical limitations  - Instruct pt to call for assistance with activity based on assessment  - Modify environment to reduce risk of injury  - Provide assistive devices as appropriate  - Consider OT/PT consult to assist with strengthening/mobility  - Encourage toileting schedule  Outcome: Progressing     Problem: CARDIOVASCULAR - ADULT  Goal: Maintains optimal cardiac output and hemodynamic stability  Description: INTERVENTIONS:  - Monitor vital signs, rhythm, and trends  - Monitor for bleeding, hypotension and signs of decreased cardiac output  - Evaluate effectiveness of vasoactive medications to optimize hemodynamic stability  - Monitor arterial and/or venous puncture sites for bleeding and/or hematoma  - Assess quality of pulses, skin color and temperature  - Assess for signs of decreased coronary artery perfusion - ex. Angina  - Evaluate fluid balance, assess for edema, trend weights  Outcome: Progressing  Goal: Absence of cardiac arrhythmias or at baseline  Description: INTERVENTIONS:  - Continuous cardiac monitoring, monitor vital signs, obtain 12 lead EKG if indicated  - Evaluate effectiveness of antiarrhythmic and heart rate control medications as ordered  - Initiate emergency measures for life threatening arrhythmias  - Monitor electrolytes and administer replacement therapy as ordered  Outcome: Progressing   No acute changes in patient status, vital signs stable, call light within reach , fall precautions in place , discharge to [facility]

## 2024-06-13 NOTE — CM/SW NOTE
06/13/24 1200   Discharge disposition   Expected discharge disposition Home-Health   Post Acute Care Provider Residential   Discharge transportation [facility]     Update 2:45 PM    SW requested to [facility].  Patient's son will drive him home.    TRACEE confirmed with RN Annelise who stated pt is medically ready for discharge today.     HH updates attached via [facility] to [facility] . Liarobson Harris from [facility]  made aware of discharge through secure chat/ [facility] Messages.    SW scheduled [facility] for 2:15 PM transport at request of family.  $85 fees.  SW communicated this with patient and he was agreeable.    TRACEE/CM to remain available for support and/or discharge planning.     PLAN: DC home with [facility] via [facility] 2:15 PM.  PCS completed.    Alisa Merritt MSW, LSW [id number]

## 2024-06-14 ENCOUNTER — PATIENT OUTREACH (OUTPATIENT)
Dept: CASE MANAGEMENT | Age: 84
End: 2024-06-14

## 2024-06-14 NOTE — PROGRESS NOTES
Attempted to reach the patient to complete a Transitional Care Management-Hospital follow up call. Left a message for the patient to call the nurse care manager back at, [phone].

## 2024-06-14 NOTE — PROGRESS NOTES
SUKHJINDER s/w patient's wife Meme. She states that the patient is currently napping. She did schedule an HFU appt with PCP for 6/24/24. SUKHJINDER will try again another time.

## 2024-06-21 ENCOUNTER — TELEPHONE (OUTPATIENT)
Dept: INTERNAL MEDICINE CLINIC | Facility: CLINIC | Age: 84
End: 2024-06-21

## 2024-06-21 NOTE — TELEPHONE ENCOUNTER
Maribel , from [facility], [phone], called to inform Provider that  Evaluation was completed today and referrals were made for  transportation, [facility] and [facility].

## 2024-06-24 ENCOUNTER — TELEPHONE (OUTPATIENT)
Dept: INTERNAL MEDICINE CLINIC | Facility: CLINIC | Age: 84
End: 2024-06-24

## 2024-06-24 ENCOUNTER — OFFICE VISIT (OUTPATIENT)
Dept: INTERNAL MEDICINE CLINIC | Facility: CLINIC | Age: 84
End: 2024-06-24

## 2024-06-24 VITALS
DIASTOLIC BLOOD PRESSURE: 70 MMHG | OXYGEN SATURATION: 96 % | HEART RATE: 76 BPM | BODY MASS INDEX: 28.2 KG/M2 | HEIGHT: 70 IN | TEMPERATURE: 97 F | WEIGHT: 197 LBS | SYSTOLIC BLOOD PRESSURE: 130 MMHG

## 2024-06-24 DIAGNOSIS — E78.00 HYPERCHOLESTEROLEMIA: ICD-10-CM

## 2024-06-24 DIAGNOSIS — R53.1 WEAKNESS: ICD-10-CM

## 2024-06-24 DIAGNOSIS — R53.81 PHYSICAL DECONDITIONING: ICD-10-CM

## 2024-06-24 DIAGNOSIS — I69.359 CVA, OLD, HEMIPARESIS (HCC): ICD-10-CM

## 2024-06-24 DIAGNOSIS — M54.50 CHRONIC BILATERAL LOW BACK PAIN WITHOUT SCIATICA: ICD-10-CM

## 2024-06-24 DIAGNOSIS — G81.94 LEFT HEMIPARESIS (HCC): ICD-10-CM

## 2024-06-24 DIAGNOSIS — G89.29 CHRONIC BILATERAL LOW BACK PAIN WITHOUT SCIATICA: ICD-10-CM

## 2024-06-24 DIAGNOSIS — J69.0 ASPIRATION PNEUMONITIS (HCC): ICD-10-CM

## 2024-06-24 DIAGNOSIS — N18.31 STAGE 3A CHRONIC KIDNEY DISEASE (HCC): ICD-10-CM

## 2024-06-24 DIAGNOSIS — I10 ESSENTIAL HYPERTENSION: ICD-10-CM

## 2024-06-24 DIAGNOSIS — M15.9 PRIMARY OSTEOARTHRITIS INVOLVING MULTIPLE JOINTS: ICD-10-CM

## 2024-06-24 DIAGNOSIS — N39.0 URINARY TRACT INFECTION WITHOUT HEMATURIA, SITE UNSPECIFIED: Primary | ICD-10-CM

## 2024-06-24 DIAGNOSIS — M48.061 SPINAL STENOSIS OF LUMBAR REGION WITHOUT NEUROGENIC CLAUDICATION: ICD-10-CM

## 2024-06-24 DIAGNOSIS — A41.9 SEPSIS, DUE TO UNSPECIFIED ORGANISM, UNSPECIFIED WHETHER ACUTE ORGAN DYSFUNCTION PRESENT (HCC): ICD-10-CM

## 2024-06-24 DIAGNOSIS — I69.391 DYSPHAGIA S/P CVA (CEREBROVASCULAR ACCIDENT): ICD-10-CM

## 2024-06-24 RX ORDER — LEVOTHYROXINE SODIUM 0.1 MG/1
100 TABLET ORAL DAILY
Qty: 90 TABLET | Refills: 3 | Status: SHIPPED | OUTPATIENT
Start: 2024-06-24

## 2024-06-24 RX ORDER — FLUOCINONIDE 0.5 MG/G
OINTMENT TOPICAL
Qty: 30 G | Refills: 2 | Status: SHIPPED | OUTPATIENT
Start: 2024-06-24

## 2024-06-24 NOTE — TELEPHONE ENCOUNTER
[facility] drug change  Fluocinonide 0.05% Ointment 15 gm    Drug not covered by plan alternative  Triam CinoloneAcetonid    Placed in

## 2024-06-24 NOTE — PROGRESS NOTES
Cody Fitzgerald is a 84 year old male.  Chief Complaint   Patient presents with    Hospital F/U     HPI:     Chief Complaint   Patient presents with    Hospital F/U     84-year-old white male who is seen today with his wife for follow-up evaluation for recent hospitalization for urinary tract infection with sepsis and also acute aspiration pneumonia.  Patient is aspiration pneumonia has been a constant problem.  Patient appears to be doing well today.  Patient has had a prior CVA with marked left-sided hemiparesis significantly by his wife.  Patient was able to come to the office today because her son was able to bring him.  Patient has no complaints.  Patient appears to be doing well today.  Patient also appears to be doing better than expected considering how sick he was when he was recently hospitalized.  Current Outpatient Medications   Medication Sig Dispense Refill    levothyroxine 100 MCG Oral Tab Take 1 tablet (100 mcg total) by mouth daily. 90 tablet 3    traMADol 50 MG Oral Tab Take 1 tablet (50 mg total) by mouth every 6 (six) hours as needed. 20 tablet 0    ipratropium-albuterol 0.5-2.5 (3) MG/3ML Inhalation Solution Take 3 mL by nebulization in the morning, at noon, and at bedtime. (Patient taking differently: Take 3 mL by nebulization in the morning and 3 mL before bedtime. And as needed.) 3 mL 0    amLODIPine 5 MG Oral Tab TAKE 1 TABLET(5 MG) BY MOUTH DAILY 90 tablet 1    calcium carbonate 500 MG Oral Chew Tab Chew 1 tablet (500 mg total) by mouth 3 (three) times daily as needed.      ATORVASTATIN 40 MG Oral Tab TAKE 1 TABLET(40 MG) BY MOUTH NIGHTLY 90 tablet 3    CLOPIDOGREL 75 MG Oral Tab TAKE 1 TABLET(75 MG) BY MOUTH DAILY 90 tablet 3    ferrous sulfate 325 (65 FE) MG Oral Tab EC Take 1 tablet (325 mg total) by mouth daily with breakfast. 30 tablet 0    gabapentin 300 MG Oral Cap Take 1 capsule (300 mg total) by mouth 3 (three) times daily. 270 capsule 3    lidocaine 4 % External Patch Place 2  patches onto the skin daily. L Shoulder  &L hip as needed      benzonatate 200 MG Oral Cap Take 1 capsule (200 mg total) by mouth 3 (three) times daily as needed for cough. 60 capsule 1    acetaminophen 500 MG Oral Tab Take 2 tablets (1,000 mg total) by mouth in the morning and 2 tablets (1,000 mg total) before bedtime. Also may have 500 mg PO Q6 prn but not to exceed 3 gm total of acetaminophen in 24 hours.      Cholecalciferol (VITAMIN D) 2000 units Oral Cap Take 1 capsule (2,000 Units total) by mouth daily.      fluticasone furoate-vilanterol 100-25 MCG/ACT Inhalation Aerosol Powder, Breath Activated Inhale 1 puff into the lungs daily. (Patient not taking: Reported on 2024)      pantoprazole 40 MG Oral Tab EC Take 1 tablet (40 mg total) by mouth every morning before breakfast. (Patient not taking: Reported on 2024)        Past Medical History:    Abdominal aortic aneurysm (AAA) (HCC)    Back problem    back pain    Disorder of thyroid    hypothyroidism    Falls frequently    High blood pressure    High cholesterol    Hx of pneumothorax    Muscle weakness    LUE hemiplegia    Osteoarthritis    PNA (pneumonia)    Pneumonia due to organism    Stroke (HCC)    left sided weakness      Social History:  Social History     Socioeconomic History    Marital status:    Tobacco Use    Smoking status: Former     Current packs/day: 0.00     Average packs/day: 2.0 packs/day for 50.0 years (100.0 ttl pk-yrs)     Types: Cigarettes     Start date: 1958     Quit date: 2008     Years since quittin.4    Smokeless tobacco: Never   Vaping Use    Vaping status: Never Used   Substance and Sexual Activity    Alcohol use: Yes     Comment: socially    Drug use: No   Other Topics Concern    Caffeine Concern Yes     Comment: 1 cup of coffee daily    Exercise No   Social History Narrative    The patient uses the following assistive device(s):  quad cane and pick-up walker.      The patient does live in a home  with stairs.     Social Determinants of Health     Financial Resource Strain: Low Risk  (11/6/2023)    Financial Resource Strain     Difficulty of Paying Living Expenses: Not very hard     Med Affordability: No   Food Insecurity: No Food Insecurity (6/10/2024)    Food Insecurity     Food Insecurity: Never true   Transportation Needs: No Transportation Needs (6/10/2024)    Transportation Needs     Lack of Transportation: No   Housing Stability: Low Risk  (6/10/2024)    Housing Stability     Housing Instability: No        REVIEW OF SYSTEMS:   GENERAL HEALTH: feels well otherwise  RESPIRATORY:No cough or SOB  CARDIOVASCULAR: No chest pain  GI: No abdominal pain, nausea, vomiting, diarrhea, or constipation  :No Urinary complaints  EXT:No complaints of pain or swelling in patient's legs    EXAM:   /70 (BP Location: Right arm, Patient Position: Sitting, Cuff Size: large)   Pulse 76   Temp 97.4 °F (36.3 °C)   Ht 5' 10\" (1.778 m)   Wt 197 lb (89.4 kg)   SpO2 96%   BMI 28.27 kg/m²   GENERAL: well developed, well nourished in no acute distress  HEENT: normal oropharynx.. Ears are normal. Eyes are normal.  Patient has a rash on the left side of his face which appears to be a seborrhea type rash.  NECK: supple,no lymphadenopathy or masses, no bruits  CHEST: Well-developed male.  LUNGS: clear to auscultation  CARDIO: RRR, normal S1S2, without murmur   GI:Protuberant, BS are present, no organomegaly or palpable masses  EXTREMITIES: no edema  NEURO: alert and oriented  ASSESSMENT AND PLAN:   There are no diagnoses linked to this encounter.    Encounter Diagnoses   Name Primary?    Urinary tract infection without hematuria, site unspecified Yes    Sepsis, due to unspecified organism, unspecified whether acute organ dysfunction present (HCC)     Aspiration pneumonitis (HCC)     Dysphagia S/P CVA (cerebrovascular accident)     Hypercholesterolemia     Left hemiparesis (HCC)     Physical deconditioning     Stage 3a  chronic kidney disease (HCC)     CVA, old, hemiparesis (HCC)     Essential hypertension     Weakness     Primary osteoarthritis involving multiple joints     Spinal stenosis of lumbar region without neurogenic claudication     Chronic bilateral low back pain without sciatica      Patient appears to be doing well at this time.  Patient is to continue his current diet, medication and activity.  Patient to follow-up with Dr. Claudio in about 4 to 6 weeks.  Patient is also to follow-up with his other consultants as he is scheduled to do.    The patient indicates understanding of these issues and agrees to the plan.  The patient is asked to return in 4 to 6 weeks with Dr Claudio.    Bam Hampton MD  6/24/2024  11:54 AM

## 2024-06-24 NOTE — PATIENT INSTRUCTIONS
Patient is to continue his current diet, medication and activity.  Patient to follow-up with Dr. Claudio in about 4 to 6 weeks.  Patient will also stay in touch with his other consultants.

## 2024-06-28 ENCOUNTER — TELEPHONE (OUTPATIENT)
Dept: INTERNAL MEDICINE CLINIC | Facility: CLINIC | Age: 84
End: 2024-06-28

## 2024-06-28 NOTE — PROGRESS NOTES
Multiple attempts to reach pt and messages left with no return call.  Patient went in for HFU appt with PCP on 6/24/24.  Encounter closing.

## 2024-06-28 NOTE — TELEPHONE ENCOUNTER
I spoke to Meme and relayed Dr Chris's message to her. She verbalized understanding of the message.  She says that Cody has gone to the ER so many times and is \"kind of refusing to go\"  She said she will discuss with him.  I did reiterate that he should present to the ER immediately and If not, could risk more serious complications. She verbalized understanding.

## 2024-06-28 NOTE — TELEPHONE ENCOUNTER
Saumya from [facility] called, requesting to speak to a nurse in regards to concerns his wife has

## 2024-06-28 NOTE — TELEPHONE ENCOUNTER
Patient should go to the emergency department.  Given his risk for recurrent aspiration pneumonia, urinary tract infection he is exhibiting signs of possible new infection with fever, confusion, hypoxia.  Needs more urgent evaluation in the ER setting

## 2024-06-28 NOTE — TELEPHONE ENCOUNTER
I spoke to  nurse Saumya. She did not see patient today but received phone call from wife Meme with concerns.    I then spoke directly to wife Meme  Patient just returned home from hospital on 6/13--treated for aspiration pneumonia and UTI      Cody just saw Dr CODY in office on Monday, was doing well.    Abrupt onset, had temp of 101.9 yesterday and last night  Most recent temp today is 98.6  Wife has been giving him two extra strength tylenol three times a day    Yesterday describes him as lethargic and weak. Pretty confused.  Today describes him as less lethargic and weak, slightly confused  Slept a lot yesterday and today is sleeping less, seems fatigued.  Cough a little worse yesterday, more normal today    Has been doing nebulizer twice a day. Oxygen saturation runs in high 80s before nebulizer, 86%, goes up to low 90s after nebulizer    Does not wear oxygen at home.    He is incontinent so it is hard to tell if he has urinary symptoms    When questioned about his diet, wife says he eats normal diet, does not drink thickened liquids, she cuts foods into small pieces.    To Dr Chris on call to please review and advise--

## 2024-07-01 NOTE — TELEPHONE ENCOUNTER
Called spouse per HIPAA who states patient is doing well. HH RN will see him tomorrow   Spouse verbalized understanding if symptoms come back or any new symptoms to go to ER-verbalized understanding

## 2024-07-09 ENCOUNTER — TELEPHONE (OUTPATIENT)
Dept: INTERNAL MEDICINE CLINIC | Facility: CLINIC | Age: 84
End: 2024-07-09

## 2024-07-09 RX ORDER — IPRATROPIUM BROMIDE AND ALBUTEROL SULFATE 2.5; .5 MG/3ML; MG/3ML
3 SOLUTION RESPIRATORY (INHALATION) 3 TIMES DAILY PRN
Qty: 3 ML | Refills: 3 | Status: SHIPPED | OUTPATIENT
Start: 2024-07-09

## 2024-07-09 NOTE — TELEPHONE ENCOUNTER
Called Saumya from [facility] and relayed  message - she will see patient tomorrow and let spouse know that patient needs rommel prior to getting oxygen ,also that RX for nebulizer has been sent

## 2024-07-09 NOTE — TELEPHONE ENCOUNTER
Saumya MAY, [facility], [phone], called to talk to nurse to inform of below items/questions:   Wife of patient  stated she is running out of Nebulizer treatments and is requesting a prescription be sent to pharmacy [facility] in [facility].   Patient has been desaturating when participating with activity. Wife wants to know if patient can have supplemental oxygen . Does Home Health send request to office for this or can provider do new script?   Dr. Hampton's last note states patient will be following up with Dr. Claudio.

## 2024-07-09 NOTE — TELEPHONE ENCOUNTER
Refill sent however if hypoxia w/exertion is new recommend eval. Needs eval for home O2 prior to rx.    Thank you!

## 2024-07-09 NOTE — TELEPHONE ENCOUNTER
Please advise - called Saumya from [facility] who states spouse wants Nebulizer treatments refilled, also asking for oxygen . Irasema states that with activity saturations go into mid 80,s , sometimes when sitting too - then spouse gives nebulizer treatment and it goes up. In order to get oxygen patient must be seen in office -to

## 2024-07-15 NOTE — TELEPHONE ENCOUNTER
[facility] Inhalation Standard written Order  Ipratropi/ALB 0.5 mg INH    Placed in green folder

## 2024-08-05 RX ORDER — IPRATROPIUM BROMIDE AND ALBUTEROL SULFATE 2.5; .5 MG/3ML; MG/3ML
SOLUTION RESPIRATORY (INHALATION)
Qty: 270 ML | Refills: 0 | OUTPATIENT
Start: 2024-08-05

## 2024-08-05 NOTE — TELEPHONE ENCOUNTER
Should not need  refill so soon     Current refill request refused due to refill is either a duplicate request or has active refills at the pharmacy.  Check previous templates.    Requested Prescriptions     Pending Prescriptions Disp Refills    IPRATROPIUM-ALBUTEROL 0.5-2.5 (3) MG/3ML Inhalation Solution [Pharmacy Med Name: IPRATROPI/ALB 0.5/3MG INH SL 30X3ML] 270 mL 0     Sig: USE 1 VIAL VIA NEBULIZER IN THE MORNING, AT NOON, AND EVERY NIGHT AT BEDTIME

## 2024-08-27 ENCOUNTER — OFFICE VISIT (OUTPATIENT)
Dept: INTERNAL MEDICINE CLINIC | Facility: CLINIC | Age: 84
End: 2024-08-27

## 2024-08-27 VITALS
OXYGEN SATURATION: 94 % | DIASTOLIC BLOOD PRESSURE: 62 MMHG | BODY MASS INDEX: 29.06 KG/M2 | WEIGHT: 203 LBS | HEIGHT: 70 IN | HEART RATE: 74 BPM | TEMPERATURE: 98 F | SYSTOLIC BLOOD PRESSURE: 122 MMHG

## 2024-08-27 DIAGNOSIS — R73.01 IMPAIRED FASTING GLUCOSE: ICD-10-CM

## 2024-08-27 DIAGNOSIS — Z00.00 ANNUAL PHYSICAL EXAM: Primary | ICD-10-CM

## 2024-08-27 PROCEDURE — 99215 OFFICE O/P EST HI 40 MIN: CPT | Performed by: INTERNAL MEDICINE

## 2024-08-27 RX ORDER — IPRATROPIUM BROMIDE AND ALBUTEROL SULFATE 2.5; .5 MG/3ML; MG/3ML
3 SOLUTION RESPIRATORY (INHALATION) 3 TIMES DAILY PRN
Qty: 3 ML | Refills: 3 | Status: SHIPPED | OUTPATIENT
Start: 2024-08-27 | End: 2024-08-27

## 2024-08-27 NOTE — PROGRESS NOTES
Cody Fitzgerald is a 84 year old male  Chief Complaint   Patient presents with    Follow - Up     Discharged from [facility] rehab a couple months ago.     Establish Care     HPI:   Cody Fitzgerald is a 84 year old male who presents for a check up.    LOV 6/24/24 w/Dr. Hampton.    Since, he has been okay.     Pt states he has arthralgias from arthritis \"all over\", and chronic low back pain. States he has a chronic cough. Not worse than normal or productive right now.     Was discharged from rehab, getting home PT once every other week. Able to stand and walk with the walker a few feet, has a difficult time ambulating stairs.     Is able to feed himself but is otherwise dependent of all ADLs. Is incontinent of bowel and bladder, wears diapers. Denies difficulties swallowing or coughing after eating.    Wt Readings from Last 6 Encounters:   08/27/24 203 lb (92.1 kg)   06/24/24 197 lb (89.4 kg)   06/13/24 189 lb 12.8 oz (86.1 kg)   04/14/24 197 lb 6.4 oz (89.5 kg)   01/17/24 193 lb 11.2 oz (87.9 kg)   11/03/23 209 lb 9.6 oz (95.1 kg)     Body mass index is 29.13 kg/m².     Current Outpatient Medications   Medication Sig Dispense Refill    ipratropium-albuterol 0.5-2.5 (3) MG/3ML Inhalation Solution Take 3 mL by nebulization 3 (three) times daily as needed (shortness of breath). And as needed 3 mL 3    levothyroxine 100 MCG Oral Tab Take 1 tablet (100 mcg total) by mouth daily. 90 tablet 3    fluocinonide 0.05 % External Ointment Apply a small amount of ointment on area of rash twice a day as necessary. 30 g 2    amLODIPine 5 MG Oral Tab TAKE 1 TABLET(5 MG) BY MOUTH DAILY 90 tablet 1    pantoprazole 40 MG Oral Tab EC Take 1 tablet (40 mg total) by mouth as needed.      ATORVASTATIN 40 MG Oral Tab TAKE 1 TABLET(40 MG) BY MOUTH NIGHTLY 90 tablet 3    CLOPIDOGREL 75 MG Oral Tab TAKE 1 TABLET(75 MG) BY MOUTH DAILY 90 tablet 3    gabapentin 300 MG Oral Cap Take 1 capsule (300 mg total) by mouth 3 (three) times daily. 270 capsule  3    benzonatate 200 MG Oral Cap Take 1 capsule (200 mg total) by mouth 3 (three) times daily as needed for cough. 60 capsule 1    acetaminophen 500 MG Oral Tab Take 2 tablets (1,000 mg total) by mouth in the morning and 2 tablets (1,000 mg total) before bedtime.      Cholecalciferol (VITAMIN D) 2000 units Oral Cap Take 1 capsule (2,000 Units total) by mouth daily.        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 - Dr. Stahl    Tonsillectomy      at age 24      Family History   Problem Relation Age of Onset    Cancer Father         Lung       Social History:  Social History     Socioeconomic History    Marital status:    Tobacco Use    Smoking status: Former     Current packs/day: 0.00     Average packs/day: 2.0 packs/day for 50.0 years (100.0 ttl pk-yrs)     Types: Cigarettes     Start date: 1958     Quit date: 2008     Years since quittin.6     Passive exposure: Never    Smokeless tobacco: Never   Vaping Use    Vaping status: Never Used   Substance and Sexual Activity    Alcohol use: Not Currently     Comment: 1-2 drinks/monthly    Drug use: No   Other Topics Concern    Caffeine Concern Yes     Comment: 1 cup of coffee daily    Exercise No   Social History Narrative    The patient uses the following assistive device(s):  quad cane and pick-up walker.      The patient does live in a home with stairs.     Social Determinants  of Health     Financial Resource Strain: Low Risk  (11/6/2023)    Financial Resource Strain     Difficulty of Paying Living Expenses: Not very hard     Med Affordability: No   Food Insecurity: No Food Insecurity (6/10/2024)    Food Insecurity     Food Insecurity: Never true   Transportation Needs: No Transportation Needs (6/10/2024)    Transportation Needs     Lack of Transportation: No   Housing Stability: Low Risk  (6/10/2024)    Housing Stability     Housing Instability: No           REVIEW OF SYSTEMS:   GENERAL: feels well otherwise, denies f/c  EYES:denies blurred vision or double vision  HEENT: denies nasal congestion, sinus pain, ST, or sore throat  LUNGS: denies shortness of breath, cough or wheezing  CARDIOVASCULAR: denies chest pain or pressure or palpitations  GI: denies abdominal pain, N/V, diarrhea, constipation, hematochezia or melena  : denies nocturia or changes in stream  NEURO: denies headaches, dizziness + L sided hemiparesis  SKIN: denies lesions, rashes or wounds    EXAM:   /62 (BP Location: Right arm, Patient Position: Sitting, Cuff Size: adult)   Pulse 74   Temp 97.5 °F (36.4 °C) (Oral)   Ht 5' 10\" (1.778 m)   Wt 203 lb (92.1 kg)   SpO2 94%   BMI 29.13 kg/m²     GENERAL: well developed, well nourished, in no apparent distress  EYES: PERRLA, EOMI, conjunctivae pink  NECK: supple, no cervical or supraclavicular lymphadenopathy, no carotid bruits, no thyromegaly  LUNGS: clear to auscultation b/l, no w/r/r  CARDIO: RRR, normal S1S2, no m/r/g  GI: soft, NT, ND, NABS, no HSM  EXTREMITIES: no edema, +2 DP pulses bilaterally  NEURO: A&O x 3, + L hemiparesis    ASSESSMENT AND PLAN:   Cody Fitzgerald is a 84 year old male who presents for a check up.    Encounter to establish care  - advised to obtain the following labs fasting:  - CBC W Differential W Platelet [E]; Future  - Comp Metabolic Panel (14) [E]; Future  - TSH W Reflex To Free T4 [E]; Future  - Lipid Panel [E]; Future  -  Urinalysis with Culture Reflex; Future  - Hemoglobin A1C [E]; Future    Hypothyroidism  - levothyroxine 100 mcg daily  - TSH wnl 9/19/23, repeat as above    Pre-diabetes  - hemoglobin A1c 6.1% 4/17/24, repeat as above    HTN  - controlled on current regimen:   - amlodipine 5 mg daily    HLD  - atorvastatin 40 mg at bedtime  - LDL 74 9/19/23    Hx of CVA c/b residual L sided weakness  - on plavix, atorvastatin 40 mg at bedtime    AAA  - s/p endograft repair    Low back pain c/b R radiculopathy  - MRI 2019: degenerative changes worst at L4-5, moderate-severe stenosis R>L  - CT L spine 6/11/24: degenerative changes   - takes tylenol PRN, gabapentin    Healthcare Maintenance  Cancer Screenings:  - Colon: age >80  - Lung: age >80    Vaccines:  - Tdap: recommend if >10 years since last  - Shingles: shingrix recommended, will obtain at pharmacy  - Pneumonia: prevnar 13 11/14/18, pneumovax 23 11/1/17, prevnar 20 recommended  - Flu: recommend yearly  - COVID-19: plans to obtain booster    Misc:  - AAA screen: AAA s/p endograft repair  - SLP recs 6/12/24: soft foods thin liquids    RTC in as able or sooner PRN.    For E/M code - 60 minutes spent reviewing performing chart review, obtaining a history, performing a physical exam, reviewing the assessment/plan, placing orders, and completing documentation.     Kandice Claudio DO  8/27/2024  8:48 AM

## 2024-09-05 ENCOUNTER — TELEPHONE (OUTPATIENT)
Dept: INTERNAL MEDICINE CLINIC | Facility: CLINIC | Age: 84
End: 2024-09-05

## 2024-09-05 NOTE — TELEPHONE ENCOUNTER
I called and spoke with Ange ORTEGA# [phone] and verbally authorized continuation of home health as she requested for every other week x 2 visits

## 2024-09-05 NOTE — TELEPHONE ENCOUNTER
Ange from [facility] called to see if she can Re-Certify this pt. For  Nursing services.

## 2024-10-16 ENCOUNTER — TELEPHONE (OUTPATIENT)
Dept: INTERNAL MEDICINE CLINIC | Facility: CLINIC | Age: 84
End: 2024-10-16

## 2024-10-16 NOTE — TELEPHONE ENCOUNTER
Stephanie from [facility], [phone] called to check status on Neurological Screening that was faxed on 10/4 & 10/8 for physician signature.   Stephanie called from unidentified phone number. No info provided per HIPAA  Stephanie will re-fax form again today.

## 2024-10-16 NOTE — TELEPHONE ENCOUNTER
Patient takes Gabapentin 300 mg 3X day and is still experiencing pain   Wondering if morning dose could be bumped up to 600 mg to help     Ange/Sintia requesting call back from   Dr Claudio's nurse [phone]

## 2024-10-16 NOTE — TELEPHONE ENCOUNTER
Received lab order request from [facility] and placed on Nancy/supervisors desk for review

## 2024-10-16 NOTE — TELEPHONE ENCOUNTER
Called pt T# and spouse/Meme answered.HIPAA verified    Relayed Dr Bone message and she voiced understanding with treatment plan

## 2024-10-17 NOTE — TELEPHONE ENCOUNTER
Left message to call back.    Has patient requested neurological screening from [facility] ([facility])?

## 2024-10-17 NOTE — TELEPHONE ENCOUNTER
Spoke with Meme (spouse) ok per HIPAA she confirmed that patient has not ordered any neurological testing from [facility] lab.   This is a fraudulent request. Fax shredded

## 2024-10-21 NOTE — TELEPHONE ENCOUNTER
Ange from [facility] calling to follow up on increased dose requested for Gabapentin    Please call Ange to relay Dr Claudio's message  [phone]

## 2024-10-24 ENCOUNTER — TELEPHONE (OUTPATIENT)
Dept: INTERNAL MEDICINE CLINIC | Facility: CLINIC | Age: 84
End: 2024-10-24

## 2024-10-24 RX ORDER — GABAPENTIN 300 MG/1
CAPSULE ORAL
Qty: 360 CAPSULE | Refills: 3 | Status: ON HOLD | OUTPATIENT
Start: 2024-10-24

## 2024-10-24 NOTE — TELEPHONE ENCOUNTER
Wife Meme called  Due to dose increase patient is out of Gabapentin   Patient is now taking 4 tablets daily   Please send refill today to [facility]

## 2024-10-24 NOTE — TELEPHONE ENCOUNTER
See 10/16/2024 telephone encounter.     New Rx for Gabapentin sent to patient's pharmacy.    Requested Prescriptions     Signed Prescriptions Disp Refills    gabapentin 300 MG Oral Cap 360 capsule 3     Sig: Take 2 capsules (600 MG) by mouth in the morning, 1 capsule (300 MG) at noon, and 1 capsule (300 MG) at bedtime     Authorizing Provider: EUSEBIO PURDY     Ordering User: JJ LINDER

## 2024-10-25 ENCOUNTER — APPOINTMENT (OUTPATIENT)
Dept: GENERAL RADIOLOGY | Facility: HOSPITAL | Age: 84
End: 2024-10-25
Attending: EMERGENCY MEDICINE
Payer: MEDICARE

## 2024-10-25 ENCOUNTER — APPOINTMENT (OUTPATIENT)
Dept: CT IMAGING | Facility: HOSPITAL | Age: 84
DRG: 178 | End: 2024-10-25
Attending: EMERGENCY MEDICINE
Payer: MEDICARE

## 2024-10-25 ENCOUNTER — APPOINTMENT (OUTPATIENT)
Dept: GENERAL RADIOLOGY | Facility: HOSPITAL | Age: 84
DRG: 178 | End: 2024-10-25
Attending: EMERGENCY MEDICINE
Payer: MEDICARE

## 2024-10-25 ENCOUNTER — HOSPITAL ENCOUNTER (INPATIENT)
Facility: HOSPITAL | Age: 84
LOS: 5 days | Discharge: NURSING, REHAB OR LONG-TERM CARE | DRG: 178 | End: 2024-10-30
Attending: EMERGENCY MEDICINE | Admitting: HOSPITALIST
Payer: MEDICARE

## 2024-10-25 ENCOUNTER — APPOINTMENT (OUTPATIENT)
Dept: GENERAL RADIOLOGY | Facility: HOSPITAL | Age: 84
DRG: 178 | End: 2024-10-25
Payer: MEDICARE

## 2024-10-25 ENCOUNTER — HOSPITAL ENCOUNTER (INPATIENT)
Facility: HOSPITAL | Age: 84
LOS: 5 days | Discharge: NURSING, REHAB OR LONG-TERM CARE | End: 2024-10-30
Attending: EMERGENCY MEDICINE | Admitting: HOSPITALIST
Payer: MEDICARE

## 2024-10-25 ENCOUNTER — APPOINTMENT (OUTPATIENT)
Dept: GENERAL RADIOLOGY | Facility: HOSPITAL | Age: 84
End: 2024-10-25
Payer: MEDICARE

## 2024-10-25 ENCOUNTER — APPOINTMENT (OUTPATIENT)
Dept: CT IMAGING | Facility: HOSPITAL | Age: 84
End: 2024-10-25
Attending: EMERGENCY MEDICINE
Payer: MEDICARE

## 2024-10-25 DIAGNOSIS — D72.829 LEUKOCYTOSIS, UNSPECIFIED TYPE: ICD-10-CM
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
## 2024-10-25 DIAGNOSIS — R50.9 FEBRILE ILLNESS: Primary | ICD-10-CM

## 2024-10-25 LAB
ALBUMIN SERPL-MCNC: 4.6 G/DL (ref 3.2–4.8)
ALBUMIN/GLOB SERPL: 1.2 {RATIO} (ref 1–2)
ALP LIVER SERPL-CCNC: 126 U/L
ALT SERPL-CCNC: <7 U/L
ANION GAP SERPL CALC-SCNC: 8 MMOL/L (ref 0–18)
AST SERPL-CCNC: 16 U/L (ref ?–34)
BASOPHILS # BLD AUTO: 0.06 X10(3) UL (ref 0–0.2)
BASOPHILS NFR BLD AUTO: 0.3 %
BILIRUB SERPL-MCNC: 0.8 MG/DL (ref 0.2–1.1)
BILIRUB UR QL: NEGATIVE
BUN BLD-MCNC: 17 MG/DL (ref 9–23)
BUN/CREAT SERPL: 14.2 (ref 10–20)
CALCIUM BLD-MCNC: 10 MG/DL (ref 8.7–10.4)
CHLORIDE SERPL-SCNC: 110 MMOL/L (ref 98–112)
CLARITY UR: CLEAR
CO2 SERPL-SCNC: 27 MMOL/L (ref 21–32)
CREAT BLD-MCNC: 1.2 MG/DL
DEPRECATED RDW RBC AUTO: 50.2 FL (ref 35.1–46.3)
EGFRCR SERPLBLD CKD-EPI 2021: 60 ML/MIN/1.73M2 (ref 60–?)
EOSINOPHIL # BLD AUTO: 0.02 X10(3) UL (ref 0–0.7)
EOSINOPHIL NFR BLD AUTO: 0.1 %
ERYTHROCYTE [DISTWIDTH] IN BLOOD BY AUTOMATED COUNT: 14.4 % (ref 11–15)
FLUAV + FLUBV RNA SPEC NAA+PROBE: NEGATIVE
FLUAV + FLUBV RNA SPEC NAA+PROBE: NEGATIVE
GLOBULIN PLAS-MCNC: 3.7 G/DL (ref 2–3.5)
GLUCOSE BLD-MCNC: 120 MG/DL (ref 70–99)
GLUCOSE UR-MCNC: NORMAL MG/DL
HCT VFR BLD AUTO: 43.3 %
HGB BLD-MCNC: 13.6 G/DL
IMM GRANULOCYTES # BLD AUTO: 0.16 X10(3) UL (ref 0–1)
IMM GRANULOCYTES NFR BLD: 0.8 %
KETONES UR-MCNC: NEGATIVE MG/DL
LACTATE SERPL-SCNC: 1.3 MMOL/L (ref 0.5–2)
LEUKOCYTE ESTERASE UR QL STRIP.AUTO: NEGATIVE
LYMPHOCYTES # BLD AUTO: 1.1 X10(3) UL (ref 1–4)
LYMPHOCYTES NFR BLD AUTO: 5.8 %
MCH RBC QN AUTO: 29.8 PG (ref 26–34)
MCHC RBC AUTO-ENTMCNC: 31.4 G/DL (ref 31–37)
MCV RBC AUTO: 95 FL
MONOCYTES # BLD AUTO: 1.57 X10(3) UL (ref 0.1–1)
MONOCYTES NFR BLD AUTO: 8.3 %
NEUTROPHILS # BLD AUTO: 16.09 X10 (3) UL (ref 1.5–7.7)
NEUTROPHILS # BLD AUTO: 16.09 X10(3) UL (ref 1.5–7.7)
NEUTROPHILS NFR BLD AUTO: 84.7 %
NITRITE UR QL STRIP.AUTO: NEGATIVE
NT-PROBNP SERPL-MCNC: 446 PG/ML (ref ?–450)
OSMOLALITY SERPL CALC.SUM OF ELEC: 303 MOSM/KG (ref 275–295)
PH UR: 5.5 [PH] (ref 5–8)
PLATELET # BLD AUTO: 206 10(3)UL (ref 150–450)
POTASSIUM SERPL-SCNC: 3.9 MMOL/L (ref 3.5–5.1)
PROCALCITONIN SERPL-MCNC: 0.15 NG/ML (ref ?–0.05)
PROT SERPL-MCNC: 8.3 G/DL (ref 5.7–8.2)
PROT UR-MCNC: 30 MG/DL
RBC # BLD AUTO: 4.56 X10(6)UL
RSV RNA SPEC NAA+PROBE: NEGATIVE
SARS-COV-2 RNA RESP QL NAA+PROBE: NOT DETECTED
SARS-COV-2 RNA RESP QL NAA+PROBE: NOT DETECTED
SODIUM SERPL-SCNC: 145 MMOL/L (ref 136–145)
SP GR UR STRIP: 1.02 (ref 1–1.03)
TROPONIN I SERPL HS-MCNC: 6 NG/L
UROBILINOGEN UR STRIP-ACNC: NORMAL
WBC # BLD AUTO: 19 X10(3) UL (ref 4–11)

## 2024-10-25 PROCEDURE — 70450 CT HEAD/BRAIN W/O DYE: CPT | Performed by: EMERGENCY MEDICINE

## 2024-10-25 PROCEDURE — 72100 X-RAY EXAM L-S SPINE 2/3 VWS: CPT | Performed by: EMERGENCY MEDICINE

## 2024-10-25 PROCEDURE — 99223 1ST HOSP IP/OBS HIGH 75: CPT | Performed by: HOSPITALIST

## 2024-10-25 PROCEDURE — 71260 CT THORAX DX C+: CPT | Performed by: EMERGENCY MEDICINE

## 2024-10-25 PROCEDURE — 73502 X-RAY EXAM HIP UNI 2-3 VIEWS: CPT | Performed by: EMERGENCY MEDICINE

## 2024-10-25 PROCEDURE — 71045 X-RAY EXAM CHEST 1 VIEW: CPT | Performed by: EMERGENCY MEDICINE

## 2024-10-25 PROCEDURE — 73562 X-RAY EXAM OF KNEE 3: CPT | Performed by: EMERGENCY MEDICINE

## 2024-10-25 RX ORDER — ATORVASTATIN CALCIUM 40 MG/1
40 TABLET, FILM COATED ORAL NIGHTLY
Status: DISCONTINUED | OUTPATIENT
Start: 2024-10-25 | End: 2024-10-30

## 2024-10-25 RX ORDER — SODIUM CHLORIDE 9 MG/ML
INJECTION, SOLUTION INTRAVENOUS CONTINUOUS
Status: DISCONTINUED | OUTPATIENT
Start: 2024-10-25 | End: 2024-10-29

## 2024-10-25 RX ORDER — ACETAMINOPHEN 500 MG
1000 TABLET ORAL ONCE
Status: COMPLETED | OUTPATIENT
Start: 2024-10-25 | End: 2024-10-25

## 2024-10-25 RX ORDER — ACETAMINOPHEN 500 MG
500 TABLET ORAL EVERY 4 HOURS PRN
Status: DISCONTINUED | OUTPATIENT
Start: 2024-10-25 | End: 2024-10-26

## 2024-10-25 RX ORDER — DOXYCYCLINE 100 MG/1
100 CAPSULE ORAL EVERY 12 HOURS SCHEDULED
Status: COMPLETED | OUTPATIENT
Start: 2024-10-25 | End: 2024-10-29

## 2024-10-25 RX ORDER — GABAPENTIN 300 MG/1
300 CAPSULE ORAL 2 TIMES DAILY
Status: DISCONTINUED | OUTPATIENT
Start: 2024-10-25 | End: 2024-10-30

## 2024-10-25 RX ORDER — GABAPENTIN 300 MG/1
600 CAPSULE ORAL EVERY MORNING
Status: DISCONTINUED | OUTPATIENT
Start: 2024-10-26 | End: 2024-10-30

## 2024-10-25 RX ORDER — AZITHROMYCIN 250 MG/1
500 TABLET, FILM COATED ORAL DAILY
Status: DISCONTINUED | OUTPATIENT
Start: 2024-10-25 | End: 2024-10-25

## 2024-10-25 RX ORDER — HEPARIN SODIUM 5000 [USP'U]/ML
5000 INJECTION, SOLUTION INTRAVENOUS; SUBCUTANEOUS EVERY 12 HOURS SCHEDULED
Status: DISCONTINUED | OUTPATIENT
Start: 2024-10-25 | End: 2024-10-30

## 2024-10-25 RX ORDER — LEVOTHYROXINE SODIUM 100 UG/1
100 TABLET ORAL DAILY
Status: DISCONTINUED | OUTPATIENT
Start: 2024-10-26 | End: 2024-10-30

## 2024-10-25 RX ORDER — CLOPIDOGREL BISULFATE 75 MG/1
75 TABLET ORAL DAILY
Status: DISCONTINUED | OUTPATIENT
Start: 2024-10-25 | End: 2024-10-30

## 2024-10-25 NOTE — ED PROVIDER NOTES
Patient Seen in: [facility] Emergency Department      History     Chief Complaint   Patient presents with    Difficulty Breathing     Stated Complaint:     Subjective:   HPI      84-year-old male presents via EMS for evaluation after a fall, noted to be hypoxemic when paramedics arrived.  They report pulse ox was 88% on room air, this improved with nasal cannula oxygenation.  Patient denies fever, chills, cough, chest pain.  Does not currently feel short of breath.  Reports he fell while getting out of bed last night, unsure why, denies lightheadedness or near syncopal prodrome.  Reports he fell onto his right knee, now having pain in his right knee, right hip, right lower back and was unable to get up from bed this morning.  Denies focal weakness or numbness.  He is on an anticoagulant but cannot tell me why, advises I ask his wife.    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 - Dr. Stahl    Tonsillectomy      at age 24                Social History     Socioeconomic History    Marital status:    Tobacco Use    Smoking status: Former     Current packs/day: 0.00     Average packs/day: 2.0 packs/day for 50.0 years (100.0 ttl pk-yrs)     Types: Cigarettes     Start date: 1958     Quit date: 2008     Years since quittin.7     Passive exposure: Never    Smokeless tobacco: Never   Vaping Use     Vaping status: Never Used   Substance and Sexual Activity    Alcohol use: Not Currently     Comment: 1-2 drinks/monthly    Drug use: No   Other Topics Concern    Caffeine Concern Yes     Comment: 1 cup of coffee daily    Exercise No   Social History Narrative    The patient uses the following assistive device(s):  quad cane and pick-up walker.      The patient does live in a home with stairs.     Social Drivers of Health     Financial Resource Strain: Low Risk  (11/6/2023)    Financial Resource Strain     Difficulty of Paying Living Expenses: Not very hard     Med Affordability: No   Food Insecurity: No Food Insecurity (6/10/2024)    Food Insecurity     Food Insecurity: Never true   Transportation Needs: No Transportation Needs (6/10/2024)    Transportation Needs     Lack of Transportation: No   Housing Stability: Low Risk  (6/10/2024)    Housing Stability     Housing Instability: No                  Physical Exam     ED Triage Vitals [10/25/24 0917]   /67   Pulse 99   Resp 24   Temp 98.3 °F (36.8 °C)   Temp src Temporal   SpO2 90 %   O2 Device None (Room air)       Current Vitals:   Vital Signs  BP: (!) 137/99  Pulse: 85  Resp: 24  Temp: 98.3 °F (36.8 °C)  Temp src: Temporal  MAP (mmHg): (!) 112    Oxygen Therapy  SpO2: 95 %  O2 Device: None (Room air)        Physical Exam  Vitals and nursing note reviewed.   Constitutional:       General: He is not in acute distress.     Appearance: He is well-developed.   HENT:      Head: Normocephalic and atraumatic.   Eyes:      Conjunctiva/sclera: Conjunctivae normal.   Cardiovascular:      Rate and Rhythm: Normal rate and regular rhythm.      Heart sounds: Normal heart sounds.   Pulmonary:      Effort: Pulmonary effort is normal. No respiratory distress.      Breath sounds: Normal breath sounds.   Abdominal:      General: Bowel sounds are normal.      Palpations: Abdomen is soft.      Tenderness: There is no abdominal tenderness.   Musculoskeletal:         General:  Normal range of motion.      Cervical back: Normal range of motion and neck supple.   Skin:     General: Skin is warm and dry.      Findings: No rash.   Neurological:      General: No focal deficit present.      Mental Status: He is alert and oriented to person, place, and time.             ED Course     Labs Reviewed   CBC WITH DIFFERENTIAL WITH PLATELET - Abnormal; Notable for the following components:       Result Value    WBC 19.0 (*)     RDW-SD 50.2 (*)     Neutrophil Absolute Prelim 16.09 (*)     Neutrophil Absolute 16.09 (*)     Monocyte Absolute 1.57 (*)     All other components within normal limits   COMP METABOLIC PANEL (14) - Abnormal; Notable for the following components:    Glucose 120 (*)     Calculated Osmolality 303 (*)     ALT <7 (*)     Alkaline Phosphatase 126 (*)     Total Protein 8.3 (*)     Globulin  3.7 (*)     All other components within normal limits   URINALYSIS WITH CULTURE REFLEX - Abnormal; Notable for the following components:    Blood Urine Trace (*)     Protein Urine 30 (*)     Squamous Epi. Cells Few (*)     All other components within normal limits   PROCALCITONIN - Abnormal; Notable for the following components:    Procalcitonin 0.15 (*)     All other components within normal limits   TROPONIN I HIGH SENSITIVITY - Normal   PRO BETA NATRIURETIC PEPTIDE - Normal   LACTIC ACID, PLASMA - Normal   RAPID SARS-COV-2 BY PCR - Normal   RAINBOW DRAW BLUE   BLOOD CULTURE   BLOOD CULTURE   SARS-COV-2/FLU A AND B/RSV BY PCR (GENEXPERT)     EKG    Rate, intervals and axes as noted on EKG Report.  Rate: 100  Rhythm: Sinus Rhythm  Reading: Sinus rhythm, no STEMI                Imaging Results Available and Reviewed while in ED:   CT BRAIN OR HEAD (CPT=70450)   Final Result   PROCEDURE: CT BRAIN OR HEAD (CPT=70450)       COMPARISON: [facility], CT BRAIN OR HEAD (CPT=70450),    11/23/2022, 9:57 PM.  [facility], CT BRAIN OR HEAD    (CPT=70450), 7/25/2022, 1:25 PM.   [facility], CT BRAIN OR    HEAD (CPT=70450), 5/13/2022, 3:20 PM.       INDICATIONS: fall       TECHNIQUE: CT images were obtained without contrast material.  Automated    exposure control for dose reduction was used.  Dose information is    transmitted to the ACR (American College of Radiology) NRDR (National    Radiology Data Registry) which includes the    Dose Index Registry.        FINDINGS:        There is moderate right cerebral hemisphere predominant parenchymal volume    loss.  There is mild left cerebral hemisphere parenchymal volume loss.     There is mild volume loss of the left cerebellar hemisphere when compared    to the right, which likely    reflects crossed cerebellar diaschisis. There is unchanged ex vacuo    dilatation of the right lateral ventricle as well as the 3rd ventricle.     No hydrocephalus.  There is no midline shift or mass effect.  The basal    cisterns are intact.  There is    atherosclerotic calcification of the carotid siphons.       Redemonstrated large chronic infarct involving the right middle cerebral    artery territory inclusive of the right frontal lobe, right parietal lobe,    right insula, right temporal lobe and right basal ganglia.  There is well    area degeneration with    associated hypertrophy of the right cerebral peduncle (8:15).  There are    scattered foci and patchy areas of hypoattenuation involving the bilateral    periventricular subcortical white matter, which likely reflects sequela of    chronic microvascular ischemic    change.  No new transcortical area of hypoattenuation.       No acute intracranial hemorrhage or extra-axial fluid collection.       The calvarium is intact.  No displaced calvarial fracture.  The paranasal    sinuses are well aerated.  The mastoid air cells are clear.  There is    cerumen in the right external auditory canal.  Left ocular lens    replacement.                   =====   CONCLUSION:        1. No acute  intracranial hemorrhage, midline shift, mass effect, or    hydrocephalus.   2. No new transcortical area of hypoattenuation to suggest an acute    infarct.   3. Redemonstrated large chronic infarct in the right middle cerebral    artery territory.   4. No displaced calvarial fracture.   5. Lesser incidental findings described above.               Dictated by (CST): John Gonzalez MD on 10/25/2024 at 10:35 AM        Finalized by (CST): John Gonzalez MD on 10/25/2024 at 10:41 AM               XR CHEST AP PORTABLE  (CPT=71045)   Final Result   PROCEDURE: XR CHEST AP PORTABLE  (CPT=71045)   TIME: 9:36 a.m.       COMPARISON: [facility], XR CHEST AP PORTABLE (CPT=71045),    6/09/2024, 7:40 PM.       INDICATIONS: Shortness of breath.       TECHNIQUE:   Single view.         FINDINGS:    The heart and mediastinal structures are enlarged.  Pulmonary vascular and    interstitial markings are slightly increased.  There may be tiny bilateral    pleural effusions.  The findings are most compatible with slight last    early interstitial edema.       Lung volumes are normal.  There is no dense consolidation clearly    identified (though the retrocardiac region is poorly evaluated.                   =====   CONCLUSION: Cardiac enlargement with secondary signs of slight/early    interstitial edema.               Dictated by (CST): Chadd Pinzon MD on 10/25/2024 at 12:14 PM        Finalized by (CST): Chadd Pinzon MD on 10/25/2024 at 12:16 PM               XR KNEE (3 VIEWS), RIGHT (CPT=73562)   Final Result   PROCEDURE: XR KNEE ROUTINE (3 VIEWS), RIGHT (CPT=73562)       COMPARISON: [facility], XR KNEE (1 OR 2 VIEWS), RIGHT    (CPT=73560), 7/25/2022, 1:13 PM.  [facility], XR KNEE (1    OR 2 VIEWS), RIGHT (CPT=73560), 5/13/2022, 3:39 PM.  [facility], XR KNEE (1 OR 2    VIEWS), RIGHT (CPT=73560),    11/14/2018, 10:28 AM.       INDICATIONS: Generalized right knee pain post fall today.        TECHNIQUE: 2 views were obtained.         FINDINGS:    BONES: No evidence of acute fracture or dislocation.  Mild medial joint    space narrowing.   SOFT TISSUES: Negative. No visible soft tissue swelling.    EFFUSION: Small suprapatellar effusion.   OTHER: Vascular calcification.                   =====   CONCLUSION:        No acute fracture or dislocation.       Small suprapatellar effusion.               Dictated by (CST): Yariel Keith MD on 10/25/2024 at 10:23 AM        Finalized by (CST): Yariel Keith MD on 10/25/2024 at 10:24 AM               XR LUMBAR SPINE (MIN 2 VIEWS) (CPT=72100)   Final Result   PROCEDURE: XR LUMBAR SPINE (MIN 2 VIEWS) (CPT=72100)       COMPARISON: [facility], XR LUMBAR SPINE (MIN 4 VIEWS)    (CPT=72110), 3/14/2021, 7:45 AM.  [facility], XR LUMBAR    SPINE (MIN 4 VIEWS) (CPT=72110), 3/01/2019, 1:12 PM.  [facility], X LUMBAR SPINE AP LAT    OBLS, 1/09/2008, 10:17 AM.       INDICATIONS: Generalized lumbar pain post fall today.       TECHNIQUE: Lumbar spine radiographs (2-3 views)         FINDINGS:        ALIGNMENT: Mild dextroscoliosis.   VERTEBRAL BODIES:   No evidence of acute fracture or malalignment.  There    is multilevel degenerative change with marginal osteophyte formation and    facet arthropathy.   DISC SPACES: Mild-to-moderate multilevel disc space narrowing.   SACROILIAC JOINTS: Intact.   OTHER: Aorto bi-iliac stent graft noted with surgical clips right upper    quadrant.                   =====   CONCLUSION:        No acute fracture or malalignment lumbar spine.  Mild lumbar    dextroscoliosis with mild-to-moderate degenerative change.               Dictated by (CST): Yariel Keith MD on 10/25/2024 at 10:21 AM        Finalized by (CST): Yariel Keith MD on 10/25/2024 at 10:23 AM               XR HIP W OR WO PELVIS 2 OR 3 VIEWS, RIGHT (CPT=73502)   Final Result   PROCEDURE: XR HIP W OR WO PELVIS 2 OR  3 VIEWS, RIGHT (CPT=73502)       COMPARISON: None.       INDICATIONS: Generalized right hip pain post fall today.       TECHNIQUE: 3 views were obtained.         FINDINGS:    Bone mineralization is normal.       There is no acute fracture/dislocation.       There are slight to moderate symmetric degenerative changes within both    hips manifested by slight articular space narrowing and subarticular    sclerosis.                   =====   CONCLUSION: No acute fracture/dislocation.               Dictated by (CST): Chadd Pinzon MD on 10/25/2024 at 10:21 AM        Finalized by (CST): Chadd Pinzon MD on 10/25/2024 at 10:25 AM                   Vitals:    10/25/24 1234 10/25/24 1235 10/25/24 1300 10/25/24 1315   BP:   153/74 (!) 137/99   Pulse: 81 77 86 85   Resp: 25 23 25 24   Temp:       TempSrc:       SpO2: 95% 95% 96% 95%   Weight:       Height:         *I personally reviewed and interpreted all ED vitals.         [facility]          Admission disposition: 10/25/2024 12:47 PM           Medical Decision Making  Differential diagnosis includes but is not limited to electrolyte imbalance, infection, cystitis, pneumonia, pneumothorax, heart failure, ACS, PE, metabolic abnormality    Well-appearing patient, denies complaint aside from right knee pain.  No acute finding on imaging.  When wife arrived she reports patient had a fever up to 101.4 this morning and she was unable to help him get out of bed which prompted the call to 911.  Blood cultures drawn and sent, chest x-ray with evidence of increased vascular markings and concern for fluid (BNP noted to be within normal limits), procalcitonin sent, no obvious infiltrate on chest x-ray.  Discussed with and admitted to hospitalist who request that a dose of Unasyn and azithromycin be given for concern of aspiration pneumonia.  Discussed with cardiology, no further recommendations for the emergency department.    Problems Addressed:  Fall, initial encounter: acute illness  or injury  Febrile illness: acute illness or injury  Leukocytosis, unspecified type: acute illness or injury    Amount and/or Complexity of Data Reviewed  Independent Historian: spouse and EMS     Details: Wife providing additional history  External Data Reviewed: labs.  Labs: ordered.  Radiology: ordered and independent interpretation performed.     Details: Chest x-ray image reviewed, no obvious pneumothorax, other incidental findings deferred to radiology  ECG/medicine tests: ordered and independent interpretation performed. Decision-making details documented in ED Course.  Discussion of management or test interpretation with external provider(s): Discussed with hospitalist and cardiology        Disposition and Plan     Clinical Impression:  1. Febrile illness    2. Fall, initial encounter    3. Leukocytosis, unspecified type         Disposition:  Admit  10/25/2024 12:47 pm    Follow-up:  No follow-up provider specified.  We recommend that you schedule follow up care with a primary care provider within the next three months to obtain basic health screening including reassessment of your blood pressure.      Medications Prescribed:  Current Discharge Medication List              Supplementary Documentation:         Hospital Problems       Present on Admission  Date Reviewed: 8/27/2024            ICD-10-CM Noted POA    * (Principal) Febrile illness R50.9 10/25/2024 Unknown

## 2024-10-25 NOTE — ED QUICK NOTES
Orders for admission, patient is aware of plan and ready to go upstairs. Any questions, please call ED RN Edith at extension [phone].     Patient Covid vaccination status: Fully vaccinated     COVID Test Ordered in ED: SARS-CoV-2/Flu A and B/RSV by PCR ([facility])    COVID Suspicion at Admission: N/A    Running Infusions:      Mental Status/LOC at time of transport: A&Ox3    Other pertinent information:   CIWA score: N/A   NIH score:  N/A

## 2024-10-25 NOTE — CONSULTS
[facility]  part of [facility]    Cardiology Consultation    Cody Fitzgerald Patient Status:  Emergency    1940 MRN [id number]   Location [facility] EMERGENCY DEPARTMENT Attending Dorothy Koch MD   Hosp Day # 0 PCP Kandice Claudio DO     Date of Admission:  10/25/2024  Date of Consult:  10/25/2024  Reason for Consultation: pulmonary edema    History of Present Illness:   Patient is a 84 year old male with sig PMH/o CVA, hypertension, GERD who presents with cough and dyspnea. Also with generalized weakness. Pt denies any associated LOC, reports he fell out of bed secondary to weakness. No associated CP or palpitations.  Vitals stable in the ED, satting well on RA. CXR with slight/early pulmonary hypertension. Labs with initial hs-Tn negative (6), proBNP not elevated at 446. Also found to have leukocytosis     Assessment and Plan:   Suspected PNA, possible aspiration given cough -> obtain CT chest  Leukocytosis  Generalized weakness  CVA on clopidogrel  GERD    Slight/early pulmonary edema  -CXR findings with slight/early pulmonary edema   -will obtain CT chest, especially given concern for aspiration PNA  -currently satting well on RA  -proBNP is borderline  -clinically does not appear significantly overloaded  -obtain TTE    Hypertension  -BP stable  -resume home meds: amlodipine 5mg daily     Past Medical History  Past Medical History:    Abdominal aortic aneurysm (AAA) (HCC)    Back problem    back pain    Disorder of thyroid    hypothyroidism    Falls frequently    High blood pressure    High cholesterol    Hx of pneumothorax    Muscle weakness    LUE hemiplegia    Osteoarthritis    PNA (pneumonia)    Pneumonia due to organism    Stroke (HCC)    left sided weakness       Past Surgical History  Past Surgical History:   Procedure Laterality Date    Cataract      Hernia surgery      ADB aneurysm repair    Other surgical history  2018    Endovascular repair of  abdominal and bilateral iliac artery aneurysm with endurance to bifurcated graft. - Dr. Stahl    Tonsillectomy      at age 24       Family History  Family History   Problem Relation Age of Onset    Cancer Father         Lung        Social History  Pediatric History   Patient Parents    Not on file     Other Topics Concern     Service Not Asked    Blood Transfusions Not Asked    Caffeine Concern Yes     Comment: 1 cup of coffee daily    Occupational Exposure Not Asked    Hobby Hazards Not Asked    Sleep Concern Not Asked    Stress Concern Not Asked    Weight Concern Not Asked    Special Diet Not Asked    Back Care Not Asked    Exercise No    Bike Helmet Not Asked    Seat Belt Not Asked    Self-Exams Not Asked   Social History Narrative    The patient uses the following assistive device(s):  quad cane and pick-up walker.      The patient does live in a home with stairs.           Current Medications:  Current Facility-Administered Medications   Medication Dose Route Frequency    ampicillin-sulbactam (Unasyn) 3 g in sodium chloride 0.9% 100mL IVPB-ADD  3 g Intravenous Once    azithromycin (Zithromax) 500 mg in sodium chloride 0.9% 250mL IVPB premix  500 mg Intravenous Once     (Not in a hospital admission)      Allergies  Allergies[1]    Review of Systems:   ROS  10 point review of systems completed and negative except as noted.    Physical Exam:   Patient Vitals for the past 24 hrs:   BP Temp Temp src Pulse Resp SpO2 Height Weight   10/25/24 1315 (!) 137/99 -- -- 85 24 95 % -- --   10/25/24 1300 153/74 -- -- 86 25 96 % -- --   10/25/24 1235 -- -- -- 77 23 95 % -- --   10/25/24 1234 -- -- -- 81 25 95 % -- --   10/25/24 1233 -- -- -- 79 19 96 % -- --   10/25/24 1232 -- -- -- 81 19 94 % -- --   10/25/24 1231 -- -- -- 80 22 94 % -- --   10/25/24 1230 -- -- -- 81 21 96 % -- --   10/25/24 1229 -- -- -- 84 22 95 % -- --   10/25/24 1228 -- -- -- 82 19 94 % -- --   10/25/24 1130 133/63 -- -- 83 24 95 % -- --    10/25/24 1115 157/79 -- -- 90 (!) 28 95 % -- --   10/25/24 0917 137/67 98.3 °F (36.8 °C) Temporal 99 24 90 % 5' 10\" (1.778 m) 203 lb (92.1 kg)       Intake/Output:   Last 3 shifts:   Vent Settings:    Hemodynamic parameters (last 24 hours):    Scheduled Meds:    ampicillin-sulbactam  3 g Intravenous Once    azithromycin  500 mg Intravenous Once     Continuous Infusions:     Physical Exam:  General: Alert and oriented x 3. No apparent distress.   HEENT: Normocephalic, anicteric sclera, neck supple, no thyromegaly or adenopathy.  Neck: No JVD, carotids 2+, no bruits.  Cardiac: Regular rate and rhythm. S1, S2 normal.   Lungs: Diminished BS  Abdomen: Soft, non-tender. No organosplenomegally, mass or rebound, BS-present.  Extremities: Without clubbing or cyanosis. Trace edema.    Neurologic: Alert and oriented, normal affect. No focal defects  Skin: Warm and dry.     Results:   Laboratory Data:  Lab Results   Component Value Date    WBC 19.0 (H) 10/25/2024    HGB 13.6 10/25/2024    HCT 43.3 10/25/2024    .0 10/25/2024    CREATSERUM 1.20 10/25/2024    BUN 17 10/25/2024     10/25/2024    K 3.9 10/25/2024     10/25/2024    CO2 27.0 10/25/2024     (H) 10/25/2024    CA 10.0 10/25/2024    ALB 4.6 10/25/2024    ALKPHO 126 (H) 10/25/2024    TP 8.3 (H) 10/25/2024    AST 16 10/25/2024    ALT <7 (L) 10/25/2024    PTT 31.5 06/09/2024    INR 1.19 06/09/2024    PTP 15.9 (H) 06/09/2024    TSH 0.733 01/08/2024    PSA 1.1 01/24/2019    DDIMER 3.93 (H) 10/05/2023    CRP 1.75 (H) 11/26/2022    MG 2.0 06/12/2024    PHOS 2.4 (L) 10/16/2023    TROP <0.045 05/25/2021     11/23/2022         Recent Labs   Lab 10/25/24  0916   *   BUN 17   CREATSERUM 1.20   CA 10.0      K 3.9      CO2 27.0     Recent Labs   Lab 10/25/24  0916   RBC 4.56   HGB 13.6   HCT 43.3   MCV 95.0   MCH 29.8   MCHC 31.4   RDW 14.4   NEPRELIM 16.09*   WBC 19.0*   .0       No results for input(s): \"BNPML\" in the last  168 hours.    No results for input(s): \"TROP\", \"CK\" in the last 168 hours.    Imaging:  XR CHEST AP PORTABLE  (CPT=71045)    Result Date: 10/25/2024  CONCLUSION: Cardiac enlargement with secondary signs of slight/early interstitial edema.    Dictated by (CST): Chadd Pinzon MD on 10/25/2024 at 12:14 PM     Finalized by (CST): Chadd Pinzon MD on 10/25/2024 at 12:16 PM          CT BRAIN OR HEAD (CPT=70450)    Result Date: 10/25/2024  CONCLUSION:   1. No acute intracranial hemorrhage, midline shift, mass effect, or hydrocephalus. 2. No new transcortical area of hypoattenuation to suggest an acute infarct. 3. 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 4. No displaced calvarial fracture. 5. Lesser incidental findings described above.    Dictated by (CST): John Gonzalez MD on 10/25/2024 at 10:35 AM     Finalized by (CST): John Gonzalez MD on 10/25/2024 at 10:41 AM          XR HIP W OR WO PELVIS 2 OR 3 VIEWS, RIGHT (CPT=73502)    Result Date: 10/25/2024  CONCLUSION: No acute fracture/dislocation.    Dictated by (CST): Chadd Pinzon MD on 10/25/2024 at 10:21 AM     Finalized by (CST): Chadd Pinzon MD on 10/25/2024 at 10:25 AM          XR KNEE (3 VIEWS), RIGHT (CPT=73562)    Result Date: 10/25/2024  CONCLUSION:   No acute fracture or dislocation.  Small suprapatellar effusion.    Dictated by (CST): Yariel Keith MD on 10/25/2024 at 10:23 AM     Finalized by (CST): Yariel Keith MD on 10/25/2024 at 10:24 AM          XR LUMBAR SPINE (MIN 2 VIEWS) (CPT=72100)    Result Date: 10/25/2024  CONCLUSION:   No acute fracture or malalignment lumbar spine.  Mild lumbar dextroscoliosis with mild-to-moderate degenerative change.    Dictated by (CST): Yariel Keith MD on 10/25/2024 at 10:21 AM     Finalized by (CST): Yariel Keith MD on 10/25/2024 at 10:23 AM           Thank you for allowing me to participate in the care of your patient.    Louis Centeno,  DO  [facility]  10/25/2024         [1] No Known Allergies

## 2024-10-26 LAB
ANION GAP SERPL CALC-SCNC: 7 MMOL/L (ref 0–18)
ATRIAL RATE: 100 BPM
BASOPHILS # BLD AUTO: 0.06 X10(3) UL (ref 0–0.2)
BASOPHILS NFR BLD AUTO: 0.6 %
BUN BLD-MCNC: 13 MG/DL (ref 9–23)
BUN/CREAT SERPL: 12.5 (ref 10–20)
CALCIUM BLD-MCNC: 9.1 MG/DL (ref 8.7–10.4)
CHLORIDE SERPL-SCNC: 111 MMOL/L (ref 98–112)
CO2 SERPL-SCNC: 26 MMOL/L (ref 21–32)
CREAT BLD-MCNC: 1.04 MG/DL
DEPRECATED RDW RBC AUTO: 50.5 FL (ref 35.1–46.3)
EGFRCR SERPLBLD CKD-EPI 2021: 71 ML/MIN/1.73M2 (ref 60–?)
EOSINOPHIL # BLD AUTO: 0.15 X10(3) UL (ref 0–0.7)
EOSINOPHIL NFR BLD AUTO: 1.4 %
ERYTHROCYTE [DISTWIDTH] IN BLOOD BY AUTOMATED COUNT: 14.3 % (ref 11–15)
GLUCOSE BLD-MCNC: 89 MG/DL (ref 70–99)
HCT VFR BLD AUTO: 37 %
HGB BLD-MCNC: 11.9 G/DL
IMM GRANULOCYTES # BLD AUTO: 0.04 X10(3) UL (ref 0–1)
IMM GRANULOCYTES NFR BLD: 0.4 %
LYMPHOCYTES # BLD AUTO: 1.5 X10(3) UL (ref 1–4)
LYMPHOCYTES NFR BLD AUTO: 13.9 %
MCH RBC QN AUTO: 31.2 PG (ref 26–34)
MCHC RBC AUTO-ENTMCNC: 32.2 G/DL (ref 31–37)
MCV RBC AUTO: 96.9 FL
MONOCYTES # BLD AUTO: 1.24 X10(3) UL (ref 0.1–1)
MONOCYTES NFR BLD AUTO: 11.5 %
NEUTROPHILS # BLD AUTO: 7.81 X10 (3) UL (ref 1.5–7.7)
NEUTROPHILS # BLD AUTO: 7.81 X10(3) UL (ref 1.5–7.7)
NEUTROPHILS NFR BLD AUTO: 72.2 %
OSMOLALITY SERPL CALC.SUM OF ELEC: 298 MOSM/KG (ref 275–295)
P AXIS: 48 DEGREES
P-R INTERVAL: 126 MS
PLATELET # BLD AUTO: 168 10(3)UL (ref 150–450)
POTASSIUM SERPL-SCNC: 3.9 MMOL/L (ref 3.5–5.1)
Q-T INTERVAL: 400 MS
QRS DURATION: 158 MS
QTC CALCULATION (BEZET): 516 MS
R AXIS: -63 DEGREES
RBC # BLD AUTO: 3.82 X10(6)UL
SODIUM SERPL-SCNC: 144 MMOL/L (ref 136–145)
T AXIS: 38 DEGREES
VENTRICULAR RATE: 100 BPM
WBC # BLD AUTO: 10.8 X10(3) UL (ref 4–11)

## 2024-10-26 PROCEDURE — 99233 SBSQ HOSP IP/OBS HIGH 50: CPT | Performed by: HOSPITALIST

## 2024-10-26 RX ORDER — ACETAMINOPHEN 500 MG
500 TABLET ORAL EVERY 4 HOURS PRN
Status: DISCONTINUED | OUTPATIENT
Start: 2024-10-26 | End: 2024-10-30

## 2024-10-26 RX ORDER — AMLODIPINE BESYLATE 5 MG/1
5 TABLET ORAL DAILY
Status: DISCONTINUED | OUTPATIENT
Start: 2024-10-26 | End: 2024-10-30

## 2024-10-26 NOTE — H&P
[facility]    PATIENT'S NAME: HARMAN KUMAR   ATTENDING PHYSICIAN: Dorothy Koch MD   PATIENT ACCOUNT#:   [id number]    LOCATION:  [address]  MEDICAL RECORD #:   [id number]       YOB: 1940  ADMISSION DATE:       10/25/2024    HISTORY AND PHYSICAL EXAMINATION    CHIEF COMPLAINT:  Aspiration pneumonia.    HISTORY OF PRESENT ILLNESS:  Patient is an 84-year-old  male who was brought in today for evaluation by his wife for progressive shortness of breath, gurgling sensation in his sensation, and generalized weakness.  CBC showed white blood cell count of 19.0 with left shift.  Chemistry was unremarkable.  Troponin and proBNP were negative.  Urinalysis showed no evidence of urinary tract infection.  Procalcitonin slightly elevated at 0.15.  Chest x-ray showed cardiac enlargement with early interstitial edema.  CT scan of the brain unremarkable.  X-rays of the hip and knee were obtained for pain, but no fractures.  X-ray of the lumbar spine, no fracture.  He had a fall at home.     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     PAST SURGICAL HISTORY:  Tonsillectomy, abdominal aortic aneurysm endovascular stent graft, cholecystectomy, and cataract procedure.    MEDICATIONS:  Please see medication reconciliation list.     ALLERGIES:  No known drug allergies.     FAMILY HISTORY:  Father had lung cancer and hypertension.     SOCIAL HISTORY:  Ex-tobacco user.  No current tobacco, alcohol, or drug use.  Lives with his wife.  Requires assistance in his basic activities of daily living.     REVIEW OF SYSTEMS:  Wife said that she has been noticing increased gurgling sensation and drowsiness for the last 2 to 3 days.  He only eats when he is sitting in a chair in an upright position,  but sometimes she notices that he chokes on his food after he chews for a long time and he coughs.  Other 12-point review of systems is negative.  No recorded fever or chills.        PHYSICAL EXAMINATION:    GENERAL:  Alert and oriented to time, place and person.  Fatigued.  No acute distress.   VITAL SIGNS:  Temperature 98.3, pulse 85, respiratory rate 24, blood pressure 137/99, pulse ox 95% on room air.  HEENT:  Atraumatic.  Oropharynx clear.  Dry mucous membranes.  Eyes:  Anicteric sclerae.   NECK:  Supple.  No lymphadenopathy.  Trachea midline.  Full range of motion.   LUNGS:  Rhonchi auscultated on left lung field.  HEART:  Regular rate and rhythm.  S1 and S2 auscultated.  No murmur.    ABDOMEN:  Soft, nondistended.  No tenderness.  Positive bowel sounds.   EXTREMITIES:  No edema, clubbing or cyanosis.    NEUROLOGIC:  Left hemiparesis.    SKIN:  Sacral area with mild erythema, slightly blanching stage 1 decubitus pressure injury.    ASSESSMENT:    1.   Recurrent aspiration pneumonia, left lung.  2.   Chronic left hemiparesis.  3.   Essential hypertension.  4.   Stage 1 sacral pressure injury.    PLAN:  Patient will be admitted to general medical floor.  IV Unasyn and azithromycin.  Aspiration precautions.  Gentle hydration.  Wound service consult.  Monitor temperature curve.  Further recommendations to follow.     Dictated By Johanny Pearson MD  d: 10/25/2024 13:23:00  t: 10/25/2024 18:55:28  Job [id number]  FB/

## 2024-10-26 NOTE — PROGRESS NOTES
[facility]  part of [facility]    Progress Note    Cody Fitzgerald Patient Status:  Inpatient    1940 MRN [id number]   Location [facility] 5SW/SE Attending Vikki Lira MD   Hosp Day # 1 PCP Kandice Claudio DO     Chief Complaint:     Febrile illness    Subjective:   Subjective:    Patient seen and examined  Alert  Afebrile, normotensive.on 1 L oxygen.    Objective:   Blood pressure 136/65, pulse 84, temperature 97.9 °F (36.6 °C), temperature source Oral, resp. rate 20, height 5' 10\" (1.778 m), weight 203 lb 9.6 oz (92.4 kg), SpO2 95%.  Physical Exam    General: Patient is alert and oriented x3 does not appear to be in acute distress at this time  HEENT: EOMI PERRLA, atraumatic normocephalic  Cardiac: S1-S2 appreciated  Lungs: Good air entry bilaterally clear to auscultation  Abdomen: Soft nontender nondistended positive bowel sounds  Ext: Peripheral pulses are positive  Neuro: No focal deficits noted  Psych: Normal mood  Skin: No rashes noted  MSK: Full range of motion intact      Results:   Lab Results   Component Value Date    WBC 10.8 10/26/2024    HGB 11.9 (L) 10/26/2024    HCT 37.0 (L) 10/26/2024    .0 10/26/2024    CREATSERUM 1.04 10/26/2024    BUN 13 10/26/2024     10/26/2024    K 3.9 10/26/2024     10/26/2024    CO2 26.0 10/26/2024    GLU 89 10/26/2024    CA 9.1 10/26/2024    ALB 4.6 10/25/2024    ALKPHO 126 (H) 10/25/2024    BILT 0.8 10/25/2024    TP 8.3 (H) 10/25/2024    AST 16 10/25/2024    ALT <7 (L) 10/25/2024    PTT 31.5 2024    INR 1.19 2024    TSH 0.733 2024    PSA 1.1 2019    DDIMER 3.93 (H) 10/05/2023    CRP 1.75 (H) 2022    MG 2.0 2024    PHOS 2.4 (L) 10/16/2023    TROP <0.045 2021    TROPHS 6 10/25/2024     2022       CT CHEST(CONTRAST ONLY) (CPT=71260)    Result Date: 10/25/2024  CONCLUSION:   Debris and secretions within the left lower lobe bronchus which is nearly completely  occluded.  Postobstructive atelectasis involving the left lower lobe has progressed since 6/10/2024.  Continued follow-up surveillance imaging recommended to exclude underlying pulmonary malignancy.  Right lower lobe pneumonia is new since 6/9/2024.  Moderate centrilobular emphysema    Dictated by (CST): Steven Tomas MD on 10/25/2024 at 6:29 PM     Finalized by (CST): Steven Tomas MD on 10/25/2024 at 6:32 PM          XR CHEST AP PORTABLE  (CPT=71045)    Result Date: 10/25/2024  CONCLUSION: Cardiac enlargement with secondary signs of slight/early interstitial edema.    Dictated by (CST): Chadd Pinzon MD on 10/25/2024 at 12:14 PM     Finalized by (CST): Chadd Pinzon MD on 10/25/2024 at 12:16 PM          CT BRAIN OR HEAD (CPT=70450)    Result Date: 10/25/2024  CONCLUSION:   1. No acute intracranial hemorrhage, midline shift, mass effect, or hydrocephalus. 2. No new transcortical area of hypoattenuation to suggest an acute infarct. 3. 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 4. No displaced calvarial fracture. 5. Lesser incidental findings described above.    Dictated by (CST): John Gonzalez MD on 10/25/2024 at 10:35 AM     Finalized by (CST): John Gonzalez MD on 10/25/2024 at 10:41 AM          XR HIP W OR WO PELVIS 2 OR 3 VIEWS, RIGHT (CPT=73502)    Result Date: 10/25/2024  CONCLUSION: No acute fracture/dislocation.    Dictated by (CST): Chadd Pinzon MD on 10/25/2024 at 10:21 AM     Finalized by (CST): Chadd Pinzon MD on 10/25/2024 at 10:25 AM          XR KNEE (3 VIEWS), RIGHT (CPT=73562)    Result Date: 10/25/2024  CONCLUSION:   No acute fracture or dislocation.  Small suprapatellar effusion.    Dictated by (CST): Yariel Keith MD on 10/25/2024 at 10:23 AM     Finalized by (CST): Yariel Keith MD on 10/25/2024 at 10:24 AM          XR LUMBAR SPINE (MIN 2 VIEWS) (CPT=72100)    Result Date: 10/25/2024  CONCLUSION:   No acute fracture or malalignment  lumbar spine.  Mild lumbar dextroscoliosis with mild-to-moderate degenerative change.    Dictated by (CST): Yariel Keith MD on 10/25/2024 at 10:21 AM     Finalized by (CST): Yariel Keith MD on 10/25/2024 at 10:23 AM         EKG 12 Lead    Result Date: 10/26/2024  Normal sinus rhythm Left axis deviation Right bundle branch block Abnormal ECG When compared with ECG of 09-JUN-2024 18:54, QRS duration has increased Confirmed by DO George Asif ([id number]) on 10/26/2024 7:55:04 AM     Assessment & Plan:       Recurrent aspiration pneumonia, left lung.  -may need a speech eval due to recurrent   -continue IV abx.  -continue oxygen supplementation via NC  -will monitor closely  -leukocytosis resolved.   -procal mildly elevated.   -CT reviewed.    Elevated BNp   -given IV lasix/currently stopped  -appreciate cardio recs  -plan for Echo.     Chronic left hemiparesis.  -continue statin plavix  -stable    Essential hypertension.  -bp controlled  -monitor and titrate meds as needed    Stage 1 sacral pressure injury.      Vte ppx: heparin     Global A/P  -CT reviewed - c/w pna,obstructive.   -will monitor closely - may need a pulmonary consult.  -appreciate cardiology recs  -Reviewed previous consultant notes  -Reviewed CBC, BMP, Mag, and Phos  -Reviewed tests ordered  -Repeat labs in am  -MDM: High, severe exacerbation of chronic illness posing a threat to life. IV medications requiring close inpatient monitoring.         **Certification      PHYSICIAN Certification of Need for Inpatient Hospitalization - Initial Certification    Patient will require inpatient services that will reasonably be expected to span two midnight's based on the clinical documentation in H+P.   Based on patients current state of illness, I anticipate that, after discharge, patient will require TBD.      Vikki Lira MD

## 2024-10-26 NOTE — PLAN OF CARE
No acute changes at this time. Safety precautions in place. Call light within reach.   Problem: CARDIOVASCULAR - ADULT  Goal: Maintains optimal cardiac output and hemodynamic stability  Description: INTERVENTIONS:  - Monitor vital signs, rhythm, and trends  - Monitor for bleeding, hypotension and signs of decreased cardiac output  - Evaluate effectiveness of vasoactive medications to optimize hemodynamic stability  - Monitor arterial and/or venous puncture sites for bleeding and/or hematoma  - Assess quality of pulses, skin color and temperature  - Assess for signs of decreased coronary artery perfusion - ex. Angina  - Evaluate fluid balance, assess for edema, trend weights  Outcome: Progressing  Goal: Absence of cardiac arrhythmias or at baseline  Description: INTERVENTIONS:  - Continuous cardiac monitoring, monitor vital signs, obtain 12 lead EKG if indicated  - Evaluate effectiveness of antiarrhythmic and heart rate control medications as ordered  - Initiate emergency measures for life threatening arrhythmias  - Monitor electrolytes and administer replacement therapy as ordered  Outcome: Progressing     Problem: RESPIRATORY - ADULT  Goal: Achieves optimal ventilation and oxygenation  Description: INTERVENTIONS:  - Assess for changes in respiratory status  - Assess for changes in mentation and behavior  - Position to facilitate oxygenation and minimize respiratory effort  - Oxygen supplementation based on oxygen saturation or ABGs  - Provide Smoking Cessation handout, if applicable  - Encourage broncho-pulmonary hygiene including cough, deep breathe, Incentive Spirometry  - Assess the need for suctioning and perform as needed  - Assess and instruct to report SOB or any respiratory difficulty  - Respiratory Therapy support as indicated  - Manage/alleviate anxiety  - Monitor for signs/symptoms of CO2 retention  Outcome: Progressing     Problem: METABOLIC/FLUID AND ELECTROLYTES - ADULT  Goal: Electrolytes maintained  within normal limits  Description: INTERVENTIONS:  - Monitor labs and rhythm and assess patient for signs and symptoms of electrolyte imbalances  - Administer electrolyte replacement as ordered  - Monitor response to electrolyte replacements, including rhythm and repeat lab results as appropriate  - Fluid restriction as ordered  - Instruct patient on fluid and nutrition restrictions as appropriate  Outcome: Progressing  Goal: Hemodynamic stability and optimal renal function maintained  Description: INTERVENTIONS:  - Monitor labs and assess for signs and symptoms of volume excess or deficit  - Monitor intake, output and patient weight  - Monitor urine specific gravity, serum osmolarity and serum sodium as indicated or ordered  - Monitor response to interventions for patient's volume status, including labs, urine output, blood pressure (other measures as available)  - Encourage oral intake as appropriate  - Instruct patient on fluid and nutrition restrictions as appropriate  Outcome: Progressing     Problem: SKIN/TISSUE INTEGRITY - ADULT  Goal: Incision(s), wounds(s) or drain site(s) healing without S/S of infection  Description: INTERVENTIONS:  - Assess and document risk factors for pressure ulcer development  - Assess and document skin integrity  - Assess and document dressing/incision, wound bed, drain sites and surrounding tissue  - Implement wound care per orders  - Initiate isolation precautions as appropriate  - Initiate Pressure Ulcer prevention bundle as indicated  Outcome: Progressing     Problem: MUSCULOSKELETAL - ADULT  Goal: Return mobility to safest level of function  Description: INTERVENTIONS:  - Assess patient stability and activity tolerance for standing, transferring and ambulating w/ or w/o assistive devices  - Assist with transfers and ambulation using safe patient handling equipment as needed  - Ensure adequate protection for wounds/incisions during mobilization  - Obtain PT/OT consults as  needed  - Advance activity as appropriate  - Communicate ordered activity level and limitations with patient/family  Outcome: Progressing     Problem: PAIN - ADULT  Goal: Verbalizes/displays adequate comfort level or patient's stated pain goal  Description: INTERVENTIONS:  - Encourage pt to monitor pain and request assistance  - Assess pain using appropriate pain scale  - Administer analgesics based on type and severity of pain and evaluate response  - Implement non-pharmacological measures as appropriate and evaluate response  - Consider cultural and social influences on pain and pain management  - Manage/alleviate anxiety  - Utilize distraction and/or relaxation techniques  - Monitor for opioid side effects  - Notify MD/LIP if interventions unsuccessful or patient reports new pain  - Anticipate increased pain with activity and pre-medicate as appropriate  Outcome: Progressing     Problem: RISK FOR INFECTION - ADULT  Goal: Absence of fever/infection during anticipated neutropenic period  Description: INTERVENTIONS  - Monitor WBC  - Administer growth factors as ordered  - Implement neutropenic guidelines  Outcome: Progressing     Problem: SAFETY ADULT - FALL  Goal: Free from fall injury  Description: INTERVENTIONS:  - Assess pt frequently for physical needs  - Identify cognitive and physical deficits and behaviors that affect risk of falls.  - [place] fall precautions as indicated by assessment.  - Educate pt/family on patient safety including physical limitations  - Instruct pt to call for assistance with activity based on assessment  - Modify environment to reduce risk of injury  - Provide assistive devices as appropriate  - Consider OT/PT consult to assist with strengthening/mobility  - Encourage toileting schedule  Outcome: Progressing     Problem: Impaired Swallowing  Goal: Minimize aspiration risk  Description: Interventions:  - Patient should be alert and upright for all feedings (90 degrees preferred)  -  Offer food and liquids at a slow rate  - No straws  - Encourage small bites of food and small sips of liquid  - Offer pills one at a time, crush or deliver with applesauce as needed  - Discontinue feeding and notify MD (or speech pathologist) if coughing or persistent throat clearing or wet/gurgly vocal quality is noted  Outcome: Progressing     Problem: Patient Centered Care  Goal: Patient preferences are identified and integrated in the patient's plan of care  Description: Interventions:  - What would you like us to know as we care for you?   - Provide timely, complete, and accurate information to patient/family  - Incorporate patient and family knowledge, values, beliefs, and cultural backgrounds into the planning and delivery of care  - Encourage patient/family to participate in care and decision-making at the level they choose  - Honor patient and family perspectives and choices  Outcome: Progressing

## 2024-10-26 NOTE — PLAN OF CARE
Problem: CARDIOVASCULAR - ADULT  Goal: Maintains optimal cardiac output and hemodynamic stability  Description: INTERVENTIONS:  - Monitor vital signs, rhythm, and trends  - Monitor for bleeding, hypotension and signs of decreased cardiac output  - Evaluate effectiveness of vasoactive medications to optimize hemodynamic stability  - Monitor arterial and/or venous puncture sites for bleeding and/or hematoma  - Assess quality of pulses, skin color and temperature  - Assess for signs of decreased coronary artery perfusion - ex. Angina  - Evaluate fluid balance, assess for edema, trend weights  Outcome: Progressing  Goal: Absence of cardiac arrhythmias or at baseline  Description: INTERVENTIONS:  - Continuous cardiac monitoring, monitor vital signs, obtain 12 lead EKG if indicated  - Evaluate effectiveness of antiarrhythmic and heart rate control medications as ordered  - Initiate emergency measures for life threatening arrhythmias  - Monitor electrolytes and administer replacement therapy as ordered  Outcome: Progressing     Problem: RESPIRATORY - ADULT  Goal: Achieves optimal ventilation and oxygenation  Description: INTERVENTIONS:  - Assess for changes in respiratory status  - Assess for changes in mentation and behavior  - Position to facilitate oxygenation and minimize respiratory effort  - Oxygen supplementation based on oxygen saturation or ABGs  - Provide Smoking Cessation handout, if applicable  - Encourage broncho-pulmonary hygiene including cough, deep breathe, Incentive Spirometry  - Assess the need for suctioning and perform as needed  - Assess and instruct to report SOB or any respiratory difficulty  - Respiratory Therapy support as indicated  - Manage/alleviate anxiety  - Monitor for signs/symptoms of CO2 retention  Outcome: Progressing     Problem: METABOLIC/FLUID AND ELECTROLYTES - ADULT  Goal: Electrolytes maintained within normal limits  Description: INTERVENTIONS:  - Monitor labs and rhythm and  assess patient for signs and symptoms of electrolyte imbalances  - Administer electrolyte replacement as ordered  - Monitor response to electrolyte replacements, including rhythm and repeat lab results as appropriate  - Fluid restriction as ordered  - Instruct patient on fluid and nutrition restrictions as appropriate  Outcome: Progressing  Goal: Hemodynamic stability and optimal renal function maintained  Description: INTERVENTIONS:  - Monitor labs and assess for signs and symptoms of volume excess or deficit  - Monitor intake, output and patient weight  - Monitor urine specific gravity, serum osmolarity and serum sodium as indicated or ordered  - Monitor response to interventions for patient's volume status, including labs, urine output, blood pressure (other measures as available)  - Encourage oral intake as appropriate  - Instruct patient on fluid and nutrition restrictions as appropriate  Outcome: Progressing     Problem: SKIN/TISSUE INTEGRITY - ADULT  Goal: Incision(s), wounds(s) or drain site(s) healing without S/S of infection  Description: INTERVENTIONS:  - Assess and document risk factors for pressure ulcer development  - Assess and document skin integrity  - Assess and document dressing/incision, wound bed, drain sites and surrounding tissue  - Implement wound care per orders  - Initiate isolation precautions as appropriate  - Initiate Pressure Ulcer prevention bundle as indicated  Outcome: Progressing     Problem: MUSCULOSKELETAL - ADULT  Goal: Return mobility to safest level of function  Description: INTERVENTIONS:  - Assess patient stability and activity tolerance for standing, transferring and ambulating w/ or w/o assistive devices  - Assist with transfers and ambulation using safe patient handling equipment as needed  - Ensure adequate protection for wounds/incisions during mobilization  - Obtain PT/OT consults as needed  - Advance activity as appropriate  - Communicate ordered activity level and  limitations with patient/family  Outcome: Progressing     Problem: PAIN - ADULT  Goal: Verbalizes/displays adequate comfort level or patient's stated pain goal  Description: INTERVENTIONS:  - Encourage pt to monitor pain and request assistance  - Assess pain using appropriate pain scale  - Administer analgesics based on type and severity of pain and evaluate response  - Implement non-pharmacological measures as appropriate and evaluate response  - Consider cultural and social influences on pain and pain management  - Manage/alleviate anxiety  - Utilize distraction and/or relaxation techniques  - Monitor for opioid side effects  - Notify MD/LIP if interventions unsuccessful or patient reports new pain  - Anticipate increased pain with activity and pre-medicate as appropriate  Outcome: Progressing     Problem: RISK FOR INFECTION - ADULT  Goal: Absence of fever/infection during anticipated neutropenic period  Description: INTERVENTIONS  - Monitor WBC  - Administer growth factors as ordered  - Implement neutropenic guidelines  Outcome: Progressing     Problem: SAFETY ADULT - FALL  Goal: Free from fall injury  Description: INTERVENTIONS:  - Assess pt frequently for physical needs  - Identify cognitive and physical deficits and behaviors that affect risk of falls.  - [place] fall precautions as indicated by assessment.  - Educate pt/family on patient safety including physical limitations  - Instruct pt to call for assistance with activity based on assessment  - Modify environment to reduce risk of injury  - Provide assistive devices as appropriate  - Consider OT/PT consult to assist with strengthening/mobility  - Encourage toileting schedule  Outcome: Progressing     Problem: Impaired Swallowing  Goal: Minimize aspiration risk  Description: Interventions:  - Patient should be alert and upright for all feedings (90 degrees preferred)  - Offer food and liquids at a slow rate  - No straws  - Encourage small bites of food  and small sips of liquid  - Offer pills one at a time, crush or deliver with applesauce as needed  - Discontinue feeding and notify MD (or speech pathologist) if coughing or persistent throat clearing or wet/gurgly vocal quality is noted  Outcome: Progressing

## 2024-10-26 NOTE — PROGRESS NOTES
Progress Note  Cody Fitzgerald Patient Status:  Inpatient    1940 MRN [id number]   Location [facility] 5SW/SE Attending Johanny Pearson MD   Hosp Day # 1 PCP Kandice Claudio DO     Subjective:  No acute events overnight.  Resting in bed this morning, on O2 at 1l, reports having chronic cough, denies any SOB, chest pain, palpitations or dizziness at this time.     Objective:  /74 (BP Location: Left arm)   Pulse 85   Temp 98.7 °F (37.1 °C) (Oral)   Resp 20   Ht 5' 10\" (1.778 m)   Wt 203 lb 9.6 oz (92.4 kg)   SpO2 92%   BMI 29.21 kg/m²     Telemetry: SR, HR 80s, RBBB      Intake/Output:    Intake/Output Summary (Last 24 hours) at 10/26/2024 0753  Last data filed at 10/26/2024 0522  Gross per 24 hour   Intake 1110 ml   Output 500 ml   Net 610 ml       Last 3 Weights   10/25/24 2130 203 lb 9.6 oz (92.4 kg)   10/25/24 0917 203 lb (92.1 kg)   24 1227 203 lb (92.1 kg)   24 1037 197 lb (89.4 kg)       Labs:  Recent Labs   Lab 10/25/24  0916 10/26/24  0653   * 89   BUN 17 13   CREATSERUM 1.20 1.04   EGFRCR 60 71   CA 10.0 9.1    144   K 3.9 3.9    111   CO2 27.0 26.0     Recent Labs   Lab 10/25/24  0916 10/26/24  0653   RBC 4.56 3.82   HGB 13.6 11.9*   HCT 43.3 37.0*   MCV 95.0 96.9   MCH 29.8 31.2   MCHC 31.4 32.2   RDW 14.4 14.3   NEPRELIM 16.09* 7.81*   WBC 19.0* 10.8   .0 168.0         Recent Labs   Lab 10/25/24  0916   TROPHS 6       Review of Systems   Constitutional: Negative.   Cardiovascular: Negative.    Respiratory:  Positive for cough.    Neurological:  Positive for focal weakness.       Physical Exam:    Gen: alert, oriented x 3, NAD  Heent: pupils equal, reactive. Mucous membranes moist.   Neck: no jvd  Cardiac: regular rate and rhythm, normal S1,S2, no murmur, gallop or rub   Lungs: diminished, on O2 at 1L  Abd: soft, NT/ND +bs  Ext: no edema, chronic left sided weakness   Skin: Warm, dry  Neuro: No focal deficits        Medications:      ampicillin-sulbactam  3 g Intravenous q6h    heparin  5,000 Units Subcutaneous 2 times per day    atorvastatin  40 mg Oral Nightly    clopidogrel  75 mg Oral Daily    levothyroxine  100 mcg Oral Daily    doxycycline  100 mg Oral 2 times per day    gabapentin  600 mg Oral QAM    gabapentin  300 mg Oral BID      sodium chloride 100 mL/hr at 10/25/24 2246     Assessment:  Suspect aspiration pneumonia-presented with progressive SOB, cough and generalized weakness   CT chest showed debris and secretions within LLL bronchus with progressive atelectasis, also right lower lobe pna  On IV abx  per primary  Leukocytosis and generalized weakness-likely d/t above     BC pending   On IV abx  Early sign of pulmonary edema based on the CXR  proBNP 446  Echo from 10/11/2023 showed preserved LVEF 55-60%, grade 1 DD-current echo pending   Holding off diuretics since no significant volume overload   HTN-controlled   On amlodipine   Hx of CVA with residual left sided hemiparesis   On plavix, statin     Plan:  Appears euvolemic on the exam, no need for diuretics at this time.  Awaiting echo results.  Continue amlodipine, plavix, statin.  Continue abx treatment for pneumonia-per primary.   Would consider speech eval with recurrent aspiration pna.     Plan of care discussed with patient, RN.    Erica Irivng, KIAH  10/26/2024  7:53 AM  [phone]        CARDIOLOGIST ADDENDUM:    I agree with the findings above.  I have personally performed the Assessment and Plan in its entirety, as detailed above with my revision and updates.    Yin Hernandez M.D.   Cardiology/Electrophysiology   10/26/2024  L3  -----------------------------------------

## 2024-10-27 LAB
ANION GAP SERPL CALC-SCNC: 8 MMOL/L (ref 0–18)
BASOPHILS # BLD AUTO: 0.06 X10(3) UL (ref 0–0.2)
BASOPHILS NFR BLD AUTO: 0.9 %
BUN BLD-MCNC: 13 MG/DL (ref 9–23)
BUN/CREAT SERPL: 11.8 (ref 10–20)
CALCIUM BLD-MCNC: 8.6 MG/DL (ref 8.7–10.4)
CHLORIDE SERPL-SCNC: 111 MMOL/L (ref 98–112)
CO2 SERPL-SCNC: 26 MMOL/L (ref 21–32)
CREAT BLD-MCNC: 1.1 MG/DL
DEPRECATED RDW RBC AUTO: 50 FL (ref 35.1–46.3)
EGFRCR SERPLBLD CKD-EPI 2021: 67 ML/MIN/1.73M2 (ref 60–?)
EOSINOPHIL # BLD AUTO: 0.24 X10(3) UL (ref 0–0.7)
EOSINOPHIL NFR BLD AUTO: 3.5 %
ERYTHROCYTE [DISTWIDTH] IN BLOOD BY AUTOMATED COUNT: 14 % (ref 11–15)
GLUCOSE BLD-MCNC: 92 MG/DL (ref 70–99)
HCT VFR BLD AUTO: 34.9 %
HGB BLD-MCNC: 11.1 G/DL
IMM GRANULOCYTES # BLD AUTO: 0.04 X10(3) UL (ref 0–1)
IMM GRANULOCYTES NFR BLD: 0.6 %
LYMPHOCYTES # BLD AUTO: 1.76 X10(3) UL (ref 1–4)
LYMPHOCYTES NFR BLD AUTO: 25.5 %
MAGNESIUM SERPL-MCNC: 1.7 MG/DL (ref 1.6–2.6)
MCH RBC QN AUTO: 30.9 PG (ref 26–34)
MCHC RBC AUTO-ENTMCNC: 31.8 G/DL (ref 31–37)
MCV RBC AUTO: 97.2 FL
MONOCYTES # BLD AUTO: 0.88 X10(3) UL (ref 0.1–1)
MONOCYTES NFR BLD AUTO: 12.8 %
NEUTROPHILS # BLD AUTO: 3.92 X10 (3) UL (ref 1.5–7.7)
NEUTROPHILS # BLD AUTO: 3.92 X10(3) UL (ref 1.5–7.7)
NEUTROPHILS NFR BLD AUTO: 56.7 %
OSMOLALITY SERPL CALC.SUM OF ELEC: 300 MOSM/KG (ref 275–295)
PHOSPHATE SERPL-MCNC: 2.2 MG/DL (ref 2.4–5.1)
PLATELET # BLD AUTO: 160 10(3)UL (ref 150–450)
POTASSIUM SERPL-SCNC: 3.3 MMOL/L (ref 3.5–5.1)
POTASSIUM SERPL-SCNC: 4.1 MMOL/L (ref 3.5–5.1)
RBC # BLD AUTO: 3.59 X10(6)UL
SODIUM SERPL-SCNC: 145 MMOL/L (ref 136–145)
WBC # BLD AUTO: 6.9 X10(3) UL (ref 4–11)

## 2024-10-27 PROCEDURE — 99233 SBSQ HOSP IP/OBS HIGH 50: CPT | Performed by: INTERNAL MEDICINE

## 2024-10-27 RX ORDER — MAGNESIUM OXIDE 400 MG/1
400 TABLET ORAL ONCE
Status: COMPLETED | OUTPATIENT
Start: 2024-10-27 | End: 2024-10-27

## 2024-10-27 RX ORDER — POTASSIUM CHLORIDE 14.9 MG/ML
20 INJECTION INTRAVENOUS ONCE
Status: COMPLETED | OUTPATIENT
Start: 2024-10-27 | End: 2024-10-27

## 2024-10-27 NOTE — CM/SW NOTE
10/27/24 0900   CM/SW Referral Data   Referral Source Physician   Reason for Referral   (HH Evaluation)   Informant EMR  (pt did not answer room phone)   Medical Hx   Does patient have an established PCP? Yes  (Kandiceelijah Claudio)   Patient Info   Patient's Home Environment House   Number of Levels in Home 3   Number of Stair in Home 5 to enter, 10 to 2nd floor   Patient lives with Spouse/Significant other   Patient Status Prior to Admission   Independent with ADLs and Mobility No   Pt. requires assistance with Housework;Driving;Meals;Bathing;Ambulating;Medications;Toileting   Services in place prior to admission DME/Supplies at home   Type of DME/Supplies Quad Cane;Wheeled Walker;Wheelchair;Shower Chair;Raised Toilet Seat;Grab Bars;Hospital Bed;Stair Lift   Discharge Needs   Anticipated D/C needs Home health care;Subacute rehab;To be determined   Services Requested   Submitted to [facility]   (NEED TO SUBMIT)   PASRR Level 1 Submitted Yes     09:35AM  Received MDO for HH Evaluation - PT/OT evals still pending at time of this note.    SW attempted pt via his room phone - no answer.    Above assessment completed based on chart review from pt's past admissions over the past couple years.    Per chart, pt is from home w/ his wife. They have a home w/ 3 levels.   Pt reported previous 4-5 steps to enter and approx 10 to the 2nd floor.    Per review, pt has above DME (including stair lift).  Pt primarily uses a walker or WC for ambulation.    [facility].  Pt also has hx w/ [facility] at [facility] (11/2022), [facility] (10/2023), and [facility] (4/2024).    [facility] entered/sent. SW also [facility] PASRR completed/uploaded. [facility] request was NOT sent at this time as PT/OT is still pending.    UPDATE: Received notice from liarobson Chacon w/ [facility] - pt is still current w/ their HH services.    12:45PM  Notified by RN that  pt's wife is at bedside and asking about short stay at [facility].    SW informed RN that [facility] referral is pending accepting providers at this time.    PT note now available for review and Anticipated therapy need: Home with Home Healthcare    Per PT's note, anticipated need HH due to pt's current living environment/services available.    [facility] request sent.    PLAN: Home w/ [facility] vs [facility] - pending OT, list/choice (if [facility]), & med clear      SW/CM to remain available for support and/or discharge planning.         Amy, MSW, LSW [id number]

## 2024-10-27 NOTE — PHYSICAL THERAPY NOTE
PHYSICAL THERAPY EVALUATION - INPATIENT     Room Number: 526/526-A  Evaluation Date: 10/27/2024  Type of Evaluation: Initial        Presenting Problem: SOB and generalized weakness (atelectasis, RLL PNA, trop (-))     Reason for Therapy: Mobility Dysfunction and Discharge Planning    PHYSICAL THERAPY ASSESSMENT   Patient is a 84 year old male admitted 10/25/2024 for SOB and generalized weakness.  Prior to admission, patient's baseline is hands on assist with basic transfers provided by spouse, using mostly w/c for mobility.  Patient is currently functioning near baseline with bed mobility and transfers.  Patient is requiring minimal assist as a result of the following impairments: decreased functional strength, decreased endurance/aerobic capacity, and impaired dynamic balance.  Physical Therapy will continue to follow for duration of hospitalization.  Patient currently on RA, denied any dizziness, SOB throughout the session.    Patient will benefit from continued skilled PT Services at discharge to promote prior level of function and safety with additional support and return home with home health PT.    PLAN DURING HOSPITALIZATION  Nursing Mobility Recommendation : 1 Assist     PT Treatment Plan: Bed mobility;Body mechanics;Energy conservation;Endurance;Patient education;Family education;Gait training;Strengthening;Transfer training;Balance training  Rehab Potential : Good  Frequency (Obs): 3-5x/week     PHYSICAL THERAPY MEDICAL/SOCIAL HISTORY   History related to current admission:      Problem List  Principal Problem:    Febrile illness  Active Problems:    Aspiration pneumonia (HCC)    Leukocytosis, unspecified type    Fall, initial encounter      HOME SITUATION  Type of Home: House  Home Layout: Two level;Stairlift  Stairs to Enter : 5 (usually family assisting via w/c)   Railing: Yes    Stairs to Bedroom:  (patient owned stairlift)         Lives With: Spouse;Daughter (both available to assist 24/7)    Drives:  No   Patient Regularly Uses: Wheelchair;Rolling walker;Hospital bed     Prior Level of [place]: Patient reported h/o chronic LT hemiparesis due to CVA in the past, requiring hands on assist w/ all transfers, mostly using w/c for mobility at home, living w/ spouse and dtr ( both available to provide assistance ), family living in a two level house w/ stairlift, family ( son) providing assistance for negotiating exterior stairs via w/c as needed, patient owned hospital bed, w/c, rolling walker and gait belt PTA, did not recall any falls recently.    SUBJECTIVE  \" My wife could not get me out of bed.\"  \" This is not what I ordered, I am not going to eat it.\" Referring to pureed toast.    PHYSICAL THERAPY EXAMINATION   OBJECTIVE  Precautions: Bed/chair alarm  Fall Risk: Standard fall risk    WEIGHT BEARING RESTRICTION       PAIN ASSESSMENT  Ratin          COGNITION  Overall Cognitive Status:  WFL - within functional limits    RANGE OF MOTION AND STRENGTH ASSESSMENT    BLE gross MT 3/5, RT stronger than LT, h/o CVA w/ LT hemiparesis, LT  3/5, able to maintain  on rolling walker.    BALANCE  Static Sitting: Good  Dynamic Sitting: Good  Static Standing: Fair  Dynamic Standing: Fair    ADDITIONAL TESTS                                    NEUROLOGICAL FINDINGS                      ACTIVITY TOLERANCE                         O2 WALK       AM-PAC '6-Clicks' INPATIENT SHORT FORM - BASIC MOBILITY  How much difficulty does the patient currently have...  Patient Difficulty: Turning over in bed (including adjusting bedclothes, sheets and blankets)?: A Little   Patient Difficulty: Sitting down on and standing up from a chair with arms (e.g., wheelchair, bedside commode, etc.): A Little   Patient Difficulty: Moving from lying on back to sitting on the side of the bed?: A Little   How much help from another person does the patient currently need...   Help from Another: Moving to and from a bed to a chair (including  a wheelchair)?: A Little   Help from Another: Need to walk in hospital room?: A Lot   Help from Another: Climbing 3-5 steps with a railing?: Total     AM-PAC Score:  Raw Score: 15   Approx Degree of Impairment: 57.7%   Standardized Score (AM-PAC Scale): 39.45   CMS Modifier (G-Code): CK    FUNCTIONAL ABILITY STATUS  Functional Mobility/Gait Assessment  Gait Assistance: Contact guard assist  Distance (ft): 5-3  Assistive Device: Rolling walker  Pattern:  (chronic decreased LLE coordination/ strength due to CVA)  Stairs: Other (comment) (NT, patient owned stairlift on interior stairs and family assisting via w/c for negotiating exterior stairs as needed.)  Rolling: minimal assist  Supine to Sit: minimal assist  Sit to Supine:  NT  Sit to Stand: minimal assist used rolling walker    Exercise/Education Provided:  Bed mobility  Body mechanics  Energy conservation  Gait training  Posture  ROM  Strengthening  Lower therapeutic exercise:  Knee extension  Transfer training    Skilled Therapy Provided: bed mobility, transfer training, HEP    The patient's Approx Degree of Impairment: 57.7% has been calculated based on documentation in the [facility] '6 clicks' Inpatient Basic Mobility Short Form.  Research supports that patients with this level of impairment may benefit from [facility] however recommending home PT based on previous living arrangement.  Final disposition will be made by interdisciplinary medical team.    Patient End of Session: Up in chair;Needs met;Call light within reach;RN aware of session/findings;Alarm set;All patient questions and concerns addressed    CURRENT GOALS  Goals to be met by: 10.30.24  Patient Goal Patient's self-stated goal is: Home   Goal #1 Patient is able to demonstrate supine - sit EOB @ level: supervision     Goal #1   Current Status    Goal #2 Patient is able to demonstrate transfers Sit to/from Stand at assistance level: supervision with walker - rolling     Goal #2  Current Status    Goal #3  Patient is able to ambulate 20 feet with assist device: walker - rolling at assistance level: minimum assistance   Goal #3   Current Status    Goal #4 Patient will negotiate 0 stairs/one curb w/ assistive device and supervision   Goal #4   Current Status    Goal #5 Patient to demonstrate independence with home activity/exercise instructions provided to patient in preparation for discharge.   Goal #5   Current Status    Goal #6    Goal #6  Current Status      Patient Evaluation Complexity Level:  History Low - no personal factors and/or co-morbidities   Examination of body systems Low -  addressing 1-2 elements   Clinical Presentation Low- Stable   Clinical Decision Making  Low Complexity     Gait Trainin minutes  Therapeutic Activity:  30 minutes  Neuromuscular Re-education: 0 minutes  Therapeutic Exercise: 0 minutes  Canalith Repositionin minutes  Manual Therapy: 0 minutes  Can add/delete any of these

## 2024-10-27 NOTE — PROGRESS NOTES
Progress Note  Cody Fitzgerald Patient Status:  Inpatient    1940 MRN [id number]   Location [facility] 5SW/SE Attending Vikki Lira MD   Hosp Day # 2 PCP Kandice Claudio DO     Subjective:  Pt resting comfortably in bed. Denies any chest pain or SOB.     Objective:  /66 (BP Location: Right arm)   Pulse 72   Temp 97.7 °F (36.5 °C) (Oral)   Resp 20   Ht 5' 10\" (1.778 m)   Wt 203 lb 9.6 oz (92.4 kg)   SpO2 91%   BMI 29.21 kg/m²     Telemetry: NSR       Intake/Output:    Intake/Output Summary (Last 24 hours) at 10/27/2024 0720  Last data filed at 10/27/2024 0551  Gross per 24 hour   Intake 850 ml   Output 650 ml   Net 200 ml       Last 3 Weights   10/25/24 2130 203 lb 9.6 oz (92.4 kg)   10/25/24 0917 203 lb (92.1 kg)   24 1227 203 lb (92.1 kg)   24 1037 197 lb (89.4 kg)       Labs:  Recent Labs   Lab 10/25/24  0916 10/26/24  0653   * 89   BUN 17 13   CREATSERUM 1.20 1.04   EGFRCR 60 71   CA 10.0 9.1    144   K 3.9 3.9    111   CO2 27.0 26.0     Recent Labs   Lab 10/25/24  0916 10/26/24  0653   RBC 4.56 3.82   HGB 13.6 11.9*   HCT 43.3 37.0*   MCV 95.0 96.9   MCH 29.8 31.2   MCHC 31.4 32.2   RDW 14.4 14.3   NEPRELIM 16.09* 7.81*   WBC 19.0* 10.8   .0 168.0         Recent Labs   Lab 10/25/24  0916   TROPHS 6     Lab Results   Component Value Date    INR 1.19 2024    INR 0.99 2024    INR 1.2 2018       Diagnostics:     Review of Systems   Respiratory: Negative.     Cardiovascular: Negative.          Physical Exam:    Physical Exam  Vitals reviewed.   Constitutional:       General: He is not in acute distress.     Appearance: He is not ill-appearing.   Neck:      Vascular: No JVD.   Cardiovascular:      Rate and Rhythm: Normal rate and regular rhythm.      Pulses: Normal pulses.      Heart sounds: No murmur heard.     No friction rub. No gallop.   Pulmonary:      Effort: No respiratory distress.      Breath sounds: No stridor.  Rhonchi present. No wheezing or rales.   Chest:      Chest wall: No tenderness.   Abdominal:      Palpations: Abdomen is soft.      Tenderness: There is no abdominal tenderness.   Musculoskeletal:      Cervical back: Normal range of motion.      Right lower leg: No edema.      Left lower leg: No edema.   Neurological:      Mental Status: He is alert and oriented to person, place, and time.         Medications:   amLODIPine  5 mg Oral Daily    ampicillin-sulbactam  3 g Intravenous q6h    heparin  5,000 Units Subcutaneous 2 times per day    atorvastatin  40 mg Oral Nightly    clopidogrel  75 mg Oral Daily    levothyroxine  100 mcg Oral Daily    doxycycline  100 mg Oral 2 times per day    gabapentin  600 mg Oral QAM    gabapentin  300 mg Oral BID      sodium chloride 100 mL/hr at 10/27/24 0127       Assessment/Plan:    Suspected aspiration pneumonia  - CT chest showed debris and secretions within LLL bronchus with progressive atelectasis and RLL pna  - on IV antibiotics per primary    Leukocytosis and generalized weakness  - secondary to above  - blood cultures no growth to date  - on IV antibiotics    Pulmonary edema  - early sign of pulmonary edema on C xray  - euvolemic on exam   - proBNP 446  -  echo pending     HTN  - well controlled on amlodipine    H/o CVA  - on statin and plavix    Plan;  - Euvolemic on exam   - echo pending   -continue current cardiac medications  - Antibiotics per primary    Plan discussed with pt and RN     KIAH Romero  [facility]  10/27/2024  7:20 AM        CARDIOLOGIST ADDENDUM:     I agree with the findings above.  I have personally performed the Assessment and Plan in its entirety, as detailed above with my revisions and updates.     Yin Hernandez M.D.   Cardiology/Electrophysiology   10/27/2024  L3  -----------------------------------------

## 2024-10-27 NOTE — SLP NOTE
ADULT SWALLOWING EVALUATION    ASSESSMENT    ASSESSMENT/OVERALL IMPRESSION:  PPE REQUIRED. THIS THERAPIST WORE GLOVES, DROPLET MASK, AND GOGGLES FOR DURATION OF EVALUATION. HANDS WASHED UPON ENTRANCE/EXIT.    Patient is a 84 year old male with sig PMH/o CVA, hypertension, GERD who presents with cough and dyspnea.    SLP BSSE orders received and acknowledged. A swallow evaluation warranted as to rule out aspiration. Pt afebrile with clear vocal quality, on room air, with oxygen saturation at 91. Pt with a hx of dysphagia at [facility]. A BSSE was completed on 6/12 which recommended soft/easy to chew and thin liquids. Per review of records, pt does have a hx of refusing modified diet consistencies.     Pt was sitting upright in chair for evaluation. Pt with no complaints of pain, RN aware. Completed trials of puree, hard solids, and thin liquids via cup (pt does not drink from a straw). Pt with adequate oral acceptance and bilabial seal across all trials. Pt independently took small bites at a time of solids. Pt with intact bite, mastication of solids, and timely A-P transit. Pt's swallow response appears timely with adequate hyolaryngeal elevation/excursion. There was one episode of wet throat quality and a delayed cough with thin liquids. No further overt s/s of aspiration noted. Oxygen status remained unchanged t/o the entire evaluation.      10/25 CXR indicates:  Debris and secretions within the left lower lobe bronchus which is nearly completely occluded      Postobstructive atelectasis involving the left lower lobe has progressed since 6/10/2024     Right lower lobe pneumonia is new since 6/9/2024      At this time, pt presents with mild oral dysphagia and probable pharyngeal dysfunction. Recommend a mechanical soft/easy to chew diet and thin liquids. Spouse was present who appeared very aware of swallow strategies including soft solids, single sips, no straws. Given concerns for aspiration, recommend a video swallow  study to objectively assess pharyngeal function.  Pt's spouse v/u to all results/recommendations. SLP collaborated with RN for MD diet orders.      RECOMMENDATIONS   Diet Recommendations - Solids: Soft, Easy to Chew  Diet Recommendations - Liquids: Thin Liquids                        Compensatory Strategies Recommended: Small bites and sips;No straws;Slow rate  Aspiration Precautions: Upright position;Slow rate;Small bites and sips;No straw  Medication Administration Recommendations: One pill at a time  Treatment Plan/Recommendations: Videofluoroscopic swallow study    HISTORY   MEDICAL HISTORY  Reason for Referral: R/O aspiration    Problem List  Principal Problem:    Febrile illness  Active Problems:    Aspiration pneumonia (HCC)    Leukocytosis, unspecified type    Fall, initial encounter      Past Medical History  Past Medical History:    Abdominal aortic aneurysm (AAA) (HCC)    Back problem    back pain    Disorder of thyroid    hypothyroidism    Falls frequently    High blood pressure    High cholesterol    Hx of pneumothorax    Muscle weakness    LUE hemiplegia    Osteoarthritis    PNA (pneumonia)    Pneumonia due to organism    Stroke (HCC)    left sided weakness       Prior Living Situation: Home with spouse  Diet Prior to Admission: Mechanical soft chopped/ Soft & Bite Sized;Thin liquids  Precautions: Aspiration    Patient/Family Goals: to eat/drink    SWALLOWING HISTORY  Current Diet Consistency: Puree;Thin liquids  Dysphagia History: hx of dysphagia  Imaging Results: see above    SUBJECTIVE       OBJECTIVE   ORAL MOTOR EXAMINATION  Dentition: Natural  Symmetry: Within Functional Limits  Strength:  (reduced)  Tone: Within Functional Limits  Range of Motion: Within Functional Limits  Rate of Motion: Reduced    Voice Quality: Clear  Respiratory Status: Unlabored  Consistencies Trialed: Thin liquids;Puree;Hard solid  Method of Presentation: Self presentation  Patient Positioning: Standard chair    Oral Phase  of Swallow: Impaired  Bolus Retrieval: Intact  Bilabial Seal: Intact  Bolus Formation: Intact  Bolus Propulsion: Intact  Mastication: Impaired  Retention: Intact    Pharyngeal Phase of Swallow: Impaired  Laryngeal Elevation Timing: Appears intact  Laryngeal Elevation Strength: Appears impaired  Laryngeal Elevation Coordination: Appears intact  (Please note: Silent aspiration cannot be evaluated clinically. Videofluoroscopic Swallow Study is required to rule-out silent aspiration.)    Esophageal Phase of Swallow: No complaints consistent with possible esophageal involvement  Comments: n/a          GOALS  Goal #1 The patient will tolerate soft easy to chew consistency and thin liquids without overt signs or symptoms of aspiration with 90 % accuracy over 1 session(s).  In Progress   Goal #2 Patient will participate in video swallow study to objectively assess pharyngeal function and rule out aspiration.     In Progress     FOLLOW UP  Treatment Plan/Recommendations: Videofluoroscopic swallow study  Number of Visits to Meet Established Goals: 2  Follow Up Needed (Documentation Required): Yes  SLP Follow-up Date: 10/28/24    Thank you for your referral.   If you have any questions, please contact Grisel Castillo, SLP Grisel Castillo, [facility]  [phone]

## 2024-10-27 NOTE — PROGRESS NOTES
[facility]  part of [facility]    Progress Note    Cody Fitzgerald Patient Status:  Inpatient    1940 MRN [id number]   Location [facility] 5SW/SE Attending Vikki Lira MD   Hosp Day # 2 PCP Kandice Claudio DO     Chief Complaint:     Febrile illness    Subjective:   Subjective:    Patient seen and examined  Sitting up in chair today on room air.  States he feels better.      Objective:   Blood pressure 139/66, pulse 72, temperature 97.7 °F (36.5 °C), temperature source Oral, resp. rate 20, height 5' 10\" (1.778 m), weight 203 lb 9.6 oz (92.4 kg), SpO2 91%.  Physical Exam    General: Patient is alert and oriented x3 does not appear to be in acute distress at this time  HEENT: EOMI PERRLA, atraumatic normocephalic  Cardiac: S1-S2 appreciated  Lungs: Good air entry right basilar crackles, good inspiratory effort.  Abdomen: Soft nontender nondistended positive bowel sounds  Ext: Peripheral pulses are positive  Neuro: No focal deficits noted  Psych: Normal mood  Skin: No rashes noted  MSK: Full range of motion intact      Results:   Lab Results   Component Value Date    WBC 6.9 10/27/2024    HGB 11.1 (L) 10/27/2024    HCT 34.9 (L) 10/27/2024    .0 10/27/2024    CREATSERUM 1.10 10/27/2024    BUN 13 10/27/2024     10/27/2024    K 3.3 (L) 10/27/2024     10/27/2024    CO2 26.0 10/27/2024    GLU 92 10/27/2024    CA 8.6 (L) 10/27/2024    ALB 4.6 10/25/2024    ALKPHO 126 (H) 10/25/2024    BILT 0.8 10/25/2024    TP 8.3 (H) 10/25/2024    AST 16 10/25/2024    ALT <7 (L) 10/25/2024    PTT 31.5 2024    INR 1.19 2024    TSH 0.733 2024    PSA 1.1 2019    DDIMER 3.93 (H) 10/05/2023    CRP 1.75 (H) 2022    MG 1.7 10/27/2024    PHOS 2.2 (L) 10/27/2024    TROP <0.045 2021    TROPHS 6 10/25/2024     2022       CT CHEST(CONTRAST ONLY) (CPT=71260)    Result Date: 10/25/2024  CONCLUSION:   Debris and secretions within the left lower  lobe bronchus which is nearly completely occluded.  Postobstructive atelectasis involving the left lower lobe has progressed since 6/10/2024.  Continued follow-up surveillance imaging recommended to exclude underlying pulmonary malignancy.  Right lower lobe pneumonia is new since 6/9/2024.  Moderate centrilobular emphysema    Dictated by (CST): Steven Tomas MD on 10/25/2024 at 6:29 PM     Finalized by (CST): Steven Tomas MD on 10/25/2024 at 6:32 PM          XR CHEST AP PORTABLE  (CPT=71045)    Result Date: 10/25/2024  CONCLUSION: Cardiac enlargement with secondary signs of slight/early interstitial edema.    Dictated by (CST): Chadd Pinzon MD on 10/25/2024 at 12:14 PM     Finalized by (CST): Chadd Pinzon MD on 10/25/2024 at 12:16 PM          CT BRAIN OR HEAD (CPT=70450)    Result Date: 10/25/2024  CONCLUSION:   1. No acute intracranial hemorrhage, midline shift, mass effect, or hydrocephalus. 2. No new transcortical area of hypoattenuation to suggest an acute infarct. 3. 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 4. No displaced calvarial fracture. 5. Lesser incidental findings described above.    Dictated by (CST): John Gonzalez MD on 10/25/2024 at 10:35 AM     Finalized by (CST): John Gonzalez MD on 10/25/2024 at 10:41 AM          XR HIP W OR WO PELVIS 2 OR 3 VIEWS, RIGHT (CPT=73502)    Result Date: 10/25/2024  CONCLUSION: No acute fracture/dislocation.    Dictated by (CST): Chadd Pinzon MD on 10/25/2024 at 10:21 AM     Finalized by (CST): Chadd Pinzon MD on 10/25/2024 at 10:25 AM          XR KNEE (3 VIEWS), RIGHT (CPT=73562)    Result Date: 10/25/2024  CONCLUSION:   No acute fracture or dislocation.  Small suprapatellar effusion.    Dictated by (CST): Yariel Keith MD on 10/25/2024 at 10:23 AM     Finalized by (CST): Yariel Keith MD on 10/25/2024 at 10:24 AM          XR LUMBAR SPINE (MIN 2 VIEWS) (CPT=72100)    Result Date:  10/25/2024  CONCLUSION:   No acute fracture or malalignment lumbar spine.  Mild lumbar dextroscoliosis with mild-to-moderate degenerative change.    Dictated by (CST): Yariel Keith MD on 10/25/2024 at 10:21 AM     Finalized by (CST): Yariel Keith MD on 10/25/2024 at 10:23 AM         EKG 12 Lead    Result Date: 10/26/2024  Normal sinus rhythm Left axis deviation Right bundle branch block Abnormal ECG When compared with ECG of 09-JUN-2024 18:54, QRS duration has increased Confirmed by DO George Asif ([id number]) on 10/26/2024 7:55:04 AM     Assessment & Plan:       Recurrent aspiration pneumonia, left lung- postobstructive/ RLL PNA new  -in pt with  ho COPD  -continue IV abx.  -continue oxygen supplementation via NC  -will monitor closely  -leukocytosis resolved.   -procal mildly elevated.   -CT reviewed.  -10/27: SLP, today on RA, pulm consult in a.m. to consider further workup for postobstructive appearance of pneumonia.  Video swallow in a.m.  Pt prefer [facility] @ [facility]      Elevated BNp   -given IV lasix/currently stopped  -appreciate cardio recs  - Echo IP    Chronic left hemiparesis.  -continue statin plavix  -stable    Essential hypertension.  -bp controlled  -monitor and titrate meds as needed    Stage 1 sacral pressure injury.      Vte ppx: heparin     Global A/P  -CT reviewed - c/w pna,obstructive.   -will monitor closely - may need a pulmonary consult.  -appreciate cardiology recs  -Reviewed previous consultant notes  -Reviewed CBC, BMP, Mag, and Phos  -Reviewed tests ordered  -Repeat labs in am  -MDM: High, severe exacerbation of chronic illness posing a threat to life. IV medications requiring close inpatient monitoring.         **Certification      PHYSICIAN Certification of Need for Inpatient Hospitalization - Initial Certification    Patient will require inpatient services that will reasonably be expected to span two midnight's based on the clinical documentation in H+P.   Based on patients current  state of illness, I anticipate that, after discharge, patient will require TBD.      Ros Tamayo MD

## 2024-10-27 NOTE — PLAN OF CARE
Problem: CARDIOVASCULAR - ADULT  Goal: Maintains optimal cardiac output and hemodynamic stability  Description: INTERVENTIONS:  - Monitor vital signs, rhythm, and trends  - Monitor for bleeding, hypotension and signs of decreased cardiac output  - Evaluate effectiveness of vasoactive medications to optimize hemodynamic stability  - Monitor arterial and/or venous puncture sites for bleeding and/or hematoma  - Assess quality of pulses, skin color and temperature  - Assess for signs of decreased coronary artery perfusion - ex. Angina  - Evaluate fluid balance, assess for edema, trend weights  Outcome: Progressing  Goal: Absence of cardiac arrhythmias or at baseline  Description: INTERVENTIONS:  - Continuous cardiac monitoring, monitor vital signs, obtain 12 lead EKG if indicated  - Evaluate effectiveness of antiarrhythmic and heart rate control medications as ordered  - Initiate emergency measures for life threatening arrhythmias  - Monitor electrolytes and administer replacement therapy as ordered  Outcome: Progressing     Problem: RESPIRATORY - ADULT  Goal: Achieves optimal ventilation and oxygenation  Description: INTERVENTIONS:  - Assess for changes in respiratory status  - Assess for changes in mentation and behavior  - Position to facilitate oxygenation and minimize respiratory effort  - Oxygen supplementation based on oxygen saturation or ABGs  - Provide Smoking Cessation handout, if applicable  - Encourage broncho-pulmonary hygiene including cough, deep breathe, Incentive Spirometry  - Assess the need for suctioning and perform as needed  - Assess and instruct to report SOB or any respiratory difficulty  - Respiratory Therapy support as indicated  - Manage/alleviate anxiety  - Monitor for signs/symptoms of CO2 retention  Outcome: Progressing     Problem: METABOLIC/FLUID AND ELECTROLYTES - ADULT  Goal: Electrolytes maintained within normal limits  Description: INTERVENTIONS:  - Monitor labs and rhythm and  assess patient for signs and symptoms of electrolyte imbalances  - Administer electrolyte replacement as ordered  - Monitor response to electrolyte replacements, including rhythm and repeat lab results as appropriate  - Fluid restriction as ordered  - Instruct patient on fluid and nutrition restrictions as appropriate  Outcome: Progressing  Goal: Hemodynamic stability and optimal renal function maintained  Description: INTERVENTIONS:  - Monitor labs and assess for signs and symptoms of volume excess or deficit  - Monitor intake, output and patient weight  - Monitor urine specific gravity, serum osmolarity and serum sodium as indicated or ordered  - Monitor response to interventions for patient's volume status, including labs, urine output, blood pressure (other measures as available)  - Encourage oral intake as appropriate  - Instruct patient on fluid and nutrition restrictions as appropriate  Outcome: Progressing     Problem: METABOLIC/FLUID AND ELECTROLYTES - ADULT  Goal: Electrolytes maintained within normal limits  Description: INTERVENTIONS:  - Monitor labs and rhythm and assess patient for signs and symptoms of electrolyte imbalances  - Administer electrolyte replacement as ordered  - Monitor response to electrolyte replacements, including rhythm and repeat lab results as appropriate  - Fluid restriction as ordered  - Instruct patient on fluid and nutrition restrictions as appropriate  Outcome: Progressing  Goal: Hemodynamic stability and optimal renal function maintained  Description: INTERVENTIONS:  - Monitor labs and assess for signs and symptoms of volume excess or deficit  - Monitor intake, output and patient weight  - Monitor urine specific gravity, serum osmolarity and serum sodium as indicated or ordered  - Monitor response to interventions for patient's volume status, including labs, urine output, blood pressure (other measures as available)  - Encourage oral intake as appropriate  - Instruct patient  on fluid and nutrition restrictions as appropriate  Outcome: Progressing     Problem: SKIN/TISSUE INTEGRITY - ADULT  Goal: Incision(s), wounds(s) or drain site(s) healing without S/S of infection  Description: INTERVENTIONS:  - Assess and document risk factors for pressure ulcer development  - Assess and document skin integrity  - Assess and document dressing/incision, wound bed, drain sites and surrounding tissue  - Implement wound care per orders  - Initiate isolation precautions as appropriate  - Initiate Pressure Ulcer prevention bundle as indicated  Outcome: Progressing

## 2024-10-27 NOTE — PLAN OF CARE
Problem: CARDIOVASCULAR - ADULT  Goal: Maintains optimal cardiac output and hemodynamic stability  Description: INTERVENTIONS:  - Monitor vital signs, rhythm, and trends  - Monitor for bleeding, hypotension and signs of decreased cardiac output  - Evaluate effectiveness of vasoactive medications to optimize hemodynamic stability  - Monitor arterial and/or venous puncture sites for bleeding and/or hematoma  - Assess quality of pulses, skin color and temperature  - Assess for signs of decreased coronary artery perfusion - ex. Angina  - Evaluate fluid balance, assess for edema, trend weights  Outcome: Progressing  Goal: Absence of cardiac arrhythmias or at baseline  Description: INTERVENTIONS:  - Continuous cardiac monitoring, monitor vital signs, obtain 12 lead EKG if indicated  - Evaluate effectiveness of antiarrhythmic and heart rate control medications as ordered  - Initiate emergency measures for life threatening arrhythmias  - Monitor electrolytes and administer replacement therapy as ordered  Outcome: Progressing     Problem: RESPIRATORY - ADULT  Goal: Achieves optimal ventilation and oxygenation  Description: INTERVENTIONS:  - Assess for changes in respiratory status  - Assess for changes in mentation and behavior  - Position to facilitate oxygenation and minimize respiratory effort  - Oxygen supplementation based on oxygen saturation or ABGs  - Provide Smoking Cessation handout, if applicable  - Encourage broncho-pulmonary hygiene including cough, deep breathe, Incentive Spirometry  - Assess the need for suctioning and perform as needed  - Assess and instruct to report SOB or any respiratory difficulty  - Respiratory Therapy support as indicated  - Manage/alleviate anxiety  - Monitor for signs/symptoms of CO2 retention  Outcome: Progressing     Problem: METABOLIC/FLUID AND ELECTROLYTES - ADULT  Goal: Electrolytes maintained within normal limits  Description: INTERVENTIONS:  - Monitor labs and rhythm and assess  patient for signs and symptoms of electrolyte imbalances  - Administer electrolyte replacement as ordered  - Monitor response to electrolyte replacements, including rhythm and repeat lab results as appropriate  - Fluid restriction as ordered  - Instruct patient on fluid and nutrition restrictions as appropriate  Outcome: Progressing  Goal: Hemodynamic stability and optimal renal function maintained  Description: INTERVENTIONS:  - Monitor labs and assess for signs and symptoms of volume excess or deficit  - Monitor intake, output and patient weight  - Monitor urine specific gravity, serum osmolarity and serum sodium as indicated or ordered  - Monitor response to interventions for patient's volume status, including labs, urine output, blood pressure (other measures as available)  - Encourage oral intake as appropriate  - Instruct patient on fluid and nutrition restrictions as appropriate  Outcome: Progressing     Problem: SKIN/TISSUE INTEGRITY - ADULT  Goal: Incision(s), wounds(s) or drain site(s) healing without S/S of infection  Description: INTERVENTIONS:  - Assess and document risk factors for pressure ulcer development  - Assess and document skin integrity  - Assess and document dressing/incision, wound bed, drain sites and surrounding tissue  - Implement wound care per orders  - Initiate isolation precautions as appropriate  - Initiate Pressure Ulcer prevention bundle as indicated  Outcome: Progressing     Problem: MUSCULOSKELETAL - ADULT  Goal: Return mobility to safest level of function  Description: INTERVENTIONS:  - Assess patient stability and activity tolerance for standing, transferring and ambulating w/ or w/o assistive devices  - Assist with transfers and ambulation using safe patient handling equipment as needed  - Ensure adequate protection for wounds/incisions during mobilization  - Obtain PT/OT consults as needed  - Advance activity as appropriate  - Communicate ordered activity level and  limitations with patient/family  Outcome: Progressing     Problem: PAIN - ADULT  Goal: Verbalizes/displays adequate comfort level or patient's stated pain goal  Description: INTERVENTIONS:  - Encourage pt to monitor pain and request assistance  - Assess pain using appropriate pain scale  - Administer analgesics based on type and severity of pain and evaluate response  - Implement non-pharmacological measures as appropriate and evaluate response  - Consider cultural and social influences on pain and pain management  - Manage/alleviate anxiety  - Utilize distraction and/or relaxation techniques  - Monitor for opioid side effects  - Notify MD/LIP if interventions unsuccessful or patient reports new pain  - Anticipate increased pain with activity and pre-medicate as appropriate  Outcome: Progressing     Problem: RISK FOR INFECTION - ADULT  Goal: Absence of fever/infection during anticipated neutropenic period  Description: INTERVENTIONS  - Monitor WBC  - Administer growth factors as ordered  - Implement neutropenic guidelines  Outcome: Progressing     Problem: SAFETY ADULT - FALL  Goal: Free from fall injury  Description: INTERVENTIONS:  - Assess pt frequently for physical needs  - Identify cognitive and physical deficits and behaviors that affect risk of falls.  - [place] fall precautions as indicated by assessment.  - Educate pt/family on patient safety including physical limitations  - Instruct pt to call for assistance with activity based on assessment  - Modify environment to reduce risk of injury  - Provide assistive devices as appropriate  - Consider OT/PT consult to assist with strengthening/mobility  - Encourage toileting schedule  Outcome: Progressing     Problem: Impaired Swallowing  Goal: Minimize aspiration risk  Description: Interventions:  - Patient should be alert and upright for all feedings (90 degrees preferred)  - Offer food and liquids at a slow rate  - No straws  - Encourage small bites of food  and small sips of liquid  - Offer pills one at a time, crush or deliver with applesauce as needed  - Discontinue feeding and notify MD (or speech pathologist) if coughing or persistent throat clearing or wet/gurgly vocal quality is noted  Outcome: Progressing     Problem: Patient Centered Care  Goal: Patient preferences are identified and integrated in the patient's plan of care  Description: Interventions:  - What would you like us to know as we care for you?   - Provide timely, complete, and accurate information to patient/family  - Incorporate patient and family knowledge, values, beliefs, and cultural backgrounds into the planning and delivery of care  - Encourage patient/family to participate in care and decision-making at the level they choose  - Honor patient and family perspectives and choices  Outcome: Progressing  Pain medication provided as needed. Receiving IV fluids and antibiotics per order. Fall precautions maintained, bed locked in lowest position, bed alarm on, call light within reach and frequent rounding by nursing staff.

## 2024-10-28 ENCOUNTER — APPOINTMENT (OUTPATIENT)
Dept: GENERAL RADIOLOGY | Facility: HOSPITAL | Age: 84
End: 2024-10-28
Attending: INTERNAL MEDICINE
Payer: MEDICARE

## 2024-10-28 ENCOUNTER — APPOINTMENT (OUTPATIENT)
Dept: GENERAL RADIOLOGY | Facility: HOSPITAL | Age: 84
DRG: 178 | End: 2024-10-28
Attending: INTERNAL MEDICINE
Payer: MEDICARE

## 2024-10-28 ENCOUNTER — APPOINTMENT (OUTPATIENT)
Dept: CV DIAGNOSTICS | Facility: HOSPITAL | Age: 84
DRG: 178 | End: 2024-10-28
Payer: MEDICARE

## 2024-10-28 ENCOUNTER — APPOINTMENT (OUTPATIENT)
Dept: CV DIAGNOSTICS | Facility: HOSPITAL | Age: 84
End: 2024-10-28
Payer: MEDICARE

## 2024-10-28 LAB
ANION GAP SERPL CALC-SCNC: 10 MMOL/L (ref 0–18)
BASOPHILS # BLD AUTO: 0.06 X10(3) UL (ref 0–0.2)
BASOPHILS NFR BLD AUTO: 0.9 %
BUN BLD-MCNC: 12 MG/DL (ref 9–23)
BUN/CREAT SERPL: 11.8 (ref 10–20)
CALCIUM BLD-MCNC: 8.3 MG/DL (ref 8.7–10.4)
CHLORIDE SERPL-SCNC: 116 MMOL/L (ref 98–112)
CO2 SERPL-SCNC: 22 MMOL/L (ref 21–32)
CREAT BLD-MCNC: 1.02 MG/DL
DEPRECATED RDW RBC AUTO: 49.8 FL (ref 35.1–46.3)
EGFRCR SERPLBLD CKD-EPI 2021: 73 ML/MIN/1.73M2 (ref 60–?)
EOSINOPHIL # BLD AUTO: 0.2 X10(3) UL (ref 0–0.7)
EOSINOPHIL NFR BLD AUTO: 2.8 %
ERYTHROCYTE [DISTWIDTH] IN BLOOD BY AUTOMATED COUNT: 14 % (ref 11–15)
GLUCOSE BLD-MCNC: 88 MG/DL (ref 70–99)
HCT VFR BLD AUTO: 37.1 %
HGB BLD-MCNC: 11.4 G/DL
IMM GRANULOCYTES # BLD AUTO: 0.04 X10(3) UL (ref 0–1)
IMM GRANULOCYTES NFR BLD: 0.6 %
LYMPHOCYTES # BLD AUTO: 1.52 X10(3) UL (ref 1–4)
LYMPHOCYTES NFR BLD AUTO: 21.6 %
MAGNESIUM SERPL-MCNC: 1.5 MG/DL (ref 1.6–2.6)
MCH RBC QN AUTO: 30 PG (ref 26–34)
MCHC RBC AUTO-ENTMCNC: 30.7 G/DL (ref 31–37)
MCV RBC AUTO: 97.6 FL
MONOCYTES # BLD AUTO: 0.62 X10(3) UL (ref 0.1–1)
MONOCYTES NFR BLD AUTO: 8.8 %
NEUTROPHILS # BLD AUTO: 4.61 X10 (3) UL (ref 1.5–7.7)
NEUTROPHILS # BLD AUTO: 4.61 X10(3) UL (ref 1.5–7.7)
NEUTROPHILS NFR BLD AUTO: 65.3 %
OSMOLALITY SERPL CALC.SUM OF ELEC: 305 MOSM/KG (ref 275–295)
PHOSPHATE SERPL-MCNC: 2.4 MG/DL (ref 2.4–5.1)
PLATELET # BLD AUTO: 198 10(3)UL (ref 150–450)
POTASSIUM SERPL-SCNC: 3.6 MMOL/L (ref 3.5–5.1)
RBC # BLD AUTO: 3.8 X10(6)UL
SODIUM SERPL-SCNC: 148 MMOL/L (ref 136–145)
WBC # BLD AUTO: 7.1 X10(3) UL (ref 4–11)

## 2024-10-28 PROCEDURE — 99233 SBSQ HOSP IP/OBS HIGH 50: CPT | Performed by: INTERNAL MEDICINE

## 2024-10-28 PROCEDURE — 99223 1ST HOSP IP/OBS HIGH 75: CPT | Performed by: INTERNAL MEDICINE

## 2024-10-28 PROCEDURE — 93306 TTE W/DOPPLER COMPLETE: CPT

## 2024-10-28 PROCEDURE — 74230 X-RAY XM SWLNG FUNCJ C+: CPT | Performed by: INTERNAL MEDICINE

## 2024-10-28 RX ORDER — MAGNESIUM OXIDE 400 MG/1
800 TABLET ORAL ONCE
Status: COMPLETED | OUTPATIENT
Start: 2024-10-28 | End: 2024-10-28

## 2024-10-28 NOTE — PROGRESS NOTES
10/28/24 1310   Wound 10/25/24 Sacrum Mid   Date First Assessed/Time First Assessed: 10/25/24 2200   Present on Original Admission: Yes  Primary Wound Type: Pressure Injury  Location: Sacrum  Wound Location Orientation: Mid   Wound Image    Site Assessment Clean;Dry;Intact;Fragile   Closure Approximated   Drainage Amount None   Treatments Site Care   Dressing Aquacel Foam   Dressing Changed Reinforced   Dressing Status Clean;Dry;Intact   Non-staged Wound Description Not applicable   Iris-wound Assessment Clean;Dry;Intact   Wound Bed Epithelium (%) 100 %   State of Healing Epithelialized   Wound Odor None   Wound Follow Up   Follow up needed No     Wound consult requested by Dr. Pearson for sacral area. See above for intact area, no redness noted, sacral foam replaced. Wound care signing off.

## 2024-10-28 NOTE — PROGRESS NOTES
[facility]  part of [facility]    Progress Note    Cody Fitzgerald Patient Status:  Inpatient    1940 MRN [id number]   Location [facility] 5SW/SE Attending Vikki Lira MD   Hosp Day # 3 PCP Kandice Claudio DO     Chief Complaint:     Febrile illness    Subjective:   Subjective:    Patient seen and examined \" I feel good\" pt is in good spirits- on RA  No SOB  TTE IP at this time      No other acute complaints or other nursing concerns or overnight events        Objective:   Blood pressure 139/84, pulse 79, temperature 97.8 °F (36.6 °C), temperature source Oral, resp. rate 18, height 5' 10\" (1.778 m), weight 203 lb 9.6 oz (92.4 kg), SpO2 90%.  Physical Exam    General: Patient is alert and oriented x3 does not appear to be in acute distress at this time  HEENT: EOMI PERRLA, atraumatic normocephalic  Cardiac: S1-S2 appreciated  Lungs: Good air entry right basilar crackles, good inspiratory effort.  Abdomen: Soft nontender nondistended positive bowel sounds  Ext: Peripheral pulses are positive  Neuro: No focal deficits noted  Psych: Normal mood  Skin: No rashes noted  MSK: Full range of motion intact      Results:   Lab Results   Component Value Date    WBC 6.9 10/27/2024    HGB 11.1 (L) 10/27/2024    HCT 34.9 (L) 10/27/2024    .0 10/27/2024    CREATSERUM 1.10 10/27/2024    BUN 13 10/27/2024     10/27/2024    K 4.1 10/27/2024     10/27/2024    CO2 26.0 10/27/2024    GLU 92 10/27/2024    CA 8.6 (L) 10/27/2024    ALB 4.6 10/25/2024    ALKPHO 126 (H) 10/25/2024    BILT 0.8 10/25/2024    TP 8.3 (H) 10/25/2024    AST 16 10/25/2024    ALT <7 (L) 10/25/2024    PTT 31.5 2024    INR 1.19 2024    TSH 0.733 2024    PSA 1.1 2019    DDIMER 3.93 (H) 10/05/2023    CRP 1.75 (H) 2022    MG 1.5 (L) 10/28/2024    PHOS 2.4 10/28/2024    TROP <0.045 2021    TROPHS 6 10/25/2024     2022       No results found.        Assessment &  Plan:       Recurrent aspiration pneumonia, left lung- postobstructive/ RLL PNA new  -in pt with  ho COPD  -continue IV abx.  -continue oxygen supplementation via NC  -will monitor closely  -leukocytosis resolved.   -procal mildly elevated.   -CT reviewed.  -10/28:  today on RA, pulm consult to consider further workup for postobstructive appearance of pneumonia.  Video swallow today; fup on TTE IP  Pt prefer [facility] @ [facility] however pt \"does not want to go \", consider palliative care consult?      Elevated BNp   -given IV lasix/currently stopped  -appreciate cardio recs  - Echo IP    Chronic left hemiparesis.  -continue statin plavix  -stable    Essential hypertension.  -bp controlled  -monitor and titrate meds as needed    Stage 1 sacral pressure injury.      Vte ppx: heparin     Global A/P  -CT reviewed - c/w pna,obstructive.   -will monitor closely - may need a pulmonary consult.  -appreciate cardiology recs  -Reviewed previous consultant notes  -Reviewed CBC, BMP, Mag, and Phos  -Reviewed tests ordered  -Repeat labs in am  -MDM: High, severe exacerbation of chronic illness posing a threat to life. IV medications requiring close inpatient monitoring.         **Certification      PHYSICIAN Certification of Need for Inpatient Hospitalization - Initial Certification    Patient will require inpatient services that will reasonably be expected to span two midnight's based on the clinical documentation in H+P.   Based on patients current state of illness, I anticipate that, after discharge, patient will require TBD.      Ros Tamayo MD

## 2024-10-28 NOTE — CONSULTS
[facility]  part of [facility]    Consult Note    Date:  10/28/2024  Date of Admission:  10/25/2024    Chief Complaint:   Cody Fitzgerald is a(n) 84 year old male with bibasilar infiltrates.    HPI:   The patient has prior history of COVID infection.  He has had multiple episodes of pneumonia.  He also has prior history of stroke with chronic left-sided weakness.  He tends to lie on his left side.  He admits to chronic wet cough.  He was admitted on the 25th of this month with suspected recurrent aspiration.  He received empiric antibiotics.  He had has a wet cough with persistent atelectasis of the left lung base.  There is no hemoptysis, pleurisy, chest pain, personal nor family history of deep venous thrombosis.  He was started on Unasyn and doxycycline.    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 - Dr. Stahl    Tonsillectomy      at age 24     Family History   Problem Relation Age of Onset    Cancer Father         Lung      Social History: , 5 kids, quit tobacco 10 years ago after greater than 1 pack/day, occasional alcohol, retired   Social History     Socioeconomic History    Marital status:    Tobacco Use    Smoking status: Former     Current packs/day: 0.00     Average packs/day: 2.0 packs/day for 50.0 years (100.0 ttl pk-yrs)     Types: Cigarettes     Start date: 1/18/1958      Quit date: 2008     Years since quittin.7     Passive exposure: Never    Smokeless tobacco: Never   Vaping Use    Vaping status: Never Used   Substance and Sexual Activity    Alcohol use: Not Currently     Comment: 1-2 drinks/monthly    Drug use: No   Other Topics Concern    Caffeine Concern Yes     Comment: 1 cup of coffee daily    Exercise No   Social History Narrative    The patient uses the following assistive device(s):  quad cane and pick-up walker.      The patient does live in a home with stairs.     Social Drivers of Health     Financial Resource Strain: Low Risk  (2023)    Financial Resource Strain     Difficulty of Paying Living Expenses: Not very hard     Med Affordability: No   Food Insecurity: No Food Insecurity (10/25/2024)    Food Insecurity     Food Insecurity: Never true   Transportation Needs: No Transportation Needs (10/25/2024)    Transportation Needs     Lack of Transportation: No   Housing Stability: Low Risk  (10/25/2024)    Housing Stability     Housing Instability: No     Allergies/Medications:   Allergies: Allergies[1]  Medications Prior to Admission   Medication Sig    gabapentin 300 MG Oral Cap Take 2 capsules (600 MG) by mouth in the morning, 1 capsule (300 MG) at noon, and 1 capsule (300 MG) at bedtime    ipratropium-albuterol 0.5-2.5 (3) MG/3ML Inhalation Solution Take 3 mL by nebulization 3 (three) times daily as needed (shortness of breath). And as needed (Patient not taking: Reported on 10/25/2024)    levothyroxine 100 MCG Oral Tab Take 1 tablet (100 mcg total) by mouth daily.    fluocinonide 0.05 % External Ointment Apply a small amount of ointment on area of rash twice a day as necessary.    amLODIPine 5 MG Oral Tab TAKE 1 TABLET(5 MG) BY MOUTH DAILY    ATORVASTATIN 40 MG Oral Tab TAKE 1 TABLET(40 MG) BY MOUTH NIGHTLY    CLOPIDOGREL 75 MG Oral Tab TAKE 1 TABLET(75 MG) BY MOUTH DAILY    acetaminophen 500 MG Oral Tab Take 2 tablets (1,000 mg total) by mouth in the  morning and 2 tablets (1,000 mg total) before bedtime.    Cholecalciferol (VITAMIN D) 2000 units Oral Cap Take 1 capsule (2,000 Units total) by mouth daily.       Review of Systems:   Review of Systems:  Vision normal. Ear nose and throat normal. Bowel normal. Bladder function normal. No depression. No thyroid disease. No lymphatic system concerns.  No rash. Muscles and joints notable for chronic left-sided weakness. No weight loss no weight gain.    Physical Exam:   Vital Signs:  Blood pressure 143/72, pulse 78, temperature 98.2 °F (36.8 °C), temperature source Oral, resp. rate 20, height 5' 10\" (1.778 m), weight 203 lb 9.6 oz (92.4 kg), SpO2 96%.     Alert white male in good spirits  HEENT examination is unremarkable with pupils equal round and reactive to light and accommodation.   Neck without adenopathy, thyromegaly, JVD nor bruit.   Lungs notable for central expiratory rattle to auscultation and percussion.  Cardiac regular rate and rhythm no murmur.   Abdomen nontender, without hepatosplenomegaly and no mass appreciable.   Extremities without clubbing cyanosis nor edema.   Neurologic with partial left-sided palsy.  Skin without gross abnormality    Results:     Lab Results   Component Value Date    K 4.1 10/27/2024    MG 1.5 10/28/2024    PHOS 2.4 10/28/2024     Chest x-ray-Cardiac enlargement with secondary signs of slight/early interstitial edema.    CT scan of the chest-Debris and secretions within the left lower lobe bronchus which is nearly completely occluded.      Postobstructive atelectasis involving the left lower lobe has progressed since 6/10/2024.  Continued follow-up surveillance imaging recommended to exclude underlying pulmonary malignancy.      Right lower lobe pneumonia is new since 6/9/2024.      Moderate centrilobular emphysema     Assessment/Plan:   1.  Pneumonia-the patient likely has intermittent aspiration.  He also has chronic left lower lobe atelectasis likely in part related to  positioning as he has weakness on the left side and always seems to be turned in neck position.  He has plenty central mucous on auscultation.  He also has an extensive history of tobacco having quit 10 years ago.  I think it would be reasonable to perform a quick bronchoscopy to aspirate out the mucus in an attempt to better inflate the left lower lobe.  Also might be helpful to inspect the airway to rule out endobronchial lesion although I think this less likely.    Recommendations:  1.  Ongoing empiric antibiotic  2.  Swallowing strategies  3.  Bronchoscopy  4.  Will follow clinically  5.  Incentive spirometry  6.  Position off the left side  7.  Acapella flutter valve  8.  Chest physiotherapy    2.  DVT prophylaxis-has been getting subcutaneous heparin but will hold for procedure.    3.  History of stroke    I am delighted to assist with Cody's care.      Babar Ferraro MD  Medical Director, Critical Care, [facility]  Medical Director, [facility]  Pager: [phone]             [1] No Known Allergies

## 2024-10-28 NOTE — PLAN OF CARE
Problem: CARDIOVASCULAR - ADULT  Goal: Maintains optimal cardiac output and hemodynamic stability  Description: INTERVENTIONS:  - Monitor vital signs, rhythm, and trends  - Monitor for bleeding, hypotension and signs of decreased cardiac output  - Evaluate effectiveness of vasoactive medications to optimize hemodynamic stability  - Monitor arterial and/or venous puncture sites for bleeding and/or hematoma  - Assess quality of pulses, skin color and temperature  - Assess for signs of decreased coronary artery perfusion - ex. Angina  - Evaluate fluid balance, assess for edema, trend weights  Outcome: Progressing  Goal: Absence of cardiac arrhythmias or at baseline  Description: INTERVENTIONS:  - Continuous cardiac monitoring, monitor vital signs, obtain 12 lead EKG if indicated  - Evaluate effectiveness of antiarrhythmic and heart rate control medications as ordered  - Initiate emergency measures for life threatening arrhythmias  - Monitor electrolytes and administer replacement therapy as ordered  Outcome: Progressing     Problem: RESPIRATORY - ADULT  Goal: Achieves optimal ventilation and oxygenation  Description: INTERVENTIONS:  - Assess for changes in respiratory status  - Assess for changes in mentation and behavior  - Position to facilitate oxygenation and minimize respiratory effort  - Oxygen supplementation based on oxygen saturation or ABGs  - Provide Smoking Cessation handout, if applicable  - Encourage broncho-pulmonary hygiene including cough, deep breathe, Incentive Spirometry  - Assess the need for suctioning and perform as needed  - Assess and instruct to report SOB or any respiratory difficulty  - Respiratory Therapy support as indicated  - Manage/alleviate anxiety  - Monitor for signs/symptoms of CO2 retention  Outcome: Progressing     Problem: METABOLIC/FLUID AND ELECTROLYTES - ADULT  Goal: Electrolytes maintained within normal limits  Description: INTERVENTIONS:  - Monitor labs and rhythm and  assess patient for signs and symptoms of electrolyte imbalances  - Administer electrolyte replacement as ordered  - Monitor response to electrolyte replacements, including rhythm and repeat lab results as appropriate  - Fluid restriction as ordered  - Instruct patient on fluid and nutrition restrictions as appropriate  Outcome: Progressing  Goal: Hemodynamic stability and optimal renal function maintained  Description: INTERVENTIONS:  - Monitor labs and assess for signs and symptoms of volume excess or deficit  - Monitor intake, output and patient weight  - Monitor urine specific gravity, serum osmolarity and serum sodium as indicated or ordered  - Monitor response to interventions for patient's volume status, including labs, urine output, blood pressure (other measures as available)  - Encourage oral intake as appropriate  - Instruct patient on fluid and nutrition restrictions as appropriate  Outcome: Progressing     Problem: SKIN/TISSUE INTEGRITY - ADULT  Goal: Incision(s), wounds(s) or drain site(s) healing without S/S of infection  Description: INTERVENTIONS:  - Assess and document risk factors for pressure ulcer development  - Assess and document skin integrity  - Assess and document dressing/incision, wound bed, drain sites and surrounding tissue  - Implement wound care per orders  - Initiate isolation precautions as appropriate  - Initiate Pressure Ulcer prevention bundle as indicated  Outcome: Progressing     Problem: MUSCULOSKELETAL - ADULT  Goal: Return mobility to safest level of function  Description: INTERVENTIONS:  - Assess patient stability and activity tolerance for standing, transferring and ambulating w/ or w/o assistive devices  - Assist with transfers and ambulation using safe patient handling equipment as needed  - Ensure adequate protection for wounds/incisions during mobilization  - Obtain PT/OT consults as needed  - Advance activity as appropriate  - Communicate ordered activity level and  limitations with patient/family  Outcome: Progressing     Problem: Impaired Swallowing  Goal: Minimize aspiration risk  Description: Interventions:  - Patient should be alert and upright for all feedings (90 degrees preferred)  - Offer food and liquids at a slow rate  - No straws  - Encourage small bites of food and small sips of liquid  - Offer pills one at a time, crush or deliver with applesauce as needed  - Discontinue feeding and notify MD (or speech pathologist) if coughing or persistent throat clearing or wet/gurgly vocal quality is noted  Outcome: Progressing     Problem: PAIN - ADULT  Goal: Verbalizes/displays adequate comfort level or patient's stated pain goal  Description: INTERVENTIONS:  - Encourage pt to monitor pain and request assistance  - Assess pain using appropriate pain scale  - Administer analgesics based on type and severity of pain and evaluate response  - Implement non-pharmacological measures as appropriate and evaluate response  - Consider cultural and social influences on pain and pain management  - Manage/alleviate anxiety  - Utilize distraction and/or relaxation techniques  - Monitor for opioid side effects  - Notify MD/LIP if interventions unsuccessful or patient reports new pain  - Anticipate increased pain with activity and pre-medicate as appropriate  Outcome: Progressing     Problem: RISK FOR INFECTION - ADULT  Goal: Absence of fever/infection during anticipated neutropenic period  Description: INTERVENTIONS  - Monitor WBC  - Administer growth factors as ordered  - Implement neutropenic guidelines  Outcome: Progressing     Problem: SAFETY ADULT - FALL  Goal: Free from fall injury  Description: INTERVENTIONS:  - Assess pt frequently for physical needs  - Identify cognitive and physical deficits and behaviors that affect risk of falls.  - [place] fall precautions as indicated by assessment.  - Educate pt/family on patient safety including physical limitations  - Instruct pt to call  for assistance with activity based on assessment  - Modify environment to reduce risk of injury  - Provide assistive devices as appropriate  - Consider OT/PT consult to assist with strengthening/mobility  - Encourage toileting schedule  Outcome: Progressing     Problem: Patient Centered Care  Goal: Patient preferences are identified and integrated in the patient's plan of care  Description: Interventions:  - What would you like us to know as we care for you? From home with wife.   - Provide timely, complete, and accurate information to patient/family  - Incorporate patient and family knowledge, values, beliefs, and cultural backgrounds into the planning and delivery of care  - Encourage patient/family to participate in care and decision-making at the level they choose  - Honor patient and family perspectives and choices  Outcome: Progressing

## 2024-10-28 NOTE — PROGRESS NOTES
Progress Note  Cody Fitzgerald Patient Status:  Inpatient    1940 MRN [id number]   Location [facility] 5SW/SE Attending Ros Tamayo MD   Hosp Day # 3 PCP Kandice Claudio DO     Subjective:  Denies cardiac complaints or productive cough    Objective:  /72 (BP Location: Right arm)   Pulse 78   Temp 98.2 °F (36.8 °C) (Oral)   Resp 20   Ht 5' 10\" (1.778 m)   Wt 203 lb 9.6 oz (92.4 kg)   SpO2 96%   BMI 29.21 kg/m²     Telemetry: SR, rare PVCs    Intake/Output:    Intake/Output Summary (Last 24 hours) at 10/28/2024 1048  Last data filed at 10/28/2024 0351  Gross per 24 hour   Intake 630 ml   Output 1300 ml   Net -670 ml     Last 3 Weights   10/25/24 2130 203 lb 9.6 oz (92.4 kg)   10/25/24 0917 203 lb (92.1 kg)   24 1227 203 lb (92.1 kg)   24 1037 197 lb (89.4 kg)     Labs:  Recent Labs   Lab 10/25/24  0916 10/26/24  0653 10/27/24  0657 10/27/24  195   * 89 92  --    BUN 17 13 13  --    CREATSERUM 1.20 1.04 1.10  --    EGFRCR 60 71 67  --    CA 10.0 9.1 8.6*  --     144 145  --    K 3.9 3.9 3.3* 4.1    111 111  --    CO2 27.0 26.0 26.0  --      Recent Labs   Lab 10/25/24  0916 10/26/24  0653 10/27/24  0657   RBC 4.56 3.82 3.59*   HGB 13.6 11.9* 11.1*   HCT 43.3 37.0* 34.9*   MCV 95.0 96.9 97.2   MCH 29.8 31.2 30.9   MCHC 31.4 32.2 31.8   RDW 14.4 14.3 14.0   NEPRELIM 16.09* 7.81* 3.92   WBC 19.0* 10.8 6.9   .0 168.0 160.0     Recent Labs   Lab 10/25/24  0916   TROPHS 6     Lab Results   Component Value Date/Time    HDL 29 (L) 2024 09:40 AM    LDL 72 2024 09:40 AM    TRIG 100 2024 09:40 AM     Lab Results   Component Value Date    DDIMER 3.93 (H) 10/05/2023     Lab Results   Component Value Date    TSH 0.733 2024     Review of Systems:   Constitutional: No fevers, chills, fatigue or night sweats.  ENT: No mouth pain, neck pain, running nose, headaches or swollen glands.  Skin: No rashes, pruritus or skin  changes,  Respiratory: Denies cough, wheezing or shortness of breath.  CV: Denies chest pain, palpitations, orthopnea, PND or dizziness.  Musculoskeletal: No joint pain, stiffness or swelling.  GI: No nausea, vomiting or diarrhea. No blood in stools.  Neurologic: No seizures, tremors, weakness or numbness.     Physical Exam:  General: Alert, cooperative, no distress, appears stated age.  Neck: Supple, symmetrical, trachea midline, no adenopathy, thyroid: no enlargment/tenderness/nodules, no carotid bruit and no JVD.  Lungs: Clear to auscultation bilaterally.  Chest wall: No tenderness or deformity.  Heart: Regular rate and rhythm, S1, S2 normal, no murmur, click, rub or gallop.  Abdomen: Soft, non-tender. Bowel sounds normal. No masses,  No organomegaly.  Extremities: Extremities normal, atraumatic, no cyanosis or edema.  Pulses: 2+ and symmetric all extremities.  Neurologic: Grossly intact.    Diagnostics:  Echo: pending    Medications:   sodium phosphate  15 mmol Intravenous Once    amLODIPine  5 mg Oral Daily    ampicillin-sulbactam  3 g Intravenous q6h    heparin  5,000 Units Subcutaneous 2 times per day    atorvastatin  40 mg Oral Nightly    clopidogrel  75 mg Oral Daily    levothyroxine  100 mcg Oral Daily    doxycycline  100 mg Oral 2 times per day    gabapentin  600 mg Oral QAM    gabapentin  300 mg Oral BID      sodium chloride 100 mL/hr at 10/28/24 0954     Assessment:  84 year old male with PMH of CVA, HTN, GERD who presented with cough and dyspnea. Cardiology was consulted due to slightly elevated proBNP and pulmonary edema on CXR.     Pulmonary Edema  Admitted with shortness of breath, mild pulmonary edema on CXR  -proBNP 446  -not volume overloaded on exam on admission, remains euvolemic on exam today   -no need for diuresis  -Echo read pending     Shortness of breath  CT chest with right lower lobe pneumonia, emphysema, postobstructive atelactasis  -pulm following  -Antibiotics per pulm-unasyn,  doxycycline  -concern for aspiration, video swallow pending  -plan for bronchoscopy  -stable O2 sats on room air    HTN  -140s  -amlodipine  -can consider increasing dose if needed but would hold off for now with multiple other concerns ongoing    Hx CVA  Statin, plavix  -plavix on hold for plans for bronch    Hypothyroid-synthroid      Plan:  -Continue amlodipine  -Continue statin, plavix, resume when cleared per pulm post bronchoscopy  -Abx per pulm/primary  -Echo read pending, if stable, cardiology will sign off  -Further recs per pulm following bronchoscopy      Plan of care discussed with patient, RN.        Maria A Chavez, APRPRERNA  10/28/2024  10:48 AM  [phone] [place]  [phone] [place]        Addendum:  Echo reviewed, normal LV function with G2DD, Cardiology will sign off and arrange for outpatient follow up  Maria A Chavez, KIAH  10/28/24  4:25 PM    Seen/examined patient independently.  Agree with findings, assessment and plan as outlined above.     Denies any CP or SOB.     In regards to mild pulmonary edema, reviewed TTE and pt with preserved EF with diastolic dysfunction. Symptoms are out of proportion to degree of dysfunction. No significant volume overload on exam. Dyspnea, likely secondary to PNA.     Louis Centeno, DO

## 2024-10-28 NOTE — SLP NOTE
ADULT VIDEOFLUOROSCOPIC SWALLOWING STUDY    Admission Date: 10/25/2024  Evaluation Date: 10/28/24  Radiologist: Dr. Ramirez    RECOMMENDATIONS   Diet Recommendations - Solids: Soft/ Easy to chew  Diet Recommendations - Liquids: Thin Liquids    Further Follow-up:  Follow Up Needed (Documentation Required): No  SLP Follow-up Date: 10/28/24  Compensatory Strategies Recommended: Small bites and sips;No straws;Slow rate  Aspiration Precautions: Upright position;Slow rate;Small bites and sips;No straw  Medication Administration Recommendations:  (as tolerated)  Treatment Plan/Recommendations: No further inpatient SLP service warranted    HISTORY   Background/Objective Information:    Problem List  Principal Problem:    Febrile illness  Active Problems:    Aspiration pneumonia (HCC)    Leukocytosis, unspecified type    Fall, initial encounter      Past Medical History  Past Medical History:    Abdominal aortic aneurysm (AAA) (HCC)    Back problem    back pain    Disorder of thyroid    hypothyroidism    Falls frequently    High blood pressure    High cholesterol    Hx of pneumothorax    Muscle weakness    LUE hemiplegia    Osteoarthritis    PNA (pneumonia)    Pneumonia due to organism    Stroke (HCC)    left sided weakness       Current Diet Consistency: Soft/ Easy to Chew;Thin liquids  Prior Level of Function: Assistance/Support for ADL's  Prior Living Situation: Home with spouse  History of Recent: Pneumonia  Precautions: Aspiration  Imaging results: 10/25 CT BRAIN  CONCLUSION:   1. No acute intracranial hemorrhage, midline shift, mass effect, or hydrocephalus.   2. No new transcortical area of hypoattenuation to suggest an acute infarct.   3. 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   4. No displaced calvarial fracture.   5. Lesser incidental findings described above.     10/25 CT CHEST  CONCLUSION:   Debris and secretions within the left lower lobe bronchus which is nearly  completely occluded.   Postobstructive atelectasis involving the left lower lobe has progressed since 6/10/2024.  Continued follow-up surveillance imaging recommended to exclude underlying pulmonary malignancy.   Right lower lobe pneumonia is new since 6/9/2024.   Moderate centrilobular emphysema     Reason for Referral: R/O aspiration    Family/Patient Goals:  Did not state     ASSESSMENT   DYSPHAGIA ASSESSMENT  Test completed in conjunction with Radiologist.  Patient Positioned: Upright;Midline;ANT chair.  Patient Viewed: Lateral.  Patient Alertness: Fully alert.  Consistencies Presented: Thin liquids;Hard solid to assess oropharyngeal swallow function and assess for compensatory strategies to improve safe swallow function.    THIN LIQUIDS  Method of Presentation: Cup  Oral Phase of Swallow (VFSS - Thin Liquids): Impaired  Bolus Retrieval (VFSS - Thin Liquids): Intact  Bilabial Seal (VFSS - Thin Liquids): Intact  Bolus Formation (VFSS - Thin Liquids): Impaired  Bolus Propulsion (VFSS - Thin Liquids): Impaired  Retention (VFSS - Thin Liquids): Impaired  Triggered at: Valleculae  Premature Spillage to: Valleculae  Residue Severity, Location: Mild;Valleculae;Pyriform sinuses  Cleared/Reduced with: Secondary swallow  Laryngeal Penetration: None  Tracheal Aspiration: None              HARD SOLID  Oral Phase of Swallow (VFSS - Hard Solid): Impaired  Bolus Retrieval (VFSS - Hard Solid): Intact  Bilabial Seal (VFSS - Hard Solid): Intact  Bolus Formation (VFSS - Hard Solid): Impaired  Bolus Propulsion (VFSS - Hard Solid): Impaired  Mastication (VFSS - Hard Solid): Impaired  Retention (VFSS - Hard Solid): Impaired  Triggered at: Valleculae  Premature Spillage to: Valleculae  Residue Severity, Location: Mild;Valleculae;Pyriform sinuses  Cleared/Reduced with: Secondary swallow;Liquid assist  Laryngeal Penetration: None  Tracheal Aspiration: None  Penetration Aspiration Scale Score: Score 1: Material does not enter airway      Overall Impression: PPE REQUIRED. THIS THERAPIST WORE GLOVES FOR DURATION OF EVALUATION. HANDS WASHED UPON ENTRANCE/EXIT.    Pt received in radiology suite sitting upright in video swallow chair, alert/cooperative. Pt afebrile, tolerating room air. Pt presented with trials of hard solids and thin liquids via cup. At this time, pt is presenting with a mild oropharyngeal dysphagia characterized by reduced bolus formation/propulsion/mastication, reduced BOT retraction, pharyngeal swallow delay, and reduced laryngeal elevation/excursion. No definite penetration/aspiration visualized t/o examination. Mild oropharyngeal residue seen with both consistencies. Recommend remain on current diet with adherence to aspiration precautions and swallow strategies. No further swallow services warranted at this time. Please re consult if needed.             GOALS: NA    PLAN: NA    EDUCATION/INSTRUCTION  Reviewed results and recommendations with patient.  Agreement/Understanding verbalized and all questions answered to their apparent satisfaction.    INTERDISCIPLINARY COMMUNICATION  Reviewed results with Radiologist; agreement verbalized.    Patient Experiencing Pain: No              FOLLOW UP  Treatment Plan/Recommendations: No further inpatient SLP service warranted  Number of Visits to Meet Established Goals:  (na)      Thank you for your referral.   If you have any questions, please contact BETHANIE Ibarra M.S. CCC-SLP  Speech Language Pathologist  Phone Number Ext. [phone]

## 2024-10-28 NOTE — CM/SW NOTE
10/27/24 0900   Services Requested   Submitted to [facility] Yes   PASRR Level 1 Submitted Yes   PMR Consult Requested Not clinically appropriate at this time   Choice of Post-Acute Provider   Informed patient of right to choose their preferred provider Yes   List of appropriate post-acute services provided to patient/family with quality data Yes   Information given to Patient;Spouse/Significant other     SW followed up on discharge planning.    [facility] denied.      SW met with pt bedside.      SW discussed rehab- pt does not want to go but is aware his wife feels he would benefit from a short stay.    List of [facility] facilities w/Medicare quality data left bedside for pt/spouse Meme to review.    PLAN: DC to [facility] pending choice/med clear    / to remain available for support and/or discharge planning.     Alisa Merritt MSW, LSW [phone]

## 2024-10-29 ENCOUNTER — ANESTHESIA (OUTPATIENT)
Dept: ENDOSCOPY | Facility: HOSPITAL | Age: 84
End: 2024-10-29
Payer: MEDICARE

## 2024-10-29 ENCOUNTER — ANESTHESIA EVENT (OUTPATIENT)
Dept: ENDOSCOPY | Facility: HOSPITAL | Age: 84
End: 2024-10-29
Payer: MEDICARE

## 2024-10-29 LAB
ANION GAP SERPL CALC-SCNC: 8 MMOL/L (ref 0–18)
APTT PPP: 30.2 SECONDS (ref 23–36)
BASOPHILS # BLD AUTO: 0.07 X10(3) UL (ref 0–0.2)
BASOPHILS NFR BLD AUTO: 1.2 %
BASOPHILS NFR BRONCH: 0 %
BUN BLD-MCNC: 11 MG/DL (ref 9–23)
BUN/CREAT SERPL: 9.6 (ref 10–20)
CALCIUM BLD-MCNC: 8.9 MG/DL (ref 8.7–10.4)
CHLORIDE SERPL-SCNC: 114 MMOL/L (ref 98–112)
CO2 SERPL-SCNC: 25 MMOL/L (ref 21–32)
CREAT BLD-MCNC: 1.14 MG/DL
DEPRECATED RDW RBC AUTO: 49.6 FL (ref 35.1–46.3)
EGFRCR SERPLBLD CKD-EPI 2021: 64 ML/MIN/1.73M2 (ref 60–?)
EOSINOPHIL # BLD AUTO: 0.27 X10(3) UL (ref 0–0.7)
EOSINOPHIL NFR BLD AUTO: 4.5 %
EOSINOPHIL NFR BRONCH: 1 %
ERYTHROCYTE [DISTWIDTH] IN BLOOD BY AUTOMATED COUNT: 14 % (ref 11–15)
GLUCOSE BLD-MCNC: 89 MG/DL (ref 70–99)
HCT VFR BLD AUTO: 35.1 %
HGB BLD-MCNC: 11 G/DL
IMM GRANULOCYTES # BLD AUTO: 0.02 X10(3) UL (ref 0–1)
IMM GRANULOCYTES NFR BLD: 0.3 %
INR BLD: 0.99 (ref 0.8–1.2)
LYMPHOCYTES # BLD AUTO: 2.16 X10(3) UL (ref 1–4)
LYMPHOCYTES NFR BLD AUTO: 36 %
LYMPHOCYTES NFR BRONCH: 7 %
MAGNESIUM SERPL-MCNC: 1.7 MG/DL (ref 1.6–2.6)
MCH RBC QN AUTO: 29.9 PG (ref 26–34)
MCHC RBC AUTO-ENTMCNC: 31.3 G/DL (ref 31–37)
MCV RBC AUTO: 95.4 FL
MONOCYTES # BLD AUTO: 0.65 X10(3) UL (ref 0.1–1)
MONOCYTES NFR BLD AUTO: 10.8 %
MONOS+MACROS NFR BRONCH: 30 %
NEUTROPHILS # BLD AUTO: 2.83 X10 (3) UL (ref 1.5–7.7)
NEUTROPHILS # BLD AUTO: 2.83 X10(3) UL (ref 1.5–7.7)
NEUTROPHILS NFR BLD AUTO: 47.2 %
NEUTROPHILS NFR BRONCH: 62 %
OSMOLALITY SERPL CALC.SUM OF ELEC: 303 MOSM/KG (ref 275–295)
PHOSPHATE SERPL-MCNC: 3.5 MG/DL (ref 2.4–5.1)
PLATELET # BLD AUTO: 192 10(3)UL (ref 150–450)
POTASSIUM SERPL-SCNC: 3.5 MMOL/L (ref 3.5–5.1)
PROTHROMBIN TIME: 13.7 SECONDS (ref 11.6–14.8)
RBC # BLD AUTO: 3.68 X10(6)UL
RBC # FLD: 2238 /CUMM (ref ?–1)
SODIUM SERPL-SCNC: 147 MMOL/L (ref 136–145)
TOTAL CELLS COUNTED BRONCH: 50 /CUMM (ref ?–1)
TOTAL CELLS COUNTED FLD: 100
WBC # BLD AUTO: 6 X10(3) UL (ref 4–11)
WBC CALC (IRIS) BRW: 50 /CUMM

## 2024-10-29 PROCEDURE — 99233 SBSQ HOSP IP/OBS HIGH 50: CPT | Performed by: INTERNAL MEDICINE

## 2024-10-29 PROCEDURE — 31624 DX BRONCHOSCOPE/LAVAGE: CPT | Performed by: INTERNAL MEDICINE

## 2024-10-29 PROCEDURE — 0B9J8ZX DRAINAGE OF LEFT LOWER LUNG LOBE, VIA NATURAL OR ARTIFICIAL OPENING ENDOSCOPIC, DIAGNOSTIC: ICD-10-PCS | Performed by: INTERNAL MEDICINE

## 2024-10-29 RX ORDER — HYDROMORPHONE HYDROCHLORIDE 1 MG/ML
0.2 INJECTION, SOLUTION INTRAMUSCULAR; INTRAVENOUS; SUBCUTANEOUS EVERY 5 MIN PRN
Status: DISCONTINUED | OUTPATIENT
Start: 2024-10-29 | End: 2024-10-29 | Stop reason: HOSPADM

## 2024-10-29 RX ORDER — LIDOCAINE HYDROCHLORIDE 20 MG/ML
INJECTION, SOLUTION EPIDURAL; INFILTRATION; INTRACAUDAL; PERINEURAL
Status: DISCONTINUED | OUTPATIENT
Start: 2024-10-29 | End: 2024-10-29 | Stop reason: HOSPADM

## 2024-10-29 RX ORDER — NALOXONE HYDROCHLORIDE 0.4 MG/ML
0.08 INJECTION, SOLUTION INTRAMUSCULAR; INTRAVENOUS; SUBCUTANEOUS AS NEEDED
Status: DISCONTINUED | OUTPATIENT
Start: 2024-10-29 | End: 2024-10-29 | Stop reason: HOSPADM

## 2024-10-29 RX ORDER — HYDROMORPHONE HYDROCHLORIDE 1 MG/ML
0.4 INJECTION, SOLUTION INTRAMUSCULAR; INTRAVENOUS; SUBCUTANEOUS EVERY 5 MIN PRN
Status: DISCONTINUED | OUTPATIENT
Start: 2024-10-29 | End: 2024-10-29 | Stop reason: HOSPADM

## 2024-10-29 RX ORDER — MIDAZOLAM HYDROCHLORIDE 1 MG/ML
INJECTION INTRAMUSCULAR; INTRAVENOUS AS NEEDED
Status: DISCONTINUED | OUTPATIENT
Start: 2024-10-29 | End: 2024-10-29 | Stop reason: SURG

## 2024-10-29 RX ORDER — MORPHINE SULFATE 10 MG/ML
6 INJECTION, SOLUTION INTRAMUSCULAR; INTRAVENOUS EVERY 10 MIN PRN
Status: DISCONTINUED | OUTPATIENT
Start: 2024-10-29 | End: 2024-10-29 | Stop reason: HOSPADM

## 2024-10-29 RX ORDER — HYDROMORPHONE HYDROCHLORIDE 1 MG/ML
0.6 INJECTION, SOLUTION INTRAMUSCULAR; INTRAVENOUS; SUBCUTANEOUS EVERY 5 MIN PRN
Status: DISCONTINUED | OUTPATIENT
Start: 2024-10-29 | End: 2024-10-29 | Stop reason: HOSPADM

## 2024-10-29 RX ORDER — ATROPINE SULFATE 0.4 MG/ML
0.4 INJECTION, SOLUTION INTRAMUSCULAR; INTRAVENOUS; SUBCUTANEOUS ONCE
Status: COMPLETED | OUTPATIENT
Start: 2024-10-29 | End: 2024-10-29

## 2024-10-29 RX ORDER — MORPHINE SULFATE 4 MG/ML
4 INJECTION, SOLUTION INTRAMUSCULAR; INTRAVENOUS EVERY 10 MIN PRN
Status: DISCONTINUED | OUTPATIENT
Start: 2024-10-29 | End: 2024-10-29 | Stop reason: HOSPADM

## 2024-10-29 RX ORDER — SODIUM CHLORIDE, SODIUM LACTATE, POTASSIUM CHLORIDE, CALCIUM CHLORIDE 600; 310; 30; 20 MG/100ML; MG/100ML; MG/100ML; MG/100ML
INJECTION, SOLUTION INTRAVENOUS CONTINUOUS
Status: DISCONTINUED | OUTPATIENT
Start: 2024-10-29 | End: 2024-10-29

## 2024-10-29 RX ORDER — MAGNESIUM SULFATE HEPTAHYDRATE 40 MG/ML
2 INJECTION, SOLUTION INTRAVENOUS ONCE
Status: COMPLETED | OUTPATIENT
Start: 2024-10-29 | End: 2024-10-29

## 2024-10-29 RX ORDER — MORPHINE SULFATE 4 MG/ML
2 INJECTION, SOLUTION INTRAMUSCULAR; INTRAVENOUS EVERY 10 MIN PRN
Status: DISCONTINUED | OUTPATIENT
Start: 2024-10-29 | End: 2024-10-29 | Stop reason: HOSPADM

## 2024-10-29 RX ADMIN — MIDAZOLAM HYDROCHLORIDE 2 MG: 1 INJECTION INTRAMUSCULAR; INTRAVENOUS at 13:50:00

## 2024-10-29 NOTE — PROGRESS NOTES
[facility]  part of [facility]    Progress Note    Cody Fitzgerald Patient Status:  Inpatient    1940 MRN [id number]   Location [facility] 5SW/SE Attending Vikki Lira MD   Hosp Day # 4 PCP Kandice Claudio DO     Chief Complaint:     Febrile illness    Subjective:   Subjective:    Patient seen and examined - no complaints   pt is in good spirits- going for bronchoscopy today at 1pm  No SOB        No other acute complaints or other nursing concerns or overnight events        Objective:   Blood pressure 133/68, pulse 68, temperature 98.1 °F (36.7 °C), temperature source Oral, resp. rate 18, height 5' 10\" (1.778 m), weight 203 lb 9.6 oz (92.4 kg), SpO2 92%.  Physical Exam    General: Patient is alert and oriented x3 does not appear to be in acute distress at this time  HEENT: EOMI PERRLA, atraumatic normocephalic  Cardiac: S1-S2 appreciated  Lungs: Good air entry right basilar crackles, good inspiratory effort.  Abdomen: Soft nontender nondistended positive bowel sounds  Ext: Peripheral pulses are positive  Neuro: No focal deficits noted  Psych: Normal mood  Skin: No rashes noted  MSK: Full range of motion intact      Results:   Lab Results   Component Value Date    WBC 6.0 10/29/2024    HGB 11.0 (L) 10/29/2024    HCT 35.1 (L) 10/29/2024    .0 10/29/2024    CREATSERUM 1.14 10/29/2024    BUN 11 10/29/2024     (H) 10/29/2024    K 3.5 10/29/2024     (H) 10/29/2024    CO2 25.0 10/29/2024    GLU 89 10/29/2024    CA 8.9 10/29/2024    ALB 4.6 10/25/2024    ALKPHO 126 (H) 10/25/2024    BILT 0.8 10/25/2024    TP 8.3 (H) 10/25/2024    AST 16 10/25/2024    ALT <7 (L) 10/25/2024    PTT 31.5 2024    INR 1.19 2024    TSH 0.733 2024    PSA 1.1 2019    DDIMER 3.93 (H) 10/05/2023    CRP 1.75 (H) 2022    MG 1.7 10/29/2024    PHOS 3.5 10/29/2024    TROP <0.045 2021    TROPHS 6 10/25/2024     2022       XR VIDEO SWALLOW  (CPT=74230)    Result Date: 10/28/2024  CONCLUSION:  1. No laryngeal penetration or aspiration    Dictated by (CST): Huan Ramirez MD on 10/28/2024 at 1:51 PM     Finalized by (CST): Huan Ramirez MD on 10/28/2024 at 1:56 PM               Assessment & Plan:       Recurrent aspiration pneumonia, left lung- postobstructive/ RLL PNA new  -in pt with  ho COPD  -continue IV abx.  -continue oxygen supplementation via NC  -will monitor closely  -leukocytosis resolved.   -procal mildly elevated.   -CT reviewed.  -10/29: appr pulm input: bronch 10/29 at 1pm then should be cleared for dc to thrive /VARGAS in AM per SW        Elevated BNp   -given IV lasix/currently stopped  -appreciate cardio recs  - Echo EF 55-60%, no RWMA, grade2 DDX, dilated AA 4.5cm, mildly increased PASP    Chronic left hemiparesis.  -continue statin plavix  -stable    Essential hypertension.  -bp controlled  -monitor and titrate meds as needed    Stage 1 sacral pressure injury.      Vte ppx: heparin       -MDM: High, severe exacerbation of chronic illness posing a threat to life. IV medications requiring close inpatient monitoring.         **Certification      PHYSICIAN Certification of Need for Inpatient Hospitalization - Initial Certification    Patient will require inpatient services that will reasonably be expected to span two midnight's based on the clinical documentation in H+P.   Based on patients current state of illness, I anticipate that, after discharge, patient will require TBD.      Ros Tamayo MD

## 2024-10-29 NOTE — PHYSICAL THERAPY NOTE
PHYSICAL THERAPY TREATMENT NOTE - INPATIENT     Room Number: 526/526-A       Presenting Problem: SOB and generalized weakness (atelectasis, RLL PNA, trop (-))    Problem List  Principal Problem:    Febrile illness  Active Problems:    Aspiration pneumonia (HCC)    Leukocytosis, unspecified type    Fall, initial encounter    PHYSICAL THERAPY ASSESSMENT   Patient demonstrates fair progress this session, goals  remain in progress.      Patient is requiring up to moderate assist as a result of the following impairments: decreased functional strength, decreased endurance/aerobic capacity, pain, impaired dynamic standing balance, decreased muscular endurance, and medical status.     Patient continues to function below baseline with bed mobility, transfers, gait, standing prolonged periods, and performing household tasks.  Next session anticipate patient to progress bed mobility, transfers, and gait.  Physical Therapy will continue to follow patient for duration of hospitalization.    Patient continues to benefit from continued skilled PT services: to promote return to prior level of function and safety with continuous assistance and gradual rehabilitative therapy .    PLAN DURING HOSPITALIZATION  Nursing Mobility Recommendation : 1 Assist  PT Device Recommendation: Rolling walker  PT Treatment Plan: Bed mobility;Body mechanics;Energy conservation;Endurance;Patient education;Family education;Gait training;Strengthening;Transfer training;Balance training  Frequency (Obs): 3-5x/week     SUBJECTIVE  \"I used to ride motorcycles when I was a young man\"     OBJECTIVE  Precautions: Bed/chair alarm    WEIGHT BEARING RESTRICTION  none    PAIN ASSESSMENT   Rating: Unable to rate  Location: generalized pain throughout body  Management Techniques: Activity promotion;Body mechanics;Breathing techniques;Repositioning    BALANCE  Static Sitting: Fair +  Dynamic Sitting: Fair  Static Standing: Poor +  Dynamic Standing: Poor    O2  WALK  Oxygen Therapy  SPO2% on Oxygen at Rest: 94  At rest oxygen flow (liters per minute): 2  SPO2% Ambulation on Room Air: 87    AM-PAC '6-Clicks' INPATIENT SHORT FORM - BASIC MOBILITY  How much difficulty does the patient currently have...  Patient Difficulty: Turning over in bed (including adjusting bedclothes, sheets and blankets)?: A Little   Patient Difficulty: Sitting down on and standing up from a chair with arms (e.g., wheelchair, bedside commode, etc.): A Little   Patient Difficulty: Moving from lying on back to sitting on the side of the bed?: A Little   How much help from another person does the patient currently need...   Help from Another: Moving to and from a bed to a chair (including a wheelchair)?: A Lot   Help from Another: Need to walk in hospital room?: A Lot   Help from Another: Climbing 3-5 steps with a railing?: A Lot     AM-PAC Score:  Raw Score: 15   Approx Degree of Impairment: 57.7%   Standardized Score (AM-PAC Scale): 39.45   CMS Modifier (G-Code): CK    FUNCTIONAL ABILITY STATUS  Functional Mobility/Gait Assessment  Gait Assistance: Moderate assistance  Distance (ft): 3  Assistive Device: Rolling walker  Pattern: Shuffle (narrow MARIA ESTHER, forward flexed posture, unsteady gait, difficulty advancing LLE, VC for sequencing of transfers)  Stairs: Other (comment) (NT, patient owned stairlift on interior stairs and family assisting via w/c for negotiating exterior stairs as needed.)  Supine to Sit: minimal assist  Sit to Stand: minimal assist    Skilled Therapy Provided: Pt received resting in bed; introduced self and role. Pt with c/o generalized pain all over body but agreeable to activity. On RA throughout session with SPO2 decreased to 87% with activity; placed back on 2 L O2 with SPO2 recovering to >90% within 1 minute of rest. Pt was able to transition supine>sit with min A and increased time; STS transfer from bed with RW and min A; steps to chair with RW and mod A, overall unsteady gait.  Pt was left sitting in chair with alarm on, needs within reach, VSS, handoff to RN complete.     The patient's Approx Degree of Impairment: 57.7% has been calculated based on documentation in the [facility] '6 clicks' Inpatient Daily Activity Short Form.  Research supports that patients with this level of impairment may benefit from rehab facility.  Final disposition will be made by interdisciplinary medical team.    Patient End of Session: Up in chair;Call light within reach;Needs met;RN aware of session/findings;All patient questions and concerns addressed;Hospital anti-slip socks;Alarm set    CURRENT GOALS   Goals to be met by: 10.30.24  Patient Goal Patient's self-stated goal is: Home   Goal #1 Patient is able to demonstrate supine - sit EOB @ level: supervision      Goal #1   Current Status  not met/progressing   Goal #2 Patient is able to demonstrate transfers Sit to/from Stand at assistance level: supervision with walker - rolling      Goal #2  Current Status  not met/progressing   Goal #3 Patient is able to ambulate 20 feet with assist device: walker - rolling at assistance level: minimum assistance   Goal #3   Current Status  not met/progressing   Goal #4 Patient will negotiate 0 stairs/one curb w/ assistive device and supervision   Goal #4   Current Status  not met/progressing   Goal #5 Patient to demonstrate independence with home activity/exercise instructions provided to patient in preparation for discharge.   Goal #5   Current Status  not met/progressing   Goal #6     Goal #6  Current Status       Therapeutic Activity: 17 minutes

## 2024-10-29 NOTE — PLAN OF CARE
Problem: CARDIOVASCULAR - ADULT  Goal: Maintains optimal cardiac output and hemodynamic stability  Description: INTERVENTIONS:  - Monitor vital signs, rhythm, and trends  - Monitor for bleeding, hypotension and signs of decreased cardiac output  - Evaluate effectiveness of vasoactive medications to optimize hemodynamic stability  - Monitor arterial and/or venous puncture sites for bleeding and/or hematoma  - Assess quality of pulses, skin color and temperature  - Assess for signs of decreased coronary artery perfusion - ex. Angina  - Evaluate fluid balance, assess for edema, trend weights  Outcome: Progressing  Goal: Absence of cardiac arrhythmias or at baseline  Description: INTERVENTIONS:  - Continuous cardiac monitoring, monitor vital signs, obtain 12 lead EKG if indicated  - Evaluate effectiveness of antiarrhythmic and heart rate control medications as ordered  - Initiate emergency measures for life threatening arrhythmias  - Monitor electrolytes and administer replacement therapy as ordered  Outcome: Progressing     Problem: RESPIRATORY - ADULT  Goal: Achieves optimal ventilation and oxygenation  Description: INTERVENTIONS:  - Assess for changes in respiratory status  - Assess for changes in mentation and behavior  - Position to facilitate oxygenation and minimize respiratory effort  - Oxygen supplementation based on oxygen saturation or ABGs  - Provide Smoking Cessation handout, if applicable  - Encourage broncho-pulmonary hygiene including cough, deep breathe, Incentive Spirometry  - Assess the need for suctioning and perform as needed  - Assess and instruct to report SOB or any respiratory difficulty  - Respiratory Therapy support as indicated  - Manage/alleviate anxiety  - Monitor for signs/symptoms of CO2 retention  Outcome: Progressing     Problem: METABOLIC/FLUID AND ELECTROLYTES - ADULT  Goal: Electrolytes maintained within normal limits  Description: INTERVENTIONS:  - Monitor labs and rhythm and  assess patient for signs and symptoms of electrolyte imbalances  - Administer electrolyte replacement as ordered  - Monitor response to electrolyte replacements, including rhythm and repeat lab results as appropriate  - Fluid restriction as ordered  - Instruct patient on fluid and nutrition restrictions as appropriate  Outcome: Progressing  Goal: Hemodynamic stability and optimal renal function maintained  Description: INTERVENTIONS:  - Monitor labs and assess for signs and symptoms of volume excess or deficit  - Monitor intake, output and patient weight  - Monitor urine specific gravity, serum osmolarity and serum sodium as indicated or ordered  - Monitor response to interventions for patient's volume status, including labs, urine output, blood pressure (other measures as available)  - Encourage oral intake as appropriate  - Instruct patient on fluid and nutrition restrictions as appropriate  Outcome: Progressing     Problem: SKIN/TISSUE INTEGRITY - ADULT  Goal: Incision(s), wounds(s) or drain site(s) healing without S/S of infection  Description: INTERVENTIONS:  - Assess and document risk factors for pressure ulcer development  - Assess and document skin integrity  - Assess and document dressing/incision, wound bed, drain sites and surrounding tissue  - Implement wound care per orders  - Initiate isolation precautions as appropriate  - Initiate Pressure Ulcer prevention bundle as indicated  Outcome: Progressing     Problem: MUSCULOSKELETAL - ADULT  Goal: Return mobility to safest level of function  Description: INTERVENTIONS:  - Assess patient stability and activity tolerance for standing, transferring and ambulating w/ or w/o assistive devices  - Assist with transfers and ambulation using safe patient handling equipment as needed  - Ensure adequate protection for wounds/incisions during mobilization  - Obtain PT/OT consults as needed  - Advance activity as appropriate  - Communicate ordered activity level and  limitations with patient/family  Outcome: Progressing     Problem: PAIN - ADULT  Goal: Verbalizes/displays adequate comfort level or patient's stated pain goal  Description: INTERVENTIONS:  - Encourage pt to monitor pain and request assistance  - Assess pain using appropriate pain scale  - Administer analgesics based on type and severity of pain and evaluate response  - Implement non-pharmacological measures as appropriate and evaluate response  - Consider cultural and social influences on pain and pain management  - Manage/alleviate anxiety  - Utilize distraction and/or relaxation techniques  - Monitor for opioid side effects  - Notify MD/LIP if interventions unsuccessful or patient reports new pain  - Anticipate increased pain with activity and pre-medicate as appropriate  Outcome: Progressing     Problem: RISK FOR INFECTION - ADULT  Goal: Absence of fever/infection during anticipated neutropenic period  Description: INTERVENTIONS  - Monitor WBC  - Administer growth factors as ordered  - Implement neutropenic guidelines  Outcome: Progressing     Problem: SAFETY ADULT - FALL  Goal: Free from fall injury  Description: INTERVENTIONS:  - Assess pt frequently for physical needs  - Identify cognitive and physical deficits and behaviors that affect risk of falls.  - [place] fall precautions as indicated by assessment.  - Educate pt/family on patient safety including physical limitations  - Instruct pt to call for assistance with activity based on assessment  - Modify environment to reduce risk of injury  - Provide assistive devices as appropriate  - Consider OT/PT consult to assist with strengthening/mobility  - Encourage toileting schedule  Outcome: Progressing     Problem: Impaired Swallowing  Goal: Minimize aspiration risk  Description: Interventions:  - Patient should be alert and upright for all feedings (90 degrees preferred)  - Offer food and liquids at a slow rate  - No straws  - Encourage small bites of food  and small sips of liquid  - Offer pills one at a time, crush or deliver with applesauce as needed  - Discontinue feeding and notify MD (or speech pathologist) if coughing or persistent throat clearing or wet/gurgly vocal quality is noted  Outcome: Progressing     Problem: Patient Centered Care  Goal: Patient preferences are identified and integrated in the patient's plan of care  Description: Interventions:  - What would you like us to know as we care for you?   - Provide timely, complete, and accurate information to patient/family  - Incorporate patient and family knowledge, values, beliefs, and cultural backgrounds into the planning and delivery of care  - Encourage patient/family to participate in care and decision-making at the level they choose  - Honor patient and family perspectives and choices  Outcome: Progressing

## 2024-10-29 NOTE — PROCEDURES
[facility]  part of [facility]     Procedure Note  10/29/24    Cody Fitzgerald Patient Status:  Inpatient    1940 MRN [id number]   Location [facility] ENDOSCOPY LAB SUITES Attending Ros Tamayo MD   Hosp Day # 4 PCP Kandice Claudio DO     Procedure: Bronchoscopy with left lower lobe bronchoalveolar lavage    Pre-Procedure Diagnosis: Partial left lower lobe atelectasis    Post-Procedure Diagnosis: Mild clear mucus plugging but otherwise normal airways.    Anesthesia:  MAC    Finding and procedure: The patient was sedated in the medium Olympus bronchoscope was inserted through the oropharyngeal route.  The vocal cords move symmetrically.  The trachea was notable for clear mucus.  This was suctioned free.  Each of the bilateral mainstem bronchi and each the bilateral segmental bronchi were completely imaged and were free of lesion.  The left lower lobe was cannulated and lavaged with 60 cc of saline with fairly clear 30 cc return.  Complications none.    Specimens: Sent    Blood Loss: None    Tourniquet Time: None  Complications:  None  Drains: None    Secondary Diagnosis: None    Babar Ferraro MD  Medical Director, Critical Care, [facility]  Medical Director, [facility]  Pager: [phone]

## 2024-10-29 NOTE — PLAN OF CARE
Problem: CARDIOVASCULAR - ADULT  Goal: Maintains optimal cardiac output and hemodynamic stability  Description: INTERVENTIONS:  - Monitor vital signs, rhythm, and trends  - Monitor for bleeding, hypotension and signs of decreased cardiac output  - Evaluate effectiveness of vasoactive medications to optimize hemodynamic stability  - Monitor arterial and/or venous puncture sites for bleeding and/or hematoma  - Assess quality of pulses, skin color and temperature  - Assess for signs of decreased coronary artery perfusion - ex. Angina  - Evaluate fluid balance, assess for edema, trend weights  Outcome: Progressing  Goal: Absence of cardiac arrhythmias or at baseline  Description: INTERVENTIONS:  - Continuous cardiac monitoring, monitor vital signs, obtain 12 lead EKG if indicated  - Evaluate effectiveness of antiarrhythmic and heart rate control medications as ordered  - Initiate emergency measures for life threatening arrhythmias  - Monitor electrolytes and administer replacement therapy as ordered  Outcome: Progressing     Problem: RESPIRATORY - ADULT  Goal: Achieves optimal ventilation and oxygenation  Description: INTERVENTIONS:  - Assess for changes in respiratory status  - Assess for changes in mentation and behavior  - Position to facilitate oxygenation and minimize respiratory effort  - Oxygen supplementation based on oxygen saturation or ABGs  - Provide Smoking Cessation handout, if applicable  - Encourage broncho-pulmonary hygiene including cough, deep breathe, Incentive Spirometry  - Assess the need for suctioning and perform as needed  - Assess and instruct to report SOB or any respiratory difficulty  - Respiratory Therapy support as indicated  - Manage/alleviate anxiety  - Monitor for signs/symptoms of CO2 retention  Outcome: Progressing     Problem: METABOLIC/FLUID AND ELECTROLYTES - ADULT  Goal: Electrolytes maintained within normal limits  Description: INTERVENTIONS:  - Monitor labs and rhythm and  assess patient for signs and symptoms of electrolyte imbalances  - Administer electrolyte replacement as ordered  - Monitor response to electrolyte replacements, including rhythm and repeat lab results as appropriate  - Fluid restriction as ordered  - Instruct patient on fluid and nutrition restrictions as appropriate  Outcome: Progressing  Goal: Hemodynamic stability and optimal renal function maintained  Description: INTERVENTIONS:  - Monitor labs and assess for signs and symptoms of volume excess or deficit  - Monitor intake, output and patient weight  - Monitor urine specific gravity, serum osmolarity and serum sodium as indicated or ordered  - Monitor response to interventions for patient's volume status, including labs, urine output, blood pressure (other measures as available)  - Encourage oral intake as appropriate  - Instruct patient on fluid and nutrition restrictions as appropriate  Outcome: Progressing     Problem: SKIN/TISSUE INTEGRITY - ADULT  Goal: Incision(s), wounds(s) or drain site(s) healing without S/S of infection  Description: INTERVENTIONS:  - Assess and document risk factors for pressure ulcer development  - Assess and document skin integrity  - Assess and document dressing/incision, wound bed, drain sites and surrounding tissue  - Implement wound care per orders  - Initiate isolation precautions as appropriate  - Initiate Pressure Ulcer prevention bundle as indicated  Outcome: Progressing     Problem: MUSCULOSKELETAL - ADULT  Goal: Return mobility to safest level of function  Description: INTERVENTIONS:  - Assess patient stability and activity tolerance for standing, transferring and ambulating w/ or w/o assistive devices  - Assist with transfers and ambulation using safe patient handling equipment as needed  - Ensure adequate protection for wounds/incisions during mobilization  - Obtain PT/OT consults as needed  - Advance activity as appropriate  - Communicate ordered activity level and  limitations with patient/family  Outcome: Progressing     Problem: PAIN - ADULT  Goal: Verbalizes/displays adequate comfort level or patient's stated pain goal  Description: INTERVENTIONS:  - Encourage pt to monitor pain and request assistance  - Assess pain using appropriate pain scale  - Administer analgesics based on type and severity of pain and evaluate response  - Implement non-pharmacological measures as appropriate and evaluate response  - Consider cultural and social influences on pain and pain management  - Manage/alleviate anxiety  - Utilize distraction and/or relaxation techniques  - Monitor for opioid side effects  - Notify MD/LIP if interventions unsuccessful or patient reports new pain  - Anticipate increased pain with activity and pre-medicate as appropriate  Outcome: Progressing     Problem: RISK FOR INFECTION - ADULT  Goal: Absence of fever/infection during anticipated neutropenic period  Description: INTERVENTIONS  - Monitor WBC  - Administer growth factors as ordered  - Implement neutropenic guidelines  Outcome: Progressing     Problem: SAFETY ADULT - FALL  Goal: Free from fall injury  Description: INTERVENTIONS:  - Assess pt frequently for physical needs  - Identify cognitive and physical deficits and behaviors that affect risk of falls.  - [place] fall precautions as indicated by assessment.  - Educate pt/family on patient safety including physical limitations  - Instruct pt to call for assistance with activity based on assessment  - Modify environment to reduce risk of injury  - Provide assistive devices as appropriate  - Consider OT/PT consult to assist with strengthening/mobility  - Encourage toileting schedule  Outcome: Progressing     Problem: Impaired Swallowing  Goal: Minimize aspiration risk  Description: Interventions:  - Patient should be alert and upright for all feedings (90 degrees preferred)  - Offer food and liquids at a slow rate  - No straws  - Encourage small bites of food  and small sips of liquid  - Offer pills one at a time, crush or deliver with applesauce as needed  - Discontinue feeding and notify MD (or speech pathologist) if coughing or persistent throat clearing or wet/gurgly vocal quality is noted  Outcome: Progressing     Problem: Patient Centered Care  Goal: Patient preferences are identified and integrated in the patient's plan of care  Description: Interventions:  - What would you like us to know as we care for you?   - Provide timely, complete, and accurate information to patient/family  - Incorporate patient and family knowledge, values, beliefs, and cultural backgrounds into the planning and delivery of care  - Encourage patient/family to participate in care and decision-making at the level they choose  - Honor patient and family perspectives and choices  Outcome: Progressing

## 2024-10-29 NOTE — PLAN OF CARE
Vitals as documented. Up to chair for meals. Had 2D echo and video swallow today. Anticipated bronch tomorrow afternoon.      Problem: CARDIOVASCULAR - ADULT  Goal: Maintains optimal cardiac output and hemodynamic stability  Description: INTERVENTIONS:  - Monitor vital signs, rhythm, and trends  - Monitor for bleeding, hypotension and signs of decreased cardiac output  - Evaluate effectiveness of vasoactive medications to optimize hemodynamic stability  - Monitor arterial and/or venous puncture sites for bleeding and/or hematoma  - Assess quality of pulses, skin color and temperature  - Assess for signs of decreased coronary artery perfusion - ex. Angina  - Evaluate fluid balance, assess for edema, trend weights  Outcome: Progressing  Goal: Absence of cardiac arrhythmias or at baseline  Description: INTERVENTIONS:  - Continuous cardiac monitoring, monitor vital signs, obtain 12 lead EKG if indicated  - Evaluate effectiveness of antiarrhythmic and heart rate control medications as ordered  - Initiate emergency measures for life threatening arrhythmias  - Monitor electrolytes and administer replacement therapy as ordered  Outcome: Progressing     Problem: RESPIRATORY - ADULT  Goal: Achieves optimal ventilation and oxygenation  Description: INTERVENTIONS:  - Assess for changes in respiratory status  - Assess for changes in mentation and behavior  - Position to facilitate oxygenation and minimize respiratory effort  - Oxygen supplementation based on oxygen saturation or ABGs  - Provide Smoking Cessation handout, if applicable  - Encourage broncho-pulmonary hygiene including cough, deep breathe, Incentive Spirometry  - Assess the need for suctioning and perform as needed  - Assess and instruct to report SOB or any respiratory difficulty  - Respiratory Therapy support as indicated  - Manage/alleviate anxiety  - Monitor for signs/symptoms of CO2 retention  Outcome: Progressing     Problem: METABOLIC/FLUID AND ELECTROLYTES  - ADULT  Goal: Electrolytes maintained within normal limits  Description: INTERVENTIONS:  - Monitor labs and rhythm and assess patient for signs and symptoms of electrolyte imbalances  - Administer electrolyte replacement as ordered  - Monitor response to electrolyte replacements, including rhythm and repeat lab results as appropriate  - Fluid restriction as ordered  - Instruct patient on fluid and nutrition restrictions as appropriate  Outcome: Progressing  Goal: Hemodynamic stability and optimal renal function maintained  Description: INTERVENTIONS:  - Monitor labs and assess for signs and symptoms of volume excess or deficit  - Monitor intake, output and patient weight  - Monitor urine specific gravity, serum osmolarity and serum sodium as indicated or ordered  - Monitor response to interventions for patient's volume status, including labs, urine output, blood pressure (other measures as available)  - Encourage oral intake as appropriate  - Instruct patient on fluid and nutrition restrictions as appropriate  Outcome: Progressing     Problem: SKIN/TISSUE INTEGRITY - ADULT  Goal: Incision(s), wounds(s) or drain site(s) healing without S/S of infection  Description: INTERVENTIONS:  - Assess and document risk factors for pressure ulcer development  - Assess and document skin integrity  - Assess and document dressing/incision, wound bed, drain sites and surrounding tissue  - Implement wound care per orders  - Initiate isolation precautions as appropriate  - Initiate Pressure Ulcer prevention bundle as indicated  Outcome: Progressing     Problem: MUSCULOSKELETAL - ADULT  Goal: Return mobility to safest level of function  Description: INTERVENTIONS:  - Assess patient stability and activity tolerance for standing, transferring and ambulating w/ or w/o assistive devices  - Assist with transfers and ambulation using safe patient handling equipment as needed  - Ensure adequate protection for wounds/incisions during  mobilization  - Obtain PT/OT consults as needed  - Advance activity as appropriate  - Communicate ordered activity level and limitations with patient/family  Outcome: Progressing     Problem: PAIN - ADULT  Goal: Verbalizes/displays adequate comfort level or patient's stated pain goal  Description: INTERVENTIONS:  - Encourage pt to monitor pain and request assistance  - Assess pain using appropriate pain scale  - Administer analgesics based on type and severity of pain and evaluate response  - Implement non-pharmacological measures as appropriate and evaluate response  - Consider cultural and social influences on pain and pain management  - Manage/alleviate anxiety  - Utilize distraction and/or relaxation techniques  - Monitor for opioid side effects  - Notify MD/LIP if interventions unsuccessful or patient reports new pain  - Anticipate increased pain with activity and pre-medicate as appropriate  Outcome: Progressing     Problem: RISK FOR INFECTION - ADULT  Goal: Absence of fever/infection during anticipated neutropenic period  Description: INTERVENTIONS  - Monitor WBC  - Administer growth factors as ordered  - Implement neutropenic guidelines  Outcome: Progressing     Problem: SAFETY ADULT - FALL  Goal: Free from fall injury  Description: INTERVENTIONS:  - Assess pt frequently for physical needs  - Identify cognitive and physical deficits and behaviors that affect risk of falls.  - [place] fall precautions as indicated by assessment.  - Educate pt/family on patient safety including physical limitations  - Instruct pt to call for assistance with activity based on assessment  - Modify environment to reduce risk of injury  - Provide assistive devices as appropriate  - Consider OT/PT consult to assist with strengthening/mobility  - Encourage toileting schedule  Outcome: Progressing     Problem: Impaired Swallowing  Goal: Minimize aspiration risk  Description: Interventions:  - Patient should be alert and upright for  all feedings (90 degrees preferred)  - Offer food and liquids at a slow rate  - No straws  - Encourage small bites of food and small sips of liquid  - Offer pills one at a time, crush or deliver with applesauce as needed  - Discontinue feeding and notify MD (or speech pathologist) if coughing or persistent throat clearing or wet/gurgly vocal quality is noted  Outcome: Progressing     Problem: Patient Centered Care  Goal: Patient preferences are identified and integrated in the patient's plan of care  Description: Interventions:  - What would you like us to know as we care for you?   - Provide timely, complete, and accurate information to patient/family  - Incorporate patient and family knowledge, values, beliefs, and cultural backgrounds into the planning and delivery of care  - Encourage patient/family to participate in care and decision-making at the level they choose  - Honor patient and family perspectives and choices  Outcome: Progressing

## 2024-10-29 NOTE — CM/SW NOTE
10/27/24 0900   Choice of Post-Acute Provider   Informed patient of right to choose their preferred provider Yes   List of appropriate post-acute services provided to patient/family with quality data Yes   Patient/family choice [facility]     TRACEE followed up on discharge planning.    [facility] denied.    SW spoke to pt's spouse Meme who confirmed VARGAS preference is [facility].    Liaison Guillermina w/[facility] confirmed they can accept pt.    If pt remains on O2, will need ambulance transport at discharge.    PLAN: DC to [facility] pending med clear    / to remain available for support and/or discharge planning.     Alisa Merritt MSW, LSW [id number]

## 2024-10-29 NOTE — ANESTHESIA PREPROCEDURE EVALUATION
Anesthesia PreOp Note    HPI:     Cody Fitzgerald is a 84 year old male who presents for preoperative consultation requested by: Babar Ferraro MD    Date of Surgery: 10/25/2024 - 10/29/2024    Procedure(s):  BRONCHOSCOPY  Indication: pneumonia    Relevant Problems   No relevant active problems       NPO:  Last Liquid Consumption Date: 10/29/24  Last Liquid Consumption Time: 0900 (sip of water with meds)  Last Solid Consumption Date: 10/28/24  Last Solid Consumption Time: 1800  Last Liquid Consumption Date: 10/29/24          History Review:  Patient Active Problem List    Diagnosis Date Noted    Febrile illness 10/25/2024    Fall, initial encounter 10/25/2024    Acute cystitis with hematuria 06/09/2024    Sepsis (HCC) 06/09/2024    Community acquired pneumonia of left lower lobe of lung 04/14/2024    Left lower lobe pneumonia 04/14/2024    Hypernatremia 10/05/2023    JOSE (acute kidney injury) (HCC) 10/05/2023    Tendinosis of rotator cuff 01/26/2023    Acute pain of left shoulder 01/05/2023    Closed fracture of left proximal humerus 01/05/2023    Acute on chronic respiratory failure with hypoxia (HCC) 11/24/2022    Contusion of left hip, initial encounter 11/24/2022    COVID 11/23/2022    Aspiration pneumonia, unspecified aspiration pneumonia type, unspecified laterality, unspecified part of lung (HCC) 11/07/2022    Leukocytosis, unspecified type 11/07/2022    Renal insufficiency 11/07/2022    Elevated troponin 11/07/2022    Hypoxia 11/07/2022    Acute respiratory failure with hypoxia (HCC) 05/13/2022    Primary osteoarthritis of both knees 04/28/2022    Weakness generalized 04/06/2022    Inability to walk 04/06/2022    Aspiration pneumonia (HCC) 04/06/2022    History of falling 01/01/2022    Hypertensive chronic kidney disease w stg 1-4/unsp chr kdny 01/01/2022    Long term (current) use of opiate analgesic 01/01/2022    History of pneumonia 01/01/2022    Dyspnea on exertion 05/25/2021    Gait disturbance  04/05/2021    Lumbar spondylosis 03/01/2021    Anticoagulant long-term use 03/01/2021    Acute bronchospasm 01/04/2020    Viral syndrome 01/04/2020    Primary osteoarthritis of right knee 08/18/2019    Spinal stenosis of lumbar region without neurogenic claudication 07/17/2019    Pneumonia due to infectious organism 07/06/2019    Pneumonia due to infectious organism, unspecified laterality, unspecified part of lung 07/06/2019    Sepsis, due to unspecified organism 07/06/2019    Aspiration pneumonia of left lower lobe (HCC) 05/13/2019    Aspiration pneumonitis ([facility]) 05/13/2019    Fatigue 03/11/2019    Chronic bilateral low back pain without sciatica 02/14/2019    Stage 3a chronic kidney disease ([facility]) 11/14/2018    Primary osteoarthritis involving multiple joints 11/14/2018    Status post repair of abdominal aortic aneurysm (AAA) using bifurcation graft 04/18/2018    Left hemiparesis ([facility]) 01/18/2018    CVA, old, hemiparesis ([facility]) 01/18/2018    Essential hypertension 01/18/2018    Hypercholesterolemia 01/18/2018    Hypothyroidism 01/18/2018    Benign prostatic hyperplasia with lower urinary tract symptoms 01/18/2018    Cerebrovascular accident (CVA) ([facility]) 10/19/2009    Personal history of nicotine dependence 01/18/2008       Past Medical History:    Abdominal aortic aneurysm (AAA) ([facility])    Back problem    back pain    Disorder of thyroid    hypothyroidism    Falls frequently    High blood pressure    High cholesterol    Hx of pneumothorax    Muscle weakness    LUE hemiplegia    Osteoarthritis    PNA (pneumonia)    Pneumonia due to organism    Stroke ([facility])    left sided weakness       Past Surgical History:   Procedure Laterality Date    Cataract      Hernia surgery      ADB aneurysm repair    Other surgical history  04/12/2018    Endovascular 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 - Dr. Stahl    Tonsillectomy      at age 24       Prescriptions Prior to  Admission[1]  Current Medications and Prescriptions Ordered in Epic[2]    Allergies[3]    Family History   Problem Relation Age of Onset    Cancer Father         Lung      Social History     Socioeconomic History    Marital status:    Tobacco Use    Smoking status: Former     Current packs/day: 0.00     Average packs/day: 2.0 packs/day for 50.0 years (100.0 ttl pk-yrs)     Types: Cigarettes     Start date: 1958     Quit date: 2008     Years since quittin.7     Passive exposure: Never    Smokeless tobacco: Never   Vaping Use    Vaping status: Never Used   Substance and Sexual Activity    Alcohol use: Not Currently     Comment: 1-2 drinks/monthly    Drug use: No   Other Topics Concern    Caffeine Concern Yes     Comment: 1 cup of coffee daily    Exercise No       Available pre-op labs reviewed.  Lab Results   Component Value Date    WBC 6.0 10/29/2024    RBC 3.68 (L) 10/29/2024    HGB 11.0 (L) 10/29/2024    HCT 35.1 (L) 10/29/2024    MCV 95.4 10/29/2024    MCH 29.9 10/29/2024    MCHC 31.3 10/29/2024    RDW 14.0 10/29/2024    .0 10/29/2024     Lab Results   Component Value Date     (H) 10/29/2024    K 3.5 10/29/2024     (H) 10/29/2024    CO2 25.0 10/29/2024    BUN 11 10/29/2024    CREATSERUM 1.14 10/29/2024    GLU 89 10/29/2024    CA 8.9 10/29/2024     Lab Results   Component Value Date    INR 0.99 10/29/2024       Vital Signs:  Body mass index is 29.21 kg/m².   height is 1.778 m (5' 10\") and weight is 92.4 kg (203 lb 9.6 oz). His oral temperature is 98.1 °F (36.7 °C). His blood pressure is 144/94 (abnormal) and his pulse is 69. His respiration is 14 and oxygen saturation is 95%.   Vitals:    10/29/24 0334 10/29/24 0338 10/29/24 0846 10/29/24 1305   BP: 141/63  133/68 (!) 144/94   Pulse: 72  68 69   Resp: 18  18 14   Temp: 97.7 °F (36.5 °C)  98.1 °F (36.7 °C)    TempSrc: Oral  Oral    SpO2: (!) 88% 92% 92% 95%   Weight:       Height:            Anesthesia Evaluation     Patient  summary reviewed    Airway   Mallampati: IV  Dental      Pulmonary    (+) shortness of breath, rhonchi, decreased breath sounds, rales  Cardiovascular   (+) hypertension    Rhythm: irregular  Rate: abnormal    Neuro/Psych    (+)  CVA,        GI/Hepatic/Renal      Endo/Other    Abdominal     Abdomen: soft.     Other findings: Poor dentition            Anesthesia Plan:   ASA:  4  Plan:   MAC and general  Airway:  ETT  Informed Consent Plan and Risks Discussed With:  Patient      I have informed Cody Fitzgerald and/or legal guardian or family member of the nature of the anesthetic plan, benefits, risks including possible dental damage if relevant, major complications, and any alternative forms of anesthetic management.   All of the patient's questions were answered to the best of my ability. The patient desires the anesthetic management as planned.  Olivia Chisholm MD, PhD  10/29/2024 1:44 PM  Present on Admission:  **None**           [1]   Medications Prior to Admission   Medication Sig Dispense Refill Last Dose/Taking    gabapentin 300 MG Oral Cap Take 2 capsules (600 MG) by mouth in the morning, 1 capsule (300 MG) at noon, and 1 capsule (300 MG) at bedtime 360 capsule 3 10/25/2024 at  7:00 AM    ipratropium-albuterol 0.5-2.5 (3) MG/3ML Inhalation Solution Take 3 mL by nebulization 3 (three) times daily as needed (shortness of breath). And as needed (Patient not taking: Reported on 10/25/2024) 3 each 3 Not Taking    levothyroxine 100 MCG Oral Tab Take 1 tablet (100 mcg total) by mouth daily. 90 tablet 3 10/25/2024 at  7:00 AM    fluocinonide 0.05 % External Ointment Apply a small amount of ointment on area of rash twice a day as necessary. 30 g 2 10/25/2024 at  7:00 AM    amLODIPine 5 MG Oral Tab TAKE 1 TABLET(5 MG) BY MOUTH DAILY 90 tablet 1 10/25/2024 at  7:00 AM    ATORVASTATIN 40 MG Oral Tab TAKE 1 TABLET(40 MG) BY MOUTH NIGHTLY 90 tablet 3 10/24/2024 at  8:30 PM    CLOPIDOGREL 75 MG Oral Tab TAKE 1 TABLET(75 MG)  BY MOUTH DAILY 90 tablet 3 10/24/2024 at  8:30 PM    acetaminophen 500 MG Oral Tab Take 2 tablets (1,000 mg total) by mouth in the morning and 2 tablets (1,000 mg total) before bedtime.   10/25/2024 at  7:00 AM    Cholecalciferol (VITAMIN D) 2000 units Oral Cap Take 1 capsule (2,000 Units total) by mouth daily.   10/25/2024 at  7:00 AM   [2]   Current Facility-Administered Medications Ordered in Epic   Medication Dose Route Frequency Provider Last Rate Last Admin    acetaminophen (Tylenol Extra Strength) tab 500 mg  500 mg Oral Q4H PRN Adriana Villavicencio MD   500 mg at 10/28/24 2126    amLODIPine (Norvasc) tab 5 mg  5 mg Oral Daily Erica Irving APRN   5 mg at 10/29/24 0854    ampicillin-sulbactam (Unasyn) 3 g in sodium chloride 0.9% 100mL IVPB-ADD  3 g Intravenous q6h Johanny Pearson  mL/hr at 10/29/24 1120 3 g at 10/29/24 1120    sodium chloride 0.9% infusion   Intravenous Continuous Johanny Pearson  mL/hr at 10/29/24 0853 New Bag at 10/29/24 0853    [Held by provider] heparin (Porcine) 5000 UNIT/ML injection 5,000 Units  5,000 Units Subcutaneous 2 times per day Johanny Pearson MD   5,000 Units at 10/27/24 2243    influenza virus trivalent high dose PF (Fluzone HD) 0.5 mL injection (ages >/= 65 years) 0.5 mL  0.5 mL Intramuscular Prior to discharge Johanny Pearson MD        atorvastatin (Lipitor) tab 40 mg  40 mg Oral Nightly Adriana Villavicencio MD   40 mg at 10/28/24 2126    [Held by provider] clopidogrel (Plavix) tab 75 mg  75 mg Oral Daily Adriana Villavicencio MD   75 mg at 10/27/24 0926    levothyroxine (Synthroid) tab 100 mcg  100 mcg Oral Daily Adriana Villavicencio MD   100 mcg at 10/29/24 0854    doxycycline (Vibramycin) cap 100 mg  100 mg Oral 2 times per day Adriana Villavicencio MD   100 mg at 10/29/24 0854    gabapentin (Neurontin) cap 600 mg  600 mg Oral QAM Adriana Villavicencio MD   600 mg at 10/29/24 0901    gabapentin (Neurontin) cap 300 mg  300 mg Oral BID Adriana Villavicencio MD   300  mg at 10/28/24 2126     No current [facility]-ordered outpatient medications on file.   [3] No Known Allergies

## 2024-10-29 NOTE — OCCUPATIONAL THERAPY NOTE
OCCUPATIONAL THERAPY EVALUATION - INPATIENT     Room Number: 526/526-A  Evaluation Date: 10/29/2024  Type of Evaluation: Initial       Physician Order: IP Consult to Occupational Therapy  Reason for Therapy: ADL/IADL Dysfunction and Discharge Planning    OCCUPATIONAL THERAPY ASSESSMENT   RN cleared pt for participation in OT session, which was completed in collaboration with PT. Upon arrival, pt was supine in bed and agreeable to activity. No visitors present during session. Pt was left in chair and alarm was activated. Call light and all needs left in reach. Handoff given to RN.    Patient is a 84 year old male admitted 10/25/2024 for febrile illness; bronchoscopy today.  Prior to admission, pt was independent; assist from wife PRN.  Patient is currently requiring up to max A for ADLs overall along with min A for supine to sit, CGA for sitting at EOB, min A for STS, and mod A for functional transfer at RW level. Pt tolerated about 1 minutes of supported standing.  Pt has the following impairments: decreased functional strength, decreased functional reach, and decreased endurance.    ACTIVITY TOLERANCE                         Education provided  Educated pt about role of OT and hospital therapy process as well as proper safety techniques including proper hand placement, body mechanics, safety techniques. Pt verbalized/demonstrated  carryover.    Patient will benefit from continued skilled OT Services to promote return to prior level of function and safety with continuous assistance and gradual rehabilitative therapy    PLAN  OT Treatment Plan: Balance activities;Energy conservation/work simplification techniques;Continued evaluation;Compensatory technique education;Fine motor coordination activities;Neuromuscluar reeducation;Equipment eval/education;Patient/Family training;Patient/Family education;Cognitive reorientation;Endurance training;UE strengthening/ROM;Functional transfer training;Visual perceptual  training;IADL training;ADL training      OCCUPATIONAL THERAPY MEDICAL/SOCIAL HISTORY     Problem List  Principal Problem:    Febrile illness  Active Problems:    Aspiration pneumonia (HCC)    Leukocytosis, unspecified type    Fall, initial encounter      Past Medical History  Past Medical History:    Abdominal aortic aneurysm (AAA) (HCC)    Back problem    back pain    Disorder of thyroid    hypothyroidism    Falls frequently    High blood pressure    High cholesterol    Hx of pneumothorax    Muscle weakness    LUE hemiplegia    Osteoarthritis    PNA (pneumonia)    Pneumonia due to organism    Stroke (HCC)    left sided weakness       Past Surgical History  Past Surgical History:   Procedure Laterality Date    Bronchoscopy,diagnostic  10/29/2024    Dr. 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 - Dr. Stahl    Tonsillectomy      at age 24       HOME SITUATION  Type of Home: House  Home Layout: Two level;Stairlift  Lives With: Spouse;Daughter                      Drives: No  Patient Regularly Uses: Wheelchair;Rolling walker;Hospital bed    SUBJECTIVE  \"I walked to the bathroom.\"    OCCUPATIONAL THERAPY EXAMINATION      OBJECTIVE  Precautions: Bed/chair alarm  Fall Risk: High fall risk    PAIN ASSESSMENT  Ratin         RANGE OF MOTION   Upper extremity ROM is within functional limits     STRENGTH ASSESSMENT  Upper extremity strength is within functional limits     COORDINATION  Gross Motor: WFL   Fine Motor: WFL     ACTIVITIES OF DAILY LIVING ASSESSMENT  AM-PAC ‘6-Clicks’ Inpatient Daily Activity Short Form  How much help from another person does the patient currently need…  -   Putting on and taking off regular lower body clothing?: Total  -   Bathing (including washing, rinsing, drying)?: A Lot  -   Toileting, which includes using  toilet, bedpan or urinal? : A Lot  -   Putting on and taking off regular upper body clothing?: A Little  -   Taking care of personal grooming such as brushing teeth?: A Little  -   Eating meals?: A Little    AM-PAC Score:  Score: 14  Approx Degree of Impairment: 59.67%  Standardized Score (AM-PAC Scale): 33.39  CMS Modifier (G-Code): CK      FUNCTIONAL TRANSFER ASSESSMENT     Supine to sit: min A     Sit to stand: min A  Toilet Transfer: mod A  Chair Transfer: mod A    FUNCTIONAL ADL ASSESSMENT  Overall max A    The patient's Approx Degree of Impairment: 59.67% has been calculated based on documentation in the [facility] '6 clicks' Inpatient Daily Activity Short Form.  Research supports that patients with this level of impairment may benefit from GR. Final disposition will be made by interdisciplinary medical team.    OT Goals  Patients self stated goal is: to go home     Patient will complete functional transfer with SBA  Comment:     Patient will complete toileting with SBA  Comment:     Patient will tolerate standing for 2 minutes in prep for adls with SBA   Comment:    Patient will complete item retrieval with SBA  Comment:          Goals  on:   Frequency: 3-5x/wk    Patient Evaluation Complexity Level:   Occupational Profile/Medical History MODERATE - Expanded review of history including review of medical or therapy record   Specific performance deficits impacting engagement in ADL/IADL MODERATE  3 - 5 performance deficits   Client Assessment/Performance Deficits MODERATE - Comorbidities and min to mod modifications of tasks    Clinical Decision Making MODERATE - Analysis of occupational profile, detailed assessments, several treatment options    Overall Complexity MODERATE     OT Session Time: 20 minutes  Self-Care Home Management: 10 minutes           Elle Gavin OT  [facility]  Inpatient Rehabilitation  Occupational Therapy  [phone]

## 2024-10-30 VITALS
SYSTOLIC BLOOD PRESSURE: 136 MMHG | HEIGHT: 70 IN | RESPIRATION RATE: 20 BRPM | WEIGHT: 203.63 LBS | TEMPERATURE: 99 F | BODY MASS INDEX: 29.15 KG/M2 | HEART RATE: 69 BPM | OXYGEN SATURATION: 94 % | DIASTOLIC BLOOD PRESSURE: 71 MMHG

## 2024-10-30 LAB
ANION GAP SERPL CALC-SCNC: 9 MMOL/L (ref 0–18)
BASOPHILS # BLD AUTO: 0.07 X10(3) UL (ref 0–0.2)
BASOPHILS NFR BLD AUTO: 1 %
BUN BLD-MCNC: 11 MG/DL (ref 9–23)
BUN/CREAT SERPL: 9.3 (ref 10–20)
CALCIUM BLD-MCNC: 9.4 MG/DL (ref 8.7–10.4)
CHLORIDE SERPL-SCNC: 113 MMOL/L (ref 98–112)
CO2 SERPL-SCNC: 25 MMOL/L (ref 21–32)
CREAT BLD-MCNC: 1.18 MG/DL
DEPRECATED RDW RBC AUTO: 48.6 FL (ref 35.1–46.3)
EGFRCR SERPLBLD CKD-EPI 2021: 61 ML/MIN/1.73M2 (ref 60–?)
EOSINOPHIL # BLD AUTO: 0.31 X10(3) UL (ref 0–0.7)
EOSINOPHIL NFR BLD AUTO: 4.3 %
ERYTHROCYTE [DISTWIDTH] IN BLOOD BY AUTOMATED COUNT: 13.9 % (ref 11–15)
GLUCOSE BLD-MCNC: 88 MG/DL (ref 70–99)
HCT VFR BLD AUTO: 37.2 %
HGB BLD-MCNC: 11.8 G/DL
IMM GRANULOCYTES # BLD AUTO: 0.03 X10(3) UL (ref 0–1)
IMM GRANULOCYTES NFR BLD: 0.4 %
LYMPHOCYTES # BLD AUTO: 2.03 X10(3) UL (ref 1–4)
LYMPHOCYTES NFR BLD AUTO: 28.2 %
MAGNESIUM SERPL-MCNC: 2.2 MG/DL (ref 1.6–2.6)
MCH RBC QN AUTO: 30 PG (ref 26–34)
MCHC RBC AUTO-ENTMCNC: 31.7 G/DL (ref 31–37)
MCV RBC AUTO: 94.7 FL
MONOCYTES # BLD AUTO: 0.69 X10(3) UL (ref 0.1–1)
MONOCYTES NFR BLD AUTO: 9.6 %
NEUTROPHILS # BLD AUTO: 4.07 X10 (3) UL (ref 1.5–7.7)
NEUTROPHILS # BLD AUTO: 4.07 X10(3) UL (ref 1.5–7.7)
NEUTROPHILS NFR BLD AUTO: 56.5 %
NON GYNE INTERPRETATION: NEGATIVE
OSMOLALITY SERPL CALC.SUM OF ELEC: 303 MOSM/KG (ref 275–295)
PLATELET # BLD AUTO: 206 10(3)UL (ref 150–450)
POTASSIUM SERPL-SCNC: 3.7 MMOL/L (ref 3.5–5.1)
RBC # BLD AUTO: 3.93 X10(6)UL
SODIUM SERPL-SCNC: 147 MMOL/L (ref 136–145)
WBC # BLD AUTO: 7.2 X10(3) UL (ref 4–11)

## 2024-10-30 PROCEDURE — 99232 SBSQ HOSP IP/OBS MODERATE 35: CPT | Performed by: INTERNAL MEDICINE

## 2024-10-30 PROCEDURE — 99239 HOSP IP/OBS DSCHRG MGMT >30: CPT | Performed by: INTERNAL MEDICINE

## 2024-10-30 NOTE — PROGRESS NOTES
[facility]  part of [facility]    Progress Note      Assessment and Plan:   1.  Pneumonia-the patient likely has intermittent aspiration.  He also has chronic left lower lobe atelectasis likely in part related to positioning as he has weakness on the left side and always seems to be turned on the left position.  He had plenty central mucous on auscultation.  He also has an extensive history of tobacco having quit 10 years ago.    The patient tolerated bronchoscopy well.  He had only mild central mucus but no major plugging of the left lower lobe and there was no endobronchial lesion.  Cultures are all negative.  Lung exam is improved today.     Recommendations:  1.  Ongoing empiric antibiotic  2.  Swallowing strategies  3.  The wife tries to keep the patient off the left side and out of bed.  4.  Will follow clinically  5.  Incentive spirometry  6.  Position off the left side  7.  Acapella flutter valve  8.  Await cultures from the bronchoscopy     2.  DVT prophylaxis-has been getting subcutaneous heparin but will hold for procedure.     3.  History of stroke    Subjective:   Cody Fitzgerald is a(n) 84 year old male who feels well.    Objective:   Blood pressure 151/66, pulse 69, temperature 98.5 °F (36.9 °C), temperature source Oral, resp. rate 18, height 5' 10\" (1.778 m), weight 203 lb 9.6 oz (92.4 kg), SpO2 90%.    Physical Exam alert white male  HEENT examination is unremarkable with pupils equal round and reactive to light and accommodation.   Neck without adenopathy, thyromegaly, JVD nor bruit.   Lungs slightly diminished to auscultation and percussion.  Cardiac regular rate and rhythm no murmur.   Abdomen nontender, without hepatosplenomegaly and no mass appreciable.   Extremities without clubbing cyanosis nor edema.   Neurologic grossly intact with symmetric tone and strength and reflex.  Skin without gross abnormality     Results:     Lab Results   Component Value Date    WBC 7.2  10/30/2024    HGB 11.8 10/30/2024    HCT 37.2 10/30/2024    .0 10/30/2024    CREATSERUM 1.18 10/30/2024    BUN 11 10/30/2024     10/30/2024    K 3.7 10/30/2024     10/30/2024    CO2 25.0 10/30/2024    GLU 88 10/30/2024    CA 9.4 10/30/2024    MG 2.2 10/30/2024       Babar Ferraro MD  Medical Director, Critical Care, [facility]  Medical Director, [facility]  Pager: [phone]

## 2024-10-30 NOTE — PLAN OF CARE
Problem: CARDIOVASCULAR - ADULT  Goal: Maintains optimal cardiac output and hemodynamic stability  Description: INTERVENTIONS:  - Monitor vital signs, rhythm, and trends  - Monitor for bleeding, hypotension and signs of decreased cardiac output  - Evaluate effectiveness of vasoactive medications to optimize hemodynamic stability  - Monitor arterial and/or venous puncture sites for bleeding and/or hematoma  - Assess quality of pulses, skin color and temperature  - Assess for signs of decreased coronary artery perfusion - ex. Angina  - Evaluate fluid balance, assess for edema, trend weights  Outcome: Progressing  Goal: Absence of cardiac arrhythmias or at baseline  Description: INTERVENTIONS:  - Continuous cardiac monitoring, monitor vital signs, obtain 12 lead EKG if indicated  - Evaluate effectiveness of antiarrhythmic and heart rate control medications as ordered  - Initiate emergency measures for life threatening arrhythmias  - Monitor electrolytes and administer replacement therapy as ordered  Outcome: Progressing     Problem: RESPIRATORY - ADULT  Goal: Achieves optimal ventilation and oxygenation  Description: INTERVENTIONS:  - Assess for changes in respiratory status  - Assess for changes in mentation and behavior  - Position to facilitate oxygenation and minimize respiratory effort  - Oxygen supplementation based on oxygen saturation or ABGs  - Provide Smoking Cessation handout, if applicable  - Encourage broncho-pulmonary hygiene including cough, deep breathe, Incentive Spirometry  - Assess the need for suctioning and perform as needed  - Assess and instruct to report SOB or any respiratory difficulty  - Respiratory Therapy support as indicated  - Manage/alleviate anxiety  - Monitor for signs/symptoms of CO2 retention  Outcome: Progressing     Problem: METABOLIC/FLUID AND ELECTROLYTES - ADULT  Goal: Electrolytes maintained within normal limits  Description: INTERVENTIONS:  - Monitor labs and rhythm and  assess patient for signs and symptoms of electrolyte imbalances  - Administer electrolyte replacement as ordered  - Monitor response to electrolyte replacements, including rhythm and repeat lab results as appropriate  - Fluid restriction as ordered  - Instruct patient on fluid and nutrition restrictions as appropriate  Outcome: Progressing  Goal: Hemodynamic stability and optimal renal function maintained  Description: INTERVENTIONS:  - Monitor labs and assess for signs and symptoms of volume excess or deficit  - Monitor intake, output and patient weight  - Monitor urine specific gravity, serum osmolarity and serum sodium as indicated or ordered  - Monitor response to interventions for patient's volume status, including labs, urine output, blood pressure (other measures as available)  - Encourage oral intake as appropriate  - Instruct patient on fluid and nutrition restrictions as appropriate  Outcome: Progressing     Problem: SKIN/TISSUE INTEGRITY - ADULT  Goal: Incision(s), wounds(s) or drain site(s) healing without S/S of infection  Description: INTERVENTIONS:  - Assess and document risk factors for pressure ulcer development  - Assess and document skin integrity  - Assess and document dressing/incision, wound bed, drain sites and surrounding tissue  - Implement wound care per orders  - Initiate isolation precautions as appropriate  - Initiate Pressure Ulcer prevention bundle as indicated  Outcome: Progressing     Problem: MUSCULOSKELETAL - ADULT  Goal: Return mobility to safest level of function  Description: INTERVENTIONS:  - Assess patient stability and activity tolerance for standing, transferring and ambulating w/ or w/o assistive devices  - Assist with transfers and ambulation using safe patient handling equipment as needed  - Ensure adequate protection for wounds/incisions during mobilization  - Obtain PT/OT consults as needed  - Advance activity as appropriate  - Communicate ordered activity level and  limitations with patient/family  Outcome: Progressing     Problem: PAIN - ADULT  Goal: Verbalizes/displays adequate comfort level or patient's stated pain goal  Description: INTERVENTIONS:  - Encourage pt to monitor pain and request assistance  - Assess pain using appropriate pain scale  - Administer analgesics based on type and severity of pain and evaluate response  - Implement non-pharmacological measures as appropriate and evaluate response  - Consider cultural and social influences on pain and pain management  - Manage/alleviate anxiety  - Utilize distraction and/or relaxation techniques  - Monitor for opioid side effects  - Notify MD/LIP if interventions unsuccessful or patient reports new pain  - Anticipate increased pain with activity and pre-medicate as appropriate  Outcome: Progressing     Problem: RISK FOR INFECTION - ADULT  Goal: Absence of fever/infection during anticipated neutropenic period  Description: INTERVENTIONS  - Monitor WBC  - Administer growth factors as ordered  - Implement neutropenic guidelines  Outcome: Progressing     Problem: SAFETY ADULT - FALL  Goal: Free from fall injury  Description: INTERVENTIONS:  - Assess pt frequently for physical needs  - Identify cognitive and physical deficits and behaviors that affect risk of falls.  - [place] fall precautions as indicated by assessment.  - Educate pt/family on patient safety including physical limitations  - Instruct pt to call for assistance with activity based on assessment  - Modify environment to reduce risk of injury  - Provide assistive devices as appropriate  - Consider OT/PT consult to assist with strengthening/mobility  - Encourage toileting schedule  Outcome: Progressing     Problem: Impaired Swallowing  Goal: Minimize aspiration risk  Description: Interventions:  - Patient should be alert and upright for all feedings (90 degrees preferred)  - Offer food and liquids at a slow rate  - No straws  - Encourage small bites of food  and small sips of liquid  - Offer pills one at a time, crush or deliver with applesauce as needed  - Discontinue feeding and notify MD (or speech pathologist) if coughing or persistent throat clearing or wet/gurgly vocal quality is noted  Outcome: Progressing     Problem: Patient Centered Care  Goal: Patient preferences are identified and integrated in the patient's plan of care  Description: Interventions:  - What would you like us to know as we care for you?   - Provide timely, complete, and accurate information to patient/family  - Incorporate patient and family knowledge, values, beliefs, and cultural backgrounds into the planning and delivery of care  - Encourage patient/family to participate in care and decision-making at the level they choose  - Honor patient and family perspectives and choices  Outcome: Progressing

## 2024-10-30 NOTE — CM/SW NOTE
10/30/24 1105   Discharge disposition   Expected discharge disposition subacute   Post Acute Care Provider   ([facility])   Discharge transportation [facility]     [facility] liaison at SNf has bed ready for 1pm  RN notified for 1pm  Pt's wife notified dc 1pm to SNF    AMBULANCE P/U 1PM    RN REPORT PH# [phone]    CM to remain available for support and/or discharge planning.     Minerva Eduardo BS, RN, CCM  PRN RN CCM  Ext.  [phone]

## 2024-10-30 NOTE — DISCHARGE SUMMARY
Discharge Summary     Cody Fitzgerald Patient Status:  Inpatient    1940 MRN [id number]   Location [facility] 5SW/SE Attending Ros Tamayo MD   Hosp Day # 5 PCP Kandice Claudio,      Date of Admission: 10/25/2024  Date of Discharge: 10/30/2024  Discharge Disposition: SNF    Discharge Diagnosis:   Recurrent aspiration pneumonia, left lung- postobstructive/ RLL PNA new  -in pt with  ho COPD      History of Present Illness:             Brief Synopsis:   Recurrent aspiration pneumonia, left lung- postobstructive/ RLL PNA new  -in pt with  ho COPD  -continue IV abx.  -continue oxygen supplementation via NC  -will monitor closely  -leukocytosis resolved.   -procal mildly elevated.   -CT reviewed.  - appr pulm input: bronch 10/29 Mild clear mucus plugging but otherwise normal airways.   Positioning of patient is crucial to prevent further aspiration (position favoring R side)           Elevated BNp   -given IV lasix/currently stopped  -appreciate cardio recs  - Echo EF 55-60%, no RWMA, grade2 DDX, dilated AA 4.5cm, mildly increased PASP     Chronic left hemiparesis.  -continue statin plavix  -stable     Essential hypertension.  -bp controlled  -monitor and titrate meds as needed     Stage 1 sacral pressure injury.  Lace+ Score: 82  59-90 High Risk  29-58 Medium Risk  0-28   Low Risk       TCM Follow-Up Recommendation:  LACE < 29: Low Risk of readmission after discharge from the hospital; Still recommend for TCM follow-up.    Procedures during hospitalization:   Bronchoscopy        Consultants:  Pulmonary, cardiology    Discharge Medication List:     Discharge Medications        ASK your doctor about these medications        Instructions Prescription details   acetaminophen 500 MG Tabs  Commonly known as: Tylenol Extra Strength      Take 2 tablets (1,000 mg total) by mouth in the morning and 2 tablets (1,000 mg total) before bedtime.   Refills: 0     amLODIPine 5 MG Tabs  Commonly known as:  Norvasc      TAKE 1 TABLET(5 MG) BY MOUTH DAILY   Quantity: 90 tablet  Refills: 1     atorvastatin 40 MG Tabs  Commonly known as: Lipitor      TAKE 1 TABLET(40 MG) BY MOUTH NIGHTLY   Quantity: 90 tablet  Refills: 3     clopidogrel 75 MG Tabs  Commonly known as: Plavix      TAKE 1 TABLET(75 MG) BY MOUTH DAILY   Quantity: 90 tablet  Refills: 3     fluocinonide 0.05 % Oint  Commonly known as: Lidex      Apply a small amount of ointment on area of rash twice a day as necessary.   Quantity: 30 g  Refills: 2     gabapentin 300 MG Caps  Commonly known as: Neurontin      Take 2 capsules (600 MG) by mouth in the morning, 1 capsule (300 MG) at noon, and 1 capsule (300 MG) at bedtime   Quantity: 360 capsule  Refills: 3     ipratropium-albuterol 0.5-2.5 (3) MG/3ML Soln  Commonly known as: Duoneb      Take 3 mL by nebulization 3 (three) times daily as needed (shortness of breath). And as needed   Quantity: 3 each  Refills: 3     levothyroxine 100 MCG Tabs  Commonly known as: Synthroid      Take 1 tablet (100 mcg total) by mouth daily.   Quantity: 90 tablet  Refills: 3     Vitamin D 50 MCG (2000 UT) Caps      Take 1 capsule (2,000 Units total) by mouth daily.   Refills: 0              Follow-up appointment:   Louis Dumont  [address] [facility] [address]  [facility] [address]  [phone]    Follow up  Office will call to schedule follow up    Appointments for Next 30 Days 10/30/2024 - 11/29/2024      None            Supplementary Documentation:   [place]    Vital signs:  Temp:  [97.4 °F (36.3 °C)-98.5 °F (36.9 °C)] 98.5 °F (36.9 °C)  Pulse:  [66-85] 69  Resp:  [14-23] 18  BP: (119-155)/(61-94) 151/66  SpO2:  [90 %-99 %] 90 %    Physical Exam:    General:  NAD  Cardiovascular:  S1, S2    -----------------------------------------------------------------------------------------------  PATIENT DISCHARGE INSTRUCTIONS: See electronic chart    Tip: Documentation requirements: For split shared discharge, BOTH providers  need to document specific floor, unit, and time spent on the discharge.  The note needs to be signed by the provider with > 50% of time and bill under their NPI.   Time spent:  45min         Ros Tamayo MD

## 2024-10-30 NOTE — PLAN OF CARE
Patient discharged to with [facility]. PIV removed. AVS reviewed with patient/family with all questions answered. All patient belongings returned at discharge. Report given to VARGAS Villarreal.    Problem: CARDIOVASCULAR - ADULT  Goal: Maintains optimal cardiac output and hemodynamic stability  Description: INTERVENTIONS:  - Monitor vital signs, rhythm, and trends  - Monitor for bleeding, hypotension and signs of decreased cardiac output  - Evaluate effectiveness of vasoactive medications to optimize hemodynamic stability  - Monitor arterial and/or venous puncture sites for bleeding and/or hematoma  - Assess quality of pulses, skin color and temperature  - Assess for signs of decreased coronary artery perfusion - ex. Angina  - Evaluate fluid balance, assess for edema, trend weights  10/30/2024 1317 by Lianna Cutler RN  Outcome: Adequate for Discharge  10/30/2024 1054 by Lianna Cutler RN  Outcome: Progressing  Goal: Absence of cardiac arrhythmias or at baseline  Description: INTERVENTIONS:  - Continuous cardiac monitoring, monitor vital signs, obtain 12 lead EKG if indicated  - Evaluate effectiveness of antiarrhythmic and heart rate control medications as ordered  - Initiate emergency measures for life threatening arrhythmias  - Monitor electrolytes and administer replacement therapy as ordered  10/30/2024 1317 by Lianna Cutler RN  Outcome: Adequate for Discharge  10/30/2024 1054 by Lianna Cutler RN  Outcome: Progressing     Problem: RESPIRATORY - ADULT  Goal: Achieves optimal ventilation and oxygenation  Description: INTERVENTIONS:  - Assess for changes in respiratory status  - Assess for changes in mentation and behavior  - Position to facilitate oxygenation and minimize respiratory effort  - Oxygen supplementation based on oxygen saturation or ABGs  - Provide Smoking Cessation handout, if applicable  - Encourage broncho-pulmonary hygiene including cough, deep breathe,  Incentive Spirometry  - Assess the need for suctioning and perform as needed  - Assess and instruct to report SOB or any respiratory difficulty  - Respiratory Therapy support as indicated  - Manage/alleviate anxiety  - Monitor for signs/symptoms of CO2 retention  10/30/2024 1317 by Lianna Cutler RN  Outcome: Adequate for Discharge  10/30/2024 1054 by Lianna Cutler RN  Outcome: Progressing     Problem: METABOLIC/FLUID AND ELECTROLYTES - ADULT  Goal: Electrolytes maintained within normal limits  Description: INTERVENTIONS:  - Monitor labs and rhythm and assess patient for signs and symptoms of electrolyte imbalances  - Administer electrolyte replacement as ordered  - Monitor response to electrolyte replacements, including rhythm and repeat lab results as appropriate  - Fluid restriction as ordered  - Instruct patient on fluid and nutrition restrictions as appropriate  10/30/2024 1317 by Lianna Cutler RN  Outcome: Adequate for Discharge  10/30/2024 1054 by Lianna Cutler RN  Outcome: Progressing  Goal: Hemodynamic stability and optimal renal function maintained  Description: INTERVENTIONS:  - Monitor labs and assess for signs and symptoms of volume excess or deficit  - Monitor intake, output and patient weight  - Monitor urine specific gravity, serum osmolarity and serum sodium as indicated or ordered  - Monitor response to interventions for patient's volume status, including labs, urine output, blood pressure (other measures as available)  - Encourage oral intake as appropriate  - Instruct patient on fluid and nutrition restrictions as appropriate  10/30/2024 1317 by Lianna Cutler RN  Outcome: Adequate for Discharge  10/30/2024 1054 by Lianna Cutler RN  Outcome: Progressing     Problem: SKIN/TISSUE INTEGRITY - ADULT  Goal: Incision(s), wounds(s) or drain site(s) healing without S/S of infection  Description: INTERVENTIONS:  - Assess and document risk factors for pressure ulcer development  - Assess and  document skin integrity  - Assess and document dressing/incision, wound bed, drain sites and surrounding tissue  - Implement wound care per orders  - Initiate isolation precautions as appropriate  - Initiate Pressure Ulcer prevention bundle as indicated  10/30/2024 1317 by Lianna Cutler RN  Outcome: Adequate for Discharge  10/30/2024 1054 by Lianna Cutler RN  Outcome: Progressing     Problem: MUSCULOSKELETAL - ADULT  Goal: Return mobility to safest level of function  Description: INTERVENTIONS:  - Assess patient stability and activity tolerance for standing, transferring and ambulating w/ or w/o assistive devices  - Assist with transfers and ambulation using safe patient handling equipment as needed  - Ensure adequate protection for wounds/incisions during mobilization  - Obtain PT/OT consults as needed  - Advance activity as appropriate  - Communicate ordered activity level and limitations with patient/family  10/30/2024 1317 by Lianna Cutler RN  Outcome: Adequate for Discharge  10/30/2024 1054 by Lianna Cutler RN  Outcome: Progressing     Problem: PAIN - ADULT  Goal: Verbalizes/displays adequate comfort level or patient's stated pain goal  Description: INTERVENTIONS:  - Encourage pt to monitor pain and request assistance  - Assess pain using appropriate pain scale  - Administer analgesics based on type and severity of pain and evaluate response  - Implement non-pharmacological measures as appropriate and evaluate response  - Consider cultural and social influences on pain and pain management  - Manage/alleviate anxiety  - Utilize distraction and/or relaxation techniques  - Monitor for opioid side effects  - Notify MD/LIP if interventions unsuccessful or patient reports new pain  - Anticipate increased pain with activity and pre-medicate as appropriate  10/30/2024 1317 by Lianna Cutler RN  Outcome: Adequate for Discharge  10/30/2024 1054 by Lianna Cutler RN  Outcome: Progressing     Problem: RISK FOR  INFECTION - ADULT  Goal: Absence of fever/infection during anticipated neutropenic period  Description: INTERVENTIONS  - Monitor WBC  - Administer growth factors as ordered  - Implement neutropenic guidelines  10/30/2024 1317 by Lianna Cutler RN  Outcome: Adequate for Discharge  10/30/2024 1054 by Lianna Cutler RN  Outcome: Progressing     Problem: SAFETY ADULT - FALL  Goal: Free from fall injury  Description: INTERVENTIONS:  - Assess pt frequently for physical needs  - Identify cognitive and physical deficits and behaviors that affect risk of falls.  - [place] fall precautions as indicated by assessment.  - Educate pt/family on patient safety including physical limitations  - Instruct pt to call for assistance with activity based on assessment  - Modify environment to reduce risk of injury  - Provide assistive devices as appropriate  - Consider OT/PT consult to assist with strengthening/mobility  - Encourage toileting schedule  10/30/2024 1317 by Lianna Cutler RN  Outcome: Adequate for Discharge  10/30/2024 1054 by Lianna Cutler RN  Outcome: Progressing     Problem: Impaired Swallowing  Goal: Minimize aspiration risk  Description: Interventions:  - Patient should be alert and upright for all feedings (90 degrees preferred)  - Offer food and liquids at a slow rate  - No straws  - Encourage small bites of food and small sips of liquid  - Offer pills one at a time, crush or deliver with applesauce as needed  - Discontinue feeding and notify MD (or speech pathologist) if coughing or persistent throat clearing or wet/gurgly vocal quality is noted  10/30/2024 1317 by Lianna Cutler RN  Outcome: Adequate for Discharge  10/30/2024 1054 by Lianna Cutler RN  Outcome: Progressing     Problem: Patient Centered Care  Goal: Patient preferences are identified and integrated in the patient's plan of care  Description: Interventions:  - What would you like us to know as we care for you?  - Provide timely, complete, and  accurate information to patient/family  - Incorporate patient and family knowledge, values, beliefs, and cultural backgrounds into the planning and delivery of care  - Encourage patient/family to participate in care and decision-making at the level they choose  - Honor patient and family perspectives and choices  10/30/2024 1317 by Lianna Cutler, RN  Outcome: Adequate for Discharge  10/30/2024 1054 by Lianna Cutler, RN  Outcome: Progressing

## 2024-10-30 NOTE — CM/SW NOTE
Expected Discharge Plan:    Destination: Subacute Rehab: vs Home with Home Health Current Barriers to d/c: [facility] DENIED      Pt/Family aware of plan:  left message  [facility] Staff aware of dc plan: Patient discussed in care rounds.  PASRR completed  CM returned voicemail message to wife regarding question on dc time.  Per Guillermina-laurie [facility] has bed today.    Plan  1pm [facility]  RN REPORT# Phone: [phone]    PCS DONE    CM to remain available for support and/or discharge planning.     Minerva Eduardo BS, RN, CCM  PRN RN CCM  Ext.  [phone]

## 2024-10-31 ENCOUNTER — TELEPHONE (OUTPATIENT)
Dept: INTERNAL MEDICINE CLINIC | Facility: CLINIC | Age: 84
End: 2024-10-31

## 2024-11-01 RX ORDER — AMLODIPINE BESYLATE 5 MG/1
5 TABLET ORAL DAILY
Qty: 90 TABLET | Refills: 3 | Status: SHIPPED | OUTPATIENT
Start: 2024-11-01

## 2024-11-04 LAB — ASPERGILLUS AG BAL/SERUM: 0.27 INDEX

## 2024-11-12 ENCOUNTER — PATIENT OUTREACH (OUTPATIENT)
Dept: CASE MANAGEMENT | Age: 84
End: 2024-11-12

## 2024-11-12 NOTE — PROGRESS NOTES
Voicemail received; Patient wife, Meme requesting assistance with scheduling appointment. Patient wife can be reached at [phone]. Patient discharged on 10/30 (patient discharged to [facility]).  Called patient wife back    Schedule Follow-Up Appointments with:    Dr Louis Centeno  Cardiovascular Diseases  [facility]  [address] [facility] [address]  [place] [address]  [phone]  Apt made:  Tue 11/19 @1:00pm w/BELLE Benítez  Confirmed w/pt wife, Meme  Closing encounter

## 2024-11-19 ENCOUNTER — OFFICE VISIT (OUTPATIENT)
Dept: INTERNAL MEDICINE CLINIC | Facility: CLINIC | Age: 84
End: 2024-11-19

## 2024-11-19 VITALS
TEMPERATURE: 98 F | SYSTOLIC BLOOD PRESSURE: 146 MMHG | HEART RATE: 78 BPM | OXYGEN SATURATION: 93 % | HEIGHT: 70 IN | BODY MASS INDEX: 29 KG/M2 | DIASTOLIC BLOOD PRESSURE: 66 MMHG

## 2024-11-19 DIAGNOSIS — L89.322 DECUBITUS ULCER OF LEFT BUTTOCK, STAGE 2 (HCC): Primary | ICD-10-CM

## 2024-11-19 PROCEDURE — 99214 OFFICE O/P EST MOD 30 MIN: CPT | Performed by: INTERNAL MEDICINE

## 2024-11-19 NOTE — PROGRESS NOTES
Cody Fitzgerald is a 84 year old male  Chief Complaint   Patient presents with    Hospital F/U     10/29/24 bronchoscopy, current in PT rehab at [facility], seeing Cardio today at 10:30a       HPI:   Cody Fitzgerald is a 84 year old male who presents for TCM.    Hospitalized 10/25-10/30 for recurrent aspiration pneumonia. S/p bronchoscopy 10/29, removed mucous plug. Was diuresed and treated with antibiotics. Discharged to [facility] for rehab. Doing well, no complaints, plans for discharge next week. Has cardiology f/u later today.    Wt Readings from Last 6 Encounters:   10/25/24 203 lb 9.6 oz (92.4 kg)   08/27/24 203 lb (92.1 kg)   06/24/24 197 lb (89.4 kg)   06/13/24 189 lb 12.8 oz (86.1 kg)   04/14/24 197 lb 6.4 oz (89.5 kg)   01/17/24 193 lb 11.2 oz (87.9 kg)     Body mass index is 29.21 kg/m².     Current Outpatient Medications   Medication Sig Dispense Refill    amLODIPine 5 MG Oral Tab Take 1 tablet (5 mg total) by mouth daily. 90 tablet 3    gabapentin 300 MG Oral Cap Take 2 capsules (600 MG) by mouth in the morning, 1 capsule (300 MG) at noon, and 1 capsule (300 MG) at bedtime 360 capsule 3    levothyroxine 100 MCG Oral Tab Take 1 tablet (100 mcg total) by mouth daily. 90 tablet 3    fluocinonide 0.05 % External Ointment Apply a small amount of ointment on area of rash twice a day as necessary. 30 g 2    ATORVASTATIN 40 MG Oral Tab TAKE 1 TABLET(40 MG) BY MOUTH NIGHTLY 90 tablet 3    CLOPIDOGREL 75 MG Oral Tab TAKE 1 TABLET(75 MG) BY MOUTH DAILY 90 tablet 3    acetaminophen 500 MG Oral Tab Take 2 tablets (1,000 mg total) by mouth in the morning and 2 tablets (1,000 mg total) before bedtime.      Cholecalciferol (VITAMIN D) 2000 units Oral Cap Take 1 capsule (2,000 Units total) by mouth daily.        Past Medical History:    Abdominal aortic aneurysm (AAA) (HCC)    Back problem    back pain    Disorder of thyroid    hypothyroidism    Falls frequently    High blood pressure    High cholesterol    Hx  of pneumothorax    Muscle weakness    LUE hemiplegia    Osteoarthritis    PNA (pneumonia)    Pneumonia due to organism    Stroke (HCC)    left sided weakness      Past Surgical History:   Procedure Laterality Date    Bronchoscopy,diagnostic  10/29/2024    Dr. 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 - Dr. Stahl    Tonsillectomy      at age 24      Family History   Problem Relation Age of Onset    Cancer Father         Lung       Social History:  Social History     Socioeconomic History    Marital status:    Tobacco Use    Smoking status: Former     Current packs/day: 0.00     Average packs/day: 2.0 packs/day for 50.0 years (100.0 ttl pk-yrs)     Types: Cigarettes     Start date: 1958     Quit date: 2008     Years since quittin.8     Passive exposure: Never    Smokeless tobacco: Never   Vaping Use    Vaping status: Never Used   Substance and Sexual Activity    Alcohol use: Not Currently     Comment: 1-2 drinks/monthly    Drug use: No   Other Topics Concern    Caffeine Concern Yes     Comment: 1 cup of coffee daily    Exercise No   Social History Narrative    The patient uses the following assistive device(s):  quad cane and pick-up walker.      The patient does live in a home with stairs.     Social Drivers of Health     Financial Resource Strain: Low Risk  (2023)    Financial Resource Strain     Difficulty of Paying Living Expenses: Not very hard     Med Affordability: No   Food Insecurity: No Food Insecurity (10/25/2024)    Food Insecurity     Food Insecurity: Never true   Transportation Needs: No Transportation Needs (10/25/2024)    Transportation Needs     Lack of Transportation: No   Housing Stability: Low Risk  (10/25/2024)    Housing Stability     Housing Instability: No           REVIEW OF SYSTEMS:    GENERAL: feels well otherwise, denies f/c  LUNGS: denies shortness of breath, + chronic cough  CARDIOVASCULAR: denies chest pain or pressure or palpitations  GI: denies abdominal pain, N/V, diarrhea, constipation, hematochezia or melena  : denies hematuria    EXAM:   /66   Pulse 78   Temp 97.8 °F (36.6 °C)   Ht 5' 10\" (1.778 m)   SpO2 93%   BMI 29.21 kg/m²     GENERAL: well developed, well nourished, in no apparent distress  LUNGS: clear to auscultation b/l, no w/r/r  CARDIO: RRR, normal S1S2, no m/r/g  GI: soft, NT, ND, NABS, no HSM  EXTREMITIES: no b/l LE edema, +2 DP pulses bilaterally  NEURO: A&O x 3, moves all 4 extremities normally    ASSESSMENT AND PLAN:   Cody Fitzgerald is a 84 year old male who presents for TCM.    Debility  - at [facility] progressing and traversing stairs  - plans for discharge the day before Thanksgiving    Recurrent aspiration pneumonia  - resolved s/p course of abx  - pt passed swallow study 10/28/24, was advised to avoid L side    Diastolic dysfunction  - TTE 10/28/24 LVEF 55-60% grade 2 diastolic dysfunction  - given IV diuresis while hospitalized, was not discharged on diuretics  - has cardiology f/u later today    Dilated ascending aorta  - noted 4.5 cm on TTE 10/28/24  - f/u cardiology     Hypothyroidism  - levothyroxine 100 mcg daily  - TSH wnl 9/19/23, repeat as above     Pre-diabetes  - hemoglobin A1c 6.1% 4/17/24, repeat as above     HTN  - controlled on current regimen:               - amlodipine 5 mg daily     HLD  - atorvastatin 40 mg at bedtime  - LDL 74 9/19/23     Hx of CVA c/b residual L sided weakness  - on plavix, atorvastatin 40 mg at bedtime     AAA  - s/p endograft repair     Low back pain c/b R radiculopathy  - MRI 2019: degenerative changes worst at L4-5, moderate-severe stenosis R>L  - CT L spine 6/11/24: degenerative changes   - takes tylenol PRN, gabapentin     Healthcare Maintenance  Cancer Screenings:  - Colon: age >80  - Lung: age >80      Vaccines:  - Tdap: recommend if >10 years since last  - Shingles: shingrix recommended, will obtain at pharmacy  - Pneumonia: prevnar 13 11/14/18, pneumovax 23 11/1/17, prevnar 20 recommended  - Flu: UTD  - COVID-19: UTD     Misc:  - AAA screen: AAA s/p endograft repair  - SLP recs 6/12/24: soft foods thin liquids     RTC in 4-6 months or sooner PRN.    For E/M code - 30 minutes spent reviewing performing chart review, obtaining a history, performing a physical exam, reviewing the assessment/plan, placing orders, and completing documentation.     Kandice Claudio DO  11/19/2024  8:57 AM

## 2024-12-03 ENCOUNTER — TELEPHONE (OUTPATIENT)
Dept: INTERNAL MEDICINE CLINIC | Facility: CLINIC | Age: 84
End: 2024-12-03

## 2024-12-03 NOTE — TELEPHONE ENCOUNTER
Patient's wife, Meme (Verified HIPAA) called to schedule appointment for Wednesday 12/4/24 as son is available to bring patient.     Meme needed morning appointment but 1:30pm was earliest available.     Patient was scheduled with Dr. Olguin.    Meme will call back if this appointment does not work.

## 2024-12-03 NOTE — TELEPHONE ENCOUNTER
Keiko RN from [facility], [phone], called to notify provider that patient was admitted to Home Health today.   Keiko needs confirmation that doctor will continue to sign orders for Home Health  Keiko stated that wife is concerned about patient as he has been very fatigued. Keiko listened to patient's lungs and does hear crackles in his lungs. Keiko is concerned that he may have pneumonia again.  Please advise

## 2024-12-03 NOTE — TELEPHONE ENCOUNTER
Spoke Keiko () advised MD will be signing orders.       Spoke to patient wife Meme (ok per HIPAA) advised that patient should be evaluated in office- stated she is unable to get patient out of the house on her own. States she will see if anyone can help her and will call back to schedule. Advised to call 911 if patient develops worsening symptoms. Verbalized understanding.

## 2024-12-04 ENCOUNTER — APPOINTMENT (OUTPATIENT)
Dept: GENERAL RADIOLOGY | Age: 84
End: 2024-12-04
Attending: NURSE PRACTITIONER
Payer: MEDICARE

## 2024-12-04 ENCOUNTER — HOSPITAL ENCOUNTER (OUTPATIENT)
Age: 84
Discharge: HOME | End: 2024-12-04
Payer: MEDICARE

## 2024-12-04 VITALS
HEART RATE: 79 BPM | TEMPERATURE: 98 F | OXYGEN SATURATION: 100 % | RESPIRATION RATE: 18 BRPM | DIASTOLIC BLOOD PRESSURE: 56 MMHG | SYSTOLIC BLOOD PRESSURE: 120 MMHG

## 2024-12-04 DIAGNOSIS — R05.1 ACUTE COUGH: Primary | ICD-10-CM

## 2024-12-04 LAB
#MXD IC: 1 X10ˆ3/UL (ref 0.1–1)
BUN BLD-MCNC: 18 MG/DL (ref 7–18)
CHLORIDE BLD-SCNC: 107 MMOL/L (ref 98–112)
CO2 BLD-SCNC: 25 MMOL/L (ref 21–32)
CREAT BLD-MCNC: 1.4 MG/DL
EGFRCR SERPLBLD CKD-EPI 2021: 50 ML/MIN/1.73M2 (ref 60–?)
GLUCOSE BLD-MCNC: 100 MG/DL (ref 70–99)
HCT VFR BLD AUTO: 43.1 %
HCT VFR BLD CALC: 45 %
HGB BLD-MCNC: 12.6 G/DL
ISTAT IONIZED CALCIUM FOR CHEM 8: 1.25 MMOL/L (ref 1.12–1.32)
LYMPHOCYTES # BLD AUTO: 1.6 X10ˆ3/UL (ref 1–4)
LYMPHOCYTES NFR BLD AUTO: 20.3 %
MCH RBC QN AUTO: 28.4 PG (ref 26–34)
MCHC RBC AUTO-ENTMCNC: 29.2 G/DL (ref 31–37)
MCV RBC AUTO: 97.3 FL (ref 80–100)
MIXED CELL %: 12.3 %
NEUTROPHILS # BLD AUTO: 5.2 X10ˆ3/UL (ref 1.5–7.7)
NEUTROPHILS NFR BLD AUTO: 67.4 %
PLATELET # BLD AUTO: 273 X10ˆ3/UL (ref 150–450)
POTASSIUM BLD-SCNC: 4.1 MMOL/L (ref 3.6–5.1)
RBC # BLD AUTO: 4.43 X10ˆ6/UL
SODIUM BLD-SCNC: 144 MMOL/L (ref 136–145)
WBC # BLD AUTO: 7.8 X10ˆ3/UL (ref 4–11)

## 2024-12-04 PROCEDURE — 99213 OFFICE O/P EST LOW 20 MIN: CPT | Performed by: NURSE PRACTITIONER

## 2024-12-04 PROCEDURE — 80047 BASIC METABLC PNL IONIZED CA: CPT | Performed by: NURSE PRACTITIONER

## 2024-12-04 PROCEDURE — 85025 COMPLETE CBC W/AUTO DIFF WBC: CPT | Performed by: NURSE PRACTITIONER

## 2024-12-04 PROCEDURE — 71046 X-RAY EXAM CHEST 2 VIEWS: CPT | Performed by: NURSE PRACTITIONER

## 2024-12-04 NOTE — ED PROVIDER NOTES
Patient Seen in: [facility]      History     Chief Complaint   Patient presents with    Cough     Entered by patient     Stated Complaint: Cough  Subjective:   84-year-old male with hypertension, hypothyroidism, abdominal aortic aneurysm, previous stroke with left-sided weakness, recurrent pneumonia presents from home.  Patient was sent as a referral from his primary doctor.  Patient was admitted to the hospital on 10/25.  He was discharged to rehab and was subsequently discharged home about 1 week ago.  He has a home health nurse visiting.  The nurse was concerned that she heard crackles on auscultation.  Patient feels like he is doing well.  He has had persistent cough.  No shortness of breath.  No fever.  Some fatigue.  He is no longer taking antibiotics.  They do have an appointment with her primary doctor today but the wife does not feel like they can make it to the appointment.    The history is provided by the patient and the spouse. No  was used.     Objective:   No pertinent past medical history.        HISTORY:  Past Medical History:    Abdominal aortic aneurysm (AAA) (HCC)    Back problem    back pain    Disorder of thyroid    hypothyroidism    Falls frequently    High blood pressure    High cholesterol    Hx of pneumothorax    Muscle weakness    LUE hemiplegia    Osteoarthritis    PNA (pneumonia)    Pneumonia due to organism    Stroke (HCC)    left sided weakness      Past Surgical History:   Procedure Laterality Date    Bronchoscopy,diagnostic  10/29/2024    Dr. 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 - Dr. Stahl    Tonsillectomy      at age 24      Family History   Problem Relation Age of Onset    Cancer Father         Lung       Social History     Socioeconomic History    Marital status:     Tobacco Use    Smoking status: Former     Current packs/day: 0.00     Average packs/day: 2.0 packs/day for 50.0 years (100.0 ttl pk-yrs)     Types: Cigarettes     Start date: 1958     Quit date: 2008     Years since quittin.8     Passive exposure: Never    Smokeless tobacco: Never   Vaping Use    Vaping status: Never Used   Substance and Sexual Activity    Alcohol use: Not Currently     Comment: 1-2 drinks/monthly    Drug use: No   Other Topics Concern    Caffeine Concern Yes     Comment: 1 cup of coffee daily    Exercise No   Social History Narrative    The patient uses the following assistive device(s):  quad cane and pick-up walker.      The patient does live in a home with stairs.     Social Drivers of Health     Financial Resource Strain: Low Risk  (2023)    Financial Resource Strain     Difficulty of Paying Living Expenses: Not very hard     Med Affordability: No   Food Insecurity: No Food Insecurity (10/25/2024)    Food Insecurity     Food Insecurity: Never true   Transportation Needs: No Transportation Needs (10/25/2024)    Transportation Needs     Lack of Transportation: No   Housing Stability: Low Risk  (10/25/2024)    Housing Stability     Housing Instability: No          No pertinent past surgical history.            No pertinent social history.          Review of Systems    Positive for stated complaint: Cough (Entered by patient)    Other systems are as noted in HPI.  Constitutional and vital signs reviewed.      All other systems reviewed and negative except as noted above.    Physical Exam     ED Triage Vitals [24 0950]   /56   Pulse 79   Resp 18   Temp 97.6 °F (36.4 °C)   Temp src Oral   SpO2 100 %   O2 Device None (Room air)     Current:/56   Pulse 79   Temp 97.6 °F (36.4 °C) (Oral)   Resp 18   SpO2 100%     Physical Exam  Vitals and nursing note reviewed.   Constitutional:       General: He is not in acute distress.     Appearance: Normal  appearance. He is not ill-appearing or toxic-appearing.      Comments: Elderly and frail   HENT:      Head: Normocephalic and atraumatic.      Nose: Nose normal.      Mouth/Throat:      Mouth: Mucous membranes are moist.      Pharynx: Oropharynx is clear.   Eyes:      Pupils: Pupils are equal, round, and reactive to light.   Cardiovascular:      Rate and Rhythm: Normal rate and regular rhythm.      Pulses: Normal pulses.   Pulmonary:      Effort: Pulmonary effort is normal. No respiratory distress.      Breath sounds: Normal breath sounds.      Comments: No persistent cough on exam. Lungs clear.  No adventitious lung sounds.  No distress.  No hypoxia.  Pulse ox 100% ra. Which is normal    Abdominal:      General: Abdomen is flat.      Palpations: Abdomen is soft.   Musculoskeletal:         General: No signs of injury. Normal range of motion.      Cervical back: Normal range of motion and neck supple.   Skin:     General: Skin is warm and dry.      Capillary Refill: Capillary refill takes less than 2 seconds.   Neurological:      General: No focal deficit present.      Mental Status: He is alert and oriented to person, place, and time.      GCS: GCS eye subscore is 4. GCS verbal subscore is 5. GCS motor subscore is 6.      Comments: Chronic left sided deficit s/p stroke. In a wheelchair   Psychiatric:         Mood and Affect: Mood normal.         Behavior: Behavior normal.         Thought Content: Thought content normal.         Judgment: Judgment normal.         ED Course   Radiology:  XR CHEST PA + LAT CHEST (CPT=71046)    Result Date: 12/4/2024  CONCLUSION:  Patchy opacities in the left lower lobe, improved compared to the October 2024 CT.  These findings could relate to mild residual/resolving pneumonia, atelectasis or scarring.    Dictated by (CST): Marques Hernandez MD on 12/04/2024 at 10:33 AM     Finalized by (CST): Marques Hernandez MD on 12/04/2024 at 10:35 AM         Labs Reviewed   POCT CBC - Abnormal;  Notable for the following components:       Result Value    HGB IC 12.6 (*)     MCHC IC 29.2 (*)     All other components within normal limits   POCT ISTAT CHEM8 CARTRIDGE - Abnormal; Notable for the following components:    ISTAT Glucose 100 (*)     ISTAT Creatinine 1.40 (*)     eGFR-Cr 50 (*)     All other components within normal limits     Recent Results (from the past 24 hours)   POCT CBC    Collection Time: 12/04/24 10:13 AM   Result Value Ref Range    WBC IC 7.8 4.0 - 11.0 x10ˆ3/uL    RBC IC 4.43 3.80 - 5.80 X10ˆ6/uL    HGB IC 12.6 (L) 13.0 - 17.5 g/dL    HCT IC 43.1 39.0 - 53.0 %    MCV IC 97.3 80.0 - 100.0 fL    MCH IC 28.4 26.0 - 34.0 pg    MCHC IC 29.2 (L) 31.0 - 37.0 g/dL    PLT .0 150.0 - 450.0 X10ˆ3/uL    # Neutrophil 5.2 1.5 - 7.7 X10ˆ3/uL    # Lymphocyte 1.6 1.0 - 4.0 X10ˆ3/uL    # Mixed Cells 1.0 0.1 - 1.0 X10ˆ3/uL    Neutrophil % 67.4 %    Lymphocyte % 20.3 %    Mixed Cell % 12.3 %   POCT ISTAT chem8 cartridge    Collection Time: 12/04/24 10:41 AM   Result Value Ref Range    ISTAT Sodium 144 136 - 145 mmol/L    ISTAT BUN 18 7 - 18 mg/dL    ISTAT Potassium 4.1 3.6 - 5.1 mmol/L    ISTAT Chloride 107 98 - 112 mmol/L    ISTAT Ionized Calcium 1.25 1.12 - 1.32 mmol/L    ISTAT Hematocrit 45 37 - 53 %    ISTAT Glucose 100 (H) 70 - 99 mg/dL    ISTAT TCO2 25 21 - 32 mmol/L    ISTAT Creatinine 1.40 (H) 0.70 - 1.30 mg/dL    eGFR-Cr 50 (L) >=60 mL/min/1.73m2     MDM     Medical Decision Making  Differential diagnoses reflecting the complexity of care include: Pneumonia, post pneumonia persistent cough, anemia, sepsis  Patient here for evaluation of abnormal breath sounds after visit from a home health nurse.  Recent pneumonia.  Having persistent cough but otherwise doing well.  No fever.  No shortness of breath.  Lungs are essentially clear.  Diminished throughout but may be from poor inspiratory effort.  No crackles appreciated.  No respiratory distress.  No hypoxia.  Pulse ox 100% room air which is  normal.  Chest x-ray today showing improvement of left lower lobe opacities.  Discussed these findings with the patient and his wife.  Most likely improving pneumonia.  Screening labs obtained and are reassuring.  WBC 7.8 today, was 19 upon admission to the hospital in October.  Hemoglobin today is 12.6 which is improved from 11.8 on 10/30.  Chemistry is showing creatinine of 1.4, was 1.2 on 10/25 and 1.18 on 10/30  Presentation consistent with improving pneumonia.  Does not appear septic here.  Stable vital signs.  No tachycardia.  No hypotension.  No fever.  Shared decision making with patient and his wife.  Discussed the limitations of the immediate care setting.  No CT chest available here.  No lactate or blood cultures.  There does not appear to be in need for antibiotics today but the patient needs close follow-up with his primary doctor    Plan of Care: Call Dr. Claudio now to discuss symptoms and results.  Go to the emergency room if shortness of breath, fever, weakness  The case was discussed with attending Dr Simeon. They are in agreement with the plan of care.     Results and plan of care discussed with the patient/family. They are in agreement with discharge. They understand to follow up with their primary doctor or the referral physician for further evaluation, especially if no improvement.  Also discussed the limitations of immediate care, patient is aware that if symptoms are worse they should go to the emergency room. Verbal and written discharge instructions were given.     My independent interpretation of studies of: [id number] comparison of AP views of chest x-ray from today and 10/25.  Opacities have improved, bases of the lungs are clearing  Diagnostic tests and medications considered but not ordered were: CT chest, lactic acid, blood cultures, BNP. no indication for further antibiotics at this time.   Shared decision making was done by: Patient agreeable to repeat chest x-ray  today  Comorbidities that add complexity to management include: Pneumonia, stroke, hypothyroidism, htn  External chart review was done and was noted: admit on 10/25 for pneumonia - not noted on xray, CT chest obtained  History obtained by an independent source was from: wife  Discussions and management was done with: PMD Dr Claudio notified of visit and results  Social determinants of health that affect care: Age              Problems Addressed:  Acute cough: acute illness or injury    Amount and/or Complexity of Data Reviewed  Independent Historian: spouse  Labs: ordered. Decision-making details documented in ED Course.  Radiology: ordered and independent interpretation performed. Decision-making details documented in ED Course.        Disposition and Plan     Clinical Impression:  1. Acute cough         Disposition:  Discharge  12/4/2024 11:10 am    Follow-up:  Kandice Claudio DO  [address] [place] [id number]  [phone]    Call today            Medications Prescribed:  Discharge Medication List as of 12/4/2024 11:13 AM

## 2024-12-04 NOTE — TELEPHONE ENCOUNTER
Called patient to confirm receipt of voicemail and cancelation of appointment.     Spoke to wife, Meme (Verified HIPAA). Meme stated she took patient to Urgent Care today.     Meme stated no appointment needed right now.

## 2024-12-11 ENCOUNTER — TELEPHONE (OUTPATIENT)
Dept: INTERNAL MEDICINE CLINIC | Facility: CLINIC | Age: 84
End: 2024-12-11

## 2024-12-11 NOTE — TELEPHONE ENCOUNTER
Please advise - called Mervat MATSON [facility] - she wants to know if they can order a commode or if our office will order   To

## 2024-12-13 NOTE — TELEPHONE ENCOUNTER
Called Mervat CHOW with OT and relayed madie forte they can order commode - verbalized understanding

## 2025-01-10 ENCOUNTER — TELEPHONE (OUTPATIENT)
Dept: INTERNAL MEDICINE CLINIC | Facility: CLINIC | Age: 85
End: 2025-01-10

## 2025-01-10 RX ORDER — BENZONATATE 200 MG/1
200 CAPSULE ORAL 3 TIMES DAILY PRN
Qty: 42 CAPSULE | Refills: 0 | Status: SHIPPED | OUTPATIENT
Start: 2025-01-10

## 2025-01-10 NOTE — TELEPHONE ENCOUNTER
Spoke with spouse, Meme (HIPAA verified) and relayed MD's message. Verbalized understanding and agreement with plan.   Meme reports that pt has  chronic cough. Not acute.   Advised to contact the office with any questions or concerns.

## 2025-01-10 NOTE — TELEPHONE ENCOUNTER
Rx sent benzonatate PRN sent, it is a cough suppressant however if pt is having difficulty expectorating he should take OTC mucinex.    Thank you!

## 2025-01-10 NOTE — TELEPHONE ENCOUNTER
To Dr Claudio  Please advise     Spoke with wife Meme (OK per HIPAA) regarding request for Tessalon Perles. Reports has occasional coughing spasm, was prescribed Tessalon Perles during 6/9/24  admission and Meme administers med to patient as needed.   Denies any fever, wheezing, and shortness of breath. Inquiring if they could have an Prescription for med again.  It is not essential, but helpful when he has coughing spasms.

## 2025-01-10 NOTE — TELEPHONE ENCOUNTER
Ros / Sintia is calling she saw patient recently patient has been trying to cough up phlegm, he is unable to cough up the phlegm.  This has been going on for 1 week    Patient's wife in the past he was prescribed Stephanie Pearls, can patient get a script called in [facility]    Phone [phone] confidential VM

## 2025-01-21 ENCOUNTER — TELEPHONE (OUTPATIENT)
Dept: INTERNAL MEDICINE CLINIC | Facility: CLINIC | Age: 85
End: 2025-01-21

## 2025-01-21 NOTE — TELEPHONE ENCOUNTER
Mile / Sintia is calling patient is requesting that she him this week instead of next week.    She will send order to be signed    Phone [phone], confidential VM

## 2025-01-29 ENCOUNTER — TELEPHONE (OUTPATIENT)
Dept: INTERNAL MEDICINE CLINIC | Facility: CLINIC | Age: 85
End: 2025-01-29

## 2025-01-29 NOTE — TELEPHONE ENCOUNTER
Tamiko RN from [facility], [phone] called to let nurse know Dr. Claudio's message has been forwarded to Ros MAY.   [facility] main #[phone]

## 2025-01-29 NOTE — TELEPHONE ENCOUNTER
Called and spoke with Meme/spouse. HIPAA versified and stated Ros home health RN Ros did contact her and relayed treatment plan

## 2025-01-29 NOTE — TELEPHONE ENCOUNTER
Ros from [facility] called. She is reaching out to see if anyone needed to talk with her.

## 2025-01-29 NOTE — TELEPHONE ENCOUNTER
Ros MAY from [facility], [phone] (Secure Voicemail) called to inform Dr. Claudio of below information from today's visit with patient.   Ros stated patient's wife,  Meme told her that patient  is having coughing spells while eating. Productive cough with thick grayish phlegm. Pronounced Hiccups during dinner that last about 30 minutes  O2 was 95 on room air today  Lungs sounded great   No Fever  Protocol for aspiration reviewed and given today to patient  Call back needed with any additional guidance.

## 2025-01-29 NOTE — TELEPHONE ENCOUNTER
Called jessica with [facility] and relayed  message - left on confidential VM   Also to call our office ,also LMTCB with [facility] main number   Ambulatory

## 2025-01-29 NOTE — TELEPHONE ENCOUNTER
Patient's wife Meme, (Verified HIPAA) called stating she was returning nurse call she missed.     Please advise.     Meme's #[phone]

## 2025-02-05 ENCOUNTER — TELEPHONE (OUTPATIENT)
Dept: INTERNAL MEDICINE CLINIC | Facility: CLINIC | Age: 85
End: 2025-02-05

## 2025-02-05 RX ORDER — ATORVASTATIN CALCIUM 40 MG/1
40 TABLET, FILM COATED ORAL NIGHTLY
Qty: 90 TABLET | Refills: 3 | Status: SHIPPED | OUTPATIENT
Start: 2025-02-05

## 2025-02-05 RX ORDER — CLOPIDOGREL BISULFATE 75 MG/1
75 TABLET ORAL DAILY
Qty: 90 TABLET | Refills: 3 | Status: SHIPPED | OUTPATIENT
Start: 2025-02-05

## 2025-02-05 NOTE — TELEPHONE ENCOUNTER
To Dr. Claudio for initial RX(s)     Previously prescribed by patient's former PCP, Dr. Hampton.     Established with you on 8/27/2024, LOV 11/19/2024.     Thank you!

## 2025-03-11 ENCOUNTER — APPOINTMENT (OUTPATIENT)
Dept: GENERAL RADIOLOGY | Facility: HOSPITAL | Age: 85
End: 2025-03-11
Attending: EMERGENCY MEDICINE
Payer: MEDICARE

## 2025-03-11 ENCOUNTER — HOSPITAL ENCOUNTER (INPATIENT)
Facility: HOSPITAL | Age: 85
LOS: 4 days | Discharge: NURSING, REHAB OR LONG-TERM CARE | End: 2025-03-16
Attending: EMERGENCY MEDICINE | Admitting: INTERNAL MEDICINE
Payer: MEDICARE

## 2025-03-11 DIAGNOSIS — J69.0 ASPIRATION PNEUMONIA OF LOWER LOBE, UNSPECIFIED ASPIRATION PNEUMONIA TYPE, UNSPECIFIED LATERALITY (HCC): Primary | ICD-10-CM

## 2025-03-11 LAB
ALBUMIN SERPL-MCNC: 4.2 G/DL (ref 3.2–4.8)
ALP LIVER SERPL-CCNC: 134 U/L
ALT SERPL-CCNC: <7 U/L
ANION GAP SERPL CALC-SCNC: 8 MMOL/L (ref 0–18)
AST SERPL-CCNC: 12 U/L (ref ?–34)
BASOPHILS # BLD AUTO: 0.06 X10(3) UL (ref 0–0.2)
BASOPHILS NFR BLD AUTO: 0.4 %
BILIRUB DIRECT SERPL-MCNC: 0.1 MG/DL (ref ?–0.3)
BILIRUB SERPL-MCNC: 0.3 MG/DL (ref 0.2–1.1)
BUN BLD-MCNC: 17 MG/DL (ref 9–23)
BUN/CREAT SERPL: 13 (ref 10–20)
CALCIUM BLD-MCNC: 9.4 MG/DL (ref 8.7–10.4)
CHLORIDE SERPL-SCNC: 108 MMOL/L (ref 98–112)
CO2 SERPL-SCNC: 25 MMOL/L (ref 21–32)
CREAT BLD-MCNC: 1.31 MG/DL
DEPRECATED RDW RBC AUTO: 47.4 FL (ref 35.1–46.3)
EGFRCR SERPLBLD CKD-EPI 2021: 54 ML/MIN/1.73M2 (ref 60–?)
EOSINOPHIL # BLD AUTO: 0.03 X10(3) UL (ref 0–0.7)
EOSINOPHIL NFR BLD AUTO: 0.2 %
ERYTHROCYTE [DISTWIDTH] IN BLOOD BY AUTOMATED COUNT: 13.7 % (ref 11–15)
FLUAV + FLUBV RNA SPEC NAA+PROBE: NEGATIVE
FLUAV + FLUBV RNA SPEC NAA+PROBE: NEGATIVE
GLUCOSE BLD-MCNC: 135 MG/DL (ref 70–99)
HCT VFR BLD AUTO: 41.2 %
HGB BLD-MCNC: 13.3 G/DL
IMM GRANULOCYTES # BLD AUTO: 0.09 X10(3) UL (ref 0–1)
IMM GRANULOCYTES NFR BLD: 0.6 %
LYMPHOCYTES # BLD AUTO: 1.46 X10(3) UL (ref 1–4)
LYMPHOCYTES NFR BLD AUTO: 9.2 %
MCH RBC QN AUTO: 30.4 PG (ref 26–34)
MCHC RBC AUTO-ENTMCNC: 32.3 G/DL (ref 31–37)
MCV RBC AUTO: 94.1 FL
MONOCYTES # BLD AUTO: 1.64 X10(3) UL (ref 0.1–1)
MONOCYTES NFR BLD AUTO: 10.3 %
NEUTROPHILS # BLD AUTO: 12.62 X10 (3) UL (ref 1.5–7.7)
NEUTROPHILS # BLD AUTO: 12.62 X10(3) UL (ref 1.5–7.7)
NEUTROPHILS NFR BLD AUTO: 79.3 %
NT-PROBNP SERPL-MCNC: 352 PG/ML (ref ?–450)
OSMOLALITY SERPL CALC.SUM OF ELEC: 296 MOSM/KG (ref 275–295)
PLATELET # BLD AUTO: 182 10(3)UL (ref 150–450)
POTASSIUM SERPL-SCNC: 3.8 MMOL/L (ref 3.5–5.1)
PROT SERPL-MCNC: 7.5 G/DL (ref 5.7–8.2)
RBC # BLD AUTO: 4.38 X10(6)UL
RSV RNA SPEC NAA+PROBE: NEGATIVE
SARS-COV-2 RNA RESP QL NAA+PROBE: NOT DETECTED
SODIUM SERPL-SCNC: 141 MMOL/L (ref 136–145)
WBC # BLD AUTO: 15.9 X10(3) UL (ref 4–11)

## 2025-03-11 PROCEDURE — 99223 1ST HOSP IP/OBS HIGH 75: CPT | Performed by: INTERNAL MEDICINE

## 2025-03-11 PROCEDURE — 71045 X-RAY EXAM CHEST 1 VIEW: CPT | Performed by: EMERGENCY MEDICINE

## 2025-03-11 NOTE — ED INITIAL ASSESSMENT (HPI)
Patient complains of feeling generally weak since today, cough sounds congested, denies chest pain, complains of chronic back pain

## 2025-03-12 LAB
ALBUMIN SERPL-MCNC: 4.4 G/DL (ref 3.2–4.8)
ALBUMIN/GLOB SERPL: 1.4 {RATIO} (ref 1–2)
ALP LIVER SERPL-CCNC: 119 U/L
ALT SERPL-CCNC: <7 U/L
ANION GAP SERPL CALC-SCNC: 10 MMOL/L (ref 0–18)
AST SERPL-CCNC: 14 U/L (ref ?–34)
BASOPHILS # BLD AUTO: 0.05 X10(3) UL (ref 0–0.2)
BASOPHILS NFR BLD AUTO: 0.4 %
BILIRUB SERPL-MCNC: 0.5 MG/DL (ref 0.2–1.1)
BUN BLD-MCNC: 15 MG/DL (ref 9–23)
BUN/CREAT SERPL: 11.5 (ref 10–20)
CALCIUM BLD-MCNC: 8.9 MG/DL (ref 8.7–10.4)
CHLORIDE SERPL-SCNC: 110 MMOL/L (ref 98–112)
CO2 SERPL-SCNC: 24 MMOL/L (ref 21–32)
CREAT BLD-MCNC: 1.31 MG/DL
DEPRECATED RDW RBC AUTO: 48.2 FL (ref 35.1–46.3)
EGFRCR SERPLBLD CKD-EPI 2021: 54 ML/MIN/1.73M2 (ref 60–?)
EOSINOPHIL # BLD AUTO: 0.03 X10(3) UL (ref 0–0.7)
EOSINOPHIL NFR BLD AUTO: 0.2 %
ERYTHROCYTE [DISTWIDTH] IN BLOOD BY AUTOMATED COUNT: 13.6 % (ref 11–15)
EST. AVERAGE GLUCOSE BLD GHB EST-MCNC: 128 MG/DL (ref 68–126)
GLOBULIN PLAS-MCNC: 3.1 G/DL (ref 2–3.5)
GLUCOSE BLD-MCNC: 145 MG/DL (ref 70–99)
GLUCOSE BLDC GLUCOMTR-MCNC: 105 MG/DL (ref 70–99)
GLUCOSE BLDC GLUCOMTR-MCNC: 111 MG/DL (ref 70–99)
GLUCOSE BLDC GLUCOMTR-MCNC: 112 MG/DL (ref 70–99)
GLUCOSE BLDC GLUCOMTR-MCNC: 130 MG/DL (ref 70–99)
HBA1C MFR BLD: 6.1 % (ref ?–5.7)
HCT VFR BLD AUTO: 40 %
HGB BLD-MCNC: 13 G/DL
IMM GRANULOCYTES # BLD AUTO: 0.06 X10(3) UL (ref 0–1)
IMM GRANULOCYTES NFR BLD: 0.5 %
LYMPHOCYTES # BLD AUTO: 1.46 X10(3) UL (ref 1–4)
LYMPHOCYTES NFR BLD AUTO: 11.7 %
MCH RBC QN AUTO: 31.4 PG (ref 26–34)
MCHC RBC AUTO-ENTMCNC: 32.5 G/DL (ref 31–37)
MCV RBC AUTO: 96.6 FL
MONOCYTES # BLD AUTO: 1.19 X10(3) UL (ref 0.1–1)
MONOCYTES NFR BLD AUTO: 9.6 %
MRSA DNA SPEC QL NAA+PROBE: POSITIVE
NEUTROPHILS # BLD AUTO: 9.65 X10 (3) UL (ref 1.5–7.7)
NEUTROPHILS # BLD AUTO: 9.65 X10(3) UL (ref 1.5–7.7)
NEUTROPHILS NFR BLD AUTO: 77.6 %
OSMOLALITY SERPL CALC.SUM OF ELEC: 301 MOSM/KG (ref 275–295)
PLATELET # BLD AUTO: 165 10(3)UL (ref 150–450)
PLATELETS.RETICULATED NFR BLD AUTO: 10.9 % (ref 0–7)
POTASSIUM SERPL-SCNC: 4.1 MMOL/L (ref 3.5–5.1)
PROT SERPL-MCNC: 7.5 G/DL (ref 5.7–8.2)
RBC # BLD AUTO: 4.14 X10(6)UL
SODIUM SERPL-SCNC: 144 MMOL/L (ref 136–145)
WBC # BLD AUTO: 12.4 X10(3) UL (ref 4–11)

## 2025-03-12 PROCEDURE — 99233 SBSQ HOSP IP/OBS HIGH 50: CPT | Performed by: HOSPITALIST

## 2025-03-12 RX ORDER — HEPARIN SODIUM 5000 [USP'U]/ML
5000 INJECTION, SOLUTION INTRAVENOUS; SUBCUTANEOUS EVERY 12 HOURS SCHEDULED
Status: DISCONTINUED | OUTPATIENT
Start: 2025-03-12 | End: 2025-03-16

## 2025-03-12 RX ORDER — GABAPENTIN 100 MG/1
300 CAPSULE ORAL 2 TIMES DAILY
Status: DISCONTINUED | OUTPATIENT
Start: 2025-03-12 | End: 2025-03-13

## 2025-03-12 RX ORDER — CLOPIDOGREL BISULFATE 75 MG/1
75 TABLET ORAL NIGHTLY
Status: DISCONTINUED | OUTPATIENT
Start: 2025-03-12 | End: 2025-03-16

## 2025-03-12 RX ORDER — ACETAMINOPHEN 500 MG
500 TABLET ORAL EVERY 4 HOURS PRN
Status: DISCONTINUED | OUTPATIENT
Start: 2025-03-12 | End: 2025-03-13

## 2025-03-12 RX ORDER — AZITHROMYCIN 250 MG/1
500 TABLET, FILM COATED ORAL DAILY
Status: COMPLETED | OUTPATIENT
Start: 2025-03-12 | End: 2025-03-14

## 2025-03-12 RX ORDER — LEVOTHYROXINE SODIUM 100 UG/1
100 TABLET ORAL
Status: DISCONTINUED | OUTPATIENT
Start: 2025-03-12 | End: 2025-03-16

## 2025-03-12 RX ORDER — BENZONATATE 200 MG/1
200 CAPSULE ORAL 3 TIMES DAILY PRN
Status: DISCONTINUED | OUTPATIENT
Start: 2025-03-12 | End: 2025-03-16

## 2025-03-12 RX ORDER — GABAPENTIN 600 MG/1
600 TABLET ORAL DAILY
Status: DISCONTINUED | OUTPATIENT
Start: 2025-03-12 | End: 2025-03-16

## 2025-03-12 RX ORDER — ATORVASTATIN CALCIUM 40 MG/1
40 TABLET, FILM COATED ORAL NIGHTLY
Status: DISCONTINUED | OUTPATIENT
Start: 2025-03-12 | End: 2025-03-16

## 2025-03-12 NOTE — RESPIRATORY THERAPY NOTE
The patient had an order for a nebulizer with metaneb. Upon entering the room, I mentioned to the patient's mother that she didn't want the metaneb. The patient's mother said, \"Who said that?\" I mentioned to her that there was a note from the RT from the night before.  The mother, said is not  true. I apologized and told her that perhaps there had been a misunderstanding. I told the patient's mother that I was going to start the treatment with metaneb. The mother agreed.I started the nebulizer via metaneb, and after 3 minutes, the patient began to get uncomfortable. The patient's mother began to scream, telling me to stop using it because her daughter didn't like the treatment. The mother told me it was better if I put on the mask so I could feel what her daughter was feeling. I immediately took the metaneb off the patient.   Next time the mother wants only the nebulizer with the regular mask. Mother REFUSED METANEB. RN was informed , order was discontinued.

## 2025-03-12 NOTE — ED QUICK NOTES
Rounding Completed    Plan of Care reviewed. Waiting for transport to bring patient upstairs.  Elimination needs assessed.  Patient resting on stretcher, with visible and regular respirations.   Patient continues to be on 2L O2 via NC.    Bed is locked and in lowest position. Call light within reach.

## 2025-03-12 NOTE — H&P
[facility]  part of [facility]    History and Physical    Cody Fitzgerald Patient Status:  Emergency    1940 MRN [id number]   Location [facility] EMERGENCY DEPARTMENT Attending Fazal Gallagher MD   Hosp Day # 0 PCP Kandice Claudio DO     Date:  3/11/2025  Date of Admission:  3/11/2025    History provided by:patient  HPI:     Chief Complaint   Patient presents with    Malaise     HPI    84-year-old male presenting with shortness of breath and progressive weakness. The patient has a long-standing history of weakness and history of recurrent aspiration PNA.  He was admitted to the hospital at the end of  for aspiration pneumonia, eventually being discharged to acute rehab. His weakness is multifactorial, including a history of  left-sided weakness stemming from a previous CVA. The patient also experiences low-grade pain complicated by radiculopathy.    Regarding aspiration PNA has been admitted multiple times requiring treatment with antibiotics in the past. A swallow study was performed in 2024, which the patient passed. During previous hospitalizations, the patient has been treated with IV diuretics for fluid management.    The patient's medical history is significant for hypothyroidism, hypertension, hyperlipidemia, osteoarthritis, diastolic dysfunction, dilated ascending aorta, diabetes, and aortic aneurysm status post-endograft repair. The patient's home regimen for hypertension includes amlodipine 5 mg daily.    History     Past Medical History:    Abdominal aortic aneurysm (AAA)    Back problem    back pain    Disorder of thyroid    hypothyroidism    Falls frequently    High blood pressure    High cholesterol    Hx of pneumothorax    Muscle weakness    LUE hemiplegia    Osteoarthritis    PNA (pneumonia)    Pneumonia due to organism    Stroke (HCC)    left sided weakness     Past Surgical History:   Procedure Laterality Date    Bronchoscopy,diagnostic   10/29/2024    Dr. 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 - Dr. Stahl    Tonsillectomy      at age 24     Family History   Problem Relation Age of Onset    Cancer Father         Lung      Social History:  Social History     Socioeconomic History    Marital status:    Tobacco Use    Smoking status: Former     Current packs/day: 0.00     Average packs/day: 2.0 packs/day for 50.0 years (100.0 ttl pk-yrs)     Types: Cigarettes     Start date: 1958     Quit date: 2008     Years since quittin.1     Passive exposure: Never    Smokeless tobacco: Never   Vaping Use    Vaping status: Never Used   Substance and Sexual Activity    Alcohol use: Not Currently     Comment: 1-2 drinks/monthly    Drug use: No   Other Topics Concern    Caffeine Concern Yes     Comment: 1 cup of coffee daily    Exercise No   Social History Narrative    The patient uses the following assistive device(s):  quad cane and pick-up walker.      The patient does live in a home with stairs.     Social Drivers of Health     Food Insecurity: No Food Insecurity (10/25/2024)    Food Insecurity     Food Insecurity: Never true   Transportation Needs: No Transportation Needs (10/25/2024)    Transportation Needs     Lack of Transportation: No   Housing Stability: Low Risk  (10/25/2024)    Housing Stability     Housing Instability: No     Allergies/Medications:   Allergies: Allergies[1]  (Not in a hospital admission)      Review of Systems:     Constitutional:  Positive for fatigue.   HENT: Negative.     Eyes: Negative.    Respiratory:  Positive for cough.    Cardiovascular: Negative.    Gastrointestinal: Negative.    Endocrine: Negative.    Genitourinary: Negative.    Musculoskeletal: Negative.    Skin: Negative.    Allergic/Immunologic: Negative.     Neurological:  Positive for weakness.   Hematological: Negative.    Psychiatric/Behavioral: Negative.         Physical Exam:   Vital Signs:  Blood pressure 136/62, pulse 78, temperature 98.8 °F (37.1 °C), temperature source Oral, resp. rate 20, weight 210 lb (95.3 kg), SpO2 95%.  Physical Exam  Constitutional:       Appearance: He is ill-appearing.   HENT:      Head: Normocephalic.      Nose: Nose normal.      Mouth/Throat:      Mouth: Mucous membranes are moist.   Eyes:      Pupils: Pupils are equal, round, and reactive to light.   Cardiovascular:      Rate and Rhythm: Normal rate.   Pulmonary:      Effort: Pulmonary effort is normal.      Breath sounds: Rales present.   Abdominal:      General: Abdomen is flat.   Skin:     General: Skin is warm.   Neurological:      Mental Status: He is alert and oriented to person, place, and time.           Results:     Lab Results   Component Value Date    WBC 15.9 (H) 03/11/2025    HGB 13.3 03/11/2025    HCT 41.2 03/11/2025    .0 03/11/2025    CREATSERUM 1.31 (H) 03/11/2025    BUN 17 03/11/2025     03/11/2025    K 3.8 03/11/2025     03/11/2025    CO2 25.0 03/11/2025     (H) 03/11/2025    CA 9.4 03/11/2025    ALB 4.6 10/25/2024    ALKPHO 126 (H) 10/25/2024    BILT 0.8 10/25/2024    TP 8.3 (H) 10/25/2024    AST 16 10/25/2024    ALT <7 (L) 10/25/2024    PTT 30.2 10/29/2024    INR 0.99 10/29/2024    TSH 0.733 01/08/2024    PSA 1.1 01/24/2019    DDIMER 3.93 (H) 10/05/2023    CRP 1.75 (H) 11/26/2022    MG 2.2 10/30/2024    PHOS 3.5 10/29/2024    TROP <0.045 05/25/2021    TROPHS 6 10/25/2024     11/23/2022     XR CHEST AP PORTABLE  (CPT=71045)    Result Date: 3/11/2025  CONCLUSION:  1. Cardiomegaly with findings concerning for mild CHF/fluid overload including mild pulmonary edema. 2. New left lower lobe consolidation, which could relate to atelectasis or pneumonia.   Elm-remote  Dictated by (CST): Marques Hernandez MD on 3/11/2025 at 9:56 PM      Finalized by (CST): Marques Hernandez MD on 3/11/2025 at 9:58 PM               Assessment/Plan:        1. Shortness of breath      - 84-year-old male with history of recurrent aspiration pneumonia     - Chest imaging: cardiomegaly, mild pulmonary edema, new left lower lobe consolidation concerning for acute on chronic diastolic CHF exacerbation with concomitant aspiration PNA        - Check BNP levels     - Consider diuretic therapy     - Monitor renal function (creatinine 1.31, possible acute kidney injury)     - Continue antibiotic treatment for suspected pneumonia        2. HTN     -  Monitor BP and continue amlodipine as warranted.     3.  Hypothyroidism      - Continue levothyroxine     - Monitor thyroid function      4. Weakness     - History of osteoarthritis and left sided weakness      - Low-grade pain complicated by radiculopathy     - Physical therapy consult.      5.  Hx of CVA      -  On asa and statin.      diet: Cardiac  ivf: none  dvt prophylaxis: heparin subcutaneous  antibiotics: Unasyn.   tubes: none  central lines: none  code status: full code  dispo: home upon medical clearance    Greater than 70 minutes, >50% time spent counseling and coordinating care regarding treatment and workup.        **Certification      PHYSICIAN Certification of Need for Inpatient Hospitalization - Initial Certification    Patient will require inpatient services that will reasonably be expected to span two midnight's based on the clinical documentation in H+P.   Based on patients current state of illness, I anticipate that, after discharge, patient will require TBD.        Marcos Roberts MD  3/11/2025         [1] No Known Allergies

## 2025-03-12 NOTE — ED QUICK NOTES
Orders for admission, patient is aware of plan and ready to go upstairs. Any questions, please call ED RN Melodie at extension [phone].     Patient Covid vaccination status: Fully vaccinated     COVID Test Ordered in ED: SARS-CoV-2/Flu A and B/RSV by PCR (GeneXpert)    COVID Suspicion at Admission: N/A    Running Infusions:  None    Mental Status/LOC at time of transport: A&Ox3    Other pertinent information:   CIWA score: N/A   NIH score:  N/A

## 2025-03-12 NOTE — SPIRITUAL CARE NOTE
Spiritual Care Visit Note    Patient Name: Cody Fitzgerald Date of Spiritual Care Visit: 25   : 1940 Primary Dx: Aspiration pneumonia of lower lobe, unspecified aspiration pneumonia type, unspecified laterality (HCC)       Referred By: Referral From: Patient    Spiritual Care Taxonomy:    Intended Effects: Aligning care plan with patient's values    Methods: Assist with spiritual/Jewish practices, Demonstrate acceptance, Offer spiritual/Jewish support    Interventions: Acknowledge current situation, Acknowledge response to difficult experience, Active listening, Ask guided questions about elma, Ask guided questions, Ask questions to bring forth feelings, Ask guided questions about the nature and presence of God, [place] for care team member(s), Facilitate communication between patient and/or family member and care team, Explain  role, Prayer for healing, Provide hospitality, Provide compassionate touch, Incorporate cultural and Jewish needs in plan of care    Visit Type/Summary:     - Spiritual Care: Responded to a request for spiritual care and assessed the patient for spiritual care needs. Consulted with RN prior to visit. Offered empathic listening and emotional support. Patient and family expressed appreciation for  visit. Attempted visit. Patient is unavailable. Left a Spiritual Care contact information card. Provided support for Patient's spiritual/Jewish requests. Coordinated Gnosticist Communion and verified NPO status. Offered prayer. Patient has five children, 11 grandchildren, and two great-grandchildren. Patient feels blessed. Been  for 60 years.  remains available for follow up.    Spiritual Care support can be requested via an Epic consult. For urgent/immediate needs, please contact the On Call  at: [place]: ext [phone]    Chaplain Resident, Bhavana Dos Santos, PhD

## 2025-03-12 NOTE — PROGRESS NOTES
[facility]  part of [facility]    Progress Note    Cody Fitzgerald Patient Status:  Inpatient    1940 MRN [id number]   Location [facility] 5SW/SE Attending Lucero Berg MD   Hosp Day # 0 PCP Kandice Claudio DO     Chief Complaint: sob    SUBJECTIVE:    No acute events overnight.  Patient denies chest pain, sob.  No fevers/chills.  No n/v/abd pain.  Feels a little better. Still coughing.     10 ROS completed and otherwise negative.    OBJECTIVE:  Vital signs in last 24 hours:  /59 (BP Location: Right arm)   Pulse 79   Temp 98 °F (36.7 °C) (Oral)   Resp 16   Wt 200 lb 9.6 oz (91 kg)   SpO2 93%   BMI 28.78 kg/m²     Intake/Output:    Intake/Output Summary (Last 24 hours) at 3/12/2025 1309  Last data filed at 3/12/2025 1200  Gross per 24 hour   Intake 310 ml   Output 150 ml   Net 160 ml       Wt Readings from Last 3 Encounters:   25 200 lb 9.6 oz (91 kg)   10/25/24 203 lb 9.6 oz (92.4 kg)   24 203 lb (92.1 kg)       Exam     Gen: No acute distress  Pulm: coarse bs  CV: Heart with regular rate and rhythm  Abd: Abdomen soft, nontender, bowel sounds present  Neuro: No acute focal deficits  MSK: moves extremities  Skin: Warm and dry  Psych: Normal affect  Ext: no c/c/e    Data Review:     Labs:   Lab Results   Component Value Date    WBC 12.4 2025    HGB 13.0 2025    HCT 40.0 2025    .0 2025    CREATSERUM 1.31 2025    BUN 15 2025     2025    K 4.1 2025     2025    CO2 24.0 2025     2025    CA 8.9 2025    ALB 4.4 2025    ALKPHO 119 2025    BILT 0.5 2025    TP 7.5 2025    AST 14 2025    ALT <7 2025       Imaging: Reviewed    XR CHEST AP PORTABLE  (CPT=71045)    Result Date: 3/11/2025  CONCLUSION:  1. Cardiomegaly with findings concerning for mild CHF/fluid overload including mild pulmonary edema. 2. New left lower lobe  consolidation, which could relate to atelectasis or pneumonia.   Elm-remote  Dictated by (CST): Marques Hernandez MD on 3/11/2025 at 9:56 PM     Finalized by (CST): Marques Hernadnez MD on 3/11/2025 at 9:58 PM           Meds:   Current Facility-Administered Medications   Medication Dose Route Frequency    heparin (Porcine) 5000 UNIT/ML injection 5,000 Units  5,000 Units Subcutaneous 2 times per day    acetaminophen (Tylenol Extra Strength) tab 500 mg  500 mg Oral Q4H PRN    azithromycin (Zithromax) tab 500 mg  500 mg Oral Daily    ampicillin-sulbactam (Unasyn) 3 g in sodium chloride 0.9% 100mL IVPB-ADD  3 g Intravenous q6h    atorvastatin (Lipitor) tab 40 mg  40 mg Oral Nightly    benzonatate (Tessalon) cap 200 mg  200 mg Oral TID PRN    clopidogrel (Plavix) tab 75 mg  75 mg Oral Nightly    gabapentin (Neurontin) cap 300 mg  300 mg Oral BID    levothyroxine (Synthroid) tab 100 mcg  100 mcg Oral Before breakfast    gabapentin (Neurontin) tab 600 mg  600 mg Oral Daily         Assessment  Patient Active Problem List   Diagnosis    Left hemiparesis (HCC)    CVA, old, hemiparesis (HCC)    Essential hypertension    Hypercholesterolemia    Hypothyroidism    Benign prostatic hyperplasia with lower urinary tract symptoms    Status post repair of abdominal aortic aneurysm (AAA) using bifurcation graft    Stage 3a chronic kidney disease (HCC)    Primary osteoarthritis involving multiple joints    Chronic bilateral low back pain without sciatica    Fatigue    Aspiration pneumonia of left lower lobe (HCC)    Aspiration pneumonitis (HCC)    Pneumonia due to infectious organism    Pneumonia due to infectious organism, unspecified laterality, unspecified part of lung    Sepsis, due to unspecified organism    Spinal stenosis of lumbar region without neurogenic claudication    Cerebrovascular accident (CVA) (HCC)    Primary osteoarthritis of right knee    Acute bronchospasm    Viral syndrome    Community acquired pneumonia of left  lower lobe of lung    Lumbar spondylosis    Anticoagulant long-term use    Gait disturbance    Dyspnea on exertion    Weakness generalized    Inability to walk    Aspiration pneumonia (HCC)    Primary osteoarthritis of both knees    Acute respiratory failure with hypoxia (HCC)    Aspiration pneumonia, unspecified aspiration pneumonia type, unspecified laterality, unspecified part of lung (HCC)    Leukocytosis, unspecified type    Renal insufficiency    Elevated troponin    Hypoxia    COVID    Acute on chronic respiratory failure with hypoxia (HCC)    Contusion of left hip, initial encounter    History of falling    Personal history of nicotine dependence    Hypertensive chronic kidney disease w stg 1-4/unsp chr kdny    Long term (current) use of opiate analgesic    History of pneumonia    Acute pain of left shoulder    Closed fracture of left proximal humerus    Tendinosis of rotator cuff    Hypernatremia    JOSE (acute kidney injury)    Left lower lobe pneumonia    Acute cystitis with hematuria    Sepsis (HCC)    Febrile illness    Fall, initial encounter    Decubitus ulcer of left buttock, stage 2 (HCC)    Aspiration pneumonia of lower lobe, unspecified aspiration pneumonia type, unspecified laterality (HCC)       Plan:     Possible aspiration pneumonia  - this is recurrent  - speech therapy consulted  - cont IV abx  - cont pulm hygiene - IS, resp therapy to assist    Weakness  - likely worsened due to above  - pt/ot    HTN  - monitor vitals and adjust meds prn    Hypothyroidism  - cont home meds    Hx of CVA  - cont asa and statin     Global A/P  - reviewed labs and vitals from today  - reviewed notes of the day  - cbc, bmp, mag ordered for tomorrow  - discussed need to stay in the hospital today due to above  - cont IV abx  - discussed with patient/RN and care team  - dispo pending clinical course      Lucero Berg MD  3/12/2025  1:09 PM    Supplementary Documentation:   DVT Mechanical Prophylaxis:    SCDs,    DVT Pharmacologic Prophylaxis   Medication    heparin (Porcine) 5000 UNIT/ML injection 5,000 Units                Code Status: DNAR/Comfort Care  Lao: External urinary catheter in place  Lao Duration (in days):   Central line:    DEBORAH: 3/14/2025                           MDM: High complexity- severe exacerbation of chronic illness posing a threat to life. IV meds requiring close inpatient monitoring. I personally spent time on chart/note review, review of labs/imaging, discussion with patient, physical exam, discussion with staff, writing progress note, and discussion of plan of care.

## 2025-03-12 NOTE — ED PROVIDER NOTES
Patient Seen in: [facility] Emergency Department    History     Chief Complaint   Patient presents with    Malaise       HPI    Patient presents to the ED complaining of feeling weak starting today.  His wife states that he is unable to even sit up from the bed.  She also states that his cough is congested and he has had aspiration pneumonia in the past.  She states that he had fever earlier today    History reviewed.   Past Medical History:    Abdominal aortic aneurysm (AAA)    Back problem    back pain    Disorder of thyroid    hypothyroidism    Falls frequently    High blood pressure    High cholesterol    Hx of pneumothorax    Muscle weakness    LUE hemiplegia    Osteoarthritis    PNA (pneumonia)    Pneumonia due to organism    Stroke (HCC)    left sided weakness       History reviewed.   Past Surgical History:   Procedure Laterality Date    Bronchoscopy,diagnostic  10/29/2024    Dr. 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 - Dr. Stahl    Tonsillectomy      at age 24         Medications :  Prescriptions Prior to Admission[1]     Family History   Problem Relation Age of Onset    Cancer Father         Lung        Smoking Status:   Social History     Socioeconomic History    Marital status:    Tobacco Use    Smoking status: Former     Current packs/day: 0.00     Average packs/day: 2.0 packs/day for 50.0 years (100.0 ttl pk-yrs)     Types: Cigarettes     Start date: 1958     Quit date: 2008     Years since quittin.1     Passive exposure: Never    Smokeless tobacco: Never   Vaping Use    Vaping status: Never Used   Substance and Sexual Activity    Alcohol use: Not Currently     Comment: 1-2 drinks/monthly    Drug use: No   Other Topics Concern    Caffeine Concern Yes     Comment: 1 cup of coffee daily     Exercise No       Constitutional and vital signs reviewed.      Social History and Family History elements reviewed from today, pertinent positives to the presenting problem noted.    Physical Exam     ED Triage Vitals [03/11/25 1837]   /57   Pulse 100   Resp 24   Temp 98.8 °F (37.1 °C)   Temp src Oral   SpO2 94 %   O2 Device None (Room air)       All measures to prevent infection transmission during my interaction with the patient were taken. Handwashing was performed prior to and after the exam.  Stethoscope and any equipment used during my examination was cleaned with super sani-cloth germicidal wipes following the exam.     Physical Exam  Vitals and nursing note reviewed.   Constitutional:       General: He is not in acute distress.     Appearance: Normal appearance. He is well-developed. He is ill-appearing.   HENT:      Head: Normocephalic and atraumatic.   Eyes:      General:         Right eye: No discharge.         Left eye: No discharge.      Conjunctiva/sclera: Conjunctivae normal.   Neck:      Trachea: No tracheal deviation.   Cardiovascular:      Rate and Rhythm: Normal rate and regular rhythm.   Pulmonary:      Effort: Pulmonary effort is normal. No respiratory distress.      Breath sounds: No stridor.      Comments: Coarse breath sounds bilaterally  Abdominal:      General: There is no distension.      Palpations: Abdomen is soft.      Tenderness: There is no abdominal tenderness.   Musculoskeletal:         General: No deformity.   Skin:     General: Skin is warm and dry.   Neurological:      Mental Status: He is alert and oriented to person, place, and time. Mental status is at baseline.   Psychiatric:         Mood and Affect: Mood normal.         Behavior: Behavior normal.         ED Course        Labs Reviewed   BASIC METABOLIC PANEL (8) - Abnormal; Notable for the following components:       Result Value    Glucose 135 (*)     Creatinine 1.31 (*)     Calculated Osmolality 296 (*)     eGFR-Cr  54 (*)     All other components within normal limits   CBC WITH DIFFERENTIAL WITH PLATELET - Abnormal; Notable for the following components:    WBC 15.9 (*)     RDW-SD 47.4 (*)     Neutrophil Absolute Prelim 12.62 (*)     Neutrophil Absolute 12.62 (*)     Monocyte Absolute 1.64 (*)     All other components within normal limits   HEPATIC FUNCTION PANEL (7) - Abnormal; Notable for the following components:    ALT <7 (*)     Alkaline Phosphatase 134 (*)     All other components within normal limits   PRO BETA NATRIURETIC PEPTIDE - Normal   SARS-COV-2/FLU A AND B/RSV BY PCR (GENEXPERT) - Normal    Narrative:     This test is intended for the qualitative detection and differentiation of SARS-CoV-2, influenza A, influenza B, and respiratory syncytial virus (RSV) viral RNA in nasopharyngeal or nares swabs from individuals suspected of respiratory viral infection consistent with COVID-19 by their healthcare provider. Signs and symptoms of respiratory viral infection due to SARS-CoV-2, influenza, and RSV can be similar.                                    Test performed using the Xpert Xpress SARS-CoV-2/FLU/RSV (real time RT-PCR)  assay on the [employer] instrument, [employer], [place] [address].                   This test is being used under the Food and Drug Administration's Emergency Use Authorization.                                    The authorized Fact Sheet for Healthcare Providers for this assay is available upon request from the laboratory.   RAINBOW DRAW LAVENDER   RAINBOW DRAW LIGHT GREEN   RAINBOW DRAW GOLD   RAINBOW DRAW BLUE       As Interpreted by me    Imaging Results Available and Reviewed while in ED: XR CHEST AP PORTABLE  (CPT=71045)    Result Date: 3/11/2025  CONCLUSION:  1. Cardiomegaly with findings concerning for mild CHF/fluid overload including mild pulmonary edema. 2. New left lower lobe consolidation, which could relate to atelectasis or pneumonia.   Elm-remote  Dictated by (CST): Marques Hernandez,  MD on 3/11/2025 at 9:56 PM     Finalized by (CST): Marques Hernandez MD on 3/11/2025 at 9:58 PM         ED Medications Administered:   Medications   ampicillin-sulbactam (Unasyn) 3 g in sodium chloride 0.9% 100mL IVPB-ADD (0 g Intravenous Stopped 3/11/25 2320)         MDM     Vitals:    03/11/25 2147 03/11/25 2230 03/11/25 2300 03/11/25 2330   BP:  121/53 124/57 137/59   Pulse:  74 72 72   Resp:  19 19 19   Temp:       TempSrc:       SpO2: 95% 95% 95% 95%   Weight:         *I personally reviewed and interpreted all ED vitals.    Pulse Ox: 95%, Room air, Normal     Monitor Interpretation:   normal sinus rhythm as interpreted by me.  The cardiac monitor was ordered to monitor heart rate.    Differential Diagnosis/ Diagnostic Considerations: Aspiration pneumonia, viral syndrome, generalized weakness, anemia, JOSE, other    Complicating Factors: The patient already has does not have any pertinent problems on file. to contribute to the complexity of this ED evaluation.    Medical Decision Making  Patient presents to the ED with generalized weakness and a coarse cough.  Afebrile in the ED and vital signs normal.  Laboratory testing with leukocytosis and pneumonia noted on chest x-ray.  Patient started on Unasyn. i Discussed with Dr. Villavicencio for admission.    Problems Addressed:  Aspiration pneumonia of lower lobe, unspecified aspiration pneumonia type, unspecified laterality (HCC): acute illness or injury that poses a threat to life or bodily functions    Amount and/or Complexity of Data Reviewed  Labs: ordered. Decision-making details documented in ED Course.  Radiology: ordered and independent interpretation performed. Decision-making details documented in ED Course.     Details: I personally reviewed the patient's chest x-ray image and noted no pneumothorax  Discussion of management or test interpretation with external provider(s): i Discussed with Dr. Villavicencio for admission.        Condition upon leaving the  department: Stable    Disposition and Plan     Clinical Impression:  1. Aspiration pneumonia of lower lobe, unspecified aspiration pneumonia type, unspecified laterality (HCC)        Disposition:  Admit    Follow-up:  No follow-up provider specified.    Medications Prescribed:  Current Discharge Medication List          Hospital Problems       Present on Admission  Date Reviewed: 11/19/2024            ICD-10-CM Noted POA    * (Principal) Aspiration pneumonia of lower lobe, unspecified aspiration pneumonia type, unspecified laterality (HCC) J69.0 3/11/2025 Unknown                       [1] (Not in a hospital admission)

## 2025-03-12 NOTE — ED QUICK NOTES
Rounding Completed    Plan of Care reviewed. Waiting for transport to bring patient upstairs.  Elimination needs assessed.  Patient continues to be on 2L O2 via NC.  Provided bag for his belongings.  Patient wife at bedside.    Bed is locked and in lowest position. Call light within reach.

## 2025-03-12 NOTE — PLAN OF CARE
Problem: Patient Centered Care  Goal: Patient preferences are identified and integrated in the patient's plan of care  Description: Interventions:  - What would you like us to know as we care for you?   - Provide timely, complete, and accurate information to patient/family  - Incorporate patient and family knowledge, values, beliefs, and cultural backgrounds into the planning and delivery of care  - Encourage patient/family to participate in care and decision-making at the level they choose  - Honor patient and family perspectives and choices  Outcome: Progressing     Problem: Diabetes/Glucose Control  Goal: Glucose maintained within prescribed range  Description: INTERVENTIONS:  - Monitor Blood Glucose as ordered  - Assess for signs and symptoms of hyperglycemia and hypoglycemia  - Administer ordered medications to maintain glucose within target range  - Assess barriers to adequate nutritional intake and initiate nutrition consult as needed  - Instruct patient on self management of diabetes  INTERVENTIONS:- Monitor Blood Glucose as ordered- Assess for signs and symptoms of hyperglycemia and hypoglycemia- Administer ordered medications to maintain glucose within target range- Assess barriers to adequate nutritional intake and initiate nutrition consult as needed- Instruct patient on self management of diabetes  Outcome: Progressing     Problem: CONFUSION  Goal: Confusion, delirium, dementia or psychosis is improved or at baseline  Description: INTERVENTIONS:- Assess for possible contributors to thought disturbance, including medications, impaired vision or hearing, underlying metabolic abnormalities, dehydration, psychiatric diagnoses, and notify attending MD/LIP- [place] high risk fall precautions, as indicated- Provide frequent short contacts to provide reality reorientation, refocusing and direction- Decrease environmental stimuli, including noise as appropriate- Monitor and intervene to maintain adequate  nutrition, hydration, elimination, sleep and activity- Consider the need for a sitter if unable to leave patient unattended- Initiate Spiritual Care consult as indicated- Consult Pharmacy as needed  Outcome: Progressing     Problem: BEHAVIOR  Goal: Pt/Family maintain appropriate behavior and adhere to behavioral management agreement, if implemented  Description: INTERVENTIONS:- Assess patient/family’s coping skills and  non-compliant behavior (including use of illegal substances)- Notify security of behavior or suspected illegal substances which indicate the need for search of the patient and/or belongings- Encourage verbalization of thoughts and concerns in a socially appropriate manner- Utilize positive, consistent limit setting strategies supporting safety of patient, staff and others- Encourage participation in the decision making process about the behavioral management agreement- Implement a Health Care Agreement if patient meets criteria- If a patient’s behavior jeopardizes the safety of the patient, staff, or others refer to organization policy. If a visitor’s behavior poses a threat to safety call refer to organization policy.- Initiate consult with , Psychosocial CNS, Spiritual Care as appropriate  Outcome: Progressing     Problem: SPIRITUAL CARE  Goal: Pt/Family able to move forward in process of forgiving self, others and/or higher power  Description: INTERVENTIONS:- Assist patient to overcome blocks to healing by use of spiritual practices (prayer, meditation, guided imagery, reiki, breath work, etc.)- De-myth guilt and help patient/family identify possible irrational spiritual/cultural beliefs and values- Explore possibilities of making amends & reconciliation with self, others, and/or a greater power- Guide patient/family in identifying painful feelings of guilt- Help patient explore and identify spiritual beliefs, cultural understandings or values that may help or hinder letting go of  issue- Help patient explore feelings of anger, bitterness, resentment- Help patient/family identify and examine the situation in which these feelings are experienced- Help patient/family identify destructive displacement of feelings onto other individuals- Invite use of sacraments/rituals/ceremonies as appropriate (e.g. - confession, anointing, smudging)-. Refer patient to formal counseling and/or to [facility] for further support work  Outcome: Progressing  Goal: Pt feels balance and connection with higher power that empowers the self during times of loss, guilt and fear  Description: INTERVENTIONS:- Create safety for patient through empathic presence and non-judgmental listening- Encourage patient to explore his/her values, beliefs and/or spiritual images and practices- Encourage use of breath work, imagery, meditation, relaxation, reiki to ease distress and provide healing- Encourage use of cultural and spiritual celebrations and rituals- Facilitate discussion that helps patient sort out spiritual concerns- Help patient identify where meaning/hope/comfort & strength are in his/her life- Refer patient to [facility] for assistance, as appropriate- Respond to patient/family need for prayer/ritual/sacrament/ceremony  Outcome: Progressing     Problem: RESPIRATORY - ADULT  Goal: Achieves optimal ventilation and oxygenation  Description: INTERVENTIONS:- Assess for changes in respiratory status- Assess for changes in mentation and behavior- Position to facilitate oxygenation and minimize respiratory effort- Oxygen supplementation based on oxygen saturation or ABGs- Provide Smoking Cessation handout, if applicable- Encourage broncho-pulmonary hygiene including cough, deep breathe, Incentive Spirometry- Assess the need for suctioning and perform as needed- Assess and instruct to report SOB or any respiratory difficulty- Respiratory Therapy support as indicated- Manage/alleviate anxiety- Monitor for signs/symptoms  of CO2 retention  Outcome: Progressing     Problem: METABOLIC/FLUID AND ELECTROLYTES - ADULT  Goal: Glucose maintained within prescribed range  Description: INTERVENTIONS:  - Monitor Blood Glucose as ordered  - Assess for signs and symptoms of hyperglycemia and hypoglycemia  - Administer ordered medications to maintain glucose within target range  - Assess barriers to adequate nutritional intake and initiate nutrition consult as needed  - Instruct patient on self management of diabetes  INTERVENTIONS:- Monitor Blood Glucose as ordered- Assess for signs and symptoms of hyperglycemia and hypoglycemia- Administer ordered medications to maintain glucose within target range- Assess barriers to adequate nutritional intake and initiate nutrition consult as needed- Instruct patient on self management of diabetes  Outcome: Progressing  Goal: Electrolytes maintained within normal limits  Description: INTERVENTIONS:- Monitor labs and rhythm and assess patient for signs and symptoms of electrolyte imbalances- Administer electrolyte replacement as ordered- Monitor response to electrolyte replacements, including rhythm and repeat lab results as appropriate- Fluid restriction as ordered- Instruct patient on fluid and nutrition restrictions as appropriate  Outcome: Progressing  Goal: Hemodynamic stability and optimal renal function maintained  Description: INTERVENTIONS:- Monitor labs and assess for signs and symptoms of volume excess or deficit- Monitor intake, output and patient weight- Monitor urine specific gravity, serum osmolarity and serum sodium as indicated or ordered- Monitor response to interventions for patient's volume status, including labs, urine output, blood pressure (other measures as available)- Encourage oral intake as appropriate- Instruct patient on fluid and nutrition restrictions as appropriate  Outcome: Progressing     Problem: SKIN/TISSUE INTEGRITY - ADULT  Goal: Skin integrity remains intact  Description:  INTERVENTIONS- Assess and document risk factors for pressure ulcer development- Assess and document skin integrity- Monitor for areas of redness and/or skin breakdown- Initiate interventions, skin care algorithm/standards of care as needed  Outcome: Progressing  Goal: Incision(s), wounds(s) or drain site(s) healing without S/S of infection  Description: INTERVENTIONS:- Assess and document risk factors for pressure ulcer development- Assess and document skin integrity- Assess and document dressing/incision, wound bed, drain sites and surrounding tissue- Implement wound care per orders- Initiate isolation precautions as appropriate- Initiate Pressure Ulcer prevention bundle as indicated  Outcome: Progressing     Problem: MUSCULOSKELETAL - ADULT  Goal: Return mobility to safest level of function  Description: INTERVENTIONS:- Assess patient stability and activity tolerance for standing, transferring and ambulating w/ or w/o assistive devices- Assist with transfers and ambulation using safe patient handling equipment as needed- Ensure adequate protection for wounds/incisions during mobilization- Obtain PT/OT consults as needed- Advance activity as appropriate- Communicate ordered activity level and limitations with patient/family  Outcome: Progressing  Goal: Maintain proper alignment of affected body part  Description: INTERVENTIONS:- Support and protect limb and body alignment per provider's orders- Instruct and reinforce with patient and family use of appropriate assistive device and precautions (e.g. spinal or hip dislocation precautions)  Outcome: Progressing     Problem: NEUROLOGICAL - ADULT  Goal: Achieves stable or improved neurological status  Description: INTERVENTIONS- Assess for and report changes in neurological status- Initiate measures to prevent increased intracranial pressure- Maintain blood pressure and fluid volume within ordered parameters to optimize cerebral perfusion and minimize risk of hemorrhage-  Monitor temperature, glucose, and sodium. Initiate appropriate interventions as ordered  Outcome: Progressing  Goal: Achieves maximal functionality and self care  Description: INTERVENTIONS- Monitor swallowing and airway patency with patient fatigue and changes in neurological status- Encourage and assist patient to increase activity and self care with guidance from PT/OT- Encourage visually impaired, hearing impaired and aphasic patients to use assistive/communication devices  Outcome: Progressing     Problem: Impaired Functional Mobility  Goal: Achieve highest/safest level of mobility/gait  Description: Interventions:- Assess patient's functional ability and stability- Promote increasing activity/tolerance for mobility and gait- Educate and engage patient/family in tolerated activity level and precautions  Outcome: Progressing     Problem: Impaired Activities of Daily Living  Goal: Achieve highest/safest level of independence in self care  Description: Interventions:- Assess ability and encourage patient to participate in ADLs to maximize function- Promote sitting position while performing ADLs such as feeding, grooming, and bathing- Educate and encourage patient/family in tolerated functional activity level and precautions during self-care  Outcome: Progressing     Problem: Impaired Swallowing  Goal: Minimize aspiration risk  Description: Interventions:- Patient should be alert and upright for all feedings (90 degrees preferred)- Offer food and liquids at a slow rate- No straws- Encourage small bites of food and small sips of liquid- Offer pills one at a time, crush or deliver with applesauce as needed- Discontinue feeding and notify MD (or speech pathologist) if coughing or persistent throat clearing or wet/gurgly vocal quality is noted  Outcome: Progressing     Problem: CARDIOVASCULAR - ADULT  Goal: Maintains optimal cardiac output and hemodynamic stability  Description: INTERVENTIONS:- Monitor vital signs,  rhythm, and trends- Monitor for bleeding, hypotension and signs of decreased cardiac output- Evaluate effectiveness of vasoactive medications to optimize hemodynamic stability- Monitor arterial and/or venous puncture sites for bleeding and/or hematoma- Assess quality of pulses, skin color and temperature- Assess for signs of decreased coronary artery perfusion - ex. Angina- Evaluate fluid balance, assess for edema, trend weights  Outcome: Progressing  Goal: Absence of cardiac arrhythmias or at baseline  Description: INTERVENTIONS:- Continuous cardiac monitoring, monitor vital signs, obtain 12 lead EKG if indicated- Evaluate effectiveness of antiarrhythmic and heart rate control medications as ordered- Initiate emergency measures for life threatening arrhythmias- Monitor electrolytes and administer replacement therapy as ordered  Outcome: Progressing     Problem: PAIN - ADULT  Goal: Verbalizes/displays adequate comfort level or patient's stated pain goal  Description: INTERVENTIONS:- Encourage pt to monitor pain and request assistance- Assess pain using appropriate pain scale- Administer analgesics based on type and severity of pain and evaluate response- Implement non-pharmacological measures as appropriate and evaluate response- Consider cultural and social influences on pain and pain management- Manage/alleviate anxiety- Utilize distraction and/or relaxation techniques- Monitor for opioid side effects- Notify MD/LIP if interventions unsuccessful or patient reports new pain- Anticipate increased pain with activity and pre-medicate as appropriate  Outcome: Progressing     Problem: SAFETY ADULT - FALL  Goal: Free from fall injury  Description: INTERVENTIONS:- Assess pt frequently for physical needs- Identify cognitive and physical deficits and behaviors that affect risk of falls.- [place] fall precautions as indicated by assessment.- Educate pt/family on patient safety including physical limitations- Instruct pt to  call for assistance with activity based on assessment- Modify environment to reduce risk of injury- Provide assistive devices as appropriate- Consider OT/PT consult to assist with strengthening/mobility- Encourage toileting schedule  Outcome: Progressing     Problem: DISCHARGE PLANNING  Goal: Discharge to home or other facility with appropriate resources  Description: INTERVENTIONS:- Identify barriers to discharge w/pt and caregiver- Include patient/family/discharge partner in discharge planning- Arrange for needed discharge resources and transportation as appropriate- Identify discharge learning needs (meds, wound care, etc)- Arrange for interpreters to assist at discharge as needed- Consider post-discharge preferences of patient/family/discharge partner- Complete POLST form as appropriate- Assess patient's ability to be responsible for managing their own health- Refer to Case Management Department for coordinating discharge planning if the patient needs post-hospital services based on physician/LIP order or complex needs related to functional status, cognitive ability or social support system  Outcome: Progressing

## 2025-03-12 NOTE — PLAN OF CARE
Problem: Patient Centered Care  Goal: Patient preferences are identified and integrated in the patient's plan of care  Description: Interventions:  - Provide timely, complete, and accurate information to patient/family  - Incorporate patient and family knowledge, values, beliefs, and cultural backgrounds into the planning and delivery of care  - Encourage patient/family to participate in care and decision-making at the level they choose  - Honor patient and family perspectives and choices  Outcome: Progressing     Problem: RESPIRATORY - ADULT  Goal: Achieves optimal ventilation and oxygenation  Description: INTERVENTIONS:- Assess for changes in respiratory status- Assess for changes in mentation and behavior- Position to facilitate oxygenation and minimize respiratory effort- Oxygen supplementation based on oxygen saturation or ABGs- Provide Smoking Cessation handout, if applicable- Encourage broncho-pulmonary hygiene including cough, deep breathe, Incentive Spirometry- Assess the need for suctioning and perform as needed- Assess and instruct to report SOB or any respiratory difficulty- Respiratory Therapy support as indicated- Manage/alleviate anxiety- Monitor for signs/symptoms of CO2 retention  Outcome: Progressing     Problem: SKIN/TISSUE INTEGRITY - ADULT  Goal: Skin integrity remains intact  Description: INTERVENTIONS- Assess and document risk factors for pressure ulcer development- Assess and document skin integrity- Monitor for areas of redness and/or skin breakdown- Initiate interventions, skin care algorithm/standards of care as needed  Outcome: Progressing  Goal: Incision(s), wounds(s) or drain site(s) healing without S/S of infection  Description: INTERVENTIONS:- Assess and document risk factors for pressure ulcer development- Assess and document skin integrity- Assess and document dressing/incision, wound bed, drain sites and surrounding tissue- Implement wound care per orders- Initiate isolation  precautions as appropriate- Initiate Pressure Ulcer prevention bundle as indicated  Outcome: Progressing     Problem: MUSCULOSKELETAL - ADULT  Goal: Maintain proper alignment of affected body part  Description: INTERVENTIONS:- Support and protect limb and body alignment per provider's orders- Instruct and reinforce with patient and family use of appropriate assistive device and precautions (e.g. spinal or hip dislocation precautions)  Outcome: Progressing     Problem: Impaired Swallowing  Goal: Minimize aspiration risk  Description: Interventions:- Patient should be alert and upright for all feedings (90 degrees preferred)- Offer food and liquids at a slow rate- No straws- Encourage small bites of food and small sips of liquid- Offer pills one at a time, crush or deliver with applesauce as needed- Discontinue feeding and notify MD (or speech pathologist) if coughing or persistent throat clearing or wet/gurgly vocal quality is noted  Outcome: Not Progressing     Problem: SAFETY ADULT - FALL  Goal: Free from fall injury  Description: INTERVENTIONS:- Assess pt frequently for physical needs- Identify cognitive and physical deficits and behaviors that affect risk of falls.- [place] fall precautions as indicated by assessment.- Educate pt/family on patient safety including physical limitations- Instruct pt to call for assistance with activity based on assessment- Modify environment to reduce risk of injury- Provide assistive devices as appropriate- Consider OT/PT consult to assist with strengthening/mobility- Encourage toileting schedule  Outcome: Progressing

## 2025-03-12 NOTE — OCCUPATIONAL THERAPY NOTE
OCCUPATIONAL THERAPY EVALUATION - INPATIENT     Room Number: 528/528-A  Evaluation Date: 3/12/2025  Type of Evaluation: Initial  Presenting Problem: aspiration pneumonia    Physician Order: IP Consult to Occupational Therapy  Reason for Therapy: ADL/IADL Dysfunction and Discharge Planning    OCCUPATIONAL THERAPY ASSESSMENT   Patient is a 84 year old male admitted 3/11/2025 for aspiration pneumonia.  Prior to admission, patient's baseline is requiring assist with ADLs, IADLs and functional mobility.  Patient is currently functioning below baseline with toileting, bathing, upper body dressing, lower body dressing, grooming, eating, bed mobility, transfers, static sitting balance, dynamic sitting balance, static standing balance, dynamic standing balance, and functional standing tolerance.  Patient is requiring moderate assist and dependent as a result of the following impairments: decreased functional strength, decreased functional reach, decreased endurance, pain, impaired static and dynamic sitting and standing balance, impaired coordination, impaired motor planning, decreased muscular endurance, medical status, and decreased safety awareness. Occupational Therapy will continue to follow for duration of hospitalization.    Patient will benefit from continued skilled OT Services to promote return to prior level of function and safety with continuous assistance and gradual rehabilitative therapy.    PLAN DURING HOSPITALIZATION  OT Device Recommendations: TBD  OT Treatment Plan: Balance activities, Energy conservation/work simplification techniques, ADL training, IADL training, Functional transfer training, UE strengthening/ROM, Endurance training, Patient/Family education, Patient/Family training, Equipment eval/education, Neuromuscluar reeducation, Fine motor coordination activities, Compensatory technique education     OCCUPATIONAL THERAPY MEDICAL/SOCIAL HISTORY   Problem List  Principal Problem:    Aspiration  pneumonia of lower lobe, unspecified aspiration pneumonia type, unspecified laterality (HCC)    HOME SITUATION  Type of Home: House  Home Layout: Multi-level  Lives With: Spouse  Occupation/Status: retire  Hand Dominance: Left  Drives: No  Patient Regularly Uses: Mark-walker; Wheelchair    Stairs in Home: chair lift up- a few stairs to enter home  Use of Assistive Device(s): mark walker    Prior Level of [place]: Prior to admission, patient was requiring assist with all ADLs and IADLs. Patient lives in a house with wife. Patient was not driving and currently does not work. Patient used mark walker for mobility.     SUBJECTIVE  \"I fell a few times at home and I am nervous\" (patient actively resisting movement)    OCCUPATIONAL THERAPY EXAMINATION      OBJECTIVE  Precautions: Bed/chair alarm  Fall Risk: High fall risk    PAIN ASSESSMENT  Ratin  Location: back pain  Management Techniques: Activity promotion; Body mechanics; Relaxation; Repositioning    O2 SATURATIONS  Oxygen Therapy  SPO2% on Oxygen at Rest: 93  At rest oxygen flow (liters per minute): 2    COGNITION  Overall Cognitive Status:  WFL - within functional limits    VISION  Current Vision: wears glasses all the time    PERCEPTION  Overall Perception Status:   Impaired    SENSATION  Patient reports some number and tingling in hands and feet    Communication: Able to communicate wants and needs     Behavioral/Emotional/Social: nervous; cooperative    RANGE OF MOTION   Upper extremity ROM is within functional limits- RUE impacted from prior CVA    STRENGTH ASSESSMENT  Upper extremity strength is within functional limits- RUE impacted from prior CVA    COORDINATION  Gross Motor: impaired  Fine Motor: impaired    ACTIVITIES OF DAILY LIVING ASSESSMENT  AM-PAC ‘6-Clicks’ Inpatient Daily Activity Short Form  How much help from another person does the patient currently need…  -   Putting on and taking off regular lower body clothing?: Total  -   Bathing  (including washing, rinsing, drying)?: A Lot  -   Toileting, which includes using toilet, bedpan or urinal? : Total  -   Putting on and taking off regular upper body clothing?: A Lot  -   Taking care of personal grooming such as brushing teeth?: A Lot  -   Eating meals?: A Lot    AM-PAC Score:  Score: 10  Approx Degree of Impairment: 74.7%  Standardized Score (AM-PAC Scale): 27.31  CMS Modifier (G-Code): CL    FUNCTIONAL TRANSFER ASSESSMENT  Sit to Stand: Edge of Bed  Edge of Bed: Dependent    BED MOBILITY  Supine to Sit : Dependent (max x 2)    FUNCTIONAL ADL ASSESSMENT  Eating: Moderate Assist  Grooming Seated: Moderate Assist  Bathing Seated: Maximum Assist  UB Dressing Seated: Moderate Assist  LB Dressing Seated: Dependent  Toileting Seated: Dependent    THERAPEUTIC EXERCISE     Skilled Therapy Provided: Patient received supine in bed. Patient performing ADLs and functional mobility at a mod-total assist level this session. Patient most limited by overall strength and balance. Patient actively pushing against therapists when attempting to move. Education provided on body mechanics and how that manifests functionally while completing ADLs and functional mobility. Patient with good return demonstration on all education.     EDUCATION PROVIDED  Patient Education : Role of Occupational Therapy; Plan of Care; Functional Transfer Techniques; Fall Prevention; Posture/Positioning; Edema Reduction; Energy Conservation; Proper Body Mechanics  Patient's Response to Education: Verbalized Understanding  Family/Caregiver Education : Role of Occupational Therapy; Plan of Care; Functional Transfer Techniques; Fall Prevention; Posture/Positioning; Edema Reduction; Energy Conservation; Proper Body Mechanics  Family/Caregiver's Response to Education: Verbalized Understanding; Returned Demonstration    The patient's Approx Degree of Impairment: 74.7% has been calculated based on documentation in the [facility] '6 clicks' Inpatient Daily  Activity Short Form.  Research supports that patients with this level of impairment may benefit from subacute rehab.  Final disposition will be made by interdisciplinary medical team.     Patient End of Session: Up in chair, Needs met, Call light within reach, RN aware of session/findings, All patient questions and concerns addressed, Hospital anti-slip socks, Alarm set, Family present    OT Goals  Patients self stated goal is: unstated     Patient will complete functional transfer with min A  Comment:     Patient will complete toileting with min A  Comment:     Patient will tolerate standing for 3 minutes in prep for adls with min A   Comment:    Patient will complete item retrieval with min A  Comment:          Goals  on: 3/26/25  Frequency: 3-5x/week    Patient Evaluation Complexity Level:   Occupational Profile/Medical History HIGH - Extensive review of history including review of medical or therapy records    Specific performance deficits impacting engagement in ADL/IADL HIGH  5+ performance deficits    Client Assessment/Performance Deficits HIGH - Comorbidities and significant modifications of tasks    Clinical Decision Making HIGH - Analysis of occupational profile, comprehensive assessments, multiple treatment options    Overall Complexity HIGH     Self-Care Home Management: 15 minutes  Therapeutic Activity: 10 minutes

## 2025-03-12 NOTE — PHYSICAL THERAPY NOTE
PHYSICAL THERAPY EVALUATION - INPATIENT     Room Number: 528/528-A  Evaluation Date: 3/12/2025  Type of Evaluation: Initial        Presenting Problem: Aspiration pneumonia  Co-Morbidities : CVA with chronic L hemiparesis, recurrent aspiration PNA, hypothyroidism, OA, HTN, CKD, HLD  Reason for Therapy: Mobility Dysfunction and Discharge Planning    PHYSICAL THERAPY ASSESSMENT   Patient is a 84 year old male admitted 3/11/2025 for aspiration pneumonia.  Prior to admission, patient's baseline is ambulatory short household distances using a hemiwalker, requires assist from wife with all ADLs, assist from son for performing stairs.  Patient is currently functioning below baseline with bed mobility, transfers, and gait.  Patient is requiring  total assist x2 people  as a result of the following impairments: impaired seated balance, impaired coordination, impaired motor planning, medical status, and fear/anxiety .  Physical Therapy will continue to follow for duration of hospitalization.    Patient will benefit from continued skilled PT Services to promote return to prior level of function and safety with continuous assistance and gradual rehabilitative therapy .    PLAN DURING HOSPITALIZATION  Nursing Mobility Recommendation : Lift Equipment  PT Device Recommendation: Mechanical lift, Hospital bed, Wheelchair  PT Treatment Plan: Bed mobility, Body mechanics, Endurance, Energy conservation, Patient education, Gait training, Family education, Strengthening, Transfer training, Balance training, Neuromuscular re-educate  Rehab Potential : Guarded  Frequency (Obs): 3-5x/week     PHYSICAL THERAPY MEDICAL/SOCIAL HISTORY   History related to current admission: Admitted 10/2024 for shortness of breath, PNA, required modA with rolling walker, [facility]     Problem List  Principal Problem:    Aspiration pneumonia of lower lobe, unspecified aspiration pneumonia type, unspecified laterality (HCC)      HOME  SITUATION  Type of Home: House  Home Layout: Multi-level  Stairs to Enter : 2   Railing: Yes    Stairs to Bedroom: 3    Railing: Yes    Lives With: Spouse    Drives: No   Patient Regularly Uses: Mark-walker, Wheelchair     Prior Level of [place]: ambulatory with hemiwalker, assist from wife getting in/out of bed, assist with stairs from strong family member, wife assists with ADLs, wife unable to physically lift patient     SUBJECTIVE  \"Don't let me fall!\"    PHYSICAL THERAPY EXAMINATION   OBJECTIVE  Precautions: Bed/chair alarm  Fall Risk: High fall risk    WEIGHT BEARING RESTRICTION  none    PAIN ASSESSMENT  Ratin  Location: back, knees, feet, hands  Management Techniques: Body mechanics, Activity promotion    COGNITION  Problem Solving:  assistance required to identify errors made, assistance required to generate solutions, and assistance required to implement solutions  Decreased insight    RANGE OF MOTION AND STRENGTH ASSESSMENT  Upper extremity ROM and strength are within functional limits RUE, LUE weak from previous CVA  Lower extremity ROM is within functional limits BLEs  Lower extremity strength is functionally limited, unable to maintain SLR, less than full range LAQ    BALANCE  Static Sitting: Fair  Dynamic Sitting: Dependent  Static Standing: Not tested  Dynamic Standing: Not tested    ACTIVITY TOLERANCE  Pulse: 79  Heart Rate Source: Monitor     BP: 142/59  BP Location: Right arm  BP Method: Automatic  Patient Position: Semi-[place]    O2 WALK  Oxygen Therapy  SPO2% on Oxygen at Rest: 93  At rest oxygen flow (liters per minute): 2    AM-PAC '6-Clicks' INPATIENT SHORT FORM - BASIC MOBILITY  How much difficulty does the patient currently have...  Patient Difficulty: Turning over in bed (including adjusting bedclothes, sheets and blankets)?: Unable   Patient Difficulty: Sitting down on and standing up from a chair with arms (e.g., wheelchair, bedside commode, etc.): Unable   Patient Difficulty:  Moving from lying on back to sitting on the side of the bed?: Unable   How much help from another person does the patient currently need...   Help from Another: Moving to and from a bed to a chair (including a wheelchair)?: Total   Help from Another: Need to walk in hospital room?: Total   Help from Another: Climbing 3-5 steps with a railing?: Total     AM-PAC Score:  Raw Score: 6   Approx Degree of Impairment: 100%   Standardized Score (AM-PAC Scale): 23.55   CMS Modifier (G-Code): CN    FUNCTIONAL ABILITY STATUS  Functional Mobility/Gait Assessment  Gait Assistance: Not tested  Distance (ft): deferred 2/2 safety concerns  Rolling: dependent  Supine to Sit: dependent  Transfer to chair: dependent - mechanical lift  Sit to Stand:  not tested - deferred 2/2 safety concerns, patient resisting attempts to scoot to edge of bed for feet to touch ground    Exercise/Education Provided:  Education Provided To: Patient, Family/Caregiver  Patient Education: Role of Physical Therapy, Plan of Care, Functional Transfer Techniques, Posture/Positioning  Patient's Response to Education: Requires Further Education, Demonstrates Poor Carry Over to Information  Family/Caregiver Education: Role of Physical Therapy, Plan of Care, Discharge Recommendations  Family/Caregiver's Response to Education: Verbalized Understanding    Skilled Therapy Provided: Patient extremely fearful of falling, resisting attempts to scoot closer edge of bed for feet to touch and leaning to R to get lift sling under, leaning posteriorly and sliding hips forward, arching into high guard position with attempts to get into position for stand-pivot transfer. With static sitting patient able to maintain dangling edge of bed with CGA-SBA for 10 minutes, maxAx1-2 for dynamic sitting balance 2/2 fear of falling. Lift sling donned in sitting and mechanical lift used to get patient to bedside chair. Patient's wife present and supportive, reporting she is unable to to  provide physical assist patient needs at this time. Maximal education and reassurance required for patient to participate throughout session.    Patient seen for evaluation in coordination with occupational therapy to maximize patient safety and function given functional limitations, wife stating has been unable to even sit up for past week. Patient received semi-fowlers in bed, agreeable to physical therapy evaluation. Vital signs monitored as noted above, no adverse symptoms and patient stable during session. Next session anticipate to progress bed mobility and transfers.    Patient history and/or personal factors that may impact the plan of care include home accessibility concerns, history of falls, limited functional status at baseline, cognitive deficits, co-morbidities (CVA with chronic L hemiparesis, recurrent aspiration PNA, hypothyroidism, OA, HTN, CKD, HLD) affecting medical status, strength, balance, cognition, endurance, activity tolerance, and has history of recent hospitalization. Based on the physical therapy examination of the noted systems and functional activity/participation limitations, the patient presentation is evolving given the patient demonstrates worsening of previously stable condition and demonstrates worsening of co-morbidities.    The patient's Approx Degree of Impairment: 100% has been calculated based on documentation in the [facility] '6 clicks' Inpatient Basic Mobility Short Form.  Research supports that patients with this level of impairment may benefit from long-term care.  Final disposition will be made by interdisciplinary medical team.    Patient End of Session: Up in chair, Needs met, Call light within reach, RN aware of session/findings, All patient questions and concerns addressed, Hospital anti-slip socks, Alarm set, Family present    CURRENT GOALS  Goals to be met by: 4/12/25  Patient Goal Patient's self-stated goal is: fall prevention   Goal #1 Patient is able to demonstrate  supine - sit EOB @ level: minimum assistance     Goal #1   Current Status    Goal #2 Patient is able to demonstrate transfers EOB to/from BSC at assistance level: moderate assistance with walker - rolling     Goal #2  Current Status    Goal #3 Patient is able to ambulate 10 feet with assist device: walker - rolling at assistance level: moderate assistance   Goal #3   Current Status    Goal #4 Patient is able to demonstrate transfers sit to/from stand at assistance level: moderate assistance with rolling walker      Goal #4   Current Status    Goal #5 Patient to demonstrate independence with home activity/exercise instructions provided to patient in preparation for discharge.   Goal #5   Current Status    Goal #6    Goal #6  Current Status      Patient Evaluation Complexity Level:  History High - 3 or more personal factors and/or co-morbidities   Examination of body systems Moderate - addressing a total of 3 or more elements   Clinical Presentation  Moderate - Evolving   Clinical Decision Making  Moderate Complexity       Therapeutic Activity:  25 minutes      Anita Polk, PT, DPT  [facility]  Rehab Services - Physical Therapy  [id number]

## 2025-03-12 NOTE — SLP NOTE
ADULT SWALLOWING EVALUATION    ASSESSMENT    ASSESSMENT/OVERALL IMPRESSION:  PPE REQUIRED. THIS THERAPIST WORE GLOVES FOR DURATION OF EVALUATION. HANDS WASHED UPON ENTRANCE/EXIT.    Per MD H&P, \"84-year-old male presenting with shortness of breath and progressive weakness. The patient has a long-standing history of weakness and history of recurrent aspiration PNA.  He was admitted to the hospital at the end of 2024 for aspiration pneumonia, eventually being discharged to acute rehab. His weakness is multifactorial, including a history of  left-sided weakness stemming from a previous CVA. The patient also experiences low-grade pain complicated by radiculopathy. Regarding aspiration PNA has been admitted multiple times requiring treatment with antibiotics in the past. A swallow study was performed in October 2024, which the patient passed. During previous hospitalizations, the patient has been treated with IV diuretics for fluid management. The patient's medical history is significant for hypothyroidism, hypertension, hyperlipidemia, osteoarthritis, diastolic dysfunction, dilated ascending aorta, diabetes, and aortic aneurysm status post-endograft repair. The patient's home regimen for hypertension includes amlodipine 5 mg daily.\"    SLP BSSE orders received and acknowledged. A swallow evaluation warranted per RN dysphagia screen. Pt afebrile with clear vocal quality, on 2L/Min, with oxygen saturation at 93%. Pt with extensive hx of dysphagia at [facility], last seen for VFSS 10/28/24 with recommendations for soft easy to chew/thin liquids. Pt with history of declining modified diet consistencies, MD aware.  Pt positioned 90 degrees in bed, alert/cooperative/spouse present. Pt with no complaints of pain. Oral motor examination revealed reduced strength, ROM, and rate of motion. Pt presented with trials of hard solids and thin liquids via cup. Pt with adequate oral acceptance and bilabial seal across all trials. Pt with  intact bite, prolonged mastication of solids, and delayed A-P transit. Pt's swallow response appears delayed with reduced hyolaryngeal elevation/excursion. No clinical signs of aspiration (e.g., immediate/delayed throat clear, immediate/delayed cough, wet vocal quality, increased O2 effort) observed across all trials. 3/11 CXR indicates \"1. Cardiomegaly with findings concerning for mild CHF/fluid overload including mild pulmonary edema. 2. New left lower lobe consolidation, which could relate to atelectasis or pneumonia\". Oxygen status remained stable t/o the entire evaluation.     At this time, pt presents with mild oral dysphagia and probable pharyngeal dysfunction. Recommend a regular (pick softer foods) diet and thin liquids with strict adherence to safe swallowing compensatory strategies. Results and recommendations reviewed with RN, pt, and family. Pt/pt's family v/u to all results/recommendations. Recommendations remain written on whiteboard. SLP collaborated with RN for MD diet orders. No further swallow services warranted at this time as pt is comfort care and will decline any modified diet or further diagnostic testing if warranted. Please re consult if needed.        RECOMMENDATIONS   Diet Recommendations - Solids: Regular (pick softer foods)  Diet Recommendations - Liquids: Thin Liquids                       Aspiration Precautions: Upright position, Slow rate, Small bites, Small sips, No straw  Medication Administration Recommendations:  (as tolerated)  Treatment Plan/Recommendations: No further inpatient SLP service warranted, Aspiration precautions    HISTORY   MEDICAL HISTORY  Reason for Referral: RN dysphagia screen    Problem List  Principal Problem:    Aspiration pneumonia of lower lobe, unspecified aspiration pneumonia type, unspecified laterality (HCC)      Past Medical History  Past Medical History:    Abdominal aortic aneurysm (AAA)    Back problem    back pain    Disorder of thyroid     hypothyroidism    Falls frequently    High blood pressure    High cholesterol    Hx of pneumothorax    Muscle weakness    LUE hemiplegia    Osteoarthritis    PNA (pneumonia)    Pneumonia due to organism    Stroke (HCC)    left sided weakness       Prior Living Situation: Home with spouse  Diet Prior to Admission: Regular, Thin liquids  Precautions: Aspiration    Patient/Family Goals: did not state    SWALLOWING HISTORY  Current Diet Consistency: NPO  Dysphagia History: see above  Imaging Results: see above    SUBJECTIVE       OBJECTIVE   ORAL MOTOR EXAMINATION  Dentition: Natural  Symmetry: Within Functional Limits  Strength: Overall reduced     Range of Motion: Overall reduced  Rate of Motion: Reduced    Voice Quality: Clear  Respiratory Status: Supplemental O2, Nasal cannula  Consistencies Trialed: Thin liquids, Hard solid  Method of Presentation: Staff/Clinician assistance, Cup, Single sips  Patient Positioned: Upright, Midline    Oral Phase of Swallow: Impaired  Bolus Retrieval: Intact  Bilabial Seal: Intact  Bolus Formation: Impaired  Bolus Propulsion: Impaired  Mastication: Impaired  Retention: Impaired    Pharyngeal Phase of Swallow: Appears Impaired  Laryngeal Elevation Timing: Appears impaired  Laryngeal Elevation Strength: Appears impaired  Laryngeal Elevation Coordination: Appears intact  (Please note: Silent aspiration cannot be evaluated clinically. Videofluoroscopic Swallow Study is required to rule-out silent aspiration.)    Esophageal Phase of Swallow: No complaints consistent with possible esophageal involvement  Comments: NA          GOALS: NA    FOLLOW UP  Treatment Plan/Recommendations: No further inpatient SLP service warranted, Aspiration precautions     Follow Up Needed (Documentation Required): No  SLP Follow-up Date: 03/12/25    Thank you for your referral.   If you have any questions, please contact BETHANIE Ibarra M.S., CCC-SLP  Speech Language Pathologist  Phone Number  Ext. [phone]

## 2025-03-13 LAB
ALBUMIN SERPL-MCNC: 4 G/DL (ref 3.2–4.8)
ALBUMIN/GLOB SERPL: 1.4 {RATIO} (ref 1–2)
ALP LIVER SERPL-CCNC: 106 U/L
ALT SERPL-CCNC: <7 U/L
ANION GAP SERPL CALC-SCNC: 10 MMOL/L (ref 0–18)
AST SERPL-CCNC: 9 U/L (ref ?–34)
BASOPHILS # BLD AUTO: 0.07 X10(3) UL (ref 0–0.2)
BASOPHILS NFR BLD AUTO: 0.8 %
BILIRUB SERPL-MCNC: 0.5 MG/DL (ref 0.2–1.1)
BUN BLD-MCNC: 15 MG/DL (ref 9–23)
BUN/CREAT SERPL: 11.4 (ref 10–20)
CALCIUM BLD-MCNC: 8.7 MG/DL (ref 8.7–10.4)
CHLORIDE SERPL-SCNC: 108 MMOL/L (ref 98–112)
CO2 SERPL-SCNC: 25 MMOL/L (ref 21–32)
CREAT BLD-MCNC: 1.32 MG/DL
DEPRECATED RDW RBC AUTO: 48.7 FL (ref 35.1–46.3)
EGFRCR SERPLBLD CKD-EPI 2021: 54 ML/MIN/1.73M2 (ref 60–?)
EOSINOPHIL # BLD AUTO: 0.15 X10(3) UL (ref 0–0.7)
EOSINOPHIL NFR BLD AUTO: 1.6 %
ERYTHROCYTE [DISTWIDTH] IN BLOOD BY AUTOMATED COUNT: 13.6 % (ref 11–15)
GLOBULIN PLAS-MCNC: 2.9 G/DL (ref 2–3.5)
GLUCOSE BLD-MCNC: 98 MG/DL (ref 70–99)
HCT VFR BLD AUTO: 37.6 %
HGB BLD-MCNC: 11.9 G/DL
IMM GRANULOCYTES # BLD AUTO: 0.03 X10(3) UL (ref 0–1)
IMM GRANULOCYTES NFR BLD: 0.3 %
LYMPHOCYTES # BLD AUTO: 1.96 X10(3) UL (ref 1–4)
LYMPHOCYTES NFR BLD AUTO: 21.1 %
MAGNESIUM SERPL-MCNC: 2 MG/DL (ref 1.6–2.6)
MCH RBC QN AUTO: 30.9 PG (ref 26–34)
MCHC RBC AUTO-ENTMCNC: 31.6 G/DL (ref 31–37)
MCV RBC AUTO: 97.7 FL
MONOCYTES # BLD AUTO: 1.26 X10(3) UL (ref 0.1–1)
MONOCYTES NFR BLD AUTO: 13.5 %
NEUTROPHILS # BLD AUTO: 5.84 X10 (3) UL (ref 1.5–7.7)
NEUTROPHILS # BLD AUTO: 5.84 X10(3) UL (ref 1.5–7.7)
NEUTROPHILS NFR BLD AUTO: 62.7 %
OSMOLALITY SERPL CALC.SUM OF ELEC: 297 MOSM/KG (ref 275–295)
PLATELET # BLD AUTO: 149 10(3)UL (ref 150–450)
POTASSIUM SERPL-SCNC: 3.5 MMOL/L (ref 3.5–5.1)
PROT SERPL-MCNC: 6.9 G/DL (ref 5.7–8.2)
RBC # BLD AUTO: 3.85 X10(6)UL
SODIUM SERPL-SCNC: 143 MMOL/L (ref 136–145)
WBC # BLD AUTO: 9.3 X10(3) UL (ref 4–11)

## 2025-03-13 PROCEDURE — 99233 SBSQ HOSP IP/OBS HIGH 50: CPT | Performed by: HOSPITALIST

## 2025-03-13 RX ORDER — ACETAMINOPHEN 500 MG
500 TABLET ORAL EVERY 4 HOURS PRN
Status: DISCONTINUED | OUTPATIENT
Start: 2025-03-13 | End: 2025-03-16

## 2025-03-13 RX ORDER — GABAPENTIN 300 MG/1
300 CAPSULE ORAL 2 TIMES DAILY
Status: DISCONTINUED | OUTPATIENT
Start: 2025-03-13 | End: 2025-03-16

## 2025-03-13 NOTE — PLAN OF CARE
Problem: Patient Centered Care  Goal: Patient preferences are identified and integrated in the patient's plan of care  Description: Interventions:  - Provide timely, complete, and accurate information to patient/family  - Incorporate patient and family knowledge, values, beliefs, and cultural backgrounds into the planning and delivery of care  - Encourage patient/family to participate in care and decision-making at the level they choose  - Honor patient and family perspectives and choices  Outcome: Progressing     Problem: Diabetes/Glucose Control  Goal: Glucose maintained within prescribed range  Description: INTERVENTIONS:  - Monitor Blood Glucose as ordered  - Assess for signs and symptoms of hyperglycemia and hypoglycemia  - Administer ordered medications to maintain glucose within target range  - Assess barriers to adequate nutritional intake and initiate nutrition consult as needed  - Instruct patient on self management of diabetes  INTERVENTIONS:- Monitor Blood Glucose as ordered- Assess for signs and symptoms of hyperglycemia and hypoglycemia- Administer ordered medications to maintain glucose within target range- Assess barriers to adequate nutritional intake and initiate nutrition consult as needed- Instruct patient on self management of diabetes  Outcome: Completed     Problem: RESPIRATORY - ADULT  Goal: Achieves optimal ventilation and oxygenation  Description: INTERVENTIONS:- Assess for changes in respiratory status- Assess for changes in mentation and behavior- Position to facilitate oxygenation and minimize respiratory effort- Oxygen supplementation based on oxygen saturation or ABGs- Provide Smoking Cessation handout, if applicable- Encourage broncho-pulmonary hygiene including cough, deep breathe, Incentive Spirometry- Assess the need for suctioning and perform as needed- Assess and instruct to report SOB or any respiratory difficulty- Respiratory Therapy support as indicated- Manage/alleviate  anxiety- Monitor for signs/symptoms of CO2 retention  Outcome: Not Progressing     Problem: METABOLIC/FLUID AND ELECTROLYTES - ADULT  Goal: Glucose maintained within prescribed range  Description: INTERVENTIONS:  - Monitor Blood Glucose as ordered  - Assess for signs and symptoms of hyperglycemia and hypoglycemia  - Administer ordered medications to maintain glucose within target range  - Assess barriers to adequate nutritional intake and initiate nutrition consult as needed  - Instruct patient on self management of diabetes  INTERVENTIONS:- Monitor Blood Glucose as ordered- Assess for signs and symptoms of hyperglycemia and hypoglycemia- Administer ordered medications to maintain glucose within target range- Assess barriers to adequate nutritional intake and initiate nutrition consult as needed- Instruct patient on self management of diabetes  Outcome: Completed     Problem: Impaired Swallowing  Goal: Minimize aspiration risk  Description: Interventions:- Patient should be alert and upright for all feedings (90 degrees preferred)- Offer food and liquids at a slow rate- No straws- Encourage small bites of food and small sips of liquid- Offer pills one at a time, crush or deliver with applesauce as needed- Discontinue feeding and notify MD (or speech pathologist) if coughing or persistent throat clearing or wet/gurgly vocal quality is noted  Outcome: Progressing     Problem: PAIN - ADULT  Goal: Verbalizes/displays adequate comfort level or patient's stated pain goal  Description: INTERVENTIONS:- Encourage pt to monitor pain and request assistance- Assess pain using appropriate pain scale- Administer analgesics based on type and severity of pain and evaluate response- Implement non-pharmacological measures as appropriate and evaluate response- Consider cultural and social influences on pain and pain management- Manage/alleviate anxiety- Utilize distraction and/or relaxation techniques- Monitor for opioid side effects-  Notify MD/LIP if interventions unsuccessful or patient reports new pain- Anticipate increased pain with activity and pre-medicate as appropriate  Outcome: Progressing     Problem: SAFETY ADULT - FALL  Goal: Free from fall injury  Description: INTERVENTIONS:- Assess pt frequently for physical needs- Identify cognitive and physical deficits and behaviors that affect risk of falls.- [place] fall precautions as indicated by assessment.- Educate pt/family on patient safety including physical limitations- Instruct pt to call for assistance with activity based on assessment- Modify environment to reduce risk of injury- Provide assistive devices as appropriate- Consider OT/PT consult to assist with strengthening/mobility- Encourage toileting schedule  Outcome: Progressing

## 2025-03-13 NOTE — PROGRESS NOTES
[facility]  part of [facility]    Progress Note    Cody Fitzgerald Patient Status:  Inpatient    1940 MRN [id number]   Location [facility] 5SW/SE Attending Lucero Berg MD   Hosp Day # 1 PCP Kandice Claudio DO     Chief Complaint: sob    SUBJECTIVE:    No acute events overnight.  Patient denies chest pain, sob.  No fevers/chills.  No n/v/abd pain.  Feels a little better. Still coughing. No sputum production.  On 5L NC    10 ROS completed and otherwise negative.    OBJECTIVE:  Vital signs in last 24 hours:  /70 (BP Location: Right arm)   Pulse 76   Temp 98.3 °F (36.8 °C) (Axillary)   Resp 16   Wt 200 lb 9.6 oz (91 kg)   SpO2 92%   BMI 28.78 kg/m²     Intake/Output:    Intake/Output Summary (Last 24 hours) at 3/13/2025 1520  Last data filed at 3/13/2025 0903  Gross per 24 hour   Intake 285 ml   Output 100 ml   Net 185 ml       Wt Readings from Last 3 Encounters:   25 200 lb 9.6 oz (91 kg)   10/25/24 203 lb 9.6 oz (92.4 kg)   24 203 lb (92.1 kg)       Exam     Gen: No acute distress  Pulm: coarse bs  CV: Heart with regular rate and rhythm  Abd: Abdomen soft, nontender, bowel sounds present  Neuro: No acute focal deficits  MSK: moves extremities  Skin: Warm and dry  Psych: Normal affect  Ext: no c/c/e    Data Review:     Labs:   Lab Results   Component Value Date    WBC 9.3 2025    HGB 11.9 2025    HCT 37.6 2025    .0 2025    CREATSERUM 1.32 2025    BUN 15 2025     2025    K 3.5 2025     2025    CO2 25.0 2025    GLU 98 2025    CA 8.7 2025    ALB 4.0 2025    ALKPHO 106 2025    BILT 0.5 2025    TP 6.9 2025    AST 9 2025    ALT <7 2025    MG 2.0 2025       Imaging: Reviewed    XR CHEST AP PORTABLE  (CPT=71045)    Result Date: 3/11/2025  CONCLUSION:  1. Cardiomegaly with findings concerning for mild CHF/fluid overload  including mild pulmonary edema. 2. New left lower lobe consolidation, which could relate to atelectasis or pneumonia.   Elm-remote  Dictated by (CST): Marques Hernandez MD on 3/11/2025 at 9:56 PM     Finalized by (CST): Marques Hernandez MD on 3/11/2025 at 9:58 PM           Meds:   Current Facility-Administered Medications   Medication Dose Route Frequency    gabapentin (Neurontin) cap 300 mg  300 mg Oral BID    heparin (Porcine) 5000 UNIT/ML injection 5,000 Units  5,000 Units Subcutaneous 2 times per day    acetaminophen (Tylenol Extra Strength) tab 500 mg  500 mg Oral Q4H PRN    azithromycin (Zithromax) tab 500 mg  500 mg Oral Daily    ampicillin-sulbactam (Unasyn) 3 g in sodium chloride 0.9% 100mL IVPB-ADD  3 g Intravenous q6h    atorvastatin (Lipitor) tab 40 mg  40 mg Oral Nightly    benzonatate (Tessalon) cap 200 mg  200 mg Oral TID PRN    clopidogrel (Plavix) tab 75 mg  75 mg Oral Nightly    levothyroxine (Synthroid) tab 100 mcg  100 mcg Oral Before breakfast    gabapentin (Neurontin) tab 600 mg  600 mg Oral Daily         Assessment  Patient Active Problem List   Diagnosis    Left hemiparesis (HCC)    CVA, old, hemiparesis (HCC)    Essential hypertension    Hypercholesterolemia    Hypothyroidism    Benign prostatic hyperplasia with lower urinary tract symptoms    Status post repair of abdominal aortic aneurysm (AAA) using bifurcation graft    Stage 3a chronic kidney disease (HCC)    Primary osteoarthritis involving multiple joints    Chronic bilateral low back pain without sciatica    Fatigue    Aspiration pneumonia of left lower lobe (HCC)    Aspiration pneumonitis (HCC)    Pneumonia due to infectious organism    Pneumonia due to infectious organism, unspecified laterality, unspecified part of lung    Sepsis, due to unspecified organism    Spinal stenosis of lumbar region without neurogenic claudication    Cerebrovascular accident (CVA) (HCC)    Primary osteoarthritis of right knee    Acute bronchospasm     Viral syndrome    Community acquired pneumonia of left lower lobe of lung    Lumbar spondylosis    Anticoagulant long-term use    Gait disturbance    Dyspnea on exertion    Weakness generalized    Inability to walk    Aspiration pneumonia (HCC)    Primary osteoarthritis of both knees    Acute respiratory failure with hypoxia (HCC)    Aspiration pneumonia, unspecified aspiration pneumonia type, unspecified laterality, unspecified part of lung (HCC)    Leukocytosis, unspecified type    Renal insufficiency    Elevated troponin    Hypoxia    COVID    Acute on chronic respiratory failure with hypoxia (HCC)    Contusion of left hip, initial encounter    History of falling    Personal history of nicotine dependence    Hypertensive chronic kidney disease w stg 1-4/unsp chr kdny    Long term (current) use of opiate analgesic    History of pneumonia    Acute pain of left shoulder    Closed fracture of left proximal humerus    Tendinosis of rotator cuff    Hypernatremia    JOSE (acute kidney injury)    Left lower lobe pneumonia    Acute cystitis with hematuria    Sepsis (HCC)    Febrile illness    Fall, initial encounter    Decubitus ulcer of left buttock, stage 2 (HCC)    Aspiration pneumonia of lower lobe, unspecified aspiration pneumonia type, unspecified laterality (HCC)       Plan:     Possible aspiration pneumonia  - this is recurrent  - speech therapy consulted  - cont IV abx  - cont pulm hygiene - IS, resp therapy to assist - wean oxygen as able     Weakness  - likely worsened due to above  - pt/ot - recs are for [facility]    HTN  - monitor vitals and adjust meds prn    Hypothyroidism  - cont home meds    Hx of CVA  - cont asa and statin     Global A/P  - reviewed labs and vitals from today  - reviewed notes of the day  - cbc, bmp, mag ordered for tomorrow  - discussed need to stay in the hospital today due to above  - cont IV abx  - discussed with patient/RN and care team  - dispo pending clinical  course          Supplementary Documentation:   DVT Mechanical Prophylaxis:   SCDs,    DVT Pharmacologic Prophylaxis   Medication    heparin (Porcine) 5000 UNIT/ML injection 5,000 Units                Code Status: DNAR/Comfort Care  Lao: External urinary catheter in place  Lao Duration (in days):   Central line:    DEBORAH: 3/14/2025                           MDM: High complexity- severe exacerbation of chronic illness posing a threat to life. IV meds requiring close inpatient monitoring. I personally spent time on chart/note review, review of labs/imaging, discussion with patient, physical exam, discussion with staff, writing progress note, and discussion of plan of care.

## 2025-03-13 NOTE — CONGREGATE LIVING REVIEW
[facility] Living Authorization    The [facility] Review Committee has reviewed this case and the patient IS APPROVED for discharge to a facility for Short Term Skilled once the following procedure is followed:     - The physician discharge instructions (contained within the MATT note for SNF) must inlcude the below appropriate and approved COVID instructions to the facility    For questions regarding CLRC approval process, please contact the CM assigned to the case.  For questions regarding RN discharge workflow, please contact the unit Clinical Leader.

## 2025-03-13 NOTE — CM/SW NOTE
03/13/25 0900   CM/SW Referral Data   Referral Source Physician   Reason for Referral Discharge planning   Informant Patient;Spouse/Significant Other;EMR;Clinical Staff Member   Medical Hx   Does patient have an established PCP? Yes  (Dr. Kandice Claudio)   Significant Past Medical/Mental Health Hx CVA   Patient Info   Advanced directives? Yes   Patient's Current Mental Status at Time of Assessment Alert;Oriented   Patient's Home Environment House   Number of Levels in Home 2   Number of Stair in Home 2 CHRISTINA; 3 to bedroom   Patient lives with Spouse/Significant other   Patient Status Prior to Admission   Independent with ADLs and Mobility No   Pt. requires assistance with Ambulating;Driving;Meals;Bathing;Dressing;Medications   Services in place prior to admission Home Health Care;DME/Supplies at home   Home Health Provider Info [facility]   Type of DME/Supplies Wheelchair;Wheeled Walker   Discharge Needs   Anticipated D/C needs Subacute rehab   Services Requested   Submitted to [facility] Yes   PASRR Level 1 Submitted Yes   PMR Consult Requested Not clinically appropriate at this time   Choice of Post-Acute Provider   Informed patient of right to choose their preferred provider Yes   List of appropriate post-acute services provided to patient/family with quality data No - Declined list   Patient/family choice [facility]     SW received MD order for discharge planning.    Pt admitted for weakness/congestion- has hx of aspiration pneumonia.  Pt passed speech eval- remains on supplemental O2 (baseline room air).    Anticipated therapy need: Gradual Rehabilitative Therapy.    TRACEE met with pt and his wife Meme bedside to complete assessment.    TRACEE confirmed address and PCP on file.    Pt and spouse live in a multi-level home.  At baseline, he is ambulatory w/ a hemiwalker and requires assistance with ADLs.     Pt is current with [facility] RN/PT.    Pt has hx of rehab at [facility]-  would like to return.  Per Meme pt typically goes to [facility] after hospital for a short stay (able to self-transfer) then is well enough to return home.    TRACEE sent referral to [facility] in [place].  Liaison Guillermina confirmed they can accept.    [facility] approved/PASRR completed and uploaded.    PLAN: DC to [facility] pending med clear    / to remain available for support and/or discharge planning.     Alisa PINEDA, LSW  Discharge Planner

## 2025-03-14 ENCOUNTER — TELEPHONE (OUTPATIENT)
Dept: INTERNAL MEDICINE CLINIC | Facility: CLINIC | Age: 85
End: 2025-03-14

## 2025-03-14 LAB
ANION GAP SERPL CALC-SCNC: 8 MMOL/L (ref 0–18)
BUN BLD-MCNC: 17 MG/DL (ref 9–23)
BUN/CREAT SERPL: 12.2 (ref 10–20)
CALCIUM BLD-MCNC: 8.7 MG/DL (ref 8.7–10.4)
CHLORIDE SERPL-SCNC: 110 MMOL/L (ref 98–112)
CO2 SERPL-SCNC: 26 MMOL/L (ref 21–32)
CREAT BLD-MCNC: 1.39 MG/DL
DEPRECATED RDW RBC AUTO: 47.8 FL (ref 35.1–46.3)
EGFRCR SERPLBLD CKD-EPI 2021: 50 ML/MIN/1.73M2 (ref 60–?)
ERYTHROCYTE [DISTWIDTH] IN BLOOD BY AUTOMATED COUNT: 13.4 % (ref 11–15)
GLUCOSE BLD-MCNC: 100 MG/DL (ref 70–99)
GLUCOSE BLDC GLUCOMTR-MCNC: 119 MG/DL (ref 70–99)
GLUCOSE BLDC GLUCOMTR-MCNC: 180 MG/DL (ref 70–99)
GLUCOSE BLDC GLUCOMTR-MCNC: 94 MG/DL (ref 70–99)
HCT VFR BLD AUTO: 36.4 %
HGB BLD-MCNC: 11.7 G/DL
MAGNESIUM SERPL-MCNC: 2.1 MG/DL (ref 1.6–2.6)
MCH RBC QN AUTO: 31.1 PG (ref 26–34)
MCHC RBC AUTO-ENTMCNC: 32.1 G/DL (ref 31–37)
MCV RBC AUTO: 96.8 FL
OSMOLALITY SERPL CALC.SUM OF ELEC: 300 MOSM/KG (ref 275–295)
PLATELET # BLD AUTO: 165 10(3)UL (ref 150–450)
POTASSIUM SERPL-SCNC: 3.6 MMOL/L (ref 3.5–5.1)
RBC # BLD AUTO: 3.76 X10(6)UL
SODIUM SERPL-SCNC: 144 MMOL/L (ref 136–145)
WBC # BLD AUTO: 7.4 X10(3) UL (ref 4–11)

## 2025-03-14 PROCEDURE — 99233 SBSQ HOSP IP/OBS HIGH 50: CPT | Performed by: INTERNAL MEDICINE

## 2025-03-14 RX ORDER — CALCIUM CARBONATE 500 MG/1
1000 TABLET, CHEWABLE ORAL 2 TIMES DAILY PRN
Status: DISCONTINUED | OUTPATIENT
Start: 2025-03-14 | End: 2025-03-16

## 2025-03-14 RX ORDER — SENNA AND DOCUSATE SODIUM 50; 8.6 MG/1; MG/1
2 TABLET, FILM COATED ORAL DAILY
Status: DISCONTINUED | OUTPATIENT
Start: 2025-03-14 | End: 2025-03-16

## 2025-03-14 RX ORDER — POLYETHYLENE GLYCOL 3350 17 G/17G
17 POWDER, FOR SOLUTION ORAL DAILY
Status: DISCONTINUED | OUTPATIENT
Start: 2025-03-14 | End: 2025-03-16

## 2025-03-14 NOTE — PROGRESS NOTES
Progress Note     Cody Fitzgerald Patient Status:  Inpatient    1940 MRN [id number]   Location [facility] 5SW/SE Attending Harvey Diallo MD   Hosp Day # 2 PCP Kandice Claudio DO     Chief Complaint:   Chief Complaint   Patient presents with    Malaise         Subjective:   S: Patient seen and examined, chart reviewed.   No new c/o. On O2 at 4 lpm, wean as tolerated.  Can go to NH but needs one more night.       Review of Systems:   10 point ROS completed and was negative, except for pertinent positive and negatives stated in subjective.                Objective:   Vital signs:  Temp:  [97.5 °F (36.4 °C)-98.2 °F (36.8 °C)] 98.2 °F (36.8 °C)  Pulse:  [59-66] 59  Resp:  [16-19] 19  BP: (122-147)/(62-91) 140/82  SpO2:  [93 %-97 %] 93 %    Wt Readings from Last 6 Encounters:   25 200 lb 9.6 oz (91 kg)   10/25/24 203 lb 9.6 oz (92.4 kg)   24 203 lb (92.1 kg)   24 197 lb (89.4 kg)   24 189 lb 12.8 oz (86.1 kg)   24 197 lb 6.4 oz (89.5 kg)       Physical Exam:    General: No acute distress.   Respiratory: Clear to auscultation bilaterally. No wheezes. No rhonchi.  Cardiovascular: S1, S2. Regular rate and rhythm. No murmurs, rubs or gallops.   Abdomen: Soft, nontender, nondistended.  Positive bowel sounds. No rebound or guarding.  Neurologic: No focal neurological deficits.   Musculoskeletal: Moves all extremities.  Extremities: No edema.    Results:   Diagnostic Data:      Labs:    Labs Last 24 Hours:   BMP     CBC    Other     Na 144 Cl 110 BUN 17 Glu 100   Hb 11.7   PTT - Procal -   K 3.6 CO2 26.0 Cr 1.39   WBC 7.4 >< .0  INR - CRP -   Renal Lytes Endo    Hct 36.4   Trop - D dim -   eGFR - Ca 8.7 POC Gluc  180    LFT   pBNP - Lactic -   eGFR AA - PO4 - A1c -   AST - APk - Prot -  LDL -      Recent Labs   Lab 25  1234 25  0529 25  0517   RBC 4.38 4.14 3.85 3.76*   HGB 13.3 13.0 11.9* 11.7*   HCT 41.2 40.0 37.6* 36.4*   MCV 94.1  96.6 97.7 96.8   MCH 30.4 31.4 30.9 31.1   MCHC 32.3 32.5 31.6 32.1   RDW 13.7 13.6 13.6 13.4   NEPRELIM 12.62* 9.65* 5.84  --    WBC 15.9* 12.4* 9.3 7.4   .0 165.0 149.0* 165.0       Lab Results   Component Value Date    INR 0.99 10/29/2024    INR 1.19 06/09/2024    INR 0.99 04/14/2024       Recent Labs   Lab 03/11/25 2020 03/12/25  1234 03/13/25  0529 03/14/25  0517   * 145* 98 100*   BUN 17 15 15 17   CREATSERUM 1.31* 1.31* 1.32* 1.39*   CA 9.4 8.9 8.7 8.7   ALB 4.2 4.4 4.0  --     144 143 144   K 3.8 4.1 3.5 3.6    110 108 110   CO2 25.0 24.0 25.0 26.0   ALKPHO 134* 119* 106  --    AST 12 14 9  --    ALT <7* <7* <7*  --    BILT 0.3 0.5 0.5  --    TP 7.5 7.5 6.9  --        No results for input(s): \"DIEGO\", \"LIP\" in the last 168 hours.    Recent Labs   Lab 03/13/25 0529 03/14/25  0517   MG 2.0 2.1       No results for input(s): \"URINE\", \"CULTI\", \"BLDSMR\" in the last 168 hours.      No results found.        Pro-Calcitonin  No results for input(s): \"PCT\" in the last 168 hours.    Cardiac  Recent Labs   Lab 03/11/25 2020   PBNP 352       Imaging: Imaging data reviewed in Epic.    No results found.       Medications:    mupirocin  1 Application Each Nare BID    polyethylene glycol (PEG 3350)  17 g Oral Daily    senna-docusate  2 tablet Oral Daily    gabapentin  300 mg Oral BID    heparin  5,000 Units Subcutaneous 2 times per day    ampicillin-sulbactam  3 g Intravenous q6h    atorvastatin  40 mg Oral Nightly    clopidogrel  75 mg Oral Nightly    levothyroxine  100 mcg Oral Before breakfast    gabapentin  600 mg Oral Daily       Assessment & Plan:   ASSESSMENT / PLAN:      Left hemiparesis (HCC)    CVA, old, hemiparesis (HCC)    Essential hypertension    Hypercholesterolemia    Hypothyroidism    Benign prostatic hyperplasia with lower urinary tract symptoms    Status post repair of abdominal aortic aneurysm (AAA) using bifurcation graft    Stage 3a chronic kidney disease (HCC)    Primary  osteoarthritis involving multiple joints    Chronic bilateral low back pain without sciatica    Fatigue    Aspiration pneumonia of left lower lobe (HCC)    Aspiration pneumonitis (HCC)    Pneumonia due to infectious organism    Pneumonia due to infectious organism, unspecified laterality, unspecified part of lung    Sepsis, due to unspecified organism    Spinal stenosis of lumbar region without neurogenic claudication    Cerebrovascular accident (CVA) (HCC)    Primary osteoarthritis of right knee    Acute bronchospasm    Viral syndrome    Community acquired pneumonia of left lower lobe of lung    Lumbar spondylosis    Anticoagulant long-term use    Gait disturbance    Dyspnea on exertion    Weakness generalized    Inability to walk    Aspiration pneumonia (HCC)    Primary osteoarthritis of both knees    Acute respiratory failure with hypoxia (HCC)    Aspiration pneumonia, unspecified aspiration pneumonia type, unspecified laterality, unspecified part of lung (HCC)    Leukocytosis, unspecified type    Renal insufficiency    Elevated troponin    Hypoxia    COVID    Acute on chronic respiratory failure with hypoxia (HCC)    Contusion of left hip, initial encounter    History of falling    Personal history of nicotine dependence    Hypertensive chronic kidney disease w stg 1-4/unsp chr kdny    Long term (current) use of opiate analgesic    History of pneumonia    Acute pain of left shoulder    Closed fracture of left proximal humerus    Tendinosis of rotator cuff    Hypernatremia    JOSE (acute kidney injury)    Left lower lobe pneumonia    Acute cystitis with hematuria    Sepsis (HCC)    Febrile illness    Fall, initial encounter    Decubitus ulcer of left buttock, stage 2 (HCC)    Aspiration pneumonia of lower lobe, unspecified aspiration pneumonia type, unspecified laterality (HCC)         Plan:      Possible aspiration pneumonia  - this is recurrent  - speech therapy consulted  - cont IV abx  - cont pulm hygiene -  IS, resp therapy to assist - wean oxygen as able      Weakness  - likely worsened due to above  - pt/ot - recs are for [facility]     HTN  - monitor vitals and adjust meds prn     Hypothyroidism  - cont home meds     Hx of CVA  - cont asa and statin            Global A/P  - reviewed labs and vitals from today  - reviewed notes of the day  - cbc, bmp, mag ordered for tomorrow  - discussed need to stay in the hospital today due to above  - cont IV abx  - discussed with patient/RN and care team  - dispo pending clinical course         dvt prophylaxis: sc heparin  code status: DNR  dispo: subacute rehab    >55min spent, >50% spent counseling and coordinating care in the form of educating pt/family and d/w consultants and staff. Most of the time spent discussing the above plan.     Estimated date of discharge: tomorrow  Discharge is dependent on: stability  At this point Mr. Fitzgerald is expected to be discharge to: rehab    Plan of care discussed with wife    Harvey Diallo MD, FAAP, FACP  [facility] Hospitalist  I respond to Epic Chat and AMS Connect

## 2025-03-14 NOTE — PLAN OF CARE
Problem: Patient Centered Care  Goal: Patient preferences are identified and integrated in the patient's plan of care  Description: Interventions:  - What would you like us to know as we care for you?   - Provide timely, complete, and accurate information to patient/family  - Incorporate patient and family knowledge, values, beliefs, and cultural backgrounds into the planning and delivery of care  - Encourage patient/family to participate in care and decision-making at the level they choose  - Honor patient and family perspectives and choices  Outcome: Progressing     Problem: CONFUSION  Goal: Confusion, delirium, dementia or psychosis is improved or at baseline  Description: INTERVENTIONS:- Assess for possible contributors to thought disturbance, including medications, impaired vision or hearing, underlying metabolic abnormalities, dehydration, psychiatric diagnoses, and notify attending MD/LIP- [place] high risk fall precautions, as indicated- Provide frequent short contacts to provide reality reorientation, refocusing and direction- Decrease environmental stimuli, including noise as appropriate- Monitor and intervene to maintain adequate nutrition, hydration, elimination, sleep and activity- Consider the need for a sitter if unable to leave patient unattended- Initiate Spiritual Care consult as indicated- Consult Pharmacy as needed  Outcome: Progressing     Problem: BEHAVIOR  Goal: Pt/Family maintain appropriate behavior and adhere to behavioral management agreement, if implemented  Description: INTERVENTIONS:- Assess patient/family’s coping skills and  non-compliant behavior (including use of illegal substances)- Notify security of behavior or suspected illegal substances which indicate the need for search of the patient and/or belongings- Encourage verbalization of thoughts and concerns in a socially appropriate manner- Utilize positive, consistent limit setting strategies supporting safety of patient,  staff and others- Encourage participation in the decision making process about the behavioral management agreement- Implement a Health Care Agreement if patient meets criteria- If a patient’s behavior jeopardizes the safety of the patient, staff, or others refer to organization policy. If a visitor’s behavior poses a threat to safety call refer to organization policy.- Initiate consult with , Psychosocial CNS, Spiritual Care as appropriate  Outcome: Progressing     Problem: SPIRITUAL CARE  Goal: Pt/Family able to move forward in process of forgiving self, others and/or higher power  Description: INTERVENTIONS:- Assist patient to overcome blocks to healing by use of spiritual practices (prayer, meditation, guided imagery, reiki, breath work, etc.)- De-myth guilt and help patient/family identify possible irrational spiritual/cultural beliefs and values- Explore possibilities of making amends & reconciliation with self, others, and/or a greater power- Guide patient/family in identifying painful feelings of guilt- Help patient explore and identify spiritual beliefs, cultural understandings or values that may help or hinder letting go of issue- Help patient explore feelings of anger, bitterness, resentment- Help patient/family identify and examine the situation in which these feelings are experienced- Help patient/family identify destructive displacement of feelings onto other individuals- Invite use of sacraments/rituals/ceremonies as appropriate (e.g. - confession, anointing, smudging)-. Refer patient to formal counseling and/or to [facility] community for further support work  Outcome: Progressing     Problem: RESPIRATORY - ADULT  Goal: Achieves optimal ventilation and oxygenation  Description: INTERVENTIONS:- Assess for changes in respiratory status- Assess for changes in mentation and behavior- Position to facilitate oxygenation and minimize respiratory effort- Oxygen supplementation based on oxygen  saturation or ABGs- Provide Smoking Cessation handout, if applicable- Encourage broncho-pulmonary hygiene including cough, deep breathe, Incentive Spirometry- Assess the need for suctioning and perform as needed- Assess and instruct to report SOB or any respiratory difficulty- Respiratory Therapy support as indicated- Manage/alleviate anxiety- Monitor for signs/symptoms of CO2 retention  Outcome: Progressing     Problem: METABOLIC/FLUID AND ELECTROLYTES - ADULT  Goal: Electrolytes maintained within normal limits  Description: INTERVENTIONS:- Monitor labs and rhythm and assess patient for signs and symptoms of electrolyte imbalances- Administer electrolyte replacement as ordered- Monitor response to electrolyte replacements, including rhythm and repeat lab results as appropriate- Fluid restriction as ordered- Instruct patient on fluid and nutrition restrictions as appropriate  Outcome: Progressing     Problem: NEUROLOGICAL - ADULT  Goal: Achieves stable or improved neurological status  Description: INTERVENTIONS- Assess for and report changes in neurological status- Initiate measures to prevent increased intracranial pressure- Maintain blood pressure and fluid volume within ordered parameters to optimize cerebral perfusion and minimize risk of hemorrhage- Monitor temperature, glucose, and sodium. Initiate appropriate interventions as ordered  Outcome: Progressing

## 2025-03-14 NOTE — TELEPHONE ENCOUNTER
Tana from [facility] calling to inform Dr. Claudio patient was admitted to [facility] Monday for pneumonia.    No call back necessary

## 2025-03-14 NOTE — PLAN OF CARE
Problem: Patient Centered Care  Goal: Patient preferences are identified and integrated in the patient's plan of care  Description: Interventions:  - What would you like us to know as we care for you? I am going to [place] when I discharge.  - Provide timely, complete, and accurate information to patient/family  - Incorporate patient and family knowledge, values, beliefs, and cultural backgrounds into the planning and delivery of care  - Encourage patient/family to participate in care and decision-making at the level they choose  - Honor patient and family perspectives and choices  Outcome: Progressing     Problem: Patient/Family Goals  Goal: Patient/Family Long Term Goal    Interventions:  - See additional Care Plan goals for specific interventions  Outcome: Progressing  Goal: Patient/Family Short Term Goal    Interventions:   - See additional Care Plan goals for specific interventions  Outcome: Progressing     Problem: CONFUSION  Goal: Confusion, delirium, dementia or psychosis is improved or at baseline  Description: INTERVENTIONS:- Assess for possible contributors to thought disturbance, including medications, impaired vision or hearing, underlying metabolic abnormalities, dehydration, psychiatric diagnoses, and notify attending MD/LIP- [place] high risk fall precautions, as indicated- Provide frequent short contacts to provide reality reorientation, refocusing and direction- Decrease environmental stimuli, including noise as appropriate- Monitor and intervene to maintain adequate nutrition, hydration, elimination, sleep and activity- Consider the need for a sitter if unable to leave patient unattended- Initiate Spiritual Care consult as indicated- Consult Pharmacy as needed  Outcome: Progressing     Problem: BEHAVIOR  Goal: Pt/Family maintain appropriate behavior and adhere to behavioral management agreement, if implemented  Description: INTERVENTIONS:- Assess patient/family’s coping skills and  non-compliant  behavior (including use of illegal substances)- Notify security of behavior or suspected illegal substances which indicate the need for search of the patient and/or belongings- Encourage verbalization of thoughts and concerns in a socially appropriate manner- Utilize positive, consistent limit setting strategies supporting safety of patient, staff and others- Encourage participation in the decision making process about the behavioral management agreement- Implement a Health Care Agreement if patient meets criteria- If a patient’s behavior jeopardizes the safety of the patient, staff, or others refer to organization policy. If a visitor’s behavior poses a threat to safety call refer to organization policy.- Initiate consult with , Psychosocial CNS, Spiritual Care as appropriate  Outcome: Progressing     Problem: SPIRITUAL CARE  Goal: Pt/Family able to move forward in process of forgiving self, others and/or higher power  Description: INTERVENTIONS:- Assist patient to overcome blocks to healing by use of spiritual practices (prayer, meditation, guided imagery, reiki, breath work, etc.)- De-myth guilt and help patient/family identify possible irrational spiritual/cultural beliefs and values- Explore possibilities of making amends & reconciliation with self, others, and/or a greater power- Guide patient/family in identifying painful feelings of guilt- Help patient explore and identify spiritual beliefs, cultural understandings or values that may help or hinder letting go of issue- Help patient explore feelings of anger, bitterness, resentment- Help patient/family identify and examine the situation in which these feelings are experienced- Help patient/family identify destructive displacement of feelings onto other individuals- Invite use of sacraments/rituals/ceremonies as appropriate (e.g. - confession, anointing, smudging)-. Refer patient to formal counseling and/or to [facility] for further support  work  Outcome: Progressing  Goal: Pt feels balance and connection with higher power that empowers the self during times of loss, guilt and fear  Description: INTERVENTIONS:- Create safety for patient through empathic presence and non-judgmental listening- Encourage patient to explore his/her values, beliefs and/or spiritual images and practices- Encourage use of breath work, imagery, meditation, relaxation, reiki to ease distress and provide healing- Encourage use of cultural and spiritual celebrations and rituals- Facilitate discussion that helps patient sort out spiritual concerns- Help patient identify where meaning/hope/comfort & strength are in his/her life- Refer patient to [facility] for assistance, as appropriate- Respond to patient/family need for prayer/ritual/sacrament/ceremony  Outcome: Progressing     Problem: RESPIRATORY - ADULT  Goal: Achieves optimal ventilation and oxygenation  Description: INTERVENTIONS:- Assess for changes in respiratory status- Assess for changes in mentation and behavior- Position to facilitate oxygenation and minimize respiratory effort- Oxygen supplementation based on oxygen saturation or ABGs- Provide Smoking Cessation handout, if applicable- Encourage broncho-pulmonary hygiene including cough, deep breathe, Incentive Spirometry- Assess the need for suctioning and perform as needed- Assess and instruct to report SOB or any respiratory difficulty- Respiratory Therapy support as indicated- Manage/alleviate anxiety- Monitor for signs/symptoms of CO2 retention  Outcome: Progressing     Problem: METABOLIC/FLUID AND ELECTROLYTES - ADULT  Goal: Electrolytes maintained within normal limits  Description: INTERVENTIONS:- Monitor labs and rhythm and assess patient for signs and symptoms of electrolyte imbalances- Administer electrolyte replacement as ordered- Monitor response to electrolyte replacements, including rhythm and repeat lab results as appropriate- Fluid restriction as  ordered- Instruct patient on fluid and nutrition restrictions as appropriate  Outcome: Progressing  Goal: Hemodynamic stability and optimal renal function maintained  Description: INTERVENTIONS:- Monitor labs and assess for signs and symptoms of volume excess or deficit- Monitor intake, output and patient weight- Monitor urine specific gravity, serum osmolarity and serum sodium as indicated or ordered- Monitor response to interventions for patient's volume status, including labs, urine output, blood pressure (other measures as available)- Encourage oral intake as appropriate- Instruct patient on fluid and nutrition restrictions as appropriate  Outcome: Progressing     Problem: SKIN/TISSUE INTEGRITY - ADULT  Goal: Skin integrity remains intact  Description: INTERVENTIONS- Assess and document risk factors for pressure ulcer development- Assess and document skin integrity- Monitor for areas of redness and/or skin breakdown- Initiate interventions, skin care algorithm/standards of care as needed  Outcome: Progressing  Goal: Incision(s), wounds(s) or drain site(s) healing without S/S of infection  Description: INTERVENTIONS:- Assess and document risk factors for pressure ulcer development- Assess and document skin integrity- Assess and document dressing/incision, wound bed, drain sites and surrounding tissue- Implement wound care per orders- Initiate isolation precautions as appropriate- Initiate Pressure Ulcer prevention bundle as indicated  Outcome: Progressing     Problem: MUSCULOSKELETAL - ADULT  Goal: Return mobility to safest level of function  Description: INTERVENTIONS:- Assess patient stability and activity tolerance for standing, transferring and ambulating w/ or w/o assistive devices- Assist with transfers and ambulation using safe patient handling equipment as needed- Ensure adequate protection for wounds/incisions during mobilization- Obtain PT/OT consults as needed- Advance activity as appropriate-  Communicate ordered activity level and limitations with patient/family  Outcome: Progressing  Goal: Maintain proper alignment of affected body part  Description: INTERVENTIONS:- Support and protect limb and body alignment per provider's orders- Instruct and reinforce with patient and family use of appropriate assistive device and precautions (e.g. spinal or hip dislocation precautions)  Outcome: Progressing     Problem: NEUROLOGICAL - ADULT  Goal: Achieves stable or improved neurological status  Description: INTERVENTIONS- Assess for and report changes in neurological status- Initiate measures to prevent increased intracranial pressure- Maintain blood pressure and fluid volume within ordered parameters to optimize cerebral perfusion and minimize risk of hemorrhage- Monitor temperature, glucose, and sodium. Initiate appropriate interventions as ordered  Outcome: Progressing  Goal: Achieves maximal functionality and self care  Description: INTERVENTIONS- Monitor swallowing and airway patency with patient fatigue and changes in neurological status- Encourage and assist patient to increase activity and self care with guidance from PT/OT- Encourage visually impaired, hearing impaired and aphasic patients to use assistive/communication devices  Outcome: Progressing     Problem: Impaired Functional Mobility  Goal: Achieve highest/safest level of mobility/gait  Description: Interventions:- Assess patient's functional ability and stability- Promote increasing activity/tolerance for mobility and gait- Educate and engage patient/family in tolerated activity level and precautions  Outcome: Progressing     Problem: Impaired Activities of Daily Living  Goal: Achieve highest/safest level of independence in self care  Description: Interventions:- Assess ability and encourage patient to participate in ADLs to maximize function- Promote sitting position while performing ADLs such as feeding, grooming, and bathing- Educate and  encourage patient/family in tolerated functional activity level and precautions during self-care  Outcome: Progressing     Problem: Impaired Swallowing  Goal: Minimize aspiration risk  Description: Interventions:- Patient should be alert and upright for all feedings (90 degrees preferred)- Offer food and liquids at a slow rate- No straws- Encourage small bites of food and small sips of liquid- Offer pills one at a time, crush or deliver with applesauce as needed- Discontinue feeding and notify MD (or speech pathologist) if coughing or persistent throat clearing or wet/gurgly vocal quality is noted  Outcome: Progressing     Problem: CARDIOVASCULAR - ADULT  Goal: Maintains optimal cardiac output and hemodynamic stability  Description: INTERVENTIONS:- Monitor vital signs, rhythm, and trends- Monitor for bleeding, hypotension and signs of decreased cardiac output- Evaluate effectiveness of vasoactive medications to optimize hemodynamic stability- Monitor arterial and/or venous puncture sites for bleeding and/or hematoma- Assess quality of pulses, skin color and temperature- Assess for signs of decreased coronary artery perfusion - ex. Angina- Evaluate fluid balance, assess for edema, trend weights  Outcome: Progressing  Goal: Absence of cardiac arrhythmias or at baseline  Description: INTERVENTIONS:- Continuous cardiac monitoring, monitor vital signs, obtain 12 lead EKG if indicated- Evaluate effectiveness of antiarrhythmic and heart rate control medications as ordered- Initiate emergency measures for life threatening arrhythmias- Monitor electrolytes and administer replacement therapy as ordered  Outcome: Progressing     Problem: PAIN - ADULT  Goal: Verbalizes/displays adequate comfort level or patient's stated pain goal  Description: INTERVENTIONS:- Encourage pt to monitor pain and request assistance- Assess pain using appropriate pain scale- Administer analgesics based on type and severity of pain and evaluate  response- Implement non-pharmacological measures as appropriate and evaluate response- Consider cultural and social influences on pain and pain management- Manage/alleviate anxiety- Utilize distraction and/or relaxation techniques- Monitor for opioid side effects- Notify MD/LIP if interventions unsuccessful or patient reports new pain- Anticipate increased pain with activity and pre-medicate as appropriate  Outcome: Progressing     Problem: SAFETY ADULT - FALL  Goal: Free from fall injury  Description: INTERVENTIONS:- Assess pt frequently for physical needs- Identify cognitive and physical deficits and behaviors that affect risk of falls.- [place] fall precautions as indicated by assessment.- Educate pt/family on patient safety including physical limitations- Instruct pt to call for assistance with activity based on assessment- Modify environment to reduce risk of injury- Provide assistive devices as appropriate- Consider OT/PT consult to assist with strengthening/mobility- Encourage toileting schedule  Outcome: Progressing     Problem: DISCHARGE PLANNING  Goal: Discharge to home or other facility with appropriate resources  Description: INTERVENTIONS:- Identify barriers to discharge w/pt and caregiver- Include patient/family/discharge partner in discharge planning- Arrange for needed discharge resources and transportation as appropriate- Identify discharge learning needs (meds, wound care, etc)- Arrange for interpreters to assist at discharge as needed- Consider post-discharge preferences of patient/family/discharge partner- Complete POLST form as appropriate- Assess patient's ability to be responsible for managing their own health- Refer to Case Management Department for coordinating discharge planning if the patient needs post-hospital services based on physician/LIP order or complex needs related to functional status, cognitive ability or social support system  Outcome: Progressing

## 2025-03-15 PROBLEM — J69.0 ASPIRATION PNEUMONIA OF LOWER LOBE, UNSPECIFIED ASPIRATION PNEUMONIA TYPE, UNSPECIFIED LATERALITY (HCC): Status: RESOLVED | Noted: 2025-03-11 | Resolved: 2025-03-15

## 2025-03-15 PROCEDURE — 99233 SBSQ HOSP IP/OBS HIGH 50: CPT | Performed by: INTERNAL MEDICINE

## 2025-03-15 RX ORDER — CALCIUM CARBONATE 500 MG/1
1000 TABLET, CHEWABLE ORAL 2 TIMES DAILY PRN
Status: SHIPPED | COMMUNITY
Start: 2025-03-15

## 2025-03-15 RX ORDER — ACETAMINOPHEN 500 MG
500 TABLET ORAL EVERY 4 HOURS PRN
Status: SHIPPED | COMMUNITY
Start: 2025-03-15

## 2025-03-15 RX ORDER — POLYETHYLENE GLYCOL 3350 17 G/17G
17 POWDER, FOR SOLUTION ORAL DAILY
Status: SHIPPED | COMMUNITY
Start: 2025-03-16

## 2025-03-15 NOTE — PLAN OF CARE
Problem: Patient Centered Care  Goal: Patient preferences are identified and integrated in the patient's plan of care  Description: Interventions:  - What would you like us to know as we care for you?   - Provide timely, complete, and accurate information to patient/family  - Incorporate patient and family knowledge, values, beliefs, and cultural backgrounds into the planning and delivery of care  - Encourage patient/family to participate in care and decision-making at the level they choose  - Honor patient and family perspectives and choices  Outcome: Progressing     Problem: Patient/Family Goals  Goal: Patient/Family Long Term Goal  Description: Patient's Long Term Goal:     Interventions:  - See additional Care Plan goals for specific interventions  Outcome: Progressing  Goal: Patient/Family Short Term Goal  Description: Patient's Short Term Goal:     Interventions:   - See additional Care Plan goals for specific interventions  Outcome: Progressing     Problem: CONFUSION  Goal: Confusion, delirium, dementia or psychosis is improved or at baseline  Description: INTERVENTIONS:- Assess for possible contributors to thought disturbance, including medications, impaired vision or hearing, underlying metabolic abnormalities, dehydration, psychiatric diagnoses, and notify attending MD/LIP- [place] high risk fall precautions, as indicated- Provide frequent short contacts to provide reality reorientation, refocusing and direction- Decrease environmental stimuli, including noise as appropriate- Monitor and intervene to maintain adequate nutrition, hydration, elimination, sleep and activity- Consider the need for a sitter if unable to leave patient unattended- Initiate Spiritual Care consult as indicated- Consult Pharmacy as needed  Outcome: Progressing     Problem: BEHAVIOR  Goal: Pt/Family maintain appropriate behavior and adhere to behavioral management agreement, if implemented  Description: INTERVENTIONS:- Assess  patient/family’s coping skills and  non-compliant behavior (including use of illegal substances)- Notify security of behavior or suspected illegal substances which indicate the need for search of the patient and/or belongings- Encourage verbalization of thoughts and concerns in a socially appropriate manner- Utilize positive, consistent limit setting strategies supporting safety of patient, staff and others- Encourage participation in the decision making process about the behavioral management agreement- Implement a Health Care Agreement if patient meets criteria- If a patient’s behavior jeopardizes the safety of the patient, staff, or others refer to organization policy. If a visitor’s behavior poses a threat to safety call refer to organization policy.- Initiate consult with , Psychosocial CNS, Spiritual Care as appropriate  Outcome: Progressing     Problem: SPIRITUAL CARE  Goal: Pt/Family able to move forward in process of forgiving self, others and/or higher power  Description: INTERVENTIONS:- Assist patient to overcome blocks to healing by use of spiritual practices (prayer, meditation, guided imagery, reiki, breath work, etc.)- De-myth guilt and help patient/family identify possible irrational spiritual/cultural beliefs and values- Explore possibilities of making amends & reconciliation with self, others, and/or a greater power- Guide patient/family in identifying painful feelings of guilt- Help patient explore and identify spiritual beliefs, cultural understandings or values that may help or hinder letting go of issue- Help patient explore feelings of anger, bitterness, resentment- Help patient/family identify and examine the situation in which these feelings are experienced- Help patient/family identify destructive displacement of feelings onto other individuals- Invite use of sacraments/rituals/ceremonies as appropriate (e.g. - confession, anointing, smudging)-. Refer patient to formal  counseling and/or to [facility] for further support work  Outcome: Progressing  Goal: Pt feels balance and connection with higher power that empowers the self during times of loss, guilt and fear  Description: INTERVENTIONS:- Create safety for patient through empathic presence and non-judgmental listening- Encourage patient to explore his/her values, beliefs and/or spiritual images and practices- Encourage use of breath work, imagery, meditation, relaxation, reiki to ease distress and provide healing- Encourage use of cultural and spiritual celebrations and rituals- Facilitate discussion that helps patient sort out spiritual concerns- Help patient identify where meaning/hope/comfort & strength are in his/her life- Refer patient to [facility] for assistance, as appropriate- Respond to patient/family need for prayer/ritual/sacrament/ceremony  Outcome: Progressing     Problem: RESPIRATORY - ADULT  Goal: Achieves optimal ventilation and oxygenation  Description: INTERVENTIONS:- Assess for changes in respiratory status- Assess for changes in mentation and behavior- Position to facilitate oxygenation and minimize respiratory effort- Oxygen supplementation based on oxygen saturation or ABGs- Provide Smoking Cessation handout, if applicable- Encourage broncho-pulmonary hygiene including cough, deep breathe, Incentive Spirometry- Assess the need for suctioning and perform as needed- Assess and instruct to report SOB or any respiratory difficulty- Respiratory Therapy support as indicated- Manage/alleviate anxiety- Monitor for signs/symptoms of CO2 retention  Outcome: Progressing     Problem: METABOLIC/FLUID AND ELECTROLYTES - ADULT  Goal: Electrolytes maintained within normal limits  Description: INTERVENTIONS:- Monitor labs and rhythm and assess patient for signs and symptoms of electrolyte imbalances- Administer electrolyte replacement as ordered- Monitor response to electrolyte replacements, including rhythm and  repeat lab results as appropriate- Fluid restriction as ordered- Instruct patient on fluid and nutrition restrictions as appropriate  Outcome: Progressing  Goal: Hemodynamic stability and optimal renal function maintained  Description: INTERVENTIONS:- Monitor labs and assess for signs and symptoms of volume excess or deficit- Monitor intake, output and patient weight- Monitor urine specific gravity, serum osmolarity and serum sodium as indicated or ordered- Monitor response to interventions for patient's volume status, including labs, urine output, blood pressure (other measures as available)- Encourage oral intake as appropriate- Instruct patient on fluid and nutrition restrictions as appropriate  Outcome: Progressing     Problem: SKIN/TISSUE INTEGRITY - ADULT  Goal: Skin integrity remains intact  Description: INTERVENTIONS- Assess and document risk factors for pressure ulcer development- Assess and document skin integrity- Monitor for areas of redness and/or skin breakdown- Initiate interventions, skin care algorithm/standards of care as needed  Outcome: Progressing  Goal: Incision(s), wounds(s) or drain site(s) healing without S/S of infection  Description: INTERVENTIONS:- Assess and document risk factors for pressure ulcer development- Assess and document skin integrity- Assess and document dressing/incision, wound bed, drain sites and surrounding tissue- Implement wound care per orders- Initiate isolation precautions as appropriate- Initiate Pressure Ulcer prevention bundle as indicated  Outcome: Progressing     Problem: MUSCULOSKELETAL - ADULT  Goal: Return mobility to safest level of function  Description: INTERVENTIONS:- Assess patient stability and activity tolerance for standing, transferring and ambulating w/ or w/o assistive devices- Assist with transfers and ambulation using safe patient handling equipment as needed- Ensure adequate protection for wounds/incisions during mobilization- Obtain PT/OT  consults as needed- Advance activity as appropriate- Communicate ordered activity level and limitations with patient/family  Outcome: Progressing  Goal: Maintain proper alignment of affected body part  Description: INTERVENTIONS:- Support and protect limb and body alignment per provider's orders- Instruct and reinforce with patient and family use of appropriate assistive device and precautions (e.g. spinal or hip dislocation precautions)  Outcome: Progressing     Problem: NEUROLOGICAL - ADULT  Goal: Achieves stable or improved neurological status  Description: INTERVENTIONS- Assess for and report changes in neurological status- Initiate measures to prevent increased intracranial pressure- Maintain blood pressure and fluid volume within ordered parameters to optimize cerebral perfusion and minimize risk of hemorrhage- Monitor temperature, glucose, and sodium. Initiate appropriate interventions as ordered  Outcome: Progressing  Goal: Achieves maximal functionality and self care  Description: INTERVENTIONS- Monitor swallowing and airway patency with patient fatigue and changes in neurological status- Encourage and assist patient to increase activity and self care with guidance from PT/OT- Encourage visually impaired, hearing impaired and aphasic patients to use assistive/communication devices  Outcome: Progressing     Problem: Impaired Functional Mobility  Goal: Achieve highest/safest level of mobility/gait  Description: Interventions:- Assess patient's functional ability and stability- Promote increasing activity/tolerance for mobility and gait- Educate and engage patient/family in tolerated activity level and precautions  Outcome: Progressing     Problem: Impaired Activities of Daily Living  Goal: Achieve highest/safest level of independence in self care  Description: Interventions:- Assess ability and encourage patient to participate in ADLs to maximize function- Promote sitting position while performing ADLs such  as feeding, grooming, and bathing- Educate and encourage patient/family in tolerated functional activity level and precautions during self-care  Outcome: Progressing     Problem: Impaired Swallowing  Goal: Minimize aspiration risk  Description: Interventions:- Patient should be alert and upright for all feedings (90 degrees preferred)- Offer food and liquids at a slow rate- No straws- Encourage small bites of food and small sips of liquid- Offer pills one at a time, crush or deliver with applesauce as needed- Discontinue feeding and notify MD (or speech pathologist) if coughing or persistent throat clearing or wet/gurgly vocal quality is noted  Outcome: Progressing     Problem: CARDIOVASCULAR - ADULT  Goal: Maintains optimal cardiac output and hemodynamic stability  Description: INTERVENTIONS:- Monitor vital signs, rhythm, and trends- Monitor for bleeding, hypotension and signs of decreased cardiac output- Evaluate effectiveness of vasoactive medications to optimize hemodynamic stability- Monitor arterial and/or venous puncture sites for bleeding and/or hematoma- Assess quality of pulses, skin color and temperature- Assess for signs of decreased coronary artery perfusion - ex. Angina- Evaluate fluid balance, assess for edema, trend weights  Outcome: Progressing  Goal: Absence of cardiac arrhythmias or at baseline  Description: INTERVENTIONS:- Continuous cardiac monitoring, monitor vital signs, obtain 12 lead EKG if indicated- Evaluate effectiveness of antiarrhythmic and heart rate control medications as ordered- Initiate emergency measures for life threatening arrhythmias- Monitor electrolytes and administer replacement therapy as ordered  Outcome: Progressing     Problem: PAIN - ADULT  Goal: Verbalizes/displays adequate comfort level or patient's stated pain goal  Description: INTERVENTIONS:- Encourage pt to monitor pain and request assistance- Assess pain using appropriate pain scale- Administer analgesics based  on type and severity of pain and evaluate response- Implement non-pharmacological measures as appropriate and evaluate response- Consider cultural and social influences on pain and pain management- Manage/alleviate anxiety- Utilize distraction and/or relaxation techniques- Monitor for opioid side effects- Notify MD/LIP if interventions unsuccessful or patient reports new pain- Anticipate increased pain with activity and pre-medicate as appropriate  Outcome: Progressing     Problem: SAFETY ADULT - FALL  Goal: Free from fall injury  Description: INTERVENTIONS:- Assess pt frequently for physical needs- Identify cognitive and physical deficits and behaviors that affect risk of falls.- [place] fall precautions as indicated by assessment.- Educate pt/family on patient safety including physical limitations- Instruct pt to call for assistance with activity based on assessment- Modify environment to reduce risk of injury- Provide assistive devices as appropriate- Consider OT/PT consult to assist with strengthening/mobility- Encourage toileting schedule  Outcome: Progressing     Problem: DISCHARGE PLANNING  Goal: Discharge to home or other facility with appropriate resources  Description: INTERVENTIONS:- Identify barriers to discharge w/pt and caregiver- Include patient/family/discharge partner in discharge planning- Arrange for needed discharge resources and transportation as appropriate- Identify discharge learning needs (meds, wound care, etc)- Arrange for interpreters to assist at discharge as needed- Consider post-discharge preferences of patient/family/discharge partner- Complete POLST form as appropriate- Assess patient's ability to be responsible for managing their own health- Refer to Case Management Department for coordinating discharge planning if the patient needs post-hospital services based on physician/LIP order or complex needs related to functional status, cognitive ability or social support  system  Outcome: Progressing

## 2025-03-15 NOTE — CM/SW NOTE
03/15/25 1558   Discharge disposition   Expected discharge disposition subacute   Post Acute Care Provider   ([facility])   Discharge transportation [facility]     Ambulance will  at 6pm.  Report to [phone].  Wife aware of dc plan.    Bryanna MONTEJOA BSN RN CRRN   RN Case Manager  [phone]

## 2025-03-15 NOTE — PROGRESS NOTES
Progress Note     Cody Fitzgerald Patient Status:  Inpatient    1940 MRN [id number]   Location [facility] 5SW/SE Attending Harvey Diallo MD   Hosp Day # 3 PCP Kandice Claudio DO     Chief Complaint:   Chief Complaint   Patient presents with    Malaise         Subjective:   S: Patient seen and examined, chart reviewed.   No further issues      Review of Systems:   10 point ROS completed and was negative, except for pertinent positive and negatives stated in subjective.                Objective:   Vital signs:  Temp:  [97.5 °F (36.4 °C)-98.2 °F (36.8 °C)] 97.8 °F (36.6 °C)  Pulse:  [64-73] 67  Resp:  [18-20] 20  BP: (138-144)/(61-66) 139/61  SpO2:  [93 %] 93 %    Wt Readings from Last 6 Encounters:   25 200 lb 9.6 oz (91 kg)   10/25/24 203 lb 9.6 oz (92.4 kg)   24 203 lb (92.1 kg)   24 197 lb (89.4 kg)   24 189 lb 12.8 oz (86.1 kg)   24 197 lb 6.4 oz (89.5 kg)       Physical Exam:    General: No acute distress.  On 3 L nasal cannula  Respiratory: Clear to auscultation bilaterally. No wheezes. No rhonchi.  Cardiovascular: S1, S2. Regular rate and rhythm. No murmurs, rubs or gallops.   Abdomen: Soft, nontender, nondistended.  Positive bowel sounds. No rebound or guarding.  Neurologic: No focal neurological deficits.   Musculoskeletal: Moves all extremities.  Extremities: No edema.    Results:   Diagnostic Data:      Labs:    Labs Last 24 Hours:   BMP     CBC    Other     Na - Cl - BUN - Glu -   Hb -   PTT - Procal -   K - CO2 - Cr -   WBC - >< PLT -  INR - CRP -   Renal Lytes Endo    Hct -   Trop - D dim -   eGFR - Ca - POC Gluc  -    LFT   pBNP - Lactic -   eGFR AA - PO4 - A1c -   AST - APk - Prot -  LDL -      Recent Labs   Lab 25  2020 25  1234 25  0529 25  0517   RBC 4.38 4.14 3.85 3.76*   HGB 13.3 13.0 11.9* 11.7*   HCT 41.2 40.0 37.6* 36.4*   MCV 94.1 96.6 97.7 96.8   MCH 30.4 31.4 30.9 31.1   MCHC 32.3 32.5 31.6 32.1   RDW 13.7 13.6  13.6 13.4   NEPRELIM 12.62* 9.65* 5.84  --    WBC 15.9* 12.4* 9.3 7.4   .0 165.0 149.0* 165.0       Lab Results   Component Value Date    INR 0.99 10/29/2024    INR 1.19 06/09/2024    INR 0.99 04/14/2024       Recent Labs   Lab 03/11/25 2020 03/12/25  1234 03/13/25  0529 03/14/25  0517   * 145* 98 100*   BUN 17 15 15 17   CREATSERUM 1.31* 1.31* 1.32* 1.39*   CA 9.4 8.9 8.7 8.7   ALB 4.2 4.4 4.0  --     144 143 144   K 3.8 4.1 3.5 3.6    110 108 110   CO2 25.0 24.0 25.0 26.0   ALKPHO 134* 119* 106  --    AST 12 14 9  --    ALT <7* <7* <7*  --    BILT 0.3 0.5 0.5  --    TP 7.5 7.5 6.9  --        No results for input(s): \"DIEGO\", \"LIP\" in the last 168 hours.    Recent Labs   Lab 03/13/25 0529 03/14/25  0517   MG 2.0 2.1       No results for input(s): \"URINE\", \"CULTI\", \"BLDSMR\" in the last 168 hours.      No results found.        Pro-Calcitonin  No results for input(s): \"PCT\" in the last 168 hours.    Cardiac  Recent Labs   Lab 03/11/25 2020   PBNP 352       Imaging: Imaging data reviewed in Epic.    No results found.       Medications:    mupirocin  1 Application Each Nare BID    polyethylene glycol (PEG 3350)  17 g Oral Daily    senna-docusate  2 tablet Oral Daily    gabapentin  300 mg Oral BID    heparin  5,000 Units Subcutaneous 2 times per day    ampicillin-sulbactam  3 g Intravenous q6h    atorvastatin  40 mg Oral Nightly    clopidogrel  75 mg Oral Nightly    levothyroxine  100 mcg Oral Before breakfast    gabapentin  600 mg Oral Daily       Assessment & Plan:   ASSESSMENT / PLAN:      84-year-old male presenting with shortness of breath and progressive weakness. The patient has a long-standing history of weakness and history of recurrent aspiration PNA.  He was admitted to the hospital at the end of 2024 for aspiration pneumonia, eventually being discharged to acute rehab. His weakness is multifactorial, including a history of  left-sided weakness stemming from a previous CVA. The  patient also experiences low-grade pain complicated by radiculopathy.     Regarding aspiration PNA has been admitted multiple times requiring treatment with antibiotics in the past. A swallow study was performed in October 2024, which the patient passed. During previous hospitalizations, the patient has been treated with IV diuretics for fluid management.     The patient's medical history is significant for hypothyroidism, hypertension, hyperlipidemia, osteoarthritis, diastolic dysfunction, dilated ascending aorta, diabetes, and aortic aneurysm status post-endograft repair. The patient's home regimen for hypertension includes amlodipine 5 mg daily.     Hospital Course:   Acute hypoxic respiratory failure   - required 3 L NC now.      Aspiration pneumonia  - this is recurrent  - speech therapy consulted  - recevied IV abx and dishcarge on augmentin for 5 more days.   - cont pulm hygiene - IS, resp therapy to assist - wean oxygen as able      Weakness  - likely worsened due to above  - pt/ot - recs are for [facility]     HTN  - monitor vitals and adjust meds prn     Hypothyroidism  - cont home meds     Hx of CVA  - cont asa and statin           >55min spent, >50% spent counseling and coordinating care in the form of educating pt/family and d/w consultants and staff. Most of the time spent discussing the above plan.     Estimated date of discharge: Tomorrow  Discharge is dependent on: Establishing a ride to rehab  At this point Mr. Fitzgerald is expected to be discharge to: Rehab    Plan of care discussed with wife and patient as well as nurse    Harvey Diallo MD, FAAP, FACP  [facility] Hospitalist  I respond to Epic Chat and AMS Connect

## 2025-03-15 NOTE — PLAN OF CARE
Problem: Patient Centered Care  Goal: Patient preferences are identified and integrated in the patient's plan of care  Description: Interventions:  - What would you like us to know as we care for you?   - Provide timely, complete, and accurate information to patient/family  - Incorporate patient and family knowledge, values, beliefs, and cultural backgrounds into the planning and delivery of care  - Encourage patient/family to participate in care and decision-making at the level they choose  - Honor patient and family perspectives and choices  3/15/2025 1718 by Charu Ambrose RN  Outcome: Adequate for Discharge  3/15/2025 1619 by Charu Ambrose RN  Outcome: Progressing     Problem: Patient/Family Goals  Goal: Patient/Family Long Term Goal  Description: Patient's Long Term Goal:     Interventions:  - See additional Care Plan goals for specific interventions  3/15/2025 1718 by Charu Ambrose RN  Outcome: Adequate for Discharge  3/15/2025 1619 by Charu Ambrose RN  Outcome: Progressing  Goal: Patient/Family Short Term Goal  Description: Patient's Short Term Goal:     Interventions:   - See additional Care Plan goals for specific interventions  3/15/2025 1718 by Charu Ambrose RN  Outcome: Adequate for Discharge  3/15/2025 1619 by Charu Ambrose RN  Outcome: Progressing     Problem: CONFUSION  Goal: Confusion, delirium, dementia or psychosis is improved or at baseline  Description: INTERVENTIONS:- Assess for possible contributors to thought disturbance, including medications, impaired vision or hearing, underlying metabolic abnormalities, dehydration, psychiatric diagnoses, and notify attending MD/LIP- [place] high risk fall precautions, as indicated- Provide frequent short contacts to provide reality reorientation, refocusing and direction- Decrease environmental stimuli, including noise as appropriate- Monitor and intervene to maintain adequate nutrition, hydration, elimination, sleep and activity- Consider  the need for a sitter if unable to leave patient unattended- Initiate Spiritual Care consult as indicated- Consult Pharmacy as needed  3/15/2025 1718 by Charu Ambrose RN  Outcome: Adequate for Discharge  3/15/2025 1619 by Charu Ambrose RN  Outcome: Progressing     Problem: BEHAVIOR  Goal: Pt/Family maintain appropriate behavior and adhere to behavioral management agreement, if implemented  Description: INTERVENTIONS:- Assess patient/family’s coping skills and  non-compliant behavior (including use of illegal substances)- Notify security of behavior or suspected illegal substances which indicate the need for search of the patient and/or belongings- Encourage verbalization of thoughts and concerns in a socially appropriate manner- Utilize positive, consistent limit setting strategies supporting safety of patient, staff and others- Encourage participation in the decision making process about the behavioral management agreement- Implement a Health Care Agreement if patient meets criteria- If a patient’s behavior jeopardizes the safety of the patient, staff, or others refer to organization policy. If a visitor’s behavior poses a threat to safety call refer to organization policy.- Initiate consult with , Psychosocial CNS, Spiritual Care as appropriate  3/15/2025 1718 by Charu Ambrose RN  Outcome: Adequate for Discharge  3/15/2025 1619 by Charu Ambrose RN  Outcome: Progressing     Problem: SPIRITUAL CARE  Goal: Pt/Family able to move forward in process of forgiving self, others and/or higher power  Description: INTERVENTIONS:- Assist patient to overcome blocks to healing by use of spiritual practices (prayer, meditation, guided imagery, reiki, breath work, etc.)- De-myth guilt and help patient/family identify possible irrational spiritual/cultural beliefs and values- Explore possibilities of making amends & reconciliation with self, others, and/or a greater power- Guide patient/family in identifying  painful feelings of guilt- Help patient explore and identify spiritual beliefs, cultural understandings or values that may help or hinder letting go of issue- Help patient explore feelings of anger, bitterness, resentment- Help patient/family identify and examine the situation in which these feelings are experienced- Help patient/family identify destructive displacement of feelings onto other individuals- Invite use of sacraments/rituals/ceremonies as appropriate (e.g. - confession, anointing, smudging)-. Refer patient to formal counseling and/or to [facility] for further support work  3/15/2025 1718 by Charu Ambrose RN  Outcome: Adequate for Discharge  3/15/2025 1619 by Charu Ambrose RN  Outcome: Progressing  Goal: Pt feels balance and connection with higher power that empowers the self during times of loss, guilt and fear  Description: INTERVENTIONS:- Create safety for patient through empathic presence and non-judgmental listening- Encourage patient to explore his/her values, beliefs and/or spiritual images and practices- Encourage use of breath work, imagery, meditation, relaxation, reiki to ease distress and provide healing- Encourage use of cultural and spiritual celebrations and rituals- Facilitate discussion that helps patient sort out spiritual concerns- Help patient identify where meaning/hope/comfort & strength are in his/her life- Refer patient to [facility] for assistance, as appropriate- Respond to patient/family need for prayer/ritual/sacrament/ceremony  3/15/2025 1718 by Charu Ambrose RN  Outcome: Adequate for Discharge  3/15/2025 1619 by Charu Ambrose RN  Outcome: Progressing     Problem: RESPIRATORY - ADULT  Goal: Achieves optimal ventilation and oxygenation  Description: INTERVENTIONS:- Assess for changes in respiratory status- Assess for changes in mentation and behavior- Position to facilitate oxygenation and minimize respiratory effort- Oxygen supplementation based on oxygen  saturation or ABGs- Provide Smoking Cessation handout, if applicable- Encourage broncho-pulmonary hygiene including cough, deep breathe, Incentive Spirometry- Assess the need for suctioning and perform as needed- Assess and instruct to report SOB or any respiratory difficulty- Respiratory Therapy support as indicated- Manage/alleviate anxiety- Monitor for signs/symptoms of CO2 retention  3/15/2025 1718 by Charu Ambrose RN  Outcome: Adequate for Discharge  3/15/2025 1619 by Charu Ambrose RN  Outcome: Progressing     Problem: METABOLIC/FLUID AND ELECTROLYTES - ADULT  Goal: Electrolytes maintained within normal limits  Description: INTERVENTIONS:- Monitor labs and rhythm and assess patient for signs and symptoms of electrolyte imbalances- Administer electrolyte replacement as ordered- Monitor response to electrolyte replacements, including rhythm and repeat lab results as appropriate- Fluid restriction as ordered- Instruct patient on fluid and nutrition restrictions as appropriate  3/15/2025 1718 by Charu Ambrose RN  Outcome: Adequate for Discharge  3/15/2025 1619 by Charu Ambrose RN  Outcome: Progressing  Goal: Hemodynamic stability and optimal renal function maintained  Description: INTERVENTIONS:- Monitor labs and assess for signs and symptoms of volume excess or deficit- Monitor intake, output and patient weight- Monitor urine specific gravity, serum osmolarity and serum sodium as indicated or ordered- Monitor response to interventions for patient's volume status, including labs, urine output, blood pressure (other measures as available)- Encourage oral intake as appropriate- Instruct patient on fluid and nutrition restrictions as appropriate  3/15/2025 1718 by Charu Ambrose RN  Outcome: Adequate for Discharge  3/15/2025 1619 by Charu Ambrose RN  Outcome: Progressing     Problem: SKIN/TISSUE INTEGRITY - ADULT  Goal: Skin integrity remains intact  Description: INTERVENTIONS- Assess and document risk  factors for pressure ulcer development- Assess and document skin integrity- Monitor for areas of redness and/or skin breakdown- Initiate interventions, skin care algorithm/standards of care as needed  3/15/2025 1718 by Charu Ambrose RN  Outcome: Adequate for Discharge  3/15/2025 1619 by Charu Ambrose RN  Outcome: Progressing  Goal: Incision(s), wounds(s) or drain site(s) healing without S/S of infection  Description: INTERVENTIONS:- Assess and document risk factors for pressure ulcer development- Assess and document skin integrity- Assess and document dressing/incision, wound bed, drain sites and surrounding tissue- Implement wound care per orders- Initiate isolation precautions as appropriate- Initiate Pressure Ulcer prevention bundle as indicated  3/15/2025 1718 by Charu Ambrose RN  Outcome: Adequate for Discharge  3/15/2025 1619 by Charu Ambrose RN  Outcome: Progressing     Problem: MUSCULOSKELETAL - ADULT  Goal: Return mobility to safest level of function  Description: INTERVENTIONS:- Assess patient stability and activity tolerance for standing, transferring and ambulating w/ or w/o assistive devices- Assist with transfers and ambulation using safe patient handling equipment as needed- Ensure adequate protection for wounds/incisions during mobilization- Obtain PT/OT consults as needed- Advance activity as appropriate- Communicate ordered activity level and limitations with patient/family  3/15/2025 1718 by Charu Ambrose RN  Outcome: Adequate for Discharge  3/15/2025 1619 by Charu Ambrose RN  Outcome: Progressing  Goal: Maintain proper alignment of affected body part  Description: INTERVENTIONS:- Support and protect limb and body alignment per provider's orders- Instruct and reinforce with patient and family use of appropriate assistive device and precautions (e.g. spinal or hip dislocation precautions)  3/15/2025 1718 by Charu Ambrose RN  Outcome: Adequate for Discharge  3/15/2025 1619 by Arnol  YADIRA Box  Outcome: Progressing     Problem: NEUROLOGICAL - ADULT  Goal: Achieves stable or improved neurological status  Description: INTERVENTIONS- Assess for and report changes in neurological status- Initiate measures to prevent increased intracranial pressure- Maintain blood pressure and fluid volume within ordered parameters to optimize cerebral perfusion and minimize risk of hemorrhage- Monitor temperature, glucose, and sodium. Initiate appropriate interventions as ordered  3/15/2025 1718 by Charu Ambrose RN  Outcome: Adequate for Discharge  3/15/2025 1619 by Charu Ambrose RN  Outcome: Progressing  Goal: Achieves maximal functionality and self care  Description: INTERVENTIONS- Monitor swallowing and airway patency with patient fatigue and changes in neurological status- Encourage and assist patient to increase activity and self care with guidance from PT/OT- Encourage visually impaired, hearing impaired and aphasic patients to use assistive/communication devices  3/15/2025 1718 by Charu Ambrose RN  Outcome: Adequate for Discharge  3/15/2025 1619 by Charu Ambrose RN  Outcome: Progressing     Problem: Impaired Functional Mobility  Goal: Achieve highest/safest level of mobility/gait  Description: Interventions:- Assess patient's functional ability and stability- Promote increasing activity/tolerance for mobility and gait- Educate and engage patient/family in tolerated activity level and precautions3/15/2025 1718 by Charu Ambrose RN  Outcome: Adequate for Discharge  3/15/2025 1619 by Charu Ambrose RN  Outcome: Progressing     Problem: Impaired Activities of Daily Living  Goal: Achieve highest/safest level of independence in self care  Description: Interventions:- Assess ability and encourage patient to participate in ADLs to maximize function- Promote sitting position while performing ADLs such as feeding, grooming, and bathing- Educate and encourage patient/family in tolerated functional activity  level and precautions during self-care  3/15/2025 1718 by Charu Ambrose RN  Outcome: Adequate for Discharge  3/15/2025 1619 by Charu Ambrose RN  Outcome: Progressing     Problem: Impaired Swallowing  Goal: Minimize aspiration risk  Description: Interventions:- Patient should be alert and upright for all feedings (90 degrees preferred)- Offer food and liquids at a slow rate- No straws- Encourage small bites of food and small sips of liquid- Offer pills one at a time, crush or deliver with applesauce as needed- Discontinue feeding and notify MD (or speech pathologist) if coughing or persistent throat clearing or wet/gurgly vocal quality is noted  3/15/2025 1718 by Charu Ambrose RN  Outcome: Adequate for Discharge  3/15/2025 1619 by Charu Ambrose RN  Outcome: Progressing     Problem: CARDIOVASCULAR - ADULT  Goal: Maintains optimal cardiac output and hemodynamic stability  Description: INTERVENTIONS:- Monitor vital signs, rhythm, and trends- Monitor for bleeding, hypotension and signs of decreased cardiac output- Evaluate effectiveness of vasoactive medications to optimize hemodynamic stability- Monitor arterial and/or venous puncture sites for bleeding and/or hematoma- Assess quality of pulses, skin color and temperature- Assess for signs of decreased coronary artery perfusion - ex. Angina- Evaluate fluid balance, assess for edema, trend weights  3/15/2025 1718 by Charu Ambrose RN  Outcome: Adequate for Discharge  3/15/2025 1619 by Charu Ambrose RN  Outcome: Progressing  Goal: Absence of cardiac arrhythmias or at baseline  Description: INTERVENTIONS:- Continuous cardiac monitoring, monitor vital signs, obtain 12 lead EKG if indicated- Evaluate effectiveness of antiarrhythmic and heart rate control medications as ordered- Initiate emergency measures for life threatening arrhythmias- Monitor electrolytes and administer replacement therapy as ordered  3/15/2025 1718 by Charu Ambrose RN  Outcome: Adequate for  Discharge  3/15/2025 1619 by Charu Ambrose RN  Outcome: Progressing     Problem: PAIN - ADULT  Goal: Verbalizes/displays adequate comfort level or patient's stated pain goal  Description: INTERVENTIONS:- Encourage pt to monitor pain and request assistance- Assess pain using appropriate pain scale- Administer analgesics based on type and severity of pain and evaluate response- Implement non-pharmacological measures as appropriate and evaluate response- Consider cultural and social influences on pain and pain management- Manage/alleviate anxiety- Utilize distraction and/or relaxation techniques- Monitor for opioid side effects- Notify MD/LIP if interventions unsuccessful or patient reports new pain- Anticipate increased pain with activity and pre-medicate as appropriate  3/15/2025 1718 by Charu Ambrose RN  Outcome: Adequate for Discharge  3/15/2025 1619 by Charu Ambrose RN  Outcome: Progressing     Problem: SAFETY ADULT - FALL  Goal: Free from fall injury  Description: INTERVENTIONS:- Assess pt frequently for physical needs- Identify cognitive and physical deficits and behaviors that affect risk of falls.- [place] fall precautions as indicated by assessment.- Educate pt/family on patient safety including physical limitations- Instruct pt to call for assistance with activity based on assessment- Modify environment to reduce risk of injury- Provide assistive devices as appropriate- Consider OT/PT consult to assist with strengthening/mobility- Encourage toileting schedule  3/15/2025 1718 by Charu Ambrose RN  Outcome: Adequate for Discharge  3/15/2025 1619 by Charu Ambrose RN  Outcome: Progressing     Problem: DISCHARGE PLANNING  Goal: Discharge to home or other facility with appropriate resources  Description: INTERVENTIONS:- Identify barriers to discharge w/pt and caregiver- Include patient/family/discharge partner in discharge planning- Arrange for needed discharge resources and transportation as  appropriate- Identify discharge learning needs (meds, wound care, etc)- Arrange for interpreters to assist at discharge as needed- Consider post-discharge preferences of patient/family/discharge partner- Complete POLST form as appropriate- Assess patient's ability to be responsible for managing their own health- Refer to Case Management Department for coordinating discharge planning if the patient needs post-hospital services based on physician/LIP order or complex needs related to functional status, cognitive ability or social support system  3/15/2025 1718 by Charu Ambrose, RN  Outcome: Adequate for Discharge  3/15/2025 1619 by Charu Ambrose, RN  Outcome: Progressing

## 2025-03-15 NOTE — PLAN OF CARE
Plan to transfer to [facility] today.    Problem: Patient Centered Care  Goal: Patient preferences are identified and integrated in the patient's plan of care  Description: Interventions:  - What would you like us to know as we care for you?   - Provide timely, complete, and accurate information to patient/family  - Incorporate patient and family knowledge, values, beliefs, and cultural backgrounds into the planning and delivery of care  - Encourage patient/family to participate in care and decision-making at the level they choose  - Honor patient and family perspectives and choices  Outcome: Progressing     Problem: CONFUSION  Goal: Confusion, delirium, dementia or psychosis is improved or at baseline  Description: INTERVENTIONS:- Assess for possible contributors to thought disturbance, including medications, impaired vision or hearing, underlying metabolic abnormalities, dehydration, psychiatric diagnoses, and notify attending MD/LIP- [place] high risk fall precautions, as indicated- Provide frequent short contacts to provide reality reorientation, refocusing and direction- Decrease environmental stimuli, including noise as appropriate- Monitor and intervene to maintain adequate nutrition, hydration, elimination, sleep and activity- Consider the need for a sitter if unable to leave patient unattended- Initiate Spiritual Care consult as indicated- Consult Pharmacy as needed  Outcome: Progressing     Problem: SPIRITUAL CARE  Goal: Pt/Family able to move forward in process of forgiving self, others and/or higher power  Description: INTERVENTIONS:- Assist patient to overcome blocks to healing by use of spiritual practices (prayer, meditation, guided imagery, reiki, breath work, etc.)- De-myth guilt and help patient/family identify possible irrational spiritual/cultural beliefs and values- Explore possibilities of making amends & reconciliation with self, others, and/or a greater power- Guide patient/family in  identifying painful feelings of guilt- Help patient explore and identify spiritual beliefs, cultural understandings or values that may help or hinder letting go of issue- Help patient explore feelings of anger, bitterness, resentment- Help patient/family identify and examine the situation in which these feelings are experienced- Help patient/family identify destructive displacement of feelings onto other individuals- Invite use of sacraments/rituals/ceremonies as appropriate (e.g. - confession, anointing, smudging)-. Refer patient to formal counseling and/or to [facility] for further support work  Outcome: Progressing  Goal: Pt feels balance and connection with higher power that empowers the self during times of loss, guilt and fear  Description: INTERVENTIONS:- Create safety for patient through empathic presence and non-judgmental listening- Encourage patient to explore his/her values, beliefs and/or spiritual images and practices- Encourage use of breath work, imagery, meditation, relaxation, reiki to ease distress and provide healing- Encourage use of cultural and spiritual celebrations and rituals- Facilitate discussion that helps patient sort out spiritual concerns- Help patient identify where meaning/hope/comfort & strength are in his/her life- Refer patient to [facility] for assistance, as appropriate- Respond to patient/family need for prayer/ritual/sacrament/ceremony  Outcome: Progressing     Problem: RESPIRATORY - ADULT  Goal: Achieves optimal ventilation and oxygenation  Description: INTERVENTIONS:- Assess for changes in respiratory status- Assess for changes in mentation and behavior- Position to facilitate oxygenation and minimize respiratory effort- Oxygen supplementation based on oxygen saturation or ABGs- Provide Smoking Cessation handout, if applicable- Encourage broncho-pulmonary hygiene including cough, deep breathe, Incentive Spirometry- Assess the need for suctioning and perform as  needed- Assess and instruct to report SOB or any respiratory difficulty- Respiratory Therapy support as indicated- Manage/alleviate anxiety- Monitor for signs/symptoms of CO2 retention  Outcome: Progressing     Problem: METABOLIC/FLUID AND ELECTROLYTES - ADULT  Goal: Electrolytes maintained within normal limits  Description: INTERVENTIONS:- Monitor labs and rhythm and assess patient for signs and symptoms of electrolyte imbalances- Administer electrolyte replacement as ordered- Monitor response to electrolyte replacements, including rhythm and repeat lab results as appropriate- Fluid restriction as ordered- Instruct patient on fluid and nutrition restrictions as appropriate  Outcome: Progressing  Goal: Hemodynamic stability and optimal renal function maintained  Description: INTERVENTIONS:- Monitor labs and assess for signs and symptoms of volume excess or deficit- Monitor intake, output and patient weight- Monitor urine specific gravity, serum osmolarity and serum sodium as indicated or ordered- Monitor response to interventions for patient's volume status, including labs, urine output, blood pressure (other measures as available)- Encourage oral intake as appropriate- Instruct patient on fluid and nutrition restrictions as appropriate  Outcome: Progressing     Problem: SKIN/TISSUE INTEGRITY - ADULT  Goal: Skin integrity remains intact  Description: INTERVENTIONS- Assess and document risk factors for pressure ulcer development- Assess and document skin integrity- Monitor for areas of redness and/or skin breakdown- Initiate interventions, skin care algorithm/standards of care as needed  Outcome: Progressing  Goal: Incision(s), wounds(s) or drain site(s) healing without S/S of infection  Description: INTERVENTIONS:- Assess and document risk factors for pressure ulcer development- Assess and document skin integrity- Assess and document dressing/incision, wound bed, drain sites and surrounding tissue- Implement wound  care per orders- Initiate isolation precautions as appropriate- Initiate Pressure Ulcer prevention bundle as indicated  Outcome: Progressing     Problem: NEUROLOGICAL - ADULT  Goal: Achieves stable or improved neurological status  Description: INTERVENTIONS- Assess for and report changes in neurological status- Initiate measures to prevent increased intracranial pressure- Maintain blood pressure and fluid volume within ordered parameters to optimize cerebral perfusion and minimize risk of hemorrhage- Monitor temperature, glucose, and sodium. Initiate appropriate interventions as ordered  Outcome: Progressing  Goal: Achieves maximal functionality and self care  Description: INTERVENTIONS- Monitor swallowing and airway patency with patient fatigue and changes in neurological status- Encourage and assist patient to increase activity and self care with guidance from PT/OT- Encourage visually impaired, hearing impaired and aphasic patients to use assistive/communication devices  Outcome: Progressing     Problem: Impaired Swallowing  Goal: Minimize aspiration risk  Description: Interventions:- Patient should be alert and upright for all feedings (90 degrees preferred)- Offer food and liquids at a slow rate- No straws- Encourage small bites of food and small sips of liquid- Offer pills one at a time, crush or deliver with applesauce as needed- Discontinue feeding and notify MD (or speech pathologist) if coughing or persistent throat clearing or wet/gurgly vocal quality is noted  Outcome: Progressing     Problem: CARDIOVASCULAR - ADULT  Goal: Maintains optimal cardiac output and hemodynamic stability  Description: INTERVENTIONS:- Monitor vital signs, rhythm, and trends- Monitor for bleeding, hypotension and signs of decreased cardiac output- Evaluate effectiveness of vasoactive medications to optimize hemodynamic stability- Monitor arterial and/or venous puncture sites for bleeding and/or hematoma- Assess quality of pulses,  skin color and temperature- Assess for signs of decreased coronary artery perfusion - ex. Angina- Evaluate fluid balance, assess for edema, trend weights  Outcome: Progressing  Goal: Absence of cardiac arrhythmias or at baseline  Description: INTERVENTIONS:- Continuous cardiac monitoring, monitor vital signs, obtain 12 lead EKG if indicated- Evaluate effectiveness of antiarrhythmic and heart rate control medications as ordered- Initiate emergency measures for life threatening arrhythmias- Monitor electrolytes and administer replacement therapy as ordered  Outcome: Progressing     Problem: PAIN - ADULT  Goal: Verbalizes/displays adequate comfort level or patient's stated pain goal  Description: INTERVENTIONS:- Encourage pt to monitor pain and request assistance- Assess pain using appropriate pain scale- Administer analgesics based on type and severity of pain and evaluate response- Implement non-pharmacological measures as appropriate and evaluate response- Consider cultural and social influences on pain and pain management- Manage/alleviate anxiety- Utilize distraction and/or relaxation techniques- Monitor for opioid side effects- Notify MD/LIP if interventions unsuccessful or patient reports new pain- Anticipate increased pain with activity and pre-medicate as appropriate  Outcome: Progressing     Problem: SAFETY ADULT - FALL  Goal: Free from fall injury  Description: INTERVENTIONS:- Assess pt frequently for physical needs- Identify cognitive and physical deficits and behaviors that affect risk of falls.- [place] fall precautions as indicated by assessment.- Educate pt/family on patient safety including physical limitations- Instruct pt to call for assistance with activity based on assessment- Modify environment to reduce risk of injury- Provide assistive devices as appropriate- Consider OT/PT consult to assist with strengthening/mobility- Encourage toileting schedule  Outcome: Progressing     Problem: DISCHARGE  PLANNING  Goal: Discharge to home or other facility with appropriate resources  Description: INTERVENTIONS:- Identify barriers to discharge w/pt and caregiver- Include patient/family/discharge partner in discharge planning- Arrange for needed discharge resources and transportation as appropriate- Identify discharge learning needs (meds, wound care, etc)- Arrange for interpreters to assist at discharge as needed- Consider post-discharge preferences of patient/family/discharge partner- Complete POLST form as appropriate- Assess patient's ability to be responsible for managing their own health- Refer to Case Management Department for coordinating discharge planning if the patient needs post-hospital services based on physician/LIP order or complex needs related to functional status, cognitive ability or social support system  Outcome: Progressing     Problem: MUSCULOSKELETAL - ADULT  Goal: Return mobility to safest level of function  Description: INTERVENTIONS:- Assess patient stability and activity tolerance for standing, transferring and ambulating w/ or w/o assistive devices- Assist with transfers and ambulation using safe patient handling equipment as needed- Ensure adequate protection for wounds/incisions during mobilization- Obtain PT/OT consults as needed- Advance activity as appropriate- Communicate ordered activity level and limitations with patient/family  Outcome: Not Progressing  Goal: Maintain proper alignment of affected body part  Description: INTERVENTIONS:- Support and protect limb and body alignment per provider's orders- Instruct and reinforce with patient and family use of appropriate assistive device and precautions (e.g. spinal or hip dislocation precautions)  Outcome: Not Progressing     Problem: Impaired Functional Mobility  Goal: Achieve highest/safest level of mobility/gait  Description: Interventions:- Assess patient's functional ability and stability- Promote increasing activity/tolerance  for mobility and gait- Educate and engage patient/family in tolerated activity level and precautions  Outcome: Not Progressing     Problem: Impaired Activities of Daily Living  Goal: Achieve highest/safest level of independence in self care  Description: Interventions:- Assess ability and encourage patient to participate in ADLs to maximize function- Promote sitting position while performing ADLs such as feeding, grooming, and bathing- Educate and encourage patient/family in tolerated functional activity level and precautions during self-care  Outcome: Not Progressing

## 2025-03-15 NOTE — DISCHARGE SUMMARY
[facility]  part of [facility]    Discharge Summary    Cody Fitzgerald Patient Status:  Inpatient    1940 MRN [id number]   Location [facility] 5SW/SE Attending Harvey Diallo MD   Hosp Day # 3 PCP Kandice Claudio DO     Date of Admission: 3/11/2025 Disposition: Home or Self Care     Date of Discharge: 3/16/2025    Admitting Diagnosis: Aspiration pneumonia of lower lobe, unspecified aspiration pneumonia type, unspecified laterality (HCC) [J69.0]    Hospital Discharge Diagnoses:   aspiration pneumonia  [place] hypoxic respiratory failure 2/2 to PNA  Weakness  HTN  Hypothyroidism  Hx of CVA with left hemiparesis      Lace+ Score: 81  59-90 High Risk  29-58 Medium Risk  0-28   Low Risk.    TCM Follow-Up Recommendation:  LACE > 58: High Risk of readmission after discharge from the hospital.      Problem List:   Patient Active Problem List   Diagnosis    Left hemiparesis (HCC)    CVA, old, hemiparesis (HCC)    Essential hypertension    Hypercholesterolemia    Hypothyroidism    Benign prostatic hyperplasia with lower urinary tract symptoms    Status post repair of abdominal aortic aneurysm (AAA) using bifurcation graft    Stage 3a chronic kidney disease (HCC)    Primary osteoarthritis involving multiple joints    Chronic bilateral low back pain without sciatica    Fatigue    Aspiration pneumonia of left lower lobe (HCC)    Aspiration pneumonitis (HCC)    Pneumonia due to infectious organism    Pneumonia due to infectious organism, unspecified laterality, unspecified part of lung    Sepsis, due to unspecified organism    Spinal stenosis of lumbar region without neurogenic claudication    Cerebrovascular accident (CVA) (HCC)    Primary osteoarthritis of right knee    Acute bronchospasm    Viral syndrome    Community acquired pneumonia of left lower lobe of lung    Lumbar spondylosis    Anticoagulant long-term use    Gait disturbance    Dyspnea on exertion    Weakness generalized     Inability to walk    Aspiration pneumonia (HCC)    Primary osteoarthritis of both knees    Acute respiratory failure with hypoxia (HCC)    Aspiration pneumonia, unspecified aspiration pneumonia type, unspecified laterality, unspecified part of lung (HCC)    Leukocytosis, unspecified type    Renal insufficiency    Elevated troponin    Hypoxia    COVID    Acute on chronic respiratory failure with hypoxia (HCC)    Contusion of left hip, initial encounter    History of falling    Personal history of nicotine dependence    Hypertensive chronic kidney disease w stg 1-4/unsp chr kdny    Long term (current) use of opiate analgesic    History of pneumonia    Acute pain of left shoulder    Closed fracture of left proximal humerus    Tendinosis of rotator cuff    Hypernatremia    JOSE (acute kidney injury)    Left lower lobe pneumonia    Acute cystitis with hematuria    Sepsis (HCC)    Febrile illness    Fall, initial encounter    Decubitus ulcer of left buttock, stage 2 ([facility])       Reason for Admission: malaise    Physical Exam:   General appearance: alert, appears stated age and cooperative  Pulmonary:  clear to auscultation bilaterally  Cardiovascular: S1, S2 normal, no murmur, click, rub or gallop, regular rate and rhythm  Abdominal: soft, non-tender; bowel sounds normal; no masses,  no organomegaly  Extremities: extremities normal, atraumatic, no cyanosis or edema  Psychiatric: calm      History of Present Illness: 84-year-old male presenting with shortness of breath and progressive weakness. The patient has a long-standing history of weakness and history of recurrent aspiration PNA.  He was admitted to the hospital at the end of 2024 for aspiration pneumonia, eventually being discharged to acute rehab. His weakness is multifactorial, including a history of  left-sided weakness stemming from a previous CVA. The patient also experiences low-grade pain complicated by radiculopathy.     Regarding aspiration PNA has been  admitted multiple times requiring treatment with antibiotics in the past. A swallow study was performed in October 2024, which the patient passed. During previous hospitalizations, the patient has been treated with IV diuretics for fluid management.     The patient's medical history is significant for hypothyroidism, hypertension, hyperlipidemia, osteoarthritis, diastolic dysfunction, dilated ascending aorta, diabetes, and aortic aneurysm status post-endograft repair. The patient's home regimen for hypertension includes amlodipine 5 mg daily.    Hospital Course:   Acute hypoxic respiratory failure   - required 3 L NC now.     Aspiration pneumonia  - this is recurrent  - speech therapy consulted  - recevied IV abx and dishcarge on augmentin for 5 more days.   - cont pulm hygiene - IS, resp therapy to assist - wean oxygen as able      Weakness  - likely worsened due to above  - pt/ot - recs are for VARGAS     HTN  - monitor vitals and adjust meds prn     Hypothyroidism  - cont home meds     Hx of CVA  - cont asa and statin             Consultations: None    Procedures: none    Complications: none    Discharge Condition: Good    Discharge Medications:      Discharge Medications        START taking these medications        Instructions Prescription details   calcium carbonate 500 MG Chew  Commonly known as: Tums      Chew 2 tablets (1,000 mg total) by mouth 2 (two) times daily as needed.   Refills: 0     Polyethylene Glycol 3350 17 g Pack  Commonly known as: MIRALAX  Start taking on: March 16, 2025      Take 17 g by mouth daily.   Refills: 0            CHANGE how you take these medications        Instructions Prescription details   acetaminophen 500 MG Tabs  Commonly known as: Tylenol Extra Strength  What changed:   how much to take  when to take this  reasons to take this      Take 1 tablet (500 mg total) by mouth every 4 (four) hours as needed.   Refills: 0            CONTINUE taking these medications        Instructions  Prescription details   amLODIPine 5 MG Tabs  Commonly known as: Norvasc      Take 1 tablet (5 mg total) by mouth daily.   Quantity: 90 tablet  Refills: 3     amoxicillin clavulanate 875-125 MG Tabs  Commonly known as: Augmentin      Take 1 tablet by mouth 2 (two) times daily.   Refills: 0     atorvastatin 40 MG Tabs  Commonly known as: Lipitor      Take 1 tablet (40 mg total) by mouth nightly.   Quantity: 90 tablet  Refills: 3     benzonatate 200 MG Caps  Commonly known as: Tessalon      Take 1 capsule (200 mg total) by mouth 3 (three) times daily as needed for cough.   Quantity: 42 capsule  Refills: 0     clopidogrel 75 MG Tabs  Commonly known as: Plavix      Take 1 tablet (75 mg total) by mouth daily.   Quantity: 90 tablet  Refills: 3     fluocinonide 0.05 % Oint  Commonly known as: Lidex      Apply a small amount of ointment on area of rash twice a day as necessary.   Quantity: 30 g  Refills: 2     gabapentin 300 MG Caps  Commonly known as: Neurontin      Take 2 capsules (600 MG) by mouth in the morning, 1 capsule (300 MG) at noon, and 1 capsule (300 MG) at bedtime   Quantity: 360 capsule  Refills: 3     levothyroxine 100 MCG Tabs  Commonly known as: Synthroid      Take 1 tablet (100 mcg total) by mouth daily.   Quantity: 90 tablet  Refills: 3     Vitamin D 50 MCG (2000 UT) Caps      Take 1 capsule (2,000 Units total) by mouth daily.   Refills: 0              Follow up Visits: Follow-up with  in 1 week    Follow up Labs:      Other Discharge Instructions:     Harvey Diallo MD  3/15/2025  5:01 PM    > 35 min

## 2025-03-16 VITALS
RESPIRATION RATE: 18 BRPM | DIASTOLIC BLOOD PRESSURE: 69 MMHG | OXYGEN SATURATION: 92 % | TEMPERATURE: 97 F | HEART RATE: 68 BPM | BODY MASS INDEX: 29 KG/M2 | SYSTOLIC BLOOD PRESSURE: 142 MMHG | WEIGHT: 200.63 LBS

## 2025-03-16 LAB — GLUCOSE BLDC GLUCOMTR-MCNC: 138 MG/DL (ref 70–99)

## 2025-03-16 PROCEDURE — 99239 HOSP IP/OBS DSCHRG MGMT >30: CPT | Performed by: INTERNAL MEDICINE

## 2025-03-16 NOTE — PLAN OF CARE
Problem: GENITOURINARY - ADULT  Goal: Absence of urinary retention  Description: INTERVENTIONS:- Assess patient’s ability to void and empty bladder- Monitor intake/output and perform bladder scan as needed- Follow urinary retention protocol/standard of care- Consider collaborating with pharmacy to review patient's medication profile- Implement strategies to promote bladder emptying  Outcome: Progressing     Problem: METABOLIC/FLUID AND ELECTROLYTES - ADULT  Goal: Glucose maintained within prescribed range  Description: INTERVENTIONS:- Monitor Blood Glucose as ordered- Assess for signs and symptoms of hyperglycemia and hypoglycemia- Administer ordered medications to maintain glucose within target range- Assess barriers to adequate nutritional intake and initiate nutrition consult as needed- Instruct patient on self management of diabetes  Outcome: Progressing  Goal: Electrolytes maintained within normal limits  Description: INTERVENTIONS:- Monitor labs and rhythm and assess patient for signs and symptoms of electrolyte imbalances- Administer electrolyte replacement as ordered- Monitor response to electrolyte replacements, including rhythm and repeat lab results as appropriate- Fluid restriction as ordered- Instruct patient on fluid and nutrition restrictions as appropriate  Outcome: Progressing  Goal: Hemodynamic stability and optimal renal function maintained  Description: INTERVENTIONS:- Monitor labs and assess for signs and symptoms of volume excess or deficit- Monitor intake, output and patient weight- Monitor urine specific gravity, serum osmolarity and serum sodium as indicated or ordered- Monitor response to interventions for patient's volume status, including labs, urine output, blood pressure (other measures as available)- Encourage oral intake as appropriate- Instruct patient on fluid and nutrition restrictions as appropriate  Outcome: Progressing     Problem: SKIN/TISSUE INTEGRITY - ADULT  Goal: Skin  integrity remains intact  Description: INTERVENTIONS- Assess and document risk factors for pressure ulcer development- Assess and document skin integrity- Monitor for areas of redness and/or skin breakdown- Initiate interventions, skin care algorithm/standards of care as needed  Outcome: Progressing  Goal: Oral mucous membranes remain intact  Description: INTERVENTIONS- Assess oral mucosa and hygiene practices- Implement preventative oral hygiene regimen- Implement oral medicated treatments as ordered  Outcome: Progressing     Problem: Impaired Swallowing  Goal: Minimize aspiration risk  Description: Interventions:- Patient should be alert and upright for all feedings (90 degrees preferred)- Offer food and liquids at a slow rate- No straws- Encourage small bites of food and small sips of liquid- Offer pills one at a time, crush or deliver with applesauce as needed- Discontinue feeding and notify MD (or speech pathologist) if coughing or persistent throat clearing or wet/gurgly vocal quality is noted  Outcome: Progressing     Problem: Impaired Cognition  Goal: Patient will exhibit improved attention, thought processing and/or memory  Description: Interventions:  Outcome: Progressing     Problem: RESPIRATORY - ADULT  Goal: Achieves optimal ventilation and oxygenation  Description: INTERVENTIONS:- Assess for changes in respiratory status- Assess for changes in mentation and behavior- Position to facilitate oxygenation and minimize respiratory effort- Oxygen supplementation based on oxygen saturation or ABGs- Provide Smoking Cessation handout, if applicable- Encourage broncho-pulmonary hygiene including cough, deep breathe, Incentive Spirometry- Assess the need for suctioning and perform as needed- Assess and instruct to report SOB or any respiratory difficulty- Respiratory Therapy support as indicated- Manage/alleviate anxiety- Monitor for signs/symptoms of CO2 retention  Outcome: Progressing

## 2025-03-16 NOTE — PLAN OF CARE
Problem: RESPIRATORY - ADULT  Goal: Achieves optimal ventilation and oxygenation  Description: INTERVENTIONS:- Assess for changes in respiratory status- Assess for changes in mentation and behavior- Position to facilitate oxygenation and minimize respiratory effort- Oxygen supplementation based on oxygen saturation or ABGs- Provide Smoking Cessation handout, if applicable- Encourage broncho-pulmonary hygiene including cough, deep breathe, Incentive Spirometry- Assess the need for suctioning and perform as needed- Assess and instruct to report SOB or any respiratory difficulty- Respiratory Therapy support as indicated- Manage/alleviate anxiety- Monitor for signs/symptoms of CO2 retention  Outcome: Adequate for Discharge     Problem: GENITOURINARY - ADULT  Goal: Absence of urinary retention  Description: INTERVENTIONS:- Assess patient’s ability to void and empty bladder- Monitor intake/output and perform bladder scan as needed- Follow urinary retention protocol/standard of care- Consider collaborating with pharmacy to review patient's medication profile- Implement strategies to promote bladder emptying  Outcome: Adequate for Discharge     Problem: METABOLIC/FLUID AND ELECTROLYTES - ADULT  Goal: Glucose maintained within prescribed range  Description: INTERVENTIONS:- Monitor Blood Glucose as ordered- Assess for signs and symptoms of hyperglycemia and hypoglycemia- Administer ordered medications to maintain glucose within target range- Assess barriers to adequate nutritional intake and initiate nutrition consult as needed- Instruct patient on self management of diabetes  Outcome: Adequate for Discharge  Goal: Electrolytes maintained within normal limits  Description: INTERVENTIONS:- Monitor labs and rhythm and assess patient for signs and symptoms of electrolyte imbalances- Administer electrolyte replacement as ordered- Monitor response to electrolyte replacements, including rhythm and repeat lab results as  appropriate- Fluid restriction as ordered- Instruct patient on fluid and nutrition restrictions as appropriate  Outcome: Adequate for Discharge  Goal: Hemodynamic stability and optimal renal function maintained  Description: INTERVENTIONS:- Monitor labs and assess for signs and symptoms of volume excess or deficit- Monitor intake, output and patient weight- Monitor urine specific gravity, serum osmolarity and serum sodium as indicated or ordered- Monitor response to interventions for patient's volume status, including labs, urine output, blood pressure (other measures as available)- Encourage oral intake as appropriate- Instruct patient on fluid and nutrition restrictions as appropriate  Outcome: Adequate for Discharge     Problem: SKIN/TISSUE INTEGRITY - ADULT  Goal: Skin integrity remains intact  Description: INTERVENTIONS- Assess and document risk factors for pressure ulcer development- Assess and document skin integrity- Monitor for areas of redness and/or skin breakdown- Initiate interventions, skin care algorithm/standards of care as needed  Outcome: Adequate for Discharge  Goal: Oral mucous membranes remain intact  Description: INTERVENTIONS- Assess oral mucosa and hygiene practices- Implement preventative oral hygiene regimen- Implement oral medicated treatments as ordered  Outcome: Adequate for Discharge     Problem: Impaired Swallowing  Goal: Minimize aspiration risk  Description: Interventions:- Patient should be alert and upright for all feedings (90 degrees preferred)- Offer food and liquids at a slow rate- No straws- Encourage small bites of food and small sips of liquid- Offer pills one at a time, crush or deliver with applesauce as needed- Discontinue feeding and notify MD (or speech pathologist) if coughing or persistent throat clearing or wet/gurgly vocal quality is noted  Outcome: Adequate for Discharge     Problem: Impaired Cognition  Goal: Patient will exhibit improved attention, thought  processing and/or memory  Description: Interventions:  Outcome: Adequate for Discharge

## 2025-03-17 ENCOUNTER — TELEPHONE (OUTPATIENT)
Dept: INTERNAL MEDICINE UNIT | Facility: HOSPITAL | Age: 85
End: 2025-03-17

## 2025-03-17 NOTE — TELEPHONE ENCOUNTER
Patient's wife called requesting to talk to Dr. Diallo.  Patient was discharged to rehab, [facility].  Patient is in isolation for MRSA.  The wife states that her  was not in isolation while in the hospital.  No PPE was used while at [facility].  The wife is upset about the isolation precautions, and is requesting to talk to Dr. Diallo.  I relayed the message to Dr. Diallo regarding the wife's concerns.  Dr. Diallo aware of the situation.

## 2025-05-28 RX ORDER — CLOPIDOGREL BISULFATE 75 MG/1
75 TABLET ORAL DAILY
Qty: 90 TABLET | Refills: 3 | Status: CANCELLED | OUTPATIENT
Start: 2025-05-28

## 2025-05-28 RX ORDER — GABAPENTIN 300 MG/1
CAPSULE ORAL
Qty: 360 CAPSULE | Refills: 3 | Status: CANCELLED | OUTPATIENT
Start: 2025-05-28

## 2025-05-28 RX ORDER — AMLODIPINE BESYLATE 5 MG/1
5 TABLET ORAL DAILY
Qty: 90 TABLET | Refills: 3 | Status: CANCELLED | OUTPATIENT
Start: 2025-05-28

## 2025-05-30 NOTE — TELEPHONE ENCOUNTER
1 year of plavix just sent in 2/25. Too soon for other 2 rx     Current refill request refused due to refill is either a duplicate request or has active refills at the pharmacy.  Check previous templates.    Requested Prescriptions     Pending Prescriptions Disp Refills    gabapentin 300 MG Oral Cap 360 capsule 3     Sig: Take 2 capsules (600 MG) by mouth in the morning, 1 capsule (300 MG) at noon, and 1 capsule (300 MG) at bedtime    amLODIPine 5 MG Oral Tab 90 tablet 3     Sig: Take 1 tablet (5 mg total) by mouth daily.

## 2025-06-06 NOTE — TELEPHONE ENCOUNTER
Narda from [facility]. [facility].H. Is calling to get verbal orders to extend OT for three additional visits over two weeks ph.  # [phone]   Routed to clinical Admission

## 2025-06-11 ENCOUNTER — TELEPHONE (OUTPATIENT)
Dept: INTERNAL MEDICINE CLINIC | Facility: CLINIC | Age: 85
End: 2025-06-11

## 2025-06-11 NOTE — TELEPHONE ENCOUNTER
Called Kandice with [facility] and gave verbal order for plan of care per protocol= left on confidential VM

## 2025-06-11 NOTE — TELEPHONE ENCOUNTER
Kandice/krissy called   Requesting an order to add a nursing visit to assess a previous wound     Call back #[phone]   Confidential VM, Ok to leave a message

## 2025-07-16 ENCOUNTER — OFFICE VISIT (OUTPATIENT)
Dept: INTERNAL MEDICINE CLINIC | Facility: CLINIC | Age: 85
End: 2025-07-16

## 2025-07-16 ENCOUNTER — LAB ENCOUNTER (OUTPATIENT)
Dept: LAB | Age: 85
End: 2025-07-16
Attending: STUDENT IN AN ORGANIZED HEALTH CARE EDUCATION/TRAINING PROGRAM
Payer: MEDICARE

## 2025-07-16 VITALS — DIASTOLIC BLOOD PRESSURE: 65 MMHG | SYSTOLIC BLOOD PRESSURE: 101 MMHG | HEART RATE: 78 BPM

## 2025-07-16 DIAGNOSIS — Z00.00 MEDICARE ANNUAL WELLNESS VISIT, SUBSEQUENT: ICD-10-CM

## 2025-07-16 DIAGNOSIS — E78.00 HYPERCHOLESTEROLEMIA: ICD-10-CM

## 2025-07-16 DIAGNOSIS — E55.9 VITAMIN D DEFICIENCY: ICD-10-CM

## 2025-07-16 DIAGNOSIS — R26.2 INABILITY TO WALK: ICD-10-CM

## 2025-07-16 DIAGNOSIS — Z95.828 STATUS POST REPAIR OF ABDOMINAL AORTIC ANEURYSM (AAA) USING BIFURCATION GRAFT: ICD-10-CM

## 2025-07-16 DIAGNOSIS — E03.9 ACQUIRED HYPOTHYROIDISM: ICD-10-CM

## 2025-07-16 DIAGNOSIS — Z86.79 STATUS POST REPAIR OF ABDOMINAL AORTIC ANEURYSM (AAA) USING BIFURCATION GRAFT: ICD-10-CM

## 2025-07-16 DIAGNOSIS — Z00.00 MEDICARE ANNUAL WELLNESS VISIT, SUBSEQUENT: Primary | ICD-10-CM

## 2025-07-16 DIAGNOSIS — Z91.81 AT HIGH RISK FOR FALLS: ICD-10-CM

## 2025-07-16 DIAGNOSIS — R32 URINARY INCONTINENCE AS SEQUELA OF CEREBROVASCULAR ACCIDENT (CVA): ICD-10-CM

## 2025-07-16 DIAGNOSIS — Z87.01 HISTORY OF ASPIRATION PNEUMONIA: ICD-10-CM

## 2025-07-16 DIAGNOSIS — G81.94 LEFT HEMIPARESIS (HCC): ICD-10-CM

## 2025-07-16 DIAGNOSIS — Z00.00 ANNUAL PHYSICAL EXAM: ICD-10-CM

## 2025-07-16 DIAGNOSIS — Z76.89 ENCOUNTER TO ESTABLISH CARE WITH NEW DOCTOR: ICD-10-CM

## 2025-07-16 DIAGNOSIS — I69.30 HISTORY OF CEREBROVASCULAR ACCIDENT (CVA) WITH RESIDUAL DEFICIT: ICD-10-CM

## 2025-07-16 DIAGNOSIS — I69.398 URINARY INCONTINENCE AS SEQUELA OF CEREBROVASCULAR ACCIDENT (CVA): ICD-10-CM

## 2025-07-16 DIAGNOSIS — N18.31 STAGE 3A CHRONIC KIDNEY DISEASE (HCC): ICD-10-CM

## 2025-07-16 DIAGNOSIS — I10 ESSENTIAL HYPERTENSION: ICD-10-CM

## 2025-07-16 PROBLEM — M67.819 TENDINOSIS OF ROTATOR CUFF: Status: RESOLVED | Noted: 2023-01-26 | Resolved: 2025-07-16

## 2025-07-16 PROBLEM — A41.9 SEPSIS, DUE TO UNSPECIFIED ORGANISM: Status: RESOLVED | Noted: 2019-07-06 | Resolved: 2025-07-16

## 2025-07-16 PROBLEM — N30.01 ACUTE CYSTITIS WITH HEMATURIA: Status: RESOLVED | Noted: 2024-06-09 | Resolved: 2025-07-16

## 2025-07-16 PROBLEM — R79.89 ELEVATED TROPONIN: Status: RESOLVED | Noted: 2022-11-07 | Resolved: 2025-07-16

## 2025-07-16 PROBLEM — S70.02XA CONTUSION OF LEFT HIP, INITIAL ENCOUNTER: Status: RESOLVED | Noted: 2022-11-24 | Resolved: 2025-07-16

## 2025-07-16 PROBLEM — M17.0 PRIMARY OSTEOARTHRITIS OF BOTH KNEES: Status: RESOLVED | Noted: 2022-04-28 | Resolved: 2025-07-16

## 2025-07-16 PROBLEM — J69.0 ASPIRATION PNEUMONIA OF LEFT LOWER LOBE (HCC): Status: RESOLVED | Noted: 2019-05-13 | Resolved: 2025-07-16

## 2025-07-16 PROBLEM — N40.1 BENIGN PROSTATIC HYPERPLASIA WITH LOWER URINARY TRACT SYMPTOMS: Status: RESOLVED | Noted: 2018-01-18 | Resolved: 2025-07-16

## 2025-07-16 PROBLEM — J96.21 ACUTE ON CHRONIC RESPIRATORY FAILURE WITH HYPOXIA (HCC): Status: RESOLVED | Noted: 2022-11-24 | Resolved: 2025-07-16

## 2025-07-16 PROBLEM — B34.9 VIRAL SYNDROME: Status: RESOLVED | Noted: 2020-01-04 | Resolved: 2025-07-16

## 2025-07-16 PROBLEM — R09.02 HYPOXIA: Status: RESOLVED | Noted: 2022-11-07 | Resolved: 2025-07-16

## 2025-07-16 PROBLEM — J18.9 COMMUNITY ACQUIRED PNEUMONIA OF LEFT LOWER LOBE OF LUNG: Status: RESOLVED | Noted: 2024-04-14 | Resolved: 2025-07-16

## 2025-07-16 PROBLEM — M48.061 SPINAL STENOSIS OF LUMBAR REGION WITHOUT NEUROGENIC CLAUDICATION: Status: RESOLVED | Noted: 2019-07-17 | Resolved: 2025-07-16

## 2025-07-16 PROBLEM — I69.359 CVA, OLD, HEMIPARESIS (HCC): Status: RESOLVED | Noted: 2018-01-18 | Resolved: 2025-07-16

## 2025-07-16 PROBLEM — M54.50 CHRONIC BILATERAL LOW BACK PAIN WITHOUT SCIATICA: Status: RESOLVED | Noted: 2019-02-14 | Resolved: 2025-07-16

## 2025-07-16 PROBLEM — Z79.01 ANTICOAGULANT LONG-TERM USE: Status: RESOLVED | Noted: 2021-03-01 | Resolved: 2025-07-16

## 2025-07-16 PROBLEM — J96.01 ACUTE RESPIRATORY FAILURE WITH HYPOXIA (HCC): Status: RESOLVED | Noted: 2022-05-13 | Resolved: 2025-07-16

## 2025-07-16 PROBLEM — D72.829 LEUKOCYTOSIS, UNSPECIFIED TYPE: Status: RESOLVED | Noted: 2022-11-07 | Resolved: 2025-07-16

## 2025-07-16 PROBLEM — N28.9 RENAL INSUFFICIENCY: Status: RESOLVED | Noted: 2022-11-07 | Resolved: 2025-07-16

## 2025-07-16 PROBLEM — M17.11 PRIMARY OSTEOARTHRITIS OF RIGHT KNEE: Status: RESOLVED | Noted: 2019-08-18 | Resolved: 2025-07-16

## 2025-07-16 PROBLEM — S42.202A CLOSED FRACTURE OF LEFT PROXIMAL HUMERUS: Status: RESOLVED | Noted: 2023-01-05 | Resolved: 2025-07-16

## 2025-07-16 PROBLEM — I12.9 HYPERTENSIVE CHRONIC KIDNEY DISEASE W STG 1-4/UNSP CHR KDNY: Status: RESOLVED | Noted: 2022-01-01 | Resolved: 2025-07-16

## 2025-07-16 PROBLEM — J18.9 PNEUMONIA DUE TO INFECTIOUS ORGANISM, UNSPECIFIED LATERALITY, UNSPECIFIED PART OF LUNG: Status: RESOLVED | Noted: 2019-07-06 | Resolved: 2025-07-16

## 2025-07-16 PROBLEM — M47.816 LUMBAR SPONDYLOSIS: Status: RESOLVED | Noted: 2021-03-01 | Resolved: 2025-07-16

## 2025-07-16 PROBLEM — U07.1 COVID: Status: RESOLVED | Noted: 2022-11-23 | Resolved: 2025-07-16

## 2025-07-16 PROBLEM — E87.0 HYPERNATREMIA: Status: RESOLVED | Noted: 2023-10-05 | Resolved: 2025-07-16

## 2025-07-16 PROBLEM — J69.0 ASPIRATION PNEUMONITIS (HCC): Status: RESOLVED | Noted: 2019-05-13 | Resolved: 2025-07-16

## 2025-07-16 PROBLEM — R53.83 FATIGUE: Status: RESOLVED | Noted: 2019-03-11 | Resolved: 2025-07-16

## 2025-07-16 PROBLEM — M25.512 ACUTE PAIN OF LEFT SHOULDER: Status: RESOLVED | Noted: 2023-01-05 | Resolved: 2025-07-16

## 2025-07-16 PROBLEM — J98.01 ACUTE BRONCHOSPASM: Status: RESOLVED | Noted: 2020-01-04 | Resolved: 2025-07-16

## 2025-07-16 PROBLEM — J69.0 ASPIRATION PNEUMONIA (HCC): Status: RESOLVED | Noted: 2022-04-06 | Resolved: 2025-07-16

## 2025-07-16 PROBLEM — Z79.891 LONG TERM (CURRENT) USE OF OPIATE ANALGESIC: Status: RESOLVED | Noted: 2022-01-01 | Resolved: 2025-07-16

## 2025-07-16 PROBLEM — N17.9 AKI (ACUTE KIDNEY INJURY): Status: RESOLVED | Noted: 2023-10-05 | Resolved: 2025-07-16

## 2025-07-16 PROBLEM — R50.9 FEBRILE ILLNESS: Status: RESOLVED | Noted: 2024-10-25 | Resolved: 2025-07-16

## 2025-07-16 PROBLEM — L89.322 DECUBITUS ULCER OF LEFT BUTTOCK, STAGE 2 (HCC): Status: RESOLVED | Noted: 2024-11-19 | Resolved: 2025-07-16

## 2025-07-16 PROBLEM — R06.09 DYSPNEA ON EXERTION: Status: RESOLVED | Noted: 2021-05-25 | Resolved: 2025-07-16

## 2025-07-16 PROBLEM — A41.9 SEPSIS (HCC): Status: RESOLVED | Noted: 2024-06-09 | Resolved: 2025-07-16

## 2025-07-16 PROBLEM — R26.9 GAIT DISTURBANCE: Status: RESOLVED | Noted: 2021-04-05 | Resolved: 2025-07-16
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
## 2025-07-16 PROBLEM — J18.9 PNEUMONIA DUE TO INFECTIOUS ORGANISM: Status: RESOLVED | Noted: 2019-07-06 | Resolved: 2025-07-16

## 2025-07-16 PROBLEM — J69.0 ASPIRATION PNEUMONIA, UNSPECIFIED ASPIRATION PNEUMONIA TYPE, UNSPECIFIED LATERALITY, UNSPECIFIED PART OF LUNG (HCC): Status: RESOLVED | Noted: 2022-11-07 | Resolved: 2025-07-16

## 2025-07-16 PROBLEM — R53.1 WEAKNESS GENERALIZED: Status: RESOLVED | Noted: 2022-04-06 | Resolved: 2025-07-16

## 2025-07-16 PROBLEM — J18.9 LEFT LOWER LOBE PNEUMONIA: Status: RESOLVED | Noted: 2024-04-14 | Resolved: 2025-07-16

## 2025-07-16 PROBLEM — G89.29 CHRONIC BILATERAL LOW BACK PAIN WITHOUT SCIATICA: Status: RESOLVED | Noted: 2019-02-14 | Resolved: 2025-07-16

## 2025-07-16 LAB
ALBUMIN SERPL-MCNC: 4.6 G/DL (ref 3.2–4.8)
ALBUMIN/GLOB SERPL: 1.4 {RATIO} (ref 1–2)
ALP LIVER SERPL-CCNC: 141 U/L (ref 45–117)
ALT SERPL-CCNC: 9 U/L (ref 10–49)
ANION GAP SERPL CALC-SCNC: 11 MMOL/L (ref 0–18)
AST SERPL-CCNC: 15 U/L (ref ?–34)
BASOPHILS # BLD AUTO: 0.08 X10(3) UL (ref 0–0.2)
BASOPHILS NFR BLD AUTO: 0.8 %
BILIRUB SERPL-MCNC: 0.3 MG/DL (ref 0.2–1.1)
BUN BLD-MCNC: 16 MG/DL (ref 9–23)
BUN/CREAT SERPL: 11.9 (ref 10–20)
CALCIUM BLD-MCNC: 9.1 MG/DL (ref 8.7–10.4)
CHLORIDE SERPL-SCNC: 106 MMOL/L (ref 98–112)
CHOLEST SERPL-MCNC: 121 MG/DL (ref ?–200)
CO2 SERPL-SCNC: 25 MMOL/L (ref 21–32)
CREAT BLD-MCNC: 1.34 MG/DL (ref 0.7–1.3)
DEPRECATED RDW RBC AUTO: 51.4 FL (ref 35.1–46.3)
EGFRCR SERPLBLD CKD-EPI 2021: 52 ML/MIN/1.73M2 (ref 60–?)
EOSINOPHIL # BLD AUTO: 0.23 X10(3) UL (ref 0–0.7)
EOSINOPHIL NFR BLD AUTO: 2.4 %
ERYTHROCYTE [DISTWIDTH] IN BLOOD BY AUTOMATED COUNT: 14.3 % (ref 11–15)
EST. AVERAGE GLUCOSE BLD GHB EST-MCNC: 128 MG/DL (ref 68–126)
FASTING PATIENT LIPID ANSWER: NO
FASTING STATUS PATIENT QL REPORTED: NO
GLOBULIN PLAS-MCNC: 3.3 G/DL (ref 2–3.5)
GLUCOSE BLD-MCNC: 120 MG/DL (ref 70–99)
HBA1C MFR BLD: 6.1 % (ref ?–5.7)
HCT VFR BLD AUTO: 44.4 % (ref 39–53)
HDLC SERPL-MCNC: 30 MG/DL (ref 40–59)
HGB BLD-MCNC: 13.8 G/DL (ref 13–17.5)
IMM GRANULOCYTES # BLD AUTO: 0.03 X10(3) UL (ref 0–1)
IMM GRANULOCYTES NFR BLD: 0.3 %
LDLC SERPL CALC-MCNC: 67 MG/DL (ref ?–100)
LYMPHOCYTES # BLD AUTO: 1.72 X10(3) UL (ref 1–4)
LYMPHOCYTES NFR BLD AUTO: 17.7 %
MCH RBC QN AUTO: 30.3 PG (ref 26–34)
MCHC RBC AUTO-ENTMCNC: 31.1 G/DL (ref 31–37)
MCV RBC AUTO: 97.6 FL (ref 80–100)
MONOCYTES # BLD AUTO: 0.98 X10(3) UL (ref 0.1–1)
MONOCYTES NFR BLD AUTO: 10.1 %
NEUTROPHILS # BLD AUTO: 6.7 X10 (3) UL (ref 1.5–7.7)
NEUTROPHILS # BLD AUTO: 6.7 X10(3) UL (ref 1.5–7.7)
NEUTROPHILS NFR BLD AUTO: 68.7 %
NONHDLC SERPL-MCNC: 91 MG/DL (ref ?–130)
OSMOLALITY SERPL CALC.SUM OF ELEC: 296 MOSM/KG (ref 275–295)
PLATELET # BLD AUTO: 212 10(3)UL (ref 150–450)
POTASSIUM SERPL-SCNC: 4.2 MMOL/L (ref 3.5–5.1)
PROT SERPL-MCNC: 7.9 G/DL (ref 5.7–8.2)
RBC # BLD AUTO: 4.55 X10(6)UL (ref 3.8–5.8)
SODIUM SERPL-SCNC: 142 MMOL/L (ref 136–145)
TRIGL SERPL-MCNC: 133 MG/DL (ref 30–149)
TSI SER-ACNC: 1.28 UIU/ML (ref 0.55–4.78)
VIT D+METAB SERPL-MCNC: 66.3 NG/ML (ref 30–100)
VLDLC SERPL CALC-MCNC: 20 MG/DL (ref 0–30)
WBC # BLD AUTO: 9.7 X10(3) UL (ref 4–11)

## 2025-07-16 PROCEDURE — 80053 COMPREHEN METABOLIC PANEL: CPT

## 2025-07-16 PROCEDURE — 85025 COMPLETE CBC W/AUTO DIFF WBC: CPT

## 2025-07-16 PROCEDURE — 36415 COLL VENOUS BLD VENIPUNCTURE: CPT

## 2025-07-16 PROCEDURE — 80061 LIPID PANEL: CPT

## 2025-07-16 PROCEDURE — 83036 HEMOGLOBIN GLYCOSYLATED A1C: CPT

## 2025-07-16 PROCEDURE — 84443 ASSAY THYROID STIM HORMONE: CPT

## 2025-07-16 PROCEDURE — 82306 VITAMIN D 25 HYDROXY: CPT

## 2025-07-16 RX ORDER — GABAPENTIN 300 MG/1
CAPSULE ORAL
Qty: 360 CAPSULE | Refills: 4 | Status: SHIPPED | OUTPATIENT
Start: 2025-07-16

## 2025-07-16 RX ORDER — CLOPIDOGREL BISULFATE 75 MG/1
75 TABLET ORAL DAILY
Qty: 90 TABLET | Refills: 3 | Status: SHIPPED | OUTPATIENT
Start: 2025-07-16

## 2025-07-16 RX ORDER — AMLODIPINE BESYLATE 5 MG/1
5 TABLET ORAL DAILY
Qty: 90 TABLET | Refills: 3 | Status: SHIPPED | OUTPATIENT
Start: 2025-07-16

## 2025-07-16 RX ORDER — ATORVASTATIN CALCIUM 40 MG/1
40 TABLET, FILM COATED ORAL NIGHTLY
Qty: 90 TABLET | Refills: 3 | Status: SHIPPED | OUTPATIENT
Start: 2025-07-16

## 2025-07-16 RX ORDER — LEVOTHYROXINE SODIUM 100 UG/1
100 TABLET ORAL DAILY
Qty: 90 TABLET | Refills: 3 | Status: SHIPPED | OUTPATIENT
Start: 2025-07-16

## 2025-07-16 NOTE — PROGRESS NOTES
Subjective:   Cody Fitzgerald is a 85 year old male who presents for a Medicare Subsequent Annual Wellness visit (Pt already had Initial Annual Wellness) and scheduled follow up of multiple significant but stable problems and acute exacerbation of a chronic illness.   History of Present Illness  Cody Fitzgerald is an 85 year old male with chronic back pain who presents to [facility] care with a new internal medicine physician.    He has ongoing lower right-sided back pain that has persisted for months, described as a 'dull, sharp pain.' He takes gabapentin 600 mg in the morning, 300 mg at noon, and 300 mg at night, but it has not been effective. Tylenol also does not alleviate the pain. He has not used other pain medications recently.    He has a history of chronic heart failure, chronic kidney disease stage 3, cerebrovascular accident with left-sided hemiparesis, hyperlipidemia, hypertension, osteoarthritis affecting multiple joints, and chronic back pain. He also has a history of aspiration pneumonia and has undergone multiple swallowing studies. He experiences swallowing difficulties and is mostly wheelchair-bound due to weakness and fear of falling, which limits his mobility. He has had falls in the past, and his knees sometimes 'buckle.' He has a history of decubitus ulcers on the left buttock, which are currently being managed.    He has urinary incontinence, which developed after his stroke 16 years ago, and is able to reach the bathroom for bowel movements about three-quarters of the time. He has a history of urinary tract infections and sepsis.    His current medications include amlodipine 5 mg daily, atorvastatin 40 mg daily, vitamin D 2000 units daily, Plavix, flecainide, and levothyroxine 100 mcg daily. He was previously on an ointment prescribed in June 2024, which he no longer uses.    He quit smoking over 15 years ago and does not drink alcohol. He has hearing issues and uses corrective  lenses.    History/Other:   Fall Risk Assessment:   He has been screened for Falls and is High Risk. Fall Prevention information provided to patient in After Visit Summary.    Do you feel unsteady when standing or walking?: Yes  Do you worry about falling?: Yes  Have you fallen in the past year?: No     Cognitive Assessment:   Abnormal  What day of the week is this?: Correct  What month is it?: Correct  What year is it?: Correct  Recall \"Ball\": Correct  Recall \"Flag\": Correct  Recall \"Tree\": Incorrect      Functional Ability/Status:   Cody Fitzgerald has some abnormal functions as listed below:  He has Dressing and/or Bathing issues based on screening of functional status.  Bathing or Showering: Cannot do without help  Dressing: Need some help  He has Toileting difficulties based on screening of functional status.He has Eating difficulties based on screening of functional status.He has Driving difficulties based on screening of functional status. He has Meal Preparation difficulties based on screening of functional status.He has difficulties Managing Money/Bills based on screening of functional status.He has difficulties Shopping for Groceries based on screening of functional status. He has difficulties Taking Meds as Rx'd based on screening of functional status. He has difficulties Affording Meds based on screening of functional status. He has Hearing problems based on screening of functional status.He has Vision problems based on screening of functional status. He has Walking problems based on screening of functional status. He has problems with Daily Activities based on screening of functional status. He has problems with Memory based on screening of functional status.       Depression Screening (PHQ):  PHQ-2 SCORE: 0  , done 7/16/2025        <5 minutes spent screening and counseling for depression    Advanced Directives:   He does NOT have a Living Will. [ ]  He does NOT have a Power of  for Health Care.  [ ]  Discussed Advance Care Planning with patient (and family/surrogate if present). Standard forms made available to patient in After Visit Summary.      Problem List[1]  Allergies:  He has no known allergies.    Current Medications:  Active Meds, Sig Only[2]    Medical History:  He  has a past medical history of Abdominal aortic aneurysm (AAA), Back problem, Disorder of thyroid, Falls frequently, High blood pressure, High cholesterol, pneumothorax, Muscle weakness, Osteoarthritis, PNA (pneumonia), Pneumonia due to organism (11/2017), and Stroke (HCC).  Surgical History:  He  has a past surgical history that includes cataract; tonsillectomy; other surgical history (04/12/2018); hernia surgery; and bronchoscopy,diagnostic (10/29/2024).   Family History:  His family history includes Cancer in his father.  Social History:  He  reports that he quit smoking about 17 years ago. His smoking use included cigarettes. He started smoking about 67 years ago. He has a 100 pack-year smoking history. He has never been exposed to tobacco smoke. He has never used smokeless tobacco. He reports that he does not currently use alcohol. He reports that he does not use drugs.    Tobacco:  He smoked tobacco in the past but quit greater than 12 months ago.  Tobacco Use[3]     CAGE Alcohol Screen:   CAGE screening score of 0 on 7/16/2025, showing low risk of alcohol abuse.      Patient Care Team:  Kandice Claudio DO as PCP - General (Internal Medicine)    Review of Systems   Constitutional:  Positive for fatigue.   Respiratory: Negative.     Cardiovascular: Negative.    Gastrointestinal: Negative.    Musculoskeletal:  Positive for arthralgias, back pain and gait problem.   Skin: Negative.    Neurological:  Positive for weakness.          Objective:   Physical Exam  Constitutional:       Appearance: He is well-developed.   Cardiovascular:      Rate and Rhythm: Normal rate and regular rhythm.      Heart sounds: Normal heart sounds.    Pulmonary:      Effort: Pulmonary effort is normal.      Breath sounds: Normal breath sounds.   Abdominal:      General: Bowel sounds are normal.      Palpations: Abdomen is soft.   Skin:     General: Skin is warm and dry.   Neurological:      Mental Status: He is alert and oriented to person, place, and time. Mental status is at baseline.      Deep Tendon Reflexes: Reflexes are normal and symmetric.      Comments: Left hemiparesis           /65 (BP Location: Right arm, Patient Position: Sitting, Cuff Size: large)   Pulse 78  Estimated body mass index is 28.78 kg/m² as calculated from the following:    Height as of 11/19/24: 5' 10\" (1.778 m).    Weight as of 3/12/25: 200 lb 9.6 oz (91 kg).    Medicare Hearing Assessment:   Hearing Screening    Time taken: 7/16/2025 11:45 AM  Entry User: Sreekanth Allison MD  Screening Method: Finger Rub  Finger Rub Result: Pass         Visual Acuity:   Right Eye Visual Acuity: Corrected Right Eye Chart Acuity: 20/30   Left Eye Visual Acuity: Corrected Left Eye Chart Acuity: 20/25   Both Eyes Visual Acuity: Corrected Both Eyes Chart Acuity: 20/30   Able To Tolerate Visual Acuity: Yes        Assessment & Plan:   Cody Fitzgerald is a 85 year old male who presents for a Medicare Assessment.     1. Medicare annual wellness visit, subsequent (Primary)  -     Lipid Panel; Future; Expected date: 07/16/2025  -     TSH W Reflex To Free T4; Future; Expected date: 07/16/2025  -     Hemoglobin A1C; Future; Expected date: 07/16/2025  -     Comp Metabolic Panel (14); Future; Expected date: 07/16/2025  -     CBC With Differential With Platelet; Future; Expected date: 07/16/2025  -     Vitamin D; Future; Expected date: 07/16/2025  2. Encounter to establish care with new doctor  -     [facility] PCP or Registry Removal Request  3. At high risk for falls  4. History of aspiration pneumonia  Assessment & Plan:  History of recurrent aspiration pneumonia,s/p SLP evaluations on regular diet with aspiration  precautions (watch for sedation,must be alert and  upright, small bite sized chewing and alternating with solid and liquids,   5. History of cerebrovascular accident (CVA) with residual deficit  Assessment & Plan:  S/p CVA with left hemiparesis in 2010  Plan;  Wheelchair bound, and continue DAPT and statin   Orders:  -     Atorvastatin Calcium; Take 1 tablet (40 mg total) by mouth nightly.  Dispense: 90 tablet; Refill: 3  -     Clopidogrel Bisulfate; Take 1 tablet (75 mg total) by mouth daily.  Dispense: 90 tablet; Refill: 3  6. Inability to walk  7. Left hemiparesis (HCC)  Assessment & Plan:  S/p CVA with left hemiparesis 2010  Plan:   Continue DAPT and Statin  Orders:  -     Atorvastatin Calcium; Take 1 tablet (40 mg total) by mouth nightly.  Dispense: 90 tablet; Refill: 3  -     Clopidogrel Bisulfate; Take 1 tablet (75 mg total) by mouth daily.  Dispense: 90 tablet; Refill: 3  8. Stage 3a chronic kidney disease (HCC)  Assessment & Plan:  Stable, at baseline  Orders:  -     Comp Metabolic Panel (14); Future; Expected date: 07/16/2025  9. Status post repair of abdominal aortic aneurysm (AAA) using bifurcation graft  Assessment & Plan:  April 2018, continue DAPT and statin    10. Hypercholesterolemia  Assessment & Plan:  With history of AAA repair and CVA  Plan  High dose statin indefinite  Orders:  -     Atorvastatin Calcium; Take 1 tablet (40 mg total) by mouth nightly.  Dispense: 90 tablet; Refill: 3  11. Essential hypertension  -     amLODIPine Besylate; Take 1 tablet (5 mg total) by mouth daily.  Dispense: 90 tablet; Refill: 3  12. Acquired hypothyroidism  -     Levothyroxine Sodium; Take 1 tablet (100 mcg total) by mouth daily.  Dispense: 90 tablet; Refill: 3  13. Vitamin D deficiency  -     Vitamin D; Future; Expected date: 07/16/2025  14. Urinary incontinence as sequela of cerebrovascular accident (CVA)  Other orders  -     Gabapentin; Take 2 capsules (600 MG) by mouth in the morning, 1 capsule (300 MG) at  noon, and 1 capsule (300 MG) at bedtime  Dispense: 360 capsule; Refill: 4    Assessment & Plan  Chronic Back Pain  Chronic lower right back pain unresponsive to gabapentin. Discussed alternative treatments including pregabalin and edibles. Explained opiate risks: sedation, aspiration pneumonia, tolerance.  - Continue gabapentin 600 mg morning, 300 mg afternoon, 300 mg evening.  - Consider pregabalin if gabapentin and edibles ineffective.  - Discuss potential use of edibles for nerve pain management.    Aspiration Pneumonia  Recurrent aspiration pneumonia with swallowing difficulties. Emphasized alertness during meals to prevent aspiration. Discussed opiate-induced lethargy increasing aspiration risk.  - Review and obtain more records related to aspiration pneumonia and swallowing studies.    Urinary Incontinence  Urinary incontinence likely due to stroke, spinal stenosis, and nerve issues. Discussed preference for incontinence over UTIs and pyelonephritis risks.  - Implement timed voiding every 2-3 hours.    Osteoarthritis  Osteoarthritis with knee and joint pain inadequately managed by acetaminophen. Discussed potential use of edibles for pain management.    Hypertension  Hypertension managed with amlodipine 5 mg daily.    Hyperlipidemia  Hyperlipidemia managed with atorvastatin 40 mg daily due to stroke history.    General Health Maintenance  Discussed cessation of age-related screenings. Emphasized quality of life. Smoking cessation over 15 years ago.    Goals of Care  Living will and power of  in place. Discussed importance of medical decision-maker.    Follow-up  Chronic care management planned every six months with video visit option. Discussed urgent visit scheduling process.  - Schedule follow-up visit in 6 months.  - Arrange for video visits if in-person visits are not feasible.  - Order blood work for physical examination.  The patient indicates understanding of these issues and agrees to the  plan.  Reinforced healthy diet, lifestyle, and exercise.      Return in about 6 months (around 1/16/2026) for chronic care mangement .     Sreekanth Allison MD, 7/16/2025     Supplementary Documentation:   General Health:       Health Maintenance   Topic Date Due    Annual Physical  Never done    Annual Depression Screening  01/01/2025    COVID-19 Vaccine (6 - 2024-25 season) 08/16/2025 (Originally 5/14/2025)    Zoster Vaccines (1 of 2) 08/16/2026 (Originally 6/24/1991)    Influenza Vaccine (1) 10/01/2025    Fall Risk Screening (Annual)  Completed    Pneumococcal Vaccine: 50+ Years  Completed    Meningococcal B Vaccine  Aged Out            [1]   Patient Active Problem List  Diagnosis    Left hemiparesis (HCC)    Essential hypertension    Hypercholesterolemia    Hypothyroidism    Status post repair of abdominal aortic aneurysm (AAA) using bifurcation graft    Stage 3a chronic kidney disease (HCC)    Primary osteoarthritis involving multiple joints    Inability to walk    At high risk for falls    History of cerebrovascular accident (CVA) with residual deficit   [2]   Outpatient Medications Marked as Taking for the 7/16/25 encounter (Office Visit) with Sreekanth Allison MD   Medication Sig    amLODIPine 5 MG Oral Tab Take 1 tablet (5 mg total) by mouth daily.    atorvastatin 40 MG Oral Tab Take 1 tablet (40 mg total) by mouth nightly.    clopidogrel 75 MG Oral Tab Take 1 tablet (75 mg total) by mouth daily.    gabapentin 300 MG Oral Cap Take 2 capsules (600 MG) by mouth in the morning, 1 capsule (300 MG) at noon, and 1 capsule (300 MG) at bedtime    levothyroxine 100 MCG Oral Tab Take 1 tablet (100 mcg total) by mouth daily.    acetaminophen 500 MG Oral Tab Take 1 tablet (500 mg total) by mouth every 4 (four) hours as needed.    calcium carbonate 500 MG Oral Chew Tab Chew 2 tablets (1,000 mg total) by mouth 2 (two) times daily as needed.    Cholecalciferol (VITAMIN D) 2000 units Oral Cap Take 1 capsule (2,000 Units total) by  mouth daily.   [3]   Social History  Tobacco Use   Smoking Status Former    Current packs/day: 0.00    Average packs/day: 2.0 packs/day for 50.0 years (100.0 ttl pk-yrs)    Types: Cigarettes    Start date: 1958    Quit date: 2008    Years since quittin.5    Passive exposure: Never   Smokeless Tobacco Never

## 2025-07-16 NOTE — ASSESSMENT & PLAN NOTE
History of recurrent aspiration pneumonia,s/p SLP evaluations on regular diet with aspiration precautions (watch for sedation,must be alert and  upright, small bite sized chewing and alternating with solid and liquids,

## 2025-07-16 NOTE — ASSESSMENT & PLAN NOTE
Pt with chronic lower back pain with neuropathy and GI/ incontinence and OA of multiple joitns  Plan;  Gabapentin 600mg in am, 300mg in afternoon and 300mg in evening, may supplement with CBD/edibles. Avoid narcotics.

## 2025-07-30 ENCOUNTER — PATIENT OUTREACH (OUTPATIENT)
Dept: CASE MANAGEMENT | Age: 85
End: 2025-07-30

## 2025-07-31 ENCOUNTER — TELEPHONE (OUTPATIENT)
Dept: INTERNAL MEDICINE CLINIC | Facility: CLINIC | Age: 85
End: 2025-07-31

## 2025-08-01 ENCOUNTER — MED REC SCAN ONLY (OUTPATIENT)
Dept: INTERNAL MEDICINE CLINIC | Facility: CLINIC | Age: 85
End: 2025-08-01

## 2025-08-05 ENCOUNTER — HOSPITAL ENCOUNTER (OUTPATIENT)
Age: 85
Discharge: HOME | End: 2025-08-05

## 2025-08-05 ENCOUNTER — NURSE TRIAGE (OUTPATIENT)
Dept: INTERNAL MEDICINE CLINIC | Facility: CLINIC | Age: 85
End: 2025-08-05

## 2025-08-05 VITALS
SYSTOLIC BLOOD PRESSURE: 134 MMHG | DIASTOLIC BLOOD PRESSURE: 61 MMHG | OXYGEN SATURATION: 93 % | HEART RATE: 74 BPM | TEMPERATURE: 98 F | RESPIRATION RATE: 18 BRPM

## 2025-08-05 DIAGNOSIS — L01.00 IMPETIGO: Primary | ICD-10-CM

## 2025-08-05 LAB
#MXD IC: 1 X10ˆ3/UL (ref 0.1–1)
HCT VFR BLD AUTO: 44.6 % (ref 39–53)
HGB BLD-MCNC: 13.5 G/DL (ref 13–17.5)
LYMPHOCYTES # BLD AUTO: 2.2 X10ˆ3/UL (ref 1–4)
LYMPHOCYTES NFR BLD AUTO: 27.1 %
MCH RBC QN AUTO: 29.7 PG (ref 26–34)
MCHC RBC AUTO-ENTMCNC: 30.3 G/DL (ref 31–37)
MCV RBC AUTO: 98 FL (ref 80–100)
MIXED CELL %: 12.2 %
NEUTROPHILS # BLD AUTO: 4.9 X10ˆ3/UL (ref 1.5–7.7)
NEUTROPHILS NFR BLD AUTO: 60.7 %
PLATELET # BLD AUTO: 214 X10ˆ3/UL (ref 150–450)
RBC # BLD AUTO: 4.55 X10ˆ6/UL (ref 3.8–5.8)
WBC # BLD AUTO: 8.1 X10ˆ3/UL (ref 4–11)

## 2025-08-05 PROCEDURE — 85025 COMPLETE CBC W/AUTO DIFF WBC: CPT | Performed by: NURSE PRACTITIONER

## 2025-08-05 PROCEDURE — 99213 OFFICE O/P EST LOW 20 MIN: CPT | Performed by: NURSE PRACTITIONER

## 2025-08-05 RX ORDER — MUPIROCIN 2 %
1 OINTMENT (GRAM) TOPICAL 2 TIMES DAILY
Qty: 30 G | Refills: 0 | Status: SHIPPED | OUTPATIENT
Start: 2025-08-05 | End: 2025-08-09

## 2025-08-08 ENCOUNTER — NURSE TRIAGE (OUTPATIENT)
Dept: INTERNAL MEDICINE CLINIC | Facility: CLINIC | Age: 85
End: 2025-08-08

## 2025-08-08 RX ORDER — DOXYCYCLINE 100 MG/1
100 CAPSULE ORAL 2 TIMES DAILY
Qty: 14 CAPSULE | Refills: 0 | Status: SHIPPED | OUTPATIENT
Start: 2025-08-08 | End: 2025-08-15

## 2025-08-09 RX ORDER — MUPIROCIN 2 %
1 OINTMENT (GRAM) TOPICAL 2 TIMES DAILY
Qty: 30 G | Refills: 0 | Status: SHIPPED | OUTPATIENT
Start: 2025-08-09 | End: 2025-08-16

## 2025-08-11 ENCOUNTER — TELEPHONE (OUTPATIENT)
Dept: INTERNAL MEDICINE CLINIC | Facility: CLINIC | Age: 85
End: 2025-08-11

## 2025-08-13 ENCOUNTER — HOSPITAL ENCOUNTER (INPATIENT)
Facility: HOSPITAL | Age: 85
LOS: 5 days | Discharge: NURSING, REHAB OR LONG-TERM CARE | End: 2025-08-18
Attending: EMERGENCY MEDICINE | Admitting: HOSPITALIST

## 2025-08-13 DIAGNOSIS — L03.119 CELLULITIS OF LOWER EXTREMITY, UNSPECIFIED LATERALITY: Primary | ICD-10-CM

## 2025-08-13 PROBLEM — L03.90 CELLULITIS: Status: ACTIVE | Noted: 2025-08-13

## 2025-08-13 LAB
ALBUMIN SERPL-MCNC: 4.5 G/DL (ref 3.2–4.8)
ALBUMIN/GLOB SERPL: 1.3 (ref 1–2)
ALP LIVER SERPL-CCNC: 123 U/L (ref 45–117)
ALT SERPL-CCNC: <7 U/L (ref 10–49)
ANION GAP SERPL CALC-SCNC: 9 MMOL/L (ref 0–18)
AST SERPL-CCNC: 13 U/L (ref ?–34)
BASOPHILS # BLD AUTO: 0.07 X10(3) UL (ref 0–0.2)
BASOPHILS NFR BLD AUTO: 0.8 %
BILIRUB SERPL-MCNC: 0.4 MG/DL (ref 0.2–1.1)
BUN BLD-MCNC: 17 MG/DL (ref 9–23)
BUN/CREAT SERPL: 12.7 (ref 10–20)
CALCIUM BLD-MCNC: 9.9 MG/DL (ref 8.7–10.4)
CHLORIDE SERPL-SCNC: 105 MMOL/L (ref 98–112)
CO2 SERPL-SCNC: 27 MMOL/L (ref 21–32)
CREAT BLD-MCNC: 1.34 MG/DL (ref 0.7–1.3)
DEPRECATED RDW RBC AUTO: 50 FL (ref 35.1–46.3)
EGFRCR SERPLBLD CKD-EPI 2021: 52 ML/MIN/1.73M2 (ref 60–?)
EOSINOPHIL # BLD AUTO: 0.29 X10(3) UL (ref 0–0.7)
EOSINOPHIL NFR BLD AUTO: 3.1 %
ERYTHROCYTE [DISTWIDTH] IN BLOOD BY AUTOMATED COUNT: 14.1 % (ref 11–15)
GLOBULIN PLAS-MCNC: 3.5 G/DL (ref 2–3.5)
GLUCOSE BLD-MCNC: 168 MG/DL (ref 70–99)
GLUCOSE BLDC GLUCOMTR-MCNC: 137 MG/DL (ref 70–99)
GLUCOSE BLDC GLUCOMTR-MCNC: 137 MG/DL (ref 70–99)
GLUCOSE BLDC GLUCOMTR-MCNC: 98 MG/DL (ref 70–99)
HCT VFR BLD AUTO: 44.5 % (ref 39–53)
HGB BLD-MCNC: 13.9 G/DL (ref 13–17.5)
IMM GRANULOCYTES # BLD AUTO: 0.03 X10(3) UL (ref 0–1)
IMM GRANULOCYTES NFR BLD: 0.3 %
LYMPHOCYTES # BLD AUTO: 1.93 X10(3) UL (ref 1–4)
LYMPHOCYTES NFR BLD AUTO: 20.8 %
MCH RBC QN AUTO: 30 PG (ref 26–34)
MCHC RBC AUTO-ENTMCNC: 31.2 G/DL (ref 31–37)
MCV RBC AUTO: 95.9 FL (ref 80–100)
MONOCYTES # BLD AUTO: 0.74 X10(3) UL (ref 0.1–1)
MONOCYTES NFR BLD AUTO: 8 %
NEUTROPHILS # BLD AUTO: 6.2 X10 (3) UL (ref 1.5–7.7)
NEUTROPHILS # BLD AUTO: 6.2 X10(3) UL (ref 1.5–7.7)
NEUTROPHILS NFR BLD AUTO: 67 %
OSMOLALITY SERPL CALC.SUM OF ELEC: 297 MOSM/KG (ref 275–295)
PLATELET # BLD AUTO: 200 10(3)UL (ref 150–450)
POTASSIUM SERPL-SCNC: 4.2 MMOL/L (ref 3.5–5.1)
PROT SERPL-MCNC: 8 G/DL (ref 5.7–8.2)
RBC # BLD AUTO: 4.64 X10(6)UL (ref 3.8–5.8)
SODIUM SERPL-SCNC: 141 MMOL/L (ref 136–145)
WBC # BLD AUTO: 9.3 X10(3) UL (ref 4–11)

## 2025-08-13 PROCEDURE — 99222 1ST HOSP IP/OBS MODERATE 55: CPT | Performed by: HOSPITALIST

## 2025-08-13 RX ORDER — ONDANSETRON 2 MG/ML
4 INJECTION INTRAMUSCULAR; INTRAVENOUS EVERY 6 HOURS PRN
Status: DISCONTINUED | OUTPATIENT
Start: 2025-08-13 | End: 2025-08-18

## 2025-08-13 RX ORDER — ATORVASTATIN CALCIUM 40 MG/1
40 TABLET, FILM COATED ORAL NIGHTLY
Status: DISCONTINUED | OUTPATIENT
Start: 2025-08-13 | End: 2025-08-18

## 2025-08-13 RX ORDER — METOCLOPRAMIDE HYDROCHLORIDE 5 MG/ML
5 INJECTION INTRAMUSCULAR; INTRAVENOUS EVERY 8 HOURS PRN
Status: DISCONTINUED | OUTPATIENT
Start: 2025-08-13 | End: 2025-08-18

## 2025-08-13 RX ORDER — VANCOMYCIN HYDROCHLORIDE
15 ONCE
Status: COMPLETED | OUTPATIENT
Start: 2025-08-13 | End: 2025-08-13

## 2025-08-13 RX ORDER — GABAPENTIN 300 MG/1
300 CAPSULE ORAL NIGHTLY
Status: DISCONTINUED | OUTPATIENT
Start: 2025-08-13 | End: 2025-08-18

## 2025-08-13 RX ORDER — LEVOTHYROXINE SODIUM 100 UG/1
100 TABLET ORAL
Status: DISCONTINUED | OUTPATIENT
Start: 2025-08-14 | End: 2025-08-18

## 2025-08-13 RX ORDER — VANCOMYCIN HYDROCHLORIDE
1250 EVERY 24 HOURS
Status: DISCONTINUED | OUTPATIENT
Start: 2025-08-14 | End: 2025-08-17

## 2025-08-13 RX ORDER — NICOTINE POLACRILEX 4 MG
30 LOZENGE BUCCAL
Status: DISCONTINUED | OUTPATIENT
Start: 2025-08-13 | End: 2025-08-18

## 2025-08-13 RX ORDER — NICOTINE POLACRILEX 4 MG
15 LOZENGE BUCCAL
Status: DISCONTINUED | OUTPATIENT
Start: 2025-08-13 | End: 2025-08-18

## 2025-08-13 RX ORDER — DEXTROSE MONOHYDRATE 25 G/50ML
50 INJECTION, SOLUTION INTRAVENOUS
Status: DISCONTINUED | OUTPATIENT
Start: 2025-08-13 | End: 2025-08-18

## 2025-08-13 RX ORDER — HEPARIN SODIUM 5000 [USP'U]/ML
5000 INJECTION, SOLUTION INTRAVENOUS; SUBCUTANEOUS EVERY 12 HOURS SCHEDULED
Status: DISCONTINUED | OUTPATIENT
Start: 2025-08-13 | End: 2025-08-18

## 2025-08-13 RX ORDER — AMLODIPINE BESYLATE 5 MG/1
5 TABLET ORAL DAILY
Status: DISCONTINUED | OUTPATIENT
Start: 2025-08-14 | End: 2025-08-18

## 2025-08-13 RX ORDER — CLOPIDOGREL BISULFATE 75 MG/1
75 TABLET ORAL DAILY
Status: DISCONTINUED | OUTPATIENT
Start: 2025-08-14 | End: 2025-08-18

## 2025-08-13 RX ORDER — ACETAMINOPHEN 500 MG
500 TABLET ORAL EVERY 4 HOURS PRN
Status: DISCONTINUED | OUTPATIENT
Start: 2025-08-13 | End: 2025-08-14

## 2025-08-13 RX ORDER — CALCIUM CARBONATE 500 MG/1
1000 TABLET, CHEWABLE ORAL 2 TIMES DAILY PRN
Status: DISCONTINUED | OUTPATIENT
Start: 2025-08-13 | End: 2025-08-18

## 2025-08-13 RX ORDER — GABAPENTIN 300 MG/1
600 CAPSULE ORAL DAILY
Status: DISCONTINUED | OUTPATIENT
Start: 2025-08-14 | End: 2025-08-18

## 2025-08-14 ENCOUNTER — APPOINTMENT (OUTPATIENT)
Dept: GENERAL RADIOLOGY | Facility: HOSPITAL | Age: 85
End: 2025-08-14
Attending: HOSPITALIST

## 2025-08-14 LAB
ANION GAP SERPL CALC-SCNC: 9 MMOL/L (ref 0–18)
BASOPHILS # BLD AUTO: 0.07 X10(3) UL (ref 0–0.2)
BASOPHILS NFR BLD AUTO: 0.8 %
BUN BLD-MCNC: 14 MG/DL (ref 9–23)
BUN/CREAT SERPL: 11.4 (ref 10–20)
CALCIUM BLD-MCNC: 9.3 MG/DL (ref 8.7–10.4)
CHLORIDE SERPL-SCNC: 107 MMOL/L (ref 98–112)
CO2 SERPL-SCNC: 26 MMOL/L (ref 21–32)
CREAT BLD-MCNC: 1.23 MG/DL (ref 0.7–1.3)
DEPRECATED RDW RBC AUTO: 49.1 FL (ref 35.1–46.3)
EGFRCR SERPLBLD CKD-EPI 2021: 58 ML/MIN/1.73M2 (ref 60–?)
EOSINOPHIL # BLD AUTO: 0.4 X10(3) UL (ref 0–0.7)
EOSINOPHIL NFR BLD AUTO: 4.7 %
ERYTHROCYTE [DISTWIDTH] IN BLOOD BY AUTOMATED COUNT: 14.1 % (ref 11–15)
GLUCOSE BLD-MCNC: 90 MG/DL (ref 70–99)
GLUCOSE BLDC GLUCOMTR-MCNC: 100 MG/DL (ref 70–99)
GLUCOSE BLDC GLUCOMTR-MCNC: 130 MG/DL (ref 70–99)
GLUCOSE BLDC GLUCOMTR-MCNC: 141 MG/DL (ref 70–99)
GLUCOSE BLDC GLUCOMTR-MCNC: 90 MG/DL (ref 70–99)
HCT VFR BLD AUTO: 42.5 % (ref 39–53)
HGB BLD-MCNC: 13.4 G/DL (ref 13–17.5)
IMM GRANULOCYTES # BLD AUTO: 0.02 X10(3) UL (ref 0–1)
IMM GRANULOCYTES NFR BLD: 0.2 %
LYMPHOCYTES # BLD AUTO: 2.01 X10(3) UL (ref 1–4)
LYMPHOCYTES NFR BLD AUTO: 23.4 %
MCH RBC QN AUTO: 29.8 PG (ref 26–34)
MCHC RBC AUTO-ENTMCNC: 31.5 G/DL (ref 31–37)
MCV RBC AUTO: 94.4 FL (ref 80–100)
MONOCYTES # BLD AUTO: 0.88 X10(3) UL (ref 0.1–1)
MONOCYTES NFR BLD AUTO: 10.3 %
NEUTROPHILS # BLD AUTO: 5.2 X10 (3) UL (ref 1.5–7.7)
NEUTROPHILS # BLD AUTO: 5.2 X10(3) UL (ref 1.5–7.7)
NEUTROPHILS NFR BLD AUTO: 60.6 %
OSMOLALITY SERPL CALC.SUM OF ELEC: 294 MOSM/KG (ref 275–295)
PLATELET # BLD AUTO: 188 10(3)UL (ref 150–450)
POTASSIUM SERPL-SCNC: 4 MMOL/L (ref 3.5–5.1)
RBC # BLD AUTO: 4.5 X10(6)UL (ref 3.8–5.8)
SODIUM SERPL-SCNC: 142 MMOL/L (ref 136–145)
WBC # BLD AUTO: 8.6 X10(3) UL (ref 4–11)

## 2025-08-14 PROCEDURE — 71045 X-RAY EXAM CHEST 1 VIEW: CPT | Performed by: HOSPITALIST

## 2025-08-14 PROCEDURE — 99233 SBSQ HOSP IP/OBS HIGH 50: CPT | Performed by: HOSPITALIST

## 2025-08-14 RX ORDER — ACETAMINOPHEN 500 MG
500 TABLET ORAL EVERY 4 HOURS PRN
Status: DISCONTINUED | OUTPATIENT
Start: 2025-08-14 | End: 2025-08-18

## 2025-08-15 LAB
ANION GAP SERPL CALC-SCNC: 6 MMOL/L (ref 0–18)
BUN BLD-MCNC: 17 MG/DL (ref 9–23)
BUN/CREAT SERPL: 13.7 (ref 10–20)
CALCIUM BLD-MCNC: 9.9 MG/DL (ref 8.7–10.4)
CHLORIDE SERPL-SCNC: 107 MMOL/L (ref 98–112)
CO2 SERPL-SCNC: 29 MMOL/L (ref 21–32)
CREAT BLD-MCNC: 1.24 MG/DL (ref 0.7–1.3)
DEPRECATED RDW RBC AUTO: 50.4 FL (ref 35.1–46.3)
EGFRCR SERPLBLD CKD-EPI 2021: 57 ML/MIN/1.73M2 (ref 60–?)
ERYTHROCYTE [DISTWIDTH] IN BLOOD BY AUTOMATED COUNT: 14.2 % (ref 11–15)
GLUCOSE BLD-MCNC: 94 MG/DL (ref 70–99)
GLUCOSE BLDC GLUCOMTR-MCNC: 104 MG/DL (ref 70–99)
GLUCOSE BLDC GLUCOMTR-MCNC: 110 MG/DL (ref 70–99)
GLUCOSE BLDC GLUCOMTR-MCNC: 121 MG/DL (ref 70–99)
GLUCOSE BLDC GLUCOMTR-MCNC: 140 MG/DL (ref 70–99)
HCT VFR BLD AUTO: 40.6 % (ref 39–53)
HGB BLD-MCNC: 12.7 G/DL (ref 13–17.5)
MAGNESIUM SERPL-MCNC: 1.9 MG/DL (ref 1.6–2.6)
MCH RBC QN AUTO: 29.5 PG (ref 26–34)
MCHC RBC AUTO-ENTMCNC: 31.3 G/DL (ref 31–37)
MCV RBC AUTO: 94.4 FL (ref 80–100)
OSMOLALITY SERPL CALC.SUM OF ELEC: 295 MOSM/KG (ref 275–295)
PLATELET # BLD AUTO: 174 10(3)UL (ref 150–450)
POTASSIUM SERPL-SCNC: 3.9 MMOL/L (ref 3.5–5.1)
RBC # BLD AUTO: 4.3 X10(6)UL (ref 3.8–5.8)
SODIUM SERPL-SCNC: 142 MMOL/L (ref 136–145)
WBC # BLD AUTO: 8 X10(3) UL (ref 4–11)

## 2025-08-15 PROCEDURE — 99233 SBSQ HOSP IP/OBS HIGH 50: CPT | Performed by: HOSPITALIST

## 2025-08-15 RX ORDER — SULFAMETHOXAZOLE AND TRIMETHOPRIM 800; 160 MG/1; MG/1
1 TABLET ORAL 2 TIMES DAILY
Qty: 14 TABLET | Refills: 0 | Status: SHIPPED | OUTPATIENT
Start: 2025-08-15 | End: 2025-08-22

## 2025-08-16 LAB
ANION GAP SERPL CALC-SCNC: 9 MMOL/L (ref 0–18)
BUN BLD-MCNC: 13 MG/DL (ref 9–23)
BUN/CREAT SERPL: 10.6 (ref 10–20)
CALCIUM BLD-MCNC: 9.4 MG/DL (ref 8.7–10.4)
CHLORIDE SERPL-SCNC: 104 MMOL/L (ref 98–112)
CO2 SERPL-SCNC: 29 MMOL/L (ref 21–32)
CREAT BLD-MCNC: 1.23 MG/DL (ref 0.7–1.3)
DEPRECATED RDW RBC AUTO: 49.9 FL (ref 35.1–46.3)
EGFRCR SERPLBLD CKD-EPI 2021: 58 ML/MIN/1.73M2 (ref 60–?)
ERYTHROCYTE [DISTWIDTH] IN BLOOD BY AUTOMATED COUNT: 13.9 % (ref 11–15)
GLUCOSE BLD-MCNC: 90 MG/DL (ref 70–99)
GLUCOSE BLDC GLUCOMTR-MCNC: 121 MG/DL (ref 70–99)
GLUCOSE BLDC GLUCOMTR-MCNC: 123 MG/DL (ref 70–99)
GLUCOSE BLDC GLUCOMTR-MCNC: 124 MG/DL (ref 70–99)
GLUCOSE BLDC GLUCOMTR-MCNC: 93 MG/DL (ref 70–99)
HCT VFR BLD AUTO: 41.5 % (ref 39–53)
HGB BLD-MCNC: 12.8 G/DL (ref 13–17.5)
MAGNESIUM SERPL-MCNC: 1.9 MG/DL (ref 1.6–2.6)
MCH RBC QN AUTO: 30 PG (ref 26–34)
MCHC RBC AUTO-ENTMCNC: 30.8 G/DL (ref 31–37)
MCV RBC AUTO: 97.2 FL (ref 80–100)
OSMOLALITY SERPL CALC.SUM OF ELEC: 294 MOSM/KG (ref 275–295)
PLATELET # BLD AUTO: 170 10(3)UL (ref 150–450)
POTASSIUM SERPL-SCNC: 3.7 MMOL/L (ref 3.5–5.1)
RBC # BLD AUTO: 4.27 X10(6)UL (ref 3.8–5.8)
SODIUM SERPL-SCNC: 142 MMOL/L (ref 136–145)
VANCOMYCIN PEAK SERPL-MCNC: 37.5 UG/ML (ref 30–50)
WBC # BLD AUTO: 8.2 X10(3) UL (ref 4–11)

## 2025-08-16 PROCEDURE — 99233 SBSQ HOSP IP/OBS HIGH 50: CPT | Performed by: HOSPITALIST

## 2025-08-17 LAB
ANION GAP SERPL CALC-SCNC: 8 MMOL/L (ref 0–18)
BUN BLD-MCNC: 15 MG/DL (ref 9–23)
BUN/CREAT SERPL: 11.3 (ref 10–20)
CALCIUM BLD-MCNC: 9 MG/DL (ref 8.7–10.4)
CHLORIDE SERPL-SCNC: 107 MMOL/L (ref 98–112)
CO2 SERPL-SCNC: 27 MMOL/L (ref 21–32)
CREAT BLD-MCNC: 1.33 MG/DL (ref 0.7–1.3)
DEPRECATED RDW RBC AUTO: 49.1 FL (ref 35.1–46.3)
EGFRCR SERPLBLD CKD-EPI 2021: 53 ML/MIN/1.73M2 (ref 60–?)
ERYTHROCYTE [DISTWIDTH] IN BLOOD BY AUTOMATED COUNT: 13.9 % (ref 11–15)
GLUCOSE BLD-MCNC: 91 MG/DL (ref 70–99)
GLUCOSE BLDC GLUCOMTR-MCNC: 112 MG/DL (ref 70–99)
GLUCOSE BLDC GLUCOMTR-MCNC: 93 MG/DL (ref 70–99)
GLUCOSE BLDC GLUCOMTR-MCNC: 96 MG/DL (ref 70–99)
GLUCOSE BLDC GLUCOMTR-MCNC: 96 MG/DL (ref 70–99)
HCT VFR BLD AUTO: 37.3 % (ref 39–53)
HGB BLD-MCNC: 11.7 G/DL (ref 13–17.5)
MAGNESIUM SERPL-MCNC: 1.8 MG/DL (ref 1.6–2.6)
MCH RBC QN AUTO: 30.4 PG (ref 26–34)
MCHC RBC AUTO-ENTMCNC: 31.4 G/DL (ref 31–37)
MCV RBC AUTO: 96.9 FL (ref 80–100)
OSMOLALITY SERPL CALC.SUM OF ELEC: 294 MOSM/KG (ref 275–295)
PLATELET # BLD AUTO: 161 10(3)UL (ref 150–450)
POTASSIUM SERPL-SCNC: 3.7 MMOL/L (ref 3.5–5.1)
RBC # BLD AUTO: 3.85 X10(6)UL (ref 3.8–5.8)
SODIUM SERPL-SCNC: 142 MMOL/L (ref 136–145)
VANCOMYCIN TROUGH SERPL-MCNC: 15 UG/ML (ref 10–20)
WBC # BLD AUTO: 9 X10(3) UL (ref 4–11)

## 2025-08-17 PROCEDURE — 99233 SBSQ HOSP IP/OBS HIGH 50: CPT | Performed by: HOSPITALIST

## 2025-08-17 RX ORDER — MAGNESIUM OXIDE 400 MG/1
400 TABLET ORAL ONCE
Status: COMPLETED | OUTPATIENT
Start: 2025-08-17 | End: 2025-08-17

## 2025-08-18 VITALS
TEMPERATURE: 98 F | HEART RATE: 65 BPM | BODY MASS INDEX: 28 KG/M2 | RESPIRATION RATE: 18 BRPM | WEIGHT: 198.5 LBS | DIASTOLIC BLOOD PRESSURE: 64 MMHG | OXYGEN SATURATION: 92 % | SYSTOLIC BLOOD PRESSURE: 152 MMHG

## 2025-08-18 LAB
ANION GAP SERPL CALC-SCNC: 5 MMOL/L (ref 0–18)
BUN BLD-MCNC: 15 MG/DL (ref 9–23)
BUN/CREAT SERPL: 12 (ref 10–20)
CALCIUM BLD-MCNC: 9.5 MG/DL (ref 8.7–10.4)
CHLORIDE SERPL-SCNC: 107 MMOL/L (ref 98–112)
CO2 SERPL-SCNC: 28 MMOL/L (ref 21–32)
CREAT BLD-MCNC: 1.25 MG/DL (ref 0.7–1.3)
DEPRECATED RDW RBC AUTO: 50.5 FL (ref 35.1–46.3)
EGFRCR SERPLBLD CKD-EPI 2021: 57 ML/MIN/1.73M2 (ref 60–?)
ERYTHROCYTE [DISTWIDTH] IN BLOOD BY AUTOMATED COUNT: 14 % (ref 11–15)
GLUCOSE BLD-MCNC: 90 MG/DL (ref 70–99)
GLUCOSE BLDC GLUCOMTR-MCNC: 135 MG/DL (ref 70–99)
GLUCOSE BLDC GLUCOMTR-MCNC: 83 MG/DL (ref 70–99)
HCT VFR BLD AUTO: 38.5 % (ref 39–53)
HGB BLD-MCNC: 12 G/DL (ref 13–17.5)
MAGNESIUM SERPL-MCNC: 1.9 MG/DL (ref 1.6–2.6)
MCH RBC QN AUTO: 30.6 PG (ref 26–34)
MCHC RBC AUTO-ENTMCNC: 31.2 G/DL (ref 31–37)
MCV RBC AUTO: 98.2 FL (ref 80–100)
OSMOLALITY SERPL CALC.SUM OF ELEC: 290 MOSM/KG (ref 275–295)
PLATELET # BLD AUTO: 157 10(3)UL (ref 150–450)
POTASSIUM SERPL-SCNC: 3.9 MMOL/L (ref 3.5–5.1)
RBC # BLD AUTO: 3.92 X10(6)UL (ref 3.8–5.8)
SODIUM SERPL-SCNC: 140 MMOL/L (ref 136–145)
WBC # BLD AUTO: 9.2 X10(3) UL (ref 4–11)

## 2025-08-18 PROCEDURE — 99239 HOSP IP/OBS DSCHRG MGMT >30: CPT | Performed by: HOSPITALIST

## (undated) DIAGNOSIS — Z02.9 ENCOUNTERS FOR UNSPECIFIED ADMINISTRATIVE PURPOSE: ICD-10-CM

## (undated) DIAGNOSIS — J69.0 ASPIRATION PNEUMONIA, UNSPECIFIED ASPIRATION PNEUMONIA TYPE, UNSPECIFIED LATERALITY, UNSPECIFIED PART OF LUNG (HCC): Primary | ICD-10-CM

## (undated) DEVICE — 35 ML SYRINGE REGULAR TIP: Brand: MONOJECT

## (undated) DEVICE — CO2 CANNULA,SSOFT,ADLT,7O2,4CO2,FEMALE: Brand: MEDLINE

## (undated) DEVICE — MASK PROC MASK SOFT WHITE

## (undated) DEVICE — STERILE LATEX POWDER-FREE SURGICAL GLOVESWITH NITRILE COATING: Brand: PROTEXIS

## (undated) DEVICE — SYRINGE 10ML SLIP TIP

## (undated) DEVICE — SINGLE USE BIOPSY VALVE MAJ-210: Brand: SINGLE USE BIOPSY VALVE (STERILE)

## (undated) DEVICE — SYRINGE, LUER SLIP, STERILE, 60ML: Brand: MEDLINE

## (undated) DEVICE — Device: Brand: CUSTOM PROCEDURE KIT

## (undated) DEVICE — KIT CLEAN ENDOKIT 1.1OZ GOWNX2

## (undated) DEVICE — LINE MNTR ADLT SET O2 INTMD

## (undated) DEVICE — SINGLE USE SUCTION VALVE MAJ-209: Brand: SINGLE USE SUCTION VALVE (STERILE)

## (undated) DEVICE — SYRINGE MNJCT 10ML LF STRL REG

## (undated) DEVICE — 6 ML SYRINGE LUER-LOCK TIP: Brand: MONOJECT

## (undated) DEVICE — 3 ML SYRINGE LUER-LOCK TIP: Brand: MONOJECT

## (undated) DEVICE — MEDI-VAC NON-CONDUCTIVE SUCTION TUBING: Brand: CARDINAL HEALTH

## (undated) DEVICE — CONTAINER CLIKSEAL PP 4OZ BLU

## (undated) DEVICE — YANKAUER,BULB TIP,W/O VENT,RIGID,STERILE: Brand: MEDLINE

## (undated) DEVICE — YANKAUER SUCTION INSTRUMENT NO CONTROL VENT, BULB TIP, CLEAR: Brand: YANKAUER

## (undated) DEVICE — Device

## (undated) DEVICE — V2 SPECIMEN COLLECTION MANIFOLD KIT: Brand: NEPTUNE

## (undated) DEVICE — SYRINGE MED 10ML SLIP TIP CLR BRL TAPR PLUNG

## (undated) DEVICE — FORCEPS BX 100CM 1.8MM RJ STD

## (undated) NOTE — LETTER
Hospital Discharge Documentation  Please phone to schedule a hospital follow up appointment.    From: [facility] Hospitalist's Office  Phone: [phone]    Patient discharged time/date: 2024  4:16 PM  Patient discharge disposition:  Home Health Care Services, [facility] home with [facility]       Discharge Summary - D/C Summary        Discharge Summary signed by Ashutosh Gaspar MD at 2024  1:11 PM  Version 1 of 1      Author: Ashutosh Gaspar MD Service: Hospitalist Author Type: Physician    Filed: 2024  1:11 PM Date of Service: 2024  1:08 PM Status: Signed    : Ashutosh Gaspar MD (Physician)         [facility]  Discharge Summary     Cody Fitzgerald  : 1940    Status: Inpatient  Day #: 4    Attending: Ashutosh Gaspar MD  PCP: Bam Hampton MD     Date of Admission:  2024  Date of Discharge:  2024     Hospital Discharge Diagnoses:     Acute Proteus mirabilis UTI  Sepsis secondary to UTI  Acute aspiration pneumonia  Dysphagia  Right lower extremity radiculopathy related to lumbar stenosis  Hypothyroidism  Hypertension  Hyperlipidemia  History of CVA with left-sided weakness      History of Present Illness:     Copied from Admission H&P:    Cody Fitzgerald is a 83 year old male with history of AAA, hypothyroidism, HTN, HLD, frequent falls, previous stroke with left-sided weakness, who was brought into the ED for evaluation of weakness.  Per wife, in the last few days he has been weak and today required maximum assistance to move around.  Today he had a fever of 102 at home.  He has a mild cough, minimally productive.  Vitals with temp 99.7, , respirations 28  CMP stable  CBC with WBC 28.8.  Otherwise stable  Lactic acid 2.6  UA: Positive for nitrites and leuk esterase  EKG with no acute changes  Blood and urine cultures obtained.  Patient has been started on ceftriaxone for UTI.  CXR: No significant change in patchy left retrocardiac opacity which is  concerning for pneumonia/aspiration.     Per wife, he recently completed a course of antibiotics for UTI.  Patient denies shortness of breath, chest pain, nausea or vomiting.      Hospital Course:     UTI  Sepsis with tachycardia, tachypnea, elevated lactic acid, leukocytosis  -Already received 1 L of fluids in the ED.  Normal BP  -stopped IVF  -lactic acid -normalized  -BCX NGTD; Ucx  + ve proteus MDR- on zosyn  -oral abx on discharge     Acute aspiration pneumonia  -ct shows debris in L bronchi with possible postop PNA  -Pt with ho aspiration PNA  -SLP ordered: soft easy chew and thick liquids  -noncompliant with thickened liquids at times   -weaned off O2  -augmentin on discharge     LBP R side radiating to RLE/radicular pattern:  -MRI in 2019 showed multilevel degenerative changes of the lumbar spine, worst at L4-L5, where there is moderate spinal canal stenosis with moderate to severe right worse than left foraminal impingement.   -CT lumbar spine similar - moderate stenosis L4-5  -PT/OT  -tramadol prn     Hypothyroidism  Hypertension  Hyperlipidemia  History of stroke with residual left-sided weakness  -Cont home medications        Physical Exam:   Blood pressure 150/65, pulse 83, temperature 97.7 °F (36.5 °C), temperature source Oral, resp. rate 20, weight 189 lb 12.8 oz (86.1 kg), SpO2 90%.  General:  Alert, no distress  HEENT:  Normocephalic, atraumatic  Cardiac:  Regular rate, regular rhythm  Pulmonary:  Clear to auscultation bilaterally, respirations unlabored  Gastrointestinal:  Soft, non-tender, normal bowel sounds  Musculoskeletal:  No joint swelling  Extremities:  No edema, no cyanosis, no clubbing  Neurologic:  nonfocal  Psychiatric:  Normal affect, calm and appropriate         Discharge Medications        START taking these medications        Instructions Prescription details   traMADol 50 MG Tabs  Commonly known as: Ultram      Take 1 tablet (50 mg total) by mouth every 6 (six) hours as needed.    Quantity: 20 tablet  Refills: 0            CONTINUE taking these medications        Instructions Prescription details   acetaminophen 500 MG Tabs  Commonly known as: Tylenol Extra Strength      Take 2 tablets (1,000 mg total) by mouth in the morning and 2 tablets (1,000 mg total) before bedtime. Also may have 500 mg PO Q6 prn but not to exceed 3 gm total of acetaminophen in 24 hours.   Refills: 0     amLODIPine 5 MG Tabs  Commonly known as: Norvasc      TAKE 1 TABLET(5 MG) BY MOUTH DAILY   Quantity: 90 tablet  Refills: 1     amoxicillin clavulanate 875-125 MG Tabs  Commonly known as: Augmentin      Take 1 tablet by mouth 2 (two) times daily for 4 days.   Stop taking on: June 17, 2024  Quantity: 8 tablet  Refills: 0     atorvastatin 40 MG Tabs  Commonly known as: Lipitor      TAKE 1 TABLET(40 MG) BY MOUTH NIGHTLY   Quantity: 90 tablet  Refills: 3     benzonatate 200 MG Caps  Commonly known as: Tessalon      Take 1 capsule (200 mg total) by mouth 3 (three) times daily as needed for cough.   Quantity: 60 capsule  Refills: 1     calcium carbonate 500 MG Chew  Commonly known as: Tums      Chew 1 tablet (500 mg total) by mouth 3 (three) times daily as needed.   Refills: 0     clopidogrel 75 MG Tabs  Commonly known as: Plavix      TAKE 1 TABLET(75 MG) BY MOUTH DAILY   Quantity: 90 tablet  Refills: 3     ferrous sulfate 325 (65 FE) MG Tbec      Take 1 tablet (325 mg total) by mouth daily with breakfast.   Quantity: 30 tablet  Refills: 0     fluticasone furoate-vilanterol 100-25 MCG/ACT Aepb  Commonly known as: Breo Ellipta      Inhale 1 puff into the lungs daily.   Refills: 0     gabapentin 300 MG Caps  Commonly known as: Neurontin      Take 1 capsule (300 mg total) by mouth 3 (three) times daily.   Quantity: 270 capsule  Refills: 3     ipratropium-albuterol 0.5-2.5 (3) MG/3ML Soln  Commonly known as: Duoneb      Take 3 mL by nebulization in the morning, at noon, and at bedtime.   Quantity: 3 mL  Refills: 0      levothyroxine 100 MCG Tabs  Commonly known as: Synthroid      TAKE 1 TABLET(100 MCG) BY MOUTH DAILY   Quantity: 90 tablet  Refills: 3     lidocaine 4 % Ptch  Commonly known as: LIDODERM      Place 2 patches onto the skin daily. L Shoulder  &L hip as needed   Refills: 0     pantoprazole 40 MG Tbec  Commonly known as: Protonix      Take 1 tablet (40 mg total) by mouth every morning before breakfast.   Refills: 0     Polyethylene Glycol 3350 17 g Pack  Commonly known as: MIRALAX      Take 17 g by mouth daily.   Refills: 0     Vitamin D 50 MCG (2000 UT) Caps      Take 1 capsule (2,000 Units total) by mouth daily.   Refills: 0               Where to Get Your Medications        These medications were sent to [id number] - [place], [place] - [address] AT [facility], [phone], [phone]  [address] [place] [address]      Phone: [phone]   amoxicillin clavulanate 875-125 MG Tabs  traMADol 50 MG Tabs           Hospital Discharge Diagnoses:  acute proteus UTI with sepsis    Lace+ Score: 84  59-90 High Risk  29-58 Medium Risk  0-28   Low Risk.    TCM Follow-Up Recommendation:  LACE > 58: High Risk of readmission after discharge from the hospital.        I spent >30 minutes on this discharge. Discussed treatment and discharge plans.       Ashutosh Gaspar MD      Electronically signed by Ashutosh Gaspar MD on 6/13/2024  1:11 PM

## (undated) NOTE — LETTER
Adrian Dub 38   Date:   8/15/2019     Name:   María Stark    YOB: 1940   MRN:   [id number]       [facility]? [place] the areas on your body where you feel the described sensations.   Use the appropriate sym

## (undated) NOTE — LETTER
Hospital Discharge Documentation  Patient was discharged to [facility]  [phone] .    From: [facility] Hospitalist's Office  Phone: [phone]    Patient discharged time/date: 2024  3:02 PM  Patient discharge disposition:  SNF Subacute Rehab    D/C Summary    No notes of this type exist for this encounter.       [facility]  part of [facility]     Progress Note           Cody Fitzgerald Patient Status:  Inpatient    1940 MRN [id number]   Location [facility]5W Attending India Still MD   Hosp Day # 3 PCP Bam Hampton MD      Chief Complaint:       Chief Complaint   Patient presents with    Weakness         Subjective:   Cody Fitzgerald is doing better. Up to chair off oxygen. Says he always coughs but it is chronic. No SOB. No F/C no CP   Per RN - BG this AM on labs 300's   Objective:      Objective:  Blood pressure 124/52, pulse 84, temperature 97.3 °F (36.3 °C), temperature source Oral, resp. rate 19, height 5' 10\" (1.778 m), weight 197 lb 6.4 oz (89.5 kg), SpO2 90%.     Physical Exam:    General: No acute distress.   Respiratory: Diminished L base   Cardiovascular: S1, S2. Regular rate and rhythm. No murmurs, rubs or gallops.   Abdomen: Soft, nontender, nondistended.  Positive bowel sounds. No rebound or guarding.  Neurologic: Chronic L weakness from prev CVA  Extremities: No edema.        Results:         Results:  Labs:        Recent Labs   Lab 04/14/24  1816 04/15/24  0847 24  0523   WBC 18.0* 10.9 10.5   HGB 13.7 11.8* 11.4*   MCV 92.9 95.1 95.0   .0 151.0 205.0   INR 0.99  --   --                 Recent Labs   Lab 248 04/15/24  0847 24  0523   * 118* 101* 316*   BUN 18 16 14 18   CREATSERUM 1.29 1.15 1.04 1.26   CA 9.9 9.3 9.0 8.4*   ALB 4.5 3.9  --   --     140 144 142   K 3.9 3.8 3.7 3.5    111 113* 111   CO2 23.0 23.0 25.0 21.0   ALKPHO 129* 100  --   --    AST 18 10  --   --     ALT <7* <7*  --   --    BILT 0.5 0.8  --   --    TP 8.2 7.0  --   --          Estimated Creatinine Clearance: 46.7 mL/min (based on SCr of 1.26 mg/dL).         Recent Labs   Lab 04/14/24  1816   PTP 13.7   INR 0.99            Culture:        Hospital Encounter on 04/14/24   1. Blood Culture     Status: None (Preliminary result)     Collection Time: 04/14/24  6:16 PM     Specimen: Blood,peripheral   Result Value Ref Range     Blood Culture Result No Growth 2 Days N/A         Cardiac  No results for input(s): \"TROP\", \"PBNP\" in the last 168 hours.        Imaging: Imaging data reviewed in [facility].  XR CHEST AP PORTABLE  (CPT=71045)     Result Date: 4/16/2024  CONCLUSION:  Unchanged left retrocardiac opacity.  Minimal hazy right basilar opacity, unchanged.  This may represent additional pneumonia or atelectasis.  Suspected trace bilateral pleural effusions.    Dictated by (CST): Mague Hernandez MD on 4/16/2024 at 4:14 PM     Finalized by (CST): Mague Hernandez MD on 4/16/2024 at 4:15 PM            Medications:   Scheduled Medications    insulin aspart  1-7 Units Subcutaneous TID CC    ipratropium-albuterol  3 mL Nebulization Q6H WA    mupirocin  1 Application Each Nare BID    acetaminophen  1,000 mg Oral BID    amLODIPine  5 mg Oral Daily    atorvastatin  40 mg Oral Nightly    cholecalciferol  2,000 Units Oral Daily    clopidogrel  75 mg Oral Daily    ferrous sulfate  325 mg Oral Daily with breakfast    gabapentin  300 mg Oral TID    levothyroxine  100 mcg Oral Daily    enoxaparin  40 mg Subcutaneous Daily    piperacillin-tazobactam  3.375 g Intravenous Q8H    remdesivir  100 mg Intravenous Q24H    fluticasone furoate-vilanterol  1 puff Inhalation Q24H               Assessment and Plan:         Assessment & Plan:  Acute respiratory failure with hypoxia  Left lung pneumonia, R/o aspiration  - CXR patchy L basilar opacities. R base atelectasis.   - Lactic acid normal.   - IV Zosyn and azithromycin day #3.   - WBC 18 on  admit now normal.   - duonebs, IS/Flutter valve.   - blood cx x 2 NGTD, MRSA nares +   - off o2.   - SLP - modified diet.   - pulm following  - no further fevers.   - Clinically better.      Hyperglycemia  - no h/o DM. Prob from decadron  - stop steroids  - ISS  - A1c      COVID-positive - old infection  -Had COVID infection at [facility] 3/2024.   - was started on Remdesivir and decadron will stop as not an active infection.   - no need for isolation.   - IS.      Generalized weakness due to above  - PT/OT - Agreeable to [facility].      Other issues   AAA,  Hypothyroidism,  HTN,  HLD,  OA,  CVA     To [facility] tomorrow      >55min spent, >50% spent counseling and coordinating care in the form of educating pt/family and d/w consultants and staff. Most of the time spent discussing the above plan.         Plan of care discussed with patient or family at bedside.     India Still MD  Hospitalist                  Supplementary Documentation:  Quality:  DVT Prophylaxis: lovenox  CODE status: DNR  Dispo: per clinical course              Estimated date of discharge: TBD  Discharge is dependent on: clinical stability  At this point Mr. Fitzgerald is expected to be discharge to: [facility]                           Electronically signed by India Still MD at 4/17/2024 10:45 AM

## (undated) NOTE — LETTER
AUTHORIZATION FOR SURGICAL OPERATION OR OTHER PROCEDURE    1. I hereby authorize Dr Zhao Quiñones and the [facility] staff assigned to my case to perform the following operation and/or procedure at the [facility]:     Left knee injection -  bilateral          2. My physician has explained the nature and purpose of the operation or other procedure, possible alternative methods of treatment, the risks involved, and the possibility of complication to me. I acknowledge that no guarantee has been made as to the result that may be obtained. 3.  I recognize that, during the course of this operation, or other procedure, unforseen conditions may necessitate additional or different procedure than those listed above. I, therefore, further authorize and request that the above named physician, his/her physician assistants or designees perform such procedures as are, in his/her professional opinion, necessary and desirable. 4.  Any tissue or organs removed in the operation or other procedure may be disposed of by and at the discretion of the [facility] staff and [facility] AT [facility]. 5.  I understand that in the event of a medical emergency, I will be transported by local paramedics to [facility] or other hospital emergency department. 6.  I certify that I have read and fully understand the above consent to operation and/or other procedure. 7.  I acknowledge that my physician has explained sedation/analgesia administration to me including the risks and benefits. I consent to the administration of sedation/analgesia as may be necessary or desirable in the judgement of my physician. Witness signature: ___________________________________________________ Date:  ______/______/_____                    Time:  ________ A. M.  P.M.        Patient Name:  Reid Collado  6/24/1940  [id number]  ______________________________________________________  (please print)       Patient signature: ___________________________________________________             Relationship to Patient:           []  Parent    Responsible person                          []  Spouse  In case of minor or                    [] Other  _____________   Incompetent name:  __________________________________________________                               (please print)      _____________      Responsible person  In case of minor or  Incompetent signature:  _______________________________________________    Statement of Physician  My signature below affirms that prior to the time of the procedure, I have explained to the patient and/or his/her guardian, the risks and benefits involved in the proposed treatment and any reasonable alternative to the proposed treatment. I have also explained the risks and benefits involved in the refusal of the proposed treatment and have answered the patient's questions.                         Date:  ______/______/_______  Provider                      Signature:  __________________________________________________________       Time:  ___________ A.M    P.M.

## (undated) NOTE — LETTER
Hospital Discharge Documentation  Please phone to schedule a hospital follow up appointment.     From: [address] Hospitalist's Office  Phone: [phone]    Patient discharged time/date: 4/13/2018 12:10 PM  Patient discharge disposition:  Home or Self from prior hospitalization. I have asked him to follow-up with his primary care physician regarding this and possible referral to pulmonology on a nonurgent basis.   Patient and wife understand to return to the emergency room for increased chest pain fever Refills:  0     AmLODIPine Besylate 5 MG Tabs  Commonly known as:  NORVASC      Take 5 mg by mouth daily. Refills:  0     atorvastatin 40 MG Tabs  Commonly known as:  LIPITOR      Take 40 mg by mouth nightly.    Refills:  0     Clopidogrel Bisulfate 76 M DISCHARGE SUMMARY[WW.1]     Charlee Fitzgerald[WW.2] Patient Status:  Inpatient    1940 MRN [id number]   [facility] 2W/SW Attending No att. providers found   Hosp Day #[WW.1] 1[WW.2] PCP[WW.1] Keyshawn Jensen MD[WW.2]     DATE OF ADM MS: No joint effusions. No peripheral edema. Skin: Skin is warm and dry. No rashes, erythema, diaphoresis. Psych: Normal mood and affect.  Behavior and judgment normal.     DISCHARGE MEDICATIONS[WW.1]     Discharge Medications      START taking these me [address], [place]  [phone] [id number]      4/25/18 @ 1130[WW.2]    The above plan and follow-up instructions were reviewed with the patient and they verbalized understandin

## (undated) NOTE — LETTER
[facility], [place]  Authorization for Invasive Procedures  1.  I hereby authorize Dr. Adenike Kelly and Dr. Cally Cramer , my physician and whomever may be designated as the doctor's assistant, to perform the following operation and/or procedu 5. I consent to the photographing of the operations or procedures to be performed for the purposes of advancing medicine, science, and/or education, provided my identity is not revealed.  If the procedure has been videotaped, the physician/surgeon will obta __________ Time: ___________    Statement of Physician  My signature below affirms that prior to the time of the procedure, I have explained to the patient and/or his legal representative, the risks and benefits involved in the proposed treatment and any r

## (undated) NOTE — LETTER
[address] [address]   Date:   4/5/2021     Name:   Melvina Powell    YOB: 1940   MRN:   [id number]       [facility]? Randall the areas on your body where you feel the described sensations.   Us

## (undated) NOTE — IP AVS SNAPSHOT
[facility]            (For Outpatient Use Only) Initial Admit Date: 2022   Inpt/Obs Admit Date: Inpt: 22 / Obs: N/A   Discharge Date:    Flory Redmond:  [de-identified]   MRN: [de-identified]   CSN: [id number]   CEID: [id number]  Patient Class: Inpatient Admitting Provider: Jillian Prakash MD Unit: [address] [place] [address]/   Hospital Service: Cardiac Telemetry Attending Provider: Shashi Chua MD   Bed: 342-A   Visit Type:   Referring Physician: No ref. provider found Billing Flag:    Admit Diagnosis: Contusion of left hip, initial encounter [S70.02XA]      PATIENT  Legal Name:   Isrrael Cosby   Legal Sex: Male  Gender ID:              [address] [place] [address],[address] Name:    PCP:  Araceli Graham MD Home: [phone]   Address:  [address] [place] [address] : 1940 (82 yrs) Mobile: [phone]         City/State/[place]: [place] [address] Marital:  Language: Usman Lovelace: Ashanti SSN4: [id number] Islam: Hinduism - No Visit     Race: White Ethnicity: Non  Or [address] CONTACT   Name Relationship Legal Guardian? Home Phone Work Phone Mobile Phone   1. Meme Fitzgerald  2.  Jacquelyn Velazquez  Son    [phone]       GUARANTOR  Guarantor: Afua Orozco : 1940 Home Phone: [phone]   Address: [address] [place] [address]  Sex: Male Work Phone:    City/State/Zip: [place] [address] [address]bi [address]   Rel. to Patient: Self Guarantor ID: [id number]   GUARANTOR EMPLOYER   Employer:  Status: RETIRED     COVERAGE  PRIMARY INSURANCE   Payor: MEDICARE Plan: MEDICARE PART A&B   Group Number: [id number] Insurance Type: [id number] Name: Anisa Velazquez : 1940   Subscriber ID: [id number] Pt Rel to Subscriber: Self   SECONDARY INSURANCE   Payor: [facility] [address]: [address]   Group Number: [id number] Insurance Type: [address] Name: Anisa Velazquez : 1940   Subscriber ID: [id number] Pt Rel to Subscriber: SELF   TERTIARY INSURANCE   Payor:  Plan: Group Number:  Insurance Type:    Subscriber Name:  Subscriber :    Subscriber ID:  Pt Rel to Subscriber:    Hospital Account Financial Class: Medicare    2022

## (undated) NOTE — LETTER
Hospital Discharge Documentation  Patient was discharged to [facility] [phone]    From: [facility] Hospitalist's Office  Phone: [phone]    Patient discharged time/date: 2024  3:37 PM  Patient discharge disposition:  SNF Subacute Rehab    D/C Summary    No notes of this type exist for this encounter.       [facility]     Hospitalist Progress Note            Cody Fitzgerald Patient Status:  Inpatient    1940 MRN [id number]   Location [facility]5W Attending Ana La MD   Hosp Day # 5 PCP Bam Hampton MD         Subjective:   Patient resting comfortably and in NAD. Denied any active complaints at the time of interview.   No acute overnight events reported by the nursing staff.  Currently saturating well on 4L NC.   Hopeful about discharging soon.      Plan of care discussed with wife over the phone.              Objective:   Review of Systems:   ROS completed; pertinent positive and negatives stated in subjective.        Vital signs:  Temp:  [97.3 °F (36.3 °C)-98.4 °F (36.9 °C)] 97.3 °F (36.3 °C)  Pulse:  [63-79] 69  Resp:  [16-18] 16  BP: (132-153)/(66-77) 132/66  SpO2:  [90 %-96 %] 93 %        Physical Exam:    Gen: NAD AO x3  Chest: good air entry, CTABL  CVS: normal s1 and s2 RR  Abd: NABS soft NT ND  Neuro: CN 2-12 grossly intact  Ext: no edema in bilateral LE        Diagnostic Data:    Labs:          Recent Labs   Lab 248 24  0507 24  0519 01/15/24  0548 24  0443   WBC 12.9* 9.9 9.9 8.6 8.5   HGB 10.8* 10.5* 10.7* 10.8* 11.1*   MCV 96.0 94.8 94.5 93.6 93.0   .0 217.0 227.0 213.0 232.0               Recent Labs   Lab 24  0519 01/15/24  0548 24  0443   GLU 90 90 89   BUN 12 12 13   CREATSERUM 1.12 1.12 1.24   CA 9.1 8.9 9.2   ALB 3.7 3.5 3.7   * 141 140   K 3.5 3.7 3.5   * 109 106   CO2 24.0 27.0 28.0   ALKPHO 91 86 92   AST 15 12 14   ALT <7* <7* <7*   BILT 0.2 0.3 0.2   TP 7.2 7.2 7.5          Estimated Creatinine Clearance: 47.4 mL/min (based on SCr of 1.24 mg/dL).     No results for input(s): \"PTP\", \"INR\" in the last 168 hours.     Imaging: Imaging data reviewed in Epic.     Medications:    docusate sodium  100 mg Oral BID    heparin  5,000 Units Subcutaneous 2 times per day    amLODIPine  5 mg Oral Daily    atorvastatin  40 mg Oral Nightly    clopidogrel  75 mg Oral Daily    gabapentin  300 mg Oral TID    levothyroxine  100 mcg Oral Daily @ 0700               Assessment & Plan:   Acute hypoxic respiratory failure due to sepsis likely 2/2 CAP and/or aspiration PNA  CXR reviewed  LA improved  Strep pneumo and Legionella Ag negative  Bcx showing NGTD  Started on IV Azithromycin x3 days, IV Unasyn - deescalate as indicated - day 5/5 of abx  Passed bedside swallow evaluation  SLP do not recommend a VFSS  Continue IS, chest PT  Generalized weakness  PT/OT recommending [facility]AS  Awaiting placement  CM/SW on board  Hypokalemia  Replaced  Continue to monitor  CKD 2-3  Renal function at baseline  Monitor labs  HTN  BP well controlled  Continue home meds  Hydralazine and Labetalol PRN  Hypothyroidism  Continue home meds  Hx of CVA  Continue home meds  Disposition  [facility] placement  CM/SW on board        Plan of care discussed with patient or family at bedside.              Supplementary Documentation:   Quality:  DVT Prophylaxis: Heparin s/c  CODE status: Full  Lao: No  Central line: No        Estimated date of discharge: TBD  Discharge is dependent on: clinical stability  At this point Mr. Fitzgerald is expected to be discharge to: home        **Certification        PHYSICIAN Certification of Need for Inpatient Hospitalization - Initial Certification     Patient will require inpatient services that will reasonably be expected to span two midnight's based on the clinical documentation in H+P.   Based on patients current state of illness, I anticipate that, after discharge, patient will require TBD.        Ana  MD Abhinav  Hospitalist     MDM: High, I personally reviewed the available laboratories, imaging including CXR. I discussed the case with RN. I ordered laboratories, studies including AM labs.   Medical decision making high, risk is high.         The 21st Century Cures Act makes medical notes like these available to patients in the interest of transparency. Please be advised this is a medical document. Medical documents are intended to carry relevant information, facts as evident, and the clinical opinion of the practitioner. The medical note is intended as peer to peer communication and may appear blunt or direct. It is written in medical language and may contain abbreviations or verbiage that are unfamiliar.                Electronically signed by Ana La MD at 1/16/2024 10:06 AM

## (undated) NOTE — IP AVS SNAPSHOT
Patient Demographics     Address  [facility] [address]  [facility] [address] Phone  [phone] (Home) *Preferred*  [phone] (Mobile) E-mail Address  [email or web address]      Patient Contacts     Name Relation Home Work Mobile    Meme Fitzgerald Spouse [phone]    SHAYNA Fitzgerald [phone]        Allergies as of 4/18/2024  Review status set to Review Complete on 4/14/2024   No Known Allergies     Code Status Information     Code Status    DNAR/Comfort Care        Patient Instructions       Complete course of antibiotics  Continue nebulizers at rehab.   Cont spirometer and flutter at rehab.   Repeat CXR in 4-6 weeks.      Follow-up Information     Bam Hampton MD Follow up in 2 week(s).    Specialty: Internal Medicine  Contact information:  [address] [facility] [address] [phone]             Ivelisse Law, DO Follow up in 3 week(s).    Specialty: PULMONARY DISEASES  Contact information:  [address] [facility] [address]  [facility] [id number] [phone]                        Your Home Meds List      TAKE these medications       Instructions Authorizing Provider Morning Afternoon Evening As Needed   acetaminophen 500 MG Tabs  Commonly known as: Tylenol Extra Strength  Next dose due: Tonight      Take 2 tablets (1,000 mg total) by mouth in the morning and 2 tablets (1,000 mg total) before bedtime. Also may have 500 mg PO Q6 prn but not to exceed 3 gm total of acetaminophen in 24 hours.          amLODIPine 5 MG Tabs  Commonly known as: Norvasc  Next dose due: Tomorrow      Take 1 tablet (5 mg total) by mouth daily.   Bam Hampton         amoxicillin clavulanate 875-125 MG Tabs  Commonly known as: Augmentin  Next dose due: Tonight      Take 1 tablet by mouth 2 (two) times daily for 4 days.  Stop taking on: April 22, 2024   Juanito Still          atorvastatin 40 MG Tabs  Commonly known as: Lipitor  Next dose due: Tonight      TAKE 1 TABLET(40 MG) BY MOUTH NIGHTLY   Bam  Memo         benzonatate 200 MG Caps  Commonly known as: Tessalon  Next dose due: As needed      Take 1 capsule (200 mg total) by mouth 3 (three) times daily as needed for cough.   Mark Chris         calcium carbonate 500 MG Chew  Commonly known as: Tums  Next dose due: As needed      Chew 1 tablet (500 mg total) by mouth 3 (three) times daily as needed.   Juanito Still          clopidogrel 75 MG Tabs  Commonly known as: Plavix  Next dose due: Tomorrow      TAKE 1 TABLET(75 MG) BY MOUTH DAILY   Bam Memo         ferrous sulfate 325 (65 FE) MG Tbec  Next dose due: Tomorrow      Take 1 tablet (325 mg total) by mouth daily with breakfast.   Sha Lira         fluticasone furoate-vilanterol 100-25 MCG/ACT Aepb  Commonly known as: Breo Ellipta  Start taking on: April 19, 2024  Next dose due: Tomorrow      Inhale 1 puff into the lungs daily.   Juanito Still          gabapentin 300 MG Caps  Commonly known as: Neurontin  Next dose due: This evening      Take 1 capsule (300 mg total) by mouth 3 (three) times daily.   Bam Hampton         ipratropium-albuterol 0.5-2.5 (3) MG/3ML Soln  Commonly known as: Duoneb  Next dose due: Tonight      Take 3 mL by nebulization in the morning, at noon, and at bedtime.   Juanito Still          levothyroxine 100 MCG Tabs  Commonly known as: Synthroid  Next dose due: Tomorrow      TAKE 1 TABLET(100 MCG) BY MOUTH DAILY   Bam Hampton         lidocaine 4 % Ptch  Commonly known as: LIDODERM  Next dose due: As needed      Place 2 patches onto the skin daily. L Shoulder  &L hip as needed          Vitamin D 50 MCG (2000 UT) Caps  Next dose due: Tomorrow      Take 1 capsule (2,000 Units total) by mouth daily.                Where to Get Your Medications      These medications were sent to [id number] - [place], [place] - [address] AT [facility], [phone], [phone]  [address] [facility] [address]    Phone: [phone]   amoxicillin  clavulanate 875-125 MG Tabs           [id number] - MAR ACTION REPORT  (last 48 hrs)    ** SITE UNKNOWN **     Order ID Medication Name Action Time Action Reason Comments    [id number] acetaminophen (Tylenol Extra Strength) tab 1,000 mg 04/16/24 2118 Given      [id number] acetaminophen (Tylenol Extra Strength) tab 1,000 mg 04/17/24 0900 Given      [id number] acetaminophen (Tylenol Extra Strength) tab 1,000 mg 04/17/24 2022 Given      [id number] acetaminophen (Tylenol Extra Strength) tab 1,000 mg 04/18/24 0900 Given      [id number] acetaminophen (Tylenol Extra Strength) tab 500 mg 04/17/24 0536 Given      [id number] amLODIPine (Norvasc) tab 5 mg 04/17/24 0900 Given      [id number] amLODIPine (Norvasc) tab 5 mg 04/18/24 0901 Given      [id number] atorvastatin (Lipitor) tab 40 mg 04/16/24 2118 Given      [id number] atorvastatin (Lipitor) tab 40 mg 04/17/24 2022 Given      [id number] azithromycin (Zithromax) tab 500 mg 04/16/24 2118 Given      [id number] calcium carbonate (Tums) chewable tab 500 mg 04/18/24 0957 Given      [id number] cholecalciferol (Vitamin D3) tab 2,000 Units 04/17/24 0900 Given      [id number] cholecalciferol (Vitamin D3) tab 2,000 Units 04/18/24 0900 Given      [id number] clopidogrel (Plavix) tab 75 mg 04/17/24 0900 Given      [id number] clopidogrel (Plavix) tab 75 mg 04/18/24 0901 Given      [id number] ferrous sulfate DR tab 325 mg 04/17/24 0900 Given      [id number] ferrous sulfate DR tab 325 mg 04/18/24 0900 Given      [id number] gabapentin (Neurontin) cap 300 mg 04/16/24 1607 Given      [id number] gabapentin (Neurontin) cap 300 mg 04/16/24 2118 Given      [id number] gabapentin (Neurontin) cap 300 mg 04/17/24 0900 Given      [id number] gabapentin (Neurontin) cap 300 mg 04/17/24 1626 Given      [id number] gabapentin (Neurontin) cap 300 mg 04/17/24 2022 Given      [id number] gabapentin (Neurontin) cap 300 mg 04/18/24 0901 Given      [id number] ipratropium-albuterol (Duoneb) 0.5-2.5 (3) MG/3ML inhalation solution 3 mL  04/17/24 1741 Given      [id number] levothyroxine (Synthroid) tab 100 mcg 04/17/24 0532 Given      [id number] levothyroxine (Synthroid) tab 100 mcg 04/18/24 0549 Given      [id number] mupirocin (Bactroban) 2% nasal ointment 1 Application 04/16/24 2118 Given      [id number] mupirocin (Bactroban) 2% nasal ointment 1 Application 04/17/24 0900 Given      [id number] mupirocin (Bactroban) 2% nasal ointment 1 Application 04/17/24 2024 Given      [id number] mupirocin (Bactroban) 2% nasal ointment 1 Application 04/18/24 0901 Given      [id number] piperacillin-tazobactam (Zosyn) 3.375 g in dextrose 5% 100 mL IVPB-ADDV 04/16/24 1131 New Bag      [id number] piperacillin-tazobactam (Zosyn) 3.375 g in dextrose 5% 100 mL IVPB-ADDV 04/16/24 1807 New Bag      [id number] piperacillin-tazobactam (Zosyn) 3.375 g in dextrose 5% 100 mL IVPB-ADDV 04/17/24 0241 New Bag      [id number] piperacillin-tazobactam (Zosyn) 3.375 g in dextrose 5% 100 mL IVPB-ADDV 04/17/24 1108 New Bag      [id number] piperacillin-tazobactam (Zosyn) 3.375 g in dextrose 5% 100 mL IVPB-ADDV 04/17/24 1830 New Bag      [id number] piperacillin-tazobactam (Zosyn) 3.375 g in dextrose 5% 100 mL IVPB-ADDV 04/18/24 0202 New Bag            LEFT LOWER ABDOMEN     Order ID Medication Name Action Time Action Reason Comments    [id number] enoxaparin (Lovenox) 40 MG/0.4ML SUBQ injection 40 mg 04/17/24 0930 Given      [id number] enoxaparin (Lovenox) 40 MG/0.4ML SUBQ injection 40 mg 04/18/24 0902 Given            RIGHT UPPER ARM     Order ID Medication Name Action Time Action Reason Comments    [id number] insulin aspart (NovoLOG) 100 Units/mL FlexPen 1-7 Units 04/17/24 1227 Given      [id number] insulin aspart (NovoLOG) 100 Units/mL FlexPen 1-7 Units 04/17/24 1707 Given              Recent Vital Signs    Flowsheet Row Most Recent Value   /72 Filed at 04/18/2024 0839   Pulse 61 Filed at 04/18/2024 0839   Resp 20 Filed at 04/18/2024 0839   Temp 97.1 °F (36.2 °C) Filed at 04/18/2024 0839   SpO2  92 % Filed at 04/18/2024 0839      Patient's Most Recent Weight    Flowsheet Row Most Recent Value   Patient Weight 89.5 kg (197 lb 6.4 oz)         Lab Results Last 24 Hours      Hemoglobin A1C [[id number]] (Abnormal)  Resulted: 04/17/24 1555, Result status: Final result   Ordering provider: India Still MD  04/17/24 1016 Resulting lab: [facility] ([facility])    Specimen Information    Type Source Collected On   Blood — 04/17/24 0523          Components    Component Value Reference Range Flag Lab   HgbA1C 6.1 <5.7 % [facility] ([facility])   Comment:         Normal HbA1C:     <5.7%      Pre-Diabetic:     5.7 - 6.4%      Diabetic:         >6.4%      Diabetic Control: <7.0%       Estimated Average Glucose 128 68 - 126 mg/dL H [facility] ([facility])   Comment:        eAG is the estimated average glucose calculated from Hgb A1c according to the formula recommended by the American Diabetes Association. eAG levels reflect the long term average glucose and may not correlate with random or fasting glucose levels since these represent specific points in time.                   Testing Performed By     Lab - Abbreviation Name Director Address Valid Date Range    162 - [facility] ([facility]) [facility] ([facility]) [facility] [address] 03/19/20 1442 - Present            Microbiology Results (All)     Procedure Component Value Units Date/Time    Blood Culture [[id number]] Collected: 04/14/24 1816    Order Status: Completed Lab Status: Preliminary result Updated: 04/17/24 2000    Specimen: Blood,peripheral      Blood Culture Result No Growth 3 Days    Blood Culture [[id number]] Collected: 04/14/24 1816    Order Status: Completed Lab Status: Preliminary result Updated: 04/17/24 2000    Specimen: Blood,peripheral      Blood Culture Result No Growth 3 Days    Emergency MRSA Screen by PCR [[id number]]  (Abnormal) Collected: 04/15/24 0205    Order Status:  Completed Lab Status: Final result Updated: 04/15/24 0326    Specimen: Other from Nares      MRSA Screen By PCR Positive    Narrative:      A positive result does not necessarily indicate the presence of viable organisms. For confirmation, epidemiological typing and susceptibility testing, appropriate culture is needed.    PLEASE REFER TO MRSA SCREENING PROTOCOL FOR TREATMENT.      SARS-CoV-2/Flu A and B/RSV by PCR (GeneXpert) [[id number]]  (Abnormal) Collected: 04/14/24 1816    Order Status: Completed Lab Status: Final result Updated: 04/14/24 1909    Specimen: Other from Nares      SARS-CoV-2 (COVID-19) - (GeneXpert) Detected     Influenza A by PCR Negative     Influenza B by PCR Negative     RSV by PCR Negative    Narrative:      This test is intended for the qualitative detection and differentiation of SARS-CoV-2, influenza A, influenza B, and respiratory syncytial virus (RSV) viral RNA in nasopharyngeal or nares swabs from individuals suspected of respiratory viral infection consistent with COVID-19 by their healthcare provider. Signs and symptoms of respiratory viral infection due to SARS-CoV-2, influenza, and RSV can be similar.    Test performed using the Xpert Xpress SARS-CoV-2/FLU/RSV (real time RT-PCR)  assay on the GeneXpert instrument, [employer], [place] [address].   This test is being used under the Food and Drug Administration's Emergency Use Authorization.    The authorized Fact Sheet for Healthcare Providers for this assay is available upon request from the laboratory.      Pending Labs     Order Current Status    Blood Culture Preliminary result    Blood Culture Preliminary result         H&P - H&P Note      H&P signed by Adriana Villavicencio MD at 4/14/2024 10:19 PM  Version 1 of 1    Author: Adriana Villavicencio MD Service: — Author Type: Physician    Filed: 4/14/2024 10:19 PM Date of Service: 4/14/2024  8:15 PM Status: Signed    : Adriana Villavicencio MD (Physician)         History and Physical      Cody Fitzgerald Patient Status:  Emergency    1940 MRN [id number]   Location [facility] EMERGENCY DEPARTMENT Attending Bam Chan*   Hosp Day # 0 PCP Bam Hampton MD       Chief Complaint: Generalized weakness, chronic cough    Subjective:    Cody Fitzgerald is a 83 year old male with history of AAA, hypothyroidism, HTN, HLD, OA, CVA who presented to the ED with complaints of generalized weakness and pain.  He denied any chest pain or shortness of breath but noted a chronic cough which is nonproductive.  No fever.  Due to OA, he has chronic joint pains and was on a trial of steroids which he completed last week.  No nausea, vomiting, diarrhea, headaches or dizziness.  He does have residual left-sided weakness from previous stroke as well as chronic neck and back pain.  Wife notes that he occasionally coughs when he swallows, she cuts his food into small pieces.    Due to progressive weakness, EMT was called.  On arrival patient was noted to be febrile at 103.  At presentation he was noted with temp 101.8, heart rate 118, respirations 29 with O2 saturations of 88% on room air.  Significant labs with WBC 18.0, lactic acid 2.4    GENexpert: COVID-positive.  CXR: Patchy retrocardiac/left basilar opacities are new or more conspicuous findings suspicious for pneumonia/aspiration.  He has been given IV fluids and started on antibiotics per sepsis protocol    Pulmonology on consult  He will need admission for ongoing treatment.    Of note, patient was recently in [facility] following hospitalization 3 months ago for pneumonia.  At the time passed bedside swallow evaluation and did not require VFSS.  Per wife, just prior to discharge from [facility], he had COVID.  About a month ago.      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 - Dr. Stahl    Tonsillectomy      at age 24       Social History:  reports that he quit smoking about 16 years ago. His smoking use included cigarettes. He started smoking about 66 years ago. He has a 100 pack-year smoking history. He has never used smokeless tobacco. He reports that he does not drink alcohol and does not use drugs.    Family History:   Family History   Problem Relation Age of Onset    Cancer Father         Lung        Allergies: No Known Allergies    Medications:    No current facility-administered medications on file prior to encounter.     Current Outpatient Medications on File Prior to Encounter   Medication Sig Dispense Refill    methylPREDNISolone (MEDROL) 4 MG Oral Tablet Therapy Pack As directed. 1 each 0    ATORVASTATIN 40 MG Oral Tab TAKE 1 TABLET(40 MG) BY MOUTH NIGHTLY 90 tablet 3    CLOPIDOGREL 75 MG Oral Tab TAKE 1 TABLET(75 MG) BY MOUTH DAILY 90 tablet 3    ferrous sulfate 325 (65 FE) MG Oral Tab EC Take 1 tablet (325 mg total) by mouth daily with breakfast. 30 tablet 0    gabapentin 300 MG Oral Cap Take 1 capsule (300 mg total) by mouth 3 (three) times daily. 270 capsule 3    LEVOTHYROXINE 100 MCG Oral Tab TAKE 1 TABLET(100 MCG) BY MOUTH DAILY 90 tablet 3    amLODIPine (NORVASC) 5 MG Oral Tab Take 1 tablet (5 mg total) by mouth daily. 90 tablet 3    lidocaine 4 % External Patch Place 2 patches onto the skin daily. L Shoulder  &L hip as needed      benzonatate 200 MG Oral Cap Take 1 capsule (200 mg total) by  mouth 3 (three) times daily as needed for cough. 60 capsule 1    acetaminophen 500 MG Oral Tab Take 2 tablets (1,000 mg total) by mouth in the morning and 2 tablets (1,000 mg total) before bedtime. Also may have 500 mg PO Q6 prn but not to exceed 3 gm total of acetaminophen in 24 hours.      Cholecalciferol (VITAMIN D) 2000 units Oral Cap Take 1 capsule (2,000 Units total) by mouth daily.         Review of Systems:   A comprehensive 14 point review of systems was completed.    Pertinent positives and negatives noted in the HPI.    Objective:     /72   Pulse 118   Temp (!) 101.8 °F (38.8 °C) (Temporal)   Resp (!) 29   Ht 5' 10\" (1.778 m)   Wt 210 lb (95.3 kg)   SpO2 91%   BMI 30.13 kg/m²   General: No acute distress.    HEENT: Normocephalic, atraumatic.  Neck: Supple.  Respiratory: Normal effort.  Diminished breath sounds bilaterally  Cardiovascular: S1, S2 regular, no murmur.   Abdomen: Soft, not tender or distended.  Neurologic: Alert, oriented x 4.  No focal deficits.  Musculoskeletal: No tenderness or deformity.  Extremities: No edema  Integument: No rashes.   Psychiatric: Cooperative    Results:      Labs:  Labs Last 24 Hours:   BMP     CBC    Other     Na 139 Cl 108 BUN 18 Glu 170   Hb 13.7   PTT 31.0 Procal -   K 3.9 CO2 23.0 Cr 1.29   WBC 18.0 >< .0  INR 0.99 CRP -   Renal Lytes Endo    Hct 42.1   Trop - D dim -   eGFR - Ca 9.9 POC Gluc  -    LFT   pBNP - Lactic 2.4   eGFR AA - PO4 - A1c -   AST 18 APk 129 Prot 8.2  LDL -     Mg - TSH -   ALT <7 T joss 0.5 Alb 4.5          COVID-19 Lab Results    COVID-19  Lab Results   Component Value Date    COVID19 Detected (A) 04/14/2024    COVID19 Not Detected 01/11/2024    COVID19 Not Detected 10/15/2023       Pro-Calcitonin  No results for input(s): \"PCT\" in the last 168 hours.    Cardiac  No results for input(s): \"TROP\", \"PBNP\" in the last 168 hours.    Creatinine Kinase  No results for input(s): \"CK\" in the last 168 hours.    Inflammatory  Markers  No results for input(s): \"CRP\", \"JAZZY\", \"LDH\", \"DDIMER\" in the last 168 hours.    Imaging: Imaging data reviewed in Epic.    Assessment & Plan:    Acute respiratory failure with hypoxia  Left lung pneumonia, R/o aspiration  Severe sepsis due to pneumonia with leukocytosis, fever, tachycardia, elevated lactate  Acute respiratory failure with hypoxia  -Admit  -Sepsis protocol with IV fluids  -IV antibiotics with Zosyn and azithromycin  -Incentive spirometer, as needed nebs  -Follow-up cultures  -Monitor lactic acid levels  -Wean oxygen as tolerated  -Repeat swallow eval    COVID-positive, unclear if new infection versus residual from a month ago  -Likely not candidate for antiviral therapy  -Continue management as above    Generalized weakness due to above  -PT/OT    AAA,  Hypothyroidism,  HTN,  HLD,  OA,  CVA  -Resume home medications when reconciled    Quality:  DVT Prophylaxis: Lovenox  CODE status: Full code  DEBORAH: 3 to 4 days    Plan of care discussed with patient and wife at bedside. Acknowledged understanding and agrees to plan. Also discussed with the ED physician.    High MDM  Time spent on this admission - examining patient, obtaining history, reviewing previous medical records, going over test results/imaging and discussing plan of care. More than 50% of the time was spent in counseling and/or coordination of care related to pneumonia, sepsis.   All questions answered.     Adriana Villavicencio MD  4/14/2024                Electronically signed by Adriana Villavicencio MD on 4/14/2024 10:19 PM              Consults - MD Consult Notes      Consults signed by Babar Ferraro MD at 4/14/2024 10:13 PM     Author: Babar Ferraro MD Service: Pulmonology Author Type: Physician    Filed: 4/14/2024 10:13 PM Date of Service: 4/14/2024 10:05 PM Status: Signed    : Babar Ferraro MD (Physician)       [facility]  [facility] of [facility]    Consult Note    Date:  4/14/2024  Date of  Admission:  2024    Chief Complaint:   Cody Fitzgerald is a(n) 83 year old male with weakness and fever.    HPI:   The patient has a prior history of COVID infection.  He also has been hospitalized with multiple episodes of pneumonia.  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  He was well until today when he developed a temperature up to 103.  He notes that he always has a cough and this was a little worse.  He denies choking on food, hemoptysis, chest pain, pleurisy, personal nor family history of deep venous thrombosis, TB exposure.  He does admit to fever, chills and shakes.  He was found to be COVID-positive.  He is on supplemental oxygen currently in the ER.    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 - Dr. Stahl    Tonsillectomy      at age 24     Family History   Problem Relation Age of Onset    Cancer Father         Lung      .  Mother  old age.    Social History: , 5 kids, quit tobacco 10 years ago after greater than 1 pack/day, slight alcohol,   Social History     Socioeconomic History    Marital status:    Tobacco Use    Smoking status: Former     Current packs/day: 0.00     Average packs/day: 2.0 packs/day for 50.0 years (100.0 ttl pk-yrs)     Types: Cigarettes     Start date: 1958     Quit date: 2008     Years since quittin.2    Smokeless tobacco:  Never   Vaping Use    Vaping status: Never Used   Substance and Sexual Activity    Alcohol use: Never     Comment: socially; about 1 glass of wine once a week     Drug use: No   Other Topics Concern    Caffeine Concern Yes     Comment: 1 cup of coffee daily    Exercise No   Social History Narrative    The patient uses the following assistive device(s):  quad cane and pick-up walker.      The patient does live in a home with stairs.     Social Determinants of Health     Financial Resource Strain: Low Risk  (11/6/2023)    Financial Resource Strain     Difficulty of Paying Living Expenses: Not very hard     Med Affordability: No   Food Insecurity: No Food Insecurity (1/11/2024)    Food Insecurity     Food Insecurity: Never true   Transportation Needs: No Transportation Needs (1/11/2024)    Transportation Needs     Lack of Transportation: No   Housing Stability: Low Risk  (1/11/2024)    Housing Stability     Housing Instability: No     Allergies/Medications:   Allergies: No Known Allergies  Medications Prior to Admission   Medication Sig    methylPREDNISolone (MEDROL) 4 MG Oral Tablet Therapy Pack As directed.    ATORVASTATIN 40 MG Oral Tab TAKE 1 TABLET(40 MG) BY MOUTH NIGHTLY    CLOPIDOGREL 75 MG Oral Tab TAKE 1 TABLET(75 MG) BY MOUTH DAILY    ferrous sulfate 325 (65 FE) MG Oral Tab EC Take 1 tablet (325 mg total) by mouth daily with breakfast.    gabapentin 300 MG Oral Cap Take 1 capsule (300 mg total) by mouth 3 (three) times daily.    LEVOTHYROXINE 100 MCG Oral Tab TAKE 1 TABLET(100 MCG) BY MOUTH DAILY    amLODIPine (NORVASC) 5 MG Oral Tab Take 1 tablet (5 mg total) by mouth daily.    lidocaine 4 % External Patch Place 2 patches onto the skin daily. L Shoulder  &L hip as needed    benzonatate 200 MG Oral Cap Take 1 capsule (200 mg total) by mouth 3 (three) times daily as needed for cough.    acetaminophen 500 MG Oral Tab Take 2 tablets (1,000 mg total) by mouth in the morning and 2 tablets (1,000 mg total) before  bedtime. Also may have 500 mg PO Q6 prn but not to exceed 3 gm total of acetaminophen in 24 hours.    Cholecalciferol (VITAMIN D) 2000 units Oral Cap Take 1 capsule (2,000 Units total) by mouth daily.       Review of Systems:   Review of Systems:  Vision normal. Ear nose and throat normal. Bowel normal. Bladder function normal. No depression. Tthyroid disease. No lymphatic system concerns.  No rash. Muscles and joints notable for left-sided palsy. No weight loss no weight gain.    Physical Exam:   Vital Signs:  Blood pressure 119/57, pulse 79, temperature 97.6 °F (36.4 °C), temperature source Oral, resp. rate 22, height 5' 10\" (1.778 m), weight 197 lb 6.4 oz (89.5 kg), SpO2 96%.     Alert white male  HEENT examination is unremarkable with pupils equal round and reactive to light and accommodation.   Neck without adenopathy, thyromegaly, JVD nor bruit.   Lungs diminished with couple central rattles to auscultation and percussion.  Cardiac regular rate and rhythm no murmur.   Abdomen nontender, without hepatosplenomegaly and no mass appreciable.   Extremities without clubbing cyanosis nor edema.   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  Skin without gross abnormality    Results:     Lab Results   Component Value Date    WBC 18.0 04/14/2024    HGB 13.7 04/14/2024    HCT 42.1 04/14/2024    .0 04/14/2024    CREATSERUM 1.29 04/14/2024    BUN 18 04/14/2024     04/14/2024    K 3.9 04/14/2024     04/14/2024    CO2 23.0 04/14/2024     04/14/2024    CA 9.9 04/14/2024    ALB 4.5 04/14/2024    ALKPHO 129 04/14/2024    BILT 0.5 04/14/2024    TP 8.2 04/14/2024    AST 18 04/14/2024    ALT <7 04/14/2024    PTT 31.0 04/14/2024    INR 0.99 04/14/2024     Chest x-ray-subtle left basilar haziness    COVID positivity    Assessment/Plan:   1.  COVID on underlying COPD-possible bacterial superinfection and I am concerned regarding the possibility of  aspiration with the prior stroke and the multiple episodes of pneumonia.  Requiring supplemental oxygen.    Recommendations:  1.  Decadron  2.  Remdesivir  3.  Empiric antibiotic  4.  Will follow clinically.  5.  Tylenol  6.  Breo    2.  DVT prophylaxis-Lovenox    3.  History of CVA-rehabilitative services    4.  Hypothyroidism-to supplement    I am delighted to assist with this patient's care.    Babar Ferraro MD  Medical Director, Critical Care, [facility]  Medical Director, [facility]  Pager: [phone]            Electronically signed by Babar Ferraro MD on 4/14/2024 10:13 PM           D/C Summary    No notes of this type exist for this encounter.        Physical Therapy Notes (last 72 hours)      Physical Therapy Note signed by Brianna Mcdonald PT at 4/15/2024 12:56 PM  Version 1 of 1    Author: Brianna Mcdonald PT Service: Rehab Author Type: Physical Therapist    Filed: 4/15/2024 12:56 PM Date of Service: 4/15/2024 10:15 AM Status: Signed    : Brianna Mcdonald PT (Physical Therapist)       PHYSICAL THERAPY EVALUATION - INPATIENT     Room Number: 516/516-A  Evaluation Date: 4/15/2024  Type of Evaluation: Initial   Physician Order: PT Eval and Treat    Presenting Problem: community acquired pneumonia     Reason for Therapy: Mobility Dysfunction and Discharge Planning    PHYSICAL THERAPY ASSESSMENT   Patient is a 83 year old male admitted 4/14/2024 for community acquired pneumonia.  Prior to admission, patient's baseline is assistance with all ADLs and functional mobility with a RW.  Patient is currently functioning below baseline with bed mobility, transfers, gait, stair negotiation, standing prolonged periods, and performing household tasks.  Patient is requiring minimal assist and moderate assist as a result of the following impairments: decreased functional strength, pain, impaired dynamic standing balance, and medical status.  Physical Therapy will continue to follow for duration of  hospitalization.    Patient will benefit from continued skilled PT Services to promote return to prior level of function and safety with continuous assistance and gradual rehabilitative therapy .    PLAN  PT Treatment Plan: Bed mobility;Body mechanics;Endurance;Energy conservation;Patient education;Gait training;Strengthening;Stair training;Transfer training;Balance training  Rehab Potential : Good  Frequency (Obs): 3-5x/week    PHYSICAL THERAPY MEDICAL/SOCIAL HISTORY     Problem List  Principal Problem:    Community acquired pneumonia of left lower lobe of lung  Active Problems:    Left lower lobe pneumonia      HOME SITUATION  Home Situation  Type of Home: House  Home Layout: Two level;Stairlift  Stairs to Enter : 5 (stairlift)  Railing: Yes  Stairs to Bedroom: 10  Railing: Yes  Lives With: Spouse;Family  Drives: No  Patient Owned Equipment: Rolling walker;Mark-walker  Patient Regularly Uses: None     Prior Level of [place]: family assists with ADLs, has home health CG to assist with bathing, ambulates short distances with RW    SUBJECTIVE  Agreeable to activity.     PHYSICAL THERAPY EXAMINATION   OBJECTIVE  Precautions: Bed/chair alarm  Fall Risk: High fall risk    WEIGHT BEARING RESTRICTION  Weight Bearing Restriction: None    PAIN ASSESSMENT  Rating: Unable to rate  Location: back and knees, chronic  Management Techniques: Activity promotion;Body mechanics;Repositioning    COGNITION  Overall Cognitive Status:  WFL - within functional limits    RANGE OF MOTION AND STRENGTH ASSESSMENT  Upper extremity ROM and strength are within functional limits.  Lower extremity ROM is within functional limits.  Lower extremity strength is within functional limits.    BALANCE  Static Sitting: Good  Dynamic Sitting: Fair +  Static Standing: Poor +  Dynamic Standing: Poor    ACTIVITY TOLERANCE  Pulse: 90  Heart Rate Source: Monitor    AM-PAC '6-Clicks' INPATIENT SHORT FORM - BASIC MOBILITY  How much difficulty does the  patient currently have...  Patient Difficulty: Turning over in bed (including adjusting bedclothes, sheets and blankets)?: A Little   Patient Difficulty: Sitting down on and standing up from a chair with arms (e.g., wheelchair, bedside commode, etc.): A Lot   Patient Difficulty: Moving from lying on back to sitting on the side of the bed?: A Little   How much help from another person does the patient currently need...   Help from Another: Moving to and from a bed to a chair (including a wheelchair)?: A Lot   Help from Another: Need to walk in hospital room?: A Lot   Help from Another: Climbing 3-5 steps with a railing?: A Lot     AM-PAC Score:  Raw Score: 14   Approx Degree of Impairment: 61.29%   Standardized Score (AM-PAC Scale): 38.1   CMS Modifier (G-Code): CL    FUNCTIONAL ABILITY STATUS  Functional Mobility/Gait Assessment  Gait Assistance: Moderate assistance  Distance (ft): 15  Assistive Device: Rolling walker  Pattern: Shuffle;L Foot drag;L Flexed knee (L knee buckling, slow pace, flexed posture, no LOB)  Supine to Sit: minimal assist  Sit to Stand: minimal assist    Exercise/Education Provided:  Bed mobility  Body mechanics  Energy conservation  Functional activity tolerated  Gait training  Posture  Transfer training    Additional Information: RN approved PT eval. Pt received resting in bed. Introduced self and role. Educated on benefits of oob mobility and transfer to chair, PT plan of care, goals for today. Pt verbalized understanding and agreeable to participate. L sided weakness at baseline due to hx of CVA. Demos bed mobility, STS transfer from EOB with RW, and walked in the room with RW. 1 instance of L knee buckling noted but no LOB. Requires mod A for gait. Pt was left sitting in chair with chair alarm on, needs within reach, handoff to RN complete.      The patient's Approx Degree of Impairment: 61.29% has been calculated based on documentation in the [facility] '6 clicks' Inpatient Basic Mobility  Short Form.  Research supports that patients with this level of impairment may benefit from a rehab facility.  Final disposition will be made by interdisciplinary medical team.    Patient End of Session: Up in chair;Needs met;RN aware of session/findings;Call light within reach;All patient questions and concerns addressed;Alarm set    CURRENT GOALS  Goals to be met by: 3/22/24  Patient Goal Patient's self-stated goal is: go home   Goal #1 Patient is able to demonstrate supine - sit EOB @ level: Supervision     Goal #1   Current Status    Goal #2 Patient is able to demonstrate transfers Sit to/from Stand at assistance level: supervision with walker - rolling     Goal #2  Current Status    Goal #3 Patient is able to ambulate 75 feet with assist device: walker - rolling at assistance level: supervision   Goal #3   Current Status    Goal #4 Patient will negotiate 5 stairs/one curb w/ assistive device and supervision   Goal #4   Current Status    Goal #5 Patient to demonstrate independence with home activity/exercise instructions provided to patient in preparation for discharge.   Goal #5   Current Status    Goal #6    Goal #6  Current Status      Patient Evaluation Complexity Level:  History Moderate - 1 or 2 personal factors and/or co-morbidities   Examination of body systems Moderate - addressing a total of 3 or more elements   Clinical Presentation  Moderate - Evolving   Clinical Decision Making  Moderate Complexity     Therapeutic Activity:  15 minutes                Occupational Therapy Notes (last 72 hours)      Occupational Therapy Note signed by Lindsay Christopher OT at 4/15/2024 11:27 AM  Version 1 of 1    Author: Lindsay Christopher OT Service: — Author Type: Occupational Therapist    Filed: 4/15/2024 11:27 AM Date of Service: 4/15/2024  9:55 AM Status: Signed    : Lindsay Christopher OT (Occupational Therapist)       OCCUPATIONAL THERAPY EVALUATION - INPATIENT     Room Number: 516/516-A  Evaluation Date:  4/15/2024  Type of Evaluation: Initial    Presenting Problem: COVID-19 viral infection, PNA    Co-Morbidities: Frequent falls, HTN, OA, PNA, CVA with L sided weakness, COPD       Physician Order: IP Consult to Occupational Therapy  Reason for Therapy: ADL/IADL Dysfunction and Discharge Planning    OCCUPATIONAL THERAPY ASSESSMENT   Patient is a 83 year old male admitted 4/14/2024 for COVID-19 viral infection, PNA.  Prior to admission, patient's baseline is MI with ADL and mobility.  Patient is currently functioning below baseline with  ADL and mobility .  Patient is requiring maximum assist as a result of the following impairments: decreased functional strength, decreased functional reach, decreased endurance, impaired standing balance, decreased muscular endurance, increased O2 needs from baseline, decreased safety awareness, and decreased aerobic capacity . Occupational Therapy will continue to follow for duration of hospitalization.    Patient will benefit from continued skilled OT Services to promote return to prior level of function and safety with continuous assistance and gradual rehabilitative therapy     PLAN  OT Treatment Plan: Balance activities;Energy conservation/work simplification techniques;ADL training;Functional transfer training;Endurance training;Patient/Family education;Patient/Family training;Compensatory technique education  OT Device Recommendations: TBD    OCCUPATIONAL THERAPY MEDICAL/SOCIAL HISTORY   Problem List  Principal Problem:    Community acquired pneumonia of left lower lobe of lung  Active Problems:    Left lower lobe pneumonia    HOME SITUATION  Type of Home: House  Home Layout: Two level (10 stairs to bedroom); Stair lift  Lives With: Spouse; Family    Stairs in Home: 10; however, pt  has a stair lift  Use of Assistive Device(s): R/W    Prior Level of [place]: Pt was MI with ADL and mobility    SUBJECTIVE  \"I don't like to use Oxygen\"     OCCUPATIONAL THERAPY EXAMINATION     OBJECTIVE  Fall Risk: High fall risk    PAIN ASSESSMENT  Rating: Unable to rate  Location: Chronic Back and knees  Management Techniques: Activity promotion; Body mechanics; Breathing techniques; Relaxation    ACTIVITY TOLERANCE  Pulse: 77  Heart Rate Source: Monitor        BP Location: Right arm  BP Method: Automatic  Patient Position: Semi-[place]    O2 SATURATIONS  Oxygen Therapy  SPO2% on Oxygen at Rest: 93  At rest oxygen flow (liters per minute): 1.5  SPO2% Ambulation on Oxygen: 97 (HR 90 BPM)  Ambulation oxygen flow (liters per minute): 1.5    COGNITION  Overall Cognitive Status:  WFL - within functional limits    RANGE OF MOTION   R Upper extremity ROM is within functional limits; Limited due to HX of CVA    STRENGTH ASSESSMENT  R Upper extremity strength is within functional limits; L sisde weakness post CVA     COORDINATION  Gross Motor: WFL   Fine Motor: WFL     ACTIVITIES OF DAILY LIVING ASSESSMENT  AM-PAC ‘6-Clicks’ Inpatient Daily Activity Short Form  How much help from another person does the patient currently need…  -   Putting on and taking off regular lower body clothing?: A Lot  -   Bathing (including washing, rinsing, drying)?: A Lot  -   Toileting, which includes using toilet, bedpan or urinal? : A Lot  -   Putting on and taking off regular upper body clothing?: A Lot  -   Taking care of personal grooming such as brushing teeth?: A Little  -   Eating meals?: None    AM-PAC Score:  Score: 15  Approx Degree of Impairment: 56.46%  Standardized Score (AM-PAC Scale): 34.69  CMS Modifier (G-Code): CK    FUNCTIONAL TRANSFER ASSESSMENT  Sit to Stand: Edge of Bed  Edge of Bed: Minimal Assist (With bed margy elevated)    BED MOBILITY  Rolling: Minimal Assist  Supine to Sit : Minimal Assist  Sit to Supine (OT): Not Tested  Scooting: Max A    BALANCE ASSESSMENT  Static Sitting: Contact Guard Assist  Static Standing: Moderate Assist    FUNCTIONAL ADL ASSESSMENT          Skilled Therapy Provided:  sitting  balance, standing balance, functional transfers, pt education, standing tolerance.    EDUCATION PROVIDED  Patient: Role of Occupational Therapy; Plan of Care; Functional Transfer Techniques; Fall Prevention; Energy Conservation; Compensatory ADL Techniques  Patient's Response to Education: Verbalized Understanding; Returned Demonstration; Requires Further Education    The patient's Approx Degree of Impairment: 56.46% has been calculated based on documentation in the [facility] '6 clicks' Inpatient Daily Activity Short Form.  Research supports that patients with this level of impairment may benefit from inpatient rehabilitative services.  Final disposition will be made by interdisciplinary medical team.     Patient End of Session: Up in chair;Needs met;Call light within reach;RN aware of session/findings;All patient questions and concerns addressed;Alarm set    OT Goals  Patients self stated goal is: Return to home     Patient will complete functional transfer with Min A  Comment:     Patient will complete toileting with Min A  Comment:     Patient will tolerate standing for 2 minutes in prep for adls with Min A   Comment:    Patient will complete item retrieval with Min A  Comment:          Goals  on: 24  Frequency: 3-5x/week    Patient Evaluation Complexity Level:   Occupational Profile/Medical History MODERATE - Expanded review of history including review of medical or therapy record   Specific performance deficits impacting engagement in ADL/IADL MODERATE  3 - 5 performance deficits   Client Assessment/Performance Deficits MODERATE - Comorbidities and min to mod modifications of tasks    Clinical Decision Making MODERATE - Analysis of occupational profile, detailed assessments, several treatment options    Overall Complexity MODERATE     OT Session Time: 30 minutes    Neuromuscular Re-education: 20 minutes      LELA Bhatia/L  Occupational Therapy  [facility]                Video  Swallow Study Notes    No notes of this type exist for this encounter.        SLP Note - SLP Notes      SLP Note signed by Brisa Rincon SLP at 4/15/2024  9:44 AM  Version 1 of 1    Author: Brisa Rincon SLP Service: — Author Type: Speech and Language Pathologist    Filed: 4/15/2024  9:44 AM Date of Service: 4/15/2024  8:00 AM Status: Signed    : Brisa Rincon SLP (Speech and Language Pathologist)       ADULT SWALLOWING EVALUATION    ASSESSMENT    ASSESSMENT/OVERALL IMPRESSION:  PT COVID-19 POSITIVE. CONTACT AND DROPLET ISOLATION PPE REQUIRED. THIS THERAPIST WORE GOWN, GLOVES, FACE SHIELD, AND DROPLET/N95 RESPIRATOR MASK. HANDS SANITIZED/WASHED UPON ENTRANCE/EXIT.      SLP BSSE orders received and acknowledged. A swallow evaluation warranted as pt at risk for aspiration and extensive dysphagia hx. Pt afebrile with clear vocal quality, on 2L/Min, with oxygen saturation at 94. Pt with extensive hx of dysphagia at [facility] in which last recommended diet was regular/thin per  tx in 1/2024. Pt positioned upright in bed. Pt with complaints of back pain, RN aware. Oral [facility] exam completed and overall reduced strength observed. Pt with adequate oral acceptance and bilabial seal across all trials. Pt with intact bite, prolonged mastication of solids, and increased JAIR. Pt's swallow response appears timely with reduced hyolaryngeal elevation/excursion. No clinical signs of aspiration (e.g., immediate/delayed throat clear, immediate/delayed cough, wet vocal quality, increased O2 effort) observed across all trials. Oxygen status remained >92 t/o the entire evaluation.     discussed further objective assessment via VFSS to assess oropharyngeal function, r/o silent aspiration, and determine the safest and least restrictive diet consistencies. Pt educated and informed of the risk/benefits of VFSS assessment. Patient v/u and expressed he would complete VFSS IF MD desired but would not be agreeable to thickened liquids if recommended.  Defer VFSS at this time.     At this time, pt presents with mild oral dysphagia and probable pharyngeal dysfunction. Recommend an easy to chew diet and thin liquids with strict adherence to safe swallowing compensatory strategies. Results and recommendations reviewed with RN and pt. Pt v/u to all results/recommendations. Recommendations remain written on whiteboard. SLP collaborated with RN for MD diet orders.     PLAN: SLP to f/u x2 meal assessments, monitor imaging, and VFSS if MD desires.     RECOMMENDATIONS   Diet Recommendations - Solids: Soft/ Easy to chew  Diet Recommendations - Liquids: Thin Liquids      Compensatory Strategies Recommended: No straws;Slow rate;Alternate consistencies;Small bites and sips  Aspiration Precautions: Upright position;Slow rate;Small bites and sips;No straw  Medication Administration Recommendations: Whole in puree  Treatment Plan/Recommendations: Aspiration precautions    HISTORY   MEDICAL HISTORY  Reason for Referral: R/O aspiration    Problem List  Principal Problem:    Community acquired pneumonia of left lower lobe of lung  Active Problems:    Left lower lobe pneumonia      Past Medical History  Past Medical History:    Abdominal aortic aneurysm (AAA) (HCC)    Back problem    back pain    Disorder of thyroid    hypothyroidism    Falls frequently    High blood pressure    High cholesterol    Hx of pneumothorax    Muscle weakness    LUE hemiplegia    Osteoarthritis    PNA (pneumonia)    Pneumonia due to organism    Stroke (HCC)    left sided weakness       Prior Living Situation: Home with spouse  Diet Prior to Admission: Regular;Thin liquids  Precautions: Aspiration    Patient/Family Goals: Pt is not agreeable to modified liquids    SWALLOWING HISTORY  Current Diet Consistency: NPO  Dysphagia History:     Bse 5/14/19: regular/thin  BSE 7/7/19: regular/thin  VFSS 7/8/19: regular/thin  BSE 1/5/20: regular/thin  VFSS 1/6/20: regular/thin  BSE 4/6/22: easy to chew/thin  BSE  5/14/22: puree mildly thick  VFSS 5/14/22: minced and moist/mild via tsp  BSE 11/8/22: easy to chew/mildly thick  BSE 11/24/22: regular/thin  VFSS @ [facility] 12/12/22: regular/thin  BSE 10/5/23: regular/thin  BSE 10/16/23: easy to chew/thin  BSE 1/12/24: easy to chew/thin      Imaging Results:     CXR 4/14/24:  CONCLUSION:      Patchy retrocardiac/left basilar opacities are new or more conspicuous since comparison chest radiograph from January, 2024.  In the appropriate clinical setting, findings are suspicious for pneumonia/aspiration.  Radiographic follow-up to resolution is   advised.         Dictated by (CST): Arsenio De Luna MD on 4/14/2024 at 6:36 PM       Finalized by (CST): Arsenio De Luna MD on 4/14/2024 at 6:39 PM       OBJECTIVE   ORAL MOTOR EXAMINATION  Dentition: Upper dentures;Lower dentures  Symmetry: Within Functional Limits  Strength:  (reduced)     Range of Motion: Within Functional Limits  Rate of Motion: Within Functional Limits    Voice Quality: Clear  Respiratory Status: Supplemental O2;Nasal cannula  Consistencies Trialed: Thin liquids;Puree;Hard solid  Method of Presentation: Self presentation;Spoon;Cup;Single sips  Patient Positioning: Upright;Midline    Oral Phase of Swallow: Impaired  Bolus Retrieval: Intact  Bilabial Seal: Intact  Bolus Formation: Impaired  Bolus Propulsion: Impaired  Mastication: Impaired       Pharyngeal Phase of Swallow: Impaired  Laryngeal Elevation Timing: Appears intact  Laryngeal Elevation Strength: Appears impaired     (Please note: Silent aspiration cannot be evaluated clinically. Videofluoroscopic Swallow Study is required to rule-out silent aspiration.)    Esophageal Phase of Swallow: No complaints consistent with possible esophageal involvement      GOALS  Goal #1 The patient will tolerate easy to chew consistency and thin liquids without overt signs or symptoms of aspiration with 100 % accuracy over 2 session(s).  In Progress   Goal #2 The  patient/family/caregiver will demonstrate understanding and implementation of aspiration precautions and swallow strategies independently over 2 session(s).    In Progress   Goal #3 The patient will utilize compensatory strategies as outlined by  BSSE (clinical evaluation) including Slow rate, Small bites, Small sips, Alternate liquids/solids, No straws, Upright 90 degrees with minimal assistance 100 % of the time across 2 sessions.  In Progress     FOLLOW UP  Treatment Plan/Recommendations: Aspiration precautions  Number of Visits to Meet Established Goals: 2  Follow Up Needed (Documentation Required): Yes  SLP Follow-up Date: 04/16/24    Thank you for your referral.   If you have any questions, please contact BETHANIE Mercedes M.S. CCC-SLP  Speech Language Pathologist  Phone Number Ext. [phone]      Electronically signed by Brisa Rincon SLP on 4/15/2024  9:44 AM           Immunizations     Name Date      Covid-19 Pfizer 08/01/22     Covid-19 Pfizer 10/08/21     Covid-19 Pfizer 02/26/21     Covid-19 Pfizer 02/05/21     Fluad 0.5ml 11/08/21     Fluad 0.5ml 10/12/19     Fluad 0.5ml 11/12/18     INFLUENZA 10/11/23     INFLUENZA 10/14/20     INFLUENZA 10/14/20     INFLUENZA 11/13/18     INFLUENZA 11/27/17     INFLUENZA 11/01/17     INFLUENZA 11/01/17     INFLUENZA 11/29/16     INFLUENZA 10/20/15     INFLUENZA 10/28/14     INFLUENZA 10/28/14     Pneumococcal (Prevnar 13) 11/14/18     Pneumococcal (Prevnar 13) 10/28/14     Pneumovax 23 11/01/17     Pneumovax 23 06/05/13       Multidisciplinary Problems     Active Goals        Problem: Patient/Family Goals    Goal Priority Disciplines Outcome Interventions   Patient/Family Long Term Goal     Interdisciplinary Progressing    Description: Patient's Long Term Goal: to go home    Interventions:  - - Monitor vitals  - Monitor appropriate labs  - Pain management  - Follow MD order  - Diagnostics per order  - Update/Informing patient and family on plan of care  -  Discharge planning  - See additional Care Plan goals for specific interventions   Patient/Family Short Term Goal     Interdisciplinary Progressing    Description: Patient's Short Term Goal: to feel better    Interventions:   - - Monitor vitals  - Monitor appropriate labs  - Pain management  - Follow MD order  - Diagnostics per order  - Update/Informing patient and family on plan of care  - Discharge planning  - See additional Care Plan goals for specific interventions

## (undated) NOTE — LETTER
Hospital Discharge Documentation      From: [facility] Hospitalist's Office  Phone: [phone]    Patient discharged time/date: 3/16/2025 11:32 AM  Patient discharge disposition:  SNF Subacute Rehab, discharged to [facility]       Discharge Summary - D/C Summary        Discharge Summary signed by Harvey Diallo MD at 3/16/2025  7:09 AM  Version 2 of 2      Author: Harvey Diallo MD Service: Internal Medicine Author Type: Physician    Filed: 3/16/2025  7:09 AM Date of Service: 3/16/2025  7:00 AM Status: Addendum    : Harvey Diallo MD (Physician)    Related Notes: Original Note by Harvey Diallo MD (Physician) filed at 3/15/2025  5:06 PM         [facility]  part of [facility]    Discharge Summary    Cody Fitzgerald Patient Status:  Inpatient    1940 MRN [id number]   Location [facility] 5SW/SE Attending Harvey Diallo MD   Hosp Day # 3 PCP Kandice Claudio DO     Date of Admission: 3/11/2025 Disposition: Home or Self Care     Date of Discharge: 3/16/2025    Admitting Diagnosis: Aspiration pneumonia of lower lobe, unspecified aspiration pneumonia type, unspecified laterality (HCC) [J69.0]    Hospital Discharge Diagnoses:   aspiration pneumonia  Acutet hypoxic respiratory failure 2/2 to PNA  Weakness  HTN  Hypothyroidism  Hx of CVA with left hemiparesis      Lace+ Score: 81  59-90 High Risk  29-58 Medium Risk  0-28   Low Risk.    TCM Follow-Up Recommendation:  LACE > 58: High Risk of readmission after discharge from the hospital.      Problem List:   Patient Active Problem List   Diagnosis    Left hemiparesis (HCC)    CVA, old, hemiparesis (HCC)    Essential hypertension    Hypercholesterolemia    Hypothyroidism    Benign prostatic hyperplasia with lower urinary tract symptoms    Status post repair of abdominal aortic aneurysm (AAA) using bifurcation graft    Stage 3a chronic kidney disease (HCC)    Primary osteoarthritis involving  multiple joints    Chronic bilateral low back pain without sciatica    Fatigue    Aspiration pneumonia of left lower lobe (HCC)    Aspiration pneumonitis (HCC)    Pneumonia due to infectious organism    Pneumonia due to infectious organism, unspecified laterality, unspecified part of lung    Sepsis, due to unspecified organism    Spinal stenosis of lumbar region without neurogenic claudication    Cerebrovascular accident (CVA) ([facility])    Primary osteoarthritis of right knee    Acute bronchospasm    Viral syndrome    Community acquired pneumonia of left lower lobe of lung    Lumbar spondylosis    Anticoagulant long-term use    Gait disturbance    Dyspnea on exertion    Weakness generalized    Inability to walk    Aspiration pneumonia (HCC)    Primary osteoarthritis of both knees    Acute respiratory failure with hypoxia (HCC)    Aspiration pneumonia, unspecified aspiration pneumonia type, unspecified laterality, unspecified part of lung (HCC)    Leukocytosis, unspecified type    Renal insufficiency    Elevated troponin    Hypoxia    COVID    Acute on chronic respiratory failure with hypoxia ([facility])    Contusion of left hip, initial encounter    History of falling    Personal history of nicotine dependence    Hypertensive chronic kidney disease w stg 1-4/unsp chr kdny    Long term (current) use of opiate analgesic    History of pneumonia    Acute pain of left shoulder    Closed fracture of left proximal humerus    Tendinosis of rotator cuff    Hypernatremia    JOSE (acute kidney injury)    Left lower lobe pneumonia    Acute cystitis with hematuria    Sepsis (HCC)    Febrile illness    Fall, initial encounter    Decubitus ulcer of left buttock, stage 2 ([facility])       Reason for Admission: malaise    Physical Exam:   General appearance: alert, appears stated age and cooperative  Pulmonary:  clear to auscultation bilaterally  Cardiovascular: S1, S2 normal, no murmur, click, rub or gallop, regular rate and rhythm  Abdominal:  soft, non-tender; bowel sounds normal; no masses,  no organomegaly  Extremities: extremities normal, atraumatic, no cyanosis or edema  Psychiatric: calm      History of Present Illness: 84-year-old male presenting with shortness of breath and progressive weakness. The patient has a long-standing history of weakness and history of recurrent aspiration PNA.  He was admitted to the hospital at the end of 2024 for aspiration pneumonia, eventually being discharged to acute rehab. His weakness is multifactorial, including a history of  left-sided weakness stemming from a previous CVA. The patient also experiences low-grade pain complicated by radiculopathy.     Regarding aspiration PNA has been admitted multiple times requiring treatment with antibiotics in the past. A swallow study was performed in October 2024, which the patient passed. During previous hospitalizations, the patient has been treated with IV diuretics for fluid management.     The patient's medical history is significant for hypothyroidism, hypertension, hyperlipidemia, osteoarthritis, diastolic dysfunction, dilated ascending aorta, diabetes, and aortic aneurysm status post-endograft repair. The patient's home regimen for hypertension includes amlodipine 5 mg daily.    Hospital Course:   Acute hypoxic respiratory failure   - required 3 L NC now.     Aspiration pneumonia  - this is recurrent  - speech therapy consulted  - recevied IV abx and dishcarge on augmentin for 5 more days.   - cont pulm hygiene - IS, resp therapy to assist - wean oxygen as able      Weakness  - likely worsened due to above  - pt/ot - recs are for VARGAS     HTN  - monitor vitals and adjust meds prn     Hypothyroidism  - cont home meds     Hx of CVA  - cont asa and statin             Consultations: None    Procedures: none    Complications: none    Discharge Condition: Good    Discharge Medications:      Discharge Medications        START taking these medications        Instructions  Prescription details   calcium carbonate 500 MG Chew  Commonly known as: Tums      Chew 2 tablets (1,000 mg total) by mouth 2 (two) times daily as needed.   Refills: 0     Polyethylene Glycol 3350 17 g Pack  Commonly known as: MIRALAX  Start taking on: March 16, 2025      Take 17 g by mouth daily.   Refills: 0            CHANGE how you take these medications        Instructions Prescription details   acetaminophen 500 MG Tabs  Commonly known as: Tylenol Extra Strength  What changed:   how much to take  when to take this  reasons to take this      Take 1 tablet (500 mg total) by mouth every 4 (four) hours as needed.   Refills: 0            CONTINUE taking these medications        Instructions Prescription details   amLODIPine 5 MG Tabs  Commonly known as: Norvasc      Take 1 tablet (5 mg total) by mouth daily.   Quantity: 90 tablet  Refills: 3     amoxicillin clavulanate 875-125 MG Tabs  Commonly known as: Augmentin      Take 1 tablet by mouth 2 (two) times daily.   Refills: 0     atorvastatin 40 MG Tabs  Commonly known as: Lipitor      Take 1 tablet (40 mg total) by mouth nightly.   Quantity: 90 tablet  Refills: 3     benzonatate 200 MG Caps  Commonly known as: Tessalon      Take 1 capsule (200 mg total) by mouth 3 (three) times daily as needed for cough.   Quantity: 42 capsule  Refills: 0     clopidogrel 75 MG Tabs  Commonly known as: Plavix      Take 1 tablet (75 mg total) by mouth daily.   Quantity: 90 tablet  Refills: 3     fluocinonide 0.05 % Oint  Commonly known as: Lidex      Apply a small amount of ointment on area of rash twice a day as necessary.   Quantity: 30 g  Refills: 2     gabapentin 300 MG Caps  Commonly known as: Neurontin      Take 2 capsules (600 MG) by mouth in the morning, 1 capsule (300 MG) at noon, and 1 capsule (300 MG) at bedtime   Quantity: 360 capsule  Refills: 3     levothyroxine 100 MCG Tabs  Commonly known as: Synthroid      Take 1 tablet (100 mcg total) by mouth daily.   Quantity: 90  tablet  Refills: 3     Vitamin D 50 MCG ( UT) Caps      Take 1 capsule (2,000 Units total) by mouth daily.   Refills: 0              Follow up Visits: Follow-up with  in 1 week    Follow up Labs:      Other Discharge Instructions:     Harvey Diallo MD  3/15/2025  5:01 PM    > 35 min     Electronically signed by Harvey Diallo MD on 3/16/2025  7:09 AM       Discharge Summary signed by Harvey Diallo MD at 3/15/2025  5:06 PM  Version 1 of 2      Author: Harvey Diallo MD Service: Internal Medicine Author Type: Physician    Filed: 3/15/2025  5:06 PM Date of Service: 3/15/2025  5:01 PM Status: Signed    : Harvey Diallo MD (Physician)    Related Notes: Addendum by Harvey Diallo MD (Physician) filed at 3/16/2025  7:09 AM         [facility]  part of [facility]    Discharge Summary    Cody Fitzgerald Patient Status:  Inpatient    1940 MRN [id number]   Location [facility] 5SW/SE Attending Harvey Diallo MD   Hosp Day # 3 PCP Kandice Claudio DO     Date of Admission: 3/11/2025 Disposition: Home or Self Care     Date of Discharge: 3/15/2025    Admitting Diagnosis: Aspiration pneumonia of lower lobe, unspecified aspiration pneumonia type, unspecified laterality (HCC) [J69.0]    Hospital Discharge Diagnoses:   aspiration pneumonia  Acutet hypoxic respiratory failure 2/2 to PNA  Weakness  HTN  Hypothyroidism  Hx of CVA with left hemiparesis      Lace+ Score: 81  59-90 High Risk  29-58 Medium Risk  0-28   Low Risk.    TCM Follow-Up Recommendation:  LACE > 58: High Risk of readmission after discharge from the hospital.      Problem List:   Patient Active Problem List   Diagnosis    Left hemiparesis (HCC)    CVA, old, hemiparesis (HCC)    Essential hypertension    Hypercholesterolemia    Hypothyroidism    Benign prostatic hyperplasia with lower urinary tract symptoms    Status post repair of abdominal aortic aneurysm (AAA) using  bifurcation graft    Stage 3a chronic kidney disease (HCC)    Primary osteoarthritis involving multiple joints    Chronic bilateral low back pain without sciatica    Fatigue    Aspiration pneumonia of left lower lobe (HCC)    Aspiration pneumonitis (HCC)    Pneumonia due to infectious organism    Pneumonia due to infectious organism, unspecified laterality, unspecified part of lung    Sepsis, due to unspecified organism    Spinal stenosis of lumbar region without neurogenic claudication    Cerebrovascular accident (CVA) (HCC)    Primary osteoarthritis of right knee    Acute bronchospasm    Viral syndrome    Community acquired pneumonia of left lower lobe of lung    Lumbar spondylosis    Anticoagulant long-term use    Gait disturbance    Dyspnea on exertion    Weakness generalized    Inability to walk    Aspiration pneumonia (HCC)    Primary osteoarthritis of both knees    Acute respiratory failure with hypoxia (HCC)    Aspiration pneumonia, unspecified aspiration pneumonia type, unspecified laterality, unspecified part of lung (HCC)    Leukocytosis, unspecified type    Renal insufficiency    Elevated troponin    Hypoxia    COVID    Acute on chronic respiratory failure with hypoxia (HCC)    Contusion of left hip, initial encounter    History of falling    Personal history of nicotine dependence    Hypertensive chronic kidney disease w stg 1-4/unsp chr kdny    Long term (current) use of opiate analgesic    History of pneumonia    Acute pain of left shoulder    Closed fracture of left proximal humerus    Tendinosis of rotator cuff    Hypernatremia    JOSE (acute kidney injury)    Left lower lobe pneumonia    Acute cystitis with hematuria    Sepsis (HCC)    Febrile illness    Fall, initial encounter    Decubitus ulcer of left buttock, stage 2 (HCC)       Reason for Admission: malaise    Physical Exam:   General appearance: alert, appears stated age and cooperative  Pulmonary:  clear to auscultation  bilaterally  Cardiovascular: S1, S2 normal, no murmur, click, rub or gallop, regular rate and rhythm  Abdominal: soft, non-tender; bowel sounds normal; no masses,  no organomegaly  Extremities: extremities normal, atraumatic, no cyanosis or edema  Psychiatric: calm      History of Present Illness: 84-year-old male presenting with shortness of breath and progressive weakness. The patient has a long-standing history of weakness and history of recurrent aspiration PNA.  He was admitted to the hospital at the end of 2024 for aspiration pneumonia, eventually being discharged to acute rehab. His weakness is multifactorial, including a history of  left-sided weakness stemming from a previous CVA. The patient also experiences low-grade pain complicated by radiculopathy.     Regarding aspiration PNA has been admitted multiple times requiring treatment with antibiotics in the past. A swallow study was performed in October 2024, which the patient passed. During previous hospitalizations, the patient has been treated with IV diuretics for fluid management.     The patient's medical history is significant for hypothyroidism, hypertension, hyperlipidemia, osteoarthritis, diastolic dysfunction, dilated ascending aorta, diabetes, and aortic aneurysm status post-endograft repair. The patient's home regimen for hypertension includes amlodipine 5 mg daily.    Hospital Course:   Acute hypoxic respiratory failure   - required 3 L NC now.     Aspiration pneumonia  - this is recurrent  - speech therapy consulted  - recevied IV abx and dishcarge on augmentin for 5 more days.   - cont pulm hygiene - IS, resp therapy to assist - wean oxygen as able      Weakness  - likely worsened due to above  - pt/ot - recs are for VAGRAS     HTN  - monitor vitals and adjust meds prn     Hypothyroidism  - cont home meds     Hx of CVA  - cont asa and statin             Consultations: None    Procedures: none    Complications: none    Discharge Condition:  Good    Discharge Medications:      Discharge Medications        START taking these medications        Instructions Prescription details   calcium carbonate 500 MG Chew  Commonly known as: Tums      Chew 2 tablets (1,000 mg total) by mouth 2 (two) times daily as needed.   Refills: 0     Polyethylene Glycol 3350 17 g Pack  Commonly known as: MIRALAX  Start taking on: March 16, 2025      Take 17 g by mouth daily.   Refills: 0            CHANGE how you take these medications        Instructions Prescription details   acetaminophen 500 MG Tabs  Commonly known as: Tylenol Extra Strength  What changed:   how much to take  when to take this  reasons to take this      Take 1 tablet (500 mg total) by mouth every 4 (four) hours as needed.   Refills: 0            CONTINUE taking these medications        Instructions Prescription details   amLODIPine 5 MG Tabs  Commonly known as: Norvasc      Take 1 tablet (5 mg total) by mouth daily.   Quantity: 90 tablet  Refills: 3     amoxicillin clavulanate 875-125 MG Tabs  Commonly known as: Augmentin      Take 1 tablet by mouth 2 (two) times daily.   Refills: 0     atorvastatin 40 MG Tabs  Commonly known as: Lipitor      Take 1 tablet (40 mg total) by mouth nightly.   Quantity: 90 tablet  Refills: 3     benzonatate 200 MG Caps  Commonly known as: Tessalon      Take 1 capsule (200 mg total) by mouth 3 (three) times daily as needed for cough.   Quantity: 42 capsule  Refills: 0     clopidogrel 75 MG Tabs  Commonly known as: Plavix      Take 1 tablet (75 mg total) by mouth daily.   Quantity: 90 tablet  Refills: 3     fluocinonide 0.05 % Oint  Commonly known as: Lidex      Apply a small amount of ointment on area of rash twice a day as necessary.   Quantity: 30 g  Refills: 2     gabapentin 300 MG Caps  Commonly known as: Neurontin      Take 2 capsules (600 MG) by mouth in the morning, 1 capsule (300 MG) at noon, and 1 capsule (300 MG) at bedtime   Quantity: 360 capsule  Refills: 3      levothyroxine 100 MCG Tabs  Commonly known as: Synthroid      Take 1 tablet (100 mcg total) by mouth daily.   Quantity: 90 tablet  Refills: 3     Vitamin D 50 MCG (2000 UT) Caps      Take 1 capsule (2,000 Units total) by mouth daily.   Refills: 0              Follow up Visits: Follow-up with  in 1 week    Follow up Labs:      Other Discharge Instructions:     Harvey Diallo MD  3/15/2025  5:01 PM    > 35 min     Electronically signed by Harvey Diallo MD on 3/15/2025  5:06 PM

## (undated) NOTE — LETTER
[facility]  [address], [place], [place]    Authorization for Surgical Operation and Procedure                               I hereby authorize Babar Ferraro MD, my physician and his/her assistants (if applicable), which may include medical students, residents, and/or fellows, to perform the following surgical operation/ procedure and administer such anesthesia as may be determined necessary by my physician: Operation/Procedure name (s) BRONCHOSCOPY on Cody Fitzgerald   2.   I recognize that during the surgical operation/procedure, unforeseen conditions may necessitate additional or different procedures than those listed above.  I, therefore, further authorize and request that the above-named surgeon, assistants, or designees perform such procedures as are, in their judgment, necessary and desirable.    3.   My surgeon/physician has discussed prior to my surgery the potential benefits, risks and side effects of this procedure; the likelihood of achieving goals; and potential problems that might occur during recuperation.  They also discussed reasonable alternatives to the procedure, including risks, benefits, and side effects related to the alternatives and risks related to not receiving this procedure.  I have had all my questions answered and I acknowledge that no guarantee has been made as to the result that may be obtained.    4.   Should the need arise during my operation/procedure, which includes change of level of care prior to discharge, I also consent to the administration of blood and/or blood products.  Further, I understand that despite careful testing and screening of blood or blood products by collecting agencies, I may still be subject to ill effects as a result of receiving a blood transfusion and/or blood products.  The following are some, but not all, of the potential risks that can occur: fever and allergic reactions, hemolytic reactions, transmission of diseases such as  Hepatitis, AIDS and Cytomegalovirus (CMV) and fluid overload.  In the event that I wish to have an autologous transfusion of my own blood, or a directed donor transfusion, I will discuss this with my physician.  Check only if Refusing Blood or Blood Products  I understand refusal of blood or blood products as deemed necessary by my physician may have serious consequences to my condition to include possible death. I hereby assume responsibility for my refusal and release the hospital, its personnel, and my physicians from any responsibility for the consequences of my refusal.    o  Refuse   5.   I authorize the use of any specimen, organs, tissues, body parts or foreign objects that may be removed from my body during the operation/procedure for diagnosis, research or teaching purposes and their subsequent disposal by hospital authorities.  I also authorize the release of specimen test results and/or written reports to my treating physician on the hospital medical staff or other referring or consulting physicians involved in my care, at the discretion of the Pathologist or my treating physician.    6.   I consent to the photographing or videotaping of the operations or procedures to be performed, including appropriate portions of my body for medical, scientific, or educational purposes, provided my identity is not revealed by the pictures or by descriptive texts accompanying them.  If the procedure has been photographed/videotaped, the surgeon will obtain the original picture, image, videotape or CD.  The hospital will not be responsible for storage, release or maintenance of the picture, image, tape or CD.    7.   I consent to the presence of a  or observers in the operating room as deemed necessary by my physician or their designees.    8.   I recognize that in the event my procedure results in extended X-Ray/fluoroscopy time, I may develop a skin reaction.    9. If I have a Do Not Attempt  Resuscitation (DNAR) order in place, that status will be suspended while in the operating room, procedural suite, and during the recovery period unless otherwise explicitly stated by me (or a person authorized to consent on my behalf). The surgeon or my attending physician will determine when the applicable recovery period ends for purposes of reinstating the DNAR order.  10. Patients having a sterilization procedure: I understand that if the procedure is successful the results will be permanent and it will therefore be impossible for me to inseminate, conceive, or bear children.  I also understand that the procedure is intended to result in sterility, although the result has not been guaranteed.   11. I acknowledge that my physician has explained sedation/analgesia administration to me including the risk and benefits I consent to the administration of sedation/analgesia as may be necessary or desirable in the judgment of my physician.    I CERTIFY THAT I HAVE READ AND FULLY UNDERSTAND THE ABOVE CONSENT TO OPERATION and/or OTHER PROCEDURE.     ____________________________________  _________________________________        ______________________________  Signature of Patient    Signature of Responsible Person                Printed Name of Responsible Person                                      ____________________________________  _____________________________                ________________________________  Signature of Witness        Date  Time         Relationship to Patient    STATEMENT OF PHYSICIAN My signature below affirms that prior to the time of the procedure; I have explained to the patient and/or his/her legal representative, the risks and benefits involved in the proposed treatment and any reasonable alternative to the proposed treatment. I have also explained the risks and benefits involved in refusal of the proposed treatment and alternatives to the proposed treatment and have answered the patient's  questions. If I have a significant financial interest in a co-management agreement or a significant financial interest in any product or implant, or other significant relationship used in this procedure/surgery, I have disclosed this and had a discussion with my patient.     _____________________________________________________              _____________________________  (Signature of Physician)                                                                                         (Date)                                   (Time)  Patient Name: Cody Fitzgerald      : 1940      Printed: 10/29/2024     Medical Record #: [id number]                                      Page 1 of 1

## (undated) NOTE — LETTER
[facility]  Administration of Anesthesia  1. Jessie Turner, or _________________________________ acting on his behalf, (Patient) (Dependent/Representative) request to receive anesthesia for my pending procedure/operation/treatment.   A infections, high spinal block, spinal bleeding, seizure, cardiac arrest and death. 7. AWARENESS: I understand that it is possible (but unlikely) to have explicit memory of events from the operating room while under general anesthesia.   8. ELECTROCONVULSIV unconscious pt /Relationship    My signature below affirms that prior to the time of the procedure, I have explained to the patient and/or his/her guardian, the risks and benefits of undergoing anesthesia, as well as any reasonable alternatives.     _______

## (undated) NOTE — ED AVS SNAPSHOT
Corby Kaur   MRN: [id number]    Department:  [facility] Emergency Department   Date of Visit:  4/15/2019           Disclosure     Insurance plans vary and the physician(s) referred by the ER may not be covered by your plan.  Please contact y within the next three months to obtain basic health screening including reassessment of your blood pressure.     IF THERE IS ANY CHANGE OR WORSENING OF YOUR CONDITION, CALL YOUR PRIMARY CARE PHYSICIAN AT ONCE OR RETURN IMMEDIATELY TO THE EMERGENCY DEPARTMEN

## (undated) NOTE — IP AVS SNAPSHOT
[facility]            (For Outpatient Use Only) Initial Admit Date: 2022   Inpt/Obs Admit Date: Inpt: 22 / Obs: N/A   Discharge Date:    Ethan Juarez:  [de-identified]   MRN: [de-identified]   CSN: [id number]   CEID: [id number]        ENCOUNTER  Patient Class: Inpatient Admitting Provider: Dominga Jamil MD Unit: [address] [place] [address]/SE   Hospital Service: Medical Attending Provider: Jass Moran MD   Bed: 564-A   Visit Type:   Referring Physician: No ref. provider found Billing Flag:    Admit Diagnosis: Aspiration pneumonitis [facility]) [J69.0]      PATIENT  Legal Name:   Sofia Avalos   Legal Sex: Male  Gender ID:              Pref Name:    PCP:  Delfin De Oliveira MD Home: [phone]   Address:  [address] [place] [address] : 1940 (81 yrs) Mobile: [phone]         City/State/Zip: [place] [address] Marital:  Language: [address] [place] [address] Drive: [facility] SSN4: [id number] Religious: Sabianism - No Visit     Race: White Ethnicity: Non  Or [address] CONTACT   Name Relationship Legal Guardian? Home Phone Work Phone Mobile Phone   1. Meme Fitzgerald  2.  Sami Dudley  Son    [phone]       GUARANTOR  Guarantor: Britney Rosalialoretta : 1940 Home Phone: [phone]   Address: [address] [place] [address]  Sex: Male Work Phone:    City/State/Zip: [place] [address] Abbi Singer   Rel. to Patient: Self Guarantor ID: [id number]   GUARANTOR EMPLOYER   Employer:  Status: RETIRED     COVERAGE  PRIMARY INSURANCE   Payor: MEDICARE Plan: MEDICARE PART A&B   Group Number: [id number] Insurance Type: [id number] Name: Rika Clark : 1940   Subscriber ID: [id number] Pt Rel to Subscriber: Self   SECONDARY INSURANCE   Payor: [address] [facility] [place] [address]: [phone]   Group Number: [id number] Insurance Type: [id number] Name: Rika Clark : 1940   Subscriber ID: [id number] Pt Rel to Subscriber: SELF   TERTIARY INSURANCE   Payor:  Plan:    Group Number:  Insurance Type:    Subscriber Name:  Subscriber :    Subscriber ID:  Pt Rel to Subscriber:    Hospital Account Financial Class: Medicare    May 17, 2022

## (undated) NOTE — IP AVS SNAPSHOT
Patient Demographics     Address  [address] [place] [id number] Phone  [phone] (Home) *Preferred*  [phone] (Mobile) E-mail Address  [email or web address]      Patient Contacts     Name Relation Home Work Mobile    Meme Fitzgerald Spouse [phone]    SHAYNA Fitzgerald [phone]        Allergies as of 6/13/2024  Review status set to In Progress on 6/11/2024   No Known Allergies     Code Status Information     Code Status    DNAR/Comfort Care      Patient Instructions    None     Patient Isolation Status     None to display      Microbiology Results (All)     Procedure Component Value Units Date/Time    Blood Culture [[id number]] Collected: 06/09/24 1906    Order Status: Completed Lab Status: Preliminary result Updated: 06/12/24 2100    Specimen: Blood,peripheral      Blood Culture Result No Growth 3 Days    Blood Culture [[id number]] Collected: 06/09/24 1916    Order Status: Completed Lab Status: Preliminary result Updated: 06/12/24 2100    Specimen: Blood,peripheral      Blood Culture Result No Growth 3 Days    Urine Culture, Routine [[id number]]  (Abnormal)  (Susceptibility) Collected: 06/09/24 1914    Order Status: Completed Lab Status: Final result Updated: 06/11/24 0654    Specimen: Urine, clean catch      Urine Culture >100,000 CFU/ML Proteus mirabilis    Susceptibility      Proteus mirabilis      Not Specified     Ampicillin <=2  Sensitive     Cefazolin <=4  Sensitive     Ciprofloxacin >=4  Resistant     Gentamicin <=1  Sensitive     Levofloxacin >=8  Resistant     Meropenem <=0.25  Sensitive     Nitrofurantoin 128  Resistant     Piperacillin + Tazobactam <=4  Sensitive     Trimethoprim/Sulfa >=320  Resistant                            Immunizations     Name Date      Covid-19 Pfizer 08/01/22     Covid-19 Pfizer 10/08/21     Covid-19 Pfizer 02/26/21     Covid-19 Pfizer 02/05/21     Fluad 0.5ml 11/08/21     Fluad 0.5ml 10/12/19     Fluad 0.5ml 11/12/18     INFLUENZA 10/11/23      INFLUENZA 10/14/20     INFLUENZA 10/14/20     INFLUENZA 11/13/18     INFLUENZA 11/27/17     INFLUENZA 11/01/17     INFLUENZA 11/01/17     INFLUENZA 11/29/16     INFLUENZA 10/20/15     INFLUENZA 10/28/14     INFLUENZA 10/28/14     Pneumococcal (Prevnar 13) 11/14/18     Pneumococcal (Prevnar 13) 10/28/14     Pneumovax 23 11/01/17     Pneumovax 23 06/05/13          Your Home Meds List      TAKE these medications       Instructions Authorizing Provider Morning Afternoon Evening As Needed   acetaminophen 500 MG Tabs  Commonly known as: Tylenol Extra Strength  Next dose due: Tonight at 9pm      Take 2 tablets (1,000 mg total) by mouth in the morning and 2 tablets (1,000 mg total) before bedtime. Also may have 500 mg PO Q6 prn but not to exceed 3 gm total of acetaminophen in 24 hours.          amLODIPine 5 MG Tabs  Commonly known as: Norvasc  Next dose due: Tomorrow morning      TAKE 1 TABLET(5 MG) BY MOUTH DAILY   Bam Memo         amoxicillin clavulanate 875-125 MG Tabs  Commonly known as: Augmentin  Next dose due: Tonight at 9pm      Take 1 tablet by mouth 2 (two) times daily for 4 days.  Stop taking on: June 17, 2024   Ashutosh Gaspar         atorvastatin 40 MG Tabs  Commonly known as: Lipitor  Next dose due: Tonight at 9pm      TAKE 1 TABLET(40 MG) BY MOUTH NIGHTLY   Bam Memo         benzonatate 200 MG Caps  Commonly known as: Tessalon      Take 1 capsule (200 mg total) by mouth 3 (three) times daily as needed for cough.   Mark Chris         calcium carbonate 500 MG Chew  Commonly known as: Tums      Chew 1 tablet (500 mg total) by mouth 3 (three) times daily as needed.   Juanito Still         clopidogrel 75 MG Tabs  Commonly known as: Plavix  Next dose due: Tomorrow morning      TAKE 1 TABLET(75 MG) BY MOUTH DAILY   Bam Memo         ferrous sulfate 325 (65 FE) MG Tbec  Next dose due: Tomorrow morning      Take 1 tablet (325 mg total) by mouth daily with breakfast.   Sha Lira          fluticasone furoate-vilanterol 100-25 MCG/ACT Aepb  Commonly known as: Breo Ellipta  Next dose due: Tomorrow morning      Inhale 1 puff into the lungs daily.   Juanito Still         gabapentin 300 MG Caps  Commonly known as: Neurontin  Next dose due: Today at 4pm      Take 1 capsule (300 mg total) by mouth 3 (three) times daily.   Bam Memo         ipratropium-albuterol 0.5-2.5 (3) MG/3ML Soln  Commonly known as: Duoneb  Next dose due: Tonight at 9pm      Take 3 mL by nebulization in the morning, at noon, and at bedtime.   Kandice Shanon         levothyroxine 100 MCG Tabs  Commonly known as: Synthroid  Next dose due: Tomorrow morning      TAKE 1 TABLET(100 MCG) BY MOUTH DAILY   Bam Memo         lidocaine 4 % Ptch  Commonly known as: LIDODERM  Next dose due: Tomorrow morning      Place 2 patches onto the skin daily. L Shoulder  &L hip as needed          pantoprazole 40 MG Tbec  Commonly known as: Protonix  Next dose due: Tomorrow morning      Take 1 tablet (40 mg total) by mouth every morning before breakfast.   Soumya Long         Polyethylene Glycol 3350 17 g Pack  Commonly known as: MIRALAX  Next dose due: Tomorrow mornng      Take 17 g by mouth daily.   Soumya Long         traMADol 50 MG Tabs  Commonly known as: Ultram      Take 1 tablet (50 mg total) by mouth every 6 (six) hours as needed.   Ashutosh Gaspar         Vitamin D 50 MCG (2000 UT) Caps  Next dose due: Tomorrow morning      Take 1 capsule (2,000 Units total) by mouth daily.                Where to Get Your Medications      These medications were sent to [id number] - [place], [place] - [address] [place] [address] AT [facility] [address], [phone], [phone]  [address] [place] [address]    Phone: [phone]   amoxicillin clavulanate 875-125 MG Tabs  traMADol 50 MG Tabs           [id number] - MAR ACTION REPORT  (last 48 hrs)    ** SITE UNKNOWN **     Order ID Medication Name Action Time Action Reason Comments    [id number]  acetaminophen (Tylenol Extra Strength) tab 1,000 mg 06/11/24 2142 Given      [id number] acetaminophen (Tylenol Extra Strength) tab 1,000 mg 06/12/24 0853 Given      [id number] acetaminophen (Tylenol Extra Strength) tab 1,000 mg 06/12/24 2147 Given by Other      [id number] acetaminophen (Tylenol Extra Strength) tab 1,000 mg 06/13/24 0847 Given      [id number] amLODIPine (Norvasc) tab 5 mg 06/12/24 0853 Given      [id number] amLODIPine (Norvasc) tab 5 mg 06/13/24 0847 Given      [id number] atorvastatin (Lipitor) tab 40 mg 06/11/24 2142 Given      [id number] atorvastatin (Lipitor) tab 40 mg 06/12/24 2147 Given by Other      [id number] azithromycin (Zithromax) 500 mg in sodium chloride 0.9% 250mL IVPB premix 06/11/24 1609 New Bag      [id number] azithromycin (Zithromax) 500 mg in sodium chloride 0.9% 250mL IVPB premix 06/12/24 1502 New Bag      [id number] cholecalciferol (Vitamin D3) tab 2,000 Units 06/12/24 0853 Given      [id number] cholecalciferol (Vitamin D3) tab 2,000 Units 06/13/24 0848 Given      [id number] clopidogrel (Plavix) tab 75 mg 06/12/24 0853 Given      [id number] clopidogrel (Plavix) tab 75 mg 06/13/24 0848 Given      [id number] ferrous sulfate DR tab 325 mg 06/12/24 0853 Given      [id number] ferrous sulfate DR tab 325 mg 06/13/24 0848 Given      [id number] fluticasone furoate-vilanterol (Breo Ellipta) 100-25 MCG/ACT inhaler 1 puff 06/12/24 0853 Given      [id number] gabapentin (Neurontin) cap 300 mg 06/11/24 1516 Given      [id number] gabapentin (Neurontin) cap 300 mg 06/11/24 2142 Given      [id number] gabapentin (Neurontin) cap 300 mg 06/12/24 0853 Given      [id number] gabapentin (Neurontin) cap 300 mg 06/12/24 1507 Given      [id number] gabapentin (Neurontin) cap 300 mg 06/12/24 2147 Given by Other      [id number] gabapentin (Neurontin) cap 300 mg 06/13/24 0848 Given      [id number] ipratropium-albuterol (Duoneb) 0.5-2.5 (3) MG/3ML inhalation solution 3 mL 06/11/24 1358 Given      [id number] ipratropium-albuterol  (Duoneb) 0.5-2.5 (3) MG/3ML inhalation solution 3 mL 06/11/24 2215 Given      [id number] ipratropium-albuterol (Duoneb) 0.5-2.5 (3) MG/3ML inhalation solution 3 mL 06/12/24 0817 Given      [id number] ipratropium-albuterol (Duoneb) 0.5-2.5 (3) MG/3ML inhalation solution 3 mL 06/12/24 1322 Given      [id number] ipratropium-albuterol (Duoneb) 0.5-2.5 (3) MG/3ML inhalation solution 3 mL 06/12/24 2021 Given      [id number] ipratropium-albuterol (Duoneb) 0.5-2.5 (3) MG/3ML inhalation solution 3 mL 06/13/24 0743 Given      [id number] ipratropium-albuterol (Duoneb) 0.5-2.5 (3) MG/3ML inhalation solution 3 mL 06/13/24 1332 Given      [id number] levothyroxine (Synthroid) tab 100 mcg 06/12/24 0534 Given      [id number] levothyroxine (Synthroid) tab 100 mcg 06/13/24 0537 Given      [id number] pantoprazole (Protonix) DR tab 40 mg 06/12/24 0534 Given      [id number] pantoprazole (Protonix) DR tab 40 mg 06/13/24 0537 Given      [id number] piperacillin-tazobactam (Zosyn) 3.375 g in dextrose 5% 100 mL IVPB-ADDV 06/11/24 1516 New Bag      [id number] piperacillin-tazobactam (Zosyn) 3.375 g in dextrose 5% 100 mL IVPB-ADDV 06/11/24 2142 New Bag      [id number] piperacillin-tazobactam (Zosyn) 3.375 g in dextrose 5% 100 mL IVPB-ADDV 06/12/24 0534 New Bag      [id number] piperacillin-tazobactam (Zosyn) 3.375 g in dextrose 5% 100 mL IVPB-ADDV 06/12/24 1627 New Bag      [id number] piperacillin-tazobactam (Zosyn) 3.375 g in dextrose 5% 100 mL IVPB-ADDV 06/13/24 0043 New Bag      [id number] piperacillin-tazobactam (Zosyn) 3.375 g in dextrose 5% 100 mL IVPB-ADDV 06/13/24 0852 New Bag            LEFT LOWER ABDOMEN     Order ID Medication Name Action Time Action Reason Comments    [id number] heparin (Porcine) 5000 UNIT/ML injection 5,000 Units 06/13/24 0537 Given            RIGHT LOWER ABDOMEN     Order ID Medication Name Action Time Action Reason Comments    [id number] heparin (Porcine) 5000 UNIT/ML injection 5,000 Units 06/11/24 1516 Given      [id number]  heparin (Porcine) 5000 UNIT/ML injection 5,000 Units 06/12/24 2148 Given by Other            RIGHT UPPER ABDOMEN     Order ID Medication Name Action Time Action Reason Comments    [id number] heparin (Porcine) 5000 UNIT/ML injection 5,000 Units 06/12/24 1502 Given            RIGHT UPPER ARM     Order ID Medication Name Action Time Action Reason Comments    [id number] heparin (Porcine) 5000 UNIT/ML injection 5,000 Units 06/11/24 2141 Given      [id number] heparin (Porcine) 5000 UNIT/ML injection 5,000 Units 06/12/24 0534 Given              Recent Vital Signs    Flowsheet Row Most Recent Value   /65 Filed at 06/13/2024 0846   Pulse 83 Filed at 06/13/2024 0846   Resp 20 Filed at 06/13/2024 0846   Temp 97.7 °F (36.5 °C) Filed at 06/13/2024 0846   SpO2 90 % Filed at 06/13/2024 0846      Patient's Most Recent Weight    Flowsheet Row Most Recent Value   Patient Weight 86.1 kg (189 lb 12.8 oz)         Lab Results Last 24 Hours      Basic Metabolic Panel (8) [[id number]] (Abnormal)  Resulted: 06/13/24 0603, Result status: Final result   Ordering provider: Ros Tamayo MD  06/12/24 2300 Resulting lab: [facility] ([facility])    Specimen Information    Type Source Collected On   Blood — 06/13/24 6317          Components    Component Value Reference Range Flag Lab   Glucose 90 70 - 99 mg/dL — [facility])   Sodium 144 136 - 145 mmol/L — [facility])   Potassium 3.5 3.5 - 5.1 mmol/L — [facility])   Chloride 114 98 - 112 mmol/L H [facility])   CO2 24.0 21.0 - 32.0 mmol/L — [facility])   Anion Gap 6 0 - 18 mmol/L — [facility])   BUN 15 9 - 23 mg/dL — [facility])   Creatinine 1.23 0.70 - 1.30 mg/dL — [facility])   BUN/CREA Ratio 12.2 10.0 - 20.0 — [facility])   Calcium, Total 9.1 8.7 - 10.4 mg/dL — [facility])   Calculated Osmolality 298 275 - 295 mOsm/kg H [facility] ([facility])   eGFR-Cr 59 >=60 mL/min/1.73m2 L [facility] ([facility])             CBC With Differential With Platelet [[id number]] (Abnormal)  Resulted: 24 0538, Result status: Final result   Ordering provider: Ros Tamayo MD  24 2300 Resulting lab: [facility] ([facility])   Narrative:  The following orders were created for panel order CBC With Differential With Platelet.  Procedure                               Abnormality         Status                     ---------                               -----------         ------                     CBC W/ DIFFERENTIAL[[id number]]          Abnormal            Final result                 Please view results for these tests on the individual orders.    Specimen Information    Type Source Collected On   Blood — 24 0455            Testing Performed By     Lab - Abbreviation Name Director Address Valid Date Range    162 - [facility] ([facility]) [facility] ([facility]) Fernando Candelario M.D. [address] [facility] [id number] 20 1442 - Present            Pending Labs     Order Current Status    Blood Culture Preliminary result    Blood Culture Preliminary result         H&P - H&P Note      H&P signed by Adriana Villavicencio MD at 2024 10:16 PM  Version 2 of 2    Author: Adriana Villavicencio MD Service: — Author Type: Physician    Filed: 2024 10:16 PM Date of Service: 2024  8:06 PM Status: Addendum    : Adriana Villavicencio MD (Physician)    Related Notes: Original Note by Adriana Villavicencio MD (Physician) filed at 2024 10:04 PM         History and Physical     Cody Fitzgerald Patient Status:  Emergency    1940 MRN [id number]   Location [facility] EMERGENCY DEPARTMENT Attending Harry Mednez MD   Hosp Day # 0 PCP Bam Hampton MD     Patient somewhat lethargic.  History obtained from wife.    Chief Complaint: Generalized weakness    Subjective:    Cody Fitzgerald is a 83 year old male with history of AAA, hypothyroidism, HTN, HLD, frequent falls,  previous stroke with left-sided weakness, who was brought into the ED for evaluation of weakness.  Per wife, in the last few days he has been weak and today required maximum assistance to move around.  Today he had a fever of 102 at home.  He has a mild cough, minimally productive.  Vitals with temp 99.7, , respirations 28  CMP stable  CBC with WBC 28.8.  Otherwise stable  Lactic acid 2.6  UA: Positive for nitrites and leuk esterase  EKG with no acute changes  Blood and urine cultures obtained.  Patient has been started on ceftriaxone for UTI.  CXR: No significant change in patchy left retrocardiac opacity which is concerning for pneumonia/aspiration.    Per wife, he recently completed a course of antibiotics for UTI.  Patient denies shortness of breath, chest pain, nausea or vomiting.    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 - Dr. Stahl    Tonsillectomy      at age 24       Social History:  reports that he quit smoking about 16 years ago. His smoking use included cigarettes. He started smoking about 66 years ago. He has a 100 pack-year smoking history. He has never used smokeless tobacco. He reports that he does not drink alcohol and does not use drugs.    Family History:   Family History   Problem Relation Age of Onset    Cancer Father         Lung        Allergies:  No Known Allergies    Medications:    Current Facility-Administered Medications on File Prior to Encounter   Medication Dose Route Frequency Provider Last Rate Last Admin    [COMPLETED] vancomycin (Vancocin) 1.75 g in sodium chloride 0.9% 500mL IVPB premix  20 mg/kg Intravenous Once Adriana Villavicencio  mL/hr at 04/15/24 0857 1,750 mg at 04/15/24 0857    [COMPLETED] azithromycin (Zithromax) tab 500 mg  500 mg Oral Nightly Adriana Villavicencio MD   500 mg at 24    [COMPLETED] sodium chloride 0.9 % IV bolus 2,859 mL  30 mL/kg Intravenous Once Bam Chan MD   Stopped at 24    [COMPLETED] acetaminophen (Tylenol Extra Strength) tab 1,000 mg  1,000 mg Oral Once Bam Chan MD   1,000 mg at 24    [COMPLETED] piperacillin-tazobactam (Zosyn) 4.5 g in dextrose 5% 100 mL IVPB-ADDV  4.5 g Intravenous Once Bam Chan MD   Stopped at 24    [COMPLETED] azithromycin (Zithromax) tab 500 mg  500 mg Oral Once Bam Chan MD   500 mg at 24 194    [COMPLETED] remdesivir (Veklury) 200 mg in sodium chloride 0.9% 100 mL IVPB  200 mg Intravenous Once Babar Ferraro  mL/hr at 24 2352 200 mg at 24 2352     Current Outpatient Medications on File Prior to Encounter   Medication Sig Dispense Refill    ipratropium-albuterol 0.5-2.5 (3) MG/3ML Inhalation Solution Take 3 mL by nebulization in the morning, at noon, and at bedtime. 3 mL 0    sulfamethoxazole-trimethoprim -160 MG Oral Tab per tablet Take 1 tablet by mouth 2 (two) times daily. 14 tablet 0    cephalexin 500 MG Oral Cap Take 1 capsule (500 mg total) by mouth 3 (three) times daily. 21 capsule 0    vancomycin (VANCOCIN) 125 MG Oral Cap Take 1 capsule (125 mg total) by mouth daily. 10 capsule 0    amLODIPine 5 MG Oral Tab TAKE 1 TABLET(5 MG) BY MOUTH DAILY 90 tablet 1    [] amoxicillin clavulanate 875-125 MG Oral Tab Take 1 tablet by mouth 2 (two)  times daily for 4 days. 8 tablet 0    fluticasone furoate-vilanterol 100-25 MCG/ACT Inhalation Aerosol Powder, Breath Activated Inhale 1 puff into the lungs daily.      calcium carbonate 500 MG Oral Chew Tab Chew 1 tablet (500 mg total) by mouth 3 (three) times daily as needed.      pantoprazole 40 MG Oral Tab EC Take 1 tablet (40 mg total) by mouth every morning before breakfast.      polyethylene glycol, PEG 3350, 17 g Oral Powd Pack Take 17 g by mouth daily.      ATORVASTATIN 40 MG Oral Tab TAKE 1 TABLET(40 MG) BY MOUTH NIGHTLY 90 tablet 3    CLOPIDOGREL 75 MG Oral Tab TAKE 1 TABLET(75 MG) BY MOUTH DAILY 90 tablet 3    ferrous sulfate 325 (65 FE) MG Oral Tab EC Take 1 tablet (325 mg total) by mouth daily with breakfast. 30 tablet 0    gabapentin 300 MG Oral Cap Take 1 capsule (300 mg total) by mouth 3 (three) times daily. 270 capsule 3    LEVOTHYROXINE 100 MCG Oral Tab TAKE 1 TABLET(100 MCG) BY MOUTH DAILY 90 tablet 3    lidocaine 4 % External Patch Place 2 patches onto the skin daily. L Shoulder  &L hip as needed      benzonatate 200 MG Oral Cap Take 1 capsule (200 mg total) by mouth 3 (three) times daily as needed for cough. 60 capsule 1    acetaminophen 500 MG Oral Tab Take 2 tablets (1,000 mg total) by mouth in the morning and 2 tablets (1,000 mg total) before bedtime. Also may have 500 mg PO Q6 prn but not to exceed 3 gm total of acetaminophen in 24 hours.      Cholecalciferol (VITAMIN D) 2000 units Oral Cap Take 1 capsule (2,000 Units total) by mouth daily.         Review of Systems:   A comprehensive 14 point review of systems was completed.    Pertinent positives and negatives noted in the HPI.    Objective:     /84   Pulse 116   Temp 99.7 °F (37.6 °C) (Oral)   Resp (!) 28   SpO2 92%   General: No acute distress.    HEENT: Normocephalic, atraumatic.  Neck: Supple.  Respiratory: Normal effort.  CTAB  Cardiovascular: S1, S2 regular.  Mild tachycardia, no murmur.  Abdomen: Soft, not tender or  distended.  Neurologic: Lethargic but arousable, with appropriate responses.  Follows commands.  Musculoskeletal: No tenderness or deformity.  Extremities: No edema  Psychiatric: Calm    Results:      Labs:  Labs Last 24 Hours:   BMP     CBC    Other     Na 142 Cl 110 BUN 22 Glu 142   Hb -   PTT - Procal -   K 3.9 CO2 21.0 Cr 1.23   WBC - >< PLT -  INR - CRP -   Renal Lytes Endo    Hct -   Trop - D dim -   eGFR - Ca 9.9 POC Gluc  -    LFT   pBNP - Lactic 2.6   eGFR AA - PO4 - A1c -   AST 11 APk 111 Prot 8.4  LDL -     Mg - TSH -   ALT <7 T joss 0.9 Alb 4.5          COVID-19 Lab Results    COVID-19  Lab Results   Component Value Date    COVID19 Detected (A) 04/14/2024    COVID19 Not Detected 01/11/2024    COVID19 Not Detected 10/15/2023       Pro-Calcitonin  No results for input(s): \"PCT\" in the last 168 hours.    Cardiac  No results for input(s): \"TROP\", \"PBNP\" in the last 168 hours.    Creatinine Kinase  No results for input(s): \"CK\" in the last 168 hours.    Inflammatory Markers  No results for input(s): \"CRP\", \"JAZZY\", \"LDH\", \"DDIMER\" in the last 168 hours.    Imaging: Imaging data reviewed in Epic.    Assessment & Plan:    UTI  Sepsis with tachycardia, tachypnea, elevated lactic acid, leukocytosis  -Admit  -Already received 1 L of fluids in the ED.  Normal BP  -Continue IV antibiotics with ceftriaxone  -Continue IV fluids  -Monitor lactic acid  -Follow-up blood and urine cultures    Left retrocardiac opacity, possible pneumonia/aspiration  -Discussed with wife, will go ahead and get chest CT given persistent since April.  -Adjust antibiotic coverage based on results    Hypothyroidism  Hypertension  Hyperlipidemia  History of stroke with residual left-sided weakness  -Resume home medications when reconciled    Quality:  DVT Prophylaxis: Heparin  CODE status: DNR/comfort from previous.  Goals of care needs to be readdressed  DEBORAH: TBD      Plan of care discussed with patient and wife. Acknowledged understanding and  agrees to plan. Also discussed with the ED physician.    High MDM  Time spent on this admission - examining patient, obtaining history, reviewing previous medical records, going over test results/imaging and discussing plan of care. More than 50% of the time was spent in counseling and/or coordination of care related to sepsis/UTI.  Possible pneumonia.   All questions answered.     Adriana Villavicencio MD  2024                Electronically signed by Adriana Villavicencio MD on 2024 10:16 PM     H&P signed by Adriana Villavicencio MD at 2024 10:04 PM  Version 1 of 2    Author: Adriana Villavicencio MD Service: — Author Type: Physician    Filed: 2024 10:04 PM Date of Service: 2024  8:06 PM Status: Signed    : Adriana Villavicencio MD (Physician)    Related Notes: Addendum by Adriana Villavicencio MD (Physician) filed at 2024 10:16 PM         History and Physical     Cody Fitzgerald Patient Status:  Emergency    1940 MRN [id number]   Location [facility] EMERGENCY DEPARTMENT Attending Harry Mendez MD   Hosp Day # 0 PCP Bam Hampton MD     Patient somewhat lethargic.  History obtained from wife.    Chief Complaint: Generalized weakness    Subjective:    Cody Fitzgerald is a 83 year old male with history of AAA, hypothyroidism, HTN, HLD, frequent falls, previous stroke with left-sided weakness, mostly bedbound who was brought into the ED for evaluation of weakness.  Today he had a fever of 102 at home.  He has a mild cough, minimally productive.  Vitals with temp 99.7, , respirations 28  CMP stable  CBC with WBC 28.8.  Otherwise stable  Lactic acid 2.6  UA: Positive for nitrites and leuk esterase  EKG with no acute changes  Blood and urine cultures obtained.  Patient has been started on ceftriaxone for UTI.  CXR: No significant change in patchy left retrocardiac opacity which is concerning for pneumonia/aspiration.    Per wife, he recently completed a course of antibiotics for  UTI.  Patient denies shortness of breath, chest pain, nausea or vomiting.    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 - Dr. Stahl    Tonsillectomy      at age 24       Social History:  reports that he quit smoking about 16 years ago. His smoking use included cigarettes. He started smoking about 66 years ago. He has a 100 pack-year smoking history. He has never used smokeless tobacco. He reports that he does not drink alcohol and does not use drugs.    Family History:   Family History   Problem Relation Age of Onset    Cancer Father         Lung        Allergies: No Known Allergies    Medications:    Current Facility-Administered Medications on File Prior to Encounter   Medication Dose Route Frequency Provider Last Rate Last Admin    [COMPLETED] vancomycin (Vancocin) 1.75 g in sodium chloride 0.9% 500mL IVPB premix  20 mg/kg Intravenous Once Adriana Villavicencio  mL/hr at 04/15/24 0857 1,750 mg at 04/15/24 0857    [COMPLETED] azithromycin (Zithromax) tab 500 mg  500 mg Oral Nightly Adriana Villavicencio MD   500 mg at 04/16/24 2118    [COMPLETED] sodium chloride 0.9 % IV bolus 2,859 mL  30 mL/kg Intravenous Once Bam Chan MD   Stopped at 04/14/24 2024    [COMPLETED] acetaminophen (Tylenol Extra Strength) tab 1,000 mg  1,000 mg Oral Once Bam Chan  MD   1,000 mg at 24    [COMPLETED] piperacillin-tazobactam (Zosyn) 4.5 g in dextrose 5% 100 mL IVPB-ADDV  4.5 g Intravenous Once Bam Chan MD   Stopped at 24    [COMPLETED] azithromycin (Zithromax) tab 500 mg  500 mg Oral Once Bam Chan MD   500 mg at 24    [COMPLETED] remdesivir (Veklury) 200 mg in sodium chloride 0.9% 100 mL IVPB  200 mg Intravenous Once Babar Ferraro  mL/hr at 24 2352 200 mg at 24 2352     Current Outpatient Medications on File Prior to Encounter   Medication Sig Dispense Refill    ipratropium-albuterol 0.5-2.5 (3) MG/3ML Inhalation Solution Take 3 mL by nebulization in the morning, at noon, and at bedtime. 3 mL 0    sulfamethoxazole-trimethoprim -160 MG Oral Tab per tablet Take 1 tablet by mouth 2 (two) times daily. 14 tablet 0    cephalexin 500 MG Oral Cap Take 1 capsule (500 mg total) by mouth 3 (three) times daily. 21 capsule 0    vancomycin (VANCOCIN) 125 MG Oral Cap Take 1 capsule (125 mg total) by mouth daily. 10 capsule 0    amLODIPine 5 MG Oral Tab TAKE 1 TABLET(5 MG) BY MOUTH DAILY 90 tablet 1    [] amoxicillin clavulanate 875-125 MG Oral Tab Take 1 tablet by mouth 2 (two) times daily for 4 days. 8 tablet 0    fluticasone furoate-vilanterol 100-25 MCG/ACT Inhalation Aerosol Powder, Breath Activated Inhale 1 puff into the lungs daily.      calcium carbonate 500 MG Oral Chew Tab Chew 1 tablet (500 mg total) by mouth 3 (three) times daily as needed.      pantoprazole 40 MG Oral Tab EC Take 1 tablet (40 mg total) by mouth every morning before breakfast.      polyethylene glycol, PEG 3350, 17 g Oral Powd Pack Take 17 g by mouth daily.      ATORVASTATIN 40 MG Oral Tab TAKE 1 TABLET(40 MG) BY MOUTH NIGHTLY 90 tablet 3    CLOPIDOGREL 75 MG Oral Tab TAKE 1 TABLET(75 MG) BY MOUTH DAILY 90 tablet 3    ferrous sulfate 325 (65 FE) MG Oral Tab EC Take 1 tablet (325 mg total) by mouth daily with  breakfast. 30 tablet 0    gabapentin 300 MG Oral Cap Take 1 capsule (300 mg total) by mouth 3 (three) times daily. 270 capsule 3    LEVOTHYROXINE 100 MCG Oral Tab TAKE 1 TABLET(100 MCG) BY MOUTH DAILY 90 tablet 3    lidocaine 4 % External Patch Place 2 patches onto the skin daily. L Shoulder  &L hip as needed      benzonatate 200 MG Oral Cap Take 1 capsule (200 mg total) by mouth 3 (three) times daily as needed for cough. 60 capsule 1    acetaminophen 500 MG Oral Tab Take 2 tablets (1,000 mg total) by mouth in the morning and 2 tablets (1,000 mg total) before bedtime. Also may have 500 mg PO Q6 prn but not to exceed 3 gm total of acetaminophen in 24 hours.      Cholecalciferol (VITAMIN D) 2000 units Oral Cap Take 1 capsule (2,000 Units total) by mouth daily.         Review of Systems:   A comprehensive 14 point review of systems was completed.    Pertinent positives and negatives noted in the HPI.    Objective:     /84   Pulse 116   Temp 99.7 °F (37.6 °C) (Oral)   Resp (!) 28   SpO2 92%   General: No acute distress.    HEENT: Normocephalic, atraumatic.  Neck: Supple.  Respiratory: Normal effort.  CTAB  Cardiovascular: S1, S2 regular.  Mild tachycardia, no murmur.  Abdomen: Soft, not tender or distended.  Neurologic: Lethargic but arousable, with appropriate responses.  Follows commands.  Musculoskeletal: No tenderness or deformity.  Extremities: No edema  Psychiatric: Calm    Results:      Labs:  Labs Last 24 Hours:   BMP     CBC    Other     Na 142 Cl 110 BUN 22 Glu 142   Hb -   PTT - Procal -   K 3.9 CO2 21.0 Cr 1.23   WBC - >< PLT -  INR - CRP -   Renal Lytes Endo    Hct -   Trop - D dim -   eGFR - Ca 9.9 POC Gluc  -    LFT   pBNP - Lactic 2.6   eGFR AA - PO4 - A1c -   AST 11 APk 111 Prot 8.4  LDL -     Mg - TSH -   ALT <7 T joss 0.9 Alb 4.5          COVID-19 Lab Results    COVID-19  Lab Results   Component Value Date    COVID19 Detected (A) 04/14/2024    COVID19 Not Detected 01/11/2024    COVID19 Not  Detected 10/15/2023       Pro-Calcitonin  No results for input(s): \"PCT\" in the last 168 hours.    Cardiac  No results for input(s): \"TROP\", \"PBNP\" in the last 168 hours.    Creatinine Kinase  No results for input(s): \"CK\" in the last 168 hours.    Inflammatory Markers  No results for input(s): \"CRP\", \"JAZZY\", \"LDH\", \"DDIMER\" in the last 168 hours.    Imaging: Imaging data reviewed in Epic.    Assessment & Plan:    UTI  Sepsis with tachycardia, tachypnea, elevated lactic acid, leukocytosis  -Admit  -Already received 1 L of fluids in the ED.  Normal BP  -Continue IV antibiotics with ceftriaxone  -Continue IV fluids  -Monitor lactic acid  -Follow-up blood and urine cultures    Left retrocardiac opacity, possible pneumonia/aspiration  -Discussed with wife, will go ahead and get chest CT given persistent since April.  -Adjust antibiotic coverage based on results    Hypothyroidism  Hypertension  Hyperlipidemia  History of stroke with residual left-sided weakness  -Resume home medications when reconciled    Quality:  DVT Prophylaxis: Heparin  CODE status: DNR/comfort from previous.  Goals of care needs to be readdressed  DEBORAH: TBD      Plan of care discussed with patient and wife. Acknowledged understanding and agrees to plan. Also discussed with the ED physician.    High MDM  Time spent on this admission - examining patient, obtaining history, reviewing previous medical records, going over test results/imaging and discussing plan of care. More than 50% of the time was spent in counseling and/or coordination of care related to sepsis/UTI.  Possible pneumonia.   All questions answered.     Adriana Villavicencio MD  6/9/2024                Electronically signed by Adriana Villavicencio MD on 6/9/2024 10:04 PM              Discharge Summary - D/C Summary      Discharge Summary signed by Ashutosh Gaspar MD at 6/13/2024  1:11 PM  Version 1 of 1    Author: Ashutosh Gaspar MD Service: Hospitalist Author Type: Physician    Filed: 6/13/2024  1:11 PM  Date of Service: 2024  1:08 PM Status: Signed    : Ashutosh Gaspar MD (Physician)       [facility]  Discharge Summary     Cody Fitzgerald  : 1940    Status: Inpatient  Day #: 4    Attending: Ashutosh Gaspar MD  PCP: Bam Hampton MD     Date of Admission:  2024  Date of Discharge:  2024     Hospital Discharge Diagnoses:     Acute Proteus mirabilis UTI  Sepsis secondary to UTI  Acute aspiration pneumonia  Dysphagia  Right lower extremity radiculopathy related to lumbar stenosis  Hypothyroidism  Hypertension  Hyperlipidemia  History of CVA with left-sided weakness      History of Present Illness:     Copied from Admission H&P:    Cody Fitzgerald is a 83 year old male with history of AAA, hypothyroidism, HTN, HLD, frequent falls, previous stroke with left-sided weakness, who was brought into the ED for evaluation of weakness.  Per wife, in the last few days he has been weak and today required maximum assistance to move around.  Today he had a fever of 102 at home.  He has a mild cough, minimally productive.  Vitals with temp 99.7, , respirations 28  CMP stable  CBC with WBC 28.8.  Otherwise stable  Lactic acid 2.6  UA: Positive for nitrites and leuk esterase  EKG with no acute changes  Blood and urine cultures obtained.  Patient has been started on ceftriaxone for UTI.  CXR: No significant change in patchy left retrocardiac opacity which is concerning for pneumonia/aspiration.     Per wife, he recently completed a course of antibiotics for UTI.  Patient denies shortness of breath, chest pain, nausea or vomiting.      Hospital Course:     UTI  Sepsis with tachycardia, tachypnea, elevated lactic acid, leukocytosis  -Already received 1 L of fluids in the ED.  Normal BP  -stopped IVF  -lactic acid -normalized  -BCX NGTD; Ucx  + ve proteus MDR- on zosyn  -oral abx on discharge     Acute aspiration pneumonia  -ct shows debris in L bronchi with possible postop PNA  -Pt with ho  aspiration PNA  -SLP ordered: soft easy chew and thick liquids  -noncompliant with thickened liquids at times   -weaned off O2  -augmentin on discharge     LBP R side radiating to RLE/radicular pattern:  -MRI in 2019 showed multilevel degenerative changes of the lumbar spine, worst at L4-L5, where there is moderate spinal canal stenosis with moderate to severe right worse than left foraminal impingement.   -CT lumbar spine similar - moderate stenosis L4-5  -PT/OT  -tramadol prn     Hypothyroidism  Hypertension  Hyperlipidemia  History of stroke with residual left-sided weakness  -Cont home medications        Physical Exam:   Blood pressure 150/65, pulse 83, temperature 97.7 °F (36.5 °C), temperature source Oral, resp. rate 20, weight 189 lb 12.8 oz (86.1 kg), SpO2 90%.  General:  Alert, no distress  HEENT:  Normocephalic, atraumatic  Cardiac:  Regular rate, regular rhythm  Pulmonary:  Clear to auscultation bilaterally, respirations unlabored  Gastrointestinal:  Soft, non-tender, normal bowel sounds  Musculoskeletal:  No joint swelling  Extremities:  No edema, no cyanosis, no clubbing  Neurologic:  nonfocal  Psychiatric:  Normal affect, calm and appropriate         Discharge Medications        START taking these medications        Instructions Prescription details   traMADol 50 MG Tabs  Commonly known as: Ultram      Take 1 tablet (50 mg total) by mouth every 6 (six) hours as needed.   Quantity: 20 tablet  Refills: 0            CONTINUE taking these medications        Instructions Prescription details   acetaminophen 500 MG Tabs  Commonly known as: Tylenol Extra Strength      Take 2 tablets (1,000 mg total) by mouth in the morning and 2 tablets (1,000 mg total) before bedtime. Also may have 500 mg PO Q6 prn but not to exceed 3 gm total of acetaminophen in 24 hours.   Refills: 0     amLODIPine 5 MG Tabs  Commonly known as: Norvasc      TAKE 1 TABLET(5 MG) BY MOUTH DAILY   Quantity: 90 tablet  Refills: 1      amoxicillin clavulanate 875-125 MG Tabs  Commonly known as: Augmentin      Take 1 tablet by mouth 2 (two) times daily for 4 days.   Stop taking on: June 17, 2024  Quantity: 8 tablet  Refills: 0     atorvastatin 40 MG Tabs  Commonly known as: Lipitor      TAKE 1 TABLET(40 MG) BY MOUTH NIGHTLY   Quantity: 90 tablet  Refills: 3     benzonatate 200 MG Caps  Commonly known as: Tessalon      Take 1 capsule (200 mg total) by mouth 3 (three) times daily as needed for cough.   Quantity: 60 capsule  Refills: 1     calcium carbonate 500 MG Chew  Commonly known as: Tums      Chew 1 tablet (500 mg total) by mouth 3 (three) times daily as needed.   Refills: 0     clopidogrel 75 MG Tabs  Commonly known as: Plavix      TAKE 1 TABLET(75 MG) BY MOUTH DAILY   Quantity: 90 tablet  Refills: 3     ferrous sulfate 325 (65 FE) MG Tbec      Take 1 tablet (325 mg total) by mouth daily with breakfast.   Quantity: 30 tablet  Refills: 0     fluticasone furoate-vilanterol 100-25 MCG/ACT Aepb  Commonly known as: Breo Ellipta      Inhale 1 puff into the lungs daily.   Refills: 0     gabapentin 300 MG Caps  Commonly known as: Neurontin      Take 1 capsule (300 mg total) by mouth 3 (three) times daily.   Quantity: 270 capsule  Refills: 3     ipratropium-albuterol 0.5-2.5 (3) MG/3ML Soln  Commonly known as: Duoneb      Take 3 mL by nebulization in the morning, at noon, and at bedtime.   Quantity: 3 mL  Refills: 0     levothyroxine 100 MCG Tabs  Commonly known as: Synthroid      TAKE 1 TABLET(100 MCG) BY MOUTH DAILY   Quantity: 90 tablet  Refills: 3     lidocaine 4 % Ptch  Commonly known as: LIDODERM      Place 2 patches onto the skin daily. L Shoulder  &L hip as needed   Refills: 0     pantoprazole 40 MG Tbec  Commonly known as: Protonix      Take 1 tablet (40 mg total) by mouth every morning before breakfast.   Refills: 0     Polyethylene Glycol 3350 17 g Pack  Commonly known as: MIRALAX      Take 17 g by mouth daily.   Refills: 0     Vitamin D 50  MCG (2000 UT) Caps      Take 1 capsule (2,000 Units total) by mouth daily.   Refills: 0               Where to Get Your Medications        These medications were sent to [id number] - [place], [place] - [address] [place] [facility] [place], [phone], [phone]  [address] [facility], [facility] [address]      Phone: [phone]   amoxicillin clavulanate 875-125 MG Tabs  traMADol 50 MG Tabs           Hospital Discharge Diagnoses:  acute proteus UTI with sepsis    Lace+ Score: 84  59-90 High Risk  29-58 Medium Risk  0-28   Low Risk.    TCM Follow-Up Recommendation:  LACE > 58: High Risk of readmission after discharge from the hospital.        I spent >30 minutes on this discharge. Discussed treatment and discharge plans.       Ashutosh Gaspar MD      Electronically signed by Ashutosh Gaspar MD on 6/13/2024  1:11 PM              Physical Therapy Notes (last 72 hours)      Physical Therapy Note signed by Sarah Chavez PT at 6/13/2024 12:12 PM  Version 1 of 1    Author: Sarah Chavez PT Service: Rehab Author Type: Physical Therapist    Filed: 6/13/2024 12:12 PM Date of Service: 6/13/2024 11:30 AM Status: Signed    : Sarah Chavez PT (Physical Therapist)       PHYSICAL THERAPY TREATMENT NOTE - INPATIENT     Room Number: 529/529-A       Presenting Problem: acute cystitis wiht hematuria, weakness  Co-Morbidities : Hx AAA, HTN, frequent falls, CVA with L side weakness    Problem List  Principal Problem:    Acute cystitis with hematuria  Active Problems:    Sepsis (HCC)      PHYSICAL THERAPY ASSESSMENT   Patient demonstrates fair progress this session, goals  remain in progress.    Patient continues to function below baseline with bed mobility, transfers, gait, stair negotiation, maintaining seated position, standing prolonged periods, performing household tasks, and wheelchair mobility.  Contributing factors to remaining limitations include decreased functional strength, decreased endurance/aerobic capacity,  pain, decreased muscular endurance, and medical status.  Next session anticipate patient to progress bed mobility, transfers, gait, stair negotiation, maintaining seated position, standing prolonged periods, and performing household tasks.  Physical Therapy will continue to follow patient for duration of hospitalization.    Patient continues to benefit from continued skilled PT services: to promote return to prior level of function and safety with continuous assistance and gradual rehabilitative therapy .    PLAN  PT Treatment Plan: Bed mobility;Body mechanics;Coordination;Endurance;Energy conservation;Strengthening;Stair training;Transfer training;Balance training  Frequency (Obs): 3-5x/week    SUBJECTIVE  \"I'm always ready\"    OBJECTIVE  Precautions: Bed/chair alarm    PAIN ASSESSMENT   Ratin  Location: generalized body aches  Management Techniques: Activity promotion;Body mechanics;Relaxation;Repositioning;Breathing techniques    BALANCE  Static Sitting: Fair -  Dynamic Sitting: Poor  Static Standing: Fair  Dynamic Standing: Poor    AM-PAC '6-Clicks' INPATIENT SHORT FORM - BASIC MOBILITY  How much difficulty does the patient currently have...  Patient Difficulty: Turning over in bed (including adjusting bedclothes, sheets and blankets)?: A Lot   Patient Difficulty: Sitting down on and standing up from a chair with arms (e.g., wheelchair, bedside commode, etc.): A Lot   Patient Difficulty: Moving from lying on back to sitting on the side of the bed?: A Lot   How much help from another person does the patient currently need...   Help from Another: Moving to and from a bed to a chair (including a wheelchair)?: A Lot   Help from Another: Need to walk in hospital room?: A Lot   Help from Another: Climbing 3-5 steps with a railing?: Total     AM-PAC Score:  Raw Score: 11   Approx Degree of Impairment: 72.57%   Standardized Score (AM-PAC Scale): 33.86   CMS Modifier (G-Code): CL    FUNCTIONAL ABILITY  STATUS  Functional Mobility/Gait Assessment  Gait Assistance: Moderate assistance (with a close chair follow)  Distance (ft): 2 feet x 2   Assistive Device: Rolling walker  Pattern:  (shuffle pattern, decreased gerhard, decreased step length, forward flexed posture, narrow base of support, verbal cues for sequencing and rolling walker management.)  Stairs:  (Deferred due to poor standing tolerance.)  Rolling:  not tested  Supine to Sit:  not tested (patient up in chair at start of session)  Sit to Supine:  not tested  Sit to Stand: maximum assist    Additional information: Patient received seated in recliner chair at initiation of session agreeable to participation in PT. Patient tolerates treatment fairly, demonstrates improvement in ambulation tolerance in comparison to previous session, however continues to require extensive 1 person assistance for all out of bed mobility. Patient requires maximal assistance to perform sit to stand transfers from recliner. Ambulates ~2 feet x 2 with moderate assistance and a close chair follow. Patient left in chair, lines intact, needs in reach and handoff to RN.      The patient's Approx Degree of Impairment: 72.57% has been calculated based on documentation in the [facility] '6 clicks' Inpatient Daily Activity Short Form.  Research supports that patients with this level of impairment may benefit from rehab.  Final disposition will be made by interdisciplinary medical team.    THERAPEUTIC EXERCISES  Lower Extremity Alternating marching  Ankle pumps  Glut sets  Knee extension     Position Sitting       Patient End of Session: Up in chair;Call light within reach;Needs met;All patient questions and concerns addressed;Alarm set;Family present    CURRENT GOALS   Goals to be met by: 6/24/24  Patient Goal Patient's self-stated goal is: go home   Goal #1 Patient is able to demonstrate supine - sit EOB @ level: minimum assistance      Goal #1   Current Status  Not tested   Goal #2 Patient is  able to demonstrate transfers Sit to/from Stand at assistance level: CGA with walker - tabitha and/or walker - rolling      Goal #2  Current Status  Maximal assistance with RW   Goal #3 Patient is able to ambulate 30 feet with assist device: walker - tabitha and/or walker - rolling at assistance level: minimum assistance   Goal #3   Current Status  2 feet x 2 moderate assistance with a RW   Goal #4 Patient will tolerate static standing for 3 minutes with BUE support on RW and CGA in order to prepare for future out of bed activities.   Goal #4   Current Status  Progressing, ~2 minutes   Goal #5 Patient to demonstrate independence with home activity/exercise instructions provided to patient in preparation for discharge.   Goal #5   Current Status  Progressing     Gait Trainin minutes  Therapeutic Activity: 10 minutes    Sarah Chavez PT, DPT                  Occupational Therapy Notes (last 72 hours)      Occupational Therapy Note signed by Marquita Sears OT at 6/10/2024  4:09 PM  Version 1 of 1    Author: Marquita Sears OT Service: — Author Type: Occupational Therapist    Filed: 6/10/2024  4:09 PM Date of Service: 6/10/2024 10:00 AM Status: Signed    : Marquita Sears OT (Occupational Therapist)       OCCUPATIONAL THERAPY EVALUATION - INPATIENT     Room Number: 529/529-A  Evaluation Date: 6/10/2024  Type of Evaluation: Initial  Presenting Problem: acute cystitis, sepsis with tachycardia  Hx AAA, HTN, frequent falls, CVA with L side weakness     Physician Order: IP Consult to Occupational Therapy  Reason for Therapy: ADL/IADL Dysfunction and Discharge Planning    OCCUPATIONAL THERAPY ASSESSMENT   Patient is a 83 year old male admitted 2024 for acute cystitis, sepsis with tachycardia.  Patient reported his family assists with ADLs; SUP for ambulating short household distances using tabitha-walker or RW. Patient is currently functioning below baseline with ADLs and fx mobility/transfers.  Patient is  requiring up to max/total assist for ADLs as a result of the following impairments: decreased functional strength, decreased functional reach, decreased endurance, pain, impaired balance, decreased muscular endurance, and limited L UE ROM. Occupational Therapy will continue to follow for duration of hospitalization.    Patient will benefit from continued skilled OT Services to promote return to prior level of function and safety with continuous assistance and gradual rehabilitative therapy     PLAN  OT Treatment Plan: Energy conservation/work simplification techniques;Balance activities;ADL training;Functional transfer training;Endurance training;Patient/Family education;Patient/Family training;Equipment eval/education;Compensatory technique education  OT Device Recommendations: TBD    OCCUPATIONAL THERAPY MEDICAL/SOCIAL HISTORY   Problem List  Principal Problem:    Acute cystitis with hematuria  Active Problems:    Sepsis (HCC)    HOME SITUATION  Type of Home: House  Home Layout: Two level; Stairlift  Lives With: Spouse; Family (daughter, granddaughter)  Toilet and Equipment: Comfort height toilet; Grab bar  Shower/Tub and Equipment: Walk-in shower; Shower chair  Other Equipment: -- (hospital bed, w/c)  Drives: No  Patient Regularly Uses: Glasses  Use of Assistive Device(s): RW, tabitha-walker; owns w/c    SUBJECTIVE  \"My daughter and granddaughter are very helpful.\"    OCCUPATIONAL THERAPY EXAMINATION    OBJECTIVE  Precautions: Bed/chair alarm  Fall Risk: High fall risk    PAIN ASSESSMENT  Rating: -- (not rated)  Location: back  Management Techniques: Activity promotion; Body mechanics; Repositioning; Nurse notified    ACTIVITY TOLERANCE           BP: (!) 145/94  BP Location: Right arm  BP Method: Automatic  Patient Position: Sitting    COGNITION  Orientation Level:  oriented to place, oriented to situation, and oriented to person  Following Commands:  follows one step commands with repetition    SENSATION  Light  touch:  intact    RANGE OF MOTION / STRENGTH ASSESSMENT  Upper extremity ROM/strength WFL except L UE d/t hx CVA  L UE shoulder flexion <15 degrees  L hand  strength 3+/5      ACTIVITIES OF DAILY LIVING ASSESSMENT  AM-PAC ‘6-Clicks’ Inpatient Daily Activity Short Form  How much help from another person does the patient currently need…  -   Putting on and taking off regular lower body clothing?: A Lot  -   Bathing (including washing, rinsing, drying)?: A Lot  -   Toileting, which includes using toilet, bedpan or urinal? : A Lot  -   Putting on and taking off regular upper body clothing?: A Little  -   Taking care of personal grooming such as brushing teeth?: A Little  -   Eating meals?: A Little    AM-PAC Score:  Score: 15  Approx Degree of Impairment: 56.46%  Standardized Score (AM-PAC Scale): 34.69  CMS Modifier (G-Code): CK    BED MOBILITY  Supine to Sit: Mod assist x2     FUNCTIONAL TRANSFER ASSESSMENT  Sit to Stand from EOB: Mod assist x1 using RW, tolerated standing <15 seconds before requiring seated recovery s/t fatigue  Stand Pivot Transfer from EOB to Chair: Mod assist x1 using RW, second person rpesent for safety  Comments:  B knee flexion and rounded flexed posture upon initial stand at EOB    FUNCTIONAL ADL ASSESSMENT  Eating: setup assist (per obs)   Grooming: min assist seated (per obs)   UB Dressing: mod assist   LB Dressing: max assist  Toileting: total assist (per obs)     EDUCATION PROVIDED  Patient: Plan of Care; Role of Occupational Therapy; Discharge Recommendations; Fall Prevention; Functional Transfer Techniques; Posture/Positioning; Energy Conservation; Compensatory ADL Techniques; Proper Body Mechanics  Patient's Response to Education: Returned Demonstration; Verbalized Understanding; Requires Further Education    The patient's Approx Degree of Impairment: 56.46% has been calculated based on documentation in the [facility] '6 clicks' Inpatient Daily Activity Short Form.  Research supports  that patients with this level of impairment may benefit from rehab.  Final disposition will be made by interdisciplinary medical team.     Patient End of Session: Up in chair;Needs met;Call light within reach;RN aware of session/findings;All patient questions and concerns addressed;Alarm set    OT Goals  Patients self stated goal is: none stated     Patient will complete functional transfer with SBA  Comment:     Patient will complete LB dressing with min assist  Comment:     Patient will tolerate standing for 2-3 minutes minutes in prep for adls with SBA  Comment:    Patient will complete item retrieval with SBA  Comment:          Goals  on: 24  Frequency: 3-5x/week    Patient Evaluation Complexity Level:   Occupational Profile/Medical History MODERATE - Expanded review of history including review of medical or therapy record   Specific performance deficits impacting engagement in ADL/IADL MODERATE  3 - 5 performance deficits   Client Assessment/Performance Deficits MODERATE - Comorbidities and min to mod modifications of tasks    Clinical Decision Making MODERATE - Analysis of occupational profile, detailed assessments, several treatment options    Overall Complexity MODERATE     OT Session Time  Therapeutic Activity: 16 minutes    LELA Bob/L  [facility]  #[id number]               Video Swallow Study Notes    No notes of this type exist for this encounter.        SLP Note - SLP Notes      SLP Note signed by Brisa Lazaro SLP at 2024  2:14 PM  Version 1 of 1    Author: Chambers, Brisa, SLP Service: Rehab Author Type: Speech and Language Pathologist    Filed: 2024  2:14 PM Date of Service: 2024  2:03 PM Status: Signed    : Brisa Lazaro SLP (Speech and Language Pathologist)    Summary:soft easy to chew/thin liquids             SPEECH DAILY NOTE - INPATIENT    ASSESSMENT & PLAN   ASSESSMENT  PPE REQUIRED. THIS THERAPIST WORE GLOVES AND MASK FOR DURATION OF  EVALUATION. HANDS WASHED UPON ENTRANCE/EXIT.    SLP f/u for ongoing dysphagia tx/meal assessment per recommendations of soft easy to chew/thin liquids per BSE. RN reports pt tolerates diet and medication well with no overt clinical s/s aspiration. Pt denies any swallowing challenges, still opposed to trialing any modified liquids and complete further diagnostic testing.     Pt positioned upright in bedside chair, alert/cooperative. Pt afebrile, tolerating room air with oxygen status 94% prior to the start of oral trials. SLP reviewed aspiration precautions and safe swallowing compensatory strategies with the patient. Safe swallow guidelines remain written on the white board in purple. Patient v/u, no family present. Provided no assistance, pt tolerates soft easy to chew consistency and thin liquids via straw with no overt clinical signs/symptoms of aspiration. Oxygen status remained stable t/o the entire session. Recommend remain on current diet with strict adherence to aspiration precautions and swallow strategies. No further swallow services warranted at this time. Please re consult if needed. RN alerted with results and recommendations.     MOST RECENT CXR 6/9  CONCLUSION:   1. No significant change in the patchy left retrocardiac opacity, which is concerning for pneumonia/aspiration.  Recommend follow-up chest radiograph in 4-6 weeks to monitor for resolution or nonemergent further evaluation with a CT chest.   2. Redemonstrated prominence of the interstitial markings, which likely reflects components of both small airways disease/bronchitis as well as emphysema.   3. Lesser incidental findings described above.           Diet Recommendations - Solids: Soft/ Easy to chew  Diet Recommendations - Liquids: Thin Liquids    Compensatory Strategies Recommended: Slow rate;Alternate consistencies;Small bites and sips;Multiple swallows  Aspiration Precautions: Upright position;Slow rate;Small bites and sips  Medication  Administration Recommendations:  (as tolerated)    Patient Experiencing Pain: No                Treatment Plan  Treatment Plan/Recommendations: Aspiration precautions;No further inpatient SLP service warranted    Interdisciplinary Communication: Plan posted at bedside            GOALS  Goal #1 The patient will tolerate soft easy to chew solid consistency and thin liquids without overt signs or symptoms of aspiration with 90 % accuracy over 2 session(s).  Goal Met 6/12   Goal #2 The patient/family/caregiver will demonstrate understanding and implementation of aspiration precautions and swallow strategies independently over 2 session(s).    Goal Met 6/12     FOLLOW UP  Follow Up Needed (Documentation Required): No  SLP Follow-up Date: 06/12/24  Number of Visits to Meet Established Goals: 2    Session: 1    If you have any questions, please contact BETHANIE Ibarra M.S. CCC-SLP  Speech Language Pathologist  Phone Number Ext. [phone]      Electronically signed by Brisa Lazaro SLP on 6/12/2024  2:14 PM           Multidisciplinary Problems     Active Goals        Problem: Patient/Family Goals    Goal Priority Disciplines Outcome Interventions   Patient/Family Long Term Goal     Interdisciplinary Progressing    Description: Patient's Long Term Goal: ***    Interventions:  - ***  - See additional Care Plan goals for specific interventions   Patient/Family Short Term Goal     Interdisciplinary Progressing    Description: Patient's Short Term Goal: ***    Interventions:   - ***  - See additional Care Plan goals for specific interventions

## (undated) NOTE — LETTER
Hospital Discharge Documentation    From: [facility] Hospitalist's Office  Phone: [phone]    Patient discharged time/date: 10/30/2024  2:00 PM  Patient discharge disposition:  [facility] emil Álvarez [phone]       Discharge Summary - D/C Summary        Discharge Summary signed by Ros Tamayo MD at 10/30/2024 10:05 AM  Version 1 of 1      Author: Ros Tamayo MD Service: Hospitalist Author Type: Physician    Filed: 10/30/2024 10:05 AM Date of Service: 10/30/2024 10:01 AM Status: Signed    : Ros Tamayo MD (Physician)            Discharge Summary     Cody Fitzgerald Patient Status:  Inpatient    1940 MRN [id number]   Location [facility] 5SW/SE Attending Ros Tamayo MD   Hosp Day # 5 PCP Kandice Claudio DO     Date of Admission: 10/25/2024  Date of Discharge: 10/30/2024  Discharge Disposition: SNF    Discharge Diagnosis:   Recurrent aspiration pneumonia, left lung- postobstructive/ RLL PNA new  -in pt with  ho COPD      History of Present Illness:             Brief Synopsis:   Recurrent aspiration pneumonia, left lung- postobstructive/ RLL PNA new  -in pt with  ho COPD  -continue IV abx.  -continue oxygen supplementation via NC  -will monitor closely  -leukocytosis resolved.   -procal mildly elevated.   -CT reviewed.  - appr pulm input: bronch 10/29 Mild clear mucus plugging but otherwise normal airways.   Positioning of patient is crucial to prevent further aspiration (position favoring R side)           Elevated BNp   -given IV lasix/currently stopped  -appreciate cardio recs  - Echo EF 55-60%, no RWMA, grade2 DDX, dilated AA 4.5cm, mildly increased PASP     Chronic left hemiparesis.  -continue statin plavix  -stable     Essential hypertension.  -bp controlled  -monitor and titrate meds as needed     Stage 1 sacral pressure injury.  Lace+ Score: 82  59-90 High Risk  29-58 Medium Risk  0-28   Low Risk       TCM Follow-Up Recommendation:  LACE < 29:  Low Risk of readmission after discharge from the hospital; Still recommend for TCM follow-up.    Procedures during hospitalization:   Bronchoscopy        Consultants:  Pulmonary, cardiology    Discharge Medication List:     Discharge Medications        ASK your doctor about these medications        Instructions Prescription details   acetaminophen 500 MG Tabs  Commonly known as: Tylenol Extra Strength      Take 2 tablets (1,000 mg total) by mouth in the morning and 2 tablets (1,000 mg total) before bedtime.   Refills: 0     amLODIPine 5 MG Tabs  Commonly known as: Norvasc      TAKE 1 TABLET(5 MG) BY MOUTH DAILY   Quantity: 90 tablet  Refills: 1     atorvastatin 40 MG Tabs  Commonly known as: Lipitor      TAKE 1 TABLET(40 MG) BY MOUTH NIGHTLY   Quantity: 90 tablet  Refills: 3     clopidogrel 75 MG Tabs  Commonly known as: Plavix      TAKE 1 TABLET(75 MG) BY MOUTH DAILY   Quantity: 90 tablet  Refills: 3     fluocinonide 0.05 % Oint  Commonly known as: Lidex      Apply a small amount of ointment on area of rash twice a day as necessary.   Quantity: 30 g  Refills: 2     gabapentin 300 MG Caps  Commonly known as: Neurontin      Take 2 capsules (600 MG) by mouth in the morning, 1 capsule (300 MG) at noon, and 1 capsule (300 MG) at bedtime   Quantity: 360 capsule  Refills: 3     ipratropium-albuterol 0.5-2.5 (3) MG/3ML Soln  Commonly known as: Duoneb      Take 3 mL by nebulization 3 (three) times daily as needed (shortness of breath). And as needed   Quantity: 3 each  Refills: 3     levothyroxine 100 MCG Tabs  Commonly known as: Synthroid      Take 1 tablet (100 mcg total) by mouth daily.   Quantity: 90 tablet  Refills: 3     Vitamin D 50 MCG (2000 UT) Caps      Take 1 capsule (2,000 Units total) by mouth daily.   Refills: 0              Follow-up appointment:   Louis Dumont  EChristian HARRIS [facility] [address]  [facility] [id number] [phone]    Follow up  Office will call to schedule follow up    Appointments for Next  30 Days 10/30/2024 - 11/29/2024      None            Supplementary Documentation:   [place]    Vital signs:  Temp:  [97.4 °F (36.3 °C)-98.5 °F (36.9 °C)] 98.5 °F (36.9 °C)  Pulse:  [66-85] 69  Resp:  [14-23] 18  BP: (119-155)/(61-94) 151/66  SpO2:  [90 %-99 %] 90 %    Physical Exam:    General:  NAD  Cardiovascular:  S1, S2    -----------------------------------------------------------------------------------------------  PATIENT DISCHARGE INSTRUCTIONS: See electronic chart    Tip: Documentation requirements: For split shared discharge, BOTH providers need to document specific floor, unit, and time spent on the discharge.  The note needs to be signed by the provider with > 50% of time and bill under their NPI.   Time spent:  45min        Ros Tamayo MD          Electronically signed by Ros Tamayo MD on 10/30/2024 10:05 AM

## (undated) NOTE — LETTER
Hospital Discharge Documentation  Please phone to schedule a hospital follow up appointment.     From: [address] Hospitalist's Office  Phone: [phone]    Patient discharged time/date: 1/9/2020  5:29 PM  Patient discharge disposition:  Maggie Patricio Pneumonia due to infectious organism, unspecified laterality, unspecified part of lung     Sepsis, due to unspecified organism     Spinal stenosis of lumbar region without neurogenic claudication     Arterial ischemic stroke, MCA (middle cerebral artery As above.     Hx of :  Hypertension  Dyslipidemia  Hypothyroidism  Chronic kidney disease stage III  History of stroke with left-sided hemiparesis      dvt proph:  heparin     Code status:   Full    Consultations:   None    Discharge Condition:  Noemi Payton TAKE 1 TABLET DAILY   Quantity:  90 tablet  Refills:  3     Vitamin D 50 MCG (2000 UT) Caps      Take 1 capsule by mouth daily.    Refills:  0        STOP taking these medications    Amoxicillin-Pot Clavulanate 875-125 MG Tabs  Commonly known as:  AUGMENTI

## (undated) NOTE — Clinical Note
Dear Natasha Luther,    I had the opportunity to see your patient Mae Gomez recently. I am sending you this update, and I appreciate your confidence in me to care for your patients. Please feel free call me with any questions at [phone] or contact me through Counts include 234 beds at the [facility]     Sincerely,  Beth Simon MD  Board Certified, Physical Medicine and Rehabilitation  Board certified, [facility] Mary Renny Erik

## (undated) NOTE — LETTER
[address] [address]   Date:   3/1/2021     Name:   Corby Kaur    YOB: 1940   MRN:   [id number]       [facility]? Randall the areas on your body where you feel the described sensations.   Us

## (undated) NOTE — LETTER
Hospital Discharge Documentation  Please phone to schedule a hospital follow up appointment.     From: [address] Hospitalist's Office  Phone: [phone]    Patient discharged time/date: 7/11/2019  1:20 PM  Patient discharge disposition:  Home or Self Aspiration pneumonia of left lower lobe (HCC)     Aspiration pneumonitis (HCC)     Pneumonia due to infectious organism     Pneumonia due to infectious organism, unspecified laterality, unspecified part of lung     Sepsis, due to unspecified organism (H Acute hypoxemic respiratory failure, pulse ox 83% RA on admission  From pneumonia and pulmonary edema  - O2 protocol  -   - CXR reviewed  Pt given abx and lasix and was weaned off of o2.     Pulmonary edema and peripheral edema - LE edema for the pa Take 1 tablet by mouth daily. Refills:  0     Irbesartan 75 MG Tabs      Take 1 tablet (75 mg total) by mouth daily.    Refills:  0     Levothyroxine Sodium 100 MCG Tabs  Commonly known as:  SYNTHROID      TAKE 1 TABLET DAILY   Quantity:  90 tablet  Refi

## (undated) NOTE — Clinical Note
Dear Laverne Sparks,    I had the opportunity to see your patient Kaye Rush recently. I am sending you this update, and I appreciate your confidence in me to care for your patients.  Please feel free call me with any questions at [phone] or contact me romain

## (undated) NOTE — LETTER
[address]  [facility] [address], [place], [place]  [address]  INFORMED CONSENT FOR TRANSFUSION OF BLOOD OR BLOOD PRODUCTS  My physician has informed me of the nature, purpose, benefits and risks of transfusion for blood and blood components that ______________________________________________  (Signature of Patient)                                                            (Responsible party in case of Minor,

## (undated) NOTE — IP AVS SNAPSHOT
[facility]            (For Outpatient Use Only) Initial Admit Date: 2022   Inpt/Obs Admit Date: Inpt: 22 / Obs: N/A   Discharge Date:    María Florian:  [de-identified]   MRN: [de-identified]   CSN: [id number]   CEID: [id number]        ENCOUNTER  Patient Class: Inpatient Admitting Provider: Jennifer Mims MD Unit: [address] Service: Cardiac Telemetry Attending Provider: Jose Alberto Sharma MD   Bed: 305-A   Visit Type:   Referring Physician: No ref. provider found Billing Flag:    Admit Diagnosis: Renal insufficiency [N28.9]      PATIENT  Legal Name:   Nash Padilla   Legal Sex: Male  Gender ID:              Pref Name:    PCP:  Rodney Norton MD Home: [phone]   Address:  [address] [place] [address] : 1940 (82 yrs) Mobile: [phone]         City/State/[place]: [place] [address] Marital:  Language: [address] [place] [address] Drive: Pro V&V SSN4: [id number] Latter day: Uatsdin - No Visit     Race: White Ethnicity: Non  Or [address] CONTACT   Name Relationship Legal Guardian? Home Phone Work Phone Mobile Phone   1. Meme Fitzgerald  2.  Nichole Peña  Son    [phone]       GUARANTOR  Guarantor: Mario Ventura : 1940 Home Phone: [phone]   Address: [address] [place] [address]  Sex: Male Work Phone:    City/State/Zip: [place] [address] Abbi Singer   Rel. to Patient: Self Guarantor ID: [id number]   GUARANTOR EMPLOYER   Employer:  Status: RETIRED     COVERAGE  PRIMARY INSURANCE   Payor: MEDICARE Plan: MEDICARE PART A&B   Group Number: [id number] Insurance Type: [address] Name: Donna Nichols : 1940   Subscriber ID: [id number] Pt Rel to Subscriber: Self   SECONDARY INSURANCE   Payor: [facility],  [phone]   Group Number: [id number] Insurance Type: [address] Name: Donna Nichols : 1940   Subscriber ID: [id number] Pt Rel to Subscriber: SELF   TERTIARY INSURANCE   Payor:  Plan:    Group Number:  Insurance Type:    Subscriber Name:  Subscriber :    Subscriber ID:  Pt Rel to Subscriber:    Hospital Account Financial Class: Medicare    2022

## (undated) NOTE — LETTER
Hospital Discharge Documentation  Please phone to schedule a hospital follow up appointment.     From: [address] Hospitalist's Office  Phone: [phone]    Patient discharged time/date: 5/16/2019 12:50 PM  Patient discharge disposition:  [address] CV: Heart with regular rate and rhythm  Abd: Abdomen soft, nontender, nondistended, bowel sounds present  Neuro: No acute focal deficits  MSK: Full range of motion in extremities  Skin: Warm and dry  Psych: Normal affect  Ext: no c/c/e      History of Pres Take 1 tablet by mouth 2 (two) times daily for 6 days.    Stop taking on:  5/22/2019  Quantity:  12 tablet  Refills:  0        CONTINUE taking these medications      Instructions Prescription details   acetaminophen 500 MG Tabs  Commonly known as:  TYLENOL

## (undated) NOTE — LETTER
[address] [place], [place] [address]PHYSIATR   Date:   4/25/2022     Name:   Roselia Qureshi    YOB: 1940   MRN:   [id number]       [facility]? Randall the areas on your body where you feel the described sensations. Use the appropriate symbol. Pravin Galeck the areas of radiation. Include all affected areas. Just to complete the picture, please draw in the face. ACHE:  ^ ^ ^   NUMBNESS:  0000   PINS & NEEDLES:  = = = =                              ^ ^ ^                       0000              = = = =                                    ^ ^ ^                       0000            = = = =      BURNING:  XXXX   STABBING: ////                  XXXX                ////                         XXXX          ////     Please randall the line below indicating your degree of pain right now  with 0 being no pain 10 being the worst pain possible.                                          0             1             2              3             4              5              6              7             8             9             10         Patient Signature:

## (undated) NOTE — Clinical Note
Dear Brendan Mathews,    I had the opportunity to see your patient William Simons recently. I am sending you this update, and I appreciate your confidence in me to care for your patients.  Please feel free call me with any questions at [phone] or contact me romain

## (undated) NOTE — LETTER
[facility]  Administration of Anesthesia  I, Cody Fitzgerald agree to be cared for by a physician anesthesiologist alone and/or with a nurse anesthetist, who is specially trained to monitor me and give me medicine to put me to sleep or keep me comfortable during my procedure    I understand that my anesthesiologist and/or anesthetist is not an employee or agent of [facility] or [facility]. He or she works for [facility]    As the patient asking for anesthesia services, I agree to:  Allow the anesthesiologist (anesthesia doctor) to give me medicine and do additional procedures as necessary. Some examples are: Starting or using an “IV” to give me medicine, fluids or blood during my procedure, and having a breathing tube placed to help me breathe when I’m asleep (intubation). In the event that my heart stops working properly, I understand that my anesthesiologist will make every effort to sustain my life, unless otherwise directed by [facility] Do Not Resuscitate documents.  Tell my anesthesia doctor before my procedure:  If I am pregnant.  The last time that I ate or drank.  iii. All of the medicines I take (including prescriptions, herbal supplements, and pills I can buy without a prescription (including street drugs/illegal medications). Failure to inform my anesthesiologist about these medicines may increase my risk of anesthetic complications.  iv.If I am allergic to anything or have had a reaction to anesthesia before.  I understand how the anesthesia medicine will help me (benefits).  I understand that with any type of anesthesia medicine there are risks:  The most common risks are: nausea, vomiting, sore throat, muscle soreness, damage to my eyes, mouth, or teeth (from breathing tube placement).  Rare risks include: remembering what happened during my procedure, allergic reactions to medications, injury to my airway, heart, lungs, vision, nerves, or  muscles and in extremely rare instances death.  My doctor has explained to me other choices available to me for my care (alternatives).  Pregnant Patients (“epidural”):  I understand that the risks of having an epidural (medicine given into my back to help control pain during labor), include itching, low blood pressure, difficulty urinating, headache or slowing of the baby’s heart. Very rare risks include infection, bleeding, seizure, irregular heart rhythms and nerve injury.  Regional Anesthesia (“spinal”, “epidural”, & “nerve blocks”):  I understand that rare but potential complications include headache, bleeding, infection, seizure, irregular heart rhythms, and nerve injury.    _____________________________________________________________________________  Patient (or Representative) Signature/Relationship to Patient  Date   Time    _____________________________________________________________________________   Name (if used)    Language/Organization   Time    _____________________________________________________________________________  Nurse Anesthetist Signature     Date   Time  _____________________________________________________________________________  Anesthesiologist Signature     Date   Time  I have discussed the procedure and information above with the patient (or patient’s representative) and answered their questions. The patient or their representative has agreed to have anesthesia services.    _____________________________________________________________________________  Witness        Date   Time  I have verified that the signature is that of the patient or patient’s representative, and that it was signed before the procedure  Patient Name: Cody Fitzgerald     : 1940                 Printed: 10/29/2024 at 6:51 AM    Medical Record #: [id number]                                            Page 1 of 1  ----------ANESTHESIA CONSENT----------

## (undated) NOTE — Clinical Note
Dr. Bam Hampton MD, Cody Fitzgerald's  wife verbally declines to participate in Chronic Care Management services and any associated charges, sending letter on your behalf for patient to re-consider CCM services.  GAN[facility]  Care Management [facility]  [email or web address] [phone] work [address], [address] [place] [address] [email or web address]

## (undated) NOTE — IP AVS SNAPSHOT
Patient Demographics     Address  [facility] [address]  [facility] [address] Phone  [phone] (Home) *Preferred*  [phone] (Mobile) E-mail Address  [email or web address]      Patient Contacts     Name Relation Home Work Mobile    Meme Fitzgerald Spouse [phone]    SHAYNA Fitzgerald [phone]        Allergies as of 1/17/2024  Review status set to In Progress on 1/11/2024   No Known Allergies     Code Status Information     Code Status    Full Code      Patient Instructions    None      Follow-up Information     Bam Hampton MD Follow up in 1 week(s).    Specialty: Internal Medicine  Contact information:  [address] [place] [id number] [phone]                        Your Home Meds List      TAKE these medications       Instructions Authorizing Provider Morning Afternoon Evening As Needed   acetaminophen 500 MG Tabs  Commonly known as: Tylenol Extra Strength  Next dose due: As needed      Take 2 tablets (1,000 mg total) by mouth in the morning and 2 tablets (1,000 mg total) before bedtime. Also may have 500 mg PO Q6 prn but not to exceed 3 gm total of acetaminophen in 24 hours.          amLODIPine 5 MG Tabs  Commonly known as: Norvasc  Next dose due: Tomorrow 1/18/24      Take 1 tablet (5 mg total) by mouth daily.   Bam Hampton         atorvastatin 40 MG Tabs  Commonly known as: Lipitor  Next dose due: tonight      Take 1 tablet (40 mg total) by mouth nightly.   Bam Hampton         benzonatate 200 MG Caps  Commonly known as: Tessalon  Next dose due: As needed      Take 1 capsule (200 mg total) by mouth 3 (three) times daily as needed for cough.   Mark Chris         clopidogrel 75 MG Tabs  Commonly known as: Plavix  Next dose due: Tomorrow 1/18/24      Take 1 tablet (75 mg total) by mouth daily.   Bam Hampton         ferrous sulfate 325 (65 FE) MG Tbec  Next dose due: Tomorrow 1/18/24      Take 1 tablet (325 mg total) by mouth daily with breakfast.   Sha CHAUHAN  Soraya         gabapentin 300 MG Caps  Commonly known as: Neurontin  Next dose due: This afternoon      Take 1 capsule (300 mg total) by mouth 3 (three) times daily.   Bam Memo         levothyroxine 100 MCG Tabs  Commonly known as: Synthroid  Next dose due: Tomorrow 1/18/24      TAKE 1 TABLET(100 MCG) BY MOUTH DAILY   Bam Memo         lidocaine 4 % Ptch  Commonly known as: LIDODERM  Next dose due: As needed      Place 2 patches onto the skin daily. L Shoulder  &L hip as needed          Vitamin D 50 MCG (2000 UT) Caps  Next dose due: Tomorrow 1/18/24      Take 1 capsule (2,000 Units total) by mouth daily.                Where to Get Your Medications      These medications were sent to [id number] - [place], [place] - [address] AT [facility], [phone], [phone]  [address] [facility] [address]    Phone: [phone]   ferrous sulfate 325 (65 FE) MG Tbec           [id number] - MAR ACTION REPORT  (last 48 hrs)    ** SITE UNKNOWN **     Order ID Medication Name Action Time Action Reason Comments    [id number] acetaminophen (Tylenol Extra Strength) tab 500 mg 01/15/24 1422 Given      [id number] acetaminophen (Tylenol Extra Strength) tab 500 mg 01/15/24 2001 Given      [id number] acetaminophen (Tylenol Extra Strength) tab 500 mg 01/16/24 0158 Given      [id number] acetaminophen (Tylenol Extra Strength) tab 500 mg 01/16/24 1215 Given      [id number] acetaminophen (Tylenol Extra Strength) tab 500 mg 01/16/24 2020 Given      [id number] acetaminophen (Tylenol Extra Strength) tab 500 mg 01/17/24 0601 Given      [id number] amLODIPine (Norvasc) tab 5 mg 01/16/24 0817 Given      [id number] amLODIPine (Norvasc) tab 5 mg 01/17/24 0803 Given      [id number] ampicillin-sulbactam (Unasyn) 3 g in sodium chloride 0.9% 100mL IVPB-ADD 01/15/24 1420 New Bag      [id number] ampicillin-sulbactam (Unasyn) 3 g in sodium chloride 0.9% 100mL IVPB-ADD 01/15/24 1848 New Bag      [id number] atorvastatin  (Lipitor) tab 40 mg 01/15/24 1949 Given      [id number] atorvastatin (Lipitor) tab 40 mg 01/16/24 2015 Given      [id number] calcium carbonate (Tums) chewable tab 500 mg 01/15/24 1949 Given      [id number] calcium carbonate (Tums) chewable tab 500 mg 01/16/24 2015 Given      [id number] clopidogrel (Plavix) tab 75 mg 01/16/24 0817 Given      [id number] clopidogrel (Plavix) tab 75 mg 01/17/24 0803 Given      [id number] gabapentin (Neurontin) cap 300 mg 01/15/24 1657 Given      [id number] gabapentin (Neurontin) cap 300 mg 01/15/24 2000 Given      [id number] gabapentin (Neurontin) cap 300 mg 01/16/24 0817 Given      [id number] gabapentin (Neurontin) cap 300 mg 01/16/24 1627 Given      [id number] gabapentin (Neurontin) cap 300 mg 01/16/24 2015 Given      [id number] gabapentin (Neurontin) cap 300 mg 01/17/24 0803 Given      [id number] levothyroxine (Synthroid) tab 100 mcg 01/16/24 0601 Given      [id number] levothyroxine (Synthroid) tab 100 mcg 01/17/24 0601 Given            LEFT LOWER ABDOMEN     Order ID Medication Name Action Time Action Reason Comments    [id number] heparin (Porcine) 5000 UNIT/ML injection 5,000 Units 01/15/24 2000 Given      [id number] heparin (Porcine) 5000 UNIT/ML injection 5,000 Units 01/16/24 2015 Given            RIGHT LOWER ABDOMEN     Order ID Medication Name Action Time Action Reason Comments    [id number] heparin (Porcine) 5000 UNIT/ML injection 5,000 Units 01/16/24 0818 Given      [id number] heparin (Porcine) 5000 UNIT/ML injection 5,000 Units 01/17/24 0804 Given              Recent Vital Signs    Flowsheet Row Most Recent Value   /73 Filed at 01/17/2024 1315   Pulse 73 Filed at 01/17/2024 1315   Resp 20 Filed at 01/17/2024 1315   Temp 97.2 °F (36.2 °C) Filed at 01/17/2024 1315   SpO2 93 % Filed at 01/17/2024 1315      Patient's Most Recent Weight    Flowsheet Row Most Recent Value   Patient Weight 87.9 kg (193 lb 11.2 oz)         Lab Results Last 24 Hours      Comp Metabolic Panel (14) [[id number]]  (Abnormal)  Resulted: 01/17/24 0650, Result status: Final result   Ordering provider: Ana La MD  01/16/24 2309 Resulting lab: [facility] ([facility])    Specimen Information    Type Source Collected On   Blood — 01/17/24 0543          Components    Component Value Reference Range Flag Lab   Glucose 94 70 - 99 mg/dL — [facility])   Sodium 142 136 - 145 mmol/L — [facility])   Potassium 3.5 3.5 - 5.1 mmol/L — [facility])   Chloride 108 98 - 112 mmol/L — [facility])   CO2 27.0 21.0 - 32.0 mmol/L — [facility])   Anion Gap 7 0 - 18 mmol/L — [facility])   BUN 16 9 - 23 mg/dL — [facility])   Creatinine 1.30 0.70 - 1.30 mg/dL — [facility])   BUN/CREA Ratio 12.3 10.0 - 20.0 — [facility])   Calcium, Total 9.5 8.7 - 10.4 mg/dL — [facility])   Calculated Osmolality 295 275 - 295 mOsm/kg — [facility])   eGFR-Cr 55 >=60 mL/min/1.73m2 L [facility])   ALT 11 10 - 49 U/L — [facility])   AST 16 <=34 U/L — [facility])   Alkaline Phosphatase 96 45 - 117 U/L — [facility])   Bilirubin, Total 0.2 0.2 - 1.1 mg/dL — [facility])   Total Protein 7.9 5.7 - 8.2 g/dL — [facility])   Albumin 4.0 3.2 - 4.8 g/dL — [facility])   Globulin  3.9 2.8 - 4.4 g/dL — [facility])   A/G Ratio 1.0 1.0 - 2.0 — [facility])            CBC With Differential With Platelet [[id number]] (Abnormal)  Resulted: 01/17/24 0630, Result status: Final result   Ordering provider: Ana La MD  01/16/24 2300 Resulting lab: [facility] ([facility])   Narrative:  The following orders were created for panel order CBC With Differential With Platelet.  Procedure                               Abnormality         Status                     ---------                               -----------         ------                     CBC W/ DIFFERENTIAL[[id number]]          Abnormal            Final  result                 Please view results for these tests on the individual orders.    Specimen Information    Type Source Collected On   Blood — 01/17/24 0543            Testing Performed By     Lab - Abbreviation Name Director Address Valid Date Range    162 - [place] [facility] ([facility] ([facility]) Fernando Echeverria MICAELA Garcia [facility] [address] 03/19/20 1442 - Present            Microbiology Results (All)     Procedure Component Value Units Date/Time    Blood Culture [[id number]] Collected: 01/11/24 1313    Order Status: Completed Lab Status: Final result Updated: 01/16/24 1401    Specimen: Blood,peripheral      Blood Culture Result No Growth 5 Days    Blood Culture [[id number]] Collected: 01/11/24 1313    Order Status: Completed Lab Status: Final result Updated: 01/16/24 1401    Specimen: Blood,peripheral      Blood Culture Result No Growth 5 Days    SARS-CoV-2/Flu A and B/RSV by PCR (GeneXpert) [[id number]]  (Normal) Collected: 01/11/24 1107    Order Status: Completed Lab Status: Final result Updated: 01/11/24 1201    Specimen: Other from Nares      SARS-CoV-2 (COVID-19) - (GeneXpert) Not Detected     Influenza A by PCR Negative     Influenza B by PCR Negative     RSV by PCR Negative    Narrative:      This test is intended for the qualitative detection and differentiation of SARS-CoV-2, influenza A, influenza B, and respiratory syncytial virus (RSV) viral RNA in nasopharyngeal or nares swabs from individuals suspected of respiratory viral infection consistent with COVID-19 by their healthcare provider. Signs and symptoms of respiratory viral infection due to SARS-CoV-2, influenza, and RSV can be similar.    Test performed using the Xpert Xpress SARS-CoV-2/FLU/RSV (real time RT-PCR)  assay on the GeneXpert instrument, [employer], [place] [address].   This test is being used under the Food and Drug Administration's Emergency Use Authorization.    The authorized Fact Sheet  for Healthcare Providers for this assay is available upon request from the laboratory.         H&P - H&P Note      H&P signed by Johanny Pearson MD at 1/12/2024  9:58 AM   Version 1 of 1    Author: Johanny Pearson MD Service: Hospitalist Author Type: Physician    Filed: 1/12/2024  9:58 AM Status: Signed    : Johanny Pearson MD (Physician)       [facility]    PATIENT'S NAME: HARMAN KUMAR   ATTENDING PHYSICIAN: Juwan Heredia MD   PATIENT ACCOUNT#:   [id number]    LOCATION:  [place] [address]  MEDICAL RECORD #:   [id number]       YOB: 1940  ADMISSION DATE:       01/11/2024    HISTORY AND PHYSICAL EXAMINATION    CHIEF COMPLAINT:  Recurrent aspiration pneumonia and sepsis.    HISTORY OF PRESENT ILLNESS:  The patient is an 83-year-old  male who was brought into the emergency department for evaluation of cough and fever at home.  CBC showed white blood cell count of 19.8 with left shift.  Chemistry showed GFR of 59 which is below his baseline.  Procalcitonin 0.13 and lactic acid 2.3.  GeneXpert viral panel was negative.  Chest x-ray showed prominent vasculature with pulmonary interstitial prominence.  The patient was started on IV antibiotics, azithromycin and Unasyn, and he will be admitted to hospital for further management.    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    PAST SURGICAL HISTORY:  Tonsillectomy, abdominal aortic aneurysm endovascular stent graft, cholecystectomy, cataract procedure.    MEDICATIONS:  Please see medication reconciliation list.    ALLERGIES:  No known drug allergies.    FAMILY HISTORY:  Father had lung cancer and hypertension.    SOCIAL HISTORY:  Ex-tobacco user.  No current tobacco, alcohol, or  drug use.  Lives with his wife.  Requires assistance in his basic activities of daily living.    REVIEW OF SYSTEMS:  The patient is a poor historian.  He will respond no to anything.  It is not clear if he has been having difficulty choking on his food or thin liquids at home.  He was brought in today for fever, cough, and generalized malaise.      PHYSICAL EXAMINATION:    GENERAL:  The patient seemed a bit flushed and fatigued.  VITAL SIGNS:  Temperature 100.6, pulse 114, sinus tachycardia on monitor, respiratory rate 22, blood pressure 160/90, pulse ox 88% on room air.  HEENT:  Atraumatic.  Oropharynx clear, dry mucous membranes.  NECK:  Supple.  No lymphadenopathy.  Trachea midline.  Full range of motion.  LUNGS:  Rhonchi auscultated on both lung bases with diminished breathing sounds both lung fields.  HEART:  Regular rate and rhythm.  S1, S2 auscultated.  No murmur.  Sinus tachycardia on monitor with right bundle branch block.  ABDOMEN:  Soft, nondistended, no tenderness.  Positive bowel sounds.  EXTREMITIES:  No edema, clubbing, or cyanosis.  NEUROLOGIC:  Chronic left hemiparesis.  No other acute findings.    ASSESSMENT:    1.   Hypoxemic respiratory failure.  2.   Sepsis with possible aspiration versus community-acquired pneumonia.  3.   Chronic kidney disease stage 2 to 3.  4.   Hypertension.    PLAN:  The patient will be admitted to general medical floor.  Continue to monitor his clinical status, obtain speech therapy evaluation, IV fluids, soft diet, IV Unasyn and azithromycin, sputum and blood cultures, monitor his hemodynamic status, follow up on lactic acid.  Further recommendations to follow.    Dictated By Johanny Pearson MD  d: 01/11/2024 13:04:50  t: 01/11/2024 13:29:36  Job [id number]  FB/    Electronically signed by Johanny Pearson MD on 1/12/2024  9:58 AM           D/C Summary    No notes of this type exist for this encounter.     Physical Therapy Notes (last 72 hours)  Notes from  1/14/2024  2:16 PM through 1/17/2024  2:16 PM   No notes of this type exist for this encounter.     Occupational Therapy Notes (last 72 hours)  Notes from 1/14/2024  2:16 PM through 1/17/2024  2:16 PM   No notes of this type exist for this encounter.     Video Swallow Study Notes    No notes of this type exist for this encounter.        SLP Note - SLP Notes      SLP Note signed by Suzette Florian SLP at 1/13/2024 11:54 AM  Version 1 of 1    Author: Suzette Florian SLP Service: Rehab Author Type: SPEECH-LANGUAGE PATHOLOGIST    Filed: 1/13/2024 11:54 AM Date of Service: 1/13/2024 11:49 AM Status: Signed    : Suzette Florian SLP (SPEECH-LANGUAGE PATHOLOGIST)       SPEECH DAILY NOTE - INPATIENT    ASSESSMENT & PLAN   ASSESSMENT  Pt seen for dysphagia tx to assess tolerance with recommended diet, ensure proper utilization of aspiration precautions and provide pt/family education.  Patient's wife at the bedside. Patient and his wife were educated on results and recommendations of BSSE on 1/12 and Pillars of Aspiration PNA. Understanding verbalized.  Patient independent in recall of aspiration precautions and able to provide demonstration of aspiration precautions. Patient demonstrates safe and efficient po intake of recommended diet. No overt clinical s/s of aspiration precautions with recommended diet and utilization of aspiration precautions.   Will discharge from skilled dysphagia therapy. Continue recommended diet with aspiration precautions.       Diet Recommendations - Solids: Soft/ Easy to chew  Diet Recommendations - Liquids: Thin Liquids    Compensatory Strategies Recommended: No straws;Slow rate;Small bites and sips  Aspiration Precautions: Upright position;Slow rate;Small bites and sips;No straw  Medication Administration Recommendations:  (as tolerated)    Patient Experiencing Pain: No                Discharge Recommendations  Discharge Recommendations/Plan: Undetermined    Treatment  Plan  Treatment Plan/Recommendations: Aspiration precautions    Interdisciplinary Communication: Disussed with other staff             GOALS  Goal #1 The patient will tolerate soft easy to chew consistency and thin liquids without overt signs or symptoms of aspiration with 100 % accuracy over 1-2 session(s).  Met   Goal #2 The patient/family/caregiver will demonstrate understanding and implementation of aspiration precautions and swallow strategies independently over 1-2 session(s).     Met   Goal #3 The patient will utilize compensatory strategies as outlined by  BSSE (clinical evaluation) including Slow rate, Small bites, Small sips, No straws, Upright 90 degrees, Head turn Left with PRN assistance 100 % of the time across 1-2 sessions.  Met         FOLLOW UP  Follow Up Needed (Documentation Required): No  SLP Follow-up Date: 01/13/24  Number of Visits to Meet Established Goals:  (1-2)    Session: 1 of 2 (recalls and demonstrates independence in aspiration precautions)    If you have any questions, please contact BETHANIE Good    Electronically signed by Suzette Florian SLP on 1/13/2024 11:54 AM           Immunizations     Name Date      Covid-19 Pfizer 08/01/22     Covid-19 Pfizer 10/08/21     Covid-19 Pfizer 02/26/21     Covid-19 Pfizer 02/05/21     Fluad 0.5ml 11/08/21     Fluad 0.5ml 10/12/19     Fluad 0.5ml 11/12/18     INFLUENZA 10/11/23     INFLUENZA 10/14/20     INFLUENZA 10/14/20     INFLUENZA 11/13/18     INFLUENZA 11/27/17     INFLUENZA 11/01/17     INFLUENZA 11/01/17     INFLUENZA 11/29/16     INFLUENZA 10/20/15     INFLUENZA 10/28/14     INFLUENZA 10/28/14     Pneumococcal (Prevnar 13) 11/14/18     Pneumococcal (Prevnar 13) 10/28/14     Pneumovax 23 11/01/17     Pneumovax 23 06/05/13       Multidisciplinary Problems     Active Goals     Not on file          Resolved Goals        Problem: Patient/Family Goals    Goal Priority Disciplines Outcome Interventions   Patient/Family Long Term Goal    (Resolved)     Interdisciplinary Completed    Description: Patient's Long Term Goal: discharge home    Interventions:  - follow md orders.  -   - See additional Care Plan goals for specific interventions   Patient/Family Short Term Goal   (Resolved)     Interdisciplinary Completed    Description: Patient's Short Term Goal: ***    Interventions:   - ***  - See additional Care Plan goals for specific interventions